# Patient Record
Sex: MALE | Race: WHITE | NOT HISPANIC OR LATINO | Employment: OTHER | URBAN - METROPOLITAN AREA
[De-identification: names, ages, dates, MRNs, and addresses within clinical notes are randomized per-mention and may not be internally consistent; named-entity substitution may affect disease eponyms.]

---

## 2017-05-12 PROBLEM — M25.552 PAIN OF LEFT HIP JOINT: Status: ACTIVE | Noted: 2017-05-12

## 2017-05-12 PROBLEM — K92.2 GASTROINTESTINAL HEMORRHAGE: Status: ACTIVE | Noted: 2017-05-12

## 2017-05-12 PROBLEM — F19.90 EXCESSIVE USE OF NONSTEROIDAL ANTI-INFLAMMATORY DRUG (NSAID): Status: ACTIVE | Noted: 2017-05-12

## 2017-05-12 PROBLEM — D64.9 SYMPTOMATIC ANEMIA: Status: ACTIVE | Noted: 2017-05-12

## 2017-05-16 PROBLEM — E87.6 HYPOKALEMIA: Status: ACTIVE | Noted: 2017-05-16

## 2017-10-24 ENCOUNTER — OFFICE VISIT (OUTPATIENT)
Dept: CARDIOLOGY | Facility: CLINIC | Age: 72
End: 2017-10-24
Payer: MEDICARE

## 2017-10-24 VITALS
SYSTOLIC BLOOD PRESSURE: 140 MMHG | HEIGHT: 71 IN | WEIGHT: 176 LBS | DIASTOLIC BLOOD PRESSURE: 71 MMHG | HEART RATE: 67 BPM | BODY MASS INDEX: 24.64 KG/M2

## 2017-10-24 DIAGNOSIS — I10 ESSENTIAL HYPERTENSION: ICD-10-CM

## 2017-10-24 DIAGNOSIS — I25.10 CORONARY ARTERY DISEASE INVOLVING NATIVE CORONARY ARTERY OF NATIVE HEART WITHOUT ANGINA PECTORIS: Primary | ICD-10-CM

## 2017-10-24 DIAGNOSIS — E78.00 HYPERCHOLESTEREMIA: ICD-10-CM

## 2017-10-24 PROCEDURE — 99999 PR PBB SHADOW E&M-NEW PATIENT-LVL III: CPT | Mod: PBBFAC,,, | Performed by: INTERNAL MEDICINE

## 2017-10-24 PROCEDURE — 99203 OFFICE O/P NEW LOW 30 MIN: CPT | Mod: PBBFAC,PO | Performed by: INTERNAL MEDICINE

## 2017-10-24 PROCEDURE — 99204 OFFICE O/P NEW MOD 45 MIN: CPT | Mod: S$PBB,,, | Performed by: INTERNAL MEDICINE

## 2017-10-24 RX ORDER — VITAMIN K2 40 MCG
1000 TABLET ORAL DAILY
Status: ON HOLD | COMMUNITY
End: 2023-01-01 | Stop reason: HOSPADM

## 2017-10-24 RX ORDER — AMOXICILLIN 500 MG
2 CAPSULE ORAL DAILY
Status: ON HOLD | COMMUNITY
End: 2018-02-16

## 2017-10-24 RX ORDER — ASPIRIN 81 MG/1
81 TABLET ORAL DAILY
Status: ON HOLD | COMMUNITY
End: 2018-02-16

## 2017-10-24 NOTE — PROGRESS NOTES
Subjective:    Patient ID:  Forrest Schmidt is a 72 y.o. male who presents for evaluation of established pt (established pt); Hypertension; and Hyperlipidemia      HPI72 yo WM who had coronary stent in 2004 , HTN and HLD. Had GI bleed this year related to NSAIDS. Doing well. Denies chest pain, SOB, or edema. Denies palpitations, weak spells, and syncope Goes to wellness program 3x's per week and exercises without problems except for a hip issue.    Review of Systems   Constitution: Negative for decreased appetite, fever, weakness, malaise/fatigue, weight gain and weight loss.   HENT: Negative for hearing loss and nosebleeds.    Eyes: Negative for visual disturbance.   Cardiovascular: Negative for chest pain, claudication, cyanosis, dyspnea on exertion, irregular heartbeat, leg swelling, near-syncope, orthopnea, palpitations, paroxysmal nocturnal dyspnea and syncope.   Respiratory: Negative for cough, hemoptysis, shortness of breath, sleep disturbances due to breathing, snoring and wheezing.    Endocrine: Negative for cold intolerance, heat intolerance, polydipsia and polyuria.   Hematologic/Lymphatic: Negative for adenopathy and bleeding problem. Does not bruise/bleed easily.   Skin: Negative for color change, itching, poor wound healing, rash and suspicious lesions.   Musculoskeletal: Positive for arthritis and joint pain. Negative for back pain, falls, joint swelling, muscle cramps, muscle weakness and myalgias.   Gastrointestinal: Negative for bloating, abdominal pain, change in bowel habit, constipation, flatus, heartburn, hematemesis, hematochezia, hemorrhoids, jaundice, melena, nausea and vomiting.   Genitourinary: Negative for bladder incontinence, decreased libido, frequency, hematuria, hesitancy and urgency.   Neurological: Negative for brief paralysis, difficulty with concentration, excessive daytime sleepiness, dizziness, focal weakness, headaches, light-headedness, loss of balance, numbness and vertigo.  "  Psychiatric/Behavioral: Negative for altered mental status, depression and memory loss. The patient does not have insomnia and is not nervous/anxious.    Allergic/Immunologic: Negative for environmental allergies, hives and persistent infections.        Objective:    Physical Exam   Constitutional: He is oriented to person, place, and time. He appears well-developed and well-nourished. No distress.   BP (!) 140/71   Pulse 67   Ht 5' 11" (1.803 m)   Wt 79.8 kg (176 lb)   BMI 24.55 kg/m²      HENT:   Head: Normocephalic and atraumatic.   Eyes: Conjunctivae and lids are normal. Pupils are equal, round, and reactive to light. Right eye exhibits no discharge. No scleral icterus.   Neck: Normal range of motion. Neck supple. No JVD present. No tracheal deviation present. No thyromegaly present.   Cardiovascular: Normal rate, regular rhythm, S1 normal, S2 normal, normal heart sounds and intact distal pulses.  Exam reveals no gallop and no friction rub.    No murmur heard.  Pulses:       Carotid pulses are 2+ on the right side, and 2+ on the left side.       Radial pulses are 2+ on the right side, and 2+ on the left side.        Femoral pulses are 2+ on the right side, and 2+ on the left side.       Popliteal pulses are 2+ on the right side, and 2+ on the left side.        Dorsalis pedis pulses are 2+ on the right side, and 2+ on the left side.        Posterior tibial pulses are 2+ on the right side, and 2+ on the left side.   Pulmonary/Chest: Effort normal and breath sounds normal. No respiratory distress. He has no wheezes. He has no rales. He exhibits no tenderness.   Abdominal: Soft. Bowel sounds are normal. He exhibits no distension and no mass. There is no hepatosplenomegaly or hepatomegaly. There is no tenderness. There is no rebound and no guarding.   Musculoskeletal: Normal range of motion. He exhibits no edema or tenderness.   Lymphadenopathy:     He has no cervical adenopathy.   Neurological: He is alert and " oriented to person, place, and time. He has normal reflexes. No cranial nerve deficit. Coordination normal.   Skin: Skin is warm and dry. No rash noted. He is not diaphoretic. No erythema. No pallor.   Psychiatric: He has a normal mood and affect. His speech is normal and behavior is normal. Judgment and thought content normal. Cognition and memory are normal.         Assessment:       1. Coronary artery disease involving native coronary artery of native heart without angina pectoris    2. Essential hypertension    3. Hypercholesteremia         Plan:     Patient asymptomatic with adequate control of BP and lipids    The current medical regimen is effective;  continue present plan and medications.    Patient advised to modify risk factors such as weight, exercise, diet,  tobacco and alcohol exposure    No orders of the defined types were placed in this encounter.    Return in about 6 months (around 4/24/2018).

## 2018-02-12 ENCOUNTER — HOSPITAL ENCOUNTER (INPATIENT)
Facility: HOSPITAL | Age: 73
LOS: 20 days | Discharge: HOME HEALTH | DRG: 982 | End: 2018-03-04
Attending: INTERNAL MEDICINE | Admitting: INTERNAL MEDICINE
Payer: MEDICARE

## 2018-02-12 DIAGNOSIS — K63.89 MASS OF SMALL INTESTINE: ICD-10-CM

## 2018-02-12 DIAGNOSIS — K92.1 GASTROINTESTINAL HEMORRHAGE WITH MELENA: ICD-10-CM

## 2018-02-12 DIAGNOSIS — K92.2 GIB (GASTROINTESTINAL BLEEDING): ICD-10-CM

## 2018-02-12 DIAGNOSIS — K92.2 GASTROINTESTINAL HEMORRHAGE: ICD-10-CM

## 2018-02-12 PROCEDURE — 36415 COLL VENOUS BLD VENIPUNCTURE: CPT

## 2018-02-12 PROCEDURE — 83735 ASSAY OF MAGNESIUM: CPT

## 2018-02-12 PROCEDURE — 11000001 HC ACUTE MED/SURG PRIVATE ROOM

## 2018-02-12 PROCEDURE — 84100 ASSAY OF PHOSPHORUS: CPT

## 2018-02-12 PROCEDURE — 85025 COMPLETE CBC W/AUTO DIFF WBC: CPT

## 2018-02-12 PROCEDURE — 80053 COMPREHEN METABOLIC PANEL: CPT

## 2018-02-12 PROCEDURE — 85610 PROTHROMBIN TIME: CPT

## 2018-02-13 PROBLEM — K92.1 MELENA: Status: ACTIVE | Noted: 2018-02-13

## 2018-02-13 LAB
ABO + RH BLD: NORMAL
ALBUMIN SERPL BCP-MCNC: 2.6 G/DL
ALBUMIN SERPL BCP-MCNC: 2.8 G/DL
ALP SERPL-CCNC: 62 U/L
ALP SERPL-CCNC: 65 U/L
ALT SERPL W/O P-5'-P-CCNC: 10 U/L
ALT SERPL W/O P-5'-P-CCNC: 10 U/L
ANION GAP SERPL CALC-SCNC: 6 MMOL/L
ANION GAP SERPL CALC-SCNC: 7 MMOL/L
AST SERPL-CCNC: 14 U/L
AST SERPL-CCNC: 15 U/L
BASOPHILS # BLD AUTO: 0.03 K/UL
BASOPHILS # BLD AUTO: 0.03 K/UL
BASOPHILS NFR BLD: 0.3 %
BASOPHILS NFR BLD: 0.3 %
BILIRUB SERPL-MCNC: 0.2 MG/DL
BILIRUB SERPL-MCNC: 0.2 MG/DL
BLD GP AB SCN CELLS X3 SERPL QL: NORMAL
BUN SERPL-MCNC: 22 MG/DL
BUN SERPL-MCNC: 23 MG/DL
CALCIUM SERPL-MCNC: 8.5 MG/DL
CALCIUM SERPL-MCNC: 8.7 MG/DL
CHLORIDE SERPL-SCNC: 107 MMOL/L
CHLORIDE SERPL-SCNC: 108 MMOL/L
CO2 SERPL-SCNC: 26 MMOL/L
CO2 SERPL-SCNC: 26 MMOL/L
CREAT SERPL-MCNC: 0.9 MG/DL
CREAT SERPL-MCNC: 0.9 MG/DL
DIFFERENTIAL METHOD: ABNORMAL
DIFFERENTIAL METHOD: ABNORMAL
EOSINOPHIL # BLD AUTO: 0.2 K/UL
EOSINOPHIL # BLD AUTO: 0.3 K/UL
EOSINOPHIL NFR BLD: 2.7 %
EOSINOPHIL NFR BLD: 3.4 %
ERYTHROCYTE [DISTWIDTH] IN BLOOD BY AUTOMATED COUNT: 15.9 %
ERYTHROCYTE [DISTWIDTH] IN BLOOD BY AUTOMATED COUNT: 15.9 %
EST. GFR  (AFRICAN AMERICAN): >60 ML/MIN/1.73 M^2
EST. GFR  (AFRICAN AMERICAN): >60 ML/MIN/1.73 M^2
EST. GFR  (NON AFRICAN AMERICAN): >60 ML/MIN/1.73 M^2
EST. GFR  (NON AFRICAN AMERICAN): >60 ML/MIN/1.73 M^2
ESTIMATED AVG GLUCOSE: 103 MG/DL
GLUCOSE SERPL-MCNC: 117 MG/DL
GLUCOSE SERPL-MCNC: 117 MG/DL
HBA1C MFR BLD HPLC: 5.2 %
HCT VFR BLD AUTO: 22.8 %
HCT VFR BLD AUTO: 23.2 %
HCT VFR BLD AUTO: 23.4 %
HCT VFR BLD AUTO: 23.6 %
HCT VFR BLD AUTO: 26.2 %
HGB BLD-MCNC: 7.3 G/DL
HGB BLD-MCNC: 7.3 G/DL
HGB BLD-MCNC: 7.5 G/DL
HGB BLD-MCNC: 7.5 G/DL
HGB BLD-MCNC: 8.3 G/DL
IMM GRANULOCYTES # BLD AUTO: 0.04 K/UL
IMM GRANULOCYTES # BLD AUTO: 0.05 K/UL
IMM GRANULOCYTES NFR BLD AUTO: 0.5 %
IMM GRANULOCYTES NFR BLD AUTO: 0.6 %
INR PPP: 0.9
LYMPHOCYTES # BLD AUTO: 1 K/UL
LYMPHOCYTES # BLD AUTO: 1 K/UL
LYMPHOCYTES NFR BLD: 11.1 %
LYMPHOCYTES NFR BLD: 11.4 %
MAGNESIUM SERPL-MCNC: 1.8 MG/DL
MCH RBC QN AUTO: 27.8 PG
MCH RBC QN AUTO: 27.8 PG
MCHC RBC AUTO-ENTMCNC: 31.5 G/DL
MCHC RBC AUTO-ENTMCNC: 31.8 G/DL
MCV RBC AUTO: 87 FL
MCV RBC AUTO: 88 FL
MONOCYTES # BLD AUTO: 1.2 K/UL
MONOCYTES # BLD AUTO: 1.2 K/UL
MONOCYTES NFR BLD: 13.1 %
MONOCYTES NFR BLD: 13.4 %
NEUTROPHILS # BLD AUTO: 6.2 K/UL
NEUTROPHILS # BLD AUTO: 6.4 K/UL
NEUTROPHILS NFR BLD: 71.2 %
NEUTROPHILS NFR BLD: 72 %
NRBC BLD-RTO: 0 /100 WBC
NRBC BLD-RTO: 0 /100 WBC
PHOSPHATE SERPL-MCNC: 3.3 MG/DL
PLATELET # BLD AUTO: 360 K/UL
PLATELET # BLD AUTO: 364 K/UL
PMV BLD AUTO: 9.3 FL
PMV BLD AUTO: 9.4 FL
POTASSIUM SERPL-SCNC: 4.4 MMOL/L
POTASSIUM SERPL-SCNC: 4.7 MMOL/L
PROT SERPL-MCNC: 5.8 G/DL
PROT SERPL-MCNC: 6 G/DL
PROTHROMBIN TIME: 9.9 SEC
RBC # BLD AUTO: 2.63 M/UL
RBC # BLD AUTO: 2.7 M/UL
SODIUM SERPL-SCNC: 140 MMOL/L
SODIUM SERPL-SCNC: 140 MMOL/L
WBC # BLD AUTO: 8.65 K/UL
WBC # BLD AUTO: 8.93 K/UL

## 2018-02-13 PROCEDURE — 83036 HEMOGLOBIN GLYCOSYLATED A1C: CPT

## 2018-02-13 PROCEDURE — 63600175 PHARM REV CODE 636 W HCPCS: Performed by: STUDENT IN AN ORGANIZED HEALTH CARE EDUCATION/TRAINING PROGRAM

## 2018-02-13 PROCEDURE — C9113 INJ PANTOPRAZOLE SODIUM, VIA: HCPCS | Performed by: STUDENT IN AN ORGANIZED HEALTH CARE EDUCATION/TRAINING PROGRAM

## 2018-02-13 PROCEDURE — 36415 COLL VENOUS BLD VENIPUNCTURE: CPT

## 2018-02-13 PROCEDURE — 85025 COMPLETE CBC W/AUTO DIFF WBC: CPT

## 2018-02-13 PROCEDURE — 80053 COMPREHEN METABOLIC PANEL: CPT

## 2018-02-13 PROCEDURE — 86920 COMPATIBILITY TEST SPIN: CPT

## 2018-02-13 PROCEDURE — 11000001 HC ACUTE MED/SURG PRIVATE ROOM

## 2018-02-13 PROCEDURE — 85014 HEMATOCRIT: CPT

## 2018-02-13 PROCEDURE — 86316 IMMUNOASSAY TUMOR OTHER: CPT

## 2018-02-13 PROCEDURE — 85018 HEMOGLOBIN: CPT

## 2018-02-13 PROCEDURE — 25000003 PHARM REV CODE 250: Performed by: STUDENT IN AN ORGANIZED HEALTH CARE EDUCATION/TRAINING PROGRAM

## 2018-02-13 PROCEDURE — 93005 ELECTROCARDIOGRAM TRACING: CPT

## 2018-02-13 PROCEDURE — 99222 1ST HOSP IP/OBS MODERATE 55: CPT | Mod: GC,,, | Performed by: INTERNAL MEDICINE

## 2018-02-13 PROCEDURE — 93010 ELECTROCARDIOGRAM REPORT: CPT | Mod: ,,, | Performed by: INTERNAL MEDICINE

## 2018-02-13 PROCEDURE — 86850 RBC ANTIBODY SCREEN: CPT

## 2018-02-13 RX ORDER — HYDROCODONE BITARTRATE AND ACETAMINOPHEN 500; 5 MG/1; MG/1
TABLET ORAL ONCE
Status: CANCELLED | OUTPATIENT
Start: 2018-02-13 | End: 2018-02-13

## 2018-02-13 RX ORDER — PANTOPRAZOLE SODIUM 40 MG/10ML
40 INJECTION, POWDER, LYOPHILIZED, FOR SOLUTION INTRAVENOUS 2 TIMES DAILY
Status: DISCONTINUED | OUTPATIENT
Start: 2018-02-13 | End: 2018-02-14

## 2018-02-13 RX ORDER — IBUPROFEN 200 MG
24 TABLET ORAL
Status: DISCONTINUED | OUTPATIENT
Start: 2018-02-13 | End: 2018-03-04 | Stop reason: HOSPADM

## 2018-02-13 RX ORDER — FUROSEMIDE 10 MG/ML
40 INJECTION INTRAMUSCULAR; INTRAVENOUS 2 TIMES DAILY
Status: DISCONTINUED | OUTPATIENT
Start: 2018-02-13 | End: 2018-02-13

## 2018-02-13 RX ORDER — SODIUM CHLORIDE 0.9 % (FLUSH) 0.9 %
5 SYRINGE (ML) INJECTION
Status: DISCONTINUED | OUTPATIENT
Start: 2018-02-13 | End: 2018-03-04 | Stop reason: HOSPADM

## 2018-02-13 RX ORDER — ATORVASTATIN CALCIUM 20 MG/1
40 TABLET, FILM COATED ORAL NIGHTLY
Status: DISCONTINUED | OUTPATIENT
Start: 2018-02-13 | End: 2018-03-04 | Stop reason: HOSPADM

## 2018-02-13 RX ORDER — IBUPROFEN 200 MG
16 TABLET ORAL
Status: DISCONTINUED | OUTPATIENT
Start: 2018-02-13 | End: 2018-03-04 | Stop reason: HOSPADM

## 2018-02-13 RX ORDER — GLUCAGON 1 MG
1 KIT INJECTION
Status: DISCONTINUED | OUTPATIENT
Start: 2018-02-13 | End: 2018-03-04 | Stop reason: HOSPADM

## 2018-02-13 RX ADMIN — PANTOPRAZOLE SODIUM 40 MG: 40 INJECTION, POWDER, FOR SOLUTION INTRAVENOUS at 08:02

## 2018-02-13 RX ADMIN — PANTOPRAZOLE SODIUM 40 MG: 40 INJECTION, POWDER, FOR SOLUTION INTRAVENOUS at 02:02

## 2018-02-13 RX ADMIN — ATORVASTATIN CALCIUM 40 MG: 20 TABLET, FILM COATED ORAL at 08:02

## 2018-02-13 RX ADMIN — ATORVASTATIN CALCIUM 40 MG: 20 TABLET, FILM COATED ORAL at 02:02

## 2018-02-13 NOTE — ASSESSMENT & PLAN NOTE
Anemia and melena. Suspected from small bowel mass.  Needs surgical evaluation and operation.  Pt was previously taking large amount of NSAIDs, but stopped last year and now only taking ASA daily.  He wishes for EGD to rule out upper source.    Plan:  EGD tomorrow, rule out upper GI bleeding source  Keep NPO past Mn  Surgical onc consult for small bowel mass resection  Send Chromogranin A serum for suspicion of carcinoid tumor  PPI PO daily

## 2018-02-13 NOTE — SUBJECTIVE & OBJECTIVE
Past Medical History:   Diagnosis Date    Anticoagulant long-term use     Arthritis     BPH (benign prostatic hyperplasia)     Coronary artery disease     stent x1 2005    Hypercholesteremia     Hypertension     Low iron     Osteopenia        Past Surgical History:   Procedure Laterality Date    APPENDECTOMY      CATARACT EXTRACTION      COLONOSCOPY N/A 5/15/2017    Procedure: COLONOSCOPY;  Surgeon: Dez Jimenez MD;  Location: [facility];  Service: Endoscopy;  Laterality: N/A;    CORONARY STENT PLACEMENT      EYE SURGERY      SKIN BIOPSY      TONSILLECTOMY      VASECTOMY         Review of patient's allergies indicates:   Allergen Reactions    Augmentin [amoxicillin-pot clavulanate] Shortness Of Breath    Horse/equine containing products     Singulair [montelukast]      Family History     Problem Relation (Age of Onset)    Breast cancer Mother    Cancer Mother    Diabetes Mother    Stroke Mother, Father        Social History Main Topics    Smoking status: Former Smoker     Packs/day: 0.50     Years: 15.00    Smokeless tobacco: Not on file      Comment: Quit smoking about 40 yrs ago    Alcohol use Yes      Comment: 2-4 glasses wine/day, 5/11/17 last drink    Drug use: No    Sexual activity: Not on file     Review of Systems   Constitutional: Positive for fatigue and unexpected weight change. Negative for chills and fever.   HENT: Positive for facial swelling. Negative for mouth sores and trouble swallowing.    Eyes: Negative for pain and redness.   Respiratory: Negative for cough, shortness of breath and wheezing.    Cardiovascular: Negative for chest pain and leg swelling.   Gastrointestinal: Positive for blood in stool. Negative for abdominal pain.   Genitourinary: Positive for difficulty urinating. Negative for dysuria and hematuria.   Musculoskeletal: Negative for gait problem and neck stiffness.   Skin: Positive for color change. Negative for rash and wound.   Neurological: Negative  for seizures and headaches.   Psychiatric/Behavioral: Negative for confusion and hallucinations.     Objective:     Vital Signs (Most Recent):  Temp: 97.7 °F (36.5 °C) (02/13/18 0734)  Pulse: 62 (02/13/18 0733)  Resp: 14 (02/13/18 0734)  BP: (!) 155/71 (02/13/18 0733)  SpO2: 96 % (02/13/18 0333) Vital Signs (24h Range):  Temp:  [97.7 °F (36.5 °C)-98.2 °F (36.8 °C)] 97.7 °F (36.5 °C)  Pulse:  [62-70] 62  Resp:  [12-17] 14  SpO2:  [96 %-99 %] 96 %  BP: (151-155)/(68-71) 155/71     Weight: 73 kg (161 lb) (02/12/18 2204)  Body mass index is 22.45 kg/m².      Intake/Output Summary (Last 24 hours) at 02/13/18 1214  Last data filed at 02/13/18 1100   Gross per 24 hour   Intake                0 ml   Output                3 ml   Net               -3 ml       Lines/Drains/Airways     Peripheral Intravenous Line                 Peripheral IV - Single Lumen 05/12/17 2119 Right Antecubital 276 days         Peripheral IV - Single Lumen 02/13/18 0230 Left Forearm less than 1 day                Physical Exam   Constitutional: He is oriented to person, place, and time. He appears well-developed and well-nourished. No distress.   HENT:   Head: Normocephalic and atraumatic.   Eyes: Conjunctivae and EOM are normal.   Neck: Neck supple. No thyromegaly present.   Cardiovascular: Normal rate, regular rhythm and intact distal pulses.    Pulmonary/Chest: Effort normal and breath sounds normal. No respiratory distress.   Abdominal: Soft. He exhibits no distension. There is no tenderness. There is no guarding.   Musculoskeletal: Normal range of motion. He exhibits no tenderness.   Neurological: He is alert and oriented to person, place, and time. No cranial nerve deficit.   Skin: Skin is warm and dry. No rash noted.   Psychiatric: He has a normal mood and affect. His behavior is normal.   Vitals reviewed.      Significant Labs:  Acute Hepatitis Panel: No results for input(s): HEPBSAG, HEPAIGM, HEPCAB in the last 48 hours.    Invalid input(s):  HAPBIGM  Amylase: No results for input(s): AMYLASE in the last 48 hours.  Blood Culture: No results for input(s): LABBLOO in the last 48 hours.  CBC:   Recent Labs  Lab 02/12/18 2335 02/13/18 0202 02/13/18  0926 02/13/18  1154   WBC 8.65 8.93  --   --    HGB 7.3* 7.5* 7.5* 8.3*   HCT 23.2* 23.6* 23.4* 26.2*   * 360*  --   --      CMP:   Recent Labs  Lab 02/13/18 0202   *   CALCIUM 8.5*   ALBUMIN 2.8*   PROT 6.0      K 4.7   CO2 26      BUN 22   CREATININE 0.9   ALKPHOS 65   ALT 10   AST 15   BILITOT 0.2     Coagulation:   Recent Labs  Lab 02/12/18 2335   INR 0.9     CRP: No results for input(s): CRP in the last 48 hours.  H.Pylori Ab IgG: No results for input(s): HPYLORIIGG in the last 48 hours.  Lipase: No results for input(s): LIPASE in the last 48 hours.  Liver Function Test:   Recent Labs  Lab 02/12/18 2335 02/13/18 0202   ALT 10 10   AST 14 15   ALKPHOS 62 65   BILITOT 0.2 0.2   PROT 5.8* 6.0   ALBUMIN 2.6* 2.8*     Peritoneal Fluid Cultures: No results for input(s): AEROBICCULTU, LABGRAM in the last 48 hours.    Significant Imaging:  Imaging results within the past 24 hours have been reviewed.     Reviewed CT scan from OSH.  Reviewed ct report as well.

## 2018-02-13 NOTE — CONSULTS
[facility]  General Surgery  Consult Note    Patient Name: Forrest Schmidt  MRN: [id number]  Code Status: Full Code  Admission Date: 2/12/2018  Hospital Length of Stay: 1 days  Attending Physician: Skye Brennan MD  Primary Care Provider: Primary Doctor No    Patient information was obtained from patient, spouse/SO, past medical records and ER records.     Inpatient consult to General Surgery  Consult performed by: GALLITO MORFIN  Consult ordered by: BARRERA LOZA        Subjective:     Principal Problem: Gastrointestinal hemorrhage    History of Present Illness: This is a 73 y/o male with hx CAD and HTN, obscure overt GI bleeding who presents as transfer [facility] for ileal mass seen on CT imaging.  Pt was admitted 2/5 with melena. Underwent  colonoscopy, negative for bleeding with old blood seen in TI. No EGD at OSH.  CT abd showed ileal mass, approx 5 cm. Surgery recommend operative intervention at OSH, however patient requested transfer for 2nd opinion.  Patient has had a similar episode in the past year, at which time he also had EGD, colonscopy and capsule enterography which were negative, bleeding at that time thought due to NSAID use which was discontinued and he did well until about a week ago. When he began to have blood stools again. He has some fullness and early satiety, but has not altered his eating habits, and normal bowel habits, 1-3 movement a day, no change in consistency. No abdominal pain. Only prior abdominal surgery is an appendectomy as a child.     No current facility-administered medications on file prior to encounter.      Current Outpatient Prescriptions on File Prior to Encounter   Medication Sig    arginine 500 mg tablet Take 500 mg by mouth once daily.    aspirin (ECOTRIN) 81 MG EC tablet Take 81 mg by mouth once daily.    atorvastatin (LIPITOR) 40 MG tablet Take 40 mg by mouth every evening.    cholecalciferol, vitamin D3, (VITAMIN D3) 2,000 unit Cap Take 1  capsule by mouth once daily.    FERROUS SULFATE (FEOSOL ORAL) Take 1 tablet by mouth once daily at 6am.    fish oil-omega-3 fatty acids 300-1,000 mg capsule Take 2 g by mouth once daily.    fluticasone (FLONASE) 50 mcg/actuation nasal spray 1 spray by Each Nare route 2 (two) times daily.    losartan (COZAAR) 100 MG tablet Take 1 tablet (100 mg total) by mouth every evening. Hold if sbp < 120    omeprazole (PRILOSEC) 20 MG capsule Take 20 mg by mouth once daily.    SOD CHLOR,SOD BICARB/NETI POT (NEILMED NASAFLO NASL) 1 spray by Nasal route every evening.    SODIUM CHLORIDE (FE-128 OPHT) Apply 1 drop to eye 4 (four) times daily. 1 drop 4 times daily to right eye    tadalafil (CIALIS) 5 MG tablet Take 5 mg by mouth once daily at 6am.    testosterone cypionate (DEPOTESTOTERONE CYPIONATE) 200 mg/mL injection Inject 200 mg into the muscle every 21 days.    zinc gluconate 50 mg tablet Take 50 mg by mouth twice a week. Every Tuesday and thursday    FOLIC ACID/MV,FE,MIN (CENTRUM ORAL) Take 1 tablet by mouth once daily at 6am.       Review of patient's allergies indicates:   Allergen Reactions    Augmentin [amoxicillin-pot clavulanate] Shortness Of Breath    Horse/equine containing products     Singulair [montelukast]        Past Medical History:   Diagnosis Date    Anticoagulant long-term use     Arthritis     BPH (benign prostatic hyperplasia)     Coronary artery disease     stent x1 2005    Hypercholesteremia     Hypertension     Low iron     Osteopenia      Past Surgical History:   Procedure Laterality Date    APPENDECTOMY      CATARACT EXTRACTION      COLONOSCOPY N/A 5/15/2017    Procedure: COLONOSCOPY;  Surgeon: Dez Jimenez MD;  Location[facility];  Service: Endoscopy;  Laterality: N/A;    CORONARY STENT PLACEMENT      EYE SURGERY      SKIN BIOPSY      TONSILLECTOMY      VASECTOMY       Family History     Problem Relation (Age of Onset)    Breast cancer Mother    Cancer Mother     Diabetes Mother    Stroke Mother, Father        Social History Main Topics    Smoking status: Former Smoker     Packs/day: 0.50     Years: 15.00    Smokeless tobacco: Not on file      Comment: Quit smoking about 40 yrs ago    Alcohol use Yes      Comment: 2-4 glasses wine/day, 5/11/17 last drink    Drug use: No    Sexual activity: Not on file     Review of Systems   Constitutional: Negative for activity change, appetite change and fever.   HENT: Negative for congestion and drooling.    Eyes: Negative for pain, redness and itching.   Respiratory: Negative for cough and shortness of breath.    Cardiovascular: Negative for chest pain.   Gastrointestinal: Positive for blood in stool. Negative for abdominal distention and abdominal pain.   Endocrine: Negative for cold intolerance and heat intolerance.   Genitourinary: Negative for dysuria.   Musculoskeletal: Negative for back pain and gait problem.   Skin: Negative for color change and wound.   Allergic/Immunologic: Negative for environmental allergies and immunocompromised state.   Neurological: Negative for dizziness.   Hematological: Negative for adenopathy.   Psychiatric/Behavioral: Negative for agitation and confusion.     Objective:     Vital Signs (Most Recent):  Temp: 97.6 °F (36.4 °C) (02/13/18 1608)  Pulse: 70 (02/13/18 1608)  Resp: 15 (02/13/18 1608)  BP: 126/61 (02/13/18 1608)  SpO2: 99 % (02/13/18 1608) Vital Signs (24h Range):  Temp:  [97.4 °F (36.3 °C)-98.2 °F (36.8 °C)] 97.6 °F (36.4 °C)  Pulse:  [62-94] 70  Resp:  [12-17] 15  SpO2:  [96 %-99 %] 99 %  BP: (126-155)/(61-71) 126/61     Weight: 73 kg (161 lb)  Body mass index is 22.45 kg/m².    Physical Exam   Constitutional: He is oriented to person, place, and time. He appears well-developed and well-nourished. No distress.   HENT:   Head: Normocephalic and atraumatic.   Eyes: Conjunctivae are normal. Right eye exhibits no discharge. Left eye exhibits no discharge. No scleral icterus.   Neck: Normal  range of motion. Neck supple. No JVD present. No tracheal deviation present.   Cardiovascular: Normal rate and regular rhythm.    Pulmonary/Chest: Effort normal. No respiratory distress.   Abdominal: Soft. He exhibits no distension. There is no tenderness.   Neurological: He is alert and oriented to person, place, and time.   Skin: Skin is warm and dry. No rash noted. He is not diaphoretic. No erythema.       Significant Labs:  CBC:   Recent Labs  Lab 02/13/18  0202  02/13/18  1154   WBC 8.93  --   --    RBC 2.70*  --   --    HGB 7.5*  < > 8.3*   HCT 23.6*  < > 26.2*   *  --   --    MCV 87  --   --    MCH 27.8  --   --    MCHC 31.8*  --   --    < > = values in this interval not displayed.  CMP:   Recent Labs  Lab 02/13/18  0202   *   CALCIUM 8.5*   ALBUMIN 2.8*   PROT 6.0      K 4.7   CO2 26      BUN 22   CREATININE 0.9   ALKPHOS 65   ALT 10   AST 15   BILITOT 0.2     Coagulation:   Recent Labs  Lab 02/12/18  2335   LABPROT 9.9   INR 0.9       Significant Diagnostics:  I have reviewed and interpreted all pertinent imaging results/findings within the past 24 hours.    Assessment/Plan:     Mass of small intestine    This is possibly related to the source of GI bleed  Agree with GI plan for upper endoscopy tomorrow to rule out more proximal source given melanic stools by history.   If this is negative then will likely need operative intervention, excisional biopsy of this ileal mass  Was in normal state of health prior to this episode, and tolerating normal diet  Will check prealbumin for operative planning  Would continue to trend H/H, and transfuse as necessary, has been stable for last several checks.   Will continue to follow along          VTE Risk Mitigation         Ordered     Medium Risk of VTE  Once      02/13/18 0104     Place sequential compression device  Until discontinued      02/13/18 0104          Thank you for your consult. I will follow-up with patient. Please contact us if  you have any additional questions.    Mario Bacon MD  General Surgery  [facility]

## 2018-02-13 NOTE — PLAN OF CARE
Outside Transfer Acceptance Note     Transferring Physician or Mid Level Provider/Speciality: Alden Swain MD     Accepting Physician: Silvio Borrero     Date of Acceptance: 02/12/2018      Code Status: Full     Transferring Facility/Hospital: [place]     Reason for Transfer to [facility]: Surgery evaluation-Patient request     Report from Transferring Physician or Mid-Level provider/Hospital course: 72 year old male with hx of chronic GI bleed. Patient has received 3 units PRBCs during this  hospitalization(approximately 1 week). Patient has had multiple EGDs, colonoscopies and capsule endoscopy in the past per Dr. Alden Swain. CT scan at this hospitalization found 5cm ileal mass.  General Surgery at [facility] recommended surgery. Patient would like second opinion with GI(possible biopsy) and surgery at [facility] if needed.   Patient requested Dr. Sanchez per outside physician.   Hgb stable today at 7.5. Hemodynamically stable.      To do list upon patient arrival: consult GI first then general surgery     Please call extension [phone] upon patient arrival to floor for Hospital Medicine admit team assignment and for additional admit orders. If patient is coming from another [facility] facility please also call [phone] to inform the admit team/office that patient has arrived from the [facility] facility to the floor so patient can be evaluated.

## 2018-02-13 NOTE — ASSESSMENT & PLAN NOTE
- with history of melena, the source likely to be Upper GIB or right side colon   - In this pt., the ileal mass could be the source for the GIB. DDX og the mass adenocarcinoma, lymphoma or less likely infectious in origine.     1. Protonix 40 mg IV BID  2. Intravascular resuscitation/support with IVFs and pRBCs as needed.  3. Serial H/H's transfuse as needed.  4. Discontinue all ASA, NSAIDs and Heparin products  5. Maintain IV access with 2 large bore IVs  6. NPO   7. GI consult. Please call in AM

## 2018-02-13 NOTE — HPI
This is a 71 y/o male with hx CAD and HTN, obscure overt GI bleeding who presents as transfer from O[facility] for ileal mass seen on CT imaging.  Pt was admitted 2/5 with melena. Underwent  colonoscopy, negative for bleeding with old blood seen in TI. No EGD at OSH.  CT abd showed ileal mass, approx 5 cm. Surgery recommend operative intervention at OSH, however patient requested transfer for 2nd opinion.  Patient has had a similar episode in the past year, at which time he also had EGD, colonscopy and capsule enterography which were negative, bleeding at that time thought due to NSAID use which was discontinued and he did well until about a week ago. When he began to have blood stools again. He has some fullness and early satiety, but has not altered his eating habits, and normal bowel habits, 1-3 movement a day, no change in consistency. No abdominal pain. Only prior abdominal surgery is an appendectomy as a child.

## 2018-02-13 NOTE — CONSULTS
[facility]  Gastroenterology  Consult Note    Patient Name: Forrest Schmidt  MRN: [id number]  Admission Date: 2/12/2018  Hospital Length of Stay: 1 days  Code Status: Full Code   Attending Provider: Skye Brennan MD   Consulting Provider: Carla Salinas MD  Primary Care Physician: Primary Doctor No  Principal Problem:Gastrointestinal hemorrhage    Inpatient consult to Gastroenterology  Consult performed by: CARLA SALINAS  Consult ordered by: DAGO HARRIS        Subjective:     HPI:  This is a 73 y/o male with hx CAD and HTN, obscure overt GI bleeding [facility].  Pt was admitted 2/5 with melena. Underwent  colonoscopy, negative for bleeding with old blood seen in TI. No EGD at OSH.  CT abd showed ileal mass, approx 5 cm  Surgery recommend operative intervention at OSH, however patient requested transfer for 2nd opinion.    GI is consulted for possible evaluation of small bowel mass.  Of note, pt with similar episode back in 5/2017 and underwent EGD + colon, both negative. VCE was performed, and per report, was negative.  He was taking NSAIDs at that time.  No NSAIDs, no liver disease    Pt has symptoms of bladder pressure and some mild flushing. No wheezing. No diarrhea. Some cough and mild SOB. Assoc 10 lb wt loss, unintentional.         Past Medical History:   Diagnosis Date    Anticoagulant long-term use     Arthritis     BPH (benign prostatic hyperplasia)     Coronary artery disease     stent x1 2005    Hypercholesteremia     Hypertension     Low iron     Osteopenia        Past Surgical History:   Procedure Laterality Date    APPENDECTOMY      CATARACT EXTRACTION      COLONOSCOPY N/A 5/15/2017    Procedure: COLONOSCOPY;  Surgeon: Dez Jimenez MD;  Location: [facility];  Service: Endoscopy;  Laterality: N/A;    CORONARY STENT PLACEMENT      EYE SURGERY      SKIN BIOPSY      TONSILLECTOMY       VASECTOMY         Review of patient's allergies indicates:   Allergen Reactions    Augmentin [amoxicillin-pot clavulanate] Shortness Of Breath    Horse/equine containing products     Singulair [montelukast]      Family History     Problem Relation (Age of Onset)    Breast cancer Mother    Cancer Mother    Diabetes Mother    Stroke Mother, Father        Social History Main Topics    Smoking status: Former Smoker     Packs/day: 0.50     Years: 15.00    Smokeless tobacco: Not on file      Comment: Quit smoking about 40 yrs ago    Alcohol use Yes      Comment: 2-4 glasses wine/day, 5/11/17 last drink    Drug use: No    Sexual activity: Not on file     Review of Systems   Constitutional: Positive for fatigue and unexpected weight change. Negative for chills and fever.   HENT: Positive for facial swelling. Negative for mouth sores and trouble swallowing.    Eyes: Negative for pain and redness.   Respiratory: Negative for cough, shortness of breath and wheezing.    Cardiovascular: Negative for chest pain and leg swelling.   Gastrointestinal: Positive for blood in stool. Negative for abdominal pain.   Genitourinary: Positive for difficulty urinating. Negative for dysuria and hematuria.   Musculoskeletal: Negative for gait problem and neck stiffness.   Skin: Positive for color change. Negative for rash and wound.   Neurological: Negative for seizures and headaches.   Psychiatric/Behavioral: Negative for confusion and hallucinations.     Objective:     Vital Signs (Most Recent):  Temp: 97.7 °F (36.5 °C) (02/13/18 0734)  Pulse: 62 (02/13/18 0733)  Resp: 14 (02/13/18 0734)  BP: (!) 155/71 (02/13/18 0733)  SpO2: 96 % (02/13/18 0333) Vital Signs (24h Range):  Temp:  [97.7 °F (36.5 °C)-98.2 °F (36.8 °C)] 97.7 °F (36.5 °C)  Pulse:  [62-70] 62  Resp:  [12-17] 14  SpO2:  [96 %-99 %] 96 %  BP: (151-155)/(68-71) 155/71     Weight: 73 kg (161 lb) (02/12/18 2204)  Body mass index is 22.45 kg/m².      Intake/Output Summary  (Last 24 hours) at 02/13/18 1214  Last data filed at 02/13/18 1100   Gross per 24 hour   Intake                0 ml   Output                3 ml   Net               -3 ml       Lines/Drains/Airways     Peripheral Intravenous Line                 Peripheral IV - Single Lumen 05/12/17 2119 Right Antecubital 276 days         Peripheral IV - Single Lumen 02/13/18 0230 Left Forearm less than 1 day                Physical Exam   Constitutional: He is oriented to person, place, and time. He appears well-developed and well-nourished. No distress.   HENT:   Head: Normocephalic and atraumatic.   Eyes: Conjunctivae and EOM are normal.   Neck: Neck supple. No thyromegaly present.   Cardiovascular: Normal rate, regular rhythm and intact distal pulses.    Pulmonary/Chest: Effort normal and breath sounds normal. No respiratory distress.   Abdominal: Soft. He exhibits no distension. There is no tenderness. There is no guarding.   Musculoskeletal: Normal range of motion. He exhibits no tenderness.   Neurological: He is alert and oriented to person, place, and time. No cranial nerve deficit.   Skin: Skin is warm and dry. No rash noted.   Psychiatric: He has a normal mood and affect. His behavior is normal.   Vitals reviewed.      Significant Labs:  Acute Hepatitis Panel: No results for input(s): HEPBSAG, HEPAIGM, HEPCAB in the last 48 hours.    Invalid input(s): HAPBIGM  Amylase: No results for input(s): AMYLASE in the last 48 hours.  Blood Culture: No results for input(s): LABBLOO in the last 48 hours.  CBC:   Recent Labs  Lab 02/12/18  2335 02/13/18  0202 02/13/18  0926 02/13/18  1154   WBC 8.65 8.93  --   --    HGB 7.3* 7.5* 7.5* 8.3*   HCT 23.2* 23.6* 23.4* 26.2*   * 360*  --   --      CMP:   Recent Labs  Lab 02/13/18  0202   *   CALCIUM 8.5*   ALBUMIN 2.8*   PROT 6.0      K 4.7   CO2 26      BUN 22   CREATININE 0.9   ALKPHOS 65   ALT 10   AST 15   BILITOT 0.2     Coagulation:   Recent Labs  Lab  02/12/18  2335   INR 0.9     CRP: No results for input(s): CRP in the last 48 hours.  H.Pylori Ab IgG: No results for input(s): HPYLORIIGG in the last 48 hours.  Lipase: No results for input(s): LIPASE in the last 48 hours.  Liver Function Test:   Recent Labs  Lab 02/12/18 2335 02/13/18  0202   ALT 10 10   AST 14 15   ALKPHOS 62 65   BILITOT 0.2 0.2   PROT 5.8* 6.0   ALBUMIN 2.6* 2.8*     Peritoneal Fluid Cultures: No results for input(s): AEROBICCULTU, LABGRAM in the last 48 hours.    Significant Imaging:  Imaging results within the past 24 hours have been reviewed.     Reviewed CT scan from OSH.  Reviewed ct report as well.    Assessment/Plan:     Mass of small intestine    Anemia and melena. Suspected from small bowel mass.  Needs surgical evaluation and operation.  Pt was previously taking large amount of NSAIDs, but stopped last year and now only taking ASA daily.  He wishes for EGD to rule out upper source.    Plan:  EGD tomorrow, rule out upper GI bleeding source  Keep NPO past Mn  Surgical onc consult for small bowel mass resection  Send Chromogranin A serum for suspicion of carcinoid tumor  PPI PO daily           Melena    As above            Thank you for your consult. I will follow-up with patient. Please contact us if you have any additional questions.    Chace Salinas MD  Gastroenterology  [facility]

## 2018-02-13 NOTE — HPI
72 year old male with hx of GI bleed (5/2017), a remote history of coronary artery disease and hypertension who trasferred from [facility] for GI/Surgery eval. Pt presented to OSH on 02/05/18 when melena for couple days. Per pt. He still has melena with each BM, last one was yesterday 11 am. Denies abdominal pain, nausea, vomiting or diarrhea. Denies NSAID use. Not know to have liver disease.     In [facility],  Patient has received 3 units PRBCs. Had colonoscopy 02/08/18 which was negative. CT abdomen showed ileal mass which is the reason pt. Is her for GI/Surgery eval for the mass. General Surgery at [facility] recommended surgery. Patient would like second opinion with GI(possible biopsy) and surgery at [facility] if     Of note, in April 2017, he had melena but at that time, he was using lots of NSAID which he is not now. At that time, he had EGD, colonoscopy and capsule endoscopy which were negative for source of bleeding.

## 2018-02-13 NOTE — ASSESSMENT & PLAN NOTE
- likely due to blood loss  - received 3 pRBC at OSH about 5 days ago, since then H/H stable   - H/H 7.5/23.6  - check H/H q 6 h

## 2018-02-13 NOTE — PLAN OF CARE
Problem: Patient Care Overview  Goal: Plan of Care Review  Outcome: Ongoing (interventions implemented as appropriate)   02/13/18 0546   Coping/Psychosocial   Plan Of Care Reviewed With patient;spouse   Patient admitted to floor 2/12. Plan of care reviewed with patient and spouse; patient NPO. Call light within reach; siderailx3; no s/sx of distress. Frequent monitoring continued; will cont to monitor.

## 2018-02-13 NOTE — NURSING
Patient arrived via transport. Patient oriented to room; call light within reach; team notified of arrival. Will continue to monitor.

## 2018-02-13 NOTE — ASSESSMENT & PLAN NOTE
This is possibly related to the source of GI bleed  Agree with GI plan for upper endoscopy tomorrow to rule out more proximal source given melanic stools by history.   If this is negative then will likely need operative intervention, excisional biopsy of this ileal mass  Was in normal state of health prior to this episode, and tolerating normal diet  Will check prealbumin for operative planning  Would continue to trend H/H, and transfuse as necessary, has been stable for last several checks.   Will continue to follow along

## 2018-02-13 NOTE — HPI
This is a 71 y/o male with hx CAD and HTN, obscure overt GI bleeding who presents as transfer from OSH [facility] for ileal mass seen on CT imaging.  Pt was admitted 2/5 with melena. Underwent  colonoscopy, negative for bleeding with old blood seen in TI. No EGD at OSH.  CT abd showed ileal mass, approx 5 cm  Surgery recommend operative intervention at OSH, however patient requested transfer for 2nd opinion.    GI is consulted for possible evaluation of small bowel mass.  Of note, pt with similar episode back in 5/2017 and underwent EGD + colon, both negative. VCE was performed, and per report, was negative.  He was taking NSAIDs at that time.  No NSAIDs, no liver disease    Pt has symptoms of bladder pressure and some mild flushing. No wheezing. No diarrhea. Some cough and mild SOB. Assoc 10 lb wt loss, unintentional.

## 2018-02-13 NOTE — HOSPITAL COURSE
2/13 Admitted to -1, GI/GS consulted, I unite PRBC transfused.  2/14 Patient hemodynamically stable, no complaints, underwent EGD today, tolerated well , no abnormalities was found, Gs will preform biopsy and possible ileal tumor resection tomorrow.   2/15 NEON,Hb stable, will undergo surgical resection of ilial mass today.   2/16 Small bowel resection was done yesterday with no complication, 5-6 cm tumor removed,no evidence of mets, frozen suction showed spindle cell tumor, patient doing well this morning, hemodynamically stable, pain controled with dilaudid gtt.

## 2018-02-13 NOTE — SUBJECTIVE & OBJECTIVE
No current facility-administered medications on file prior to encounter.      Current Outpatient Prescriptions on File Prior to Encounter   Medication Sig    arginine 500 mg tablet Take 500 mg by mouth once daily.    aspirin (ECOTRIN) 81 MG EC tablet Take 81 mg by mouth once daily.    atorvastatin (LIPITOR) 40 MG tablet Take 40 mg by mouth every evening.    cholecalciferol, vitamin D3, (VITAMIN D3) 2,000 unit Cap Take 1 capsule by mouth once daily.    FERROUS SULFATE (FEOSOL ORAL) Take 1 tablet by mouth once daily at 6am.    fish oil-omega-3 fatty acids 300-1,000 mg capsule Take 2 g by mouth once daily.    fluticasone (FLONASE) 50 mcg/actuation nasal spray 1 spray by Each Nare route 2 (two) times daily.    losartan (COZAAR) 100 MG tablet Take 1 tablet (100 mg total) by mouth every evening. Hold if sbp < 120    omeprazole (PRILOSEC) 20 MG capsule Take 20 mg by mouth once daily.    SOD CHLOR,SOD BICARB/NETI POT (NEILMED NASAFLO NASL) 1 spray by Nasal route every evening.    SODIUM CHLORIDE (FE-128 OPHT) Apply 1 drop to eye 4 (four) times daily. 1 drop 4 times daily to right eye    tadalafil (CIALIS) 5 MG tablet Take 5 mg by mouth once daily at 6am.    testosterone cypionate (DEPOTESTOTERONE CYPIONATE) 200 mg/mL injection Inject 200 mg into the muscle every 21 days.    zinc gluconate 50 mg tablet Take 50 mg by mouth twice a week. Every Tuesday and thursday    FOLIC ACID/MV,FE,MIN (CENTRUM ORAL) Take 1 tablet by mouth once daily at 6am.       Review of patient's allergies indicates:   Allergen Reactions    Augmentin [amoxicillin-pot clavulanate] Shortness Of Breath    Horse/equine containing products     Singulair [montelukast]        Past Medical History:   Diagnosis Date    Anticoagulant long-term use     Arthritis     BPH (benign prostatic hyperplasia)     Coronary artery disease     stent x1 2005    Hypercholesteremia     Hypertension     Low iron     Osteopenia      Past Surgical  History:   Procedure Laterality Date    APPENDECTOMY      CATARACT EXTRACTION      COLONOSCOPY N/A 5/15/2017    Procedure: COLONOSCOPY;  Surgeon: Dez Jimenez MD;  Location: [facility];  Service: Endoscopy;  Laterality: N/A;    CORONARY STENT PLACEMENT      EYE SURGERY      SKIN BIOPSY      TONSILLECTOMY      VASECTOMY       Family History     Problem Relation (Age of Onset)    Breast cancer Mother    Cancer Mother    Diabetes Mother    Stroke Mother, Father        Social History Main Topics    Smoking status: Former Smoker     Packs/day: 0.50     Years: 15.00    Smokeless tobacco: Not on file      Comment: Quit smoking about 40 yrs ago    Alcohol use Yes      Comment: 2-4 glasses wine/day, 5/11/17 last drink    Drug use: No    Sexual activity: Not on file     Review of Systems   Constitutional: Negative for activity change, appetite change and fever.   HENT: Negative for congestion and drooling.    Eyes: Negative for pain, redness and itching.   Respiratory: Negative for cough and shortness of breath.    Cardiovascular: Negative for chest pain.   Gastrointestinal: Positive for blood in stool. Negative for abdominal distention and abdominal pain.   Endocrine: Negative for cold intolerance and heat intolerance.   Genitourinary: Negative for dysuria.   Musculoskeletal: Negative for back pain and gait problem.   Skin: Negative for color change and wound.   Allergic/Immunologic: Negative for environmental allergies and immunocompromised state.   Neurological: Negative for dizziness.   Hematological: Negative for adenopathy.   Psychiatric/Behavioral: Negative for agitation and confusion.     Objective:     Vital Signs (Most Recent):  Temp: 97.6 °F (36.4 °C) (02/13/18 1608)  Pulse: 70 (02/13/18 1608)  Resp: 15 (02/13/18 1608)  BP: 126/61 (02/13/18 1608)  SpO2: 99 % (02/13/18 1608) Vital Signs (24h Range):  Temp:  [97.4 °F (36.3 °C)-98.2 °F (36.8 °C)] 97.6 °F (36.4 °C)  Pulse:  [62-94] 70  Resp:  [12-17]  15  SpO2:  [96 %-99 %] 99 %  BP: (126-155)/(61-71) 126/61     Weight: 73 kg (161 lb)  Body mass index is 22.45 kg/m².    Physical Exam   Constitutional: He is oriented to person, place, and time. He appears well-developed and well-nourished. No distress.   HENT:   Head: Normocephalic and atraumatic.   Eyes: Conjunctivae are normal. Right eye exhibits no discharge. Left eye exhibits no discharge. No scleral icterus.   Neck: Normal range of motion. Neck supple. No JVD present. No tracheal deviation present.   Cardiovascular: Normal rate and regular rhythm.    Pulmonary/Chest: Effort normal. No respiratory distress.   Abdominal: Soft. He exhibits no distension. There is no tenderness.   Neurological: He is alert and oriented to person, place, and time.   Skin: Skin is warm and dry. No rash noted. He is not diaphoretic. No erythema.       Significant Labs:  CBC:   Recent Labs  Lab 02/13/18  0202  02/13/18  1154   WBC 8.93  --   --    RBC 2.70*  --   --    HGB 7.5*  < > 8.3*   HCT 23.6*  < > 26.2*   *  --   --    MCV 87  --   --    MCH 27.8  --   --    MCHC 31.8*  --   --    < > = values in this interval not displayed.  CMP:   Recent Labs  Lab 02/13/18  0202   *   CALCIUM 8.5*   ALBUMIN 2.8*   PROT 6.0      K 4.7   CO2 26      BUN 22   CREATININE 0.9   ALKPHOS 65   ALT 10   AST 15   BILITOT 0.2     Coagulation:   Recent Labs  Lab 02/12/18  2335   LABPROT 9.9   INR 0.9       Significant Diagnostics:  I have reviewed and interpreted all pertinent imaging results/findings within the past 24 hours.

## 2018-02-13 NOTE — H&P
[facility] Hospital Medicine  History & Physical    Patient Name: Forrest Schmidt  MRN: [id number]  Admission Date: 2/12/2018  Attending Physician: Sukhdeep Mckenzie MD   Primary Care Provider: Primary Doctor [facility] Medicine Team: [facility] HOSP MED 1 Pattie Zapata MD     Patient information was obtained from patient and past medical records.     Subjective:     Principal Problem:Gastrointestinal hemorrhage    Chief Complaint: Melena and ileal mass on CT abdomen      HPI: 72 year old male with hx of GI bleed (5/2017), a remote history of coronary artery disease and hypertension who trasferred from [facility] for GI/Surgery eval. Pt presented to OSH on 02/05/18 when melena for couple days. Per pt. He still has melena with each BM, last one was yesterday 11 am. Denies abdominal pain, nausea, vomiting or diarrhea. Denies NSAID use. Not know to have liver disease.     In [facility],  Patient has received 3 units PRBCs. Had colonoscopy 02/08/18 which was negative. CT abdomen showed ileal mass which is the reason pt. Is her for GI/Surgery eval for the mass. General Surgery at [facility] recommended surgery. Patient would like second opinion with GI(possible biopsy) and surgery at [facility] if     Of note, in April 2017, he had melena but at that time, he was using lots of NSAID which he is not now. At that time, he had EGD, colonoscopy and capsule endoscopy which were negative for source of bleeding.     Past Medical History:   Diagnosis Date    Anticoagulant long-term use     Arthritis     BPH (benign prostatic hyperplasia)     Coronary artery disease     stent x1 2005    Hypercholesteremia     Hypertension     Low iron     Osteopenia        Past Surgical History:   Procedure Laterality Date    APPENDECTOMY      CATARACT EXTRACTION      COLONOSCOPY N/A 5/15/2017    Procedure: COLONOSCOPY;  Surgeon: Dez Jimenez MD;  Location: [facility];  Service: Endoscopy;   Laterality: N/A;    CORONARY STENT PLACEMENT      EYE SURGERY      SKIN BIOPSY      TONSILLECTOMY      VASECTOMY         Review of patient's allergies indicates:   Allergen Reactions    Augmentin [amoxicillin-pot clavulanate] Shortness Of Breath    Horse/equine containing products     Singulair [montelukast]        No current facility-administered medications on file prior to encounter.      Current Outpatient Prescriptions on File Prior to Encounter   Medication Sig    arginine 500 mg tablet Take 500 mg by mouth once daily.    aspirin (ECOTRIN) 81 MG EC tablet Take 81 mg by mouth once daily.    atorvastatin (LIPITOR) 40 MG tablet Take 40 mg by mouth every evening.    cholecalciferol, vitamin D3, (VITAMIN D3) 2,000 unit Cap Take 1 capsule by mouth once daily.    FERROUS SULFATE (FEOSOL ORAL) Take 1 tablet by mouth once daily at 6am.    fish oil-omega-3 fatty acids 300-1,000 mg capsule Take 2 g by mouth once daily.    fluticasone (FLONASE) 50 mcg/actuation nasal spray 1 spray by Each Nare route 2 (two) times daily.    losartan (COZAAR) 100 MG tablet Take 1 tablet (100 mg total) by mouth every evening. Hold if sbp < 120    omeprazole (PRILOSEC) 20 MG capsule Take 20 mg by mouth once daily.    SOD CHLOR,SOD BICARB/NETI POT (NEILMED NASAFLO NASL) 1 spray by Nasal route every evening.    SODIUM CHLORIDE (FE-128 OPHT) Apply 1 drop to eye 4 (four) times daily. 1 drop 4 times daily to right eye    tadalafil (CIALIS) 5 MG tablet Take 5 mg by mouth once daily at 6am.    testosterone cypionate (DEPOTESTOTERONE CYPIONATE) 200 mg/mL injection Inject 200 mg into the muscle every 21 days.    zinc gluconate 50 mg tablet Take 50 mg by mouth twice a week. Every Tuesday and thursday    FOLIC ACID/MV,FE,MIN (CENTRUM ORAL) Take 1 tablet by mouth once daily at 6am.     Family History     Problem Relation (Age of Onset)    Breast cancer Mother    Cancer Mother    Diabetes Mother    Stroke Mother, Father         Social History Main Topics    Smoking status: Former Smoker     Packs/day: 0.50     Years: 15.00    Smokeless tobacco: Not on file      Comment: Quit smoking about 40 yrs ago    Alcohol use Yes      Comment: 2-4 glasses wine/day, 5/11/17 last drink    Drug use: No    Sexual activity: Not on file     Review of Systems   Constitutional: denies unintentional weight loss, night sweats, fever or chills  Eyes: denies visual changes  ENT: denies nasal congestion, ear pain or sore throat  Respiratory: denies cough, dyspnea on exertion and pleurisy  Cardiovascular: denies chest pain, chest pressure/discomfort, dyspnea.  Gastrointestinal: + melena. denies nausea or vomiting, abdominal pain.  Genitourinary: denies hematuria or dysuria  Musculoskeletal: denies arthralgias or myalgias  Neurological: denies seizures or tremors  Behavioral/Psych: denies auditory or visual hallucinations, SI, HI     Objective:     Vital Signs (Most Recent):  Temp: 98 °F (36.7 °C) (02/13/18 0333)  Pulse: 64 (02/13/18 0333)  Resp: 17 (02/13/18 0333)  BP: (!) 151/68 (02/13/18 0333)  SpO2: 96 % (02/13/18 0333) Vital Signs (24h Range):  Temp:  [98 °F (36.7 °C)-98.2 °F (36.8 °C)] 98 °F (36.7 °C)  Pulse:  [64-70] 64  Resp:  [12-17] 17  SpO2:  [96 %-99 %] 96 %  BP: (151-153)/(68-71) 151/68     Weight: 73 kg (161 lb)  Body mass index is 22.45 kg/m².    Physical Exam    General: Well developed, well nourished male in NAD  HEENT: Conjunctiva clear; Oropharynx clear  Neck: No JVD noted, Supple  CV: Normal S1, S2 with no murmurs.  Resp: Lungs CTA Bilaterally, Unlabored  Abdomen: NTND, BS normoactive x4 quads, soft. LUPE -ve for melena or BRBPR   Extrem: No cyanosis, clubbing, edema.  Skin: No rashes, lesions, ulcers  Neuro: motor strength in tact. No focal deficit   PSYCH: Oriented x3       Significant Labs:   CBC:   Recent Labs  Lab 02/12/18  2335 02/13/18  0202   WBC 8.65 8.93   HGB 7.3* 7.5*   HCT 23.2* 23.6*   * 360*     CMP:   Recent  Labs  Lab 02/12/18  2335 02/13/18  0202    140   K 4.4 4.7    108   CO2 26 26   * 117*   BUN 23 22   CREATININE 0.9 0.9   CALCIUM 8.7 8.5*   PROT 5.8* 6.0   ALBUMIN 2.6* 2.8*   BILITOT 0.2 0.2   ALKPHOS 62 65   AST 14 15   ALT 10 10   ANIONGAP 7* 6*   EGFRNONAA >60.0 >60.0       Significant Imaging:   CT abdomen/ Pelvic from OSH 2/8/18   1.  Soft tissue mass contiguous with the ileum concerning for malignancy. Considerations include adenocarcinoma, carcinoid, lymphoma and gastrointestinal stromal tumor.   2.  Bilateral renal cysts.  3. Postoperative changes of appendectomy.    Assessment/Plan:     * Gastrointestinal hemorrhage    - with history of melena, the source likely to be Upper GIB or right side colon   - In this pt., the ileal mass could be the source for the GIB. DDX og the mass adenocarcinoma, lymphoma or less likely infectious in origine.   - pt. Consented, typed and screened     1. Protonix 40 mg IV BID  2. Intravascular resuscitation/support with IVFs and pRBCs as needed.  3. Serial H/H's transfuse as needed.  4. Discontinue all ASA, NSAIDs and Heparin products  5. Maintain IV access with 2 large bore IVs  6. NPO   7. GI consult. Please call in AM              Mass of small intestine    - see GI bleed           Coronary artery disease    - stable no issue  - restart ASA if GIB resolved           Hypercholesteremia    - on statin, will continue           Hypertension    - hold med due to GIB          Symptomatic anemia    - likely due to blood loss  - received 3 pRBC at OSH about 5 days ago, since then H/H stable   - H/H 7.5/23.6  - check H/H q 6 h            VTE Risk Mitigation         Ordered     Medium Risk of VTE  Once      02/13/18 0104     Place sequential compression device  Until discontinued      02/13/18 0104        Diet: NPO  DVT ppx:  SCD due to GIB  Code: Full  PT/OT: not ordered, pt. Is fully independent.   Disop: pending clinical improvement       Pattie Zapata  MD  Department of Hospital Medicine   [facility]

## 2018-02-14 ENCOUNTER — ANESTHESIA EVENT (OUTPATIENT)
Dept: SURGERY | Facility: HOSPITAL | Age: 73
DRG: 982 | End: 2018-02-14
Payer: MEDICARE

## 2018-02-14 ENCOUNTER — ANESTHESIA (OUTPATIENT)
Dept: ENDOSCOPY | Facility: HOSPITAL | Age: 73
DRG: 982 | End: 2018-02-14
Payer: MEDICARE

## 2018-02-14 ENCOUNTER — SURGERY (OUTPATIENT)
Age: 73
End: 2018-02-14

## 2018-02-14 ENCOUNTER — ANESTHESIA EVENT (OUTPATIENT)
Dept: ENDOSCOPY | Facility: HOSPITAL | Age: 73
DRG: 982 | End: 2018-02-14
Payer: MEDICARE

## 2018-02-14 DIAGNOSIS — K63.89 MASS OF SMALL INTESTINE: Primary | ICD-10-CM

## 2018-02-14 LAB
ALBUMIN SERPL BCP-MCNC: 2.7 G/DL
ALP SERPL-CCNC: 66 U/L
ALT SERPL W/O P-5'-P-CCNC: 8 U/L
ANION GAP SERPL CALC-SCNC: 5 MMOL/L
AST SERPL-CCNC: 15 U/L
BASOPHILS # BLD AUTO: 0.03 K/UL
BASOPHILS NFR BLD: 0.2 %
BILIRUB SERPL-MCNC: 1.9 MG/DL
BLD PROD TYP BPU: NORMAL
BLOOD UNIT EXPIRATION DATE: NORMAL
BLOOD UNIT TYPE CODE: 600
BLOOD UNIT TYPE: NORMAL
BUN SERPL-MCNC: 19 MG/DL
CALCIUM SERPL-MCNC: 8.7 MG/DL
CHLORIDE SERPL-SCNC: 107 MMOL/L
CO2 SERPL-SCNC: 27 MMOL/L
CODING SYSTEM: NORMAL
CREAT SERPL-MCNC: 0.9 MG/DL
DIFFERENTIAL METHOD: ABNORMAL
DISPENSE STATUS: NORMAL
EOSINOPHIL # BLD AUTO: 0.3 K/UL
EOSINOPHIL NFR BLD: 2.3 %
ERYTHROCYTE [DISTWIDTH] IN BLOOD BY AUTOMATED COUNT: 15.5 %
EST. GFR  (AFRICAN AMERICAN): >60 ML/MIN/1.73 M^2
EST. GFR  (NON AFRICAN AMERICAN): >60 ML/MIN/1.73 M^2
GLUCOSE SERPL-MCNC: 109 MG/DL
HCT VFR BLD AUTO: 21.8 %
HCT VFR BLD AUTO: 25.9 %
HCT VFR BLD AUTO: 28.6 %
HGB BLD-MCNC: 6.9 G/DL
HGB BLD-MCNC: 8.3 G/DL
HGB BLD-MCNC: 9.3 G/DL
IMM GRANULOCYTES # BLD AUTO: 0.07 K/UL
IMM GRANULOCYTES NFR BLD AUTO: 0.5 %
LYMPHOCYTES # BLD AUTO: 0.7 K/UL
LYMPHOCYTES NFR BLD: 5.4 %
MCH RBC QN AUTO: 27.4 PG
MCHC RBC AUTO-ENTMCNC: 32 G/DL
MCV RBC AUTO: 86 FL
MONOCYTES # BLD AUTO: 1 K/UL
MONOCYTES NFR BLD: 7.8 %
NEUTROPHILS # BLD AUTO: 10.7 K/UL
NEUTROPHILS NFR BLD: 83.8 %
NRBC BLD-RTO: 0 /100 WBC
PLATELET # BLD AUTO: 381 K/UL
PMV BLD AUTO: 9 FL
POTASSIUM SERPL-SCNC: 4.5 MMOL/L
PROT SERPL-MCNC: 5.7 G/DL
RBC # BLD AUTO: 3.03 M/UL
SODIUM SERPL-SCNC: 139 MMOL/L
TRANS ERYTHROCYTES VOL PATIENT: NORMAL ML
WBC # BLD AUTO: 12.73 K/UL

## 2018-02-14 PROCEDURE — C9113 INJ PANTOPRAZOLE SODIUM, VIA: HCPCS | Performed by: STUDENT IN AN ORGANIZED HEALTH CARE EDUCATION/TRAINING PROGRAM

## 2018-02-14 PROCEDURE — 25000003 PHARM REV CODE 250: Performed by: STUDENT IN AN ORGANIZED HEALTH CARE EDUCATION/TRAINING PROGRAM

## 2018-02-14 PROCEDURE — P9021 RED BLOOD CELLS UNIT: HCPCS

## 2018-02-14 PROCEDURE — 99222 1ST HOSP IP/OBS MODERATE 55: CPT | Mod: 57,GC,, | Performed by: SURGERY

## 2018-02-14 PROCEDURE — 37000008 HC ANESTHESIA 1ST 15 MINUTES: Performed by: INTERNAL MEDICINE

## 2018-02-14 PROCEDURE — 11000001 HC ACUTE MED/SURG PRIVATE ROOM

## 2018-02-14 PROCEDURE — 0DJ08ZZ INSPECTION OF UPPER INTESTINAL TRACT, VIA NATURAL OR ARTIFICIAL OPENING ENDOSCOPIC: ICD-10-PCS | Performed by: INTERNAL MEDICINE

## 2018-02-14 PROCEDURE — 43235 EGD DIAGNOSTIC BRUSH WASH: CPT | Mod: ,,, | Performed by: INTERNAL MEDICINE

## 2018-02-14 PROCEDURE — 25000003 PHARM REV CODE 250: Performed by: NURSE ANESTHETIST, CERTIFIED REGISTERED

## 2018-02-14 PROCEDURE — 63600175 PHARM REV CODE 636 W HCPCS: Performed by: STUDENT IN AN ORGANIZED HEALTH CARE EDUCATION/TRAINING PROGRAM

## 2018-02-14 PROCEDURE — D9220A PRA ANESTHESIA: Mod: ANES,,, | Performed by: ANESTHESIOLOGY

## 2018-02-14 PROCEDURE — 36415 COLL VENOUS BLD VENIPUNCTURE: CPT

## 2018-02-14 PROCEDURE — D9220A PRA ANESTHESIA: Mod: CRNA,,, | Performed by: NURSE ANESTHETIST, CERTIFIED REGISTERED

## 2018-02-14 PROCEDURE — 85025 COMPLETE CBC W/AUTO DIFF WBC: CPT

## 2018-02-14 PROCEDURE — 85018 HEMOGLOBIN: CPT

## 2018-02-14 PROCEDURE — 37000009 HC ANESTHESIA EA ADD 15 MINS: Performed by: INTERNAL MEDICINE

## 2018-02-14 PROCEDURE — 80053 COMPREHEN METABOLIC PANEL: CPT

## 2018-02-14 PROCEDURE — 63600175 PHARM REV CODE 636 W HCPCS: Performed by: NURSE ANESTHETIST, CERTIFIED REGISTERED

## 2018-02-14 PROCEDURE — 99233 SBSQ HOSP IP/OBS HIGH 50: CPT | Mod: ,,, | Performed by: HOSPITALIST

## 2018-02-14 PROCEDURE — 43235 EGD DIAGNOSTIC BRUSH WASH: CPT | Performed by: INTERNAL MEDICINE

## 2018-02-14 PROCEDURE — 85014 HEMATOCRIT: CPT

## 2018-02-14 RX ORDER — PANTOPRAZOLE SODIUM 40 MG/1
40 TABLET, DELAYED RELEASE ORAL 2 TIMES DAILY
Status: DISCONTINUED | OUTPATIENT
Start: 2018-02-14 | End: 2018-03-04 | Stop reason: HOSPADM

## 2018-02-14 RX ORDER — SODIUM CHLORIDE 9 MG/ML
INJECTION, SOLUTION INTRAVENOUS CONTINUOUS PRN
Status: DISCONTINUED | OUTPATIENT
Start: 2018-02-14 | End: 2018-02-14

## 2018-02-14 RX ORDER — PROPOFOL 10 MG/ML
VIAL (ML) INTRAVENOUS
Status: DISCONTINUED | OUTPATIENT
Start: 2018-02-14 | End: 2018-02-14

## 2018-02-14 RX ORDER — LIDOCAINE HCL/PF 100 MG/5ML
SYRINGE (ML) INTRAVENOUS
Status: DISCONTINUED | OUTPATIENT
Start: 2018-02-14 | End: 2018-02-14

## 2018-02-14 RX ORDER — SODIUM CHLORIDE 0.9 % (FLUSH) 0.9 %
3 SYRINGE (ML) INJECTION
Status: DISCONTINUED | OUTPATIENT
Start: 2018-02-14 | End: 2018-02-14 | Stop reason: HOSPADM

## 2018-02-14 RX ORDER — HYDROCODONE BITARTRATE AND ACETAMINOPHEN 500; 5 MG/1; MG/1
TABLET ORAL
Status: DISCONTINUED | OUTPATIENT
Start: 2018-02-14 | End: 2018-03-04 | Stop reason: HOSPADM

## 2018-02-14 RX ADMIN — PANTOPRAZOLE SODIUM 40 MG: 40 INJECTION, POWDER, FOR SOLUTION INTRAVENOUS at 11:02

## 2018-02-14 RX ADMIN — PROPOFOL 100 MG: 10 INJECTION, EMULSION INTRAVENOUS at 09:02

## 2018-02-14 RX ADMIN — PROPOFOL 20 MG: 10 INJECTION, EMULSION INTRAVENOUS at 09:02

## 2018-02-14 RX ADMIN — LIDOCAINE HYDROCHLORIDE 100 MG: 20 INJECTION, SOLUTION INTRAVENOUS at 09:02

## 2018-02-14 RX ADMIN — ATORVASTATIN CALCIUM 40 MG: 20 TABLET, FILM COATED ORAL at 09:02

## 2018-02-14 RX ADMIN — SODIUM CHLORIDE: 0.9 INJECTION, SOLUTION INTRAVENOUS at 09:02

## 2018-02-14 RX ADMIN — PANTOPRAZOLE SODIUM 40 MG: 40 TABLET, DELAYED RELEASE ORAL at 09:02

## 2018-02-14 NOTE — PLAN OF CARE
Problem: Patient Care Overview  Goal: Plan of Care Review  Outcome: Ongoing (interventions implemented as appropriate)   02/14/18 9490   Coping/Psychosocial   Plan Of Care Reviewed With patient;spouse   Plan of care reviewed with patient and spouse; hemoglobin 6.9- transfused 1 u PRBCs. Patient tolerated well. No s/sx of reaction. Patient NPO for EGD today. Voiding per urinal. VSS; no s/sx of distress. Frequent checks maintained; call light within reach; will continue to monitor.

## 2018-02-14 NOTE — ANESTHESIA PREPROCEDURE EVALUATION
02/14/2018  Pre-operative evaluation for Procedure(s) (LRB):  ESOPHAGOGASTRODUODENOSCOPY (EGD) (N/A)    Forrest Schmidt is a 72 y.o. male     Patient Active Problem List   Diagnosis    Symptomatic anemia    Hypertension    Hypercholesteremia    Coronary artery disease    BPH (benign prostatic hyperplasia)    Arthritis    Gastrointestinal hemorrhage    Excessive use of nonsteroidal anti-inflammatory drug (NSAID)    Pain of left hip joint    Hypokalemia    Mass of small intestine    Melena       Review of patient's allergies indicates:   Allergen Reactions    Augmentin [amoxicillin-pot clavulanate] Shortness Of Breath    Horse/equine containing products     Singulair [montelukast]        No current facility-administered medications on file prior to encounter.      Current Outpatient Prescriptions on File Prior to Encounter   Medication Sig Dispense Refill    arginine 500 mg tablet Take 500 mg by mouth once daily.      aspirin (ECOTRIN) 81 MG EC tablet Take 81 mg by mouth once daily.      atorvastatin (LIPITOR) 40 MG tablet Take 40 mg by mouth every evening.      cholecalciferol, vitamin D3, (VITAMIN D3) 2,000 unit Cap Take 1 capsule by mouth once daily.      FERROUS SULFATE (FEOSOL ORAL) Take 1 tablet by mouth once daily at 6am.      fish oil-omega-3 fatty acids 300-1,000 mg capsule Take 2 g by mouth once daily.      fluticasone (FLONASE) 50 mcg/actuation nasal spray 1 spray by Each Nare route 2 (two) times daily.      losartan (COZAAR) 100 MG tablet Take 1 tablet (100 mg total) by mouth every evening. Hold if sbp < 120      omeprazole (PRILOSEC) 20 MG capsule Take 20 mg by mouth once daily.      SOD CHLOR,SOD BICARB/NETI POT (NEILMED NASAFLO NASL) 1 spray by Nasal route every evening.      SODIUM CHLORIDE (FE-128 OPHT) Apply 1 drop to eye 4 (four) times daily. 1 drop 4 times daily to  right eye      tadalafil (CIALIS) 5 MG tablet Take 5 mg by mouth once daily at 6am.      testosterone cypionate (DEPOTESTOTERONE CYPIONATE) 200 mg/mL injection Inject 200 mg into the muscle every 21 days.      zinc gluconate 50 mg tablet Take 50 mg by mouth twice a week. Every Tuesday and thursday      FOLIC ACID/MV,FE,MIN (CENTRUM ORAL) Take 1 tablet by mouth once daily at 6am.         Past Surgical History:   Procedure Laterality Date    APPENDECTOMY      CATARACT EXTRACTION      COLONOSCOPY N/A 5/15/2017    Procedure: COLONOSCOPY;  Surgeon: Dez Jimenez MD;  [facility];  Service: Endoscopy;  Laterality: N/A;    CORONARY STENT PLACEMENT      EYE SURGERY      SKIN BIOPSY      TONSILLECTOMY      VASECTOMY         Social History     Social History    Marital status:      Spouse name: N/A    Number of children: N/A    Years of education: N/A     Occupational History    Not on file.     Social History Main Topics    Smoking status: Former Smoker     Packs/day: 0.50     Years: 15.00    Smokeless tobacco: Not on file      Comment: Quit smoking about 40 yrs ago    Alcohol use Yes      Comment: 2-4 glasses wine/day, 5/11/17 last drink    Drug use: No    Sexual activity: Not on file     Other Topics Concern    Not on file     Social History Narrative    No narrative on file         Vital Signs Range (Last 24H):  Temp:  [36.2 °C (97.1 °F)-37.2 °C (98.9 °F)]   Pulse:  [60-94]   Resp:  [12-18]   BP: (112-156)/(60-72)   SpO2:  [96 %-99 %]       CBC:   Recent Labs      02/13/18 0202 02/13/18 2337 02/14/18   0619   WBC  8.93   --    --   12.73*   RBC  2.70*   --    --   3.03*   HGB  7.5*   < >  6.9*  8.3*   HCT  23.6*   < >  21.8*  25.9*   PLT  360*   --    --   381*   MCV  87   --    --   86   MCH  27.8   --    --   27.4   MCHC  31.8*   --    --   32.0    < > = values in this interval not displayed.       CMP:   Recent Labs      02/12/18 2335 02/13/18 0202 02/14/18    0619   NA  140  140  139   K  4.4  4.7  4.5   CL  107  108  107   CO2  26  26  27   BUN  23  22  19   CREATININE  0.9  0.9  0.9   GLU  117*  117*  109   MG  1.8   --    --    PHOS  3.3   --    --    CALCIUM  8.7  8.5*  8.7   ALBUMIN  2.6*  2.8*  2.7*   PROT  5.8*  6.0  5.7*   ALKPHOS  62  65  66   ALT  10  10  8*   AST  14  15  15   BILITOT  0.2  0.2  1.9*       INR  Recent Labs      18   2335   INR  0.9           Diagnostic Studies:      EKD Echo:        Anesthesia Evaluation    I have reviewed the Patient Summary Reports.     I have reviewed the Medications.     Review of Systems  Anesthesia Hx:  No problems with previous Anesthesia  History of prior surgery of interest to airway management or planning: Denies Family Hx of Anesthesia complications.   Denies Personal Hx of Anesthesia complications.   Cardiovascular:   Exercise tolerance: good Hypertension CAD  CABG/stent     Pulmonary:  Pulmonary Normal    Renal/:  Renal/ Normal     Neurological:  Neurology Normal    Endocrine:  Endocrine Normal        Physical Exam  General:  Well nourished    Airway/Jaw/Neck:  Airway Findings: Mouth Opening: Normal Tongue: Normal  General Airway Assessment: Adult  Mallampati: II  TM Distance: Normal, at least 6 cm  Jaw/Neck Findings:  Neck ROM: Normal ROM      Dental:  Dental Findings: In tact   Chest/Lungs:  Chest/Lungs Findings: Clear to auscultation, Normal Respiratory Rate         Mental Status:  Mental Status Findings:  Cooperative, Alert and Oriented         Anesthesia Plan  Type of Anesthesia, risks & benefits discussed:  Anesthesia Type:  general  Patient's Preference:   Intra-op Monitoring Plan: standard ASA monitors  Intra-op Monitoring Plan Comments:   Post Op Pain Control Plan: multimodal analgesia  Post Op Pain Control Plan Comments:   Induction:   IV  Beta Blocker:  Patient is not currently on a Beta-Blocker (No further documentation required).       Informed Consent: Patient understands risks and  agrees with Anesthesia plan.  Questions answered. Anesthesia consent signed with patient.  ASA Score: 3     Day of Surgery Review of History & Physical:    H&P update referred to the surgeon.         Ready For Surgery From Anesthesia Perspective.

## 2018-02-14 NOTE — ASSESSMENT & PLAN NOTE
- with history of melena, the source likely to be Upper GIB or right side colon   - In this pt., the ileal mass could be the source for the GIB. DDX og the mass adenocarcinoma, lymphoma or less likely infectious in origine.     -Protonix 40 mg PO BID  -Serial H/H's q6 ,transfuse as needed.  -Discontinue all ASA, NSAIDs and Heparin products  - Maintain IV access with 2 large bore IVs  -GI/GS consult  -NPO for GS intervention tomorrow.    - Underwent EGD today, tolerated well , no abnormalities was found

## 2018-02-14 NOTE — ASSESSMENT & PLAN NOTE
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  If no source of bleed on EGD today, will plan on surgical resection of mass and associated small bowel tomorrow

## 2018-02-14 NOTE — ANESTHESIA POSTPROCEDURE EVALUATION
"Anesthesia Post Evaluation    Patient: Forrest Schmidt    Procedure(s) Performed: Procedure(s) (LRB):  ESOPHAGOGASTRODUODENOSCOPY (EGD) (N/A)    Final Anesthesia Type: general  Patient location during evaluation: floor  Patient participation: Yes- Able to Participate  Level of consciousness: awake and alert and oriented  Post-procedure vital signs: reviewed and stable  Pain management: adequate  Airway patency: patent  PONV status at discharge: No PONV  Anesthetic complications: no      Cardiovascular status: hemodynamically stable  Respiratory status: unassisted  Hydration status: euvolemic  Follow-up not needed.        Visit Vitals  BP (!) 152/69   Pulse 85   Temp 36.3 °C (97.3 °F)   Resp 18   Ht 5' 11" (1.803 m)   Wt 73 kg (161 lb)   SpO2 98%   BMI 22.45 kg/m²       Pain/Denice Score: Pain Assessment Performed: Yes (2/14/2018 10:25 AM)  Presence of Pain: denies (2/14/2018 10:25 AM)  Denice Score: 9 (2/14/2018  9:47 AM)      "

## 2018-02-14 NOTE — SUBJECTIVE & OBJECTIVE
Interval Hx: Patient hemodynamically stable, no complaints, underwent EGD today, tolerated well , no abnormalities was found, Gs will preform biopsy and possible ileal tumor resection tomorrow.     Review of Systems   Constitutional: patient had 10 Ib unintentional wt loss in the last 3 month , night sweats, fever or chills  Eyes: denies visual changes  ENT: denies nasal congestion, ear pain or sore throat  Respiratory: denies cough, dyspnea on exertion and pleurisy  Cardiovascular: denies chest pain, chest pressure/discomfort, dyspnea.  Gastrointestinal: + melena. denies nausea or vomiting, abdominal pain.  Genitourinary: denies hematuria or dysuria  Musculoskeletal: denies arthralgias or myalgias  Neurological: denies seizures or tremors  Behavioral/Psych: denies auditory or visual hallucinations, SI, HI     Objective:     Vital Signs (Most Recent):  Temp: 98.6 °F (37 °C) (02/14/18 1030)  Pulse: 67 (02/14/18 1030)  Resp: 18 (02/14/18 1030)  BP: 131/62 (02/14/18 1030)  SpO2: 99 % (02/14/18 1030) Vital Signs (24h Range):  Temp:  [97.1 °F (36.2 °C)-98.9 °F (37.2 °C)] 98.6 °F (37 °C)  Pulse:  [59-94] 67  Resp:  [12-18] 18  SpO2:  [96 %-99 %] 99 %  BP: ()/(49-72) 131/62     Weight: 73 kg (161 lb)  Body mass index is 22.45 kg/m².    Physical Exam    General: Well developed, well nourished male in NAD  HEENT: Conjunctiva clear; Oropharynx clear  Neck: No JVD noted, Supple  CV: Normal S1, S2 with no murmurs.  Resp: Lungs CTA Bilaterally, Unlabored  Abdomen: NTND, BS normoactive x4 quads, soft. LUPE -ve for melena or BRBPR   Extrem: No cyanosis, clubbing, edema.  Skin: No rashes, lesions, ulcers  Neuro: motor strength in tact. No focal deficit   PSYCH: Oriented x3       Significant Labs:   CBC:   Recent Labs  Lab 02/12/18  2335 02/13/18  0202  02/13/18  1757 02/13/18  2337 02/14/18  0619   WBC 8.65 8.93  --   --   --  12.73*   HGB 7.3* 7.5*  < > 7.3* 6.9* 8.3*   HCT 23.2* 23.6*  < > 22.8* 21.8* 25.9*   * 360*   --   --   --  381*   < > = values in this interval not displayed.  CMP:     Recent Labs  Lab 02/12/18  2335 02/13/18  0202 02/14/18  0619    140 139   K 4.4 4.7 4.5    108 107   CO2 26 26 27   * 117* 109   BUN 23 22 19   CREATININE 0.9 0.9 0.9   CALCIUM 8.7 8.5* 8.7   PROT 5.8* 6.0 5.7*   ALBUMIN 2.6* 2.8* 2.7*   BILITOT 0.2 0.2 1.9*   ALKPHOS 62 65 66   AST 14 15 15   ALT 10 10 8*   ANIONGAP 7* 6* 5*   EGFRNONAA >60.0 >60.0 >60.0       Significant Imaging:   CT abdomen/ Pelvic from OSH 2/8/18   1.  Soft tissue mass contiguous with the ileum concerning for malignancy. Considerations include adenocarcinoma, carcinoid, lymphoma and gastrointestinal stromal tumor.   2.  Bilateral renal cysts.  3. Postoperative changes of appendectomy.

## 2018-02-14 NOTE — PROGRESS NOTES
[facility] Hospital Medicine  Progress Note    Patient Name: Forrest Schmidt  MRN: [id number]  Patient Class: IP- Inpatient   Admission Date: 2/12/2018  Length of Stay: 2 days  Attending Physician: Skye Brennan MD  Primary Care Provider: Primary Doctor [facility] Medicine Team: [facility] HOSP MED 1 LIAN Wright    Subjective:     Principal Problem:Gastrointestinal hemorrhage    HPI:  72 year old male with hx of GI bleed (5/2017), a remote history of coronary artery disease and hypertension who trasferred from [facility] for GI/Surgery eval. Pt presented to OSH on 02/05/18 when melena for couple days. Per pt. He still has melena with each BM, last one was yesterday 11 am. Denies abdominal pain, nausea, vomiting or diarrhea. Denies NSAID use. Not know to have liver disease.     In [facility],  Patient has received 3 units PRBCs. Had colonoscopy 02/08/18 which was negative. CT abdomen showed ileal mass which is the reason pt. Is her for GI/Surgery eval for the mass. General Surgery at [facility] recommended surgery. Patient would like second opinion with GI(possible biopsy) and surgery at [facility] if     Of note, in April 2017, he had melena but at that time, he was using lots of NSAID which he is not now. At that time, he had EGD, colonoscopy and capsule endoscopy which were negative for source of bleeding.     Hospital Course:  2/13 Admitted to -1, GI/GS consulted, I unite PRBC transfused.  2/14 Patient hemodynamically stable, no complaints, underwent EGD today, tolerated well , no abnormalities was found, Gs will preform biopsy and possible ileal tumor resection tomorrow.     Interval Hx: Patient hemodynamically stable, no complaints, underwent EGD today, tolerated well , no abnormalities was found, Gs will preform biopsy and possible ileal tumor resection tomorrow.     Review of Systems   Constitutional: patient had 10 Ib unintentional wt loss in the last 3 month , night  sweats, fever or chills  Eyes: denies visual changes  ENT: denies nasal congestion, ear pain or sore throat  Respiratory: denies cough, dyspnea on exertion and pleurisy  Cardiovascular: denies chest pain, chest pressure/discomfort, dyspnea.  Gastrointestinal: + melena. denies nausea or vomiting, abdominal pain.  Genitourinary: denies hematuria or dysuria  Musculoskeletal: denies arthralgias or myalgias  Neurological: denies seizures or tremors  Behavioral/Psych: denies auditory or visual hallucinations, SI, HI     Objective:     Vital Signs (Most Recent):  Temp: 98.6 °F (37 °C) (02/14/18 1030)  Pulse: 67 (02/14/18 1030)  Resp: 18 (02/14/18 1030)  BP: 131/62 (02/14/18 1030)  SpO2: 99 % (02/14/18 1030) Vital Signs (24h Range):  Temp:  [97.1 °F (36.2 °C)-98.9 °F (37.2 °C)] 98.6 °F (37 °C)  Pulse:  [59-94] 67  Resp:  [12-18] 18  SpO2:  [96 %-99 %] 99 %  BP: ()/(49-72) 131/62     Weight: 73 kg (161 lb)  Body mass index is 22.45 kg/m².    Physical Exam    General: Well developed, well nourished male in NAD  HEENT: Conjunctiva clear; Oropharynx clear  Neck: No JVD noted, Supple  CV: Normal S1, S2 with no murmurs.  Resp: Lungs CTA Bilaterally, Unlabored  Abdomen: NTND, BS normoactive x4 quads, soft. LUPE -ve for melena or BRBPR   Extrem: No cyanosis, clubbing, edema.  Skin: No rashes, lesions, ulcers  Neuro: motor strength in tact. No focal deficit   PSYCH: Oriented x3       Significant Labs:   CBC:   Recent Labs  Lab 02/12/18  2335 02/13/18  0202  02/13/18  1757 02/13/18  2337 02/14/18  0619   WBC 8.65 8.93  --   --   --  12.73*   HGB 7.3* 7.5*  < > 7.3* 6.9* 8.3*   HCT 23.2* 23.6*  < > 22.8* 21.8* 25.9*   * 360*  --   --   --  381*   < > = values in this interval not displayed.  CMP:     Recent Labs  Lab 02/12/18  2335 02/13/18  0202 02/14/18  0619    140 139   K 4.4 4.7 4.5    108 107   CO2 26 26 27   * 117* 109   BUN 23 22 19   CREATININE 0.9 0.9 0.9   CALCIUM 8.7 8.5* 8.7   PROT 5.8* 6.0  5.7*   ALBUMIN 2.6* 2.8* 2.7*   BILITOT 0.2 0.2 1.9*   ALKPHOS 62 65 66   AST 14 15 15   ALT 10 10 8*   ANIONGAP 7* 6* 5*   EGFRNONAA >60.0 >60.0 >60.0       Significant Imaging:   CT abdomen/ Pelvic from OSH 2/8/18   1.  Soft tissue mass contiguous with the ileum concerning for malignancy. Considerations include adenocarcinoma, carcinoid, lymphoma and gastrointestinal stromal tumor.   2.  Bilateral renal cysts.  3. Postoperative changes of appendectomy.    Assessment/Plan:      * Gastrointestinal hemorrhage    - with history of melena, the source likely to be Upper GIB or right side colon   - In this pt., the ileal mass could be the source for the GIB. DDX og the mass adenocarcinoma, lymphoma or less likely infectious in origine.     -Protonix 40 mg PO BID  -Serial H/H's q6 ,transfuse as needed.  -Discontinue all ASA, NSAIDs and Heparin products  - Maintain IV access with 2 large bore IVs  -GI/GS consult  -NPO for GS intervention tomorrow.    - Underwent EGD today, tolerated well , no abnormalities was found        Mass of small intestine    - see GI bleed           Coronary artery disease    - stable no issue  - restart ASA if GIB resolved           Hypercholesteremia    - on statin, will continue           Hypertension    - hold med due to GIB          Symptomatic anemia    - likely due to blood loss  - received 3 pRBC at OSH about 5 days ago, since then H/H stable   - H/H 7.5/23.6  - check H/H q 6 h            VTE Risk Mitigation         Ordered     Medium Risk of VTE  Once      02/13/18 0104     Place sequential compression device  Until discontinued      02/13/18 0104              LIAN Wright  Department of Hospital Medicine   [facility]                    02/14/2018                             STAFF PHYSICIAN NOTE                                   Attending Attestation for Rounds with Resident  I have reviewed and concur with the resident's history, physical, assessment, and plan.   I have personally interviewed and examined the patient at bedside and agree with the resident's findings.                                  ________________________________________                                     REASON FOR ADMISSION:     Patient is 72 y.o.male    Body mass index is 22.45 kg/m².,  Gastrointestinal hemorrhage

## 2018-02-14 NOTE — PLAN OF CARE
Extended Emergency Contact Information  Primary Emergency Contact: Veena Schmidt   [facility]  Home Phone: [phone]  Mobile Phone: [phone]  Relation: [place]  [id number] [id number]    No future appointments.    Payor: MEDICARE / Plan: MEDICARE PART A & B / Product Type: [employer] /       [facility] [place], [place] - [address]  [address]  [place] [address]  Phone: [phone] Fax: [phone]       02/14/18 6895   Discharge Assessment   Assessment Type Discharge Planning Assessment   Assessment information obtained from? Medical Record   Prior to hospitilization cognitive status: Alert/Oriented   Prior to hospitalization functional status: Independent   Current cognitive status: Alert/Oriented   Current Functional Status: Independent   Lives With spouse   Able to Return to Prior Arrangements yes   Is patient able to care for self after discharge? Yes   Readmission Within The Last 30 Days no previous admission in last 30 days   Patient currently being followed by outpatient case management? No   Patient currently receives any other outside agency services? No   Equipment Currently Used at Home none   Does the patient receive services at the [facility] Clinic? No   Discharge Plan A Home with family   Discharge Plan B Home with family;Home Health   Patient/Family In Agreement With Plan unable to assess

## 2018-02-14 NOTE — TRANSFER OF CARE
"Anesthesia Transfer of Care Note    Patient: Forrest Schmidt    Procedure(s) Performed: Procedure(s) (LRB):  ESOPHAGOGASTRODUODENOSCOPY (EGD) (N/A)    Patient location: [facility]    Anesthesia Type: general    Transport from OR: Transported from OR on 2-3 L/min O2 by NC with adequate spontaneous ventilation    Post pain: adequate analgesia    Post assessment: no apparent anesthetic complications and tolerated procedure well    Post vital signs: stable    Level of consciousness: sedated and responds to stimulation    Nausea/Vomiting: no nausea/vomiting    Complications: none    Transfer of care protocol was followed      Last vitals:   Visit Vitals  BP (!) 143/66 (BP Location: Left arm, Patient Position: Sitting)   Pulse 69   Temp 37.1 °C (98.8 °F) (Temporal)   Resp 17   Ht 5' 11" (1.803 m)   Wt 73 kg (161 lb)   SpO2 96%   BMI 22.45 kg/m²     "

## 2018-02-14 NOTE — PROGRESS NOTES
[facility]  General Surgery  Progress Note    Subjective:     History of Present Illness:  This is a 71 y/o male with hx CAD and HTN, obscure overt GI bleeding who [facility] CT [facility].  Pt was admitted 2/5 with melena. Underwent  colonoscopy, negative for bleeding with old blood seen in TI. No EGD at OSH.  CT abd showed ileal mass, approx 5 cm. Surgery recommend operative intervention at OSH, however patient requested transfer for 2nd opinion.  Patient has had a similar episode in the past year, at which time he also had EGD, colonscopy and capsule enterography which were negative, bleeding at that time thought due to NSAID use which was discontinued and he did well until about a week ago. When he began to have blood stools again. He has some fullness and early satiety, but has not altered his eating habits, and normal bowel habits, 1-3 movement a day, no change in consistency. No abdominal pain. Only prior abdominal surgery is an appendectomy as a child.     Post-Op Info:  Procedure(s) (LRB):  ESOPHAGOGASTRODUODENOSCOPY (EGD) (N/A)   Day of Surgery     Interval History: No acute events overnight, though did receive a blood transfusion.  Afebrile and HD stable.  NPO for EGD today.  No current complaints.    Medications:  Continuous Infusions:  Scheduled Meds:   atorvastatin  40 mg Oral QHS    pantoprazole  40 mg Oral BID     PRN Meds:sodium chloride, dextrose 50%, dextrose 50%, glucagon (human recombinant), glucose, glucose, sodium chloride 0.9%     Review of patient's allergies indicates:   Allergen Reactions    Augmentin [amoxicillin-pot clavulanate] Shortness Of Breath    Horse/equine containing products     Singulair [montelukast]      Objective:     Vital Signs (Most Recent):  Temp: 97.3 °F (36.3 °C) (02/14/18 1257)  Pulse: 85 (02/14/18 1257)  Resp: 18 (02/14/18 1257)  BP: (!) 152/69 (02/14/18 1257)  SpO2: 98 % (02/14/18 1257) Vital  Signs (24h Range):  Temp:  [97.1 °F (36.2 °C)-98.9 °F (37.2 °C)] 97.3 °F (36.3 °C)  Pulse:  [59-87] 85  Resp:  [12-18] 18  SpO2:  [96 %-99 %] 98 %  BP: ()/(49-72) 152/69     Weight: 73 kg (161 lb)  Body mass index is 22.45 kg/m².    Intake/Output - Last 3 Shifts       02/12 0700 - 02/13 0659 02/13 0700 - 02/14 0659 02/14 0700 - 02/15 0659    P.O.  480     I.V. (mL/kg)   50 (0.7)    Blood  606.3     Total Intake(mL/kg)  1086.3 (14.9) 50 (0.7)    Net   +1086.3 +50           Urine Occurrence  5 x     Stool Occurrence  1 x           Physical Exam   Constitutional: He is oriented to person, place, and time. He appears well-developed and well-nourished. No distress.   HENT:   Head: Normocephalic and atraumatic.   Eyes: Conjunctivae and EOM are normal. No scleral icterus.   Neck: Normal range of motion.   Cardiovascular: Normal rate and regular rhythm.    Pulmonary/Chest: Effort normal. No respiratory distress.   Abdominal: Soft. He exhibits no distension. There is no tenderness.   Musculoskeletal: Normal range of motion. He exhibits no edema.   Neurological: He is alert and oriented to person, place, and time.   Nursing note and vitals reviewed.      Significant Labs:  CBC:   Recent Labs  Lab 02/14/18  0619   WBC 12.73*   RBC 3.03*   HGB 8.3*   HCT 25.9*   *   MCV 86   MCH 27.4   MCHC 32.0     CMP:   Recent Labs  Lab 02/14/18  0619      CALCIUM 8.7   ALBUMIN 2.7*   PROT 5.7*      K 4.5   CO2 27      BUN 19   CREATININE 0.9   ALKPHOS 66   ALT 8*   AST 15   BILITOT 1.9*       Significant Diagnostics:  I have reviewed and interpreted all pertinent imaging results/findings within the past 24 hours.    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  If no source of bleed on EGD today, will plan on  surgical resection of mass and associated small bowel tomorrow            Reddy Garcia Jr., MD  General Surgery  [facility]

## 2018-02-14 NOTE — PLAN OF CARE
Patient calling to report today is day three. Radha also had some questions.     [id number]   Problem: Patient Care Overview  Goal: Plan of Care Review  Outcome: Ongoing (interventions implemented as appropriate)  Pt alert and oriented. No c/o pain. No skin break down noted. No falls noted this shift. Wife at bedside. Bed in low position. Call light in reach. Will continue to monitor.

## 2018-02-14 NOTE — SUBJECTIVE & OBJECTIVE
Interval History: No acute events overnight, though did receive a blood transfusion.  Afebrile and HD stable.  NPO for EGD today.  No current complaints.    Medications:  Continuous Infusions:  Scheduled Meds:   atorvastatin  40 mg Oral QHS    pantoprazole  40 mg Oral BID     PRN Meds:sodium chloride, dextrose 50%, dextrose 50%, glucagon (human recombinant), glucose, glucose, sodium chloride 0.9%     Review of patient's allergies indicates:   Allergen Reactions    Augmentin [amoxicillin-pot clavulanate] Shortness Of Breath    Horse/equine containing products     Singulair [montelukast]      Objective:     Vital Signs (Most Recent):  Temp: 97.3 °F (36.3 °C) (02/14/18 1257)  Pulse: 85 (02/14/18 1257)  Resp: 18 (02/14/18 1257)  BP: (!) 152/69 (02/14/18 1257)  SpO2: 98 % (02/14/18 1257) Vital Signs (24h Range):  Temp:  [97.1 °F (36.2 °C)-98.9 °F (37.2 °C)] 97.3 °F (36.3 °C)  Pulse:  [59-87] 85  Resp:  [12-18] 18  SpO2:  [96 %-99 %] 98 %  BP: ()/(49-72) 152/69     Weight: 73 kg (161 lb)  Body mass index is 22.45 kg/m².    Intake/Output - Last 3 Shifts       02/12 0700 - 02/13 0659 02/13 0700 - 02/14 0659 02/14 0700 - 02/15 0659    P.O.  480     I.V. (mL/kg)   50 (0.7)    Blood  606.3     Total Intake(mL/kg)  1086.3 (14.9) 50 (0.7)    Net   +1086.3 +50           Urine Occurrence  5 x     Stool Occurrence  1 x           Physical Exam   Constitutional: He is oriented to person, place, and time. He appears well-developed and well-nourished. No distress.   HENT:   Head: Normocephalic and atraumatic.   Eyes: Conjunctivae and EOM are normal. No scleral icterus.   Neck: Normal range of motion.   Cardiovascular: Normal rate and regular rhythm.    Pulmonary/Chest: Effort normal. No respiratory distress.   Abdominal: Soft. He exhibits no distension. There is no tenderness.   Musculoskeletal: Normal range of motion. He exhibits no edema.   Neurological: He is alert and oriented to person, place, and time.   Nursing note  and vitals reviewed.      Significant Labs:  CBC:   Recent Labs  Lab 02/14/18  0619   WBC 12.73*   RBC 3.03*   HGB 8.3*   HCT 25.9*   *   MCV 86   MCH 27.4   MCHC 32.0     CMP:   Recent Labs  Lab 02/14/18  0619      CALCIUM 8.7   ALBUMIN 2.7*   PROT 5.7*      K 4.5   CO2 27      BUN 19   CREATININE 0.9   ALKPHOS 66   ALT 8*   AST 15   BILITOT 1.9*       Significant Diagnostics:  I have reviewed and interpreted all pertinent imaging results/findings within the past 24 hours.

## 2018-02-14 NOTE — NURSING TRANSFER
Nursing Transfer Note      2/14/2018     Transfer To: [id number] From: [facility] 34    Transfer via stretcher    Transfer with na     Transported by PCT    Medicines sent: none    Chart send with patient: Yes    Notified: PAYTON Urbina 5th floor RN    Patient reassessed at: 02/14/17 1030 (date, time)    Upon arrival to floor: patient oriented to room and bed in lowest position

## 2018-02-14 NOTE — PROVATION PATIENT INSTRUCTIONS
Discharge Summary/Instructions after an Endoscopic Procedure  Patient Name: Forrest Schmidt  Patient MRN: [id number]  Patient YOB: 1945 Wednesday, February 14, 2018  Jakob Ng MD  RESTRICTIONS:  During your procedure today, you received medications for sedation.  These   medications may affect your judgment, balance and coordination.  Therefore,   for 24 hours, you have the following restrictions:   - DO NOT drive a car, operate machinery, make legal/financial decisions,   sign important papers or drink alcohol.    ACTIVITY:  The following day: return to full activity including work, except no heavy   lifting, straining or running for 3 days if polyps were removed.  DIET:  Eat and drink normally unless instructed otherwise.     TREATMENT FOR COMMON SIDE EFFECTS:  - Mild abdominal pain, belching, bloating or excessive gas: rest, eat   lightly and use a heating pad.  - Sore Throat: treat with throat lozenges and/or gargle with warm salt   water.  SYMPTOMS TO WATCH FOR AND REPORT TO YOUR PHYSICIAN:  1. Abdominal pain or bloating, other than gas cramps.  2. Chest pain.  3. Back pain.  4. Chills or fever occurring within 24 hours after the procedure.  5. Rectal bleeding, which would show as bright red, maroon, or black stools.   (A tablespoon of blood from the rectum is not serious, especially if   hemorrhoids are present.)  6. Vomiting.  7. Weakness or dizziness.  8. Because air was used during the procedure, expelling large amounts of air   from your rectum or belching is normal.  9. If a bowel prep was taken, you may not have a bowel movement for 1-3   days.  This is normal.  GO DIRECTLY TO THE NEAREST EMERGENCY ROOM IF YOU HAVE ANY OF THE FOLLOWING:      Difficulty breathing  Chills and/or fever over 101 F   Persistent vomiting and/or vomiting blood   Severe abdominal pain   Severe chest pain   Black, tarry stools   Bleeding- more than one tablespoon   Any other symptom or condition that you may feel needs  urgent attention  Your doctor recommends these additional instructions:  If any biopsies were taken, your doctor s clinic will contact you in 1 to 2   weeks with any results.  The findings and recommendations were discussed with your designated   responsible adult.  For questions, problems or results please call your physician - Jakob Ng MD at Work:  [phone].  [facility], EMERGENCY ROOM PHONE NUMBER: [phone]  IF A COMPLICATION OR EMERGENCY SITUATION ARISES AND YOU ARE UNABLE TO REACH   YOUR PHYSICIAN - GO DIRECTLY TO THE EMERGENCY ROOM.  Jakob Ng MD  2/14/2018 10:12:05 AM  This report has been verified and signed electronically.

## 2018-02-14 NOTE — ANESTHESIA PREPROCEDURE EVALUATION
[facility]  Anesthesia Pre-Operative Evaluation         Patient Name: Forrest Schmidt  YOB: 1945  MRN: [id number]    SUBJECTIVE:     Pre-operative evaluation for Procedure(s) (LRB):  RESECTION-SMALL BOWEL (N/A)     02/14/2018    Forrest Schmidt is a 72 y.o. male w/ a significant PMHx of CAD and HTN. He was as a transfer from [facility] after presenting there with an overt GI bleeding. CT imaging found an ileal mass (approximately 5 cm). He underwent colonoscopy which was negative for bleeding with old blood seen in terminal ileum. EGD is planned for 02/14/18 to r/o upper GI bleed. Patient now presents for the above procedure(s).      LDA:        Peripheral IV - Single Lumen 05/12/17 2119 Right Antecubital (Active)   Site Assessment Clean;Dry;Intact;No redness;No swelling 2/14/2018  9:33 AM   Line Status Saline locked 2/14/2018  9:33 AM   Dressing Status Clean;Dry;Intact 2/14/2018  9:33 AM   Reason Not Rotated Not due 2/14/2018  9:33 AM   Number of days: 277            Peripheral IV - Single Lumen 02/13/18 0230 Left Forearm (Active)   Site Assessment Clean;Dry;Intact;No redness;No swelling 2/14/2018  9:33 AM   Line Status Infusing 2/14/2018  9:33 AM   Dressing Status Clean;Intact;Dry 2/14/2018  9:33 AM   Reason Not Rotated Not due 2/14/2018  9:33 AM   Number of days: 1       Prev airway: None documented in Epic or in Legacy Documents.     Drips: None documented.      Patient Active Problem List   Diagnosis    Symptomatic anemia    Hypertension    Hypercholesteremia    Coronary artery disease    BPH (benign prostatic hyperplasia)    Arthritis    Gastrointestinal hemorrhage    Excessive use of nonsteroidal anti-inflammatory drug (NSAID)    Pain of left hip joint    Hypokalemia    Mass of small intestine    Melena       Review of patient's allergies indicates:   Allergen Reactions    Augmentin [amoxicillin-pot clavulanate] Shortness Of Breath    Horse/equine containing products      Singulair [montelukast]        Current Inpatient Medications:   atorvastatin  40 mg Oral QHS    pantoprazole  40 mg Intravenous BID       No current facility-administered medications on file prior to encounter.      Current Outpatient Prescriptions on File Prior to Encounter   Medication Sig Dispense Refill    arginine 500 mg tablet Take 500 mg by mouth once daily.      aspirin (ECOTRIN) 81 MG EC tablet Take 81 mg by mouth once daily.      atorvastatin (LIPITOR) 40 MG tablet Take 40 mg by mouth every evening.      cholecalciferol, vitamin D3, (VITAMIN D3) 2,000 unit Cap Take 1 capsule by mouth once daily.      FERROUS SULFATE (FEOSOL ORAL) Take 1 tablet by mouth once daily at 6am.      fish oil-omega-3 fatty acids 300-1,000 mg capsule Take 2 g by mouth once daily.      fluticasone (FLONASE) 50 mcg/actuation nasal spray 1 spray by Each Nare route 2 (two) times daily.      losartan (COZAAR) 100 MG tablet Take 1 tablet (100 mg total) by mouth every evening. Hold if sbp < 120      omeprazole (PRILOSEC) 20 MG capsule Take 20 mg by mouth once daily.      SOD CHLOR,SOD BICARB/NETI POT (NEILMED NASAFLO NASL) 1 spray by Nasal route every evening.      SODIUM CHLORIDE (FE-128 OPHT) Apply 1 drop to eye 4 (four) times daily. 1 drop 4 times daily to right eye      tadalafil (CIALIS) 5 MG tablet Take 5 mg by mouth once daily at 6am.      testosterone cypionate (DEPOTESTOTERONE CYPIONATE) 200 mg/mL injection Inject 200 mg into the muscle every 21 days.      zinc gluconate 50 mg tablet Take 50 mg by mouth twice a week. Every Tuesday and thursday      FOLIC ACID/MV,FE,MIN (CENTRUM ORAL) Take 1 tablet by mouth once daily at 6am.         Past Surgical History:   Procedure Laterality Date    APPENDECTOMY      CATARACT EXTRACTION      COLONOSCOPY N/A 5/15/2017    Procedure: COLONOSCOPY;  Surgeon: Dez Jimenez MD;  Location: [facility];  Service: Endoscopy;  Laterality: N/A;    CORONARY STENT PLACEMENT       EYE SURGERY      SKIN BIOPSY      TONSILLECTOMY      VASECTOMY         Social History     Social History    Marital status:      Spouse name: N/A    Number of children: N/A    Years of education: N/A     Occupational History    Not on file.     Social History Main Topics    Smoking status: Former Smoker     Packs/day: 0.50     Years: 15.00    Smokeless tobacco: Not on file      Comment: Quit smoking about 40 yrs ago    Alcohol use Yes      Comment: 2-4 glasses wine/day, 17 last drink    Drug use: No    Sexual activity: Not on file     Other Topics Concern    Not on file     Social History Narrative    No narrative on file       OBJECTIVE:     Vital Signs Range (Last 24H):  Temp:  [36.2 °C (97.1 °F)-37.2 °C (98.9 °F)]   Pulse:  [60-94]   Resp:  [12-18]   BP: (112-156)/(60-72)   SpO2:  [96 %-99 %]       CBC:   Recent Labs      18   0202   18   2337  18   0619   WBC  8.93   --    --   12.73*   RBC  2.70*   --    --   3.03*   HGB  7.5*   < >  6.9*  8.3*   HCT  23.6*   < >  21.8*  25.9*   PLT  360*   --    --   381*   MCV  87   --    --   86   MCH  27.8   --    --   27.4   MCHC  31.8*   --    --   32.0    < > = values in this interval not displayed.       CMP:   Recent Labs      18   2335  182  18   0619   NA  140  140  139   K  4.4  4.7  4.5   CL  107  108  107   CO2  26  26  27   BUN  23  22  19   CREATININE  0.9  0.9  0.9   GLU  117*  117*  109   MG  1.8   --    --    PHOS  3.3   --    --    CALCIUM  8.7  8.5*  8.7   ALBUMIN  2.6*  2.8*  2.7*   PROT  5.8*  6.0  5.7*   ALKPHOS  62  65  66   ALT  10  10  8*   AST  14  15  15   BILITOT  0.2  0.2  1.9*       INR:  Recent Labs      185   INR  0.9       Diagnostic Studies:     EK/13/18    Test Reason : K92.2  Vent. Rate : 073 BPM     Atrial Rate : 073 BPM     P-R Int : 170 ms          QRS Dur : 090 ms      QT Int : 408 ms       P-R-T Axes : 077 051 055 degrees     QTc Int : 449  ms    Normal sinus rhythm  Moderate voltage criteria for LVH, may be normal variant  Borderline Abnormal ECG  No previous ECGs available    Referred By: ISABELA PRADHAN           Confirmed By:Mann Cherry MD    2D ECHO:  No results found for this or any previous visit.      ASSESSMENT/PLAN:         Anesthesia Evaluation    I have reviewed the Patient Summary Reports.     I have reviewed the Medications.     Review of Systems  Anesthesia Hx:  No problems with previous Anesthesia  History of prior surgery of interest to airway management or planning: Denies Family Hx of Anesthesia complications.   Denies Personal Hx of Anesthesia complications.   Hematology/Oncology:  Hematology Normal   Oncology Normal     EENT/Dental:EENT/Dental Normal   Cardiovascular:   Exercise tolerance: good Hypertension CAD  CABG/stent     Pulmonary:  Pulmonary Normal    Renal/:  Renal/ Normal     Hepatic/GI:  Hepatic/GI Normal    Musculoskeletal:  Musculoskeletal Normal    Neurological:  Neurology Normal    Endocrine:  Endocrine Normal    Dermatological:  Skin Normal    Psych:  Psychiatric Normal           Physical Exam  General:  Well nourished, Large Beard    Airway/Jaw/Neck:  Airway Findings: Mouth Opening: Normal Tongue: Normal  General Airway Assessment: Adult  Mallampati: II  TM Distance: Normal, at least 6 cm  Jaw/Neck Findings:  Neck ROM: Normal ROM  Neck Findings: Normal     Dental:  Dental Findings: In tact, Periodontal disease, Mild   Chest/Lungs:  Chest/Lungs Findings: Clear to auscultation, Normal Respiratory Rate     Heart/Vascular:  Heart Findings: Rate: Normal  Rhythm: Regular Rhythm  Sounds: Normal  Heart murmur: negative Vascular Findings: Normal    Abdomen:  Abdomen Findings:  Tenderness     Musculoskeletal:  Musculoskeletal Findings: Normal   Skin:  Skin Findings: Normal    Mental Status:  Mental Status Findings:  Cooperative, Alert and Oriented         Anesthesia Plan  Type of Anesthesia, risks & benefits  discussed:  Anesthesia Type:  general  Patient's Preference:   Intra-op Monitoring Plan: standard ASA monitors  Intra-op Monitoring Plan Comments:   Post Op Pain Control Plan: multimodal analgesia, per primary service following discharge from PACU and IV/PO Opioids PRN  Post Op Pain Control Plan Comments:   Induction:   IV  Beta Blocker:  Patient is not currently on a Beta-Blocker (No further documentation required).       Informed Consent: Patient understands risks and agrees with Anesthesia plan.  Questions answered. Anesthesia consent signed with patient.  ASA Score: 3     Day of Surgery Review of History & Physical:    H&P update referred to the surgeon.         Ready For Surgery From Anesthesia Perspective.

## 2018-02-15 ENCOUNTER — ANESTHESIA (OUTPATIENT)
Dept: SURGERY | Facility: HOSPITAL | Age: 73
DRG: 982 | End: 2018-02-15
Payer: MEDICARE

## 2018-02-15 ENCOUNTER — SURGERY (OUTPATIENT)
Age: 73
End: 2018-02-15

## 2018-02-15 PROBLEM — C49.A3 MALIGNANT GASTROINTESTINAL STROMAL TUMOR (GIST) OF SMALL INTESTINE: Status: ACTIVE | Noted: 2018-02-15

## 2018-02-15 PROBLEM — K92.1 GASTROINTESTINAL HEMORRHAGE WITH MELENA: Status: ACTIVE | Noted: 2018-02-15

## 2018-02-15 LAB
ALBUMIN SERPL BCP-MCNC: 2.6 G/DL
ALP SERPL-CCNC: 66 U/L
ALT SERPL W/O P-5'-P-CCNC: 8 U/L
ANION GAP SERPL CALC-SCNC: 5 MMOL/L
AST SERPL-CCNC: 16 U/L
BASOPHILS # BLD AUTO: 0.04 K/UL
BASOPHILS NFR BLD: 0.5 %
BILIRUB SERPL-MCNC: 0.4 MG/DL
BUN SERPL-MCNC: 18 MG/DL
CALCIUM SERPL-MCNC: 8.5 MG/DL
CHLORIDE SERPL-SCNC: 105 MMOL/L
CO2 SERPL-SCNC: 29 MMOL/L
CREAT SERPL-MCNC: 1 MG/DL
DIFFERENTIAL METHOD: ABNORMAL
EOSINOPHIL # BLD AUTO: 0.3 K/UL
EOSINOPHIL NFR BLD: 3.5 %
ERYTHROCYTE [DISTWIDTH] IN BLOOD BY AUTOMATED COUNT: 15.7 %
EST. GFR  (AFRICAN AMERICAN): >60 ML/MIN/1.73 M^2
EST. GFR  (NON AFRICAN AMERICAN): >60 ML/MIN/1.73 M^2
GLUCOSE SERPL-MCNC: 118 MG/DL
HCT VFR BLD AUTO: 25.6 %
HCT VFR BLD AUTO: 25.7 %
HCT VFR BLD AUTO: 25.8 %
HCT VFR BLD AUTO: 27.2 %
HCT VFR BLD AUTO: 28.6 %
HGB BLD-MCNC: 8.3 G/DL
HGB BLD-MCNC: 8.7 G/DL
HGB BLD-MCNC: 9.1 G/DL
IMM GRANULOCYTES # BLD AUTO: 0.04 K/UL
IMM GRANULOCYTES NFR BLD AUTO: 0.5 %
LYMPHOCYTES # BLD AUTO: 0.9 K/UL
LYMPHOCYTES NFR BLD: 10.2 %
MCH RBC QN AUTO: 27.3 PG
MCHC RBC AUTO-ENTMCNC: 32.2 G/DL
MCV RBC AUTO: 85 FL
MONOCYTES # BLD AUTO: 1.3 K/UL
MONOCYTES NFR BLD: 15 %
NEUTROPHILS # BLD AUTO: 6.1 K/UL
NEUTROPHILS NFR BLD: 70.3 %
NRBC BLD-RTO: 0 /100 WBC
PLATELET # BLD AUTO: 356 K/UL
PMV BLD AUTO: 9.2 FL
POTASSIUM SERPL-SCNC: 4.4 MMOL/L
PREALB SERPL-MCNC: 24 MG/DL
PROT SERPL-MCNC: 5.7 G/DL
RBC # BLD AUTO: 3.04 M/UL
SODIUM SERPL-SCNC: 139 MMOL/L
WBC # BLD AUTO: 8.65 K/UL

## 2018-02-15 PROCEDURE — 36415 COLL VENOUS BLD VENIPUNCTURE: CPT

## 2018-02-15 PROCEDURE — 88309 TISSUE EXAM BY PATHOLOGIST: CPT | Mod: 26,,, | Performed by: PATHOLOGY

## 2018-02-15 PROCEDURE — 71000039 HC RECOVERY, EACH ADD'L HOUR: Performed by: SURGERY

## 2018-02-15 PROCEDURE — 36000709 HC OR TIME LEV III EA ADD 15 MIN: Performed by: SURGERY

## 2018-02-15 PROCEDURE — 63600175 PHARM REV CODE 636 W HCPCS: Performed by: NURSE ANESTHETIST, CERTIFIED REGISTERED

## 2018-02-15 PROCEDURE — 94761 N-INVAS EAR/PLS OXIMETRY MLT: CPT

## 2018-02-15 PROCEDURE — 25000003 PHARM REV CODE 250: Performed by: NURSE ANESTHETIST, CERTIFIED REGISTERED

## 2018-02-15 PROCEDURE — D9220A PRA ANESTHESIA: Mod: CRNA,,, | Performed by: NURSE ANESTHETIST, CERTIFIED REGISTERED

## 2018-02-15 PROCEDURE — 85014 HEMATOCRIT: CPT | Mod: 91

## 2018-02-15 PROCEDURE — 88341 IMHCHEM/IMCYTCHM EA ADD ANTB: CPT | Mod: 26,,, | Performed by: PATHOLOGY

## 2018-02-15 PROCEDURE — S0077 INJECTION, CLINDAMYCIN PHOSP: HCPCS | Performed by: NURSE ANESTHETIST, CERTIFIED REGISTERED

## 2018-02-15 PROCEDURE — 85014 HEMATOCRIT: CPT

## 2018-02-15 PROCEDURE — 0DB80ZZ EXCISION OF SMALL INTESTINE, OPEN APPROACH: ICD-10-PCS | Performed by: SURGERY

## 2018-02-15 PROCEDURE — 27000221 HC OXYGEN, UP TO 24 HOURS

## 2018-02-15 PROCEDURE — D9220A PRA ANESTHESIA: Mod: ANES,,, | Performed by: ANESTHESIOLOGY

## 2018-02-15 PROCEDURE — 37000009 HC ANESTHESIA EA ADD 15 MINS: Performed by: SURGERY

## 2018-02-15 PROCEDURE — 88342 IMHCHEM/IMCYTCHM 1ST ANTB: CPT | Performed by: PATHOLOGY

## 2018-02-15 PROCEDURE — 99233 SBSQ HOSP IP/OBS HIGH 50: CPT | Mod: ,,, | Performed by: HOSPITALIST

## 2018-02-15 PROCEDURE — 85025 COMPLETE CBC W/AUTO DIFF WBC: CPT

## 2018-02-15 PROCEDURE — 88342 IMHCHEM/IMCYTCHM 1ST ANTB: CPT | Mod: 26,,, | Performed by: PATHOLOGY

## 2018-02-15 PROCEDURE — 25000003 PHARM REV CODE 250: Performed by: SURGERY

## 2018-02-15 PROCEDURE — 36000708 HC OR TIME LEV III 1ST 15 MIN: Performed by: SURGERY

## 2018-02-15 PROCEDURE — 37000008 HC ANESTHESIA 1ST 15 MINUTES: Performed by: SURGERY

## 2018-02-15 PROCEDURE — 63600175 PHARM REV CODE 636 W HCPCS: Performed by: SURGERY

## 2018-02-15 PROCEDURE — 84134 ASSAY OF PREALBUMIN: CPT

## 2018-02-15 PROCEDURE — 80053 COMPREHEN METABOLIC PANEL: CPT

## 2018-02-15 PROCEDURE — 85018 HEMOGLOBIN: CPT | Mod: 91

## 2018-02-15 PROCEDURE — 85018 HEMOGLOBIN: CPT

## 2018-02-15 PROCEDURE — C9290 INJ, BUPIVACAINE LIPOSOME: HCPCS | Performed by: SURGERY

## 2018-02-15 PROCEDURE — 27201423 OPTIME MED/SURG SUP & DEVICES STERILE SUPPLY: Performed by: SURGERY

## 2018-02-15 PROCEDURE — 25000003 PHARM REV CODE 250: Performed by: STUDENT IN AN ORGANIZED HEALTH CARE EDUCATION/TRAINING PROGRAM

## 2018-02-15 PROCEDURE — 71000033 HC RECOVERY, INTIAL HOUR: Performed by: SURGERY

## 2018-02-15 PROCEDURE — 88331 PATH CONSLTJ SURG 1 BLK 1SPC: CPT | Mod: 26,,, | Performed by: PATHOLOGY

## 2018-02-15 PROCEDURE — 11000001 HC ACUTE MED/SURG PRIVATE ROOM

## 2018-02-15 PROCEDURE — 88341 IMHCHEM/IMCYTCHM EA ADD ANTB: CPT | Performed by: PATHOLOGY

## 2018-02-15 PROCEDURE — 44120 REMOVAL OF SMALL INTESTINE: CPT | Mod: GC,,, | Performed by: SURGERY

## 2018-02-15 RX ORDER — BUPIVACAINE HYDROCHLORIDE 2.5 MG/ML
INJECTION, SOLUTION EPIDURAL; INFILTRATION; INTRACAUDAL
Status: DISCONTINUED | OUTPATIENT
Start: 2018-02-15 | End: 2018-02-15

## 2018-02-15 RX ORDER — NALOXONE HCL 0.4 MG/ML
0.02 VIAL (ML) INJECTION
Status: DISCONTINUED | OUTPATIENT
Start: 2018-02-15 | End: 2018-02-16

## 2018-02-15 RX ORDER — MIDAZOLAM HYDROCHLORIDE 1 MG/ML
INJECTION, SOLUTION INTRAMUSCULAR; INTRAVENOUS
Status: DISCONTINUED | OUTPATIENT
Start: 2018-02-15 | End: 2018-02-15

## 2018-02-15 RX ORDER — KETOROLAC TROMETHAMINE 30 MG/ML
INJECTION, SOLUTION INTRAMUSCULAR; INTRAVENOUS
Status: DISCONTINUED | OUTPATIENT
Start: 2018-02-15 | End: 2018-02-15

## 2018-02-15 RX ORDER — LIDOCAINE HCL/PF 100 MG/5ML
SYRINGE (ML) INTRAVENOUS
Status: DISCONTINUED | OUTPATIENT
Start: 2018-02-15 | End: 2018-02-15

## 2018-02-15 RX ORDER — IPRATROPIUM BROMIDE AND ALBUTEROL SULFATE 2.5; .5 MG/3ML; MG/3ML
3 SOLUTION RESPIRATORY (INHALATION)
Status: COMPLETED | OUTPATIENT
Start: 2018-02-16 | End: 2018-02-17

## 2018-02-15 RX ORDER — SODIUM CHLORIDE 0.9 % (FLUSH) 0.9 %
3 SYRINGE (ML) INJECTION
Status: DISCONTINUED | OUTPATIENT
Start: 2018-02-15 | End: 2018-03-04 | Stop reason: HOSPADM

## 2018-02-15 RX ORDER — CLINDAMYCIN PHOSPHATE 900 MG/50ML
INJECTION, SOLUTION INTRAVENOUS
Status: DISCONTINUED | OUTPATIENT
Start: 2018-02-15 | End: 2018-02-15

## 2018-02-15 RX ORDER — SODIUM CHLORIDE 9 MG/ML
INJECTION, SOLUTION INTRAVENOUS CONTINUOUS
Status: DISCONTINUED | OUTPATIENT
Start: 2018-02-15 | End: 2018-02-15

## 2018-02-15 RX ORDER — SODIUM CHLORIDE 9 MG/ML
INJECTION, SOLUTION INTRAVENOUS CONTINUOUS
Status: ACTIVE | OUTPATIENT
Start: 2018-02-15 | End: 2018-02-15

## 2018-02-15 RX ORDER — FENTANYL CITRATE 50 UG/ML
INJECTION, SOLUTION INTRAMUSCULAR; INTRAVENOUS
Status: DISCONTINUED | OUTPATIENT
Start: 2018-02-15 | End: 2018-02-15

## 2018-02-15 RX ORDER — SUCCINYLCHOLINE CHLORIDE 20 MG/ML
INJECTION INTRAMUSCULAR; INTRAVENOUS
Status: DISCONTINUED | OUTPATIENT
Start: 2018-02-15 | End: 2018-02-15

## 2018-02-15 RX ORDER — ONDANSETRON 2 MG/ML
INJECTION INTRAMUSCULAR; INTRAVENOUS
Status: DISCONTINUED | OUTPATIENT
Start: 2018-02-15 | End: 2018-02-15

## 2018-02-15 RX ORDER — GLYCOPYRROLATE 0.2 MG/ML
INJECTION INTRAMUSCULAR; INTRAVENOUS
Status: DISCONTINUED | OUTPATIENT
Start: 2018-02-15 | End: 2018-02-15

## 2018-02-15 RX ORDER — FENTANYL CITRATE 50 UG/ML
25 INJECTION, SOLUTION INTRAMUSCULAR; INTRAVENOUS EVERY 5 MIN PRN
Status: DISCONTINUED | OUTPATIENT
Start: 2018-02-15 | End: 2018-02-15

## 2018-02-15 RX ORDER — PROPOFOL 10 MG/ML
VIAL (ML) INTRAVENOUS
Status: DISCONTINUED | OUTPATIENT
Start: 2018-02-15 | End: 2018-02-15

## 2018-02-15 RX ORDER — LIDOCAINE HYDROCHLORIDE 10 MG/ML
1 INJECTION, SOLUTION EPIDURAL; INFILTRATION; INTRACAUDAL; PERINEURAL ONCE
Status: DISCONTINUED | OUTPATIENT
Start: 2018-02-15 | End: 2018-02-15

## 2018-02-15 RX ORDER — ACETAMINOPHEN 10 MG/ML
INJECTION, SOLUTION INTRAVENOUS
Status: DISCONTINUED | OUTPATIENT
Start: 2018-02-15 | End: 2018-02-15

## 2018-02-15 RX ORDER — HYDROMORPHONE HCL IN 0.9% NACL 6 MG/30 ML
PATIENT CONTROLLED ANALGESIA SYRINGE INTRAVENOUS CONTINUOUS
Status: DISCONTINUED | OUTPATIENT
Start: 2018-02-15 | End: 2018-02-16

## 2018-02-15 RX ORDER — NEOSTIGMINE METHYLSULFATE 1 MG/ML
INJECTION, SOLUTION INTRAVENOUS
Status: DISCONTINUED | OUTPATIENT
Start: 2018-02-15 | End: 2018-02-15

## 2018-02-15 RX ORDER — ROCURONIUM BROMIDE 10 MG/ML
INJECTION, SOLUTION INTRAVENOUS
Status: DISCONTINUED | OUTPATIENT
Start: 2018-02-15 | End: 2018-02-15

## 2018-02-15 RX ADMIN — EPHEDRINE SULFATE 10 MG: 50 INJECTION, SOLUTION INTRAMUSCULAR; INTRAVENOUS; SUBCUTANEOUS at 02:02

## 2018-02-15 RX ADMIN — EPHEDRINE SULFATE 10 MG: 50 INJECTION, SOLUTION INTRAMUSCULAR; INTRAVENOUS; SUBCUTANEOUS at 01:02

## 2018-02-15 RX ADMIN — ACETAMINOPHEN 1000 MG: 10 INJECTION, SOLUTION INTRAVENOUS at 03:02

## 2018-02-15 RX ADMIN — FENTANYL CITRATE 50 MCG: 50 INJECTION, SOLUTION INTRAMUSCULAR; INTRAVENOUS at 04:02

## 2018-02-15 RX ADMIN — ROCURONIUM BROMIDE 40 MG: 10 INJECTION, SOLUTION INTRAVENOUS at 01:02

## 2018-02-15 RX ADMIN — ROCURONIUM BROMIDE 5 MG: 10 INJECTION, SOLUTION INTRAVENOUS at 03:02

## 2018-02-15 RX ADMIN — SODIUM CHLORIDE: 0.9 INJECTION, SOLUTION INTRAVENOUS at 04:02

## 2018-02-15 RX ADMIN — EPHEDRINE SULFATE 10 MG: 50 INJECTION, SOLUTION INTRAMUSCULAR; INTRAVENOUS; SUBCUTANEOUS at 03:02

## 2018-02-15 RX ADMIN — SODIUM CHLORIDE, SODIUM GLUCONATE, SODIUM ACETATE, POTASSIUM CHLORIDE, MAGNESIUM CHLORIDE, SODIUM PHOSPHATE, DIBASIC, AND POTASSIUM PHOSPHATE: .53; .5; .37; .037; .03; .012; .00082 INJECTION, SOLUTION INTRAVENOUS at 01:02

## 2018-02-15 RX ADMIN — FENTANYL CITRATE 50 MCG: 50 INJECTION, SOLUTION INTRAMUSCULAR; INTRAVENOUS at 02:02

## 2018-02-15 RX ADMIN — SODIUM CHLORIDE: 0.9 INJECTION, SOLUTION INTRAVENOUS at 09:02

## 2018-02-15 RX ADMIN — SUCCINYLCHOLINE CHLORIDE 140 MG: 20 INJECTION, SOLUTION INTRAMUSCULAR; INTRAVENOUS at 01:02

## 2018-02-15 RX ADMIN — KETOROLAC TROMETHAMINE 30 MG: 30 INJECTION, SOLUTION INTRAMUSCULAR; INTRAVENOUS at 04:02

## 2018-02-15 RX ADMIN — FENTANYL CITRATE 100 MCG: 50 INJECTION, SOLUTION INTRAMUSCULAR; INTRAVENOUS at 01:02

## 2018-02-15 RX ADMIN — ROCURONIUM BROMIDE 5 MG: 10 INJECTION, SOLUTION INTRAVENOUS at 02:02

## 2018-02-15 RX ADMIN — CLINDAMYCIN PHOSPHATE 900 MG: 18 INJECTION, SOLUTION INTRAVENOUS at 01:02

## 2018-02-15 RX ADMIN — BUPIVACAINE 20 ML: 13.3 INJECTION, SUSPENSION, LIPOSOMAL INFILTRATION at 04:02

## 2018-02-15 RX ADMIN — NEOSTIGMINE METHYLSULFATE 4 MG: 1 INJECTION INTRAVENOUS at 04:02

## 2018-02-15 RX ADMIN — ONDANSETRON 4 MG: 2 INJECTION INTRAMUSCULAR; INTRAVENOUS at 04:02

## 2018-02-15 RX ADMIN — MIDAZOLAM HYDROCHLORIDE 2 MG: 1 INJECTION, SOLUTION INTRAMUSCULAR; INTRAVENOUS at 01:02

## 2018-02-15 RX ADMIN — SODIUM CHLORIDE, SODIUM GLUCONATE, SODIUM ACETATE, POTASSIUM CHLORIDE, MAGNESIUM CHLORIDE, SODIUM PHOSPHATE, DIBASIC, AND POTASSIUM PHOSPHATE: .53; .5; .37; .037; .03; .012; .00082 INJECTION, SOLUTION INTRAVENOUS at 02:02

## 2018-02-15 RX ADMIN — Medication: at 04:02

## 2018-02-15 RX ADMIN — BUPIVACAINE HYDROCHLORIDE 30 ML: 2.5 INJECTION, SOLUTION EPIDURAL; INFILTRATION; INTRACAUDAL; PERINEURAL at 04:02

## 2018-02-15 RX ADMIN — PROPOFOL 150 MG: 10 INJECTION, EMULSION INTRAVENOUS at 01:02

## 2018-02-15 RX ADMIN — LIDOCAINE HYDROCHLORIDE 100 MG: 20 INJECTION, SOLUTION INTRAVENOUS at 01:02

## 2018-02-15 RX ADMIN — GLYCOPYRROLATE 0.4 MG: 0.2 INJECTION, SOLUTION INTRAMUSCULAR; INTRAVENOUS at 04:02

## 2018-02-15 RX ADMIN — PANTOPRAZOLE SODIUM 40 MG: 40 TABLET, DELAYED RELEASE ORAL at 09:02

## 2018-02-15 RX ADMIN — ATORVASTATIN CALCIUM 40 MG: 20 TABLET, FILM COATED ORAL at 09:02

## 2018-02-15 RX ADMIN — FENTANYL CITRATE 50 MCG: 50 INJECTION, SOLUTION INTRAMUSCULAR; INTRAVENOUS at 03:02

## 2018-02-15 NOTE — OP NOTE
DATE OF PROCEDURE:  02/15/2018.    OPERATING SURGEON:  Ahmet Sanchez M.D.    ASSISTANT:  Mario Bacon M.D. (RES).    PREOPERATIVE DIAGNOSIS:  Small bowel neoplasm.    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    OPERATIVE PROCEDURE:  Segmental small bowel resection.    PROCEDURE IN DETAIL:  The patient is placed in the supine position on the   operating table.  Adequate general endotracheal anesthesia is induced.  The   abdomen is prepped and draped in sterile fashion and entered through a mid   abdominal midline incision that started just above the umbilicus and continued   around the umbilicus to the infraumbilical midline.  There is no ascites.    Readily apparent is a mass in the lower abdomen and pelvis, which appears to be   arising from the small bowel.  There is no evidence of peritoneal or omental   implants and the accessible portions of the liver are palpated and there are no   focal lesions within it.  Attention is turned to the small bowel mass.  The mass   appears to be arising from the mid small bowel.  It is somewhat adherent to the   sigmoid colon and to an adjacent loop of more proximal small bowel.  It does   not appear that the tumor is invading these structures, but rather there are a   series of vascular adhesions to these structures, which I take down sharply,   taking great care to avoid disrupting the mass.  Once the mass has been freed,   it is quite mobile and delivered onto the anterior abdominal wall.  Segmental   small bowel resection is performed using the BOBBY blue tissue cartridge to   complete resection by dividing the bowel proximal and distal to the lesion,   providing a margin of some 8 cm on either side, taking a wedge of the mesentery.    The specimen is submitted for frozen section and the Pathologist reports that   this is a spindle cell tumor, quite suggestive of a GIST and there is no   evidence of lymphoma.  Small bowel continuity is  restored with a side-to-side   stapled BOBBY anastomosis.  The transverse enterotomies are closed with   interrupted 3-0 Vicryl.  Mesenteric defect is closed with interrupted 3-0 silk.    A blue load of the stapler is used to create the anastomosis.  The small bowel   is run from the ligament of Treitz to the ileocecal valve.  No other lesions are   noted.  The abdomen is irrigated with sterile saline.  Hemostasis is excellent.    Exparel solution is infiltrated into the deep muscular layers on either side   of the incision.  The abdominal incision is closed in layers in the usual   fashion and a sterile dressing is applied.  The patient tolerates the procedure   well and is brought to the Recovery Room in stable condition.      PRIYA/JORDANA  dd: 02/15/2018 16:30:37 (CST)  td: 02/15/2018 17:09:33 (CST)  Doc ID   #[id number]  Job ID #[id number]    CC:

## 2018-02-15 NOTE — SUBJECTIVE & OBJECTIVE
Interval Hx: NEON,Hb stable, will undergo surgical resection of ilial mass today.     Review of Systems   Constitutional: patient had 10 Ib unintentional wt loss in the last 3 month , night sweats, fever or chills  Eyes: denies visual changes  ENT: denies nasal congestion, ear pain or sore throat  Respiratory: denies cough, dyspnea on exertion and pleurisy  Cardiovascular: denies chest pain, chest pressure/discomfort, dyspnea.  Gastrointestinal: + melena. denies nausea or vomiting, abdominal pain.  Genitourinary: denies hematuria or dysuria  Musculoskeletal: denies arthralgias or myalgias  Neurological: denies seizures or tremors  Behavioral/Psych: denies auditory or visual hallucinations, SI, HI     Objective:     Vital Signs (Most Recent):  Temp: 97.6 °F (36.4 °C) (02/15/18 0718)  Pulse: 62 (02/15/18 0718)  Resp: 20 (02/15/18 0718)  BP: (!) 143/78 (02/15/18 0718)  SpO2: 96 % (02/15/18 0718) Vital Signs (24h Range):  Temp:  [97.2 °F (36.2 °C)-98.4 °F (36.9 °C)] 97.6 °F (36.4 °C)  Pulse:  [62-85] 62  Resp:  [16-20] 20  SpO2:  [96 %-98 %] 96 %  BP: (125-152)/(69-78) 143/78     Weight: 73 kg (161 lb)  Body mass index is 22.45 kg/m².    Physical Exam    General: Well developed, well nourished male in NAD  HEENT: Conjunctiva clear; Oropharynx clear  Neck: No JVD noted, Supple  CV: Normal S1, S2 with no murmurs.  Resp: Lungs CTA Bilaterally, Unlabored  Abdomen: NTND, BS normoactive x4 quads, soft. LUPE -ve for melena or BRBPR   Extrem: No cyanosis, clubbing, edema.  Skin: No rashes, lesions, ulcers  Neuro: motor strength in tact. No focal deficit   PSYCH: Oriented x3       Significant Labs:   CBC:     Recent Labs  Lab 02/14/18  0619 02/14/18  1754 02/15/18  0007 02/15/18  0603   WBC 12.73*  --   --  8.65   HGB 8.3* 9.3* 8.3* 8.3*  8.3*   HCT 25.9* 28.6* 25.6* 25.8*  25.7*   *  --   --  356*     CMP:     Recent Labs  Lab 02/14/18  0619 02/15/18  0603    139   K 4.5 4.4    105   CO2 27 29    118*    BUN 19 18   CREATININE 0.9 1.0   CALCIUM 8.7 8.5*   PROT 5.7* 5.7*   ALBUMIN 2.7* 2.6*   BILITOT 1.9* 0.4   ALKPHOS 66 66   AST 15 16   ALT 8* 8*   ANIONGAP 5* 5*   EGFRNONAA >60.0 >60.0

## 2018-02-15 NOTE — BRIEF OP NOTE
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 Segmental small bowel resection performed. Frozen section reveals a spindle cell tumor  EBL: 100 ccs  CPT code:

## 2018-02-15 NOTE — SUBJECTIVE & OBJECTIVE
Interval History: No acute events overnight.  Afebrile and HD stable.  NPO for surgery today. No bowel movements. No pain, nausea, vomiting.     Medications:  Continuous Infusions:  Scheduled Meds:   atorvastatin  40 mg Oral QHS    pantoprazole  40 mg Oral BID     PRN Meds:sodium chloride, dextrose 50%, dextrose 50%, glucagon (human recombinant), glucose, glucose, sodium chloride 0.9%     Review of patient's allergies indicates:   Allergen Reactions    Augmentin [amoxicillin-pot clavulanate] Shortness Of Breath    Horse/equine containing products     Singulair [montelukast]      Objective:     Vital Signs (Most Recent):  Temp: 97.2 °F (36.2 °C) (02/15/18 0455)  Pulse: 68 (02/15/18 0455)  Resp: 16 (02/15/18 0455)  BP: (!) 141/72 (02/15/18 0455)  SpO2: 97 % (02/15/18 0455) Vital Signs (24h Range):  Temp:  [97.1 °F (36.2 °C)-98.8 °F (37.1 °C)] 97.2 °F (36.2 °C)  Pulse:  [59-85] 68  Resp:  [16-18] 16  SpO2:  [96 %-99 %] 97 %  BP: ()/(49-73) 141/72     Weight: 73 kg (161 lb)  Body mass index is 22.45 kg/m².    Intake/Output - Last 3 Shifts       02/13 0700 - 02/14 0659 02/14 0700 - 02/15 0659 02/15 0700 - 02/16 0659    P.O. 480      I.V. (mL/kg)  50 (0.7)     Blood 606.3      Total Intake(mL/kg) 1086.3 (14.9) 50 (0.7)     Net +1086.3 +50             Urine Occurrence 5 x      Stool Occurrence 1 x            Physical Exam   Constitutional: He is oriented to person, place, and time. He appears well-developed and well-nourished. No distress.   HENT:   Head: Normocephalic and atraumatic.   Eyes: Conjunctivae and EOM are normal. No scleral icterus.   Neck: Normal range of motion.   Cardiovascular: Normal rate and regular rhythm.    Pulmonary/Chest: Effort normal. No respiratory distress.   Abdominal: Soft. He exhibits no distension. There is no tenderness.   Musculoskeletal: Normal range of motion. He exhibits no edema.   Neurological: He is alert and oriented to person, place, and time.   Nursing note and vitals  reviewed.      Significant Labs:  CBC:     Recent Labs  Lab 02/15/18  0603   WBC 8.65   RBC 3.04*   HGB 8.3*  8.3*   HCT 25.8*  25.7*   *   MCV 85   MCH 27.3   MCHC 32.2     CMP:     Recent Labs  Lab 02/14/18  0619      CALCIUM 8.7   ALBUMIN 2.7*   PROT 5.7*      K 4.5   CO2 27      BUN 19   CREATININE 0.9   ALKPHOS 66   ALT 8*   AST 15   BILITOT 1.9*       Significant Diagnostics:  I have reviewed and interpreted all pertinent imaging results/findings within the past 24 hours.

## 2018-02-15 NOTE — TRANSFER OF CARE
"Anesthesia Transfer of Care Note    Patient: Forrest Schmidt    Procedure(s) Performed: Procedure(s) (LRB):  RESECTION-SMALL BOWEL (N/A)    Patient location: PACU    Anesthesia Type: general    Transport from OR: Transported from OR on 6-10 L/min O2 by face mask with adequate spontaneous ventilation    Post pain: adequate analgesia    Post assessment: no apparent anesthetic complications    Post vital signs: stable    Level of consciousness: awake    Nausea/Vomiting: no nausea/vomiting    Complications: none    Transfer of care protocol was followed      Last vitals:   Visit Vitals  BP (!) 108/59 (BP Location: Right arm, Patient Position: Lying)   Pulse 99   Temp 36.5 °C (97.7 °F) (Temporal)   Resp 16   Ht 5' 11" (1.803 m)   Wt 73 kg (161 lb)   SpO2 100%   BMI 22.45 kg/m²     "

## 2018-02-15 NOTE — ASSESSMENT & PLAN NOTE
- with history of melena, the source likely to be Upper GIB or right side colon   - In this pt., the ileal mass could be the source for the GIB. DDX og the mass adenocarcinoma, lymphoma or less likely infectious in origine.     -Protonix 40 mg PO BID  -Serial H/H's q6 ,transfuse as needed.  -Discontinue all ASA, NSAIDs and Heparin products  -GI/GS consult  -surgical resection of ilial mass today.     - Underwent EGD today, tolerated well , no abnormalities was found

## 2018-02-15 NOTE — PROGRESS NOTES
[facility]  General Surgery  Progress Note    Subjective:     History of Present Illness:  This is a 73 y/o male with hx CAD and HTN, obscure overt GI bleeding [facility].  Pt was admitted 2/5 with melena. Underwent  colonoscopy, negative for bleeding with old blood seen in TI. No EGD at OSH.  CT abd showed ileal mass, approx 5 cm. Surgery recommend operative intervention at OSH, however patient requested transfer for 2nd opinion.  Patient has had a similar episode in the past year, at which time he also had EGD, colonscopy and capsule enterography which were negative, bleeding at that time thought due to NSAID use which was discontinued and he did well until about a week ago. When he began to have blood stools again. He has some fullness and early satiety, but has not altered his eating habits, and normal bowel habits, 1-3 movement a day, no change in consistency. No abdominal pain. Only prior abdominal surgery is an appendectomy as a child.     Post-Op Info:  Procedure(s) (LRB):  ESOPHAGOGASTRODUODENOSCOPY (EGD) (N/A)   1 Day Post-Op     Interval History: No acute events overnight.  Afebrile and HD stable.  NPO for surgery today. No bowel movements. No pain, nausea, vomiting.     Medications:  Continuous Infusions:  Scheduled Meds:   atorvastatin  40 mg Oral QHS    pantoprazole  40 mg Oral BID     PRN Meds:sodium chloride, dextrose 50%, dextrose 50%, glucagon (human recombinant), glucose, glucose, sodium chloride 0.9%     Review of patient's allergies indicates:   Allergen Reactions    Augmentin [amoxicillin-pot clavulanate] Shortness Of Breath    Horse/equine containing products     Singulair [montelukast]      Objective:     Vital Signs (Most Recent):  Temp: 97.2 °F (36.2 °C) (02/15/18 0455)  Pulse: 68 (02/15/18 0455)  Resp: 16 (02/15/18 0455)  BP: (!) 141/72 (02/15/18 0455)  SpO2: 97 % (02/15/18 0455) Vital Signs (24h  Range):  Temp:  [97.1 °F (36.2 °C)-98.8 °F (37.1 °C)] 97.2 °F (36.2 °C)  Pulse:  [59-85] 68  Resp:  [16-18] 16  SpO2:  [96 %-99 %] 97 %  BP: ()/(49-73) 141/72     Weight: 73 kg (161 lb)  Body mass index is 22.45 kg/m².    Intake/Output - Last 3 Shifts       02/13 0700 - 02/14 0659 02/14 0700 - 02/15 0659 02/15 0700 - 02/16 0659    P.O. 480      I.V. (mL/kg)  50 (0.7)     Blood 606.3      Total Intake(mL/kg) 1086.3 (14.9) 50 (0.7)     Net +1086.3 +50             Urine Occurrence 5 x      Stool Occurrence 1 x            Physical Exam   Constitutional: He is oriented to person, place, and time. He appears well-developed and well-nourished. No distress.   HENT:   Head: Normocephalic and atraumatic.   Eyes: Conjunctivae and EOM are normal. No scleral icterus.   Neck: Normal range of motion.   Cardiovascular: Normal rate and regular rhythm.    Pulmonary/Chest: Effort normal. No respiratory distress.   Abdominal: Soft. He exhibits no distension. There is no tenderness.   Musculoskeletal: Normal range of motion. He exhibits no edema.   Neurological: He is alert and oriented to person, place, and time.   Nursing note and vitals reviewed.      Significant Labs:  CBC:     Recent Labs  Lab 02/15/18  0603   WBC 8.65   RBC 3.04*   HGB 8.3*  8.3*   HCT 25.8*  25.7*   *   MCV 85   MCH 27.3   MCHC 32.2     CMP:     Recent Labs  Lab 02/14/18  0619      CALCIUM 8.7   ALBUMIN 2.7*   PROT 5.7*      K 4.5   CO2 27      BUN 19   CREATININE 0.9   ALKPHOS 66   ALT 8*   AST 15   BILITOT 1.9*       Significant Diagnostics:  I have reviewed and interpreted all pertinent imaging results/findings within the past 24 hours.    Assessment/Plan:     Mass of small intestine    No GI bleed noted on EDG yesterday 2/14/18  NPO at midnight   Plan for surgical resection of mass and associated small bowel today  Consented at bedside yesterday            Haylie Garnica MD  General Surgery  [facility]

## 2018-02-15 NOTE — ASSESSMENT & PLAN NOTE
- likely due to blood loss  - received 3 pRBC at OSH about 5 days ago, received 1 unit 2/13 since then H/H stable   - check H/H q 6 h

## 2018-02-15 NOTE — PROGRESS NOTES
[facility] Hospital Medicine  Progress Note    Patient Name: Forrest Schmidt  MRN: [id number]  Patient Class: IP- Inpatient   Admission Date: 2/12/2018  Length of Stay: 3 days  Attending Physician: Skye Brennan MD  Primary Care Provider: Ahmet Watkins Jr, MD    [facility]: [facility] 1 LIAN Wright    Subjective:     Principal Problem:Gastrointestinal hemorrhage    HPI:  72 year old male with hx of GI bleed (5/2017), a remote history of coronary artery disease and hypertension who trasferred from [facility] for GI/Surgery eval. Pt presented to OSH on 02/05/18 when melena for couple days. Per pt. He still has melena with each BM, last one was yesterday 11 am. Denies abdominal pain, nausea, vomiting or diarrhea. Denies NSAID use. Not know to have liver disease.     In [facility],  Patient has received 3 units PRBCs. Had colonoscopy 02/08/18 which was negative. CT abdomen showed ileal mass which is the reason pt. Is her for GI/Surgery eval for the mass. General Surgery at [facility] recommended surgery. Patient would like second opinion with GI(possible biopsy) and surgery at [facility] if     Of note, in April 2017, he had melena but at that time, he was using lots of NSAID which he is not now. At that time, he had EGD, colonoscopy and capsule endoscopy which were negative for source of bleeding.     Hospital Course:  2/13 Admitted to -1, GI/GS consulted, I unite PRBC transfused.  2/14 Patient hemodynamically stable, no complaints, underwent EGD today, tolerated well , no abnormalities was found, Gs will preform biopsy and possible ileal tumor resection tomorrow.   2/15 NEON,Hb stable, will undergo surgical resection of ilial mass today.     Interval Hx: NEON,Hb stable, will undergo surgical resection of ilial mass today.     Review of Systems   Constitutional: patient had 10 Ib unintentional wt loss in the last 3 month , night sweats, fever or chills  Eyes:  denies visual changes  ENT: denies nasal congestion, ear pain or sore throat  Respiratory: denies cough, dyspnea on exertion and pleurisy  Cardiovascular: denies chest pain, chest pressure/discomfort, dyspnea.  Gastrointestinal: + melena. denies nausea or vomiting, abdominal pain.  Genitourinary: denies hematuria or dysuria  Musculoskeletal: denies arthralgias or myalgias  Neurological: denies seizures or tremors  Behavioral/Psych: denies auditory or visual hallucinations, SI, HI     Objective:     Vital Signs (Most Recent):  Temp: 97.6 °F (36.4 °C) (02/15/18 0718)  Pulse: 62 (02/15/18 0718)  Resp: 20 (02/15/18 0718)  BP: (!) 143/78 (02/15/18 0718)  SpO2: 96 % (02/15/18 0718) Vital Signs (24h Range):  Temp:  [97.2 °F (36.2 °C)-98.4 °F (36.9 °C)] 97.6 °F (36.4 °C)  Pulse:  [62-85] 62  Resp:  [16-20] 20  SpO2:  [96 %-98 %] 96 %  BP: (125-152)/(69-78) 143/78     Weight: 73 kg (161 lb)  Body mass index is 22.45 kg/m².    Physical Exam    General: Well developed, well nourished male in NAD  HEENT: Conjunctiva clear; Oropharynx clear  Neck: No JVD noted, Supple  CV: Normal S1, S2 with no murmurs.  Resp: Lungs CTA Bilaterally, Unlabored  Abdomen: NTND, BS normoactive x4 quads, soft. LUPE -ve for melena or BRBPR   Extrem: No cyanosis, clubbing, edema.  Skin: No rashes, lesions, ulcers  Neuro: motor strength in tact. No focal deficit   PSYCH: Oriented x3       Significant Labs:   CBC:     Recent Labs  Lab 02/14/18  0619 02/14/18  1754 02/15/18  0007 02/15/18  0603   WBC 12.73*  --   --  8.65   HGB 8.3* 9.3* 8.3* 8.3*  8.3*   HCT 25.9* 28.6* 25.6* 25.8*  25.7*   *  --   --  356*     CMP:     Recent Labs  Lab 02/14/18  0619 02/15/18  0603    139   K 4.5 4.4    105   CO2 27 29    118*   BUN 19 18   CREATININE 0.9 1.0   CALCIUM 8.7 8.5*   PROT 5.7* 5.7*   ALBUMIN 2.7* 2.6*   BILITOT 1.9* 0.4   ALKPHOS 66 66   AST 15 16   ALT 8* 8*   ANIONGAP 5* 5*   EGFRNONAA >60.0 >60.0         Assessment/Plan:      *  Gastrointestinal hemorrhage    - with history of melena, the source likely to be Upper GIB or right side colon   - In this pt., the ileal mass could be the source for the GIB. DDX og the mass adenocarcinoma, lymphoma or less likely infectious in origine.     -Protonix 40 mg PO BID  -Serial H/H's q6 ,transfuse as needed.  -Discontinue all ASA, NSAIDs and Heparin products  -GI/GS consult  - Underwent EGD 2/14, tolerated well , no abnormalities was found  -surgical resection of ilial mass today.         Mass of small intestine    - see GI bleed           Coronary artery disease    - stable no issue  - restart ASA if GIB resolved           Hypercholesteremia    - on statin, will continue           Hypertension    - hold med due to GIB          Symptomatic anemia    - likely due to blood loss  - received 3 pRBC at OSH about 5 days ago, received 1 unit 2/13 since then H/H stable   - check H/H q 6 h            VTE Risk Mitigation         Ordered     Medium Risk of VTE  Once      02/13/18 0104     Place sequential compression device  Until discontinued      02/13/18 0104              LIAN Wright  Department of Hospital Medicine   [facility]                    02/15/2018                             STAFF PHYSICIAN NOTE                                   Attending Attestation for Rounds with Resident  I have reviewed and concur with the resident's history, physical, assessment, and plan.  I have personally interviewed and examined the patient at bedside and agree with the resident's findings.                                  ________________________________________                                     REASON FOR ADMISSION:     Patient is 72 y.o.male    Body mass index is 22.45 kg/m².,  Gastrointestinal hemorrhage

## 2018-02-15 NOTE — ASSESSMENT & PLAN NOTE
No GI bleed noted on EDG yesterday 2/14/18  NPO at midnight   Plan for surgical resection of mass and associated small bowel today  Consented at bedside yesterday

## 2018-02-15 NOTE — ANESTHESIA POSTPROCEDURE EVALUATION
"Anesthesia Post Evaluation    Patient: Forrest Schmidt    Procedure(s) Performed: Procedure(s) (LRB):  RESECTION-SMALL BOWEL (N/A)    Final Anesthesia Type: general  Patient location during evaluation: PACU  Patient participation: Yes- Able to Participate  Level of consciousness: awake and alert  Post-procedure vital signs: reviewed and stable  Pain management: adequate  Airway patency: patent  PONV status at discharge: No PONV  Anesthetic complications: no      Cardiovascular status: blood pressure returned to baseline  Respiratory status: unassisted  Hydration status: euvolemic  Follow-up not needed.        Visit Vitals  BP (!) 108/59 (BP Location: Right arm, Patient Position: Lying)   Pulse 99   Temp 36.5 °C (97.7 °F) (Temporal)   Resp 16   Ht 5' 11" (1.803 m)   Wt 73 kg (161 lb)   SpO2 100%   BMI 22.45 kg/m²       Pain/Denice Score: Pain Assessment Performed: Yes (2/15/2018 12:25 PM)  Presence of Pain: denies (2/15/2018 12:25 PM)  Pain Rating Prior to Med Admin: 0 (2/15/2018  4:59 PM)  Denice Score: 9 (2/14/2018  9:47 AM)      "

## 2018-02-16 LAB
ALBUMIN SERPL BCP-MCNC: 2.4 G/DL
ALP SERPL-CCNC: 63 U/L
ALT SERPL W/O P-5'-P-CCNC: 7 U/L
ANION GAP SERPL CALC-SCNC: 7 MMOL/L
AST SERPL-CCNC: 19 U/L
BASOPHILS # BLD AUTO: 0.02 K/UL
BASOPHILS NFR BLD: 0.2 %
BILIRUB SERPL-MCNC: 0.6 MG/DL
BUN SERPL-MCNC: 20 MG/DL
CALCIUM SERPL-MCNC: 7.7 MG/DL
CHLORIDE SERPL-SCNC: 107 MMOL/L
CO2 SERPL-SCNC: 25 MMOL/L
CREAT SERPL-MCNC: 1 MG/DL
DIFFERENTIAL METHOD: ABNORMAL
EOSINOPHIL # BLD AUTO: 0 K/UL
EOSINOPHIL NFR BLD: 0.1 %
ERYTHROCYTE [DISTWIDTH] IN BLOOD BY AUTOMATED COUNT: 15.8 %
EST. GFR  (AFRICAN AMERICAN): >60 ML/MIN/1.73 M^2
EST. GFR  (NON AFRICAN AMERICAN): >60 ML/MIN/1.73 M^2
GLUCOSE SERPL-MCNC: 138 MG/DL
HCT VFR BLD AUTO: 25.1 %
HCT VFR BLD AUTO: 25.2 %
HCT VFR BLD AUTO: 25.5 %
HCT VFR BLD AUTO: 26.6 %
HGB BLD-MCNC: 7.9 G/DL
HGB BLD-MCNC: 7.9 G/DL
HGB BLD-MCNC: 8 G/DL
HGB BLD-MCNC: 8.4 G/DL
IMM GRANULOCYTES # BLD AUTO: 0.04 K/UL
IMM GRANULOCYTES NFR BLD AUTO: 0.3 %
LYMPHOCYTES # BLD AUTO: 0.4 K/UL
LYMPHOCYTES NFR BLD: 3.2 %
MCH RBC QN AUTO: 27.1 PG
MCHC RBC AUTO-ENTMCNC: 31.5 G/DL
MCV RBC AUTO: 86 FL
MONOCYTES # BLD AUTO: 0.8 K/UL
MONOCYTES NFR BLD: 7.2 %
NEUTROPHILS # BLD AUTO: 10.2 K/UL
NEUTROPHILS NFR BLD: 89 %
NRBC BLD-RTO: 0 /100 WBC
PLATELET # BLD AUTO: 370 K/UL
PMV BLD AUTO: 9.3 FL
POTASSIUM SERPL-SCNC: 4.8 MMOL/L
PROT SERPL-MCNC: 5.3 G/DL
RBC # BLD AUTO: 2.91 M/UL
SODIUM SERPL-SCNC: 139 MMOL/L
WBC # BLD AUTO: 11.46 K/UL

## 2018-02-16 PROCEDURE — 85014 HEMATOCRIT: CPT

## 2018-02-16 PROCEDURE — 97530 THERAPEUTIC ACTIVITIES: CPT

## 2018-02-16 PROCEDURE — 25000003 PHARM REV CODE 250: Performed by: STUDENT IN AN ORGANIZED HEALTH CARE EDUCATION/TRAINING PROGRAM

## 2018-02-16 PROCEDURE — 97161 PT EVAL LOW COMPLEX 20 MIN: CPT

## 2018-02-16 PROCEDURE — 63600175 PHARM REV CODE 636 W HCPCS: Performed by: SURGERY

## 2018-02-16 PROCEDURE — 85025 COMPLETE CBC W/AUTO DIFF WBC: CPT

## 2018-02-16 PROCEDURE — 25000242 PHARM REV CODE 250 ALT 637 W/ HCPCS: Performed by: NURSE PRACTITIONER

## 2018-02-16 PROCEDURE — G8987 SELF CARE CURRENT STATUS: HCPCS | Mod: CK

## 2018-02-16 PROCEDURE — 99233 SBSQ HOSP IP/OBS HIGH 50: CPT | Mod: ,,, | Performed by: HOSPITALIST

## 2018-02-16 PROCEDURE — S5010 5% DEXTROSE AND 0.45% SALINE: HCPCS | Performed by: NURSE PRACTITIONER

## 2018-02-16 PROCEDURE — 25000003 PHARM REV CODE 250: Performed by: NURSE PRACTITIONER

## 2018-02-16 PROCEDURE — 94761 N-INVAS EAR/PLS OXIMETRY MLT: CPT

## 2018-02-16 PROCEDURE — 94640 AIRWAY INHALATION TREATMENT: CPT

## 2018-02-16 PROCEDURE — 94770 HC EXHALED C02 TEST: CPT

## 2018-02-16 PROCEDURE — 97165 OT EVAL LOW COMPLEX 30 MIN: CPT

## 2018-02-16 PROCEDURE — 85018 HEMOGLOBIN: CPT | Mod: 91

## 2018-02-16 PROCEDURE — 99900035 HC TECH TIME PER 15 MIN (STAT)

## 2018-02-16 PROCEDURE — 11000001 HC ACUTE MED/SURG PRIVATE ROOM

## 2018-02-16 PROCEDURE — 94799 UNLISTED PULMONARY SVC/PX: CPT

## 2018-02-16 PROCEDURE — 36415 COLL VENOUS BLD VENIPUNCTURE: CPT

## 2018-02-16 PROCEDURE — 80053 COMPREHEN METABOLIC PANEL: CPT

## 2018-02-16 PROCEDURE — G8988 SELF CARE GOAL STATUS: HCPCS | Mod: CI

## 2018-02-16 RX ORDER — KETOROLAC TROMETHAMINE 15 MG/ML
30 INJECTION, SOLUTION INTRAMUSCULAR; INTRAVENOUS EVERY 8 HOURS
Status: DISCONTINUED | OUTPATIENT
Start: 2018-02-16 | End: 2018-02-16

## 2018-02-16 RX ORDER — FLUTICASONE PROPIONATE 50 MCG
1 SPRAY, SUSPENSION (ML) NASAL 2 TIMES DAILY
Status: DISCONTINUED | OUTPATIENT
Start: 2018-02-16 | End: 2018-03-04 | Stop reason: HOSPADM

## 2018-02-16 RX ORDER — ONDANSETRON 2 MG/ML
8 INJECTION INTRAMUSCULAR; INTRAVENOUS EVERY 8 HOURS PRN
Status: DISCONTINUED | OUTPATIENT
Start: 2018-02-16 | End: 2018-03-04 | Stop reason: HOSPADM

## 2018-02-16 RX ORDER — TADALAFIL 5 MG/1
5 TABLET ORAL DAILY
Status: DISCONTINUED | OUTPATIENT
Start: 2018-02-16 | End: 2018-02-16

## 2018-02-16 RX ORDER — ACETAMINOPHEN 325 MG/1
650 TABLET ORAL EVERY 6 HOURS PRN
Status: DISCONTINUED | OUTPATIENT
Start: 2018-02-16 | End: 2018-02-16

## 2018-02-16 RX ORDER — NALOXONE HCL 0.4 MG/ML
0.02 VIAL (ML) INJECTION
Status: DISCONTINUED | OUTPATIENT
Start: 2018-02-16 | End: 2018-02-17

## 2018-02-16 RX ORDER — DEXTROSE MONOHYDRATE AND SODIUM CHLORIDE 5; .45 G/100ML; G/100ML
INJECTION, SOLUTION INTRAVENOUS CONTINUOUS
Status: DISCONTINUED | OUTPATIENT
Start: 2018-02-16 | End: 2018-02-25

## 2018-02-16 RX ORDER — ACETAMINOPHEN 10 MG/ML
1000 INJECTION, SOLUTION INTRAVENOUS EVERY 8 HOURS
Status: COMPLETED | OUTPATIENT
Start: 2018-02-16 | End: 2018-02-17

## 2018-02-16 RX ORDER — MORPHINE SULFATE 1 MG/ML
INJECTION INTRAVENOUS CONTINUOUS
Status: DISCONTINUED | OUTPATIENT
Start: 2018-02-16 | End: 2018-02-17

## 2018-02-16 RX ADMIN — FLUTICASONE PROPIONATE 50 MCG: 50 SPRAY, METERED NASAL at 01:02

## 2018-02-16 RX ADMIN — IPRATROPIUM BROMIDE AND ALBUTEROL SULFATE 3 ML: .5; 3 SOLUTION RESPIRATORY (INHALATION) at 09:02

## 2018-02-16 RX ADMIN — PANTOPRAZOLE SODIUM 40 MG: 40 TABLET, DELAYED RELEASE ORAL at 09:02

## 2018-02-16 RX ADMIN — PANTOPRAZOLE SODIUM 40 MG: 40 TABLET, DELAYED RELEASE ORAL at 08:02

## 2018-02-16 RX ADMIN — Medication: at 11:02

## 2018-02-16 RX ADMIN — IPRATROPIUM BROMIDE AND ALBUTEROL SULFATE 3 ML: .5; 3 SOLUTION RESPIRATORY (INHALATION) at 07:02

## 2018-02-16 RX ADMIN — ONDANSETRON 8 MG: 2 INJECTION INTRAMUSCULAR; INTRAVENOUS at 01:02

## 2018-02-16 RX ADMIN — ACETAMINOPHEN 1000 MG: 10 INJECTION, SOLUTION INTRAVENOUS at 01:02

## 2018-02-16 RX ADMIN — DEXTROSE AND SODIUM CHLORIDE: 5; .45 INJECTION, SOLUTION INTRAVENOUS at 11:02

## 2018-02-16 RX ADMIN — Medication: at 06:02

## 2018-02-16 RX ADMIN — KETOROLAC TROMETHAMINE 30 MG: 15 INJECTION, SOLUTION INTRAMUSCULAR; INTRAVENOUS at 09:02

## 2018-02-16 RX ADMIN — ATORVASTATIN CALCIUM 40 MG: 20 TABLET, FILM COATED ORAL at 09:02

## 2018-02-16 RX ADMIN — ACETAMINOPHEN 650 MG: 325 TABLET, FILM COATED ORAL at 11:02

## 2018-02-16 RX ADMIN — ACETAMINOPHEN 1000 MG: 10 INJECTION, SOLUTION INTRAVENOUS at 09:02

## 2018-02-16 RX ADMIN — IPRATROPIUM BROMIDE AND ALBUTEROL SULFATE 3 ML: .5; 3 SOLUTION RESPIRATORY (INHALATION) at 12:02

## 2018-02-16 NOTE — SUBJECTIVE & OBJECTIVE
Interval History: s/p Small bowel resection    No acute events overnight. Febrile to 100.8. HD stable.  NPO. PCA with adequate pain control. Low UOP.    Medications:  Continuous Infusions:   hydromorphone in 0.9 % NaCl 6 mg/30 ml       Scheduled Meds:   albuterol-ipratropium 2.5mg-0.5mg/3mL  3 mL Nebulization Q6H WAKE    atorvastatin  40 mg Oral QHS    ketorolac  30 mg Intravenous Q8H    pantoprazole  40 mg Oral BID     PRN Meds:sodium chloride, acetaminophen, dextrose 50%, dextrose 50%, glucagon (human recombinant), glucose, glucose, naloxone, sodium chloride 0.9%, sodium chloride 0.9%     Review of patient's allergies indicates:   Allergen Reactions    Augmentin [amoxicillin-pot clavulanate] Shortness Of Breath    Horse/equine containing products     Singulair [montelukast]      Objective:     Vital Signs (Most Recent):  Temp: 97.3 °F (36.3 °C) (02/16/18 0738)  Pulse: 77 (02/16/18 0738)  Resp: 16 (02/16/18 0738)  BP: 138/62 (02/16/18 0738)  SpO2: 95 % (02/16/18 0738) Vital Signs (24h Range):  Temp:  [97 °F (36.1 °C)-98.5 °F (36.9 °C)] 97.3 °F (36.3 °C)  Pulse:  [60-99] 77  Resp:  [16-20] 16  SpO2:  [95 %-100 %] 95 %  BP: (108-156)/(55-70) 138/62     Weight: 73 kg (161 lb)  Body mass index is 22.45 kg/m².    Intake/Output - Last 3 Shifts       02/14 0700 - 02/15 0659 02/15 0700 - 02/16 0659 02/16 0700 - 02/17 0659    P.O.  60     I.V. (mL/kg) 50 (0.7) 3659 (50.1)     Blood       Total Intake(mL/kg) 50 (0.7) 3719 (50.9)     Urine (mL/kg/hr)  950 (0.5)     Blood  100 (0.1)     Total Output   1050      Net +50 +2669                   Physical Exam   Constitutional: He is oriented to person, place, and time. He appears well-developed and well-nourished. No distress.   HENT:   Head: Normocephalic and atraumatic.   Eyes: Conjunctivae and EOM are normal. No scleral icterus.   Neck: Normal range of motion.   Cardiovascular: Normal rate and regular rhythm.    Pulmonary/Chest: Effort normal. No respiratory distress.    Abdominal: Soft. He exhibits no distension. There is tenderness.   Incision intact without erythema, drainage or bleeding   Musculoskeletal: Normal range of motion. He exhibits no edema.   Neurological: He is alert and oriented to person, place, and time.   Nursing note and vitals reviewed.      Significant Labs:  CBC:     Recent Labs  Lab 02/16/18  0445   WBC 11.46   RBC 2.91*   HGB 7.9*  7.9*   HCT 25.5*  25.1*   *   MCV 86   MCH 27.1   MCHC 31.5*     CMP:     Recent Labs  Lab 02/16/18  0445   *   CALCIUM 7.7*   ALBUMIN 2.4*   PROT 5.3*      K 4.8   CO2 25      BUN 20   CREATININE 1.0   ALKPHOS 63   ALT 7*   AST 19   BILITOT 0.6       Significant Diagnostics:  I have reviewed and interpreted all pertinent imaging results/findings within the past 24 hours.

## 2018-02-16 NOTE — PLAN OF CARE
Problem: Occupational Therapy Goal  Goal: Occupational Therapy Goal  Goals to be met by: 3/2/18    Patient will increase functional independence with ADLs by performing:    UE Dressing with [place].  Grooming while standing at sink with Supervision.  Toileting from toilet with [place] for hygiene and clothing management.   Toilet transfer to toilet with Supervision.    Outcome: Ongoing (interventions implemented as appropriate)  Eval completed; goals established      Comments: Initiate OT THANG Garces OT  2/16/2018

## 2018-02-16 NOTE — ASSESSMENT & PLAN NOTE
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 DDX og the mass adenocarcinoma, lymphoma or less likely infectious in origine.     -Protonix 40 mg PO BID  -Serial H/H's q6 ,transfuse as needed.  -Discontinue all ASA, NSAIDs and Heparin products  -GI/GS consult  - Underwent EGD , tolerated well , no abnormalities was found.  - Small bowel resection was done yesterday with no complication, 5-6 cm tumor removed,no evidence of mets, frozen suction showed spindle cell tumor, patient doing well this morning, hemodynamically stable, pain controled with dilaudid gtt.

## 2018-02-16 NOTE — NURSING TRANSFER
Nursing Transfer Note      2/15/2018     Transfer To: [address]    Transfer via stretcher    Transfer with ETCO2, IVF,PCA pump    Transported by PCT    Medicines sent: None    Chart send with patient: Yes    Notified: spouse    Patient reassessed at: 2/15/18 1800    Upon arrival to floor: patient oriented to room, call bell in reach and bed in lowest position

## 2018-02-16 NOTE — PT/OT/SLP EVAL
Occupational Therapy   Evaluation    Name: Forrest Schmidt  MRN: [id number]  Admitting Diagnosis:  Gastrointestinal hemorrhage 1 Day Post-Op    Recommendations:     Discharge Recommendations: home health OT  Discharge Equipment Recommendations:  none (Pt owns RW, straight cane, beside commode, shower chair, and grab bar)  Barriers to discharge:  Decreased caregiver support (Wife can provide limited physical assist)    History:     Occupational Profile:  Living Environment: Pt lives with wife in 2SH with threshold ELROY. 17 steps with B handrail to reach 2nd floor. PTA, pt using 2nd floor bed/bath; however 1st floor bed/bath available for use following d/c. 2nd floor bathroom has walk-in shower with shower chair; 1st floor bathroom has tub/shower combo  Previous level of function: PTA, pt reports independence with ADL and IADL, including driving. 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  Pain/Comfort:  ·   3/10 at s site pre- and post- intervention    Patients cultural, spiritual, Buddhism conflicts given the current situation: none reported     Objective:     Patient found with: SCD, peripheral IV, PCA, oxygen    General Precautions: Standard, fall   Orthopedic Precautions:N/A   Braces: N/A     Occupational Performance:    Bed Mobility:    NT 2/2 pt found UIC and wanting to remain UIC following eval    Functional Mobility/Transfers:  · Patient completed Sit <> Stand Transfer with contact guard assistance with no assistive device; pt demo 1 LOB upon standing, but able to self-correct    Activities of Daily Living:  · LB Dressing: independence to doff/don B socks    Cognitive/Visual Perceptual:  Cognitive/Psychosocial Skills:     -       Oriented to: Person, Place, Time and Situation   -       Follows Commands/attention:Follows multistep  commands  -       Communication: clear/fluent  -       Memory: No Deficits noted  -       Safety awareness/insight to disability: intact   -       Mood/Affect/Coping skills/emotional control: Appropriate to situation  Visual/Perceptual:      -Intact    Physical Exam:  Balance:    -       good sitting balance; fair standing balance, 1 LOB upon stand from chair but pt able to self-correct  Postural examination/scapula alignment:    -       No postural abnormalities identified  Skin integrity: Visible skin intact  Edema:  None noted  Sensation:    -       Intact  Dominant hand:    -       R  Upper Extremity Range of Motion:     -       Right Upper Extremity: WFL  -       Left Upper Extremity: WFL  Upper Extremity Strength:    -       Right Upper Extremity: WFL  -       Left Upper Extremity: WFL   Strength:    -       Right Upper Extremity: WFL  -       Left Upper Extremity: WFL  Fine Motor Coordination:    -       Intact    Patient left up in chair with all lines intact, call button in reach, RN notified and RN and wife present    AMPA 6 Click:  AMPA Total Score: 19    Treatment  "& Education:  Pt and wife educated on role of OT/POC and HH OT recommendation  Education:    Assessment:     Forrest Schmidt is a 72 y.o. male with a medical diagnosis of Gastrointestinal hemorrhage.  He presents with pain and weakness impairing safe functional mobility and self care.  Performance deficits affecting function are weakness, impaired endurance, impaired self care skills, impaired functional mobilty, pain.      Rehab Prognosis:  Good; patient would benefit from acute skilled OT services to address these deficits and reach maximum level of function.         Clinical Decision Makin.  OT Low:  "Pt evaluation falls under low complexity for evaluation coding due to performance deficits noted in 1-3 areas as stated above and 0 co-morbities affecting current functional status. Data obtained from problem focused assessments. No modifications or assistance was required for completion of evaluation. Only brief occupational profile and history review completed."     Plan:     Patient to be seen 3 x/week to address the above listed problems via self-care/home management, therapeutic activities, therapeutic exercises  · Plan of Care Expires: 18  · Plan of Care Reviewed with: spouse, patient    This Plan of care has been discussed with the patient who was involved in its development and understands and is in agreement with the identified goals and treatment plan    GOALS:    Occupational Therapy Goals        Problem: Occupational Therapy Goal    Goal Priority Disciplines Outcome Interventions   Occupational Therapy Goal     OT, PT/OT Ongoing (interventions implemented as appropriate)    Description:  Goals to be met by: 3/2/18    Patient will increase functional independence with ADLs by performing:    UE Dressing with [place].  Grooming while standing at sink with Supervision.  Toileting from toilet with [place] for hygiene and clothing management.   Toilet transfer to toilet with Supervision.    "                   Time Tracking:     OT Date of Treatment:  2/16/18  OT Start Time:  1125  OT Stop Time:  1135  OT Total Time (min):  10    Billable Minutes:Evaluation 10    Pushpa Garces OT  2/16/2018

## 2018-02-16 NOTE — PROGRESS NOTES
[facility] Hospital Medicine  Progress Note    Patient Name: Forrest Schmidt  MRN: [id number]  Patient Class: IP- Inpatient   Admission Date: 2/12/2018  Length of Stay: 4 days  Attending Physician: Ahmet Sanchez MD  Primary Care Provider: Ahmet Watkins Jr, MD    [facility] Medicine Team: [facility] HOSP MED 1 LIAN Wright    Subjective:     Principal Problem:Gastrointestinal hemorrhage    HPI:  72 year old male with hx of GI bleed (5/2017), a remote history of coronary artery disease and hypertension who trasferred from [facility] for GI/Surgery eval. Pt presented to OSH on 02/05/18 when melena for couple days. Per pt. He still has melena with each BM, last one was yesterday 11 am. Denies abdominal pain, nausea, vomiting or diarrhea. Denies NSAID use. Not know to have liver disease.     In [facility],  Patient has received 3 units PRBCs. Had colonoscopy 02/08/18 which was negative. CT abdomen showed ileal mass which is the reason pt. Is her for GI/Surgery eval for the mass. General Surgery at [facility] recommended surgery. Patient would like second opinion with GI(possible biopsy) and surgery at [facility] if     Of note, in April 2017, he had melena but at that time, he was using lots of NSAID which he is not now. At that time, he had EGD, colonoscopy and capsule endoscopy which were negative for source of bleeding.     Hospital Course:  2/13 Admitted to -1, GI/GS consulted, I unite PRBC transfused.  2/14 Patient hemodynamically stable, no complaints, underwent EGD today, tolerated well , no abnormalities was found, Gs will preform biopsy and possible ileal tumor resection tomorrow.   2/15 NEON,Hb stable, will undergo surgical resection of ilial mass today.   2/16 Small bowel resection was done yesterday with no complication, 5-6 cm tumor removed,no evidence of mets, frozen suction showed spindle cell tumor, patient doing well this morning, hemodynamically stable, pain controled  with dilaudid gtt.     Interval Hx: Small bowel resection was done yesterday with no complication, 5-6 cm tumor removed,no evidence of mets, frozen suction showed spindle cell tumor, patient doing well this morning, hemodynamically stable, pain controled with dilaudid gtt.      Review of Systems   Constitutional: patient had 10 Ib unintentional wt loss in the last 3 month , night sweats, fever or chills  Eyes: denies visual changes  ENT: denies nasal congestion, ear pain or sore throat  Respiratory: denies cough, dyspnea on exertion and pleurisy  Cardiovascular: denies chest pain, chest pressure/discomfort, dyspnea.  Gastrointestinal: + melena. denies nausea or vomiting, abdominal pain.  Genitourinary: denies hematuria or dysuria  Musculoskeletal: denies arthralgias or myalgias  Neurological: denies seizures or tremors.headaches.  Behavioral/Psych: denies auditory or visual hallucinations, SI, HI     Objective:     Vital Signs (Most Recent):  Temp: 96.1 °F (35.6 °C) (02/16/18 1220)  Pulse: 87 (02/16/18 1251)  Resp: 17 (02/16/18 1251)  BP: 130/64 (02/16/18 1220)  SpO2: 96 % (02/16/18 1251) Vital Signs (24h Range):  Temp:  [96.1 °F (35.6 °C)-97.8 °F (36.6 °C)] 96.1 °F (35.6 °C)  Pulse:  [68-99] 87  Resp:  [14-20] 17  SpO2:  [94 %-100 %] 96 %  BP: (108-138)/(55-70) 130/64     Weight: 73 kg (161 lb)  Body mass index is 22.45 kg/m².    Physical Exam    General: Well developed, well nourished male in NAD  HEENT: Conjunctiva clear; Oropharynx clear  Neck: No JVD noted, Supple  CV: Normal S1, S2 with no murmurs.  Resp: Lungs CTA Bilaterally, Unlabored  Abdomen: NTND, BS normoactive x4 quads, soft. LUPE -ve for melena or BRBPR ,surgical scar midabdominal.  Extrem: No cyanosis, clubbing, edema.  Skin: No rashes, lesions, ulcers  Neuro: motor strength in tact. No focal deficit   PSYCH: Oriented x3       Significant Labs:   CBC:     Recent Labs  Lab 02/15/18  0603  02/15/18  2348 02/16/18  0445 02/16/18  1153   WBC 8.65  --   --   11.46  --    HGB 8.3*  8.3*  < > 8.0* 7.9*  7.9* 8.4*   HCT 25.8*  25.7*  < > 25.2* 25.5*  25.1* 26.6*   *  --   --  370*  --    < > = values in this interval not displayed.  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 DDX og the mass adenocarcinoma, lymphoma or less likely infectious in origine.     -Protonix 40 mg PO BID  -Serial H/H's q6 ,transfuse as needed.  -Discontinue all ASA, NSAIDs and Heparin products  -GI/GS consult  - Underwent EGD , tolerated well , no abnormalities was found.  - Small bowel resection was done yesterday with no complication, 5-6 cm tumor removed,no evidence of mets, frozen suction showed spindle cell tumor, patient doing well this morning, hemodynamically stable, pain controled with dilaudid gtt.             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              Melena              Mass of small intestine    - see GI bleed           Coronary artery disease    - stable no issue  - restart ASA if GIB resolved           Hypercholesteremia    - on statin, will continue           Hypertension    - hold med due to GIB          Symptomatic anemia    - likely due to blood loss  - received 3 pRBC at OSH about 5 days ago, received 1 unit 2/13 since then H/H stable   - check H/H q 6 h            VTE Risk Mitigation         Ordered     Medium Risk of VTE  Once      02/13/18 0104     Place sequential compression device  Until  discontinued      02/13/18 0104              LIAN Wright  Department of Hospital Medicine   [facility]                    02/16/2018                             STAFF PHYSICIAN NOTE                                   Attending Attestation for Rounds with Resident  I have reviewed and concur with the resident's history, physical, assessment, and plan.  I have personally interviewed and examined the patient at bedside and agree with the resident's findings.                                  ________________________________________                                     REASON FOR ADMISSION:     Patient is 72 y.o.male    Body mass index is 22.45 kg/m².,  Gastrointestinal hemorrhage

## 2018-02-16 NOTE — PLAN OF CARE
Problem: Patient Care Overview  Goal: Plan of Care Review  Outcome: Ongoing (interventions implemented as appropriate)  Pt awake alert and oriented x4. Vitals stable. No fall or trauma this shift. PCA for pain control. Wife here at bedside. White board updated. q2h rounding protocol followed

## 2018-02-16 NOTE — PROGRESS NOTES
[facility]  General Surgery  Progress Note    Subjective:     History of Present Illness:  This is a 71 y/o male with hx CAD and HTN, obscure overt GI bleeding [facility].  Pt was admitted 2/5 with melena. Underwent  colonoscopy, negative for bleeding with old blood seen in TI. No EGD at OSH.  CT abd showed ileal mass, approx 5 cm. Surgery recommend operative intervention at OSH, however patient requested transfer for 2nd opinion.  Patient has had a similar episode in the past year, at which time he also had EGD, colonscopy and capsule enterography which were negative, bleeding at that time thought due to NSAID use which was discontinued and he did well until about a week ago. When he began to have blood stools again. He has some fullness and early satiety, but has not altered his eating habits, and normal bowel habits, 1-3 movement a day, no change in consistency. No abdominal pain. Only prior abdominal surgery is an appendectomy as a child.     Post-Op Info:  Procedure(s) (LRB):  RESECTION-SMALL BOWEL (N/A)   1 Day Post-Op     Interval History: s/p Small bowel resection    No acute events overnight. Febrile to 100.8. HD stable.  NPO. PCA with adequate pain control. Low UOP.    Medications:  Continuous Infusions:   hydromorphone in 0.9 % NaCl 6 mg/30 ml       Scheduled Meds:   albuterol-ipratropium 2.5mg-0.5mg/3mL  3 mL Nebulization Q6H WAKE    atorvastatin  40 mg Oral QHS    ketorolac  30 mg Intravenous Q8H    pantoprazole  40 mg Oral BID     PRN Meds:sodium chloride, acetaminophen, dextrose 50%, dextrose 50%, glucagon (human recombinant), glucose, glucose, naloxone, sodium chloride 0.9%, sodium chloride 0.9%     Review of patient's allergies indicates:   Allergen Reactions    Augmentin [amoxicillin-pot clavulanate] Shortness Of Breath    Horse/equine containing products     Singulair [montelukast]      Objective:      Vital Signs (Most Recent):  Temp: 97.3 °F (36.3 °C) (02/16/18 0738)  Pulse: 77 (02/16/18 0738)  Resp: 16 (02/16/18 0738)  BP: 138/62 (02/16/18 0738)  SpO2: 95 % (02/16/18 0738) Vital Signs (24h Range):  Temp:  [97 °F (36.1 °C)-98.5 °F (36.9 °C)] 97.3 °F (36.3 °C)  Pulse:  [60-99] 77  Resp:  [16-20] 16  SpO2:  [95 %-100 %] 95 %  BP: (108-156)/(55-70) 138/62     Weight: 73 kg (161 lb)  Body mass index is 22.45 kg/m².    Intake/Output - Last 3 Shifts       02/14 0700 - 02/15 0659 02/15 0700 - 02/16 0659 02/16 0700 - 02/17 0659    P.O.  60     I.V. (mL/kg) 50 (0.7) 3659 (50.1)     Blood       Total Intake(mL/kg) 50 (0.7) 3719 (50.9)     Urine (mL/kg/hr)  950 (0.5)     Blood  100 (0.1)     Total Output   1050      Net +50 +2669                   Physical Exam   Constitutional: He is oriented to person, place, and time. He appears well-developed and well-nourished. No distress.   HENT:   Head: Normocephalic and atraumatic.   Eyes: Conjunctivae and EOM are normal. No scleral icterus.   Neck: Normal range of motion.   Cardiovascular: Normal rate and regular rhythm.    Pulmonary/Chest: Effort normal. No respiratory distress.   Abdominal: Soft. He exhibits no distension. There is tenderness.   Incision intact without erythema, drainage or bleeding   Musculoskeletal: Normal range of motion. He exhibits no edema.   Neurological: He is alert and oriented to person, place, and time.   Nursing note and vitals reviewed.      Significant Labs:  CBC:     Recent Labs  Lab 02/16/18  0445   WBC 11.46   RBC 2.91*   HGB 7.9*  7.9*   HCT 25.5*  25.1*   *   MCV 86   MCH 27.1   MCHC 31.5*     CMP:     Recent Labs  Lab 02/16/18  0445   *   CALCIUM 7.7*   ALBUMIN 2.4*   PROT 5.3*      K 4.8   CO2 25      BUN 20   CREATININE 1.0   ALKPHOS 63   ALT 7*   AST 19   BILITOT 0.6       Significant Diagnostics:  I have reviewed and interpreted all pertinent imaging results/findings within the past 24  hours.    Assessment/Plan:     Mass of small intestine    71 yo male with mass in distal small bowel s/p Small bowel resection/mass resection on 2/16/18    Daily labs and lyte repletion; H/H stable, no leukocytosis  NPO with sips   mIVF   Path pending-will follow up   Strict I/Os- low UOP overnight. Bladder scan - if high will replace baldwin   OOBTC today, encourage ambulation  Encourage IS.                Haylie Garnica MD  General Surgery  [facility] [address]

## 2018-02-16 NOTE — PLAN OF CARE
Problem: Patient Care Overview  Goal: Plan of Care Review  Outcome: Ongoing (interventions implemented as appropriate)  Pt aaox4, vs stable, no falls or injuries. Fall precautions in place w/ personal items near by. Worked w/ PT and OT. Pain level being monitor and control by PCA. No signs of infection or distress. Call light in reach; will continue to monitor pt.

## 2018-02-16 NOTE — ASSESSMENT & PLAN NOTE
71 yo male with mass in distal small bowel s/p Small bowel resection/mass resection on 2/16/18    Daily labs and lyte repletion; H/H stable, no leukocytosis  NPO with sips   mIVF   Path pending-will follow up   Strict I/Os- low UOP overnight. Bladder scan - if high will replace baldwin   OOBTC today, encourage ambulation  Encourage IS.

## 2018-02-16 NOTE — PHARMACY MED REC
"Admission Medication Reconciliation - Pharmacy Consult Note    The home medication history was taken by Heidi Block, Pharmacy Technician.  Based on information gathered and subsequent review by the clinical pharmacist, the items below may need attention.    You may go to "Admission" then "Reconcile Home Medications" tabs to review and/or act upon these items.    No issues noted with the medication reconciliation.    Please address this information as you see fit.  Feel free to contact us if you have any questions or require assistance.    Viviane Roberts, PharmD  [id number]     Patient's prior to admission medication regimen was as follows:  Medication Sig    arginine 500 mg tablet Take 1,000 mg by mouth once daily.     atorvastatin (LIPITOR) 40 MG tablet Take 40 mg by mouth every evening.    cholecalciferol, vitamin D3, (VITAMIN D3) 2,000 unit Cap Take 1 capsule by mouth once daily.    FERROUS SULFATE (FEOSOL ORAL) Take 1 tablet by mouth once daily at 6am.    fluticasone (FLONASE) 50 mcg/actuation nasal spray 1 spray by Each Nare route 2 (two) times daily.    losartan (COZAAR) 100 MG tablet Take 1 tablet (100 mg total) by mouth every evening. Hold if sbp < 120    omeprazole (PRILOSEC) 20 MG capsule Take 20 mg by mouth 2 (two) times daily.     SOD CHLOR,SOD BICARB/NETI POT (NEILMED NASAFLO NASL) 1 spray by Nasal route every evening.    SODIUM CHLORIDE (FE-128 OPHT) Place 1 drop into the right eye 4 (four) times daily.     tadalafil (CIALIS) 5 MG tablet Take 5 mg by mouth once daily at 6am.    testosterone cypionate (DEPOTESTOTERONE CYPIONATE) 200 mg/mL injection Inject 200 mg into the muscle every 21 days.    zinc gluconate 50 mg tablet Take 50 mg by mouth every Monday and Thursday.     [DISCONTINUED] aspirin (ECOTRIN) 81 MG EC tablet Take 81 mg by mouth once daily.    [DISCONTINUED] fish oil-omega-3 fatty acids 300-1,000 mg capsule Take 2 g by mouth once daily.    FOLIC ACID/MV,FE,MIN (CENTRUM ORAL) " Take 1 tablet by mouth once daily at 6am.         .    .

## 2018-02-16 NOTE — PT/OT/SLP EVAL
Physical Therapy Evaluation/Treatment    Patient Name:  Forrest Schmidt   MRN:  [id number]    Recommendations:     Discharge Recommendations:  nursing facility, skilled   Discharge Equipment Recommendations: none   Barriers to discharge: Decreased caregiver support    Assessment:     Forrest Schmidt is a 72 y.o. male admitted with a medical diagnosis of Gastrointestinal hemorrhage.  He presents with the following impairments/functional limitations:  weakness, impaired endurance, impaired self care skills, gait instability, impaired balance, pain, decreased safety awareness requiring CGA for bed mobility, transfers, and short distance ambulation using RW limited by light headedness and dizziness during session. Pt may need to discharge to  SNF pending progress upon discharge due to inability of spouse to physically assist. It is possible for pt to discharge home with HHPT if functional mobility improves with pain management and decreased dizziness.     Rehab Prognosis:  good; patient would benefit from acute skilled PT services to address these deficits and reach maximum level of function.      Recent Surgery: Procedure(s) (LRB):  RESECTION-SMALL BOWEL (N/A) 1 Day Post-Op    Plan:     During this hospitalization, patient to be seen 4 x/week to address the above listed problems via gait training, therapeutic activities, therapeutic exercises, neuromuscular re-education  · Plan of Care Expires:  03/18/18   Plan of Care Reviewed with: patient, spouse    Subjective     Communicated with pt and nurse prior to session.  Patient found supine in bed with spouse present upon PT entry to room, agreeable to evaluation.      Chief Complaint: headache and abdominal pain  Patient comments/goals: to improve mobility to return home  Pain/Comfort:  · Pain Rating 1: 3/10  · Location - Orientation 1: generalized  · Location 1: abdomen  · Pain Addressed 1: Pre-medicate for activity, Reposition, Distraction, Cessation of Activity, Nurse  notified  · Pain Rating Post-Intervention 1: 4/10  · Pain Rating 2:  (did not rate)  · Location 2: head  · Pain Addressed 2: Pre-medicate for activity, Reposition, Distraction, Cessation of Activity, Nurse notified  · Pain Rating Post-Intervention 2:  (did not rate)    Patients cultural, spiritual, Alevism conflicts given the current situation: none    Living Environment:  Pt lives with spouse in 2SH with threshold ELROY and 17 steps to 2nd floor with B Hr. Pt has BR/BA on 1st floor if needed.   Prior to admission, patients level of function was independent without AD.  Patient has the following equipment: none.  DME owned (not currently used): rolling walker, single point cane, bedside commode and shower chair.  Upon discharge, patient will have light assistance from spouse but she is unable to physically assist.    Objective:     Patient found with: SCD, peripheral IV, PCA, oxygen     General Precautions: Standard, fall   Orthopedic Precautions:N/A   Braces: N/A     Exams:  · Cognitive Exam:  Patient is oriented to Person, Place, Time and Situation and follows 100% of single step commands   · Fine Motor Coordination:    · -       Intact  · Gross Motor Coordination:  WFL  · Postural Exam:  Patient presented with the following abnormalities:    · -       Rounded shoulders  · -       Forward head  · Sensation:    · -       Intact  · Skin Integrity/Edema:      · -       Skin integrity: Visible skin intact  · -       Edema: None noted BLE  · RLE ROM: WFL  · RLE Strength: WFL  · LLE ROM: WFL  · LLE Strength: WFL    Functional Mobility:  · Bed Mobility:     · Rolling Right: contact guard assistance  · Supine to Sit: contact guard assistance  · Transfers:     · Sit to Stand:  contact guard assistance with rolling walker  · Gait: 3 feet forward and backward using RW with CGA with decreased step length and foot clearance  · Balance: pt demonstrates increased sway with dynamic activity in sitting balance but is able to  maintain without LOB, pt is able to perform static standing with BUE support on RW but is unable to accept challenges on this date due to light headedness upon standing    AM-PAC 6 CLICK MOBILITY  Total Score:17       Therapeutic Activities and Exercises:   PT provided pt and spouse education on:   -role of PT and POC   -importance of OOB activity   -LE exercises to perform independently   -call for assistance for transfers   -importance of participation in therapy   -discharge planning      Patient left up in chair with call button in reach, nurse notified of pt dizziness/clammy and left in reclined chair and spouse present.    GOALS:    Physical Therapy Goals        Problem: Physical Therapy Goal    Goal Priority Disciplines Outcome Goal Variances Interventions   Physical Therapy Goal     PT/OT, PT Ongoing (interventions implemented as appropriate)     Description:  Goals to be met by: 18     Patient will increase functional independence with mobility by performin. Supine to sit with Modified [place]  2. Sit to supine with Modified [place]  3. Sit to stand transfer with Stand-by Assistance  4. Bed to chair transfer with Stand-by Assistance using Rolling Walker  5. Gait  x 50 feet with Stand-by Assistance using Rolling Walker.   6. 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    Osteopenia        Past Surgical History:   Procedure Laterality Date    APPENDECTOMY      CATARACT EXTRACTION      COLONOSCOPY N/A 5/15/2017    Procedure: COLONOSCOPY;  Surgeon: Dez Jimenez MD;  [facility];  Service: Endoscopy;   Laterality: N/A;    CORONARY STENT PLACEMENT      ESOPHAGOGASTRODUODENOSCOPY      EYE SURGERY      SKIN BIOPSY      TONSILLECTOMY      VASECTOMY         Clinical Decision Making:     History  Co-morbidities and personal factors that may impact the plan of care Examination  Body Structures and Functions, activity limitations and participation restrictions that may impact the plan of care Clinical Presentation   Decision Making/ Complexity Score   Co-morbidities:   [] Time since onset of injury / illness / exacerbation  [] Status of current condition  []Patient's cognitive status and safety concerns    [x] Multiple Medical Problems (see med hx)  Personal Factors:   [] Patient's age  [] Prior Level of function   [x] Patient's home situation (environment and family support)  [x] Patient's level of motivation  [] Expected progression of patient      HISTORY:(criteria)    [] 45812 - no personal factors/history    [x] 38476 - has 1-2 personal factor/comorbidity     [] 37444 - has >3 personal factor/comorbidity     Body Regions:  [] Objective examination findings  [] Head     []  Neck  [x] Trunk   [] Upper Extremity  [x] Lower Extremity    Body Systems:  [] For communication ability, affect, cognition, language, and learning style: the assessment of the ability to make needs known, consciousness, orientation (person, place, and time), expected emotional /behavioral responses, and learning preferences (eg, learning barriers, education  needs)  [x] For the neuromuscular system: a general assessment of gross coordinated movement (eg, balance, gait, locomotion, transfers, and transitions) and motor function  (motor control and motor learning)  [x] For the musculoskeletal system: the assessment of gross symmetry, gross range of motion, gross strength, height, and weight  [] For the integumentary system: the assessment of pliability(texture), presence of scar formation, skin color, and skin integrity  [] For  cardiovascular/pulmonary system: the assessment of heart rate, respiratory rate, blood pressure, and edema     Activity limitations:    [] Patient's cognitive status and saf ety concerns          [] Status of current condition      [] Weight bearing restriction  [] Cardiopulmunary Restriction    Participation Restrictions:   [] Goals and goal agreement with the patient     [] Rehab potential (prognosis) and probable outcome      Examination of Body System: (criteria)    [x] 75861 - addressing 1-2 elements    [] 57792 - addressing a total of 3 or more elements     [] 31214 -  Addressing a total of 4 or more elements         Clinical Presentation: (criteria)  Stable - 70351     On examination of body system using standardized tests and measures patient presents with 1-2 elements from any of the following: body structures and functions, activity limitations, and/or participation restrictions.  Leading to a clinical presentation that is considered stable and/or uncomplicated                              Clinical Decision Making  (Eval Complexity):  Low- 47435     Time Tracking:     PT Received On: 02/16/18  PT Start Time: 0937     PT Stop Time: 1005  PT Total Time (min): 28 min     Billable Minutes: Evaluation 18 and Therapeutic Activity 10      Radha Thompson, PT  02/16/2018

## 2018-02-16 NOTE — CLINICAL REVIEW
Pt unable to void since out of surgery on 2/15 around 4pm. Bladder scan showed 659cc. Dr Velez, on call for surg., paged and notified. Straight cath order given. Will continue to monitor.

## 2018-02-16 NOTE — PLAN OF CARE
Problem: Physical Therapy Goal  Goal: Physical Therapy Goal  Goals to be met by: 18     Patient will increase functional independence with mobility by performin. Supine to sit with Modified [place]  2. Sit to supine with Modified [place]  3. Sit to stand transfer with Stand-by Assistance  4. Bed to chair transfer with Stand-by Assistance using Rolling Walker  5. Gait  x 50 feet with Stand-by Assistance using Rolling Walker.   6. Lower extremity exercise program x20 reps per handout, with independence    Outcome: Ongoing (interventions implemented as appropriate)  Goals set today

## 2018-02-16 NOTE — SUBJECTIVE & OBJECTIVE
Interval Hx: Small bowel resection was done yesterday with no complication, 5-6 cm tumor removed,no evidence of mets, frozen suction showed spindle cell tumor, patient doing well this morning, hemodynamically stable, pain controled with dilaudid gtt.      Review of Systems   Constitutional: patient had 10 Ib unintentional wt loss in the last 3 month , night sweats, fever or chills  Eyes: denies visual changes  ENT: denies nasal congestion, ear pain or sore throat  Respiratory: denies cough, dyspnea on exertion and pleurisy  Cardiovascular: denies chest pain, chest pressure/discomfort, dyspnea.  Gastrointestinal: + melena. denies nausea or vomiting, abdominal pain.  Genitourinary: denies hematuria or dysuria  Musculoskeletal: denies arthralgias or myalgias  Neurological: denies seizures or tremors.headaches.  Behavioral/Psych: denies auditory or visual hallucinations, SI, HI     Objective:     Vital Signs (Most Recent):  Temp: 96.1 °F (35.6 °C) (02/16/18 1220)  Pulse: 87 (02/16/18 1251)  Resp: 17 (02/16/18 1251)  BP: 130/64 (02/16/18 1220)  SpO2: 96 % (02/16/18 1251) Vital Signs (24h Range):  Temp:  [96.1 °F (35.6 °C)-97.8 °F (36.6 °C)] 96.1 °F (35.6 °C)  Pulse:  [68-99] 87  Resp:  [14-20] 17  SpO2:  [94 %-100 %] 96 %  BP: (108-138)/(55-70) 130/64     Weight: 73 kg (161 lb)  Body mass index is 22.45 kg/m².    Physical Exam    General: Well developed, well nourished male in NAD  HEENT: Conjunctiva clear; Oropharynx clear  Neck: No JVD noted, Supple  CV: Normal S1, S2 with no murmurs.  Resp: Lungs CTA Bilaterally, Unlabored  Abdomen: NTND, BS normoactive x4 quads, soft. LUPE -ve for melena or BRBPR ,surgical scar midabdominal.  Extrem: No cyanosis, clubbing, edema.  Skin: No rashes, lesions, ulcers  Neuro: motor strength in tact. No focal deficit   PSYCH: Oriented x3       Significant Labs:   CBC:     Recent Labs  Lab 02/15/18  0603  02/15/18  2348 02/16/18  0445 02/16/18  1153   WBC 8.65  --   --  11.46  --    HGB 8.3*   8.3*  < > 8.0* 7.9*  7.9* 8.4*   HCT 25.8*  25.7*  < > 25.2* 25.5*  25.1* 26.6*   *  --   --  370*  --    < > = values in this interval not displayed.  CMP:     Recent Labs  Lab 02/15/18  0603 02/16/18  0445    139   K 4.4 4.8    107   CO2 29 25   * 138*   BUN 18 20   CREATININE 1.0 1.0   CALCIUM 8.5* 7.7*   PROT 5.7* 5.3*   ALBUMIN 2.6* 2.4*   BILITOT 0.4 0.6   ALKPHOS 66 63   AST 16 19   ALT 8* 7*   ANIONGAP 5* 7*   EGFRNONAA >60.0 >60.0

## 2018-02-16 NOTE — NURSING
Pt returned to floor. VSS. Reoriented to room. No complaints at this time. Wife at bedside. Will continue to monitor.

## 2018-02-17 LAB
ALBUMIN SERPL BCP-MCNC: 2.1 G/DL
ALP SERPL-CCNC: 55 U/L
ALT SERPL W/O P-5'-P-CCNC: 7 U/L
ANION GAP SERPL CALC-SCNC: 6 MMOL/L
AST SERPL-CCNC: 16 U/L
BASOPHILS # BLD AUTO: 0.01 K/UL
BASOPHILS NFR BLD: 0.1 %
BILIRUB SERPL-MCNC: 0.4 MG/DL
BLD PROD TYP BPU: NORMAL
BLD PROD TYP BPU: NORMAL
BLOOD UNIT EXPIRATION DATE: NORMAL
BLOOD UNIT EXPIRATION DATE: NORMAL
BLOOD UNIT TYPE CODE: 600
BLOOD UNIT TYPE CODE: 600
BLOOD UNIT TYPE: NORMAL
BLOOD UNIT TYPE: NORMAL
BUN SERPL-MCNC: 14 MG/DL
CALCIUM SERPL-MCNC: 7.4 MG/DL
CGA SERPL-MCNC: 567 NG/ML (ref 0–95)
CHLORIDE SERPL-SCNC: 107 MMOL/L
CO2 SERPL-SCNC: 26 MMOL/L
CODING SYSTEM: NORMAL
CODING SYSTEM: NORMAL
CREAT SERPL-MCNC: 0.9 MG/DL
DIFFERENTIAL METHOD: ABNORMAL
DISPENSE STATUS: NORMAL
DISPENSE STATUS: NORMAL
EOSINOPHIL # BLD AUTO: 0.1 K/UL
EOSINOPHIL NFR BLD: 1.6 %
ERYTHROCYTE [DISTWIDTH] IN BLOOD BY AUTOMATED COUNT: 15.6 %
EST. GFR  (AFRICAN AMERICAN): >60 ML/MIN/1.73 M^2
EST. GFR  (NON AFRICAN AMERICAN): >60 ML/MIN/1.73 M^2
GLUCOSE SERPL-MCNC: 239 MG/DL
HCT VFR BLD AUTO: 23.1 %
HGB BLD-MCNC: 7.2 G/DL
IMM GRANULOCYTES # BLD AUTO: 0.06 K/UL
IMM GRANULOCYTES NFR BLD AUTO: 0.8 %
LYMPHOCYTES # BLD AUTO: 0.5 K/UL
LYMPHOCYTES NFR BLD: 6.6 %
MAGNESIUM SERPL-MCNC: 1.9 MG/DL
MCH RBC QN AUTO: 27.2 PG
MCHC RBC AUTO-ENTMCNC: 31.2 G/DL
MCV RBC AUTO: 87 FL
MONOCYTES # BLD AUTO: 0.7 K/UL
MONOCYTES NFR BLD: 9.6 %
NEUTROPHILS # BLD AUTO: 6 K/UL
NEUTROPHILS NFR BLD: 81.3 %
NRBC BLD-RTO: 0 /100 WBC
PHOSPHATE SERPL-MCNC: 2.3 MG/DL
PLATELET # BLD AUTO: 352 K/UL
PMV BLD AUTO: 9.3 FL
POTASSIUM SERPL-SCNC: 3.9 MMOL/L
PROT SERPL-MCNC: 5.1 G/DL
RBC # BLD AUTO: 2.65 M/UL
SODIUM SERPL-SCNC: 139 MMOL/L
TRANS ERYTHROCYTES VOL PATIENT: NORMAL ML
TRANS ERYTHROCYTES VOL PATIENT: NORMAL ML
WBC # BLD AUTO: 7.32 K/UL

## 2018-02-17 PROCEDURE — 25000003 PHARM REV CODE 250: Performed by: SURGERY

## 2018-02-17 PROCEDURE — 25000003 PHARM REV CODE 250: Performed by: STUDENT IN AN ORGANIZED HEALTH CARE EDUCATION/TRAINING PROGRAM

## 2018-02-17 PROCEDURE — 25000242 PHARM REV CODE 250 ALT 637 W/ HCPCS: Performed by: NURSE PRACTITIONER

## 2018-02-17 PROCEDURE — S5010 5% DEXTROSE AND 0.45% SALINE: HCPCS | Performed by: SURGERY

## 2018-02-17 PROCEDURE — 63600175 PHARM REV CODE 636 W HCPCS: Performed by: SURGERY

## 2018-02-17 PROCEDURE — 97530 THERAPEUTIC ACTIVITIES: CPT

## 2018-02-17 PROCEDURE — 11000001 HC ACUTE MED/SURG PRIVATE ROOM

## 2018-02-17 PROCEDURE — 36415 COLL VENOUS BLD VENIPUNCTURE: CPT

## 2018-02-17 PROCEDURE — 94640 AIRWAY INHALATION TREATMENT: CPT

## 2018-02-17 PROCEDURE — 94761 N-INVAS EAR/PLS OXIMETRY MLT: CPT

## 2018-02-17 PROCEDURE — 85025 COMPLETE CBC W/AUTO DIFF WBC: CPT

## 2018-02-17 PROCEDURE — 83735 ASSAY OF MAGNESIUM: CPT

## 2018-02-17 PROCEDURE — 97116 GAIT TRAINING THERAPY: CPT

## 2018-02-17 PROCEDURE — 80053 COMPREHEN METABOLIC PANEL: CPT

## 2018-02-17 PROCEDURE — 84100 ASSAY OF PHOSPHORUS: CPT

## 2018-02-17 RX ORDER — OXYCODONE AND ACETAMINOPHEN 5; 325 MG/1; MG/1
1 TABLET ORAL EVERY 4 HOURS PRN
Status: DISCONTINUED | OUTPATIENT
Start: 2018-02-17 | End: 2018-02-19

## 2018-02-17 RX ORDER — OXYCODONE AND ACETAMINOPHEN 10; 325 MG/1; MG/1
1 TABLET ORAL EVERY 6 HOURS PRN
Status: DISCONTINUED | OUTPATIENT
Start: 2018-02-17 | End: 2018-02-19

## 2018-02-17 RX ADMIN — FLUTICASONE PROPIONATE 50 MCG: 50 SPRAY, METERED NASAL at 09:02

## 2018-02-17 RX ADMIN — OXYCODONE HYDROCHLORIDE AND ACETAMINOPHEN 1 TABLET: 5; 325 TABLET ORAL at 05:02

## 2018-02-17 RX ADMIN — Medication: at 12:02

## 2018-02-17 RX ADMIN — IPRATROPIUM BROMIDE AND ALBUTEROL SULFATE 3 ML: .5; 3 SOLUTION RESPIRATORY (INHALATION) at 09:02

## 2018-02-17 RX ADMIN — ATORVASTATIN CALCIUM 40 MG: 20 TABLET, FILM COATED ORAL at 09:02

## 2018-02-17 RX ADMIN — DEXTROSE AND SODIUM CHLORIDE: 5; .45 INJECTION, SOLUTION INTRAVENOUS at 12:02

## 2018-02-17 RX ADMIN — PANTOPRAZOLE SODIUM 40 MG: 40 TABLET, DELAYED RELEASE ORAL at 09:02

## 2018-02-17 RX ADMIN — IPRATROPIUM BROMIDE AND ALBUTEROL SULFATE 3 ML: .5; 3 SOLUTION RESPIRATORY (INHALATION) at 07:02

## 2018-02-17 RX ADMIN — IPRATROPIUM BROMIDE AND ALBUTEROL SULFATE 3 ML: .5; 3 SOLUTION RESPIRATORY (INHALATION) at 01:02

## 2018-02-17 RX ADMIN — OXYCODONE HYDROCHLORIDE AND ACETAMINOPHEN 1 TABLET: 10; 325 TABLET ORAL at 09:02

## 2018-02-17 RX ADMIN — ACETAMINOPHEN 1000 MG: 10 INJECTION, SOLUTION INTRAVENOUS at 06:02

## 2018-02-17 NOTE — PLAN OF CARE
Problem: Patient Care Overview  Goal: Plan of Care Review  Outcome: Ongoing (interventions implemented as appropriate)  Pt aaox4, vs stable, no falls or injuries. Fall precautions in place w/ personal items near by. Spouse at bedside. Pain level being monitor. Worked with PT. No signs of distress. Call light in reach; will continue to monitor pt.

## 2018-02-17 NOTE — SUBJECTIVE & OBJECTIVE
Interval History: s/p Small bowel resection    No acute events overnight. HD stable.  NPO. PCA partially controlling pain. Feels distended, possible ileus. Cialis started with good UOP-baldwin in place    Medications:  Continuous Infusions:   dextrose 5 % and 0.45 % NaCl 100 mL/hr at 02/16/18 1251    morphine       Scheduled Meds:   albuterol-ipratropium 2.5mg-0.5mg/3mL  3 mL Nebulization Q6H WAKE    atorvastatin  40 mg Oral QHS    Cialis (tadalafil) 5 mg - half tablet  5 mg Oral Daily    fluticasone  1 spray Each Nare BID    pantoprazole  40 mg Oral BID     PRN Meds:sodium chloride, dextrose 50%, dextrose 50%, glucagon (human recombinant), glucose, glucose, naloxone, ondansetron, promethazine (PHENERGAN) IVPB, sodium chloride 0.9%, sodium chloride 0.9%     Review of patient's allergies indicates:   Allergen Reactions    Augmentin [amoxicillin-pot clavulanate] Shortness Of Breath    Horse/equine containing products     Singulair [montelukast]      Objective:     Vital Signs (Most Recent):  Temp: 97.5 °F (36.4 °C) (02/17/18 0026)  Pulse: 69 (02/17/18 0026)  Resp: 18 (02/17/18 0026)  BP: 133/60 (02/17/18 0026)  SpO2: (!) 94 % (02/17/18 0026) Vital Signs (24h Range):  Temp:  [96.1 °F (35.6 °C)-97.5 °F (36.4 °C)] 97.5 °F (36.4 °C)  Pulse:  [66-99] 69  Resp:  [14-18] 18  SpO2:  [67 %-99 %] 94 %  BP: (129-133)/(60-64) 133/60     Weight: 73 kg (161 lb)  Body mass index is 22.45 kg/m².    Intake/Output - Last 3 Shifts       02/15 0700 - 02/16 0659 02/16 0700 - 02/17 0659 02/17 0700 - 02/18 0659    P.O. 60 120     I.V. (mL/kg) 3659 (50.1) 731 (10)     Total Intake(mL/kg) 3719 (50.9) 851 (11.7)     Urine (mL/kg/hr) 950 (0.5) 1710 (1)     Blood 100 (0.1)      Total Output 1050 1710      Net +2669 -859             Stool Occurrence  0 x           Physical Exam   Constitutional: He is oriented to person, place, and time. He appears well-developed and well-nourished. No distress.   HENT:   Head: Normocephalic and  atraumatic.   Eyes: Conjunctivae and EOM are normal. No scleral icterus.   Neck: Normal range of motion.   Cardiovascular: Normal rate and regular rhythm.    Pulmonary/Chest: Effort normal. No respiratory distress.   Abdominal: Soft. He exhibits distension (mild with tympany ). There is tenderness.   Incision intact without erythema, drainage or bleeding   Musculoskeletal: Normal range of motion. He exhibits no edema.   Neurological: He is alert and oriented to person, place, and time.   Nursing note and vitals reviewed.      Significant Labs:  CBC:     Recent Labs  Lab 02/17/18  0649   WBC 7.32   RBC 2.65*   HGB 7.2*   HCT 23.1*   *   MCV 87   MCH 27.2   MCHC 31.2*     CMP:     Recent Labs  Lab 02/17/18  0649   *   CALCIUM 7.4*   ALBUMIN 2.1*   PROT 5.1*      K 3.9   CO2 26      BUN 14   CREATININE 0.9   ALKPHOS 55   ALT 7*   AST 16   BILITOT 0.4       Significant Diagnostics:  I have reviewed and interpreted all pertinent imaging results/findings within the past 24 hours.

## 2018-02-17 NOTE — ASSESSMENT & PLAN NOTE
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 Appear to have ileus.     Daily labs and lyte repletion; H/H stable, no leukocytosis  NPO with sips and ice chips  mIVF   Path pending-will follow up   Strict I/Os-   Cialis (home med) restarted- baldwin in place with increase UOP  OOBTC today, encourage ambulation  Encourage IS.

## 2018-02-17 NOTE — PLAN OF CARE
Problem: Patient Care Overview  Goal: Plan of Care Review  Outcome: Ongoing (interventions implemented as appropriate)  Patient is awake, alert and oriented. Patient remained free from complaints this shift. Patient is ambulatory with assistance. Patient spouse is at bedside.  Remained free from injury and falls this shift. Bed is in the low and locked position with side rail up x 2. Call light is within reach. Informed to call if need assistance. Will continue to monitor.

## 2018-02-17 NOTE — PROGRESS NOTES
[facility]  General Surgery  Progress Note    Subjective:     History of Present Illness:  This is a 73 y/o male with hx CAD and HTN, obscure overt GI bleeding [facility].  Pt was admitted 2/5 with melena. Underwent  colonoscopy, negative for bleeding with old blood seen in TI. No EGD at OSH.  CT abd showed ileal mass, approx 5 cm. Surgery recommend operative intervention at OSH, however patient requested transfer for 2nd opinion.  Patient has had a similar episode in the past year, at which time he also had EGD, colonscopy and capsule enterography which were negative, bleeding at that time thought due to NSAID use which was discontinued and he did well until about a week ago. When he began to have blood stools again. He has some fullness and early satiety, but has not altered his eating habits, and normal bowel habits, 1-3 movement a day, no change in consistency. No abdominal pain. Only prior abdominal surgery is an appendectomy as a child.     Post-Op Info:  Procedure(s) (LRB):  RESECTION-SMALL BOWEL (N/A)   2 Days Post-Op     Interval History: s/p Small bowel resection    No acute events overnight. HD stable.  NPO. PCA partially controlling pain. Feels distended, possible ileus. Cialis started with good UOP-baldwin in place    Medications:  Continuous Infusions:   dextrose 5 % and 0.45 % NaCl 100 mL/hr at 02/16/18 1251    morphine       Scheduled Meds:   albuterol-ipratropium 2.5mg-0.5mg/3mL  3 mL Nebulization Q6H WAKE    atorvastatin  40 mg Oral QHS    Cialis (tadalafil) 5 mg - half tablet  5 mg Oral Daily    fluticasone  1 spray Each Nare BID    pantoprazole  40 mg Oral BID     PRN Meds:sodium chloride, dextrose 50%, dextrose 50%, glucagon (human recombinant), glucose, glucose, naloxone, ondansetron, promethazine (PHENERGAN) IVPB, sodium chloride 0.9%, sodium chloride 0.9%     Review of patient's allergies indicates:    Allergen Reactions    Augmentin [amoxicillin-pot clavulanate] Shortness Of Breath    Horse/equine containing products     Singulair [montelukast]      Objective:     Vital Signs (Most Recent):  Temp: 97.5 °F (36.4 °C) (02/17/18 0026)  Pulse: 69 (02/17/18 0026)  Resp: 18 (02/17/18 0026)  BP: 133/60 (02/17/18 0026)  SpO2: (!) 94 % (02/17/18 0026) Vital Signs (24h Range):  Temp:  [96.1 °F (35.6 °C)-97.5 °F (36.4 °C)] 97.5 °F (36.4 °C)  Pulse:  [66-99] 69  Resp:  [14-18] 18  SpO2:  [67 %-99 %] 94 %  BP: (129-133)/(60-64) 133/60     Weight: 73 kg (161 lb)  Body mass index is 22.45 kg/m².    Intake/Output - Last 3 Shifts       02/15 0700 - 02/16 0659 02/16 0700 - 02/17 0659 02/17 0700 - 02/18 0659    P.O. 60 120     I.V. (mL/kg) 3659 (50.1) 731 (10)     Total Intake(mL/kg) 3719 (50.9) 851 (11.7)     Urine (mL/kg/hr) 950 (0.5) 1710 (1)     Blood 100 (0.1)      Total Output 1050 1710      Net +2669 -859             Stool Occurrence  0 x           Physical Exam   Constitutional: He is oriented to person, place, and time. He appears well-developed and well-nourished. No distress.   HENT:   Head: Normocephalic and atraumatic.   Eyes: Conjunctivae and EOM are normal. No scleral icterus.   Neck: Normal range of motion.   Cardiovascular: Normal rate and regular rhythm.    Pulmonary/Chest: Effort normal. No respiratory distress.   Abdominal: Soft. He exhibits distension (mild with tympany ). There is tenderness.   Incision intact without erythema, drainage or bleeding   Musculoskeletal: Normal range of motion. He exhibits no edema.   Neurological: He is alert and oriented to person, place, and time.   Nursing note and vitals reviewed.      Significant Labs:  CBC:     Recent Labs  Lab 02/17/18  0649   WBC 7.32   RBC 2.65*   HGB 7.2*   HCT 23.1*   *   MCV 87   MCH 27.2   MCHC 31.2*     CMP:     Recent Labs  Lab 02/17/18  0649   *   CALCIUM 7.4*   ALBUMIN 2.1*   PROT 5.1*      K 3.9   CO2 26      BUN 14    CREATININE 0.9   ALKPHOS 55   ALT 7*   AST 16   BILITOT 0.4       Significant Diagnostics:  I have reviewed and interpreted all pertinent imaging results/findings within the past 24 hours.    Assessment/Plan:     Mass of small intestine    71 yo male with mass in distal small bowel s/p Small bowel resection/mass resection on 2/16/18. Appear to have ileus.     Daily labs and lyte repletion; H/H stable, no leukocytosis  NPO with sips and ice chips  mIVF   Path pending-will follow up   Strict I/Os-   Cialis (home med) restarted- baldwin in place with increase UOP  OOBTC today, encourage ambulation  Encourage IS.                Haylie Garnica MD  General Surgery  [facility]

## 2018-02-17 NOTE — PT/OT/SLP PROGRESS
Physical Therapy Treatment    Patient Name:  Forrest Schmidt   MRN:  [id number]    Recommendations:     Discharge Recommendations: nursing facility, skilled  progressing towards  home health PT   Discharge Equipment Recommendations: none   Barriers to discharge:Decreased caregiver support (Wife can provide limited physical assist)    Assessment:     Forrest Schmidt is a 72 y.o. male admitted with a medical diagnosis of Gastrointestinal hemorrhage.  He presents with the following impairments/functional limitations:  weakness, impaired endurance, impaired self care skills, gait instability, impaired functional mobilty, pain . Pt Progressing with PT Intervention. Pt Progressing with improving gait distance however limited due to c/o of dizziness/ light headedness. Pt would continue to benefit from skilled PT to address overall functional mobility and goals. Goals remain appropriate.    Rehab Prognosis:  good; patient would benefit from acute skilled PT services to address these deficits and reach maximum level of function.      Recent Surgery: Procedure(s) (LRB):  RESECTION-SMALL BOWEL (N/A) 2 Days Post-Op    Plan:     During this hospitalization, patient to be seen 4 x/week to address the above listed problems via gait training, therapeutic activities, therapeutic exercises, neuromuscular re-education  · Plan of Care Expires:  03/18/18   Plan of Care Reviewed with: patient    Subjective     Communicated with RN prior to session.  Patient found supine upon PT entry to room, agreeable to treatment.      Chief Complaint: I feel light headed  I want to go home from here  Pain/Comfort:  · Pain Rating 1: 4/10  · Location - Orientation 1: generalized  · Location 1: abdomen  · Pain Addressed 1: Pre-medicate for activity, Reposition, Cessation of Activity, Distraction, Nurse notified  · Pain Rating Post-Intervention 1: 4/10    Patients cultural, spiritual, Hoahaoism conflicts given the current situation: none reported    Objective:      Patient found with: peripheral IV, PCA, SCD     General Precautions: Standard, fall   Orthopedic Precautions:N/A   Braces: N/A     Functional Mobility:  · Bed Mobility:     · Rolling Right: contact guard assistance  · Supine to Sit: contact guard assistance  · Transfers:     · Sit to Stand:  contact guard assistance with rolling walker  · Gait: 200 feet x 2 with seated rest break between trials using RW with CGA . Pt with c/o of dizziness, fatigue and increased perspiration during seated rest break. Pt ambulated with decreased step length and foot clearance      AM-PAC 6 CLICK MOBILITY  Turning over in bed (including adjusting bedclothes, sheets and blankets)?: 3  Sitting down on and standing up from a chair with arms (e.g., wheelchair, bedside commode, etc.): 3  Moving from lying on back to sitting on the side of the bed?: 3  Moving to and from a bed to a chair (including a wheelchair)?: 3  Need to walk in hospital room?: 3  Climbing 3-5 steps with a railing?: 2  Total Score: 17       Therapeutic Activities and Exercises:   Discussed/educated patient on progress, safety,d/c, PT POC   White board updated   Donned an extra gown   Pt encouraged to ambulate in hallways 3x/day with nursing  assistance to improve endurance.   Pt safe to ambulate in hallway with RN or PCT assistance       Patient left up in chair with all lines intact, call button in reach, nsg notified and spouse present..    GOALS:    Physical Therapy Goals        Problem: Physical Therapy Goal    Goal Priority Disciplines Outcome Goal Variances Interventions   Physical Therapy Goal     PT/OT, PT Ongoing (interventions implemented as appropriate)     Description:  Goals to be met by: 18     Patient will increase functional independence with mobility by performin. Supine to sit with Modified [place]  2. Sit to supine with Modified [place]  3. Sit to stand transfer with Stand-by Assistance-met  4. Bed to chair transfer with  Stand-by Assistance using Rolling Walker  5. Gait  x 50 feet with Stand-by Assistance using Rolling Walker.   6. Lower extremity exercise program x20 reps per handout, with independence                       Time Tracking:     PT Received On: 02/17/18  PT Start Time: 1028     PT Stop Time: 1107  PT Total Time (min): 39 min     Billable Minutes: Gait Training 26 and Therapeutic Activity 13    Treatment Type: Treatment  PT/PTA: PTA     PTA Visit Number: 1     Ahmet Pearce, USAMA  02/17/2018

## 2018-02-17 NOTE — PLAN OF CARE
Problem: Physical Therapy Goal  Goal: Physical Therapy Goal  Goals to be met by: 18     Patient will increase functional independence with mobility by performin. Supine to sit with Modified [place]  2. Sit to supine with Modified [place]  3. Sit to stand transfer with Stand-by Assistance-met  4. Bed to chair transfer with Stand-by Assistance using Rolling Walker  5. Gait  x 50 feet with Stand-by Assistance using Rolling Walker.   6. Lower extremity exercise program x20 reps per handout, with independence     Pt progressing towards goals. continue with PT POC.Goals remain appropriate.  Ahmet Pearce PTA  2018

## 2018-02-18 LAB
ALBUMIN SERPL BCP-MCNC: 2.3 G/DL
ALP SERPL-CCNC: 57 U/L
ALT SERPL W/O P-5'-P-CCNC: 10 U/L
ANION GAP SERPL CALC-SCNC: 7 MMOL/L
AST SERPL-CCNC: 19 U/L
BASOPHILS # BLD AUTO: 0.03 K/UL
BASOPHILS NFR BLD: 0.4 %
BILIRUB SERPL-MCNC: 0.4 MG/DL
BUN SERPL-MCNC: 8 MG/DL
CALCIUM SERPL-MCNC: 8.1 MG/DL
CHLORIDE SERPL-SCNC: 108 MMOL/L
CO2 SERPL-SCNC: 26 MMOL/L
CREAT SERPL-MCNC: 0.8 MG/DL
DIFFERENTIAL METHOD: ABNORMAL
EOSINOPHIL # BLD AUTO: 0.2 K/UL
EOSINOPHIL NFR BLD: 3 %
ERYTHROCYTE [DISTWIDTH] IN BLOOD BY AUTOMATED COUNT: 15.3 %
EST. GFR  (AFRICAN AMERICAN): >60 ML/MIN/1.73 M^2
EST. GFR  (NON AFRICAN AMERICAN): >60 ML/MIN/1.73 M^2
GLUCOSE SERPL-MCNC: 106 MG/DL
HCT VFR BLD AUTO: 27.6 %
HGB BLD-MCNC: 8.3 G/DL
IMM GRANULOCYTES # BLD AUTO: 0.03 K/UL
IMM GRANULOCYTES NFR BLD AUTO: 0.4 %
LYMPHOCYTES # BLD AUTO: 0.6 K/UL
LYMPHOCYTES NFR BLD: 7.8 %
MAGNESIUM SERPL-MCNC: 2 MG/DL
MCH RBC QN AUTO: 26.6 PG
MCHC RBC AUTO-ENTMCNC: 30.1 G/DL
MCV RBC AUTO: 89 FL
MONOCYTES # BLD AUTO: 0.8 K/UL
MONOCYTES NFR BLD: 10.1 %
NEUTROPHILS # BLD AUTO: 6.2 K/UL
NEUTROPHILS NFR BLD: 78.3 %
NRBC BLD-RTO: 0 /100 WBC
PHOSPHATE SERPL-MCNC: 2.5 MG/DL
PLATELET # BLD AUTO: 408 K/UL
PMV BLD AUTO: 9.5 FL
POTASSIUM SERPL-SCNC: 4 MMOL/L
PROT SERPL-MCNC: 5.5 G/DL
RBC # BLD AUTO: 3.12 M/UL
SODIUM SERPL-SCNC: 141 MMOL/L
WBC # BLD AUTO: 7.96 K/UL

## 2018-02-18 PROCEDURE — 84100 ASSAY OF PHOSPHORUS: CPT

## 2018-02-18 PROCEDURE — 25000003 PHARM REV CODE 250: Performed by: STUDENT IN AN ORGANIZED HEALTH CARE EDUCATION/TRAINING PROGRAM

## 2018-02-18 PROCEDURE — 11000001 HC ACUTE MED/SURG PRIVATE ROOM

## 2018-02-18 PROCEDURE — 36415 COLL VENOUS BLD VENIPUNCTURE: CPT

## 2018-02-18 PROCEDURE — 83735 ASSAY OF MAGNESIUM: CPT

## 2018-02-18 PROCEDURE — 85025 COMPLETE CBC W/AUTO DIFF WBC: CPT

## 2018-02-18 PROCEDURE — 80053 COMPREHEN METABOLIC PANEL: CPT

## 2018-02-18 RX ADMIN — ATORVASTATIN CALCIUM 40 MG: 20 TABLET, FILM COATED ORAL at 09:02

## 2018-02-18 RX ADMIN — OXYCODONE HYDROCHLORIDE AND ACETAMINOPHEN 1 TABLET: 10; 325 TABLET ORAL at 11:02

## 2018-02-18 RX ADMIN — FLUTICASONE PROPIONATE 50 MCG: 50 SPRAY, METERED NASAL at 09:02

## 2018-02-18 RX ADMIN — PANTOPRAZOLE SODIUM 40 MG: 40 TABLET, DELAYED RELEASE ORAL at 08:02

## 2018-02-18 RX ADMIN — OXYCODONE HYDROCHLORIDE AND ACETAMINOPHEN 1 TABLET: 10; 325 TABLET ORAL at 07:02

## 2018-02-18 RX ADMIN — OXYCODONE HYDROCHLORIDE AND ACETAMINOPHEN 1 TABLET: 5; 325 TABLET ORAL at 03:02

## 2018-02-18 RX ADMIN — OXYCODONE HYDROCHLORIDE AND ACETAMINOPHEN 1 TABLET: 5; 325 TABLET ORAL at 07:02

## 2018-02-18 RX ADMIN — FLUTICASONE PROPIONATE 50 MCG: 50 SPRAY, METERED NASAL at 08:02

## 2018-02-18 RX ADMIN — PANTOPRAZOLE SODIUM 40 MG: 40 TABLET, DELAYED RELEASE ORAL at 09:02

## 2018-02-18 NOTE — PROGRESS NOTES
[facility]  General Surgery  Progress Note    Subjective:     History of Present Illness:  This is a 71 y/o male with hx CAD and HTN, obscure overt GI bleeding [facility].  Pt was admitted 2/5 with melena. Underwent  colonoscopy, negative for bleeding with old blood seen in TI. No EGD at OSH.  CT abd showed ileal mass, approx 5 cm. Surgery recommend operative intervention at OSH, however patient requested transfer for 2nd opinion.  Patient has had a similar episode in the past year, at which time he also had EGD, colonscopy and capsule enterography which were negative, bleeding at that time thought due to NSAID use which was discontinued and he did well until about a week ago. When he began to have blood stools again. He has some fullness and early satiety, but has not altered his eating habits, and normal bowel habits, 1-3 movement a day, no change in consistency. No abdominal pain. Only prior abdominal surgery is an appendectomy as a child.     Post-Op Info:  Procedure(s) (LRB):  RESECTION-SMALL BOWEL (N/A)   3 Days Post-Op     Interval History: s/p Small bowel resection    No acute events overnight. HD stable. Appears to still have ileus. +bloating +belching -flatus -bm. NPO. Switched from PCA to PO pain medications  Dove in place with good UOP    Medications:  Continuous Infusions:   dextrose 5 % and 0.45 % NaCl 100 mL/hr at 02/17/18 1214     Scheduled Meds:   atorvastatin  40 mg Oral QHS    Cialis (tadalafil) 5 mg - half tablet  5 mg Oral Daily    fluticasone  1 spray Each Nare BID    pantoprazole  40 mg Oral BID     PRN Meds:sodium chloride, dextrose 50%, dextrose 50%, glucagon (human recombinant), glucose, glucose, ondansetron, oxyCODONE-acetaminophen, oxyCODONE-acetaminophen, promethazine (PHENERGAN) IVPB, sodium chloride 0.9%, sodium chloride 0.9%     Review of patient's allergies indicates:   Allergen Reactions     Augmentin [amoxicillin-pot clavulanate] Shortness Of Breath    Horse/equine containing products     Singulair [montelukast]      Objective:     Vital Signs (Most Recent):  Temp: 96.7 °F (35.9 °C) (02/18/18 0724)  Pulse: 65 (02/18/18 0724)  Resp: 16 (02/18/18 0724)  BP: (!) 154/68 (02/18/18 0724)  SpO2: 99 % (02/18/18 0724) Vital Signs (24h Range):  Temp:  [96.3 °F (35.7 °C)-98.5 °F (36.9 °C)] 96.7 °F (35.9 °C)  Pulse:  [60-75] 65  Resp:  [13-18] 16  SpO2:  [92 %-99 %] 99 %  BP: (118-154)/(54-68) 154/68     Weight: 73 kg (161 lb)  Body mass index is 22.45 kg/m².    Intake/Output - Last 3 Shifts       02/16 0700 - 02/17 0659 02/17 0700 - 02/18 0659 02/18 0700 - 02/19 0659    P.O. 120 240     I.V. (mL/kg) 731 (10) 2176 (29.8)     Total Intake(mL/kg) 851 (11.7) 2416 (33.1)     Urine (mL/kg/hr) 1710 (1) 2500 (1.4)     Stool  0 (0)     Blood       Total Output 1710 2500      Net -859 -84             Stool Occurrence 0 x 0 x           Physical Exam   Constitutional: He is oriented to person, place, and time. He appears well-developed and well-nourished. No distress.   HENT:   Head: Normocephalic and atraumatic.   Eyes: Conjunctivae and EOM are normal. No scleral icterus.   Neck: Normal range of motion.   Cardiovascular: Normal rate and regular rhythm.    Pulmonary/Chest: Effort normal. No respiratory distress.   Abdominal: Soft. He exhibits distension (mild with tympany ). There is tenderness.   Incision intact without erythema, drainage or bleeding   Musculoskeletal: Normal range of motion. He exhibits no edema.   Neurological: He is alert and oriented to person, place, and time.   Nursing note and vitals reviewed.      Significant Labs:  Awaiting today's lab values-will follow up    Significant Diagnostics:  I have reviewed and interpreted all pertinent imaging results/findings within the past 24 hours.    Assessment/Plan:     Mass of small intestine    71 yo male with mass in distal small bowel s/p Small bowel  resection/mass resection on 2/16/18. Appear to have ileus.     Daily labs and lyte repletion- no labs back yet will follow up  NPO with sips and ice chips. Limit intake due to ileus  mIVF   KUB today  PRN pain medications   Path pending-will follow up   Strict I/Os  D/c baldwin today  Cialis (home med) for urinary retention  OOBTC today, encourage ambulation  Encourage IS.                Haylie Garnica MD  General Surgery  [facility]

## 2018-02-18 NOTE — ASSESSMENT & PLAN NOTE
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 Appear to have ileus.     Daily labs and lyte repletion- no labs back yet will follow up  NPO with sips and ice chips. Limit intake due to ileus  mIVF   KUB today  PRN pain medications   Path pending-will follow up   Strict I/Os  D/c baldwin today  Cialis (home med) for urinary retention  OOBTC today, encourage ambulation  Encourage IS.

## 2018-02-18 NOTE — PLAN OF CARE
Problem: Patient Care Overview  Goal: Plan of Care Review  Outcome: Ongoing (interventions implemented as appropriate)  VSS, pt. Pain is moderately controlled with current regimen.  Pt. And wife verbalized understanding to call for assistance for OOB, will continue to monitor.

## 2018-02-18 NOTE — SUBJECTIVE & OBJECTIVE
Interval History: s/p Small bowel resection    No acute events overnight. HD stable. Appears to still have ileus. +bloating +belching -flatus -bm. NPO. Switched from PCA to PO pain medications  Dove in place with good UOP    Medications:  Continuous Infusions:   dextrose 5 % and 0.45 % NaCl 100 mL/hr at 02/17/18 1214     Scheduled Meds:   atorvastatin  40 mg Oral QHS    Cialis (tadalafil) 5 mg - half tablet  5 mg Oral Daily    fluticasone  1 spray Each Nare BID    pantoprazole  40 mg Oral BID     PRN Meds:sodium chloride, dextrose 50%, dextrose 50%, glucagon (human recombinant), glucose, glucose, ondansetron, oxyCODONE-acetaminophen, oxyCODONE-acetaminophen, promethazine (PHENERGAN) IVPB, sodium chloride 0.9%, sodium chloride 0.9%     Review of patient's allergies indicates:   Allergen Reactions    Augmentin [amoxicillin-pot clavulanate] Shortness Of Breath    Horse/equine containing products     Singulair [montelukast]      Objective:     Vital Signs (Most Recent):  Temp: 96.7 °F (35.9 °C) (02/18/18 0724)  Pulse: 65 (02/18/18 0724)  Resp: 16 (02/18/18 0724)  BP: (!) 154/68 (02/18/18 0724)  SpO2: 99 % (02/18/18 0724) Vital Signs (24h Range):  Temp:  [96.3 °F (35.7 °C)-98.5 °F (36.9 °C)] 96.7 °F (35.9 °C)  Pulse:  [60-75] 65  Resp:  [13-18] 16  SpO2:  [92 %-99 %] 99 %  BP: (118-154)/(54-68) 154/68     Weight: 73 kg (161 lb)  Body mass index is 22.45 kg/m².    Intake/Output - Last 3 Shifts       02/16 0700 - 02/17 0659 02/17 0700 - 02/18 0659 02/18 0700 - 02/19 0659    P.O. 120 240     I.V. (mL/kg) 731 (10) 2176 (29.8)     Total Intake(mL/kg) 851 (11.7) 2416 (33.1)     Urine (mL/kg/hr) 1710 (1) 2500 (1.4)     Stool  0 (0)     Blood       Total Output 1710 2500      Net -859 -84             Stool Occurrence 0 x 0 x           Physical Exam   Constitutional: He is oriented to person, place, and time. He appears well-developed and well-nourished. No distress.   HENT:   Head: Normocephalic and atraumatic.   Eyes:  Conjunctivae and EOM are normal. No scleral icterus.   Neck: Normal range of motion.   Cardiovascular: Normal rate and regular rhythm.    Pulmonary/Chest: Effort normal. No respiratory distress.   Abdominal: Soft. He exhibits distension (mild with tympany ). There is tenderness.   Incision intact without erythema, drainage or bleeding   Musculoskeletal: Normal range of motion. He exhibits no edema.   Neurological: He is alert and oriented to person, place, and time.   Nursing note and vitals reviewed.      Significant Labs:  Awaiting today's lab values-will follow up    Significant Diagnostics:  I have reviewed and interpreted all pertinent imaging results/findings within the past 24 hours.

## 2018-02-19 LAB
ALBUMIN SERPL BCP-MCNC: 2.2 G/DL
ALP SERPL-CCNC: 57 U/L
ALT SERPL W/O P-5'-P-CCNC: 11 U/L
ANION GAP SERPL CALC-SCNC: 6 MMOL/L
AST SERPL-CCNC: 19 U/L
BASOPHILS # BLD AUTO: 0.05 K/UL
BASOPHILS NFR BLD: 0.7 %
BILIRUB SERPL-MCNC: 0.4 MG/DL
BUN SERPL-MCNC: 8 MG/DL
CALCIUM SERPL-MCNC: 8.1 MG/DL
CHLORIDE SERPL-SCNC: 106 MMOL/L
CO2 SERPL-SCNC: 27 MMOL/L
CREAT SERPL-MCNC: 0.8 MG/DL
DIFFERENTIAL METHOD: ABNORMAL
EOSINOPHIL # BLD AUTO: 0.3 K/UL
EOSINOPHIL NFR BLD: 3.7 %
ERYTHROCYTE [DISTWIDTH] IN BLOOD BY AUTOMATED COUNT: 15.2 %
EST. GFR  (AFRICAN AMERICAN): >60 ML/MIN/1.73 M^2
EST. GFR  (NON AFRICAN AMERICAN): >60 ML/MIN/1.73 M^2
GLUCOSE SERPL-MCNC: 122 MG/DL
HCT VFR BLD AUTO: 27.3 %
HGB BLD-MCNC: 9.2 G/DL
IMM GRANULOCYTES # BLD AUTO: 0.04 K/UL
IMM GRANULOCYTES NFR BLD AUTO: 0.5 %
LYMPHOCYTES # BLD AUTO: 0.6 K/UL
LYMPHOCYTES NFR BLD: 7.7 %
MAGNESIUM SERPL-MCNC: 1.7 MG/DL
MCH RBC QN AUTO: 27.1 PG
MCHC RBC AUTO-ENTMCNC: 33.7 G/DL
MCV RBC AUTO: 81 FL
MONOCYTES # BLD AUTO: 0.9 K/UL
MONOCYTES NFR BLD: 12.4 %
NEUTROPHILS # BLD AUTO: 5.5 K/UL
NEUTROPHILS NFR BLD: 75 %
NRBC BLD-RTO: 0 /100 WBC
PHOSPHATE SERPL-MCNC: 2.8 MG/DL
PLATELET # BLD AUTO: 482 K/UL
PMV BLD AUTO: 9.1 FL
POTASSIUM SERPL-SCNC: 3.6 MMOL/L
PROT SERPL-MCNC: 5.2 G/DL
RBC # BLD AUTO: 3.39 M/UL
SODIUM SERPL-SCNC: 139 MMOL/L
WBC # BLD AUTO: 7.36 K/UL

## 2018-02-19 PROCEDURE — 25000003 PHARM REV CODE 250: Performed by: STUDENT IN AN ORGANIZED HEALTH CARE EDUCATION/TRAINING PROGRAM

## 2018-02-19 PROCEDURE — 11000001 HC ACUTE MED/SURG PRIVATE ROOM

## 2018-02-19 PROCEDURE — 85025 COMPLETE CBC W/AUTO DIFF WBC: CPT

## 2018-02-19 PROCEDURE — 83735 ASSAY OF MAGNESIUM: CPT

## 2018-02-19 PROCEDURE — G8988 SELF CARE GOAL STATUS: HCPCS | Mod: CI

## 2018-02-19 PROCEDURE — 25000003 PHARM REV CODE 250: Performed by: SURGERY

## 2018-02-19 PROCEDURE — 63600175 PHARM REV CODE 636 W HCPCS: Performed by: NURSE PRACTITIONER

## 2018-02-19 PROCEDURE — 25000003 PHARM REV CODE 250: Performed by: NURSE PRACTITIONER

## 2018-02-19 PROCEDURE — 97530 THERAPEUTIC ACTIVITIES: CPT

## 2018-02-19 PROCEDURE — 36415 COLL VENOUS BLD VENIPUNCTURE: CPT

## 2018-02-19 PROCEDURE — 84100 ASSAY OF PHOSPHORUS: CPT

## 2018-02-19 PROCEDURE — 80053 COMPREHEN METABOLIC PANEL: CPT

## 2018-02-19 PROCEDURE — 94761 N-INVAS EAR/PLS OXIMETRY MLT: CPT

## 2018-02-19 PROCEDURE — G8987 SELF CARE CURRENT STATUS: HCPCS | Mod: CK

## 2018-02-19 RX ORDER — BISACODYL 10 MG
10 SUPPOSITORY, RECTAL RECTAL ONCE
Status: COMPLETED | OUTPATIENT
Start: 2018-02-19 | End: 2018-02-19

## 2018-02-19 RX ORDER — HYDROCODONE BITARTRATE AND ACETAMINOPHEN 7.5; 325 MG/15ML; MG/15ML
15 SOLUTION ORAL EVERY 6 HOURS PRN
Status: DISCONTINUED | OUTPATIENT
Start: 2018-02-19 | End: 2018-02-19

## 2018-02-19 RX ORDER — ENOXAPARIN SODIUM 100 MG/ML
40 INJECTION SUBCUTANEOUS EVERY 24 HOURS
Status: DISCONTINUED | OUTPATIENT
Start: 2018-02-19 | End: 2018-03-04 | Stop reason: HOSPADM

## 2018-02-19 RX ORDER — OXYCODONE AND ACETAMINOPHEN 10; 325 MG/1; MG/1
1 TABLET ORAL EVERY 6 HOURS PRN
Status: DISCONTINUED | OUTPATIENT
Start: 2018-02-19 | End: 2018-03-01

## 2018-02-19 RX ORDER — HYDROCODONE BITARTRATE AND ACETAMINOPHEN 7.5; 325 MG/15ML; MG/15ML
10 SOLUTION ORAL EVERY 4 HOURS PRN
Status: DISCONTINUED | OUTPATIENT
Start: 2018-02-19 | End: 2018-02-19

## 2018-02-19 RX ORDER — OXYCODONE AND ACETAMINOPHEN 5; 325 MG/1; MG/1
1 TABLET ORAL EVERY 4 HOURS PRN
Status: DISCONTINUED | OUTPATIENT
Start: 2018-02-19 | End: 2018-03-04 | Stop reason: HOSPADM

## 2018-02-19 RX ORDER — MAGNESIUM SULFATE HEPTAHYDRATE 40 MG/ML
2 INJECTION, SOLUTION INTRAVENOUS ONCE
Status: COMPLETED | OUTPATIENT
Start: 2018-02-19 | End: 2018-02-19

## 2018-02-19 RX ADMIN — MAGNESIUM SULFATE HEPTAHYDRATE 2 G: 40 INJECTION, SOLUTION INTRAVENOUS at 11:02

## 2018-02-19 RX ADMIN — ATORVASTATIN CALCIUM 40 MG: 20 TABLET, FILM COATED ORAL at 09:02

## 2018-02-19 RX ADMIN — POTASSIUM PHOSPHATE, MONOBASIC AND POTASSIUM PHOSPHATE, DIBASIC 30 MMOL: 224; 236 INJECTION, SOLUTION INTRAVENOUS at 12:02

## 2018-02-19 RX ADMIN — ENOXAPARIN SODIUM 40 MG: 100 INJECTION SUBCUTANEOUS at 11:02

## 2018-02-19 RX ADMIN — FLUTICASONE PROPIONATE 50 MCG: 50 SPRAY, METERED NASAL at 09:02

## 2018-02-19 RX ADMIN — FLUTICASONE PROPIONATE 50 MCG: 50 SPRAY, METERED NASAL at 11:02

## 2018-02-19 RX ADMIN — OXYCODONE HYDROCHLORIDE AND ACETAMINOPHEN 1 TABLET: 5; 325 TABLET ORAL at 12:02

## 2018-02-19 RX ADMIN — OXYCODONE HYDROCHLORIDE AND ACETAMINOPHEN 1 TABLET: 10; 325 TABLET ORAL at 05:02

## 2018-02-19 RX ADMIN — PANTOPRAZOLE SODIUM 40 MG: 40 TABLET, DELAYED RELEASE ORAL at 09:02

## 2018-02-19 RX ADMIN — BISACODYL 10 MG: 10 SUPPOSITORY RECTAL at 11:02

## 2018-02-19 RX ADMIN — PANTOPRAZOLE SODIUM 40 MG: 40 TABLET, DELAYED RELEASE ORAL at 08:02

## 2018-02-19 RX ADMIN — SODIUM CHLORIDE 1000 ML: 0.9 INJECTION, SOLUTION INTRAVENOUS at 08:02

## 2018-02-19 RX ADMIN — OXYCODONE HYDROCHLORIDE AND ACETAMINOPHEN 1 TABLET: 10; 325 TABLET ORAL at 08:02

## 2018-02-19 RX ADMIN — OXYCODONE HYDROCHLORIDE AND ACETAMINOPHEN 1 TABLET: 5; 325 TABLET ORAL at 01:02

## 2018-02-19 NOTE — PLAN OF CARE
RENEE learned in morning huddle that Pt is anticipated to discharge home later in the week. Therapy has recommended home health. RENEE met with Pt who was agreeable to home health and said he did not have a preference for the agency to which he was referred. RENEE sent all necessary referral information to [facility] via [facility] and notified them of Pt's anticipated discharge. SW to continue to follow.     Jenna Hollis LCSW

## 2018-02-19 NOTE — PT/OT/SLP PROGRESS
Occupational Therapy   Treatment    Name: Forrest Schmidt  MRN: [id number]  Admitting Diagnosis:  Gastrointestinal hemorrhage  4 Days Post-Op    Recommendations:     Discharge Recommendations: home health OT  Discharge Equipment Recommendations:  walker, rolling  Barriers to discharge:  Decreased caregiver support (wife can provide limited physical assist)    Subjective     Communicated with: RN prior to session.  Pain/Comfort:  · Pain Rating 1: 3/10  · Location - Orientation 1:  (incision site)  · Pain Addressed 1: Reposition, Distraction  · Pain Rating Post-Intervention 1: 4/10    Patients cultural, spiritual, Holiness conflicts given the current situation: none reported     Objective:     Patient found with: peripheral IV, SCD    General Precautions: Standard, fall   Orthopedic Precautions:N/A   Braces: N/A     Occupational Performance:    Bed Mobility:    · Patient completed Rolling/Turning to Right with modified independence and with side rail  · Patient completed Scooting/Bridging with modified independence and with side rail  · Patient completed Supine to Sit with modified independence     Functional Mobility/Transfers:  · Patient completed Sit <> Stand Transfer with contact guard assistance  with  rolling walker   · Patient completed Bed <> Chair Transfer using Step Transfer technique with contact guard assistance with rolling walker  · Patient completed Toilet Transfer to bedside commode using Step Transfer technique with contact guard assistance with rolling walker  · Functional Mobility: Pt ambulate 200 ft with CGA using RW; 1 standing rest break needed 2/2 pain    Activities of Daily Living:  · UB Dressing: Minimum assistance to don backward gown 2/2 IV  · Toileting: Set-up assistance for clothing management and ledy cleaning    Patient left up in chair with all lines intact and call button in reach    [facility] 6 Click:  [facility] Total Score: 19    Treatment & Education:  BUE AROM exercises completed 10 x 2 for  shoulder flex, elbow flex/ext, and chest press.  Education:    Assessment:     Forrest Schmidt is a 72 y.o. male with a medical diagnosis of Gastrointestinal hemorrhage.  He presents with pain, weakness and recent low BP impairing safe functional mobility and self care.  Performance deficits affecting function are weakness, impaired endurance, impaired functional mobilty, impaired self care skills, impaired cardiopulmonary response to activity.      Rehab Prognosis:  Good; patient would benefit from acute skilled OT services to address these deficits and reach maximum level of function.       Plan:     Patient to be seen 3 x/week to address the above listed problems via self-care/home management, therapeutic activities, therapeutic exercises  · Plan of Care Expires: 03/18/18  · Plan of Care Reviewed with: patient    This Plan of care has been discussed with the patient who was involved in its development and understands and is in agreement with the identified goals and treatment plan    GOALS:    Occupational Therapy Goals        Problem: Occupational Therapy Goal    Goal Priority Disciplines Outcome Interventions   Occupational Therapy Goal     OT, PT/OT Ongoing (interventions implemented as appropriate)    Description:  Goals to be met by: 3/2/18    Patient will increase functional independence with ADLs by performing:    UE Dressing with [place].  Grooming while standing at sink with Supervision.  Toileting from toilet with [place] for hygiene and clothing management.   Toilet transfer to toilet with Supervision.                      Time Tracking:     OT Date of Treatment: 02/19/18  OT Start Time: 1026  OT Stop Time: 1044  OT Total Time (min): 18 min    Billable Minutes:Therapeutic Activity 18    Pushpa Garces OT  2/19/2018

## 2018-02-19 NOTE — PLAN OF CARE
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       I have seen and examined this patient face to face today. My clinical findings that support the need for the home health skilled services and home bound status are the following:  Weakness/numbness causing balance and gait disturbance due to Surgery making it taxing to leave home.    Allergies:  Review of patient's allergies indicates:   Allergen Reactions    Augmentin [amoxicillin-pot clavulanate] Shortness Of Breath    Singulair [montelukast]      Pt "felt strange"      Horse/equine containing products        Diet: regular diet    Activities: activity as tolerated    Nursing:   SN to complete comprehensive assessment including routine vital signs. Instruct on disease process and s/s of complications to report to MD. Review/verify medication list sent home with the patient at time of discharge  and instruct patient/caregiver as needed. Frequency may be adjusted depending on start of care date.    Notify MD if SBP > 160 or < 90; DBP > 90 or < 50; HR > 120 or < 50; Temp > 101  "     Wound care:  Wound vac to midline abdomen.  Wound vac dressing changes every Monday, Wednesday and Friday using a black sponge to base of abdominal wound.     CONSULTS:    Physical Therapy to evaluate and treat. Evaluate for home safety and equipment needs; Establish/upgrade home exercise program. Perform / instruct on therapeutic exercises, gait training, transfer training, and Range of Motion.  Occupational Therapy to evaluate and treat. Evaluate home environment for safety and equipment needs. Perform/Instruct on transfers, ADL training, ROM, and therapeutic exercises.   to evaluate for community resources/long-range planning.  Aide to provide assistance with personal care, ADLs, and vital signs.      MISCELLANEOUS CARE:  Wound Care Orders:  yes:  Surgical Wound:  Location: midline abdomen. May shower.     Medications: Review discharge medications with patient and family and provide education.      Current Discharge Medication List      CONTINUE these medications which have NOT CHANGED    Details   arginine 500 mg tablet Take 1,000 mg by mouth once daily.       atorvastatin (LIPITOR) 40 MG tablet Take 40 mg by mouth every evening.      cholecalciferol, vitamin D3, (VITAMIN D3) 2,000 unit Cap Take 1 capsule by mouth once daily.      FERROUS SULFATE (FEOSOL ORAL) Take 1 tablet by mouth once daily at 6am.      fluticasone (FLONASE) 50 mcg/actuation nasal spray 1 spray by Each Nare route 2 (two) times daily.      losartan (COZAAR) 100 MG tablet Take 1 tablet (100 mg total) by mouth every evening. Hold if sbp < 120      omeprazole (PRILOSEC) 20 MG capsule Take 20 mg by mouth 2 (two) times daily.       SOD CHLOR,SOD BICARB/NETI POT (NEILMED NASAFLO NASL) 1 spray by Nasal route every evening.      SODIUM CHLORIDE (FE-128 OPHT) Place 1 drop into the right eye 4 (four) times daily.       tadalafil (CIALIS) 5 MG tablet Take 5 mg by mouth once daily at 6am.      testosterone cypionate (DEPOTESTOTERONE  CYPIONATE) 200 mg/mL injection Inject 200 mg into the muscle every 21 days.      zinc gluconate 50 mg tablet Take 50 mg by mouth every Monday and Thursday.       FOLIC ACID/MV,FE,MIN (CENTRUM ORAL) Take 1 tablet by mouth once daily at 6am.             I certify that this patient is confined to his home and needs intermittent skilled nursing care, physical therapy and occupational therapy.      Electronically Signed  _________________________________  Loren Loza, MARISOL  3/2/2018

## 2018-02-19 NOTE — ASSESSMENT & PLAN NOTE
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 Appear to have ileus.     Daily labs and lyte repletion- no labs back yet will follow up  NPO with sips and ice chips. Limit intake due to ileus  mIVF   KUB today  PRN pain medications   Path pending-will follow up   Strict I/Os  Good UOP with balwdin removed  Cialis (home med) for urinary retention  OOBTC today, encourage ambulation  Encourage IS.

## 2018-02-19 NOTE — SUBJECTIVE & OBJECTIVE
I  Interval History: s/p Small bowel resection    No acute events overnight. HD stable. Has ileus, increased bloating. +belching -flatus -nausea -bm. NPO. Dove removed yesterday- with good UOP since removal.     Medications:  Continuous Infusions:   dextrose 5 % and 0.45 % NaCl 100 mL/hr at 02/17/18 1214     Scheduled Meds:   atorvastatin  40 mg Oral QHS    bisacodyl  10 mg Rectal Once    Cialis (tadalafil) 5 mg - half tablet  5 mg Oral Daily    fluticasone  1 spray Each Nare BID    pantoprazole  40 mg Oral BID     PRN Meds:sodium chloride, dextrose 50%, dextrose 50%, glucagon (human recombinant), glucose, glucose, ondansetron, oxyCODONE-acetaminophen, oxyCODONE-acetaminophen, promethazine (PHENERGAN) IVPB, sodium chloride 0.9%, sodium chloride 0.9%     Review of patient's allergies indicates:   Allergen Reactions    Augmentin [amoxicillin-pot clavulanate] Shortness Of Breath    Horse/equine containing products     Singulair [montelukast]      Objective:     Vital Signs (Most Recent):  Temp: 96.1 °F (35.6 °C) (02/19/18 0437)  Pulse: 65 (02/19/18 0437)  Resp: 16 (02/19/18 0437)  BP: 128/69 (02/19/18 0437)  SpO2: 95 % (02/19/18 0437) Vital Signs (24h Range):  Temp:  [96.1 °F (35.6 °C)-97.2 °F (36.2 °C)] 96.1 °F (35.6 °C)  Pulse:  [61-65] 65  Resp:  [16-18] 16  SpO2:  [95 %-99 %] 95 %  BP: (128-174)/(68-82) 128/69     Weight: 73 kg (161 lb)  Body mass index is 22.45 kg/m².    Intake/Output - Last 3 Shifts       02/17 0700 - 02/18 0659 02/18 0700 - 02/19 0659 02/19 0700 - 02/20 0659    P.O. 240      I.V. (mL/kg) 2176 (29.8)      Total Intake(mL/kg) 2416 (33.1)      Urine (mL/kg/hr) 2500 (1.4)      Stool 0 (0)      Total Output 2500        Net -84               Stool Occurrence 0 x            Physical Exam   Constitutional: He is oriented to person, place, and time. He appears well-developed and well-nourished. No distress.   HENT:   Head: Normocephalic and atraumatic.   Eyes: Conjunctivae and EOM are normal.  No scleral icterus.   Neck: Normal range of motion.   Cardiovascular: Normal rate and regular rhythm.    Pulmonary/Chest: Effort normal. No respiratory distress.   Abdominal: Soft. He exhibits distension (mild with tympany ). There is tenderness.   Incision intact without erythema, drainage or bleeding   Musculoskeletal: Normal range of motion. He exhibits no edema.   Neurological: He is alert and oriented to person, place, and time.   Nursing note and vitals reviewed.      Significant Labs:  Awaiting today's lab values-will follow up    Significant Diagnostics:  I have reviewed and interpreted all pertinent imaging results/findings within the past 24 hours.

## 2018-02-19 NOTE — PLAN OF CARE
Patient admitted in transfer from [place] to Medicine Service with GI Bleed (transfused 3 U PRBC at [place]) and Ileal mass found on CT scan.  Patient went to OR with Dr Sanchez for Small Bowel Resection 2/15/2018 and transferred from [facility] to Dr Sanchez's Service 2/17/2018.  Patient now with postop Ileus, NPO.  Patient is expected to discharge home with with wife and home health +/- 2/23/2018.  Will continue to follow for needs.       02/19/18 1019   Discharge Reassessment   Assessment Type Discharge Planning Reassessment   Provided patient/caregiver education on the expected discharge date and the discharge plan Yes   Do you have any problems affording any of your prescribed medications? No   Discharge Plan A Home with family;Home Health   Can the patient answer the patient profile reliably? Yes, cognitively intact   Describe the patient's ability to walk at the present time. Minor restrictions or changes   How often would a person be available to care for the patient? Often

## 2018-02-19 NOTE — PLAN OF CARE
Problem: Occupational Therapy Goal  Goal: Occupational Therapy Goal  Goals to be met by: 3/2/18    Patient will increase functional independence with ADLs by performing:    UE Dressing with [place].  Grooming while standing at sink with Supervision.  Toileting from toilet with [place] for hygiene and clothing management.   Toilet transfer to toilet with Supervision.     Outcome: Ongoing (interventions implemented as appropriate)  Goals remain appropriate    Comments: Continue OT POC    Pushpa Garces OT   2/19/2018

## 2018-02-19 NOTE — PROGRESS NOTES
[facility]  General Surgery  Progress Note    Subjective:     History of Present Illness:  This is a 71 y/o male with hx CAD and HTN, obscure overt GI bleeding who [facility] for ileal mass seen on CT imaging.  Pt was admitted 2/5 with melena. Underwent  colonoscopy, negative for bleeding with old blood seen in TI. No EGD at OSH.  CT abd showed ileal mass, approx 5 cm. Surgery recommend operative intervention at OSH, however patient requested transfer for 2nd opinion.  Patient has had a similar episode in the past year, at which time he also had EGD, colonscopy and capsule enterography which were negative, bleeding at that time thought due to NSAID use which was discontinued and he did well until about a week ago. When he began to have blood stools again. He has some fullness and early satiety, but has not altered his eating habits, and normal bowel habits, 1-3 movement a day, no change in consistency. No abdominal pain. Only prior abdominal surgery is an appendectomy as a child.     Post-Op Info:  Procedure(s) (LRB):  RESECTION-SMALL BOWEL (N/A)   4 Days Post-Op     I  Interval History: s/p Small bowel resection    No acute events overnight. HD stable. Has ileus, increased bloating. +belching -flatus -nausea -bm. NPO. Dove removed yesterday- with good UOP since removal.     Medications:  Continuous Infusions:   dextrose 5 % and 0.45 % NaCl 100 mL/hr at 02/17/18 1214     Scheduled Meds:   atorvastatin  40 mg Oral QHS    bisacodyl  10 mg Rectal Once    Cialis (tadalafil) 5 mg - half tablet  5 mg Oral Daily    fluticasone  1 spray Each Nare BID    pantoprazole  40 mg Oral BID     PRN Meds:sodium chloride, dextrose 50%, dextrose 50%, glucagon (human recombinant), glucose, glucose, ondansetron, oxyCODONE-acetaminophen, oxyCODONE-acetaminophen, promethazine (PHENERGAN) IVPB, sodium chloride 0.9%, sodium chloride 0.9%     Review of patient's allergies indicates:    Allergen Reactions    Augmentin [amoxicillin-pot clavulanate] Shortness Of Breath    Horse/equine containing products     Singulair [montelukast]      Objective:     Vital Signs (Most Recent):  Temp: 96.1 °F (35.6 °C) (02/19/18 0437)  Pulse: 65 (02/19/18 0437)  Resp: 16 (02/19/18 0437)  BP: 128/69 (02/19/18 0437)  SpO2: 95 % (02/19/18 0437) Vital Signs (24h Range):  Temp:  [96.1 °F (35.6 °C)-97.2 °F (36.2 °C)] 96.1 °F (35.6 °C)  Pulse:  [61-65] 65  Resp:  [16-18] 16  SpO2:  [95 %-99 %] 95 %  BP: (128-174)/(68-82) 128/69     Weight: 73 kg (161 lb)  Body mass index is 22.45 kg/m².    Intake/Output - Last 3 Shifts       02/17 0700 - 02/18 0659 02/18 0700 - 02/19 0659 02/19 0700 - 02/20 0659    P.O. 240      I.V. (mL/kg) 2176 (29.8)      Total Intake(mL/kg) 2416 (33.1)      Urine (mL/kg/hr) 2500 (1.4)      Stool 0 (0)      Total Output 2500        Net -84               Stool Occurrence 0 x            Physical Exam   Constitutional: He is oriented to person, place, and time. He appears well-developed and well-nourished. No distress.   HENT:   Head: Normocephalic and atraumatic.   Eyes: Conjunctivae and EOM are normal. No scleral icterus.   Neck: Normal range of motion.   Cardiovascular: Normal rate and regular rhythm.    Pulmonary/Chest: Effort normal. No respiratory distress.   Abdominal: Soft. He exhibits distension (mild with tympany ). There is tenderness.   Incision intact without erythema, drainage or bleeding   Musculoskeletal: Normal range of motion. He exhibits no edema.   Neurological: He is alert and oriented to person, place, and time.   Nursing note and vitals reviewed.      Significant Labs:  Awaiting today's lab values-will follow up    Significant Diagnostics:  I have reviewed and interpreted all pertinent imaging results/findings within the past 24 hours.       Assessment/Plan:     Mass of small intestine    73 yo male with mass in distal small bowel s/p Small bowel resection/mass resection on  2/16/18. Appear to have ileus.     Daily labs and lyte repletion- no labs back yet will follow up  NPO with sips and ice chips. Limit intake due to ileus  mIVF   KUB today  PRN pain medications   Path pending-will follow up   Strict I/Os  Good UOP with baldwin removed  Cialis (home med) for urinary retention  OOBTC today, encourage ambulation  Encourage IS.                Haylie Garnica MD  General Surgery  [facility]

## 2018-02-20 LAB
ALBUMIN SERPL BCP-MCNC: 2.4 G/DL
ALP SERPL-CCNC: 62 U/L
ALT SERPL W/O P-5'-P-CCNC: 20 U/L
ANION GAP SERPL CALC-SCNC: 5 MMOL/L
AST SERPL-CCNC: 26 U/L
BASOPHILS # BLD AUTO: 0.04 K/UL
BASOPHILS NFR BLD: 0.5 %
BILIRUB SERPL-MCNC: 0.5 MG/DL
BUN SERPL-MCNC: 9 MG/DL
CALCIUM SERPL-MCNC: 8.4 MG/DL
CHLORIDE SERPL-SCNC: 106 MMOL/L
CO2 SERPL-SCNC: 29 MMOL/L
CREAT SERPL-MCNC: 0.8 MG/DL
DIFFERENTIAL METHOD: ABNORMAL
EOSINOPHIL # BLD AUTO: 0.2 K/UL
EOSINOPHIL NFR BLD: 2.4 %
ERYTHROCYTE [DISTWIDTH] IN BLOOD BY AUTOMATED COUNT: 14.9 %
EST. GFR  (AFRICAN AMERICAN): >60 ML/MIN/1.73 M^2
EST. GFR  (NON AFRICAN AMERICAN): >60 ML/MIN/1.73 M^2
GLUCOSE SERPL-MCNC: 120 MG/DL
HCT VFR BLD AUTO: 26.6 %
HGB BLD-MCNC: 8.3 G/DL
IMM GRANULOCYTES # BLD AUTO: 0.03 K/UL
IMM GRANULOCYTES NFR BLD AUTO: 0.4 %
LYMPHOCYTES # BLD AUTO: 0.5 K/UL
LYMPHOCYTES NFR BLD: 6.3 %
MAGNESIUM SERPL-MCNC: 1.8 MG/DL
MCH RBC QN AUTO: 26.3 PG
MCHC RBC AUTO-ENTMCNC: 31.2 G/DL
MCV RBC AUTO: 84 FL
MONOCYTES # BLD AUTO: 0.8 K/UL
MONOCYTES NFR BLD: 9.6 %
NEUTROPHILS # BLD AUTO: 6.4 K/UL
NEUTROPHILS NFR BLD: 80.8 %
NRBC BLD-RTO: 0 /100 WBC
PHOSPHATE SERPL-MCNC: 3.7 MG/DL
PLATELET # BLD AUTO: 409 K/UL
PMV BLD AUTO: 9.2 FL
POTASSIUM SERPL-SCNC: 4.2 MMOL/L
PROT SERPL-MCNC: 5.5 G/DL
RBC # BLD AUTO: 3.15 M/UL
SODIUM SERPL-SCNC: 140 MMOL/L
WBC # BLD AUTO: 7.93 K/UL

## 2018-02-20 PROCEDURE — 36415 COLL VENOUS BLD VENIPUNCTURE: CPT

## 2018-02-20 PROCEDURE — 25000003 PHARM REV CODE 250: Performed by: STUDENT IN AN ORGANIZED HEALTH CARE EDUCATION/TRAINING PROGRAM

## 2018-02-20 PROCEDURE — 25000003 PHARM REV CODE 250: Performed by: SURGERY

## 2018-02-20 PROCEDURE — 63600175 PHARM REV CODE 636 W HCPCS: Performed by: SURGERY

## 2018-02-20 PROCEDURE — S5010 5% DEXTROSE AND 0.45% SALINE: HCPCS | Performed by: SURGERY

## 2018-02-20 PROCEDURE — 80053 COMPREHEN METABOLIC PANEL: CPT

## 2018-02-20 PROCEDURE — 97116 GAIT TRAINING THERAPY: CPT

## 2018-02-20 PROCEDURE — 97803 MED NUTRITION INDIV SUBSEQ: CPT

## 2018-02-20 PROCEDURE — 11000001 HC ACUTE MED/SURG PRIVATE ROOM

## 2018-02-20 PROCEDURE — 25000003 PHARM REV CODE 250: Performed by: NURSE PRACTITIONER

## 2018-02-20 PROCEDURE — 83735 ASSAY OF MAGNESIUM: CPT

## 2018-02-20 PROCEDURE — 63600175 PHARM REV CODE 636 W HCPCS: Performed by: NURSE PRACTITIONER

## 2018-02-20 PROCEDURE — 84100 ASSAY OF PHOSPHORUS: CPT

## 2018-02-20 PROCEDURE — 85025 COMPLETE CBC W/AUTO DIFF WBC: CPT

## 2018-02-20 PROCEDURE — 25000242 PHARM REV CODE 250 ALT 637 W/ HCPCS: Performed by: NURSE PRACTITIONER

## 2018-02-20 RX ORDER — PSEUDOEPHEDRINE/ACETAMINOPHEN 30MG-500MG
100 TABLET ORAL
Status: COMPLETED | OUTPATIENT
Start: 2018-02-20 | End: 2018-02-20

## 2018-02-20 RX ORDER — DOCUSATE SODIUM 100 MG/1
100 CAPSULE, LIQUID FILLED ORAL 2 TIMES DAILY
Status: DISCONTINUED | OUTPATIENT
Start: 2018-02-20 | End: 2018-03-04 | Stop reason: HOSPADM

## 2018-02-20 RX ORDER — KETOROLAC TROMETHAMINE 30 MG/ML
15 INJECTION, SOLUTION INTRAMUSCULAR; INTRAVENOUS EVERY 8 HOURS
Status: COMPLETED | OUTPATIENT
Start: 2018-02-20 | End: 2018-02-21

## 2018-02-20 RX ORDER — SYRING-NEEDL,DISP,INSUL,0.3 ML 29 G X1/2"
296 SYRINGE, EMPTY DISPOSABLE MISCELLANEOUS
Status: COMPLETED | OUTPATIENT
Start: 2018-02-20 | End: 2018-02-20

## 2018-02-20 RX ORDER — ACETAMINOPHEN 10 MG/ML
1000 INJECTION, SOLUTION INTRAVENOUS EVERY 8 HOURS
Status: COMPLETED | OUTPATIENT
Start: 2018-02-20 | End: 2018-02-21

## 2018-02-20 RX ORDER — MAGNESIUM SULFATE HEPTAHYDRATE 40 MG/ML
2 INJECTION, SOLUTION INTRAVENOUS ONCE
Status: COMPLETED | OUTPATIENT
Start: 2018-02-20 | End: 2018-02-20

## 2018-02-20 RX ADMIN — FLUTICASONE PROPIONATE 50 MCG: 50 SPRAY, METERED NASAL at 11:02

## 2018-02-20 RX ADMIN — MAGNESIUM SULFATE IN WATER 2 G: 40 INJECTION, SOLUTION INTRAVENOUS at 11:02

## 2018-02-20 RX ADMIN — OXYCODONE HYDROCHLORIDE AND ACETAMINOPHEN 1 TABLET: 5; 325 TABLET ORAL at 07:02

## 2018-02-20 RX ADMIN — SODIUM CHLORIDE 500 ML: 9 INJECTION, SOLUTION INTRAVENOUS at 09:02

## 2018-02-20 RX ADMIN — FLUTICASONE PROPIONATE 50 MCG: 50 SPRAY, METERED NASAL at 09:02

## 2018-02-20 RX ADMIN — MAGNESIUM CITRATE 296 ML: 1.75 LIQUID ORAL at 09:02

## 2018-02-20 RX ADMIN — ONDANSETRON 8 MG: 2 INJECTION INTRAMUSCULAR; INTRAVENOUS at 11:02

## 2018-02-20 RX ADMIN — DEXTROSE AND SODIUM CHLORIDE: 5; .45 INJECTION, SOLUTION INTRAVENOUS at 05:02

## 2018-02-20 RX ADMIN — KETOROLAC TROMETHAMINE 15 MG: 30 INJECTION, SOLUTION INTRAMUSCULAR at 10:02

## 2018-02-20 RX ADMIN — KETOROLAC TROMETHAMINE 15 MG: 30 INJECTION, SOLUTION INTRAMUSCULAR at 01:02

## 2018-02-20 RX ADMIN — OXYCODONE HYDROCHLORIDE AND ACETAMINOPHEN 1 TABLET: 10; 325 TABLET ORAL at 12:02

## 2018-02-20 RX ADMIN — Medication 100 ML: at 09:02

## 2018-02-20 RX ADMIN — ACETAMINOPHEN 1000 MG: 10 INJECTION, SOLUTION INTRAVENOUS at 01:02

## 2018-02-20 RX ADMIN — ACETAMINOPHEN 1000 MG: 10 INJECTION, SOLUTION INTRAVENOUS at 10:02

## 2018-02-20 RX ADMIN — PANTOPRAZOLE SODIUM 40 MG: 40 TABLET, DELAYED RELEASE ORAL at 09:02

## 2018-02-20 RX ADMIN — DOCUSATE SODIUM 100 MG: 100 CAPSULE, LIQUID FILLED ORAL at 09:02

## 2018-02-20 RX ADMIN — ENOXAPARIN SODIUM 40 MG: 100 INJECTION SUBCUTANEOUS at 05:02

## 2018-02-20 RX ADMIN — DOCUSATE SODIUM 100 MG: 100 CAPSULE, LIQUID FILLED ORAL at 10:02

## 2018-02-20 RX ADMIN — ATORVASTATIN CALCIUM 40 MG: 20 TABLET, FILM COATED ORAL at 09:02

## 2018-02-20 NOTE — ASSESSMENT & PLAN NOTE
73 yo male with mass in distal small bowel s/p Small bowel resection/mass resection on 2/16/18. Ileus improving    Daily labs and lyte repletion; H/H stable; no leukocytosis  NPO with sips and ice chips. Limit intake due to ileus. If continues to improve possible CLD later today  mIVF   KUB today- Persistent gaseous distention of scattered loops of small and large bowel, similar when compared to the previous exam.  PRN pain medications   Path pending-will follow up   Strict I/Os  Cialis (home med) for urinary retention  OOBTC today, encourage ambulation  Encourage IS.  Stool softeners

## 2018-02-20 NOTE — SUBJECTIVE & OBJECTIVE
"Interval History: s/p Small bowel resection    No acute events overnight. HD stable. Ileus improving. Passed small amount of stool yesterday with flatus. +belching. Remains bloated but pain improved. No nausea or vomiting. NPO.     Medications:  Continuous Infusions:   dextrose 5 % and 0.45 % NaCl 100 mL/hr at 02/17/18 1214     Scheduled Meds:   atorvastatin  40 mg Oral QHS    Cialis (tadalafil) 5 mg - half tablet  5 mg Oral Daily    enoxaparin  40 mg Subcutaneous Daily    fluticasone  1 spray Each Nare BID    pantoprazole  40 mg Oral BID     PRN Meds:sodium chloride, dextrose 50%, dextrose 50%, glucagon (human recombinant), glucose, glucose, ondansetron, oxyCODONE-acetaminophen, oxyCODONE-acetaminophen, promethazine (PHENERGAN) IVPB, sodium chloride 0.9%, sodium chloride 0.9%     Review of patient's allergies indicates:   Allergen Reactions    Augmentin [amoxicillin-pot clavulanate] Shortness Of Breath    Singulair [montelukast]      Pt "felt strange"      Horse/equine containing products      Objective:     Vital Signs (Most Recent):  Temp: 96.5 °F (35.8 °C) (02/20/18 0407)  Pulse: 64 (02/20/18 0407)  Resp: 18 (02/20/18 0007)  BP: (!) 144/71 (02/20/18 0407)  SpO2: 96 % (02/20/18 0407) Vital Signs (24h Range):  Temp:  [96.2 °F (35.7 °C)-98.2 °F (36.8 °C)] 96.5 °F (35.8 °C)  Pulse:  [55-76] 64  Resp:  [16-18] 18  SpO2:  [93 %-98 %] 96 %  BP: ()/(51-82) 144/71     Weight: 73 kg (161 lb)  Body mass index is 22.45 kg/m².    Intake/Output - Last 3 Shifts       02/18 0700 - 02/19 0659 02/19 0700 - 02/20 0659 02/20 0700 - 02/21 0659    P.O.       I.V. (mL/kg)       Total Intake(mL/kg)       Urine (mL/kg/hr)       Stool       Total Output          Net                 Stool Occurrence   1 x          Physical Exam  Constitutional: He is oriented to person, place, and time. He appears well-developed and well-nourished. No distress.   HENT:   Head: Normocephalic and atraumatic.   Eyes: Conjunctivae and EOM are " normal. No scleral icterus.   Neck: Normal range of motion.   Cardiovascular: Normal rate and regular rhythm.    Pulmonary/Chest: Effort normal. No respiratory distress.   Abdominal: Soft. He exhibits distension (mild with tympany ). There is tenderness.   Incision intact without erythema, drainage or bleeding   Musculoskeletal: Normal range of motion. He exhibits no edema.   Neurological: He is alert and oriented to person, place, and time.   Nursing note and vitals reviewed.    Significant Labs:  CBC:   Recent Labs  Lab 02/20/18 0459   WBC 7.93   RBC 3.15*   HGB 8.3*   HCT 26.6*   *   MCV 84   MCH 26.3*   MCHC 31.2*     BMP:   Recent Labs  Lab 02/20/18 0459   *      K 4.2      CO2 29   BUN 9   CREATININE 0.8   CALCIUM 8.4*   MG 1.8     CMP:   Recent Labs  Lab 02/20/18 0459   *   CALCIUM 8.4*   ALBUMIN 2.4*   PROT 5.5*      K 4.2   CO2 29      BUN 9   CREATININE 0.8   ALKPHOS 62   ALT 20   AST 26   BILITOT 0.5     LFTs:   Recent Labs  Lab 02/20/18  0459   ALT 20   AST 26   ALKPHOS 62   BILITOT 0.5   PROT 5.5*   ALBUMIN 2.4*       Significant Diagnostics:  Technique: AP supine abdominal radiographs.    Comparison: Radiograph 02/19/2018.    Findings:    There is scattered gas within slightly distended loops of small and large bowel, similar when compared to the previous radiograph.  No definite intra-abdominal free air.  There is persistent left basilar subsegmental atelectasis.   Impression         Persistent gaseous distention of scattered loops of small and large bowel, similar when compared to the previous exam.

## 2018-02-20 NOTE — PLAN OF CARE
Problem: Nutrition, Imbalanced: Inadequate Oral Intake (Adult)  Intervention: Promote/Optimize Nutrition   02/20/18 1127   Nutrition Interventions   Oral Nutrition Promotion nutritional therapy counseling provided   Recommendations     Recommendation/Intervention: ADAT to Cl as tolerated. IF tolerated cont. to escalate diet to GI soft. Encourage PO intake > 75%.  If PO intake< 50% Provide Boost (breeze for CL Plus for more adv'ed diet). RD to follow  Goals: pt to consume nutrients >/= 85% EEN/EPN   Nutrition Goal Status: progressing towards goal  Communication of RD Recs: reviewed with RN     Assessment and Plan         * Gastrointestinal hemorrhage     Nutrition Problem  Inadequate oral intake     Related to (etiology):   GI hemorrhage 2/2 ileus     Signs and Symptoms (as evidenced by):   NPO Day+5     Interventions/Recommendations (treatment strategy):  See recs above          Nutrition Diagnosis Status:   New

## 2018-02-20 NOTE — PLAN OF CARE
Problem: Physical Therapy Goal  Goal: Physical Therapy Goal  Goals to be met by: 18     Patient will increase functional independence with mobility by performin. Supine to sit with Modified [place]  2. Sit to supine with Modified [place]  3. Sit to stand transfer with Stand-by Assistance-met  4. Bed to chair transfer with Stand-by Assistance using Rolling Walker  5. Gait  x 50 feet with Stand-by Assistance using Rolling Walker.   6. Lower extremity exercise program x20 reps per handout, with independence      Outcome: Ongoing (interventions implemented as appropriate)  Goals remain appropriate

## 2018-02-20 NOTE — PROGRESS NOTES
[facility]  General Surgery  Progress Note    Subjective:     History of Present Illness:  This is a 71 y/o male with hx CAD and HTN, obscure overt GI bleeding who [facility] CT imaging.  Pt was admitted 2/5 with melena. Underwent  colonoscopy, negative for bleeding with old blood seen in TI. No EGD at OSH.  CT abd showed ileal mass, approx 5 cm. Surgery recommend operative intervention at OSH, however patient requested transfer for 2nd opinion.  Patient has had a similar episode in the past year, at which time he also had EGD, colonscopy and capsule enterography which were negative, bleeding at that time thought due to NSAID use which was discontinued and he did well until about a week ago. When he began to have blood stools again. He has some fullness and early satiety, but has not altered his eating habits, and normal bowel habits, 1-3 movement a day, no change in consistency. No abdominal pain. Only prior abdominal surgery is an appendectomy as a child.     Post-Op Info:  Procedure(s) (LRB):  RESECTION-SMALL BOWEL (N/A)   5 Days Post-Op     Interval History: s/p Small bowel resection    No acute events overnight. HD stable. Ileus improving. Passed small amount of stool yesterday with flatus. +belching. Remains bloated but pain improved. No nausea or vomiting. NPO.     Medications:  Continuous Infusions:   dextrose 5 % and 0.45 % NaCl 100 mL/hr at 02/17/18 1214     Scheduled Meds:   atorvastatin  40 mg Oral QHS    Cialis (tadalafil) 5 mg - half tablet  5 mg Oral Daily    enoxaparin  40 mg Subcutaneous Daily    fluticasone  1 spray Each Nare BID    pantoprazole  40 mg Oral BID     PRN Meds:sodium chloride, dextrose 50%, dextrose 50%, glucagon (human recombinant), glucose, glucose, ondansetron, oxyCODONE-acetaminophen, oxyCODONE-acetaminophen, promethazine (PHENERGAN) IVPB, sodium chloride 0.9%, sodium chloride 0.9%     Review of  "patient's allergies indicates:   Allergen Reactions    Augmentin [amoxicillin-pot clavulanate] Shortness Of Breath    Singulair [montelukast]      Pt "felt strange"      Horse/equine containing products      Objective:     Vital Signs (Most Recent):  Temp: 96.5 °F (35.8 °C) (02/20/18 0407)  Pulse: 64 (02/20/18 0407)  Resp: 18 (02/20/18 0007)  BP: (!) 144/71 (02/20/18 0407)  SpO2: 96 % (02/20/18 0407) Vital Signs (24h Range):  Temp:  [96.2 °F (35.7 °C)-98.2 °F (36.8 °C)] 96.5 °F (35.8 °C)  Pulse:  [55-76] 64  Resp:  [16-18] 18  SpO2:  [93 %-98 %] 96 %  BP: ()/(51-82) 144/71     Weight: 73 kg (161 lb)  Body mass index is 22.45 kg/m².    Intake/Output - Last 3 Shifts       02/18 0700 - 02/19 0659 02/19 0700 - 02/20 0659 02/20 0700 - 02/21 0659    P.O.       I.V. (mL/kg)       Total Intake(mL/kg)       Urine (mL/kg/hr)       Stool       Total Output          Net                 Stool Occurrence   1 x          Physical Exam  Constitutional: He is oriented to person, place, and time. He appears well-developed and well-nourished. No distress.   HENT:   Head: Normocephalic and atraumatic.   Eyes: Conjunctivae and EOM are normal. No scleral icterus.   Neck: Normal range of motion.   Cardiovascular: Normal rate and regular rhythm.    Pulmonary/Chest: Effort normal. No respiratory distress.   Abdominal: Soft. He exhibits distension (mild with tympany ). There is tenderness.   Incision intact without erythema, drainage or bleeding   Musculoskeletal: Normal range of motion. He exhibits no edema.   Neurological: He is alert and oriented to person, place, and time.   Nursing note and vitals reviewed.    Significant Labs:  CBC:   Recent Labs  Lab 02/20/18  0459   WBC 7.93   RBC 3.15*   HGB 8.3*   HCT 26.6*   *   MCV 84   MCH 26.3*   MCHC 31.2*     BMP:   Recent Labs  Lab 02/20/18  0459   *      K 4.2      CO2 29   BUN 9   CREATININE 0.8   CALCIUM 8.4*   MG 1.8     CMP:   Recent Labs  Lab " 02/20/18  0459   *   CALCIUM 8.4*   ALBUMIN 2.4*   PROT 5.5*      K 4.2   CO2 29      BUN 9   CREATININE 0.8   ALKPHOS 62   ALT 20   AST 26   BILITOT 0.5     LFTs:   Recent Labs  Lab 02/20/18  0459   ALT 20   AST 26   ALKPHOS 62   BILITOT 0.5   PROT 5.5*   ALBUMIN 2.4*       Significant Diagnostics:  Technique: AP supine abdominal radiographs.    Comparison: Radiograph 02/19/2018.    Findings:    There is scattered gas within slightly distended loops of small and large bowel, similar when compared to the previous radiograph.  No definite intra-abdominal free air.  There is persistent left basilar subsegmental atelectasis.   Impression         Persistent gaseous distention of scattered loops of small and large bowel, similar when compared to the previous exam.             Assessment/Plan:     Mass of small intestine    73 yo male with mass in distal small bowel s/p Small bowel resection/mass resection on 2/16/18. Ileus improving    Daily labs and lyte repletion; H/H stable; no leukocytosis  NPO with sips and ice chips. Limit intake due to ileus. If continues to improve possible CLD later today  mIVF   KUB today- Persistent gaseous distention of scattered loops of small and large bowel, similar when compared to the previous exam.  PRN pain medications   Path pending-will follow up   Strict I/Os  Cialis (home med) for urinary retention  OOBTC today, encourage ambulation  Encourage IS.  Stool softeners               Haylie Garnica MD  General Surgery  [facility]

## 2018-02-20 NOTE — NURSING
Saline/mag citrate enema given as ordered. Large amt of formed stool and soft stool returned. Tolerated well

## 2018-02-20 NOTE — PT/OT/SLP PROGRESS
Physical Therapy Treatment    Patient Name:  Forrest Schmidt   MRN:  [id number]    Recommendations:     Discharge Recommendations:  nursing facility, skilled, home with home health   Discharge Equipment Recommendations: walker, rolling   Barriers to discharge: Decreased caregiver support    Assessment:     Forrest Schmidt is a 72 y.o. male admitted with a medical diagnosis of Gastrointestinal hemorrhage.  He presents with the following impairments/functional limitations:  weakness, impaired endurance, impaired functional mobilty, gait instability, impaired balance, impaired cardiopulmonary response to activity, pain requiring CGA for bed mobility and SBA for transfers and ambulation using Rw. Pt was limited in session today due to not having pain medication since early this morning, being fatigued from having enema earlier today, and c/o dizziness with standing. Pt seems to fluctuate in activity tolerance and may need to be considered for SS SNF placement upon discharge due to spouse's inability to provide physical assistance.    Rehab Prognosis:  good; patient would benefit from acute skilled PT services to address these deficits and reach maximum level of function.      Recent Surgery: Procedure(s) (LRB):  RESECTION-SMALL BOWEL (N/A) 5 Days Post-Op    Plan:     During this hospitalization, patient to be seen 4 x/week to address the above listed problems via gait training, therapeutic activities, therapeutic exercises, neuromuscular re-education  · Plan of Care Expires:  03/18/18   Plan of Care Reviewed with: patient, spouse    Subjective     Communicated with pt and nurse prior to session.  Patient found supine in bed with spouse present upon PT entry to room, agreeable to treatment.      Chief Complaint: pain in abdomen with activity, fatigue, and dizziness with standing  Patient comments/goals: to improve mobility to return home  Pain/Comfort:  · Pain Rating 1: 0/10  · Location - Orientation 1: upper  · Location 1:  abdomen  · Pain Addressed 1: Reposition, Distraction, Cessation of Activity  · Pain Rating Post-Intervention 1: 5/10    Patients cultural, spiritual, Orthodox conflicts given the current situation: none    Objective:     Patient found with: peripheral IV     General Precautions: Standard, fall   Orthopedic Precautions:N/A   Braces: N/A     Functional Mobility:  · Bed Mobility:     · Rolling Right: contact guard assistance  · Supine to Sit: contact guard assistance   PT provided verbal and tactile instruction for proper technique with use of side rails    · Transfers:     · Sit to Stand:  stand by assistance with rolling walker    · Gait: 24 feet within room using RW with close SBA for safety with pt demonstrating slow shell with decreased step length and foot clearance    · Balance: pt demonstrates good sitting balance but requires BUE support in standing due to increased sway with c/o dizziness      AM-PAC 6 CLICK MOBILITY  Turning over in bed (including adjusting bedclothes, sheets and blankets)?: 3  Sitting down on and standing up from a chair with arms (e.g., wheelchair, bedside commode, etc.): 3  Moving from lying on back to sitting on the side of the bed?: 3  Moving to and from a bed to a chair (including a wheelchair)?: 3  Need to walk in hospital room?: 3  Climbing 3-5 steps with a railing?: 2  Total Score: 17       Therapeutic Activities and Exercises:   PT provided pt education on:   -role of PT and POC   -importance of OOB activity   -LE exercises to perform independently   -call for assistance for transfers    -importance of participation in therapy      Patient left up in chair with call button in reach and spouse present..    GOALS:    Physical Therapy Goals        Problem: Physical Therapy Goal    Goal Priority Disciplines Outcome Goal Variances Interventions   Physical Therapy Goal     PT/OT, PT Ongoing (interventions implemented as appropriate)     Description:  Goals to be met by: 2/23/18      Patient will increase functional independence with mobility by performin. Supine to sit with Modified [place]  2. Sit to supine with Modified [place]  3. Sit to stand transfer with Stand-by Assistance-met  4. Bed to chair transfer with Stand-by Assistance using Rolling Walker  5. Gait  x 50 feet with Stand-by Assistance using Rolling Walker.   6. Lower extremity exercise program x20 reps per handout, with independence                       Time Tracking:     PT Received On: 18  PT Start Time: 1410     PT Stop Time: 1428  PT Total Time (min): 18 min     Billable Minutes: Gait Training 18    Treatment Type: Treatment  PT/PTA: PT     PTA Visit Number: 0     Radha Thompson, PT  2018

## 2018-02-20 NOTE — PROGRESS NOTES
Patient Consulted by CTTS:     The following was discussed by the Tobacco Treatment Specialist:  Patient stated that he quit tobacco usage about 40 years ago.

## 2018-02-20 NOTE — ASSESSMENT & PLAN NOTE
Nutrition Problem  Inadequate oral intake    Related to (etiology):   GI hemorrhage 2/2 ileus    Signs and Symptoms (as evidenced by):   NPO Day+5    Interventions/Recommendations (treatment strategy):  See recs above     Nutrition Diagnosis Status:   New

## 2018-02-21 LAB
ALBUMIN SERPL BCP-MCNC: 2.3 G/DL
ALP SERPL-CCNC: 65 U/L
ALT SERPL W/O P-5'-P-CCNC: 21 U/L
ANION GAP SERPL CALC-SCNC: 9 MMOL/L
AST SERPL-CCNC: 23 U/L
BASOPHILS # BLD AUTO: 0.02 K/UL
BASOPHILS NFR BLD: 0.2 %
BILIRUB SERPL-MCNC: 0.5 MG/DL
BUN SERPL-MCNC: 11 MG/DL
CALCIUM SERPL-MCNC: 8 MG/DL
CHLORIDE SERPL-SCNC: 106 MMOL/L
CO2 SERPL-SCNC: 24 MMOL/L
CREAT SERPL-MCNC: 0.8 MG/DL
DIFFERENTIAL METHOD: ABNORMAL
EOSINOPHIL # BLD AUTO: 0.2 K/UL
EOSINOPHIL NFR BLD: 2.6 %
ERYTHROCYTE [DISTWIDTH] IN BLOOD BY AUTOMATED COUNT: 15 %
EST. GFR  (AFRICAN AMERICAN): >60 ML/MIN/1.73 M^2
EST. GFR  (NON AFRICAN AMERICAN): >60 ML/MIN/1.73 M^2
GLUCOSE SERPL-MCNC: 117 MG/DL
HCT VFR BLD AUTO: 27.1 %
HGB BLD-MCNC: 8.5 G/DL
IMM GRANULOCYTES # BLD AUTO: 0.02 K/UL
IMM GRANULOCYTES NFR BLD AUTO: 0.2 %
LYMPHOCYTES # BLD AUTO: 0.4 K/UL
LYMPHOCYTES NFR BLD: 5 %
MAGNESIUM SERPL-MCNC: 1.9 MG/DL
MCH RBC QN AUTO: 26.1 PG
MCHC RBC AUTO-ENTMCNC: 31.4 G/DL
MCV RBC AUTO: 83 FL
MONOCYTES # BLD AUTO: 0.7 K/UL
MONOCYTES NFR BLD: 8.7 %
NEUTROPHILS # BLD AUTO: 7.1 K/UL
NEUTROPHILS NFR BLD: 83.3 %
NRBC BLD-RTO: 0 /100 WBC
PHOSPHATE SERPL-MCNC: 3.2 MG/DL
PLATELET # BLD AUTO: 408 K/UL
PMV BLD AUTO: 8.9 FL
POTASSIUM SERPL-SCNC: 3.6 MMOL/L
PROT SERPL-MCNC: 5.4 G/DL
RBC # BLD AUTO: 3.26 M/UL
SODIUM SERPL-SCNC: 139 MMOL/L
WBC # BLD AUTO: 8.54 K/UL

## 2018-02-21 PROCEDURE — 25000003 PHARM REV CODE 250: Performed by: STUDENT IN AN ORGANIZED HEALTH CARE EDUCATION/TRAINING PROGRAM

## 2018-02-21 PROCEDURE — 84100 ASSAY OF PHOSPHORUS: CPT

## 2018-02-21 PROCEDURE — 80053 COMPREHEN METABOLIC PANEL: CPT

## 2018-02-21 PROCEDURE — 63600175 PHARM REV CODE 636 W HCPCS: Performed by: NURSE PRACTITIONER

## 2018-02-21 PROCEDURE — 36415 COLL VENOUS BLD VENIPUNCTURE: CPT

## 2018-02-21 PROCEDURE — 97110 THERAPEUTIC EXERCISES: CPT

## 2018-02-21 PROCEDURE — 25000003 PHARM REV CODE 250: Performed by: NURSE PRACTITIONER

## 2018-02-21 PROCEDURE — 85025 COMPLETE CBC W/AUTO DIFF WBC: CPT

## 2018-02-21 PROCEDURE — 97116 GAIT TRAINING THERAPY: CPT

## 2018-02-21 PROCEDURE — 11000001 HC ACUTE MED/SURG PRIVATE ROOM

## 2018-02-21 PROCEDURE — 83735 ASSAY OF MAGNESIUM: CPT

## 2018-02-21 RX ORDER — KETOROLAC TROMETHAMINE 30 MG/ML
30 INJECTION, SOLUTION INTRAMUSCULAR; INTRAVENOUS EVERY 8 HOURS
Status: COMPLETED | OUTPATIENT
Start: 2018-02-21 | End: 2018-02-21

## 2018-02-21 RX ORDER — MAGNESIUM SULFATE HEPTAHYDRATE 40 MG/ML
2 INJECTION, SOLUTION INTRAVENOUS ONCE
Status: COMPLETED | OUTPATIENT
Start: 2018-02-21 | End: 2018-02-21

## 2018-02-21 RX ORDER — POTASSIUM CHLORIDE 750 MG/1
40 CAPSULE, EXTENDED RELEASE ORAL ONCE
Status: COMPLETED | OUTPATIENT
Start: 2018-02-21 | End: 2018-02-21

## 2018-02-21 RX ADMIN — KETOROLAC TROMETHAMINE 15 MG: 30 INJECTION, SOLUTION INTRAMUSCULAR at 05:02

## 2018-02-21 RX ADMIN — FLUTICASONE PROPIONATE 50 MCG: 50 SPRAY, METERED NASAL at 09:02

## 2018-02-21 RX ADMIN — ENOXAPARIN SODIUM 40 MG: 100 INJECTION SUBCUTANEOUS at 05:02

## 2018-02-21 RX ADMIN — MAGNESIUM SULFATE HEPTAHYDRATE 2 G: 40 INJECTION, SOLUTION INTRAVENOUS at 12:02

## 2018-02-21 RX ADMIN — PANTOPRAZOLE SODIUM 40 MG: 40 TABLET, DELAYED RELEASE ORAL at 11:02

## 2018-02-21 RX ADMIN — KETOROLAC TROMETHAMINE 30 MG: 30 INJECTION, SOLUTION INTRAMUSCULAR at 09:02

## 2018-02-21 RX ADMIN — PANTOPRAZOLE SODIUM 40 MG: 40 TABLET, DELAYED RELEASE ORAL at 09:02

## 2018-02-21 RX ADMIN — KETOROLAC TROMETHAMINE 30 MG: 30 INJECTION, SOLUTION INTRAMUSCULAR at 02:02

## 2018-02-21 RX ADMIN — DOCUSATE SODIUM 100 MG: 100 CAPSULE, LIQUID FILLED ORAL at 08:02

## 2018-02-21 RX ADMIN — POTASSIUM CHLORIDE 40 MEQ: 750 CAPSULE, EXTENDED RELEASE ORAL at 11:02

## 2018-02-21 RX ADMIN — ACETAMINOPHEN 1000 MG: 10 INJECTION, SOLUTION INTRAVENOUS at 05:02

## 2018-02-21 RX ADMIN — FLUTICASONE PROPIONATE 50 MCG: 50 SPRAY, METERED NASAL at 08:02

## 2018-02-21 RX ADMIN — DOCUSATE SODIUM 100 MG: 100 CAPSULE, LIQUID FILLED ORAL at 09:02

## 2018-02-21 RX ADMIN — ATORVASTATIN CALCIUM 40 MG: 20 TABLET, FILM COATED ORAL at 09:02

## 2018-02-21 NOTE — SUBJECTIVE & OBJECTIVE
"Interval History: s/p Small bowel resection    Had small bowel movements yesterday and is passing flatus. Bloating improved. No nausea or vomiting. NPO.     Medications:  Continuous Infusions:   dextrose 5 % and 0.45 % NaCl 100 mL/hr at 02/20/18 1758     Scheduled Meds:   atorvastatin  40 mg Oral QHS    Cialis (tadalafil) 5 mg - half tablet  5 mg Oral Daily    docusate sodium  100 mg Oral BID    enoxaparin  40 mg Subcutaneous Daily    fluticasone  1 spray Each Nare BID    pantoprazole  40 mg Oral BID     PRN Meds:sodium chloride, dextrose 50%, dextrose 50%, glucagon (human recombinant), glucose, glucose, ondansetron, oxyCODONE-acetaminophen, oxyCODONE-acetaminophen, promethazine (PHENERGAN) IVPB, sodium chloride 0.9%, sodium chloride 0.9%     Review of patient's allergies indicates:   Allergen Reactions    Augmentin [amoxicillin-pot clavulanate] Shortness Of Breath    Singulair [montelukast]      Pt "felt strange"      Horse/equine containing products      Objective:     Vital Signs (Most Recent):  Temp: 96.6 °F (35.9 °C) (02/21/18 0715)  Pulse: 64 (02/21/18 0715)  Resp: 15 (02/21/18 0715)  BP: (!) 142/67 (02/21/18 0715)  SpO2: 96 % (02/21/18 0715) Vital Signs (24h Range):  Temp:  [95.6 °F (35.3 °C)-97.3 °F (36.3 °C)] 96.6 °F (35.9 °C)  Pulse:  [56-68] 64  Resp:  [15-20] 15  SpO2:  [96 %-97 %] 96 %  BP: (114-161)/(56-74) 142/67     Weight: 73 kg (160 lb 15 oz)  Body mass index is 22.46 kg/m².    Intake/Output - Last 3 Shifts       02/19 0700 - 02/20 0659 02/20 0700 - 02/21 0659 02/21 0700 - 02/22 0659    I.V. (mL/kg)  1200 (16.4)     IV Piggyback  200     Total Intake(mL/kg)  1400 (19.2)     Urine (mL/kg/hr)  300 (0.2)     Total Output   300      Net   +1100             Urine Occurrence  2 x     Stool Occurrence  1 x     Emesis Occurrence  0 x           Physical Exam    Constitutional: He is oriented to person, place, and time. He appears well-developed and well-nourished. No distress.   HENT:   Head: " Normocephalic and atraumatic.   Eyes: Conjunctivae and EOM are normal. No scleral icterus.   Neck: Normal range of motion.   Cardiovascular: Normal rate and regular rhythm.    Pulmonary/Chest: Effort normal. No respiratory distress.   Abdominal: Soft. Non-tender. He exhibits distension improved (mild with tympany).   Incision intact without erythema, drainage or bleeding   Musculoskeletal: Normal range of motion. He exhibits no edema.   Neurological: He is alert and oriented to person, place, and time.   Nursing note and vitals reviewed.    Significant Labs:  CBC:     Recent Labs  Lab 02/21/18  0516   WBC 8.54   RBC 3.26*   HGB 8.5*   HCT 27.1*   *   MCV 83   MCH 26.1*   MCHC 31.4*     BMP:     Recent Labs  Lab 02/21/18  0516   *      K 3.6      CO2 24   BUN 11   CREATININE 0.8   CALCIUM 8.0*   MG 1.9     CMP:     Recent Labs  Lab 02/21/18  0516   *   CALCIUM 8.0*   ALBUMIN 2.3*   PROT 5.4*      K 3.6   CO2 24      BUN 11   CREATININE 0.8   ALKPHOS 65   ALT 21   AST 23   BILITOT 0.5     LFTs:     Recent Labs  Lab 02/21/18  0516   ALT 21   AST 23   ALKPHOS 65   BILITOT 0.5   PROT 5.4*   ALBUMIN 2.3*       Significant Diagnostics:  Technique: AP supine abdominal radiographs.    Comparison: Radiograph 02/19/2018.    Findings:    There is scattered gas within slightly distended loops of small and large bowel, similar when compared to the previous radiograph.  No definite intra-abdominal free air.  There is persistent left basilar subsegmental atelectasis.   Impression       Persistent gaseous distention of scattered loops of small and large bowel, similar when compared to the previous exam.

## 2018-02-21 NOTE — PROGRESS NOTES
[facility]  General Surgery  Progress Note    Subjective:     History of Present Illness:  This is a 73 y/o male with hx CAD and HTN, obscure overt GI bleeding who [facility].  Pt was admitted 2/5 with melena. Underwent  colonoscopy, negative for bleeding with old blood seen in TI. No EGD at OSH.  CT abd showed ileal mass, approx 5 cm. Surgery recommend operative intervention at OSH, however patient requested transfer for 2nd opinion.  Patient has had a similar episode in the past year, at which time he also had EGD, colonscopy and capsule enterography which were negative, bleeding at that time thought due to NSAID use which was discontinued and he did well until about a week ago. When he began to have blood stools again. He has some fullness and early satiety, but has not altered his eating habits, and normal bowel habits, 1-3 movement a day, no change in consistency. No abdominal pain. Only prior abdominal surgery is an appendectomy as a child.     Post-Op Info:  Procedure(s) (LRB):  RESECTION-SMALL BOWEL (N/A)   6 Days Post-Op     Interval History: s/p Small bowel resection    Had small bowel movements yesterday and is passing flatus. Bloating improved. No nausea or vomiting. NPO.     Medications:  Continuous Infusions:   dextrose 5 % and 0.45 % NaCl 100 mL/hr at 02/20/18 1758     Scheduled Meds:   atorvastatin  40 mg Oral QHS    Cialis (tadalafil) 5 mg - half tablet  5 mg Oral Daily    docusate sodium  100 mg Oral BID    enoxaparin  40 mg Subcutaneous Daily    fluticasone  1 spray Each Nare BID    pantoprazole  40 mg Oral BID     PRN Meds:sodium chloride, dextrose 50%, dextrose 50%, glucagon (human recombinant), glucose, glucose, ondansetron, oxyCODONE-acetaminophen, oxyCODONE-acetaminophen, promethazine (PHENERGAN) IVPB, sodium chloride 0.9%, sodium chloride 0.9%     Review of patient's allergies indicates:   Allergen  "Reactions    Augmentin [amoxicillin-pot clavulanate] Shortness Of Breath    Singulair [montelukast]      Pt "felt strange"      Horse/equine containing products      Objective:     Vital Signs (Most Recent):  Temp: 96.6 °F (35.9 °C) (02/21/18 0715)  Pulse: 64 (02/21/18 0715)  Resp: 15 (02/21/18 0715)  BP: (!) 142/67 (02/21/18 0715)  SpO2: 96 % (02/21/18 0715) Vital Signs (24h Range):  Temp:  [95.6 °F (35.3 °C)-97.3 °F (36.3 °C)] 96.6 °F (35.9 °C)  Pulse:  [56-68] 64  Resp:  [15-20] 15  SpO2:  [96 %-97 %] 96 %  BP: (114-161)/(56-74) 142/67     Weight: 73 kg (160 lb 15 oz)  Body mass index is 22.46 kg/m².    Intake/Output - Last 3 Shifts       02/19 0700 - 02/20 0659 02/20 0700 - 02/21 0659 02/21 0700 - 02/22 0659    I.V. (mL/kg)  1200 (16.4)     IV Piggyback  200     Total Intake(mL/kg)  1400 (19.2)     Urine (mL/kg/hr)  300 (0.2)     Total Output   300      Net   +1100             Urine Occurrence  2 x     Stool Occurrence  1 x     Emesis Occurrence  0 x           Physical Exam    Constitutional: He is oriented to person, place, and time. He appears well-developed and well-nourished. No distress.   HENT:   Head: Normocephalic and atraumatic.   Eyes: Conjunctivae and EOM are normal. No scleral icterus.   Neck: Normal range of motion.   Cardiovascular: Normal rate and regular rhythm.    Pulmonary/Chest: Effort normal. No respiratory distress.   Abdominal: Soft. Non-tender. He exhibits distension improved (mild with tympany).   Incision intact without erythema, drainage or bleeding   Musculoskeletal: Normal range of motion. He exhibits no edema.   Neurological: He is alert and oriented to person, place, and time.   Nursing note and vitals reviewed.    Significant Labs:  CBC:     Recent Labs  Lab 02/21/18  0516   WBC 8.54   RBC 3.26*   HGB 8.5*   HCT 27.1*   *   MCV 83   MCH 26.1*   MCHC 31.4*     BMP:     Recent Labs  Lab 02/21/18  0516   *      K 3.6      CO2 24   BUN 11   CREATININE 0.8 "   CALCIUM 8.0*   MG 1.9     CMP:     Recent Labs  Lab 02/21/18  0516   *   CALCIUM 8.0*   ALBUMIN 2.3*   PROT 5.4*      K 3.6   CO2 24      BUN 11   CREATININE 0.8   ALKPHOS 65   ALT 21   AST 23   BILITOT 0.5     LFTs:     Recent Labs  Lab 02/21/18  0516   ALT 21   AST 23   ALKPHOS 65   BILITOT 0.5   PROT 5.4*   ALBUMIN 2.3*       Significant Diagnostics:  Technique: AP supine abdominal radiographs.    Comparison: Radiograph 02/19/2018.    Findings:    There is scattered gas within slightly distended loops of small and large bowel, similar when compared to the previous radiograph.  No definite intra-abdominal free air.  There is persistent left basilar subsegmental atelectasis.   Impression       Persistent gaseous distention of scattered loops of small and large bowel, similar when compared to the previous exam.             Assessment/Plan:     Mass of small intestine    71 yo male with mass in distal small bowel s/p Small bowel resection/mass resection on 2/16/18. Ileus improving    Daily labs and lyte repletion; H/H stable; no leukocytosis  Clear liquid diet today as tolerated.  mIVF   PRN pain medications   Path pending-will follow up   Strict I/Os  Cialis (home med) for urinary retention  OOBTC today, encourage ambulation  Encourage IS.  Stool softeners PRN              Haylie Garnica MD  General Surgery  [facility]

## 2018-02-21 NOTE — PLAN OF CARE
Problem: Patient Care Overview  Goal: Plan of Care Review  Outcome: Ongoing (interventions implemented as appropriate)  Pt continues with plan of care. Waiting on placement for SNF. No issues over night. Pain managed with iv medication. Pt free from falls. Incision dry intact. Will continue to monitor.

## 2018-02-21 NOTE — PLAN OF CARE
Problem: Physical Therapy Goal  Goal: Physical Therapy Goal  Goals to be met by: 18     Patient will increase functional independence with mobility by performin. Supine to sit with Modified [place]  2. Sit to supine with Modified [place]  3. Sit to stand transfer with Stand-by Assistance-met  4. Bed to chair transfer with Stand-by Assistance using Rolling Walker  5. Gait  x 50 feet with Stand-by Assistance using Rolling Walker. -met  6. Lower extremity exercise program x20 reps per handout, with independence      Pt progressing towards goals. continue with PT POC.Goals remain appropriate.  Ahmet Pearce PTA  2018

## 2018-02-21 NOTE — ASSESSMENT & PLAN NOTE
73 yo male with mass in distal small bowel s/p Small bowel resection/mass resection on 2/16/18. Ileus improving    Daily labs and lyte repletion; H/H stable; no leukocytosis  Clear liquid diet today as tolerated.  mIVF   PRN pain medications   Path pending-will follow up   Strict I/Os  Cialis (home med) for urinary retention  OOBTC today, encourage ambulation  Encourage IS.  Stool softeners PRN

## 2018-02-21 NOTE — PT/OT/SLP PROGRESS
Physical Therapy Treatment    Patient Name:  Forrest Schmidt   MRN:  [id number]    Recommendations:     Discharge Recommendations:  nursing facility, skilled, home with home health   Discharge Equipment Recommendations: walker, rolling   Barriers to discharge: Decreased caregiver support    Assessment:     Forrest Schmidt is a 72 y.o. male admitted with a medical diagnosis of Gastrointestinal hemorrhage.  He presents with the following impairments/functional limitations:  weakness, impaired endurance, impaired self care skills, gait instability, impaired functional mobilty, pain, impaired cardiopulmonary response to activity .pt without c/o of dizziness with mobility this date. Pt Progressing with PT Intervention. Pt Progressing with improving gait distance. Pt would continue to benefit from skilled PT to address overall functional mobility and goals. Goals remain appropriate.      Rehab Prognosis:  good; patient would benefit from acute skilled PT services to address these deficits and reach maximum level of function.      Recent Surgery: Procedure(s) (LRB):  RESECTION-SMALL BOWEL (N/A) 6 Days Post-Op    Plan:     During this hospitalization, patient to be seen 4 x/week to address the above listed problems via gait training, therapeutic activities, therapeutic exercises, neuromuscular re-education  · Plan of Care Expires:  03/18/18   Plan of Care Reviewed with: patient    Subjective     Communicated with RN prior to session.  Patient found seated upon PT entry to room, agreeable to treatment.      Chief Complaint: I am ready to walk  Pain/Comfort:  · Pain Rating 1: 0/10  · Location - Orientation 1: upper  · Location 1: abdomen  · Pain Addressed 1: Reposition, Cessation of Activity, Distraction  · Pain Rating Post-Intervention 1: 5/10    Patients cultural, spiritual, Pentecostal conflicts given the current situation: none    Objective:     Patient found with: peripheral IV     General Precautions: Standard, fall   Orthopedic  Precautions:N/A   Braces: N/A     Functional Mobility:  · Transfers:     · Sit to Stand:  stand by assistance with rolling walker     · Gait: 225 feetusing RW with close SBA for safety with pt demonstrating slow shell with decreased step length and foot clearance      AM-PAC 6 CLICK MOBILITY  Turning over in bed (including adjusting bedclothes, sheets and blankets)?: 3  Sitting down on and standing up from a chair with arms (e.g., wheelchair, bedside commode, etc.): 3  Moving from lying on back to sitting on the side of the bed?: 3  Moving to and from a bed to a chair (including a wheelchair)?: 3  Need to walk in hospital room?: 3  Climbing 3-5 steps with a railing?: 2  Total Score: 17       Therapeutic Activities and Exercises:   Discussed/educated patient on progress, safety,d/c, PT POC   White board updated   Donned an extra gown   Pt performed B LE therex x 15 reps AROM  Pt encouraged to ambulate in hallways 3x/day with nursing  assistance to improve endurance.   Pt safe to ambulate in hallway with RN or PCT assistance     Patient left up in chair with all lines intact, call button in reach, nsg notified and spouse present..    GOALS:    Physical Therapy Goals        Problem: Physical Therapy Goal    Goal Priority Disciplines Outcome Goal Variances Interventions   Physical Therapy Goal     PT/OT, PT Ongoing (interventions implemented as appropriate)     Description:  Goals to be met by: 18     Patient will increase functional independence with mobility by performin. Supine to sit with Modified [place]  2. Sit to supine with Modified [place]  3. Sit to stand transfer with Stand-by Assistance-met  4. Bed to chair transfer with Stand-by Assistance using Rolling Walker  5. Gait  x 50 feet with Stand-by Assistance using Rolling Walker. -met  6. Lower extremity exercise program x20 reps per handout, with independence                        Time Tracking:     PT Received On: 18   PT Total  Time (min): 23 min     Billable Minutes: Gait Training 15 and Therapeutic Exercise 8    Treatment Type: Treatment  PT/PTA: PTA     PTA Visit Number: 1     Ahmet Pearce PTA  02/21/2018

## 2018-02-22 LAB
ALBUMIN SERPL BCP-MCNC: 2.3 G/DL
ALP SERPL-CCNC: 76 U/L
ALT SERPL W/O P-5'-P-CCNC: 40 U/L
ANION GAP SERPL CALC-SCNC: 5 MMOL/L
AST SERPL-CCNC: 47 U/L
BASOPHILS # BLD AUTO: 0.02 K/UL
BASOPHILS NFR BLD: 0.2 %
BILIRUB SERPL-MCNC: 0.4 MG/DL
BUN SERPL-MCNC: 12 MG/DL
CALCIUM SERPL-MCNC: 8 MG/DL
CHLORIDE SERPL-SCNC: 105 MMOL/L
CO2 SERPL-SCNC: 26 MMOL/L
CREAT SERPL-MCNC: 0.9 MG/DL
DIFFERENTIAL METHOD: ABNORMAL
EOSINOPHIL # BLD AUTO: 0.3 K/UL
EOSINOPHIL NFR BLD: 3.4 %
ERYTHROCYTE [DISTWIDTH] IN BLOOD BY AUTOMATED COUNT: 15.2 %
EST. GFR  (AFRICAN AMERICAN): >60 ML/MIN/1.73 M^2
EST. GFR  (NON AFRICAN AMERICAN): >60 ML/MIN/1.73 M^2
GLUCOSE SERPL-MCNC: 125 MG/DL
HCT VFR BLD AUTO: 25.1 %
HGB BLD-MCNC: 8 G/DL
IMM GRANULOCYTES # BLD AUTO: 0.04 K/UL
IMM GRANULOCYTES NFR BLD AUTO: 0.5 %
LYMPHOCYTES # BLD AUTO: 0.5 K/UL
LYMPHOCYTES NFR BLD: 5.9 %
MAGNESIUM SERPL-MCNC: 2.1 MG/DL
MCH RBC QN AUTO: 26.3 PG
MCHC RBC AUTO-ENTMCNC: 31.9 G/DL
MCV RBC AUTO: 83 FL
MONOCYTES # BLD AUTO: 0.9 K/UL
MONOCYTES NFR BLD: 10.5 %
NEUTROPHILS # BLD AUTO: 6.8 K/UL
NEUTROPHILS NFR BLD: 79.5 %
NRBC BLD-RTO: 0 /100 WBC
PHOSPHATE SERPL-MCNC: 2.9 MG/DL
PLATELET # BLD AUTO: 370 K/UL
PMV BLD AUTO: 9 FL
POTASSIUM SERPL-SCNC: 3.9 MMOL/L
PROT SERPL-MCNC: 5.2 G/DL
RBC # BLD AUTO: 3.04 M/UL
SODIUM SERPL-SCNC: 136 MMOL/L
WBC # BLD AUTO: 8.59 K/UL

## 2018-02-22 PROCEDURE — 25000003 PHARM REV CODE 250: Performed by: STUDENT IN AN ORGANIZED HEALTH CARE EDUCATION/TRAINING PROGRAM

## 2018-02-22 PROCEDURE — S0077 INJECTION, CLINDAMYCIN PHOSP: HCPCS | Performed by: STUDENT IN AN ORGANIZED HEALTH CARE EDUCATION/TRAINING PROGRAM

## 2018-02-22 PROCEDURE — 11000001 HC ACUTE MED/SURG PRIVATE ROOM

## 2018-02-22 PROCEDURE — 63600175 PHARM REV CODE 636 W HCPCS: Performed by: NURSE PRACTITIONER

## 2018-02-22 PROCEDURE — 25000003 PHARM REV CODE 250: Performed by: NURSE PRACTITIONER

## 2018-02-22 PROCEDURE — 63600175 PHARM REV CODE 636 W HCPCS: Performed by: STUDENT IN AN ORGANIZED HEALTH CARE EDUCATION/TRAINING PROGRAM

## 2018-02-22 PROCEDURE — 83735 ASSAY OF MAGNESIUM: CPT

## 2018-02-22 PROCEDURE — 25000003 PHARM REV CODE 250: Performed by: SURGERY

## 2018-02-22 PROCEDURE — G8987 SELF CARE CURRENT STATUS: HCPCS | Mod: CJ

## 2018-02-22 PROCEDURE — 84100 ASSAY OF PHOSPHORUS: CPT

## 2018-02-22 PROCEDURE — 97530 THERAPEUTIC ACTIVITIES: CPT

## 2018-02-22 PROCEDURE — 36415 COLL VENOUS BLD VENIPUNCTURE: CPT

## 2018-02-22 PROCEDURE — G8988 SELF CARE GOAL STATUS: HCPCS | Mod: CI

## 2018-02-22 PROCEDURE — S5010 5% DEXTROSE AND 0.45% SALINE: HCPCS | Performed by: SURGERY

## 2018-02-22 PROCEDURE — 85025 COMPLETE CBC W/AUTO DIFF WBC: CPT

## 2018-02-22 PROCEDURE — 80053 COMPREHEN METABOLIC PANEL: CPT

## 2018-02-22 RX ORDER — SODIUM,POTASSIUM PHOSPHATES 280-250MG
2 POWDER IN PACKET (EA) ORAL
Status: COMPLETED | OUTPATIENT
Start: 2018-02-22 | End: 2018-02-22

## 2018-02-22 RX ORDER — KETOROLAC TROMETHAMINE 30 MG/ML
30 INJECTION, SOLUTION INTRAMUSCULAR; INTRAVENOUS ONCE
Status: COMPLETED | OUTPATIENT
Start: 2018-02-22 | End: 2018-02-22

## 2018-02-22 RX ORDER — TRAMADOL HYDROCHLORIDE 50 MG/1
50 TABLET ORAL EVERY 6 HOURS
Status: DISCONTINUED | OUTPATIENT
Start: 2018-02-22 | End: 2018-03-04 | Stop reason: HOSPADM

## 2018-02-22 RX ORDER — KETOROLAC TROMETHAMINE 30 MG/ML
INJECTION, SOLUTION INTRAMUSCULAR; INTRAVENOUS
Status: DISPENSED
Start: 2018-02-22 | End: 2018-02-22

## 2018-02-22 RX ORDER — CLINDAMYCIN PHOSPHATE 600 MG/50ML
600 INJECTION, SOLUTION INTRAVENOUS
Status: DISCONTINUED | OUTPATIENT
Start: 2018-02-22 | End: 2018-03-04 | Stop reason: HOSPADM

## 2018-02-22 RX ADMIN — POTASSIUM & SODIUM PHOSPHATES POWDER PACK 280-160-250 MG 2 PACKET: 280-160-250 PACK at 10:02

## 2018-02-22 RX ADMIN — CLINDAMYCIN IN 5 PERCENT DEXTROSE 600 MG: 12 INJECTION, SOLUTION INTRAVENOUS at 05:02

## 2018-02-22 RX ADMIN — DOCUSATE SODIUM 100 MG: 100 CAPSULE, LIQUID FILLED ORAL at 09:02

## 2018-02-22 RX ADMIN — FLUTICASONE PROPIONATE 50 MCG: 50 SPRAY, METERED NASAL at 08:02

## 2018-02-22 RX ADMIN — DEXTROSE AND SODIUM CHLORIDE: 5; .45 INJECTION, SOLUTION INTRAVENOUS at 12:02

## 2018-02-22 RX ADMIN — PANTOPRAZOLE SODIUM 40 MG: 40 TABLET, DELAYED RELEASE ORAL at 08:02

## 2018-02-22 RX ADMIN — FLUTICASONE PROPIONATE 50 MCG: 50 SPRAY, METERED NASAL at 10:02

## 2018-02-22 RX ADMIN — PANTOPRAZOLE SODIUM 40 MG: 40 TABLET, DELAYED RELEASE ORAL at 10:02

## 2018-02-22 RX ADMIN — DOCUSATE SODIUM 100 MG: 100 CAPSULE, LIQUID FILLED ORAL at 08:02

## 2018-02-22 RX ADMIN — KETOROLAC TROMETHAMINE 30 MG: 30 INJECTION, SOLUTION INTRAMUSCULAR at 07:02

## 2018-02-22 RX ADMIN — TRAMADOL HYDROCHLORIDE 50 MG: 50 TABLET, FILM COATED ORAL at 12:02

## 2018-02-22 RX ADMIN — ENOXAPARIN SODIUM 40 MG: 100 INJECTION SUBCUTANEOUS at 05:02

## 2018-02-22 RX ADMIN — ATORVASTATIN CALCIUM 40 MG: 20 TABLET, FILM COATED ORAL at 10:02

## 2018-02-22 RX ADMIN — TRAMADOL HYDROCHLORIDE 50 MG: 50 TABLET, FILM COATED ORAL at 05:02

## 2018-02-22 RX ADMIN — CLINDAMYCIN IN 5 PERCENT DEXTROSE 600 MG: 12 INJECTION, SOLUTION INTRAVENOUS at 10:02

## 2018-02-22 RX ADMIN — POTASSIUM & SODIUM PHOSPHATES POWDER PACK 280-160-250 MG 2 PACKET: 280-160-250 PACK at 05:02

## 2018-02-22 NOTE — NURSING
Small amount of drainage, serosanguinous applied non adherent dressing with paper tape. Stapes intact. Incision intact.

## 2018-02-22 NOTE — ASSESSMENT & PLAN NOTE
73 yo male with mass in distal small bowel s/p Small bowel resection/mass resection on 2/16/18. Ileus improving. Now with superficial wound infection/    Daily labs and lyte repletion; H/H stable; no leukocytosis 8.5  Staples removed at middle portition of midline incision- wound was explored with qtip and does not appear to track laterally. Purulent drainage expressed. Wound packed with gauze. Will continue to monitor. No systemic signs of infection- no leukocytosis, fever, chills  Clear liquid diet today as tolerated.  mIVF   Daily KUB- awaiting results  PRN pain medications   Path pending-will follow up   Strict I/Os  Cialis (home med) for urinary retention  OOBTC today, encourage ambulation  Encourage IS.  Stool softeners PRN

## 2018-02-22 NOTE — PROGRESS NOTES
[facility]  General Surgery  Progress Note    Subjective:     History of Present Illness:  This is a 71 y/o male with hx CAD and HTN, obscure overt GI bleeding [facility].  Pt was admitted 2/5 with melena. Underwent  colonoscopy, negative for bleeding with old blood seen in TI. No EGD at OSH.  CT abd showed ileal mass, approx 5 cm. Surgery recommend operative intervention at OSH, however patient requested transfer for 2nd opinion.  Patient has had a similar episode in the past year, at which time he also had EGD, colonscopy and capsule enterography which were negative, bleeding at that time thought due to NSAID use which was discontinued and he did well until about a week ago. When he began to have blood stools again. He has some fullness and early satiety, but has not altered his eating habits, and normal bowel habits, 1-3 movement a day, no change in consistency. No abdominal pain. Only prior abdominal surgery is an appendectomy as a child.     Post-Op Info:  Procedure(s) (LRB):  RESECTION-SMALL BOWEL (N/A)   7 Days Post-Op     Interval History: s/p Small bowel resection    Has bloody/purulent drainage from middle porition of midline incision overnight. No fever. No leukocytosis. Has continued to  pass flatus. Bloating improved. No nausea or vomiting. Tolerated CLD.     Medications:  Continuous Infusions:   dextrose 5 % and 0.45 % NaCl 100 mL/hr at 02/20/18 1758     Scheduled Meds:   ketorolac        atorvastatin  40 mg Oral QHS    Cialis (tadalafil) 5 mg - half tablet  5 mg Oral Daily    docusate sodium  100 mg Oral BID    enoxaparin  40 mg Subcutaneous Daily    fluticasone  1 spray Each Nare BID    pantoprazole  40 mg Oral BID     PRN Meds:sodium chloride, dextrose 50%, dextrose 50%, glucagon (human recombinant), glucose, glucose, ondansetron, oxyCODONE-acetaminophen, oxyCODONE-acetaminophen, promethazine (PHENERGAN)  "IVPB, sodium chloride 0.9%, sodium chloride 0.9%     Review of patient's allergies indicates:   Allergen Reactions    Augmentin [amoxicillin-pot clavulanate] Shortness Of Breath    Singulair [montelukast]      Pt "felt strange"      Horse/equine containing products      Objective:     Vital Signs (Most Recent):  Temp: 98.4 °F (36.9 °C) (02/22/18 0442)  Pulse: (!) 58 (02/22/18 0442)  Resp: 18 (02/22/18 0442)  BP: 137/67 (02/22/18 0442)  SpO2: 97 % (02/22/18 0442) Vital Signs (24h Range):  Temp:  [97.3 °F (36.3 °C)-98.4 °F (36.9 °C)] 98.4 °F (36.9 °C)  Pulse:  [58-71] 58  Resp:  [15-18] 18  SpO2:  [96 %-99 %] 97 %  BP: (137-153)/(63-72) 137/67     Weight: 73 kg (160 lb 15 oz)  Body mass index is 22.46 kg/m².    Intake/Output - Last 3 Shifts       02/20 0700 - 02/21 0659 02/21 0700 - 02/22 0659 02/22 0700 - 02/23 0659    I.V. (mL/kg) 1200 (16.4)      IV Piggyback 200      Total Intake(mL/kg) 1400 (19.2)      Urine (mL/kg/hr) 300 (0.2)      Total Output 300        Net +1100               Urine Occurrence 2 x 3 x     Stool Occurrence 1 x 0 x     Emesis Occurrence 0 x 0 x           Physical Exam    Constitutional: He is oriented to person, place, and time. He appears well-developed and well-nourished. No distress.   HENT:   Head: Normocephalic and atraumatic.   Eyes: Conjunctivae and EOM are normal. No scleral icterus.   Neck: Normal range of motion.   Cardiovascular: Normal rate and regular rhythm.    Pulmonary/Chest: Effort normal. No respiratory distress.   Abdominal: Soft. Non-tender. He exhibits distension improved (mild with tympany).   Incision with area of bloody purulent drainage from the middle of the wound. Associated erythema of the skin at superior aspect of wound. Wound does not appear to track laterally. 6-7 staples removed and wound now packed with gauze  Musculoskeletal: Normal range of motion. He exhibits no edema.   Neurological: He is alert and oriented to person, place, and time.   Nursing note and " vitals reviewed.    Significant Labs:  CBC:     Recent Labs  Lab 02/22/18  0515   WBC 8.59   RBC 3.04*   HGB 8.0*   HCT 25.1*   *   MCV 83   MCH 26.3*   MCHC 31.9*     BMP:     Recent Labs  Lab 02/22/18  0515   *      K 3.9      CO2 26   BUN 12   CREATININE 0.9   CALCIUM 8.0*   MG 2.1     CMP:     Recent Labs  Lab 02/22/18  0515   *   CALCIUM 8.0*   ALBUMIN 2.3*   PROT 5.2*      K 3.9   CO2 26      BUN 12   CREATININE 0.9   ALKPHOS 76   ALT 40   AST 47*   BILITOT 0.4     LFTs:     Recent Labs  Lab 02/22/18  0515   ALT 40   AST 47*   ALKPHOS 76   BILITOT 0.4   PROT 5.2*   ALBUMIN 2.3*       Significant Diagnostics:  Awaiting daily KUB today        Assessment/Plan:     Mass of small intestine    73 yo male with mass in distal small bowel s/p Small bowel resection/mass resection on 2/16/18. Ileus improving. Now with superficial wound infection/    Daily labs and lyte repletion; H/H stable; no leukocytosis 8.5  Staples removed at middle portition of midline incision- wound was explored with qtip and does not appear to track laterally. Purulent drainage expressed. Wound packed with gauze. Will start on abx  Clear liquid diet today as tolerated.  mIVF   Daily KUB- awaiting results  PRN pain medications- d/c toradol, will add scheduled tramadol and PRN percocet for breakthrough   Path pending-will follow up   Strict I/Os  Cialis (home med) for urinary retention  OOBTC today, encourage ambulation  Encourage IS.  Stool softeners PRN              Haylie Garnica MD  General Surgery  [facility]

## 2018-02-22 NOTE — PLAN OF CARE
Problem: Patient Care Overview  Goal: Plan of Care Review  Outcome: Ongoing (interventions implemented as appropriate)  Pt AAOx3, VSS, no complaints at this time, family at bedside, no s/s of skin breakdown noted, no falls noted as precautions remain. Will resume with plan of care.

## 2018-02-22 NOTE — PT/OT/SLP PROGRESS
Occupational Therapy   Treatment    Name: Forrest Schmidt  MRN: [id number]  Admitting Diagnosis:  Gastrointestinal hemorrhage  7 Days Post-Op    Recommendations:     Discharge Recommendations: home health OT  Discharge Equipment Recommendations:  walker, rolling  Barriers to discharge:  Decreased caregiver support (Wife can provide limited physical assist)    Subjective     Communicated with: RN prior to session.  Pain/Comfort:  · Pain Rating 1: 0/10  · Pain Rating Post-Intervention 1: 5/10 (at incision site)    Patients cultural, spiritual, Zoroastrianism conflicts given the current situation: none reported     Objective:     Patient found with: peripheral IV    General Precautions: Standard, fall   Orthopedic Precautions:N/A   Braces: N/A     Occupational Performance:    Bed Mobility:    · Patient completed Rolling/Turning to Right with modified independence and with side rail  · Patient completed Scooting/Bridging with modified independence and with side rail  · Patient completed Supine to Sit with modified independence and with side rail     Functional Mobility/Transfers:  · Patient completed Sit <> Stand Transfer with stand by assistance  with  rolling walker   · Patient completed Bed <> Chair Transfer using Step Transfer technique with stand by assistance with rolling walker  · Functional Mobility: Pt ambulate 250 ft with SBA using RW; pt took 2 standing rest breaks 2/2 pain    Activities of Daily Living:  · Grooming: supervision standing at sink to brush teeth, use mouthwash, wash face, and comb hair/beard  · LB Dressing: independence to doff/don B socks    Patient left up in chair with all lines intact, call button in reach and wife present    AMPA 6 Click:  [facility] Total Score: 20    Treatment & Education:  Pt educated on role of OT/POC  White board/communication board updated  Education:    Assessment:     Forrest Schmidt is a 72 y.o. male with a medical diagnosis of Gastrointestinal hemorrhage.  He presents with pain as  limiting factor in safe functional mobility and self care.  Performance deficits affecting function are weakness, impaired endurance, impaired self care skills, impaired functional mobilty, pain.      Rehab Prognosis:  Good; patient would benefit from acute skilled OT services to address these deficits and reach maximum level of function.       Plan:     Patient to be seen 3 x/week to address the above listed problems via self-care/home management, therapeutic activities, therapeutic exercises  · Plan of Care Expires: 03/18/18  · Plan of Care Reviewed with: patient, spouse    This Plan of care has been discussed with the patient who was involved in its development and understands and is in agreement with the identified goals and treatment plan    GOALS:    Occupational Therapy Goals        Problem: Occupational Therapy Goal    Goal Priority Disciplines Outcome Interventions   Occupational Therapy Goal     OT, PT/OT Ongoing (interventions implemented as appropriate)    Description:  Goals to be met by: 3/2/18    Patient will increase functional independence with ADLs by performing:    UE Dressing with [place].  Grooming while standing at sink with Supervision. GOAL MET 2/22  Toileting from toilet with [place] for hygiene and clothing management.   Toilet transfer to toilet with Supervision.                       Time Tracking:     OT Date of Treatment: 02/22/18  OT Start Time: 0903  OT Stop Time: 0923  OT Total Time (min): 20 min    Billable Minutes:Therapeutic Activity 20    Pushpa Garces OT  2/22/2018

## 2018-02-22 NOTE — SUBJECTIVE & OBJECTIVE
"Interval History: s/p Small bowel resection    Has bloody/purulent drainage from middle porition of midline incision overnight. Had small bowel movements and passing flatus. Bloating improved. No nausea or vomiting. Tolerated CLD.     Medications:  Continuous Infusions:   dextrose 5 % and 0.45 % NaCl 100 mL/hr at 02/20/18 1758     Scheduled Meds:   ketorolac        atorvastatin  40 mg Oral QHS    Cialis (tadalafil) 5 mg - half tablet  5 mg Oral Daily    docusate sodium  100 mg Oral BID    enoxaparin  40 mg Subcutaneous Daily    fluticasone  1 spray Each Nare BID    pantoprazole  40 mg Oral BID     PRN Meds:sodium chloride, dextrose 50%, dextrose 50%, glucagon (human recombinant), glucose, glucose, ondansetron, oxyCODONE-acetaminophen, oxyCODONE-acetaminophen, promethazine (PHENERGAN) IVPB, sodium chloride 0.9%, sodium chloride 0.9%     Review of patient's allergies indicates:   Allergen Reactions    Augmentin [amoxicillin-pot clavulanate] Shortness Of Breath    Singulair [montelukast]      Pt "felt strange"      Horse/equine containing products      Objective:     Vital Signs (Most Recent):  Temp: 98.4 °F (36.9 °C) (02/22/18 0442)  Pulse: (!) 58 (02/22/18 0442)  Resp: 18 (02/22/18 0442)  BP: 137/67 (02/22/18 0442)  SpO2: 97 % (02/22/18 0442) Vital Signs (24h Range):  Temp:  [97.3 °F (36.3 °C)-98.4 °F (36.9 °C)] 98.4 °F (36.9 °C)  Pulse:  [58-71] 58  Resp:  [15-18] 18  SpO2:  [96 %-99 %] 97 %  BP: (137-153)/(63-72) 137/67     Weight: 73 kg (160 lb 15 oz)  Body mass index is 22.46 kg/m².    Intake/Output - Last 3 Shifts       02/20 0700 - 02/21 0659 02/21 0700 - 02/22 0659 02/22 0700 - 02/23 0659    I.V. (mL/kg) 1200 (16.4)      IV Piggyback 200      Total Intake(mL/kg) 1400 (19.2)      Urine (mL/kg/hr) 300 (0.2)      Total Output 300        Net +1100               Urine Occurrence 2 x 3 x     Stool Occurrence 1 x 0 x     Emesis Occurrence 0 x 0 x           Physical Exam    Constitutional: He is oriented to " person, place, and time. He appears well-developed and well-nourished. No distress.   HENT:   Head: Normocephalic and atraumatic.   Eyes: Conjunctivae and EOM are normal. No scleral icterus.   Neck: Normal range of motion.   Cardiovascular: Normal rate and regular rhythm.    Pulmonary/Chest: Effort normal. No respiratory distress.   Abdominal: Soft. Non-tender. He exhibits distension improved (mild with tympany).   Incision with area of bloody purulent drainage from the middle of the wound.No associated erythema of the skin. Wound does not appear to track laterally. 6-7 staples removed and wound now packed with gauze  Musculoskeletal: Normal range of motion. He exhibits no edema.   Neurological: He is alert and oriented to person, place, and time.   Nursing note and vitals reviewed.    Significant Labs:  CBC:     Recent Labs  Lab 02/22/18 0515   WBC 8.59   RBC 3.04*   HGB 8.0*   HCT 25.1*   *   MCV 83   MCH 26.3*   MCHC 31.9*     BMP:     Recent Labs  Lab 02/22/18 0515   *      K 3.9      CO2 26   BUN 12   CREATININE 0.9   CALCIUM 8.0*   MG 2.1     CMP:     Recent Labs  Lab 02/22/18 0515   *   CALCIUM 8.0*   ALBUMIN 2.3*   PROT 5.2*      K 3.9   CO2 26      BUN 12   CREATININE 0.9   ALKPHOS 76   ALT 40   AST 47*   BILITOT 0.4     LFTs:     Recent Labs  Lab 02/22/18 0515   ALT 40   AST 47*   ALKPHOS 76   BILITOT 0.4   PROT 5.2*   ALBUMIN 2.3*       Significant Diagnostics:  Awaiting daily KUB today

## 2018-02-23 LAB
ALBUMIN SERPL BCP-MCNC: 2.2 G/DL
ALP SERPL-CCNC: 89 U/L
ALT SERPL W/O P-5'-P-CCNC: 81 U/L
ANION GAP SERPL CALC-SCNC: 8 MMOL/L
AST SERPL-CCNC: 86 U/L
BASOPHILS # BLD AUTO: 0.02 K/UL
BASOPHILS NFR BLD: 0.2 %
BILIRUB SERPL-MCNC: 0.5 MG/DL
BUN SERPL-MCNC: 9 MG/DL
CALCIUM SERPL-MCNC: 7.9 MG/DL
CHLORIDE SERPL-SCNC: 104 MMOL/L
CO2 SERPL-SCNC: 26 MMOL/L
CREAT SERPL-MCNC: 0.8 MG/DL
DIFFERENTIAL METHOD: ABNORMAL
EOSINOPHIL # BLD AUTO: 0.2 K/UL
EOSINOPHIL NFR BLD: 2.7 %
ERYTHROCYTE [DISTWIDTH] IN BLOOD BY AUTOMATED COUNT: 15.2 %
EST. GFR  (AFRICAN AMERICAN): >60 ML/MIN/1.73 M^2
EST. GFR  (NON AFRICAN AMERICAN): >60 ML/MIN/1.73 M^2
GLUCOSE SERPL-MCNC: 112 MG/DL
HCT VFR BLD AUTO: 24.1 %
HGB BLD-MCNC: 7.6 G/DL
IMM GRANULOCYTES # BLD AUTO: 0.02 K/UL
IMM GRANULOCYTES NFR BLD AUTO: 0.2 %
LYMPHOCYTES # BLD AUTO: 0.5 K/UL
LYMPHOCYTES NFR BLD: 6.4 %
MAGNESIUM SERPL-MCNC: 1.6 MG/DL
MCH RBC QN AUTO: 26.1 PG
MCHC RBC AUTO-ENTMCNC: 31.5 G/DL
MCV RBC AUTO: 83 FL
MONOCYTES # BLD AUTO: 0.9 K/UL
MONOCYTES NFR BLD: 11.4 %
NEUTROPHILS # BLD AUTO: 6.3 K/UL
NEUTROPHILS NFR BLD: 79.1 %
NRBC BLD-RTO: 0 /100 WBC
PHOSPHATE SERPL-MCNC: 3.6 MG/DL
PLATELET # BLD AUTO: 361 K/UL
PMV BLD AUTO: 9.2 FL
POTASSIUM SERPL-SCNC: 3.7 MMOL/L
PROT SERPL-MCNC: 5.1 G/DL
RBC # BLD AUTO: 2.91 M/UL
SODIUM SERPL-SCNC: 138 MMOL/L
WBC # BLD AUTO: 8.01 K/UL

## 2018-02-23 PROCEDURE — 83735 ASSAY OF MAGNESIUM: CPT

## 2018-02-23 PROCEDURE — 63600175 PHARM REV CODE 636 W HCPCS: Performed by: SURGERY

## 2018-02-23 PROCEDURE — 25000003 PHARM REV CODE 250: Performed by: STUDENT IN AN ORGANIZED HEALTH CARE EDUCATION/TRAINING PROGRAM

## 2018-02-23 PROCEDURE — 11000001 HC ACUTE MED/SURG PRIVATE ROOM

## 2018-02-23 PROCEDURE — 63600175 PHARM REV CODE 636 W HCPCS: Performed by: NURSE PRACTITIONER

## 2018-02-23 PROCEDURE — 84100 ASSAY OF PHOSPHORUS: CPT

## 2018-02-23 PROCEDURE — S0077 INJECTION, CLINDAMYCIN PHOSP: HCPCS | Performed by: STUDENT IN AN ORGANIZED HEALTH CARE EDUCATION/TRAINING PROGRAM

## 2018-02-23 PROCEDURE — 80053 COMPREHEN METABOLIC PANEL: CPT

## 2018-02-23 PROCEDURE — S5010 5% DEXTROSE AND 0.45% SALINE: HCPCS | Performed by: SURGERY

## 2018-02-23 PROCEDURE — 36415 COLL VENOUS BLD VENIPUNCTURE: CPT

## 2018-02-23 PROCEDURE — 25000003 PHARM REV CODE 250: Performed by: NURSE PRACTITIONER

## 2018-02-23 PROCEDURE — 85025 COMPLETE CBC W/AUTO DIFF WBC: CPT

## 2018-02-23 PROCEDURE — 25000003 PHARM REV CODE 250: Performed by: SURGERY

## 2018-02-23 PROCEDURE — 97803 MED NUTRITION INDIV SUBSEQ: CPT

## 2018-02-23 RX ORDER — POTASSIUM CHLORIDE 750 MG/1
30 CAPSULE, EXTENDED RELEASE ORAL ONCE
Status: COMPLETED | OUTPATIENT
Start: 2018-02-23 | End: 2018-02-23

## 2018-02-23 RX ORDER — BISACODYL 10 MG
10 SUPPOSITORY, RECTAL RECTAL DAILY
Status: DISCONTINUED | OUTPATIENT
Start: 2018-02-23 | End: 2018-02-25

## 2018-02-23 RX ADMIN — CLINDAMYCIN IN 5 PERCENT DEXTROSE 600 MG: 12 INJECTION, SOLUTION INTRAVENOUS at 10:02

## 2018-02-23 RX ADMIN — DOCUSATE SODIUM 100 MG: 100 CAPSULE, LIQUID FILLED ORAL at 08:02

## 2018-02-23 RX ADMIN — POTASSIUM CHLORIDE 30 MEQ: 750 CAPSULE, EXTENDED RELEASE ORAL at 03:02

## 2018-02-23 RX ADMIN — METHYLNALTREXONE BROMIDE 12 MG: 12 INJECTION, SOLUTION SUBCUTANEOUS at 11:02

## 2018-02-23 RX ADMIN — ENOXAPARIN SODIUM 40 MG: 100 INJECTION SUBCUTANEOUS at 05:02

## 2018-02-23 RX ADMIN — FLUTICASONE PROPIONATE 50 MCG: 50 SPRAY, METERED NASAL at 09:02

## 2018-02-23 RX ADMIN — DEXTROSE AND SODIUM CHLORIDE: 5; .45 INJECTION, SOLUTION INTRAVENOUS at 10:02

## 2018-02-23 RX ADMIN — FLUTICASONE PROPIONATE 50 MCG: 50 SPRAY, METERED NASAL at 08:02

## 2018-02-23 RX ADMIN — PANTOPRAZOLE SODIUM 40 MG: 40 TABLET, DELAYED RELEASE ORAL at 09:02

## 2018-02-23 RX ADMIN — BISACODYL 10 MG: 10 SUPPOSITORY RECTAL at 11:02

## 2018-02-23 RX ADMIN — TRAMADOL HYDROCHLORIDE 50 MG: 50 TABLET, FILM COATED ORAL at 12:02

## 2018-02-23 RX ADMIN — DOCUSATE SODIUM 100 MG: 100 CAPSULE, LIQUID FILLED ORAL at 09:02

## 2018-02-23 RX ADMIN — OXYCODONE HYDROCHLORIDE AND ACETAMINOPHEN 1 TABLET: 5; 325 TABLET ORAL at 09:02

## 2018-02-23 RX ADMIN — PANTOPRAZOLE SODIUM 40 MG: 40 TABLET, DELAYED RELEASE ORAL at 08:02

## 2018-02-23 RX ADMIN — ONDANSETRON 8 MG: 2 INJECTION INTRAMUSCULAR; INTRAVENOUS at 05:02

## 2018-02-23 RX ADMIN — CLINDAMYCIN IN 5 PERCENT DEXTROSE 600 MG: 12 INJECTION, SOLUTION INTRAVENOUS at 02:02

## 2018-02-23 RX ADMIN — TRAMADOL HYDROCHLORIDE 50 MG: 50 TABLET, FILM COATED ORAL at 06:02

## 2018-02-23 RX ADMIN — CLINDAMYCIN IN 5 PERCENT DEXTROSE 600 MG: 12 INJECTION, SOLUTION INTRAVENOUS at 05:02

## 2018-02-23 RX ADMIN — OXYCODONE HYDROCHLORIDE AND ACETAMINOPHEN 1 TABLET: 5; 325 TABLET ORAL at 12:02

## 2018-02-23 RX ADMIN — ATORVASTATIN CALCIUM 40 MG: 20 TABLET, FILM COATED ORAL at 08:02

## 2018-02-23 RX ADMIN — OXYCODONE HYDROCHLORIDE AND ACETAMINOPHEN 1 TABLET: 10; 325 TABLET ORAL at 08:02

## 2018-02-23 NOTE — PROGRESS NOTES
Feels about the same, slightly bloated. Passing small amount of flatus  Abdomen soft, mildly distended. BS are normally active    Abdominal xray this am looks about the same; mildly distended bowel loops, mostly colon. There is a loop of non-distended small bowel visible in the left mid-abdomen    Imp:  Adynamic ileus; mechanical partial SBO less likely    Rec:  Will try methylnaltrexone

## 2018-02-23 NOTE — PROGRESS NOTES
[facility]  General Surgery  Progress Note    Subjective:     History of Present Illness:  This is a 73 y/o male with hx CAD and HTN, obscure overt GI bleeding who [facility] CT [facility].  Pt was admitted 2/5 with melena. Underwent  colonoscopy, negative for bleeding with old blood seen in TI. No EGD at OSH.  CT abd showed ileal mass, approx 5 cm. Surgery recommend operative intervention at OSH, however patient requested transfer for 2nd opinion.  Patient has had a similar episode in the past year, at which time he also had EGD, colonscopy and capsule enterography which were negative, bleeding at that time thought due to NSAID use which was discontinued and he did well until about a week ago. When he began to have blood stools again. He has some fullness and early satiety, but has not altered his eating habits, and normal bowel habits, 1-3 movement a day, no change in consistency. No abdominal pain. Only prior abdominal surgery is an appendectomy as a child.     Post-Op Info:  Procedure(s) (LRB):  RESECTION-SMALL BOWEL (N/A)   8 Days Post-Op     Interval History: +flatus. - BM. + abdominal distension. + ambulation and voiding without difficulty.  No c/o chest pain, shortness of breath, nausea or vomiting.     Medications:  Continuous Infusions:   dextrose 5 % and 0.45 % NaCl 100 mL/hr at 02/23/18 1048     Scheduled Meds:   atorvastatin  40 mg Oral QHS    bisacodyl  10 mg Rectal Daily    Cialis (tadalafil) 5 mg - half tablet  5 mg Oral Daily    clindamycin (CLEOCIN) IVPB  600 mg Intravenous Q8H    docusate sodium  100 mg Oral BID    enoxaparin  40 mg Subcutaneous Daily    fluticasone  1 spray Each Nare BID    methylnaltrexone  12 mg Subcutaneous Every other day    pantoprazole  40 mg Oral BID    traMADol  50 mg Oral Q6H     PRN Meds:sodium chloride, dextrose 50%, dextrose 50%, glucagon (human recombinant), glucose, glucose,  "ondansetron, oxyCODONE-acetaminophen, oxyCODONE-acetaminophen, promethazine (PHENERGAN) IVPB, sodium chloride 0.9%, sodium chloride 0.9%     Review of patient's allergies indicates:   Allergen Reactions    Augmentin [amoxicillin-pot clavulanate] Shortness Of Breath    Singulair [montelukast]      Pt "felt strange"      Horse/equine containing products      Objective:     Vital Signs (Most Recent):  Temp: 96.3 °F (35.7 °C) (02/23/18 1200)  Pulse: 65 (02/23/18 1200)  Resp: 16 (02/23/18 1200)  BP: (!) 140/70 (02/23/18 0842)  SpO2: 95 % (02/23/18 1200) Vital Signs (24h Range):  Temp:  [96.3 °F (35.7 °C)-98.4 °F (36.9 °C)] 96.3 °F (35.7 °C)  Pulse:  [57-65] 65  Resp:  [16-18] 16  SpO2:  [95 %-98 %] 95 %  BP: (128-176)/(70-82) 140/70     Weight: 73 kg (160 lb 15 oz)  Body mass index is 22.46 kg/m².    Intake/Output - Last 3 Shifts       02/21 0700 - 02/22 0659 02/22 0700 - 02/23 0659 02/23 0700 - 02/24 0659    P.O.  400     I.V. (mL/kg)  550 (7.5)     IV Piggyback       Total Intake(mL/kg)  950 (13)     Urine (mL/kg/hr)       Total Output          Net   +950             Urine Occurrence 3 x 2 x     Stool Occurrence 0 x 0 x     Emesis Occurrence 0 x            Physical Exam   Constitutional: He is oriented to person, place, and time.   HENT:   Head: Normocephalic.   Eyes: Pupils are equal, round, and reactive to light.   Neck: Normal range of motion.   Cardiovascular: Normal rate, regular rhythm, normal heart sounds and intact distal pulses.    Pulmonary/Chest: Effort normal and breath sounds normal.   Abdominal: Soft. Bowel sounds are normal. He exhibits distension.   Incision c/d/i   Musculoskeletal: Normal range of motion.   Neurological: He is alert and oriented to person, place, and time.   Skin: Skin is warm and dry.   Psychiatric: He has a normal mood and affect. His behavior is normal. Judgment and thought content normal.   Nursing note and vitals reviewed.      Significant Labs:  CBC:   Recent Labs  Lab " 02/23/18  0549   WBC 8.01   RBC 2.91*   HGB 7.6*   HCT 24.1*   *   MCV 83   MCH 26.1*   MCHC 31.5*     CMP:   Recent Labs  Lab 02/23/18  0549   *   CALCIUM 7.9*   ALBUMIN 2.2*   PROT 5.1*      K 3.7   CO2 26      BUN 9   CREATININE 0.8   ALKPHOS 89   ALT 81*   AST 86*   BILITOT 0.5       Significant Diagnostics:  I have reviewed and interpreted all pertinent imaging results/findings within the past 24 hours.    Assessment/Plan:     Mass of small intestine    71 yo male with mass in distal small bowel s/p Small bowel resection/mass resection on 2/16/18. Ileus improving. Now with superficial wound infection/    Daily labs and lyte repletion; H/H stable; no leukocytosis   Staples removed at middle portition of midline incision- wound was explored with qtip and does not appear to track laterally. Purulent drainage expressed. Wound packed with gauze. Will continue to monitor. No systemic signs of infection- no leukocytosis, fever, chills  Clear liquid diet today as tolerated.  mIVF   Daily KUB  PRN pain medications   Path pending-will follow up   Strict I/Os  Cialis (home med) for urinary retention  OOBTC today, encourage ambulation, PT  Encourage IS.  Stool softeners PRN  methylnaltrexone  Adynamic ileus; mechanical partial SBO less likely              Loren Loza NP  General Surgery  [facility]

## 2018-02-23 NOTE — PLAN OF CARE
Problem: Patient Care Overview  Goal: Plan of Care Review  Outcome: Ongoing (interventions implemented as appropriate)  Pt continues with plan of care. VSS no issues overnight, family at bedside, x-ray done at bedside this am redressed wound. Pain managed with oral pain medication.

## 2018-02-23 NOTE — PLAN OF CARE
Patient with persistent postop ileus and not medically stable for discharge.  Plan for home with [facility] when ready, will continue to follow for needs.       02/23/18 1145   Discharge Reassessment   Assessment Type Discharge Planning Reassessment   Discharge Plan A Home with family;[facility]

## 2018-02-23 NOTE — SUBJECTIVE & OBJECTIVE
"Interval History: +flatus. - BM. + abdominal distension. + ambulation and voiding without difficulty.  No c/o chest pain, shortness of breath, nausea or vomiting.     Medications:  Continuous Infusions:   dextrose 5 % and 0.45 % NaCl 100 mL/hr at 02/23/18 1048     Scheduled Meds:   atorvastatin  40 mg Oral QHS    bisacodyl  10 mg Rectal Daily    Cialis (tadalafil) 5 mg - half tablet  5 mg Oral Daily    clindamycin (CLEOCIN) IVPB  600 mg Intravenous Q8H    docusate sodium  100 mg Oral BID    enoxaparin  40 mg Subcutaneous Daily    fluticasone  1 spray Each Nare BID    methylnaltrexone  12 mg Subcutaneous Every other day    pantoprazole  40 mg Oral BID    traMADol  50 mg Oral Q6H     PRN Meds:sodium chloride, dextrose 50%, dextrose 50%, glucagon (human recombinant), glucose, glucose, ondansetron, oxyCODONE-acetaminophen, oxyCODONE-acetaminophen, promethazine (PHENERGAN) IVPB, sodium chloride 0.9%, sodium chloride 0.9%     Review of patient's allergies indicates:   Allergen Reactions    Augmentin [amoxicillin-pot clavulanate] Shortness Of Breath    Singulair [montelukast]      Pt "felt strange"      Horse/equine containing products      Objective:     Vital Signs (Most Recent):  Temp: 96.3 °F (35.7 °C) (02/23/18 1200)  Pulse: 65 (02/23/18 1200)  Resp: 16 (02/23/18 1200)  BP: (!) 140/70 (02/23/18 0842)  SpO2: 95 % (02/23/18 1200) Vital Signs (24h Range):  Temp:  [96.3 °F (35.7 °C)-98.4 °F (36.9 °C)] 96.3 °F (35.7 °C)  Pulse:  [57-65] 65  Resp:  [16-18] 16  SpO2:  [95 %-98 %] 95 %  BP: (128-176)/(70-82) 140/70     Weight: 73 kg (160 lb 15 oz)  Body mass index is 22.46 kg/m².    Intake/Output - Last 3 Shifts       02/21 0700 - 02/22 0659 02/22 0700 - 02/23 0659 02/23 0700 - 02/24 0659    P.O.  400     I.V. (mL/kg)  550 (7.5)     IV Piggyback       Total Intake(mL/kg)  950 (13)     Urine (mL/kg/hr)       Total Output          Net   +950             Urine Occurrence 3 x 2 x     Stool Occurrence 0 x 0 x     " Emesis Occurrence 0 x            Physical Exam   Constitutional: He is oriented to person, place, and time.   HENT:   Head: Normocephalic.   Eyes: Pupils are equal, round, and reactive to light.   Neck: Normal range of motion.   Cardiovascular: Normal rate, regular rhythm, normal heart sounds and intact distal pulses.    Pulmonary/Chest: Effort normal and breath sounds normal.   Abdominal: Soft. Bowel sounds are normal. He exhibits distension.   Incision c/d/i   Musculoskeletal: Normal range of motion.   Neurological: He is alert and oriented to person, place, and time.   Skin: Skin is warm and dry.   Psychiatric: He has a normal mood and affect. His behavior is normal. Judgment and thought content normal.   Nursing note and vitals reviewed.      Significant Labs:  CBC:   Recent Labs  Lab 02/23/18  0549   WBC 8.01   RBC 2.91*   HGB 7.6*   HCT 24.1*   *   MCV 83   MCH 26.1*   MCHC 31.5*     CMP:   Recent Labs  Lab 02/23/18  0549   *   CALCIUM 7.9*   ALBUMIN 2.2*   PROT 5.1*      K 3.7   CO2 26      BUN 9   CREATININE 0.8   ALKPHOS 89   ALT 81*   AST 86*   BILITOT 0.5       Significant Diagnostics:  I have reviewed and interpreted all pertinent imaging results/findings within the past 24 hours.

## 2018-02-23 NOTE — PROGRESS NOTES
"  [facility]  Adult Nutrition  Consult Note    SUMMARY     Recommendations    1. When medically feasible, ADAT to GI soft/light.   2. Upon diet advancement, add Boost Plus ONS if PO intake consistently <50%.  3. RD to monitor & follow-up.    Goals: PO intake >50%  Nutrition Goal Status: new  Communication of RD Recs: reviewed with RN    Reason for Assessment    Reason for Assessment: RD follow-up  Diagnosis:  (Gastrointestinal hemorrhage)  Relevent Medical History: anticoagulants, HTN, GIST   Interdisciplinary Rounds: did not attend     General Information Comments: 8 days post-op small bowel resection. Tolerating clear liquid diet, consuming 50% of meals.  Nutrition Discharge Planning: Unable to determine    Nutrition Prescription Ordered    Current Diet Order: Clear liquid    Evaluation of Received Nutrients/Fluid Intake    Other Calories (kcal): 408 (From D5)     IV Fluid (mL): 2400     Nutrition/Diet History    Patient Reported Diet/Restrictions/Preferences: general     Typical Food/Fluid Intake: adequate PTA     Factors Affecting Nutritional Intake: altered gastrointestinal function, other (see comments) (CLD)    Labs/Tests/Procedures/Meds    Pertinent Labs Reviewed: reviewed  Pertinent Labs Comments: Gluc 112  Pertinent Medications Reviewed: reviewed, pertinent  Pertinent Medications Comments: D5, Statin    Physical Findings    Overall Physical Appearance: lethargic, nourished  Oral/Mouth Cavity: WDL  Skin: incision (Abdomen)    Anthropometrics    Temp: 97.4 °F (36.3 °C)     Height: 5' 10.98" (180.3 cm)  Weight Method: Bed Scale  Weight: 73 kg (160 lb 15 oz)    Ideal Body Weight (IBW), Male: 171.88 lb  % Ideal Body Weight, Male (lb): 93.64 lb     BMI (Calculated): 22.5  BMI Grade: 18.5-24.9 - normal    Estimated/Assessed Needs    Weight Used For Calorie Calculations: 73 kg (160 lb 15 oz)      Energy Calorie Requirements (kcal): 1764  Energy Need Method: [place]-St Jeor (x 1.2)     Weight " Used For Protein Calculations: 73 kg (160 lb 15 oz)  Protein Requirements: 88 g/d (1.2 g/kg)    Fluid Requirements (mL): per MD    Assessment and Plan    Gastrointestinal hemorrhage     Nutrition Problem  Inadequate oral intake     Related to (etiology):   GI hemorrhage 2/2 ileus     Signs and Symptoms (as evidenced by):   NPO Day+5    Nutrition Diagnosis Status:   Continues     Monitor and Evaluation    Food and Nutrient Intake: energy intake, food and beverage intake  Food and Nutrient Adminstration: diet order     Physical Activity and Function: nutrition-related ADLs and IADLs  Anthropometric Measurements: weight, weight change  Biochemical Data, Medical Tests and Procedures: inflammatory profile, lipid profile, glucose/endocrine profile, gastrointestinal profile, electrolyte and renal panel  Nutrition-Focused Physical Findings: overall appearance    Nutrition Risk    Level of Risk:  (f/u 2x/wk)    Nutrition Follow-Up    RD Follow-up?: Yes

## 2018-02-23 NOTE — PT/OT/SLP PROGRESS
Physical Therapy      Patient Name:  Forrest Schmidt   MRN:  [id number]    Patient not seen today secondary to pt reports not feeling well and just received suppository  . Will follow-up next scheduled treatment per PT POC.    Ahmet Pearce, PTA  2/23/2018

## 2018-02-24 PROBLEM — S31.109A OPEN WOUND OF ABDOMEN: Status: ACTIVE | Noted: 2018-02-24

## 2018-02-24 LAB
ALBUMIN SERPL BCP-MCNC: 2.2 G/DL
ALP SERPL-CCNC: 98 U/L
ALT SERPL W/O P-5'-P-CCNC: 107 U/L
ANION GAP SERPL CALC-SCNC: 6 MMOL/L
AST SERPL-CCNC: 98 U/L
BASOPHILS # BLD AUTO: 0.03 K/UL
BASOPHILS NFR BLD: 0.4 %
BILIRUB SERPL-MCNC: 0.4 MG/DL
BUN SERPL-MCNC: 8 MG/DL
CALCIUM SERPL-MCNC: 8.2 MG/DL
CHLORIDE SERPL-SCNC: 105 MMOL/L
CO2 SERPL-SCNC: 28 MMOL/L
CREAT SERPL-MCNC: 0.8 MG/DL
DIFFERENTIAL METHOD: ABNORMAL
EOSINOPHIL # BLD AUTO: 0.1 K/UL
EOSINOPHIL NFR BLD: 1.7 %
ERYTHROCYTE [DISTWIDTH] IN BLOOD BY AUTOMATED COUNT: 15.3 %
EST. GFR  (AFRICAN AMERICAN): >60 ML/MIN/1.73 M^2
EST. GFR  (NON AFRICAN AMERICAN): >60 ML/MIN/1.73 M^2
GLUCOSE SERPL-MCNC: 124 MG/DL
HCT VFR BLD AUTO: 24.7 %
HGB BLD-MCNC: 7.7 G/DL
IMM GRANULOCYTES # BLD AUTO: 0.02 K/UL
IMM GRANULOCYTES NFR BLD AUTO: 0.2 %
LYMPHOCYTES # BLD AUTO: 0.5 K/UL
LYMPHOCYTES NFR BLD: 5.8 %
MAGNESIUM SERPL-MCNC: 1.5 MG/DL
MCH RBC QN AUTO: 25.8 PG
MCHC RBC AUTO-ENTMCNC: 31.2 G/DL
MCV RBC AUTO: 83 FL
MONOCYTES # BLD AUTO: 0.8 K/UL
MONOCYTES NFR BLD: 9.4 %
NEUTROPHILS # BLD AUTO: 6.7 K/UL
NEUTROPHILS NFR BLD: 82.5 %
NRBC BLD-RTO: 0 /100 WBC
PHOSPHATE SERPL-MCNC: 3.7 MG/DL
PLATELET # BLD AUTO: 333 K/UL
PMV BLD AUTO: 9.1 FL
POTASSIUM SERPL-SCNC: 4 MMOL/L
PROT SERPL-MCNC: 5.3 G/DL
RBC # BLD AUTO: 2.98 M/UL
SODIUM SERPL-SCNC: 139 MMOL/L
WBC # BLD AUTO: 8.07 K/UL

## 2018-02-24 PROCEDURE — 63600175 PHARM REV CODE 636 W HCPCS: Performed by: SURGERY

## 2018-02-24 PROCEDURE — 25000242 PHARM REV CODE 250 ALT 637 W/ HCPCS: Performed by: NURSE PRACTITIONER

## 2018-02-24 PROCEDURE — S0077 INJECTION, CLINDAMYCIN PHOSP: HCPCS | Performed by: STUDENT IN AN ORGANIZED HEALTH CARE EDUCATION/TRAINING PROGRAM

## 2018-02-24 PROCEDURE — 25000003 PHARM REV CODE 250: Performed by: STUDENT IN AN ORGANIZED HEALTH CARE EDUCATION/TRAINING PROGRAM

## 2018-02-24 PROCEDURE — 80053 COMPREHEN METABOLIC PANEL: CPT

## 2018-02-24 PROCEDURE — A4216 STERILE WATER/SALINE, 10 ML: HCPCS | Performed by: SURGERY

## 2018-02-24 PROCEDURE — 76937 US GUIDE VASCULAR ACCESS: CPT

## 2018-02-24 PROCEDURE — 25500020 PHARM REV CODE 255: Performed by: SURGERY

## 2018-02-24 PROCEDURE — 25000003 PHARM REV CODE 250: Performed by: SURGERY

## 2018-02-24 PROCEDURE — B4185 PARENTERAL SOL 10 GM LIPIDS: HCPCS | Performed by: SURGERY

## 2018-02-24 PROCEDURE — 36415 COLL VENOUS BLD VENIPUNCTURE: CPT

## 2018-02-24 PROCEDURE — 25000003 PHARM REV CODE 250: Performed by: NURSE PRACTITIONER

## 2018-02-24 PROCEDURE — 84100 ASSAY OF PHOSPHORUS: CPT

## 2018-02-24 PROCEDURE — C1751 CATH, INF, PER/CENT/MIDLINE: HCPCS

## 2018-02-24 PROCEDURE — 11000001 HC ACUTE MED/SURG PRIVATE ROOM

## 2018-02-24 PROCEDURE — 36569 INSJ PICC 5 YR+ W/O IMAGING: CPT

## 2018-02-24 PROCEDURE — 63600175 PHARM REV CODE 636 W HCPCS: Performed by: NURSE PRACTITIONER

## 2018-02-24 PROCEDURE — 97116 GAIT TRAINING THERAPY: CPT | Performed by: PHYSICAL THERAPIST

## 2018-02-24 PROCEDURE — 02HV33Z INSERTION OF INFUSION DEVICE INTO SUPERIOR VENA CAVA, PERCUTANEOUS APPROACH: ICD-10-PCS | Performed by: INTERNAL MEDICINE

## 2018-02-24 PROCEDURE — 83735 ASSAY OF MAGNESIUM: CPT

## 2018-02-24 PROCEDURE — 85025 COMPLETE CBC W/AUTO DIFF WBC: CPT

## 2018-02-24 RX ORDER — SODIUM CHLORIDE 0.9 % (FLUSH) 0.9 %
10 SYRINGE (ML) INJECTION EVERY 6 HOURS
Status: DISCONTINUED | OUTPATIENT
Start: 2018-02-24 | End: 2018-03-04 | Stop reason: HOSPADM

## 2018-02-24 RX ORDER — SODIUM CHLORIDE 0.9 % (FLUSH) 0.9 %
10 SYRINGE (ML) INJECTION
Status: DISCONTINUED | OUTPATIENT
Start: 2018-02-24 | End: 2018-03-04 | Stop reason: HOSPADM

## 2018-02-24 RX ORDER — LANOLIN ALCOHOL/MO/W.PET/CERES
400 CREAM (GRAM) TOPICAL ONCE
Status: COMPLETED | OUTPATIENT
Start: 2018-02-24 | End: 2018-02-24

## 2018-02-24 RX ORDER — MORPHINE SULFATE 4 MG/ML
4 INJECTION, SOLUTION INTRAMUSCULAR; INTRAVENOUS ONCE
Status: COMPLETED | OUTPATIENT
Start: 2018-02-24 | End: 2018-02-24

## 2018-02-24 RX ORDER — MORPHINE SULFATE 4 MG/ML
INJECTION, SOLUTION INTRAMUSCULAR; INTRAVENOUS
Status: DISPENSED
Start: 2018-02-24 | End: 2018-02-24

## 2018-02-24 RX ADMIN — DIATRIZOATE MEGLUMINE AND DIATRIZOATE SODIUM 100 ML: 660; 100 LIQUID ORAL; RECTAL at 02:02

## 2018-02-24 RX ADMIN — TRAMADOL HYDROCHLORIDE 50 MG: 50 TABLET, FILM COATED ORAL at 12:02

## 2018-02-24 RX ADMIN — ATORVASTATIN CALCIUM 40 MG: 20 TABLET, FILM COATED ORAL at 08:02

## 2018-02-24 RX ADMIN — OXYCODONE HYDROCHLORIDE AND ACETAMINOPHEN 1 TABLET: 10; 325 TABLET ORAL at 02:02

## 2018-02-24 RX ADMIN — PANTOPRAZOLE SODIUM 40 MG: 40 TABLET, DELAYED RELEASE ORAL at 08:02

## 2018-02-24 RX ADMIN — DOCUSATE SODIUM 100 MG: 100 CAPSULE, LIQUID FILLED ORAL at 08:02

## 2018-02-24 RX ADMIN — CLINDAMYCIN IN 5 PERCENT DEXTROSE 600 MG: 12 INJECTION, SOLUTION INTRAVENOUS at 06:02

## 2018-02-24 RX ADMIN — MAGNESIUM SULFATE HEPTAHYDRATE 3 G: 500 INJECTION, SOLUTION INTRAMUSCULAR; INTRAVENOUS at 12:02

## 2018-02-24 RX ADMIN — ENOXAPARIN SODIUM 40 MG: 100 INJECTION SUBCUTANEOUS at 06:02

## 2018-02-24 RX ADMIN — FLUTICASONE PROPIONATE 50 MCG: 50 SPRAY, METERED NASAL at 08:02

## 2018-02-24 RX ADMIN — Medication 10 ML: at 06:02

## 2018-02-24 RX ADMIN — CLINDAMYCIN IN 5 PERCENT DEXTROSE 600 MG: 12 INJECTION, SOLUTION INTRAVENOUS at 02:02

## 2018-02-24 RX ADMIN — BISACODYL 10 MG: 10 SUPPOSITORY RECTAL at 08:02

## 2018-02-24 RX ADMIN — OXYCODONE HYDROCHLORIDE AND ACETAMINOPHEN 1 TABLET: 5; 325 TABLET ORAL at 05:02

## 2018-02-24 RX ADMIN — MORPHINE SULFATE 4 MG: 4 INJECTION INTRAVENOUS at 10:02

## 2018-02-24 RX ADMIN — ASCORBIC ACID, VITAMIN A PALMITATE, CHOLECALCIFEROL, THIAMINE HYDROCHLORIDE, RIBOFLAVIN-5 PHOSPHATE SODIUM, PYRIDOXINE HYDROCHLORIDE, NIACINAMIDE, DEXPANTHENOL, ALPHA-TOCOPHEROL ACETATE, VITAMIN K1, FOLIC ACID, BIOTIN, CYANOCOBALAMIN: 200; 3300; 200; 6; 3.6; 6; 40; 15; 10; 150; 600; 60; 5 INJECTION, SOLUTION INTRAVENOUS at 10:02

## 2018-02-24 RX ADMIN — FLUTICASONE PROPIONATE 50 MCG: 50 SPRAY, METERED NASAL at 10:02

## 2018-02-24 RX ADMIN — TRAMADOL HYDROCHLORIDE 50 MG: 50 TABLET, FILM COATED ORAL at 06:02

## 2018-02-24 RX ADMIN — OXYCODONE HYDROCHLORIDE AND ACETAMINOPHEN 1 TABLET: 10; 325 TABLET ORAL at 08:02

## 2018-02-24 RX ADMIN — SOYBEAN OIL 250 ML: 20 INJECTION, SOLUTION INTRAVENOUS at 10:02

## 2018-02-24 RX ADMIN — CLINDAMYCIN IN 5 PERCENT DEXTROSE 600 MG: 12 INJECTION, SOLUTION INTRAVENOUS at 10:02

## 2018-02-24 RX ADMIN — MAGNESIUM OXIDE TAB 400 MG (241.3 MG ELEMENTAL MG) 400 MG: 400 (241.3 MG) TAB at 02:02

## 2018-02-24 NOTE — ASSESSMENT & PLAN NOTE
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 Ileus improving. Now with superficial wound infection/    Daily labs and lyte repletion; H/H stable; no leukocytosis   Staples removed at middle portition of midline incision plus a few more superior and inferior for a total of 6cm wound exposed- wound was explored with qtip and does not appear to track laterally. Purulent drainage expressed. Wound packed with gauze. Will continue to monitor. No systemic signs of infection- no leukocytosis, fever, chills  Clear liquid diet today as tolerated.  mIVF   Daily KUB  PRN pain medications   Path pending-will follow up   Strict I/Os  Cialis (home med) for urinary retention  OOBTC today, encourage ambulation, PT  Encourage IS.  Stool softeners PRN  methylnaltrexone  -Plan for gastrograffin challenge patient will need to be sitting up in bed during and after drinking to minimize aspiration   -PICC line placed  -Begin TPN  -Wound care changes BID with wet to dry dressing changes   -Possible adynamic ileus vs SBO will f/u gastro challenge

## 2018-02-24 NOTE — CONSULTS
Double lumen PICC placed in right brachial vein RUE, 35cm in length with 0cm exposed and 29cm arm circumference. Lot#[id number].

## 2018-02-24 NOTE — PROCEDURES
"Forrest Schmidt is a 72 y.o. male patient.    Temp: 97.3 °F (36.3 °C) (02/24/18 0751)  Pulse: 62 (02/24/18 0751)  Resp: 16 (02/24/18 0751)  BP: 133/63 (02/24/18 0751)  SpO2: 96 % (02/24/18 0751)  Weight: 73 kg (160 lb 15 oz) (02/23/18 1000)  Height: 5' 10.98" (180.3 cm) (02/23/18 1000)    PICC  Date/Time: 2/24/2018 12:03 PM  Performed by: ELENA LEE  Consent Done: Yes  Time out: Immediately prior to procedure a time out was called to verify the correct patient, procedure, equipment, support staff and site/side marked as required  Indications: med administration and vascular access  Anesthesia: local infiltration  Local anesthetic: lidocaine 1% without epinephrine  Anesthetic Total (mL): 2  Preparation: skin prepped with ChloraPrep  Skin prep agent dried: skin prep agent completely dried prior to procedure  Sterile barriers: all five maximum sterile barriers used - cap, mask, sterile gown, sterile gloves, and large sterile sheet  Hand hygiene: hand hygiene performed prior to central venous catheter insertion  Location details: right brachial  Catheter type: double lumen  Catheter size: 5 Fr  Catheter Length: 35cm    Ultrasound guidance: yes  Vessel Caliber: medium and patent, compressibility normal  Vascular Doppler: not done  Needle advanced into vessel with real time Ultrasound guidance.  Guidewire confirmed in vessel.  Image recorded and saved.  Sterile sheath used.  Number of attempts: 1  Post-procedure: blood return through all ports, chlorhexidine patch and sterile dressing applied  Technical procedures used: 3cg  Specimens: No  Implants: No  Assessment: placement verified by x-ray  Complications: none        Xiao Brush  2/24/2018    "

## 2018-02-24 NOTE — SUBJECTIVE & OBJECTIVE
"Interval History: Patient states he is passing gas but not very often. - BM. + abdominal distension. + ambulation and voiding without difficulty.  No c/o chest pain, shortness of breath, nausea or vomiting.     Medications:  Continuous Infusions:   Amino acid 5% - dextrose 20% (CLINIMIX-E) solution with additives (1L provides 50 gm AA, 200 gm CHO (680 kcal/L dextrose), Na 35, K 30, Mg 5, Ca 4.5, Acetate 80, Cl 39, Phos 15)      dextrose 5 % and 0.45 % NaCl 100 mL/hr at 02/23/18 1048     Scheduled Meds:   atorvastatin  40 mg Oral QHS    bisacodyl  10 mg Rectal Daily    Cialis (tadalafil) 5 mg - half tablet  5 mg Oral Daily    clindamycin (CLEOCIN) IVPB  600 mg Intravenous Q8H    docusate sodium  100 mg Oral BID    enoxaparin  40 mg Subcutaneous Daily    fat emulsion 20%  250 mL Intravenous Daily    fluticasone  1 spray Each Nare BID    gastrograffin  100 mL Oral Once    magnesium sulfate IVPB  3 g Intravenous Once    methylnaltrexone  12 mg Subcutaneous Every other day    morphine        pantoprazole  40 mg Oral BID    sodium chloride 0.9%  10 mL Intravenous Q6H    traMADol  50 mg Oral Q6H     PRN Meds:sodium chloride, dextrose 50%, dextrose 50%, glucagon (human recombinant), glucose, glucose, ondansetron, oxyCODONE-acetaminophen, oxyCODONE-acetaminophen, promethazine (PHENERGAN) IVPB, Flushing PICC Protocol **AND** sodium chloride 0.9% **AND** sodium chloride 0.9%, sodium chloride 0.9%, sodium chloride 0.9%     Review of patient's allergies indicates:   Allergen Reactions    Augmentin [amoxicillin-pot clavulanate] Shortness Of Breath    Singulair [montelukast]      Pt "felt strange"      Horse/equine containing products      Objective:     Vital Signs (Most Recent):  Temp: 97.3 °F (36.3 °C) (02/24/18 0751)  Pulse: 62 (02/24/18 0751)  Resp: 16 (02/24/18 0751)  BP: 133/63 (02/24/18 0751)  SpO2: 96 % (02/24/18 0751) Vital Signs (24h Range):  Temp:  [96.2 °F (35.7 °C)-98.4 °F (36.9 °C)] 97.3 °F (36.3 " °C)  Pulse:  [56-81] 62  Resp:  [16-18] 16  SpO2:  [95 %-98 %] 96 %  BP: (118-168)/(56-72) 133/63     Weight: 73 kg (160 lb 15 oz)  Body mass index is 22.46 kg/m².    Intake/Output - Last 3 Shifts       02/22 0700 - 02/23 0659 02/23 0700 - 02/24 0659 02/24 0700 - 02/25 0659    P.O. 400 400     I.V. (mL/kg) 550 (7.5) 1050 (14.4)     Total Intake(mL/kg) 950 (13) 1450 (19.9)     Net +950 +1450             Urine Occurrence 2 x 3 x     Stool Occurrence 0 x 1 x           Physical Exam   Constitutional: He is oriented to person, place, and time.   HENT:   Head: Normocephalic.   Eyes: Pupils are equal, round, and reactive to light.   Neck: Normal range of motion.   Cardiovascular: Normal rate, regular rhythm, normal heart sounds and intact distal pulses.    Pulmonary/Chest: Effort normal and breath sounds normal.   Abdominal: Soft. Bowel sounds are normal. He exhibits distension.   Incision c/d/i   Musculoskeletal: Normal range of motion.   Neurological: He is alert and oriented to person, place, and time.   Skin: Skin is warm and dry.   Psychiatric: He has a normal mood and affect. His behavior is normal. Judgment and thought content normal.   Nursing note and vitals reviewed.      Significant Labs:  CBC:     Recent Labs  Lab 02/24/18  0454   WBC 8.07   RBC 2.98*   HGB 7.7*   HCT 24.7*      MCV 83   MCH 25.8*   MCHC 31.2*     CMP:     Recent Labs  Lab 02/24/18  0454   *   CALCIUM 8.2*   ALBUMIN 2.2*   PROT 5.3*      K 4.0   CO2 28      BUN 8   CREATININE 0.8   ALKPHOS 98   *   AST 98*   BILITOT 0.4       Significant Diagnostics:  I have reviewed and interpreted all pertinent imaging results/findings within the past 24 hours.

## 2018-02-24 NOTE — PLAN OF CARE
Problem: Physical Therapy Goal  Goal: Physical Therapy Goal  Goals to be met by: 3/3    Patient will increase functional independence with mobility by performin. Supine to sit with Modified [place]  2. Sit to supine with Modified [place]  3. Sit to stand transfer with Stand-by Assistance-met  3a.  Transfers supervision with no device  4. Bed to chair transfer with Stand-by Assistance using Rolling Walker  5. Gait  x 50 feet with Stand-by Assistance using Rolling Walker. -met  5a. Gait with supervision x250 ft with no device  6. Lower extremity exercise program x20 reps per handout, with independence       Outcome: Ongoing (interventions implemented as appropriate)  Updated goals    Nakul Hoffman PT

## 2018-02-24 NOTE — PT/OT/SLP PROGRESS
Physical Therapy Treatment    Patient Name:  Forrest Schmidt   MRN:  [id number]    Recommendations:     Discharge Recommendations:  home health PT   Discharge Equipment Recommendations: walker, rolling, bedside commode   Barriers to discharge: None    Assessment:     Forrest Schmidt is a 72 y.o. male admitted with a medical diagnosis of Gastrointestinal hemorrhage.  He presents with the following impairments/functional limitations:  weakness, impaired balance, impaired endurance, impaired functional mobilty, gait instability, decreased lower extremity function.   He has demonstrated increased distance ambulated and improving endurance.  He had some discomfort with bed mobility and transitional movement today due to surgery removing staples along the incision.  He will continue to benefit from PT to progress with mobility and improve safety, strength, and balance but at this time if progress remains at its current rate, he will likely d;c home with HH.    Rehab Prognosis:  good; patient would benefit from acute skilled PT services to address these deficits and reach maximum level of function.      Recent Surgery: Procedure(s) (LRB):  RESECTION-SMALL BOWEL (N/A) 9 Days Post-Op    Plan:     During this hospitalization, patient to be seen 4 x/week to address the above listed problems via gait training, therapeutic activities, therapeutic exercises  · Plan of Care Expires:  03/18/18   Plan of Care Reviewed with: patient, spouse    Subjective     Communicated with RN prior to session.  Patient found supine upon PT entry to room, agreeable to treatment.      Chief Complaint: abdominal discomfort with movement  Patient comments/goals: to get better and return home  Pain/Comfort:  · Pain Rating 1: other (see comments) (pain only with movement)  · Location 1: abdomen  · Pain Addressed 1: Reposition  · Pain Rating Post-Intervention 1: other (see comments)    Patients cultural, spiritual, Adventist conflicts given the current situation:  none    Objective:     Patient found with: peripheral IV, telemetry     General Precautions: Standard, fall   Orthopedic Precautions:N/A   Braces: N/A     Functional Mobility:  · Bed Mobility:     · Supine to Sit: contact guard assistance and increased time  · Transfers:     · Sit to Stand:  stand by assistance with rolling walker  · Gait: Ambulated about 250ft with RW.  slow gait speed.  no LOB.        AM-PAC 6 CLICK MOBILITY  Turning over in bed (including adjusting bedclothes, sheets and blankets)?: 3  Sitting down on and standing up from a chair with arms (e.g., wheelchair, bedside commode, etc.): 3  Moving from lying on back to sitting on the side of the bed?: 3  Moving to and from a bed to a chair (including a wheelchair)?: 3  Need to walk in hospital room?: 3  Climbing 3-5 steps with a railing?: 3  Total Score: 18       Therapeutic Activities and Exercises:   pt educated on benefit of consistent ambulation and time OOB to improve strength and endurance and improve indepencence    Patient left up in chair with all lines intact and call button in reach..    GOALS:    Physical Therapy Goals        Problem: Physical Therapy Goal    Goal Priority Disciplines Outcome Goal Variances Interventions   Physical Therapy Goal     PT/OT, PT Ongoing (interventions implemented as appropriate)     Description:  Goals to be met by: 3/3    Patient will increase functional independence with mobility by performin. Supine to sit with Modified [place]  2. Sit to supine with Modified [place]  3. Sit to stand transfer with Stand-by Assistance-met  3a.  Transfers supervision with no device  4. Bed to chair transfer with Stand-by Assistance using Rolling Walker  5. Gait  x 50 feet with Stand-by Assistance using Rolling Walker. -met  5a. Gait with supervision x250 ft with no device  6. Lower extremity exercise program x20 reps per handout, with independence                         Time Tracking:     PT Received On:  02/24/18  PT Start Time: 1408     PT Stop Time: 1422  PT Total Time (min): 14 min     Billable Minutes: Gait Training 14    Treatment Type: Treatment  PT/PTA: PT     PTA Visit Number: 0     Nakul Hoffman PT  02/24/2018

## 2018-02-24 NOTE — PROGRESS NOTES
[facility]  General Surgery  Progress Note    Subjective:     History of Present Illness:  This is a 73 y/o male with hx CAD and HTN, obscure overt GI bleeding [facility].  Pt was admitted 2/5 with melena. Underwent  colonoscopy, negative for bleeding with old blood seen in TI. No EGD at OSH.  CT abd showed ileal mass, approx 5 cm. Surgery recommend operative intervention at OSH, however patient requested transfer for 2nd opinion.  Patient has had a similar episode in the past year, at which time he also had EGD, colonscopy and capsule enterography which were negative, bleeding at that time thought due to NSAID use which was discontinued and he did well until about a week ago. When he began to have blood stools again. He has some fullness and early satiety, but has not altered his eating habits, and normal bowel habits, 1-3 movement a day, no change in consistency. No abdominal pain. Only prior abdominal surgery is an appendectomy as a child.     Post-Op Info:  Procedure(s) (LRB):  RESECTION-SMALL BOWEL (N/A)   9 Days Post-Op     Interval History: Patient states he is passing gas but not very often. - BM. + abdominal distension. + ambulation and voiding without difficulty.  No c/o chest pain, shortness of breath, nausea or vomiting.     Medications:  Continuous Infusions:   Amino acid 5% - dextrose 20% (CLINIMIX-E) solution with additives (1L provides 50 gm AA, 200 gm CHO (680 kcal/L dextrose), Na 35, K 30, Mg 5, Ca 4.5, Acetate 80, Cl 39, Phos 15)      dextrose 5 % and 0.45 % NaCl 100 mL/hr at 02/23/18 1048     Scheduled Meds:   atorvastatin  40 mg Oral QHS    bisacodyl  10 mg Rectal Daily    Cialis (tadalafil) 5 mg - half tablet  5 mg Oral Daily    clindamycin (CLEOCIN) IVPB  600 mg Intravenous Q8H    docusate sodium  100 mg Oral BID    enoxaparin  40 mg Subcutaneous Daily    fat emulsion 20%  250 mL Intravenous Daily  "   fluticasone  1 spray Each Nare BID    gastrograffin  100 mL Oral Once    magnesium sulfate IVPB  3 g Intravenous Once    methylnaltrexone  12 mg Subcutaneous Every other day    morphine        pantoprazole  40 mg Oral BID    sodium chloride 0.9%  10 mL Intravenous Q6H    traMADol  50 mg Oral Q6H     PRN Meds:sodium chloride, dextrose 50%, dextrose 50%, glucagon (human recombinant), glucose, glucose, ondansetron, oxyCODONE-acetaminophen, oxyCODONE-acetaminophen, promethazine (PHENERGAN) IVPB, Flushing PICC Protocol **AND** sodium chloride 0.9% **AND** sodium chloride 0.9%, sodium chloride 0.9%, sodium chloride 0.9%     Review of patient's allergies indicates:   Allergen Reactions    Augmentin [amoxicillin-pot clavulanate] Shortness Of Breath    Singulair [montelukast]      Pt "felt strange"      Horse/equine containing products      Objective:     Vital Signs (Most Recent):  Temp: 97.3 °F (36.3 °C) (02/24/18 0751)  Pulse: 62 (02/24/18 0751)  Resp: 16 (02/24/18 0751)  BP: 133/63 (02/24/18 0751)  SpO2: 96 % (02/24/18 0751) Vital Signs (24h Range):  Temp:  [96.2 °F (35.7 °C)-98.4 °F (36.9 °C)] 97.3 °F (36.3 °C)  Pulse:  [56-81] 62  Resp:  [16-18] 16  SpO2:  [95 %-98 %] 96 %  BP: (118-168)/(56-72) 133/63     Weight: 73 kg (160 lb 15 oz)  Body mass index is 22.46 kg/m².    Intake/Output - Last 3 Shifts       02/22 0700 - 02/23 0659 02/23 0700 - 02/24 0659 02/24 0700 - 02/25 0659    P.O. 400 400     I.V. (mL/kg) 550 (7.5) 1050 (14.4)     Total Intake(mL/kg) 950 (13) 1450 (19.9)     Net +950 +1450             Urine Occurrence 2 x 3 x     Stool Occurrence 0 x 1 x           Physical Exam   Constitutional: He is oriented to person, place, and time.   HENT:   Head: Normocephalic.   Eyes: Pupils are equal, round, and reactive to light.   Neck: Normal range of motion.   Cardiovascular: Normal rate, regular rhythm, normal heart sounds and intact distal pulses.    Pulmonary/Chest: Effort normal and breath sounds " normal.   Abdominal: Soft. Bowel sounds are normal. He exhibits distension.   Incision is opened about 6cm with purulent drainage expressed   Musculoskeletal: Normal range of motion.   Neurological: He is alert and oriented to person, place, and time.   Skin: Skin is warm and dry.   Psychiatric: He has a normal mood and affect. His behavior is normal. Judgment and thought content normal.   Nursing note and vitals reviewed.      Significant Labs:  CBC:     Recent Labs  Lab 02/24/18  0454   WBC 8.07   RBC 2.98*   HGB 7.7*   HCT 24.7*      MCV 83   MCH 25.8*   MCHC 31.2*     CMP:     Recent Labs  Lab 02/24/18  0454   *   CALCIUM 8.2*   ALBUMIN 2.2*   PROT 5.3*      K 4.0   CO2 28      BUN 8   CREATININE 0.8   ALKPHOS 98   *   AST 98*   BILITOT 0.4       Significant Diagnostics:  I have reviewed and interpreted all pertinent imaging results/findings within the past 24 hours.    Assessment/Plan:     Mass of small intestine    71 yo male with mass in distal small bowel s/p Small bowel resection/mass resection on 2/16/18. Ileus improving. Now with superficial wound infection/    Daily labs and lyte repletion; H/H stable; no leukocytosis   Staples removed at middle portition of midline incision plus a few more superior and inferior for a total of 6cm wound exposed- wound was explored with qtip and does not appear to track laterally. Purulent drainage expressed. Wound packed with gauze. Will continue to monitor. No systemic signs of infection- no leukocytosis, fever, chills  Clear liquid diet today as tolerated.  mIVF   Daily KUB  PRN pain medications   Path pending-will follow up   Strict I/Os  Cialis (home med) for urinary retention  OOBTC today, encourage ambulation, PT  Encourage IS.  Stool softeners PRN  methylnaltrexone  -Plan for gastrograffin challenge patient will need to be sitting up in bed during and after drinking to minimize aspiration   -PICC line placed  -Begin TPN  -Wound  care changes BID with wet to dry dressing changes   -Possible adynamic ileus vs SBO will f/u gastro challenge              Steven Roblero MD  General Surgery  [facility]

## 2018-02-25 LAB
ALBUMIN SERPL BCP-MCNC: 2 G/DL
ALP SERPL-CCNC: 95 U/L
ALT SERPL W/O P-5'-P-CCNC: 95 U/L
ANION GAP SERPL CALC-SCNC: 6 MMOL/L
AST SERPL-CCNC: 67 U/L
BASOPHILS # BLD AUTO: 0.03 K/UL
BASOPHILS NFR BLD: 0.3 %
BILIRUB SERPL-MCNC: 0.2 MG/DL
BUN SERPL-MCNC: 9 MG/DL
CALCIUM SERPL-MCNC: 7.9 MG/DL
CHLORIDE SERPL-SCNC: 102 MMOL/L
CO2 SERPL-SCNC: 29 MMOL/L
CREAT SERPL-MCNC: 0.8 MG/DL
DIFFERENTIAL METHOD: ABNORMAL
EOSINOPHIL # BLD AUTO: 0.1 K/UL
EOSINOPHIL NFR BLD: 1.5 %
ERYTHROCYTE [DISTWIDTH] IN BLOOD BY AUTOMATED COUNT: 15.4 %
EST. GFR  (AFRICAN AMERICAN): >60 ML/MIN/1.73 M^2
EST. GFR  (NON AFRICAN AMERICAN): >60 ML/MIN/1.73 M^2
GLUCOSE SERPL-MCNC: 135 MG/DL
HCT VFR BLD AUTO: 23 %
HGB BLD-MCNC: 7.2 G/DL
IMM GRANULOCYTES # BLD AUTO: 0.04 K/UL
IMM GRANULOCYTES NFR BLD AUTO: 0.5 %
LYMPHOCYTES # BLD AUTO: 0.7 K/UL
LYMPHOCYTES NFR BLD: 7.8 %
MAGNESIUM SERPL-MCNC: 1.8 MG/DL
MCH RBC QN AUTO: 25.3 PG
MCHC RBC AUTO-ENTMCNC: 31.3 G/DL
MCV RBC AUTO: 81 FL
MONOCYTES # BLD AUTO: 0.9 K/UL
MONOCYTES NFR BLD: 10.6 %
NEUTROPHILS # BLD AUTO: 6.9 K/UL
NEUTROPHILS NFR BLD: 79.3 %
NRBC BLD-RTO: 0 /100 WBC
PHOSPHATE SERPL-MCNC: 3.4 MG/DL
PLATELET # BLD AUTO: 312 K/UL
PMV BLD AUTO: 9 FL
POTASSIUM SERPL-SCNC: 3.9 MMOL/L
PROT SERPL-MCNC: 5.1 G/DL
RBC # BLD AUTO: 2.85 M/UL
SODIUM SERPL-SCNC: 137 MMOL/L
WBC # BLD AUTO: 8.74 K/UL

## 2018-02-25 PROCEDURE — 25000003 PHARM REV CODE 250: Performed by: SURGERY

## 2018-02-25 PROCEDURE — 25000003 PHARM REV CODE 250: Performed by: STUDENT IN AN ORGANIZED HEALTH CARE EDUCATION/TRAINING PROGRAM

## 2018-02-25 PROCEDURE — 87186 SC STD MICRODIL/AGAR DIL: CPT

## 2018-02-25 PROCEDURE — 25000003 PHARM REV CODE 250: Performed by: NURSE PRACTITIONER

## 2018-02-25 PROCEDURE — 87075 CULTR BACTERIA EXCEPT BLOOD: CPT

## 2018-02-25 PROCEDURE — 87077 CULTURE AEROBIC IDENTIFY: CPT

## 2018-02-25 PROCEDURE — 80053 COMPREHEN METABOLIC PANEL: CPT

## 2018-02-25 PROCEDURE — 63600175 PHARM REV CODE 636 W HCPCS: Performed by: SURGERY

## 2018-02-25 PROCEDURE — B4185 PARENTERAL SOL 10 GM LIPIDS: HCPCS | Performed by: SURGERY

## 2018-02-25 PROCEDURE — 87070 CULTURE OTHR SPECIMN AEROBIC: CPT

## 2018-02-25 PROCEDURE — S0077 INJECTION, CLINDAMYCIN PHOSP: HCPCS | Performed by: STUDENT IN AN ORGANIZED HEALTH CARE EDUCATION/TRAINING PROGRAM

## 2018-02-25 PROCEDURE — 85025 COMPLETE CBC W/AUTO DIFF WBC: CPT

## 2018-02-25 PROCEDURE — 87076 CULTURE ANAEROBE IDENT EACH: CPT

## 2018-02-25 PROCEDURE — 63600175 PHARM REV CODE 636 W HCPCS: Performed by: NURSE PRACTITIONER

## 2018-02-25 PROCEDURE — 11000001 HC ACUTE MED/SURG PRIVATE ROOM

## 2018-02-25 PROCEDURE — 84100 ASSAY OF PHOSPHORUS: CPT

## 2018-02-25 PROCEDURE — A4216 STERILE WATER/SALINE, 10 ML: HCPCS | Performed by: SURGERY

## 2018-02-25 PROCEDURE — 83735 ASSAY OF MAGNESIUM: CPT

## 2018-02-25 RX ADMIN — PANTOPRAZOLE SODIUM 40 MG: 40 TABLET, DELAYED RELEASE ORAL at 09:02

## 2018-02-25 RX ADMIN — TRAMADOL HYDROCHLORIDE 50 MG: 50 TABLET, FILM COATED ORAL at 06:02

## 2018-02-25 RX ADMIN — Medication 10 ML: at 01:02

## 2018-02-25 RX ADMIN — ASCORBIC ACID, VITAMIN A PALMITATE, CHOLECALCIFEROL, THIAMINE HYDROCHLORIDE, RIBOFLAVIN-5 PHOSPHATE SODIUM, PYRIDOXINE HYDROCHLORIDE, NIACINAMIDE, DEXPANTHENOL, ALPHA-TOCOPHEROL ACETATE, VITAMIN K1, FOLIC ACID, BIOTIN, CYANOCOBALAMIN: 200; 3300; 200; 6; 3.6; 6; 40; 15; 10; 150; 600; 60; 5 INJECTION, SOLUTION INTRAVENOUS at 10:02

## 2018-02-25 RX ADMIN — BISACODYL 10 MG: 10 SUPPOSITORY RECTAL at 09:02

## 2018-02-25 RX ADMIN — FLUTICASONE PROPIONATE 50 MCG: 50 SPRAY, METERED NASAL at 09:02

## 2018-02-25 RX ADMIN — TRAMADOL HYDROCHLORIDE 50 MG: 50 TABLET, FILM COATED ORAL at 01:02

## 2018-02-25 RX ADMIN — OXYCODONE HYDROCHLORIDE AND ACETAMINOPHEN 1 TABLET: 10; 325 TABLET ORAL at 08:02

## 2018-02-25 RX ADMIN — TRAMADOL HYDROCHLORIDE 50 MG: 50 TABLET, FILM COATED ORAL at 12:02

## 2018-02-25 RX ADMIN — Medication 10 ML: at 06:02

## 2018-02-25 RX ADMIN — OXYCODONE HYDROCHLORIDE AND ACETAMINOPHEN 1 TABLET: 10; 325 TABLET ORAL at 04:02

## 2018-02-25 RX ADMIN — CLINDAMYCIN IN 5 PERCENT DEXTROSE 600 MG: 12 INJECTION, SOLUTION INTRAVENOUS at 01:02

## 2018-02-25 RX ADMIN — FLUTICASONE PROPIONATE 50 MCG: 50 SPRAY, METERED NASAL at 08:02

## 2018-02-25 RX ADMIN — PANTOPRAZOLE SODIUM 40 MG: 40 TABLET, DELAYED RELEASE ORAL at 08:02

## 2018-02-25 RX ADMIN — OXYCODONE HYDROCHLORIDE AND ACETAMINOPHEN 1 TABLET: 5; 325 TABLET ORAL at 04:02

## 2018-02-25 RX ADMIN — SOYBEAN OIL 250 ML: 20 INJECTION, SOLUTION INTRAVENOUS at 10:02

## 2018-02-25 RX ADMIN — CLINDAMYCIN IN 5 PERCENT DEXTROSE 600 MG: 12 INJECTION, SOLUTION INTRAVENOUS at 06:02

## 2018-02-25 RX ADMIN — CLINDAMYCIN IN 5 PERCENT DEXTROSE 600 MG: 12 INJECTION, SOLUTION INTRAVENOUS at 11:02

## 2018-02-25 RX ADMIN — ATORVASTATIN CALCIUM 40 MG: 20 TABLET, FILM COATED ORAL at 08:02

## 2018-02-25 RX ADMIN — ENOXAPARIN SODIUM 40 MG: 100 INJECTION SUBCUTANEOUS at 06:02

## 2018-02-25 RX ADMIN — DOCUSATE SODIUM 100 MG: 100 CAPSULE, LIQUID FILLED ORAL at 08:02

## 2018-02-25 RX ADMIN — DOCUSATE SODIUM 100 MG: 100 CAPSULE, LIQUID FILLED ORAL at 09:02

## 2018-02-25 RX ADMIN — ONDANSETRON 8 MG: 2 INJECTION INTRAMUSCULAR; INTRAVENOUS at 04:02

## 2018-02-25 RX ADMIN — OXYCODONE HYDROCHLORIDE AND ACETAMINOPHEN 1 TABLET: 10; 325 TABLET ORAL at 11:02

## 2018-02-25 RX ADMIN — METHYLNALTREXONE BROMIDE 12 MG: 12 INJECTION, SOLUTION SUBCUTANEOUS at 09:02

## 2018-02-25 NOTE — ASSESSMENT & PLAN NOTE
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 Ileus improving. Now with superficial wound infection/    Daily labs and lyte repletion; H/H stable; no leukocytosis   Staples removed at middle portition of midline incision plus a few more superior and inferior for a total of 6cm wound exposed- wound was explored with qtip and does not appear to track laterally. Purulent drainage expressed. Wound packed with gauze. Will continue to monitor. No systemic signs of infection- no leukocytosis, fever, chills  Advance to full liquid diet  mIVF   Daily KUB  PRN pain medications   Path pending-will follow up   Strict I/Os  Cialis (home med) for urinary retention  OOBTC today, encourage ambulation, PT  Encourage IS.  Stool softeners PRN  methylnaltrexone  -PICC line placed  -Begin TPN  -Wound care changes BID with wet to dry dressing changes   -D/c suppository  -Possible adynamic ileus

## 2018-02-25 NOTE — PROGRESS NOTES
[facility]  General Surgery  Progress Note    Subjective:     History of Present Illness:  This is a 73 y/o male with hx CAD and HTN, obscure overt GI bleeding who presents as transfer from [facility] for ileal mass seen on CT imaging.  Pt was admitted 2/5 with melena. Underwent  colonoscopy, negative for bleeding with old blood seen in TI. No EGD at OSH.  CT abd showed ileal mass, approx 5 cm. Surgery recommend operative intervention at OSH, however patient requested transfer for 2nd opinion.  Patient has had a similar episode in the past year, at which time he also had EGD, colonscopy and capsule enterography which were negative, bleeding at that time thought due to NSAID use which was discontinued and he did well until about a week ago. When he began to have blood stools again. He has some fullness and early satiety, but has not altered his eating habits, and normal bowel habits, 1-3 movement a day, no change in consistency. No abdominal pain. Only prior abdominal surgery is an appendectomy as a child.     Post-Op Info:  Procedure(s) (LRB):  RESECTION-SMALL BOWEL (N/A)   10 Days Post-Op     Interval History: Patient states he is passing gas. One very small BM. + abdominal distension. + ambulation and voiding without difficulty.  No c/o chest pain, shortness of breath, nausea or vomiting.     Medications:  Continuous Infusions:   Amino acid 5% - dextrose 20% (CLINIMIX-E) solution with additives (1L provides 50 gm AA, 200 gm CHO (680 kcal/L dextrose), Na 35, K 30, Mg 5, Ca 4.5, Acetate 80, Cl 39, Phos 15) 75 mL/hr at 02/24/18 2205    dextrose 5 % and 0.45 % NaCl 100 mL/hr at 02/23/18 1048     Scheduled Meds:   atorvastatin  40 mg Oral QHS    bisacodyl  10 mg Rectal Daily    Cialis (tadalafil) 5 mg - half tablet  5 mg Oral Daily    clindamycin (CLEOCIN) IVPB  600 mg Intravenous Q8H    docusate sodium  100 mg Oral BID    enoxaparin  40 mg Subcutaneous Daily    fluticasone  1 spray  "Each Nare BID    methylnaltrexone  12 mg Subcutaneous Every other day    pantoprazole  40 mg Oral BID    sodium chloride 0.9%  10 mL Intravenous Q6H    traMADol  50 mg Oral Q6H     PRN Meds:sodium chloride, dextrose 50%, dextrose 50%, glucagon (human recombinant), glucose, glucose, ondansetron, oxyCODONE-acetaminophen, oxyCODONE-acetaminophen, promethazine (PHENERGAN) IVPB, Flushing PICC Protocol **AND** sodium chloride 0.9% **AND** sodium chloride 0.9%, sodium chloride 0.9%, sodium chloride 0.9%     Review of patient's allergies indicates:   Allergen Reactions    Augmentin [amoxicillin-pot clavulanate] Shortness Of Breath    Singulair [montelukast]      Pt "felt strange"      Horse/equine containing products      Objective:     Vital Signs (Most Recent):  Temp: 97.9 °F (36.6 °C) (02/25/18 0423)  Pulse: 69 (02/25/18 0423)  Resp: 20 (02/25/18 0423)  BP: 131/65 (02/25/18 0423)  SpO2: 95 % (02/25/18 0423) Vital Signs (24h Range):  Temp:  [96.8 °F (36 °C)-97.9 °F (36.6 °C)] 97.9 °F (36.6 °C)  Pulse:  [67-70] 69  Resp:  [16-20] 20  SpO2:  [94 %-96 %] 95 %  BP: (121-166)/(63-80) 131/65     Weight: 73 kg (160 lb 15 oz)  Body mass index is 22.46 kg/m².    Intake/Output - Last 3 Shifts       02/23 0700 - 02/24 0659 02/24 0700 - 02/25 0659 02/25 0700 - 02/26 0659    P.O. 400 400     I.V. (mL/kg) 1050 (14.4) 1250 (17.1)     Total Intake(mL/kg) 1450 (19.9) 1650 (22.6)     Net +1450 +1650             Urine Occurrence 3 x 4 x     Stool Occurrence 1 x 1 x           Physical Exam   Constitutional: He is oriented to person, place, and time.   HENT:   Head: Normocephalic.   Eyes: Pupils are equal, round, and reactive to light.   Neck: Normal range of motion.   Cardiovascular: Normal rate, regular rhythm, normal heart sounds and intact distal pulses.    Pulmonary/Chest: Effort normal and breath sounds normal.   Abdominal: Soft. Bowel sounds are normal. He exhibits distension.   Incision c/d/i   Musculoskeletal: Normal range of " motion.   Neurological: He is alert and oriented to person, place, and time.   Skin: Skin is warm and dry.   Psychiatric: He has a normal mood and affect. His behavior is normal. Judgment and thought content normal.   Nursing note and vitals reviewed.      Significant Labs:  CBC:     Recent Labs  Lab 02/25/18  0438   WBC 8.74   RBC 2.85*   HGB 7.2*   HCT 23.0*      MCV 81*   MCH 25.3*   MCHC 31.3*     CMP:     Recent Labs  Lab 02/25/18  0438   *   CALCIUM 7.9*   ALBUMIN 2.0*   PROT 5.1*      K 3.9   CO2 29      BUN 9   CREATININE 0.8   ALKPHOS 95   ALT 95*   AST 67*   BILITOT 0.2       Significant Diagnostics:  I have reviewed and interpreted all pertinent imaging results/findings within the past 24 hours.    Assessment/Plan:     Mass of small intestine    73 yo male with mass in distal small bowel s/p Small bowel resection/mass resection on 2/16/18. Ileus improving. Now with superficial wound infection/    Daily labs and lyte repletion; H/H stable; no leukocytosis   Staples removed at middle portition of midline incision plus a few more superior and inferior for a total of 6cm wound exposed- wound was explored with qtip and does not appear to track laterally. Purulent drainage expressed. Wound packed with gauze. Will continue to monitor. No systemic signs of infection- no leukocytosis, fever, chills  Advance to full liquid diet  mIVF   Daily KUB  PRN pain medications   Path pending-will follow up   Strict I/Os  Cialis (home med) for urinary retention  OOBTC today, encourage ambulation, PT  Encourage IS.  Stool softeners PRN  methylnaltrexone  -PICC line placed  -Begin TPN  -Wound care changes BID with wet to dry dressing changes   -D/c suppository  -Possible adynamic ileus               Steven Roblero MD  General Surgery  [facility] [address] [facility]

## 2018-02-25 NOTE — SUBJECTIVE & OBJECTIVE
"Interval History: Patient states he is passing gas. One very small BM. + abdominal distension. + ambulation and voiding without difficulty.  No c/o chest pain, shortness of breath, nausea or vomiting.     Medications:  Continuous Infusions:   Amino acid 5% - dextrose 20% (CLINIMIX-E) solution with additives (1L provides 50 gm AA, 200 gm CHO (680 kcal/L dextrose), Na 35, K 30, Mg 5, Ca 4.5, Acetate 80, Cl 39, Phos 15) 75 mL/hr at 02/24/18 2205    dextrose 5 % and 0.45 % NaCl 100 mL/hr at 02/23/18 1048     Scheduled Meds:   atorvastatin  40 mg Oral QHS    bisacodyl  10 mg Rectal Daily    Cialis (tadalafil) 5 mg - half tablet  5 mg Oral Daily    clindamycin (CLEOCIN) IVPB  600 mg Intravenous Q8H    docusate sodium  100 mg Oral BID    enoxaparin  40 mg Subcutaneous Daily    fluticasone  1 spray Each Nare BID    methylnaltrexone  12 mg Subcutaneous Every other day    pantoprazole  40 mg Oral BID    sodium chloride 0.9%  10 mL Intravenous Q6H    traMADol  50 mg Oral Q6H     PRN Meds:sodium chloride, dextrose 50%, dextrose 50%, glucagon (human recombinant), glucose, glucose, ondansetron, oxyCODONE-acetaminophen, oxyCODONE-acetaminophen, promethazine (PHENERGAN) IVPB, Flushing PICC Protocol **AND** sodium chloride 0.9% **AND** sodium chloride 0.9%, sodium chloride 0.9%, sodium chloride 0.9%     Review of patient's allergies indicates:   Allergen Reactions    Augmentin [amoxicillin-pot clavulanate] Shortness Of Breath    Singulair [montelukast]      Pt "felt strange"      Horse/equine containing products      Objective:     Vital Signs (Most Recent):  Temp: 97.9 °F (36.6 °C) (02/25/18 0423)  Pulse: 69 (02/25/18 0423)  Resp: 20 (02/25/18 0423)  BP: 131/65 (02/25/18 0423)  SpO2: 95 % (02/25/18 0423) Vital Signs (24h Range):  Temp:  [96.8 °F (36 °C)-97.9 °F (36.6 °C)] 97.9 °F (36.6 °C)  Pulse:  [67-70] 69  Resp:  [16-20] 20  SpO2:  [94 %-96 %] 95 %  BP: (121-166)/(63-80) 131/65     Weight: 73 kg (160 lb 15 " oz)  Body mass index is 22.46 kg/m².    Intake/Output - Last 3 Shifts       02/23 0700 - 02/24 0659 02/24 0700 - 02/25 0659 02/25 0700 - 02/26 0659    P.O. 400 400     I.V. (mL/kg) 1050 (14.4) 1250 (17.1)     Total Intake(mL/kg) 1450 (19.9) 1650 (22.6)     Net +1450 +1650             Urine Occurrence 3 x 4 x     Stool Occurrence 1 x 1 x           Physical Exam   Constitutional: He is oriented to person, place, and time.   HENT:   Head: Normocephalic.   Eyes: Pupils are equal, round, and reactive to light.   Neck: Normal range of motion.   Cardiovascular: Normal rate, regular rhythm, normal heart sounds and intact distal pulses.    Pulmonary/Chest: Effort normal and breath sounds normal.   Abdominal: Soft. Bowel sounds are normal. He exhibits distension.   Incision c/d/i   Musculoskeletal: Normal range of motion.   Neurological: He is alert and oriented to person, place, and time.   Skin: Skin is warm and dry.   Psychiatric: He has a normal mood and affect. His behavior is normal. Judgment and thought content normal.   Nursing note and vitals reviewed.      Significant Labs:  CBC:     Recent Labs  Lab 02/25/18  0438   WBC 8.74   RBC 2.85*   HGB 7.2*   HCT 23.0*      MCV 81*   MCH 25.3*   MCHC 31.3*     CMP:     Recent Labs  Lab 02/25/18  0438   *   CALCIUM 7.9*   ALBUMIN 2.0*   PROT 5.1*      K 3.9   CO2 29      BUN 9   CREATININE 0.8   ALKPHOS 95   ALT 95*   AST 67*   BILITOT 0.2       Significant Diagnostics:  I have reviewed and interpreted all pertinent imaging results/findings within the past 24 hours.

## 2018-02-26 LAB
ALBUMIN SERPL BCP-MCNC: 2.1 G/DL
ALP SERPL-CCNC: 106 U/L
ALT SERPL W/O P-5'-P-CCNC: 78 U/L
ANION GAP SERPL CALC-SCNC: 9 MMOL/L
AST SERPL-CCNC: 62 U/L
BASOPHILS # BLD AUTO: 0.05 K/UL
BASOPHILS NFR BLD: 0.6 %
BILIRUB SERPL-MCNC: 0.2 MG/DL
BUN SERPL-MCNC: 13 MG/DL
CALCIUM SERPL-MCNC: 8.4 MG/DL
CHLORIDE SERPL-SCNC: 101 MMOL/L
CO2 SERPL-SCNC: 26 MMOL/L
CREAT SERPL-MCNC: 0.8 MG/DL
DIFFERENTIAL METHOD: ABNORMAL
EOSINOPHIL # BLD AUTO: 0.2 K/UL
EOSINOPHIL NFR BLD: 2.9 %
ERYTHROCYTE [DISTWIDTH] IN BLOOD BY AUTOMATED COUNT: 15.5 %
EST. GFR  (AFRICAN AMERICAN): >60 ML/MIN/1.73 M^2
EST. GFR  (NON AFRICAN AMERICAN): >60 ML/MIN/1.73 M^2
GLUCOSE SERPL-MCNC: 131 MG/DL
HCT VFR BLD AUTO: 24.6 %
HGB BLD-MCNC: 7.8 G/DL
IMM GRANULOCYTES # BLD AUTO: 0.02 K/UL
IMM GRANULOCYTES NFR BLD AUTO: 0.2 %
LYMPHOCYTES # BLD AUTO: 0.7 K/UL
LYMPHOCYTES NFR BLD: 7.9 %
MAGNESIUM SERPL-MCNC: 1.8 MG/DL
MCH RBC QN AUTO: 25.8 PG
MCHC RBC AUTO-ENTMCNC: 31.7 G/DL
MCV RBC AUTO: 82 FL
MONOCYTES # BLD AUTO: 1 K/UL
MONOCYTES NFR BLD: 12.2 %
NEUTROPHILS # BLD AUTO: 6.4 K/UL
NEUTROPHILS NFR BLD: 76.2 %
NRBC BLD-RTO: 0 /100 WBC
PHOSPHATE SERPL-MCNC: 3.7 MG/DL
PLATELET # BLD AUTO: 326 K/UL
PMV BLD AUTO: 9.8 FL
POTASSIUM SERPL-SCNC: 4.2 MMOL/L
PREALB SERPL-MCNC: 11 MG/DL
PROT SERPL-MCNC: 5.6 G/DL
RBC # BLD AUTO: 3.02 M/UL
SODIUM SERPL-SCNC: 136 MMOL/L
WBC # BLD AUTO: 8.37 K/UL

## 2018-02-26 PROCEDURE — 25000003 PHARM REV CODE 250: Performed by: STUDENT IN AN ORGANIZED HEALTH CARE EDUCATION/TRAINING PROGRAM

## 2018-02-26 PROCEDURE — 83735 ASSAY OF MAGNESIUM: CPT

## 2018-02-26 PROCEDURE — B4185 PARENTERAL SOL 10 GM LIPIDS: HCPCS | Performed by: STUDENT IN AN ORGANIZED HEALTH CARE EDUCATION/TRAINING PROGRAM

## 2018-02-26 PROCEDURE — 97116 GAIT TRAINING THERAPY: CPT

## 2018-02-26 PROCEDURE — 97110 THERAPEUTIC EXERCISES: CPT

## 2018-02-26 PROCEDURE — 84134 ASSAY OF PREALBUMIN: CPT

## 2018-02-26 PROCEDURE — 25000003 PHARM REV CODE 250: Performed by: SURGERY

## 2018-02-26 PROCEDURE — 63600175 PHARM REV CODE 636 W HCPCS: Performed by: SURGERY

## 2018-02-26 PROCEDURE — 11000001 HC ACUTE MED/SURG PRIVATE ROOM

## 2018-02-26 PROCEDURE — 85025 COMPLETE CBC W/AUTO DIFF WBC: CPT

## 2018-02-26 PROCEDURE — 25000003 PHARM REV CODE 250: Performed by: NURSE PRACTITIONER

## 2018-02-26 PROCEDURE — 97803 MED NUTRITION INDIV SUBSEQ: CPT

## 2018-02-26 PROCEDURE — 80053 COMPREHEN METABOLIC PANEL: CPT

## 2018-02-26 PROCEDURE — 63600175 PHARM REV CODE 636 W HCPCS: Performed by: NURSE PRACTITIONER

## 2018-02-26 PROCEDURE — A4216 STERILE WATER/SALINE, 10 ML: HCPCS | Performed by: SURGERY

## 2018-02-26 PROCEDURE — 84100 ASSAY OF PHOSPHORUS: CPT

## 2018-02-26 PROCEDURE — S0077 INJECTION, CLINDAMYCIN PHOSP: HCPCS | Performed by: STUDENT IN AN ORGANIZED HEALTH CARE EDUCATION/TRAINING PROGRAM

## 2018-02-26 RX ORDER — LOSARTAN POTASSIUM 25 MG/1
25 TABLET ORAL DAILY
Status: DISCONTINUED | OUTPATIENT
Start: 2018-02-26 | End: 2018-02-27

## 2018-02-26 RX ORDER — LOSARTAN POTASSIUM 25 MG/1
100 TABLET ORAL NIGHTLY
Status: DISCONTINUED | OUTPATIENT
Start: 2018-02-26 | End: 2018-02-26

## 2018-02-26 RX ADMIN — ONDANSETRON 8 MG: 2 INJECTION INTRAMUSCULAR; INTRAVENOUS at 10:02

## 2018-02-26 RX ADMIN — OXYCODONE HYDROCHLORIDE AND ACETAMINOPHEN 1 TABLET: 10; 325 TABLET ORAL at 04:02

## 2018-02-26 RX ADMIN — ENOXAPARIN SODIUM 40 MG: 100 INJECTION SUBCUTANEOUS at 05:02

## 2018-02-26 RX ADMIN — Medication 10 ML: at 05:02

## 2018-02-26 RX ADMIN — PANTOPRAZOLE SODIUM 40 MG: 40 TABLET, DELAYED RELEASE ORAL at 09:02

## 2018-02-26 RX ADMIN — Medication 10 ML: at 06:02

## 2018-02-26 RX ADMIN — CLINDAMYCIN IN 5 PERCENT DEXTROSE 600 MG: 12 INJECTION, SOLUTION INTRAVENOUS at 04:02

## 2018-02-26 RX ADMIN — DOCUSATE SODIUM 100 MG: 100 CAPSULE, LIQUID FILLED ORAL at 08:02

## 2018-02-26 RX ADMIN — PANTOPRAZOLE SODIUM 40 MG: 40 TABLET, DELAYED RELEASE ORAL at 08:02

## 2018-02-26 RX ADMIN — TRAMADOL HYDROCHLORIDE 50 MG: 50 TABLET, FILM COATED ORAL at 05:02

## 2018-02-26 RX ADMIN — TRAMADOL HYDROCHLORIDE 50 MG: 50 TABLET, FILM COATED ORAL at 11:02

## 2018-02-26 RX ADMIN — TRAMADOL HYDROCHLORIDE 50 MG: 50 TABLET, FILM COATED ORAL at 12:02

## 2018-02-26 RX ADMIN — OXYCODONE HYDROCHLORIDE AND ACETAMINOPHEN 1 TABLET: 10; 325 TABLET ORAL at 05:02

## 2018-02-26 RX ADMIN — Medication 10 ML: at 12:02

## 2018-02-26 RX ADMIN — FLUTICASONE PROPIONATE 50 MCG: 50 SPRAY, METERED NASAL at 08:02

## 2018-02-26 RX ADMIN — ATORVASTATIN CALCIUM 40 MG: 20 TABLET, FILM COATED ORAL at 09:02

## 2018-02-26 RX ADMIN — ONDANSETRON 8 MG: 2 INJECTION INTRAMUSCULAR; INTRAVENOUS at 12:02

## 2018-02-26 RX ADMIN — FLUTICASONE PROPIONATE 50 MCG: 50 SPRAY, METERED NASAL at 09:02

## 2018-02-26 RX ADMIN — LOSARTAN POTASSIUM 25 MG: 25 TABLET, FILM COATED ORAL at 10:02

## 2018-02-26 RX ADMIN — CLINDAMYCIN IN 5 PERCENT DEXTROSE 600 MG: 12 INJECTION, SOLUTION INTRAVENOUS at 12:02

## 2018-02-26 RX ADMIN — CLINDAMYCIN IN 5 PERCENT DEXTROSE 600 MG: 12 INJECTION, SOLUTION INTRAVENOUS at 09:02

## 2018-02-26 RX ADMIN — ASCORBIC ACID, VITAMIN A PALMITATE, CHOLECALCIFEROL, THIAMINE HYDROCHLORIDE, RIBOFLAVIN-5 PHOSPHATE SODIUM, PYRIDOXINE HYDROCHLORIDE, NIACINAMIDE, DEXPANTHENOL, ALPHA-TOCOPHEROL ACETATE, VITAMIN K1, FOLIC ACID, BIOTIN, CYANOCOBALAMIN: 200; 3300; 200; 6; 3.6; 6; 40; 15; 10; 150; 600; 60; 5 INJECTION, SOLUTION INTRAVENOUS at 09:02

## 2018-02-26 RX ADMIN — TRAMADOL HYDROCHLORIDE 50 MG: 50 TABLET, FILM COATED ORAL at 06:02

## 2018-02-26 RX ADMIN — SOYBEAN OIL 250 ML: 20 INJECTION, SOLUTION INTRAVENOUS at 09:02

## 2018-02-26 RX ADMIN — DOCUSATE SODIUM 100 MG: 100 CAPSULE, LIQUID FILLED ORAL at 09:02

## 2018-02-26 NOTE — PROGRESS NOTES
Patient concerned about having 3 loose BM today.  MD on call made aware.  No concerns at this time.  Will continue to monitor.

## 2018-02-26 NOTE — PT/OT/SLP PROGRESS
Physical Therapy Treatment    Patient Name:  Forrest Schmidt   MRN:  [id number]    Recommendations:     Discharge Recommendations:  home health PT   Discharge Equipment Recommendations: walker, rolling, bedside commode   Barriers to discharge: None    Assessment:     Forrest Schmidt is a 72 y.o. male admitted with a medical diagnosis of Gastrointestinal hemorrhage.  He presents with the following impairments/functional limitations:  weakness, impaired endurance, impaired self care skills, gait instability, impaired functional mobilty, decreased lower extremity function .pt with c/o of  some discomfort and dizziness with bed mobility and transitional movement today.Pt tolerated treatment fairly well and is progressing with mobility. Pt would continue to benefit from skilled PT to address overall functional mobility and goals. Goals remain appropriate    Rehab Prognosis:  good; patient would benefit from acute skilled PT services to address these deficits and reach maximum level of function.      Recent Surgery: Procedure(s) (LRB):  RESECTION-SMALL BOWEL (N/A) 11 Days Post-Op    Plan:     During this hospitalization, patient to be seen 4 x/week to address the above listed problems via gait training, therapeutic activities, therapeutic exercises  · Plan of Care Expires:  03/18/18   Plan of Care Reviewed with: patient    Subjective     Communicated with RN prior to session.  Patient found supine upon PT entry to room, agreeable to treatment.      Chief Complaint: abdominal discomfort with movement  Pain/Comfort:  · Pain Rating 1: 6/10  · Location - Orientation 1: upper  · Location 1: abdomen  · Pain Addressed 1: Reposition  · Pain Rating Post-Intervention 1: 6/10    Patients cultural, spiritual, Evangelical conflicts given the current situation: none noted    Objective:     Patient found with: peripheral IV, telemetry     General Precautions: Standard, fall   Orthopedic Precautions:N/A   Braces: N/A     Functional Mobility:  · Bed  Mobility:     · Supine to Sit: contact guard assistance and increased time  · Transfers:     · Sit to Stand:  stand by assistance with rolling walker  · Gait: Ambulated about 260ft with RW.  slow gait speed.  no LOB.           AM-PAC 6 CLICK MOBILITY  Turning over in bed (including adjusting bedclothes, sheets and blankets)?: 3  Sitting down on and standing up from a chair with arms (e.g., wheelchair, bedside commode, etc.): 3  Moving from lying on back to sitting on the side of the bed?: 3  Moving to and from a bed to a chair (including a wheelchair)?: 3  Need to walk in hospital room?: 3  Climbing 3-5 steps with a railing?: 3  Total Score: 18       Therapeutic Activities and Exercises:   Discussed/educated patient on progress, safety,d/c, PT POC   White board updated   Donned an extra gown   Pt performed B LE therex x 15 reps AROM  Pt encouraged to ambulate in hallways 3x/day with nursing  assistance to improve endurance.   Pt safe to ambulate in hallway with RN or PCT assistance     Patient left up in chair with all lines intact, call button in reach, nsg notified and spouse present..    GOALS:    Physical Therapy Goals        Problem: Physical Therapy Goal    Goal Priority Disciplines Outcome Goal Variances Interventions   Physical Therapy Goal     PT/OT, PT Ongoing (interventions implemented as appropriate)     Description:  Goals to be met by: 3/3    Patient will increase functional independence with mobility by performin. Supine to sit with Modified [place]  2. Sit to supine with Modified [place]  3. Sit to stand transfer with Stand-by Assistance-met  3a.  Transfers supervision with no device  4. Bed to chair transfer with Stand-by Assistance using Rolling Walker  5. Gait  x 50 feet with Stand-by Assistance using Rolling Walker. -met  5a. Gait with supervision x250 ft with no device  6. Lower extremity exercise program x20 reps per handout, with independence                         Time  Tracking:     PT Received On: 02/26/18  PT Start Time: 1028     PT Stop Time: 1053  PT Total Time (min): 25 min     Billable Minutes: Gait Training 15 and Therapeutic Exercise 10    Treatment Type: Treatment  PT/PTA: PTA     PTA Visit Number: 1     Ahmet Pearce, USAMA  02/26/2018

## 2018-02-26 NOTE — SUBJECTIVE & OBJECTIVE
"Interval History: No acute events overnight. Had 3 loose BMs yesterday. Patient states he is passing gas. Continues to have abdominal distension but soft abdomen, hyperactive bowel sounds . Voiding, ambulating. Midline incision open, no erythema. Dressing changed at bedside this morning.    Medications:  Continuous Infusions:   Amino acid 5% - dextrose 20% (CLINIMIX-E) solution with additives (1L provides 50 gm AA, 200 gm CHO (680 kcal/L dextrose), Na 35, K 30, Mg 5, Ca 4.5, Acetate 80, Cl 39, Phos 15) 75 mL/hr at 02/25/18 2219    Amino acid 5% - dextrose 20% (CLINIMIX-E) solution with additives (1L provides 50 gm AA, 200 gm CHO (680 kcal/L dextrose), Na 35, K 30, Mg 5, Ca 4.5, Acetate 80, Cl 39, Phos 15)       Scheduled Meds:   atorvastatin  40 mg Oral QHS    Cialis (tadalafil) 5 mg - half tablet  5 mg Oral Daily    clindamycin (CLEOCIN) IVPB  600 mg Intravenous Q8H    docusate sodium  100 mg Oral BID    enoxaparin  40 mg Subcutaneous Daily    fat emulsion 20%  250 mL Intravenous Daily    fat emulsion 20%  250 mL Intravenous Daily    fluticasone  1 spray Each Nare BID    methylnaltrexone  12 mg Subcutaneous Every other day    pantoprazole  40 mg Oral BID    sodium chloride 0.9%  10 mL Intravenous Q6H    traMADol  50 mg Oral Q6H     PRN Meds:sodium chloride, dextrose 50%, dextrose 50%, glucagon (human recombinant), glucose, glucose, ondansetron, oxyCODONE-acetaminophen, oxyCODONE-acetaminophen, promethazine (PHENERGAN) IVPB, Flushing PICC Protocol **AND** sodium chloride 0.9% **AND** sodium chloride 0.9%, sodium chloride 0.9%, sodium chloride 0.9%     Review of patient's allergies indicates:   Allergen Reactions    Augmentin [amoxicillin-pot clavulanate] Shortness Of Breath    Singulair [montelukast]      Pt "felt strange"      Horse/equine containing products      Objective:     Vital Signs (Most Recent):  Temp: 97.2 °F (36.2 °C) (02/26/18 0453)  Pulse: 69 (02/26/18 0453)  Resp: 16 (02/26/18 " 1603)  BP: (!) 157/69 (02/26/18 0453)  SpO2: 95 % (02/26/18 0453) Vital Signs (24h Range):  Temp:  [96.6 °F (35.9 °C)-97.8 °F (36.6 °C)] 97.2 °F (36.2 °C)  Pulse:  [67-74] 69  Resp:  [16] 16  SpO2:  [94 %-98 %] 95 %  BP: (129-166)/(68-78) 157/69     Weight: 73 kg (160 lb 15 oz)  Body mass index is 22.46 kg/m².    Intake/Output - Last 3 Shifts       02/24 0700 - 02/25 0659 02/25 0700 - 02/26 0659 02/26 0700 - 02/27 0659    P.O. 400      I.V. (mL/kg) 1250 (17.1)      Total Intake(mL/kg) 1650 (22.6)      Net +1650               Urine Occurrence 4 x      Stool Occurrence 1 x 3 x           Physical Exam   Constitutional: He is oriented to person, place, and time. He appears well-developed and well-nourished.   HENT:   Head: Normocephalic and atraumatic.   Eyes: Pupils are equal, round, and reactive to light.   Neck: Normal range of motion.   Cardiovascular: Normal rate.    Pulmonary/Chest: Effort normal. No respiratory distress.   Abdominal: Soft. Bowel sounds are normal. He exhibits distension. There is no tenderness.   Midline incision packed with gauze, changed at bedside this morning.   Musculoskeletal: Normal range of motion.   Neurological: He is alert and oriented to person, place, and time.   Skin: Skin is warm and dry.   Psychiatric: He has a normal mood and affect. His behavior is normal. Judgment and thought content normal.   Nursing note and vitals reviewed.      Significant Labs:  CBC:     Recent Labs  Lab 02/26/18 0417   WBC 8.37   RBC 3.02*   HGB 7.8*   HCT 24.6*      MCV 82   MCH 25.8*   MCHC 31.7*     CMP:     Recent Labs  Lab 02/26/18 0417   *   CALCIUM 8.4*   ALBUMIN 2.1*   PROT 5.6*      K 4.2   CO2 26      BUN 13   CREATININE 0.8   ALKPHOS 106   ALT 78*   AST 62*   BILITOT 0.2       Significant Diagnostics:  I have reviewed and interpreted all pertinent imaging results/findings within the past 24 hours.

## 2018-02-26 NOTE — ASSESSMENT & PLAN NOTE
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 Ileus improving. Now with superficial wound infection/    - trend labs q48 hours; H/H stable; no leukocytosis this morning  - midline incision open at middle portition of midline incision plus a few more superior and inferior for a total of 6cm wound exposed. Wound packed with gauze- dressing changed this morning. No systemic signs of infection- no leukocytosis, fever, chills  - tolerating full liquid diet, continue, has return of bowel function  - d/c mIVF   - pain well controlled on PO PRN pain medications   - pathology report discussed with patient by Dr. Sanchez, report given  - monitor I/Os  - continue cialis (home med) for urinary retention  - encourage OOBTC today  - encourage IS  - stool softeners PRN  - methylnaltrexone  -PICC line placed, continue  -continue TPN, reordered for today  -Wound care changes BID with wet to dry dressing changes   - hypertension overnight, added home losartan back today

## 2018-02-26 NOTE — PLAN OF CARE
Problem: Physical Therapy Goal  Goal: Physical Therapy Goal  Goals to be met by: 3/3    Patient will increase functional independence with mobility by performin. Supine to sit with Modified [place]  2. Sit to supine with Modified [place]  3. Sit to stand transfer with Stand-by Assistance-met  3a.  Transfers supervision with no device  4. Bed to chair transfer with Stand-by Assistance using Rolling Walker  5. Gait  x 50 feet with Stand-by Assistance using Rolling Walker. -met  5a. Gait with supervision x250 ft with no device  6. Lower extremity exercise program x20 reps per handout, with independence        Pt progressing towards goals. continue with PT POC.Goals remain appropriate.  Ahmet Pearce PTA  2018

## 2018-02-26 NOTE — PROGRESS NOTES
[facility]  General Surgery  Progress Note    Subjective:     History of Present Illness:  This is a 73 y/o male with hx CAD and HTN, obscure overt GI bleeding [facility].  Pt was admitted 2/5 with melena. Underwent  colonoscopy, negative for bleeding with old blood seen in TI. No EGD at OSH.  CT abd showed ileal mass, approx 5 cm. Surgery recommend operative intervention at OSH, however patient requested transfer for 2nd opinion.  Patient has had a similar episode in the past year, at which time he also had EGD, colonscopy and capsule enterography which were negative, bleeding at that time thought due to NSAID use which was discontinued and he did well until about a week ago. When he began to have blood stools again. He has some fullness and early satiety, but has not altered his eating habits, and normal bowel habits, 1-3 movement a day, no change in consistency. No abdominal pain. Only prior abdominal surgery is an appendectomy as a child.     Post-Op Info:  Procedure(s) (LRB):  RESECTION-SMALL BOWEL (N/A)   11 Days Post-Op     Interval History: No acute events overnight. Had 3 loose BMs yesterday. Patient states he is passing gas. Continues to have abdominal distension but soft abdomen, hyperactive bowel sounds . Voiding, ambulating. Midline incision open, no erythema. Dressing changed at bedside this morning.    Medications:  Continuous Infusions:   Amino acid 5% - dextrose 20% (CLINIMIX-E) solution with additives (1L provides 50 gm AA, 200 gm CHO (680 kcal/L dextrose), Na 35, K 30, Mg 5, Ca 4.5, Acetate 80, Cl 39, Phos 15) 75 mL/hr at 02/25/18 5556    Amino acid 5% - dextrose 20% (CLINIMIX-E) solution with additives (1L provides 50 gm AA, 200 gm CHO (680 kcal/L dextrose), Na 35, K 30, Mg 5, Ca 4.5, Acetate 80, Cl 39, Phos 15)       Scheduled Meds:   atorvastatin  40 mg Oral QHS    Cialis (tadalafil) 5 mg - half tablet   "5 mg Oral Daily    clindamycin (CLEOCIN) IVPB  600 mg Intravenous Q8H    docusate sodium  100 mg Oral BID    enoxaparin  40 mg Subcutaneous Daily    fat emulsion 20%  250 mL Intravenous Daily    fat emulsion 20%  250 mL Intravenous Daily    fluticasone  1 spray Each Nare BID    methylnaltrexone  12 mg Subcutaneous Every other day    pantoprazole  40 mg Oral BID    sodium chloride 0.9%  10 mL Intravenous Q6H    traMADol  50 mg Oral Q6H     PRN Meds:sodium chloride, dextrose 50%, dextrose 50%, glucagon (human recombinant), glucose, glucose, ondansetron, oxyCODONE-acetaminophen, oxyCODONE-acetaminophen, promethazine (PHENERGAN) IVPB, Flushing PICC Protocol **AND** sodium chloride 0.9% **AND** sodium chloride 0.9%, sodium chloride 0.9%, sodium chloride 0.9%     Review of patient's allergies indicates:   Allergen Reactions    Augmentin [amoxicillin-pot clavulanate] Shortness Of Breath    Singulair [montelukast]      Pt "felt strange"      Horse/equine containing products      Objective:     Vital Signs (Most Recent):  Temp: 97.2 °F (36.2 °C) (02/26/18 0453)  Pulse: 69 (02/26/18 0453)  Resp: 16 (02/26/18 0453)  BP: (!) 157/69 (02/26/18 0453)  SpO2: 95 % (02/26/18 0453) Vital Signs (24h Range):  Temp:  [96.6 °F (35.9 °C)-97.8 °F (36.6 °C)] 97.2 °F (36.2 °C)  Pulse:  [67-74] 69  Resp:  [16] 16  SpO2:  [94 %-98 %] 95 %  BP: (129-166)/(68-78) 157/69     Weight: 73 kg (160 lb 15 oz)  Body mass index is 22.46 kg/m².    Intake/Output - Last 3 Shifts       02/24 0700 - 02/25 0659 02/25 0700 - 02/26 0659 02/26 0700 - 02/27 0659    P.O. 400      I.V. (mL/kg) 1250 (17.1)      Total Intake(mL/kg) 1650 (22.6)      Net +1650               Urine Occurrence 4 x      Stool Occurrence 1 x 3 x           Physical Exam   Constitutional: He is oriented to person, place, and time. He appears well-developed and well-nourished.   HENT:   Head: Normocephalic and atraumatic.   Eyes: Pupils are equal, round, and reactive to light. "   Neck: Normal range of motion.   Cardiovascular: Normal rate.    Pulmonary/Chest: Effort normal. No respiratory distress.   Abdominal: Soft. Bowel sounds are normal. He exhibits distension. There is no tenderness.   Midline incision packed with gauze, changed at bedside this morning.   Musculoskeletal: Normal range of motion.   Neurological: He is alert and oriented to person, place, and time.   Skin: Skin is warm and dry.   Psychiatric: He has a normal mood and affect. His behavior is normal. Judgment and thought content normal.   Nursing note and vitals reviewed.      Significant Labs:  CBC:     Recent Labs  Lab 02/26/18 0417   WBC 8.37   RBC 3.02*   HGB 7.8*   HCT 24.6*      MCV 82   MCH 25.8*   MCHC 31.7*     CMP:     Recent Labs  Lab 02/26/18 0417   *   CALCIUM 8.4*   ALBUMIN 2.1*   PROT 5.6*      K 4.2   CO2 26      BUN 13   CREATININE 0.8   ALKPHOS 106   ALT 78*   AST 62*   BILITOT 0.2       Significant Diagnostics:  I have reviewed and interpreted all pertinent imaging results/findings within the past 24 hours.    Assessment/Plan:     Mass of small intestine    71 yo male with mass in distal small bowel s/p Small bowel resection/mass resection on 2/16/18. Ileus improving. Now with superficial wound infection/    - trend labs q48 hours; H/H stable; no leukocytosis this morning  - midline incision open at middle portition of midline incision plus a few more superior and inferior for a total of 6cm wound exposed. Wound packed with gauze- dressing changed this morning. No systemic signs of infection- no leukocytosis, fever, chills  - tolerating full liquid diet, continue, has return of bowel function  - d/c mIVF   - pain well controlled on PO PRN pain medications   - pathology report discussed with patient by Dr. Sanchez, report given  - monitor I/Os  - continue cialis (home med) for urinary retention  - encourage OOBTC today  - encourage IS  - stool softeners PRN  -  methylnaltrexone  -PICC line placed, continue  -continue TPN, reordered for today  -Wound care changes BID with wet to dry dressing changes   - hypertension overnight, added home losartan back today                    Devora Gross MD  General Surgery  [facility]

## 2018-02-26 NOTE — PROGRESS NOTES
[facility]  Adult Nutrition  Consult Note    SUMMARY     Recommendations    1. When medically able, ADAT to GI soft/light.  2. Modify TPN regimen to 5/15 @ 75 mL/hr + 250 mL 20% lipids to meet 95% EEN, 102% EPN.   - Currently meeting 121% EEN, 114% EPN.  3. RD to monitor & follow-up.    Goals: Meet % EEN, EPN  Nutrition Goal Status: new  Communication of RD Recs: reviewed with RN    Reason for Assessment    Reason for Assessment: RD follow-up  Diagnosis:  (Gastrointestinal hemorrhage)  Relevent Medical History: anticoagulants, HTN, GIST   Interdisciplinary Rounds: did not attend     General Information Comments: 11 days post-op small bowel resection. Receiving TPN/Lipids & on full liquid diet.  Nutrition Discharge Planning: Unable to determine    Nutrition Prescription Ordered    Current Diet Order: Full liquid     Current Nutrition Support Formula Ordered: Other (Comment) (400g D, 100g AA)  Current Nutrition Support Rate Ordered: 75 (ml)  Current Nutrition Support Frequency Ordered: mL/hr + 250 mL 20% lipids     Evaluation of Received Nutrients/Fluid Intake    Parenteral Calories (kcal): 2260 (500 calories from lipids)  Parenteral Protein (gm): 100  Parenteral Fluid (mL): 1800     Energy Calories Required: exceed needs  % Kcal Needs: 121%     Protein Required: exceed needs  % Protein Needs: 114%     GIR (Glucose Infusion Rate) (mg/kg/min): 3.81 mg/kg/min     Tolerance: tolerating    Nutrition/Diet History    Patient Reported Diet/Restrictions/Preferences: general     Typical Food/Fluid Intake: adequate PTA     Food Preferences: pt to consume nutrients >/= 85% EEN/EPN      Factors Affecting Nutritional Intake: altered gastrointestinal function     Labs/Tests/Procedures/Meds    Pertinent Labs Reviewed: reviewed  Pertinent Labs Comments: Gluc 131, AST 62, ALT 78  Pertinent Medications Reviewed: reviewed, pertinent  Pertinent Medications Comments: TPN, Lipids, Statin    Physical  "Findings    Overall Physical Appearance: lethargic, nourished  Oral/Mouth Cavity: WDL  Skin: incision (Abdomen)    Anthropometrics    Temp: 97.1 °F (36.2 °C)     Height: 5' 10.98" (180.3 cm)  Weight Method: Bed Scale  Weight: 73 kg (160 lb 15 oz)    Ideal Body Weight (IBW), Male: 171.88 lb  % Ideal Body Weight, Male (lb): 93.64 lb     BMI (Calculated): 22.5  BMI Grade: 18.5-24.9 - normal    Estimated/Assessed Needs    Weight Used For Calorie Calculations: 73 kg (160 lb 15 oz)      Energy Calorie Requirements (kcal): 1870 kcal/d  Energy Need Method: [place]-St Jeor (1.25 PAL)     Weight Used For Protein Calculations: 73 kg (160 lb 15 oz)  Protein Requirements: 88 g/d (1.2 g/kg)    Fluid Requirements (mL): per MD    Assessment and Plan    Gastrointestinal hemorrhage     Nutrition Problem  Inadequate oral intake     Related to (etiology):   GI hemorrhage 2/2 ileus     Signs and Symptoms (as evidenced by):   NPO Day+5     Nutrition Diagnosis Status:   Continues     Monitor and Evaluation    Food and Nutrient Intake: energy intake, food and beverage intake, parenteral nutrition intake  Food and Nutrient Adminstration: diet order, enteral and parenteral nutrition administration     Physical Activity and Function: nutrition-related ADLs and IADLs  Anthropometric Measurements: weight, weight change  Biochemical Data, Medical Tests and Procedures: inflammatory profile, lipid profile, glucose/endocrine profile, gastrointestinal profile, electrolyte and renal panel  Nutrition-Focused Physical Findings: overall appearance    Nutrition Risk    Level of Risk:  (f/u 2x/wk)    Nutrition Follow-Up    RD Follow-up?: Yes      "

## 2018-02-26 NOTE — PLAN OF CARE
Patient expected to discharge home +/- 3/2/2018 with [facility].  Per Dr Sanchez's note:    Three loose BM's over the past 18 hours, two of them 'large'  Occasional abdominal cramps  Over the weekend, I followed the progress of his contrast SBFT and this did not show evidence of obstruction.   This am, his abdomen is mildly distended but soft. Incision looks better, with resolution of erythema. BS, which I have listened to on multiple occasions over the past 4-5 days, are mildly hyperactive and slightly high pitched today, but no rushes or tinkles are heard.      Will continue present management, including TPN and diet as tolerated        02/26/18 1127   Discharge Reassessment   Assessment Type Discharge Planning Reassessment   Provided patient/caregiver education on the expected discharge date and the discharge plan Yes   Do you have any problems affording any of your prescribed medications? No   Discharge Plan A Home Health   Can the patient answer the patient profile reliably? Yes, cognitively intact   Describe the patient's ability to walk at the present time. Minor restrictions or changes   How often would a person be available to care for the patient? Whenever needed

## 2018-02-26 NOTE — PROGRESS NOTES
Afebrile, vs stable  Three loose BM's over the past 18 hours, two of them 'large'  Occasional abdominal cramps  Over the weekend, I followed the progress of his contrast SBFT and this did not show evidence of obstruction.   This am, his abdomen is mildly distended but soft. Incision looks better, with resolution of erythema. BS, which I have listened to on multiple occasions over the past 4-5 days, are mildly hyperactive and slightly high pitched today, but no rushes or tinkles are heard.     Will continue present management, including TPN and diet as tolerated    Path report discussed with patient and copy of path report given to him.

## 2018-02-27 PROCEDURE — B4185 PARENTERAL SOL 10 GM LIPIDS: HCPCS | Performed by: STUDENT IN AN ORGANIZED HEALTH CARE EDUCATION/TRAINING PROGRAM

## 2018-02-27 PROCEDURE — 25000003 PHARM REV CODE 250: Performed by: STUDENT IN AN ORGANIZED HEALTH CARE EDUCATION/TRAINING PROGRAM

## 2018-02-27 PROCEDURE — 11000001 HC ACUTE MED/SURG PRIVATE ROOM

## 2018-02-27 PROCEDURE — 25000003 PHARM REV CODE 250: Performed by: SURGERY

## 2018-02-27 PROCEDURE — A4216 STERILE WATER/SALINE, 10 ML: HCPCS | Performed by: SURGERY

## 2018-02-27 PROCEDURE — G8987 SELF CARE CURRENT STATUS: HCPCS | Mod: CJ

## 2018-02-27 PROCEDURE — S0077 INJECTION, CLINDAMYCIN PHOSP: HCPCS | Performed by: STUDENT IN AN ORGANIZED HEALTH CARE EDUCATION/TRAINING PROGRAM

## 2018-02-27 PROCEDURE — 63600175 PHARM REV CODE 636 W HCPCS: Performed by: NURSE PRACTITIONER

## 2018-02-27 PROCEDURE — 63600175 PHARM REV CODE 636 W HCPCS: Performed by: SURGERY

## 2018-02-27 PROCEDURE — 97535 SELF CARE MNGMENT TRAINING: CPT

## 2018-02-27 PROCEDURE — 25000003 PHARM REV CODE 250: Performed by: NURSE PRACTITIONER

## 2018-02-27 PROCEDURE — G8988 SELF CARE GOAL STATUS: HCPCS | Mod: CI

## 2018-02-27 RX ORDER — LOSARTAN POTASSIUM 25 MG/1
50 TABLET ORAL DAILY
Status: DISCONTINUED | OUTPATIENT
Start: 2018-02-28 | End: 2018-03-01

## 2018-02-27 RX ADMIN — CLINDAMYCIN IN 5 PERCENT DEXTROSE 600 MG: 12 INJECTION, SOLUTION INTRAVENOUS at 09:02

## 2018-02-27 RX ADMIN — TRAMADOL HYDROCHLORIDE 50 MG: 50 TABLET, FILM COATED ORAL at 06:02

## 2018-02-27 RX ADMIN — OXYCODONE HYDROCHLORIDE AND ACETAMINOPHEN 1 TABLET: 10; 325 TABLET ORAL at 08:02

## 2018-02-27 RX ADMIN — DOCUSATE SODIUM 100 MG: 100 CAPSULE, LIQUID FILLED ORAL at 08:02

## 2018-02-27 RX ADMIN — ATORVASTATIN CALCIUM 40 MG: 20 TABLET, FILM COATED ORAL at 09:02

## 2018-02-27 RX ADMIN — FLUTICASONE PROPIONATE 50 MCG: 50 SPRAY, METERED NASAL at 09:02

## 2018-02-27 RX ADMIN — ONDANSETRON 8 MG: 2 INJECTION INTRAMUSCULAR; INTRAVENOUS at 09:02

## 2018-02-27 RX ADMIN — CLINDAMYCIN IN 5 PERCENT DEXTROSE 600 MG: 12 INJECTION, SOLUTION INTRAVENOUS at 01:02

## 2018-02-27 RX ADMIN — TRAMADOL HYDROCHLORIDE 50 MG: 50 TABLET, FILM COATED ORAL at 11:02

## 2018-02-27 RX ADMIN — METHYLNALTREXONE BROMIDE 12 MG: 12 INJECTION, SOLUTION SUBCUTANEOUS at 08:02

## 2018-02-27 RX ADMIN — LOSARTAN POTASSIUM 25 MG: 25 TABLET, FILM COATED ORAL at 08:02

## 2018-02-27 RX ADMIN — TRAMADOL HYDROCHLORIDE 50 MG: 50 TABLET, FILM COATED ORAL at 05:02

## 2018-02-27 RX ADMIN — Medication 10 ML: at 12:02

## 2018-02-27 RX ADMIN — CLINDAMYCIN IN 5 PERCENT DEXTROSE 600 MG: 12 INJECTION, SOLUTION INTRAVENOUS at 06:02

## 2018-02-27 RX ADMIN — FLUTICASONE PROPIONATE 50 MCG: 50 SPRAY, METERED NASAL at 08:02

## 2018-02-27 RX ADMIN — SOYBEAN OIL 250 ML: 20 INJECTION, SOLUTION INTRAVENOUS at 09:02

## 2018-02-27 RX ADMIN — Medication 10 ML: at 05:02

## 2018-02-27 RX ADMIN — DOCUSATE SODIUM 100 MG: 100 CAPSULE, LIQUID FILLED ORAL at 09:02

## 2018-02-27 RX ADMIN — PANTOPRAZOLE SODIUM 40 MG: 40 TABLET, DELAYED RELEASE ORAL at 09:02

## 2018-02-27 RX ADMIN — PANTOPRAZOLE SODIUM 40 MG: 40 TABLET, DELAYED RELEASE ORAL at 08:02

## 2018-02-27 RX ADMIN — TRAMADOL HYDROCHLORIDE 50 MG: 50 TABLET, FILM COATED ORAL at 12:02

## 2018-02-27 RX ADMIN — ASCORBIC ACID, VITAMIN A PALMITATE, CHOLECALCIFEROL, THIAMINE HYDROCHLORIDE, RIBOFLAVIN-5 PHOSPHATE SODIUM, PYRIDOXINE HYDROCHLORIDE, NIACINAMIDE, DEXPANTHENOL, ALPHA-TOCOPHEROL ACETATE, VITAMIN K1, FOLIC ACID, BIOTIN, CYANOCOBALAMIN: 200; 3300; 200; 6; 3.6; 6; 40; 15; 10; 150; 600; 60; 5 INJECTION, SOLUTION INTRAVENOUS at 09:02

## 2018-02-27 RX ADMIN — ONDANSETRON 8 MG: 2 INJECTION INTRAMUSCULAR; INTRAVENOUS at 01:02

## 2018-02-27 RX ADMIN — Medication 10 ML: at 11:02

## 2018-02-27 RX ADMIN — ENOXAPARIN SODIUM 40 MG: 100 INJECTION SUBCUTANEOUS at 04:02

## 2018-02-27 NOTE — PT/OT/SLP PROGRESS
Occupational Therapy   Treatment    Name: Forrest Schmidt  MRN: [id number]  Admitting Diagnosis:  Gastrointestinal hemorrhage  12 Days Post-Op    Recommendations:     Discharge Recommendations: home health OT  Discharge Equipment Recommendations:  walker, rolling, bedside commode  Barriers to discharge:  Decreased caregiver support    Subjective     Communicated with: RN prior to session.  Pain/Comfort:  · Pain Rating 1: 4/10  · Location 1:  (incision site)  · Pain Addressed 1: Reposition, Distraction, Nurse notified  · Pain Rating Post-Intervention 1: 5/10  · Pain Rating 2: 5/10  · Location - Orientation 2: lower  · Location 2: back  · Pain Addressed 2: Nurse notified, Reposition, Distraction  · Pain Rating Post-Intervention 2: 5/10    Patients cultural, spiritual, Rastafari conflicts given the current situation: none reported     Objective:     Patient found with: peripheral IV    General Precautions: Standard, fall   Orthopedic Precautions:N/A   Braces: N/A     Occupational Performance:    Bed Mobility:    · Patient completed Rolling/Turning to Right with supervision  · Patient completed Scooting/Bridging with supervision  · Patient completed Supine to Sit with supervision     Functional Mobility/Transfers:  · Patient completed Sit <> Stand Transfer with supervision  with  rolling walker   · Patient completed Bed <> Chair Transfer using Step Transfer technique with supervision with rolling walker  · Functional Mobility: Pt ambulate 250 ft with SBA using RW; occasional standing rest breaks 2/2 pain    Activities of Daily Living:  · Grooming: supervision standing at sink to brush teeth, wash face, and comb hair/beard  · UB Dressing: minimum assistance to don backward gown while managing IV  · LB Dressing: supervision to doff/don B socks while seated EOB    Patient left up in chair with all lines intact, call button in reach, RN notified and wife present    [facility] 6 Click:  [facility] Total Score: 20    Treatment &  Education:  Pt educated on role of OT/POC  White board/communication board updated  Education:    Assessment:     Forrest Schmidt is a 72 y.o. male with a medical diagnosis of Gastrointestinal hemorrhage.  He presents with pain as limiting factor in safe functional mobility and self care.  Performance deficits affecting function are weakness, impaired endurance, impaired functional mobilty, impaired self care skills, impaired balance, pain.      Rehab Prognosis:  Good; patient would benefit from acute skilled OT services to address these deficits and reach maximum level of function.       Plan:     Patient to be seen 3 x/week to address the above listed problems via self-care/home management, therapeutic activities, therapeutic exercises  · Plan of Care Expires: 03/18/18  · Plan of Care Reviewed with: patient, spouse    This Plan of care has been discussed with the patient who was involved in its development and understands and is in agreement with the identified goals and treatment plan    GOALS:    Occupational Therapy Goals        Problem: Occupational Therapy Goal    Goal Priority Disciplines Outcome Interventions   Occupational Therapy Goal     OT, PT/OT Ongoing (interventions implemented as appropriate)    Description:  Goals to be met by: 3/2/18    Patient will increase functional independence with ADLs by performing:    UE Dressing with [employer].  Grooming while standing at sink with Supervision. GOAL MET 2/22  Toileting from toilet with [employer] for hygiene and clothing management.   Toilet transfer to toilet with Supervision.                       Time Tracking:     OT Date of Treatment: 02/27/18  OT Start Time: 0759  OT Stop Time: 0818  OT Total Time (min): 19 min    Billable Minutes:Self Care/Home Management 19    Pushpa Garces OT  2/27/2018

## 2018-02-27 NOTE — PROGRESS NOTES
[facility]  General Surgery  Progress Note    Subjective:     History of Present Illness:  This is a 73 y/o male with hx CAD and HTN, obscure overt GI bleeding [facility] CT [facility].  Pt was admitted 2/5 with melena. Underwent  colonoscopy, negative for bleeding with old blood seen in TI. No EGD at OSH.  CT abd showed ileal mass, approx 5 cm. Surgery recommend operative intervention at OSH, however patient requested transfer for 2nd opinion.  Patient has had a similar episode in the past year, at which time he also had EGD, colonscopy and capsule enterography which were negative, bleeding at that time thought due to NSAID use which was discontinued and he did well until about a week ago. When he began to have blood stools again. He has some fullness and early satiety, but has not altered his eating habits, and normal bowel habits, 1-3 movement a day, no change in consistency. No abdominal pain. Only prior abdominal surgery is an appendectomy as a child.     Post-Op Info:  Procedure(s) (LRB):  RESECTION-SMALL BOWEL (N/A)   12 Days Post-Op     Interval History: No acute events overnight. Had several small BMs overnight, passing flatus.  Continues to have abdominal distension but soft abdomen. Voiding, ambulating. Midline incision open, no erythema. Dressing changed at bedside this morning.    Medications:  Continuous Infusions:   Amino acid 5% - dextrose 15% (CLINIMIX-E) solution with additives (1L  provides 510 kcal/L dextrose, with 50 gm AA, 150 gm CHO, Na 35, K 30, Mg 5, Ca 4.5, Acetate 80, Cl 39, Phos 15)      Amino acid 5% - dextrose 20% (CLINIMIX-E) solution with additives (1L provides 50 gm AA, 200 gm CHO (680 kcal/L dextrose), Na 35, K 30, Mg 5, Ca 4.5, Acetate 80, Cl 39, Phos 15) 75 mL/hr at 02/26/18 2141     Scheduled Meds:   atorvastatin  40 mg Oral QHS    Cialis (tadalafil) 5 mg - half tablet  5 mg Oral Daily    clindamycin  "(CLEOCIN) IVPB  600 mg Intravenous Q8H    docusate sodium  100 mg Oral BID    enoxaparin  40 mg Subcutaneous Daily    fat emulsion 20%  250 mL Intravenous Daily    fat emulsion 20%  250 mL Intravenous Daily    fluticasone  1 spray Each Nare BID    losartan  25 mg Oral Daily    methylnaltrexone  12 mg Subcutaneous Every other day    pantoprazole  40 mg Oral BID    sodium chloride 0.9%  10 mL Intravenous Q6H    traMADol  50 mg Oral Q6H     PRN Meds:sodium chloride, dextrose 50%, dextrose 50%, glucagon (human recombinant), glucose, glucose, ondansetron, oxyCODONE-acetaminophen, oxyCODONE-acetaminophen, promethazine (PHENERGAN) IVPB, Flushing PICC Protocol **AND** sodium chloride 0.9% **AND** sodium chloride 0.9%, sodium chloride 0.9%, sodium chloride 0.9%     Review of patient's allergies indicates:   Allergen Reactions    Augmentin [amoxicillin-pot clavulanate] Shortness Of Breath    Singulair [montelukast]      Pt "felt strange"      Horse/equine containing products      Objective:     Vital Signs (Most Recent):  Temp: 97.3 °F (36.3 °C) (02/27/18 0337)  Pulse: 64 (02/27/18 0337)  Resp: 16 (02/27/18 0337)  BP: (!) 140/65 (02/27/18 0337)  SpO2: 95 % (02/27/18 0337) Vital Signs (24h Range):  Temp:  [96.5 °F (35.8 °C)-97.3 °F (36.3 °C)] 97.3 °F (36.3 °C)  Pulse:  [64-76] 64  Resp:  [16-18] 16  SpO2:  [95 %-98 %] 95 %  BP: (140-161)/(65-78) 140/65     Weight: 73 kg (160 lb 15 oz)  Body mass index is 22.46 kg/m².    Intake/Output - Last 3 Shifts       02/25 0700 - 02/26 0659 02/26 0700 - 02/27 0659 02/27 0700 - 02/28 0659    P.O.  824     I.V. (mL/kg)       Total Intake(mL/kg)  824 (11.3)     Urine (mL/kg/hr)  825 (0.5)     Stool  0 (0)     Total Output   825      Net   -1             Urine Occurrence  2 x     Stool Occurrence 3 x 0 x           Physical Exam   Constitutional: He is oriented to person, place, and time. He appears well-developed and well-nourished.   HENT:   Head: Normocephalic and atraumatic. "   Eyes: Pupils are equal, round, and reactive to light.   Neck: Normal range of motion.   Cardiovascular: Normal rate.    Pulmonary/Chest: Effort normal. No respiratory distress.   Abdominal: Soft. Bowel sounds are normal. He exhibits distension. There is no tenderness.   Midline incision packed with gauze with modest amount of purulent drainage- decreased from yesterday, changed at bedside this morning.   Musculoskeletal: Normal range of motion.   Neurological: He is alert and oriented to person, place, and time.   Skin: Skin is warm and dry.   Psychiatric: He has a normal mood and affect. His behavior is normal. Judgment and thought content normal.   Nursing note and vitals reviewed.      Significant Labs:  CBC:     Recent Labs  Lab 02/26/18  0417   WBC 8.37   RBC 3.02*   HGB 7.8*   HCT 24.6*      MCV 82   MCH 25.8*   MCHC 31.7*     CMP:     Recent Labs  Lab 02/26/18 0417   *   CALCIUM 8.4*   ALBUMIN 2.1*   PROT 5.6*      K 4.2   CO2 26      BUN 13   CREATININE 0.8   ALKPHOS 106   ALT 78*   AST 62*   BILITOT 0.2       Significant Diagnostics:  I have reviewed and interpreted all pertinent imaging results/findings within the past 24 hours.    Assessment/Plan:     Mass of small intestine    71 yo male with mass in distal small bowel s/p Small bowel resection/mass resection on 2/16/18. Ileus improving. Now with superficial wound infection/    - trend labs q48 hours  - midline incision open at middle portition of midline incision plus a few more superior and inferior for a total of 6cm wound exposed. Wound packed with gauze- dressing changed this morning with modest amount purulent drainage. No systemic signs of infection- no leukocytosis, fever, chills  - tolerating full liquid diet, continue, has return of bowel function, continue TPN until tolerating regular diet  - pain well controlled on PO PRN pain medications   - pathology report discussed with patient by Dr. Sanchez, report given  yesterday  - monitor I/Os  - continue cialis (home med) for urinary retention  - encourage OOBTC today  - encourage IS  - stool softeners PRN  - methylnaltrexone  -PICC line placed, continue  -continue TPN, reordered for today  -Wound care changes BID with wet to dry dressing changes   - continue home losartan for hypertension                    Devora Gross MD  General Surgery  [facility]

## 2018-02-27 NOTE — PLAN OF CARE
Problem: Patient Care Overview  Goal: Plan of Care Review  Outcome: Ongoing (interventions implemented as appropriate)  Pt is progressing with plan of care. Frequent rounds made to assess pain and safety. Pt's pain controlled with pain medication ordered at this time. TPN infusing to PICC line. PICC line dressing clean and intact. Bed locked and at lowest position. Side rails up x 2. Call light within reach. Will continue to monitor.

## 2018-02-27 NOTE — SUBJECTIVE & OBJECTIVE
"Interval History: No acute events overnight. Had several small BMs overnight, passing flatus.  Continues to have abdominal distension but soft abdomen. Voiding, ambulating. Midline incision open, no erythema. Dressing changed at bedside this morning.    Medications:  Continuous Infusions:   Amino acid 5% - dextrose 15% (CLINIMIX-E) solution with additives (1L  provides 510 kcal/L dextrose, with 50 gm AA, 150 gm CHO, Na 35, K 30, Mg 5, Ca 4.5, Acetate 80, Cl 39, Phos 15)      Amino acid 5% - dextrose 20% (CLINIMIX-E) solution with additives (1L provides 50 gm AA, 200 gm CHO (680 kcal/L dextrose), Na 35, K 30, Mg 5, Ca 4.5, Acetate 80, Cl 39, Phos 15) 75 mL/hr at 02/26/18 2141     Scheduled Meds:   atorvastatin  40 mg Oral QHS    Cialis (tadalafil) 5 mg - half tablet  5 mg Oral Daily    clindamycin (CLEOCIN) IVPB  600 mg Intravenous Q8H    docusate sodium  100 mg Oral BID    enoxaparin  40 mg Subcutaneous Daily    fat emulsion 20%  250 mL Intravenous Daily    fat emulsion 20%  250 mL Intravenous Daily    fluticasone  1 spray Each Nare BID    losartan  25 mg Oral Daily    methylnaltrexone  12 mg Subcutaneous Every other day    pantoprazole  40 mg Oral BID    sodium chloride 0.9%  10 mL Intravenous Q6H    traMADol  50 mg Oral Q6H     PRN Meds:sodium chloride, dextrose 50%, dextrose 50%, glucagon (human recombinant), glucose, glucose, ondansetron, oxyCODONE-acetaminophen, oxyCODONE-acetaminophen, promethazine (PHENERGAN) IVPB, Flushing PICC Protocol **AND** sodium chloride 0.9% **AND** sodium chloride 0.9%, sodium chloride 0.9%, sodium chloride 0.9%     Review of patient's allergies indicates:   Allergen Reactions    Augmentin [amoxicillin-pot clavulanate] Shortness Of Breath    Singulair [montelukast]      Pt "felt strange"      Horse/equine containing products      Objective:     Vital Signs (Most Recent):  Temp: 97.3 °F (36.3 °C) (02/27/18 0337)  Pulse: 64 (02/27/18 0337)  Resp: 16 (02/27/18 " 0337)  BP: (!) 140/65 (02/27/18 0337)  SpO2: 95 % (02/27/18 0337) Vital Signs (24h Range):  Temp:  [96.5 °F (35.8 °C)-97.3 °F (36.3 °C)] 97.3 °F (36.3 °C)  Pulse:  [64-76] 64  Resp:  [16-18] 16  SpO2:  [95 %-98 %] 95 %  BP: (140-161)/(65-78) 140/65     Weight: 73 kg (160 lb 15 oz)  Body mass index is 22.46 kg/m².    Intake/Output - Last 3 Shifts       02/25 0700 - 02/26 0659 02/26 0700 - 02/27 0659 02/27 0700 - 02/28 0659    P.O.  824     I.V. (mL/kg)       Total Intake(mL/kg)  824 (11.3)     Urine (mL/kg/hr)  825 (0.5)     Stool  0 (0)     Total Output   825      Net   -1             Urine Occurrence  2 x     Stool Occurrence 3 x 0 x           Physical Exam   Constitutional: He is oriented to person, place, and time. He appears well-developed and well-nourished.   HENT:   Head: Normocephalic and atraumatic.   Eyes: Pupils are equal, round, and reactive to light.   Neck: Normal range of motion.   Cardiovascular: Normal rate.    Pulmonary/Chest: Effort normal. No respiratory distress.   Abdominal: Soft. Bowel sounds are normal. He exhibits distension. There is no tenderness.   Midline incision packed with gauze with modest amount of purulent drainage- decreased from yesterday, changed at bedside this morning.   Musculoskeletal: Normal range of motion.   Neurological: He is alert and oriented to person, place, and time.   Skin: Skin is warm and dry.   Psychiatric: He has a normal mood and affect. His behavior is normal. Judgment and thought content normal.   Nursing note and vitals reviewed.      Significant Labs:  CBC:     Recent Labs  Lab 02/26/18 0417   WBC 8.37   RBC 3.02*   HGB 7.8*   HCT 24.6*      MCV 82   MCH 25.8*   MCHC 31.7*     CMP:     Recent Labs  Lab 02/26/18 0417   *   CALCIUM 8.4*   ALBUMIN 2.1*   PROT 5.6*      K 4.2   CO2 26      BUN 13   CREATININE 0.8   ALKPHOS 106   ALT 78*   AST 62*   BILITOT 0.2       Significant Diagnostics:  I have reviewed and interpreted all  pertinent imaging results/findings within the past 24 hours.

## 2018-02-27 NOTE — ASSESSMENT & PLAN NOTE
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 Ileus improving. Now with superficial wound infection/    - trend labs q48 hours  - midline incision open at middle portition of midline incision plus a few more superior and inferior for a total of 6cm wound exposed. Wound packed with gauze- dressing changed this morning with modest amount purulent drainage. No systemic signs of infection- no leukocytosis, fever, chills  - tolerating full liquid diet, continue, has return of bowel function, continue TPN until tolerating regular diet  - pain well controlled on PO PRN pain medications   - pathology report discussed with patient by Dr. Sanchez, report given yesterday  - monitor I/Os  - continue cialis (home med) for urinary retention  - encourage OOBTC today  - encourage IS  - stool softeners PRN  - methylnaltrexone  -PICC line placed, continue  -continue TPN, reordered for today  -Wound care changes BID with wet to dry dressing changes   - continue home losartan for hypertension

## 2018-02-27 NOTE — PLAN OF CARE
Problem: Occupational Therapy Goal  Goal: Occupational Therapy Goal  Goals to be met by: 3/2/18    Patient will increase functional independence with ADLs by performing:    UE Dressing with [employer].  Grooming while standing at sink with Supervision. GOAL MET 2/22  Toileting from toilet with [employer] for hygiene and clothing management.   Toilet transfer to toilet with Supervision.      Outcome: Ongoing (interventions implemented as appropriate)  Goals remain appropriate    Comments: Continue OT POC    Pushpa Garces OT  2/27/2018

## 2018-02-27 NOTE — PROGRESS NOTES
Pt AA0x3 and VSS.  Two side rails up, bed locked, call light within reach. No falls noted as these precautions remain. Pt free of skin breakdown as the pt moves well independently. Pain controlled well with PRN meds. Hourly rounds made and no complaints at this time noted. Will resume with plan of care.

## 2018-02-28 LAB
ALBUMIN SERPL BCP-MCNC: 2.1 G/DL
ALP SERPL-CCNC: 106 U/L
ALT SERPL W/O P-5'-P-CCNC: 54 U/L
ANION GAP SERPL CALC-SCNC: 7 MMOL/L
AST SERPL-CCNC: 39 U/L
BACTERIA SPEC AEROBE CULT: NORMAL
BASOPHILS # BLD AUTO: 0.03 K/UL
BASOPHILS NFR BLD: 0.4 %
BILIRUB SERPL-MCNC: 0.2 MG/DL
BUN SERPL-MCNC: 16 MG/DL
CALCIUM SERPL-MCNC: 8.8 MG/DL
CHLORIDE SERPL-SCNC: 98 MMOL/L
CO2 SERPL-SCNC: 29 MMOL/L
CREAT SERPL-MCNC: 0.8 MG/DL
DIFFERENTIAL METHOD: ABNORMAL
EOSINOPHIL # BLD AUTO: 0.3 K/UL
EOSINOPHIL NFR BLD: 4.3 %
ERYTHROCYTE [DISTWIDTH] IN BLOOD BY AUTOMATED COUNT: 15.9 %
EST. GFR  (AFRICAN AMERICAN): >60 ML/MIN/1.73 M^2
EST. GFR  (NON AFRICAN AMERICAN): >60 ML/MIN/1.73 M^2
GLUCOSE SERPL-MCNC: 118 MG/DL
HCT VFR BLD AUTO: 24.4 %
HGB BLD-MCNC: 7.5 G/DL
IMM GRANULOCYTES # BLD AUTO: 0.04 K/UL
IMM GRANULOCYTES NFR BLD AUTO: 0.6 %
LYMPHOCYTES # BLD AUTO: 0.7 K/UL
LYMPHOCYTES NFR BLD: 9.9 %
MAGNESIUM SERPL-MCNC: 1.7 MG/DL
MCH RBC QN AUTO: 24.8 PG
MCHC RBC AUTO-ENTMCNC: 30.7 G/DL
MCV RBC AUTO: 81 FL
MONOCYTES # BLD AUTO: 1.1 K/UL
MONOCYTES NFR BLD: 14.8 %
NEUTROPHILS # BLD AUTO: 5 K/UL
NEUTROPHILS NFR BLD: 70 %
NRBC BLD-RTO: 0 /100 WBC
PHOSPHATE SERPL-MCNC: 3.9 MG/DL
PLATELET # BLD AUTO: 344 K/UL
PMV BLD AUTO: 9.9 FL
POTASSIUM SERPL-SCNC: 4.5 MMOL/L
PROT SERPL-MCNC: 5.7 G/DL
RBC # BLD AUTO: 3.02 M/UL
SODIUM SERPL-SCNC: 134 MMOL/L
WBC # BLD AUTO: 7.16 K/UL

## 2018-02-28 PROCEDURE — 25000003 PHARM REV CODE 250: Performed by: STUDENT IN AN ORGANIZED HEALTH CARE EDUCATION/TRAINING PROGRAM

## 2018-02-28 PROCEDURE — G8987 SELF CARE CURRENT STATUS: HCPCS | Mod: CJ

## 2018-02-28 PROCEDURE — 84100 ASSAY OF PHOSPHORUS: CPT

## 2018-02-28 PROCEDURE — 97535 SELF CARE MNGMENT TRAINING: CPT

## 2018-02-28 PROCEDURE — 80053 COMPREHEN METABOLIC PANEL: CPT

## 2018-02-28 PROCEDURE — 63600175 PHARM REV CODE 636 W HCPCS: Performed by: NURSE PRACTITIONER

## 2018-02-28 PROCEDURE — 25000003 PHARM REV CODE 250: Performed by: NURSE PRACTITIONER

## 2018-02-28 PROCEDURE — 85025 COMPLETE CBC W/AUTO DIFF WBC: CPT

## 2018-02-28 PROCEDURE — G8988 SELF CARE GOAL STATUS: HCPCS | Mod: CI

## 2018-02-28 PROCEDURE — 11000001 HC ACUTE MED/SURG PRIVATE ROOM

## 2018-02-28 PROCEDURE — S0077 INJECTION, CLINDAMYCIN PHOSP: HCPCS | Performed by: STUDENT IN AN ORGANIZED HEALTH CARE EDUCATION/TRAINING PROGRAM

## 2018-02-28 PROCEDURE — A4216 STERILE WATER/SALINE, 10 ML: HCPCS | Performed by: SURGERY

## 2018-02-28 PROCEDURE — B4185 PARENTERAL SOL 10 GM LIPIDS: HCPCS | Performed by: STUDENT IN AN ORGANIZED HEALTH CARE EDUCATION/TRAINING PROGRAM

## 2018-02-28 PROCEDURE — 83735 ASSAY OF MAGNESIUM: CPT

## 2018-02-28 PROCEDURE — 25000003 PHARM REV CODE 250: Performed by: SURGERY

## 2018-02-28 RX ADMIN — PANTOPRAZOLE SODIUM 40 MG: 40 TABLET, DELAYED RELEASE ORAL at 08:02

## 2018-02-28 RX ADMIN — SOYBEAN OIL 250 ML: 20 INJECTION, SOLUTION INTRAVENOUS at 09:02

## 2018-02-28 RX ADMIN — ATORVASTATIN CALCIUM 40 MG: 20 TABLET, FILM COATED ORAL at 09:02

## 2018-02-28 RX ADMIN — TRAMADOL HYDROCHLORIDE 50 MG: 50 TABLET, FILM COATED ORAL at 05:02

## 2018-02-28 RX ADMIN — RETINOL, ERGOCALCIFEROL, .ALPHA.-TOCOPHEROL ACETATE, DL-, PHYTONADIONE, ASCORBIC ACID, NIACINAMIDE, RIBOFLAVIN 5-PHOSPHATE SODIUM, THIAMINE HYDROCHLORIDE, PYRIDOXINE HYDROCHLORIDE, DEXPANTHENOL, BIOTIN, FOLIC ACID, AND CYANOCOBALAMIN: KIT at 09:02

## 2018-02-28 RX ADMIN — CLINDAMYCIN IN 5 PERCENT DEXTROSE 600 MG: 12 INJECTION, SOLUTION INTRAVENOUS at 09:02

## 2018-02-28 RX ADMIN — OXYCODONE HYDROCHLORIDE AND ACETAMINOPHEN 1 TABLET: 10; 325 TABLET ORAL at 08:02

## 2018-02-28 RX ADMIN — OXYCODONE HYDROCHLORIDE AND ACETAMINOPHEN 1 TABLET: 5; 325 TABLET ORAL at 04:02

## 2018-02-28 RX ADMIN — Medication 10 ML: at 05:02

## 2018-02-28 RX ADMIN — DOCUSATE SODIUM 100 MG: 100 CAPSULE, LIQUID FILLED ORAL at 09:02

## 2018-02-28 RX ADMIN — CLINDAMYCIN IN 5 PERCENT DEXTROSE 600 MG: 12 INJECTION, SOLUTION INTRAVENOUS at 05:02

## 2018-02-28 RX ADMIN — Medication 10 ML: at 12:02

## 2018-02-28 RX ADMIN — CLINDAMYCIN IN 5 PERCENT DEXTROSE 600 MG: 12 INJECTION, SOLUTION INTRAVENOUS at 01:02

## 2018-02-28 RX ADMIN — TRAMADOL HYDROCHLORIDE 50 MG: 50 TABLET, FILM COATED ORAL at 12:02

## 2018-02-28 RX ADMIN — FLUTICASONE PROPIONATE 50 MCG: 50 SPRAY, METERED NASAL at 09:02

## 2018-02-28 RX ADMIN — LOSARTAN POTASSIUM 50 MG: 25 TABLET, FILM COATED ORAL at 08:02

## 2018-02-28 RX ADMIN — TRAMADOL HYDROCHLORIDE 50 MG: 50 TABLET, FILM COATED ORAL at 11:02

## 2018-02-28 RX ADMIN — ENOXAPARIN SODIUM 40 MG: 100 INJECTION SUBCUTANEOUS at 04:02

## 2018-02-28 RX ADMIN — DOCUSATE SODIUM 100 MG: 100 CAPSULE, LIQUID FILLED ORAL at 08:02

## 2018-02-28 RX ADMIN — FLUTICASONE PROPIONATE 50 MCG: 50 SPRAY, METERED NASAL at 08:02

## 2018-02-28 RX ADMIN — PANTOPRAZOLE SODIUM 40 MG: 40 TABLET, DELAYED RELEASE ORAL at 09:02

## 2018-02-28 NOTE — SUBJECTIVE & OBJECTIVE
"Interval History: No acute events overnight. Tolerating regular diet, some nausea which is moderately controlled with PRN zofran. Continues to have several small BMs overnight, passing flatus.  Still having abdominal distension but soft abdomen. Voiding, ambulating. Midline incision open, no erythema. Dressing changed at bedside this morning, wound with continued purulent drainage noted at superior aspect of wound bed.    Medications:  Continuous Infusions:   Amino acid 5% - dextrose 15% (CLINIMIX-E) solution with additives (1L  provides 510 kcal/L dextrose, with 50 gm AA, 150 gm CHO, Na 35, K 30, Mg 5, Ca 4.5, Acetate 80, Cl 39, Phos 15) 75 mL/hr at 02/27/18 2144    Amino acid 5% - dextrose 15% (CLINIMIX-E) solution with additives (1L  provides 510 kcal/L dextrose, with 50 gm AA, 150 gm CHO, Na 35, K 30, Mg 5, Ca 4.5, Acetate 80, Cl 39, Phos 15)       Scheduled Meds:   atorvastatin  40 mg Oral QHS    Cialis (tadalafil) 5 mg - half tablet  5 mg Oral Daily    clindamycin (CLEOCIN) IVPB  600 mg Intravenous Q8H    docusate sodium  100 mg Oral BID    enoxaparin  40 mg Subcutaneous Daily    fat emulsion 20%  250 mL Intravenous Daily    fluticasone  1 spray Each Nare BID    losartan  50 mg Oral Daily    methylnaltrexone  12 mg Subcutaneous Every other day    pantoprazole  40 mg Oral BID    sodium chloride 0.9%  10 mL Intravenous Q6H    traMADol  50 mg Oral Q6H     PRN Meds:sodium chloride, dextrose 50%, dextrose 50%, glucagon (human recombinant), glucose, glucose, ondansetron, oxyCODONE-acetaminophen, oxyCODONE-acetaminophen, promethazine (PHENERGAN) IVPB, Flushing PICC Protocol **AND** sodium chloride 0.9% **AND** sodium chloride 0.9%, sodium chloride 0.9%, sodium chloride 0.9%     Review of patient's allergies indicates:   Allergen Reactions    Augmentin [amoxicillin-pot clavulanate] Shortness Of Breath    Singulair [montelukast]      Pt "felt strange"      Horse/equine containing products  "     Objective:     Vital Signs (Most Recent):  Temp: 98.1 °F (36.7 °C) (02/28/18 0755)  Pulse: 63 (02/28/18 0755)  Resp: 16 (02/28/18 0755)  BP: 128/62 (02/28/18 0755)  SpO2: 97 % (02/28/18 0755) Vital Signs (24h Range):  Temp:  [96.3 °F (35.7 °C)-98.1 °F (36.7 °C)] 98.1 °F (36.7 °C)  Pulse:  [63-73] 63  Resp:  [14-16] 16  SpO2:  [93 %-98 %] 97 %  BP: (121-152)/(57-73) 128/62     Weight: 73 kg (160 lb 15 oz)  Body mass index is 22.46 kg/m².    Intake/Output - Last 3 Shifts       02/26 0700 - 02/27 0659 02/27 0700 - 02/28 0659 02/28 0700 - 03/01 0659    P.O. 824 1190     Other  0     IV Piggyback  53     TPN  561     Total Intake(mL/kg) 824 (11.3) 1804 (24.7)     Urine (mL/kg/hr) 825 (0.5) 2380 (1.4)     Stool 0 (0) 0 (0)     Total Output 825 2380      Net -1 -576             Urine Occurrence 2 x      Stool Occurrence 0 x 2 x           Physical Exam   Constitutional: He is oriented to person, place, and time. He appears well-developed and well-nourished.   HENT:   Head: Normocephalic and atraumatic.   Eyes: Pupils are equal, round, and reactive to light.   Neck: Normal range of motion.   Cardiovascular: Normal rate.    Pulmonary/Chest: Effort normal. No respiratory distress.   Abdominal: Soft. Bowel sounds are normal. He exhibits distension (Improved slightly from yesterday). There is no tenderness.   Midline incision packed with gauze with modest amount of purulent drainage, changed at bedside this morning.   Musculoskeletal: Normal range of motion.   Neurological: He is alert and oriented to person, place, and time.   Skin: Skin is warm and dry.   Psychiatric: He has a normal mood and affect. His behavior is normal. Judgment and thought content normal.   Nursing note and vitals reviewed.      Significant Labs:  CBC:     Recent Labs  Lab 02/28/18  0401   WBC 7.16   RBC 3.02*   HGB 7.5*   HCT 24.4*      MCV 81*   MCH 24.8*   MCHC 30.7*     CMP:     Recent Labs  Lab 02/28/18  0401   *   CALCIUM 8.8    ALBUMIN 2.1*   PROT 5.7*   *   K 4.5   CO2 29   CL 98   BUN 16   CREATININE 0.8   ALKPHOS 106   ALT 54*   AST 39   BILITOT 0.2       Significant Diagnostics:  I have reviewed and interpreted all pertinent imaging results/findings within the past 24 hours.

## 2018-02-28 NOTE — PT/OT/SLP PROGRESS
Physical Therapy Treatment    Patient Name:  Forrest Schmidt   MRN:  [id number]    Recommendations:     Discharge Recommendations:  home health PT   Discharge Equipment Recommendations: walker, rolling, bedside commode   Barriers to discharge: Decreased caregiver support    Assessment:     Forrest Schmidt is a 72 y.o. male admitted with a medical diagnosis of Gastrointestinal hemorrhage.  He presents with the following impairments/functional limitations:  weakness, impaired endurance, impaired self care skills, gait instability, impaired functional mobilty, impaired balance, pain . Pt Progressing with PT Intervention. Pt Progressing with improving gait distance. Pt limited due to c/o of fatigue and dizziness. Pt would continue to benefit from skilled PT to address overall functional mobility and goals. Goals remain appropriate      Rehab Prognosis:  good; patient would benefit from acute skilled PT services to address these deficits and reach maximum level of function.      Recent Surgery: Procedure(s) (LRB):  RESECTION-SMALL BOWEL (N/A) 13 Days Post-Op    Plan:     During this hospitalization, patient to be seen 4 x/week to address the above listed problems via gait training, therapeutic activities, therapeutic exercises  · Plan of Care Expires:  03/18/18   Plan of Care Reviewed with: patient    Subjective     Communicated with RN prior to session.  Patient found supine1 upon PT entry to room, agreeable to treatment.      Chief Complaint: I need to use the bathroom  Pain/Comfort:  · Pain Rating 1: 4/10  · Location - Orientation 1: upper  · Location 1: abdomen  · Pain Addressed 1: Reposition, Nurse notified  · Pain Rating Post-Intervention 1: 5/10    Patients cultural, spiritual, Sabianism conflicts given the current situation: none noted    Objective:     Patient found with: peripheral IV     General Precautions: Standard, fall   Orthopedic Precautions:N/A   Braces: N/A     Functional Mobility:  · Bed Mobility:     · Supine  to Sit: stand by assistance   · Sit to supine with SBA  · Transfers:    · Toilet t/f with no AD bed <> commode with CGA    · Sit to Stand:  stand by assistance with rolling walker  · Gait: Ambulated about 300ft with RW with SBA/CGA.  slow gait speed.  no LOB pt with min c/o of dizziness and required standing rest breaks prn      AM-PAC 6 CLICK MOBILITY  Turning over in bed (including adjusting bedclothes, sheets and blankets)?: 3  Sitting down on and standing up from a chair with arms (e.g., wheelchair, bedside commode, etc.): 3  Moving from lying on back to sitting on the side of the bed?: 3  Moving to and from a bed to a chair (including a wheelchair)?: 3  Need to walk in hospital room?: 3  Climbing 3-5 steps with a railing?: 3  Total Score: 18       Therapeutic Activities and Exercises:   Discussed/educated patient on progress, safety,d/c, PT POC   White board updated   Donned an extra gown   Pt encouraged to ambulate in hallways 3x/day with nursing  assistance to improve endurance.   Pt safe to ambulate in hallway with RN or PCT assistance     Patient left supine with all lines intact, call button in reach and nsg notified..    GOALS:    Physical Therapy Goals        Problem: Physical Therapy Goal    Goal Priority Disciplines Outcome Goal Variances Interventions   Physical Therapy Goal     PT/OT, PT Ongoing (interventions implemented as appropriate)     Description:  Goals to be met by: 3/3    Patient will increase functional independence with mobility by performin. Supine to sit with Modified [place]  2. Sit to supine with Modified [place]  3. Sit to stand transfer with Stand-by Assistance-met  3a.  Transfers supervision with no device  4. Bed to chair transfer with Stand-by Assistance using Rolling Walker  5. Gait  x 50 feet with Stand-by Assistance using Rolling Walker. -met  5a. Gait with supervision x250 ft with no device  6. Lower extremity exercise program x20 reps per handout, with  independence                         Time Tracking:     PT Received On: 02/28/18  PT Total Time (min): 23 min     Billable Minutes: Gait Training 15 and Therapeutic Activity 8    Treatment Type: Treatment  PT/PTA: PTA     PTA Visit Number: 2     Ahmet Pearce PTA  02/28/2018

## 2018-02-28 NOTE — PHYSICIAN QUERY
PT Name: Forrest Schmidt  MR #: [id number]    Physician Query Form - Nutrition Clarification     CDS/: Bethanie PARKS,RN,CDI              Contact information: [phone]    This form is a permanent document in the medical record.     Query Date: February 28, 2018    By submitting this query, we are merely seeking further clarification of documentation.. Please utilize your independent clinical judgment when addressing the question(s) below.    The Medical record contains the following:   Indicators  Supporting Clinical Findings Location in Medical Record   x % of Estimated Energy Intake over a time frame from p.o., TF, or TPN Energy Calories Required: not meeting needs  % Kcal Needs: 0   Protein Required: not meeting needs  % Protein Needs: 0  % Intake of Estimated Energy Needs: 0 - 25 %  % Meal Intake: NPO     02/20/18 @1120am Adult Nutrition Progress Note by Oracio Rollins RD   x Weight Status over a time frame Assoc 10 lb wt loss, unintentional 02/13/18@1221pm Gastroenterology Consults by Chace Salinas MD    Subcutaneous Fat and/or Muscle Loss      Fluid Accumulation or Edema      Reduced  Strength     x Wt / BMI / Usual Body Weight HT=5'10  OC=623mrl  BMI=22.5 02/20/18 @1120am Adult Nutrition Progress Note by Oracio Rollins RD    Delayed Wound Healing / Failure to Thrive     x Acute or Chronic Illness  Gastrointestinal hemorrhage,  GIST       Now with superficial wound infection 02/26/18@208pm Adult Nutrition Consult by Holly Guo RD      02/26/18@1035 General Surgery Progress Note by Devora Gross MD    Medication     x Treatment Recommendation/Intervention: ADAT to Cl as tolerated. IF tolerated cont. to escalate diet to GI soft. Encourage PO intake > 75%.  If PO intake< 50% Provide Boost (breeze for CL Plus for more adv'ed diet      Modify TPN regimen to 5/15 @ 75 mL/hr + 250 mL 20% lipids to meet 95% EEN, 102% EPN.     - Currently meeting 121% EEN, 114% EPN.   02/20/18 @1120am  Adult Nutrition Progress Note by Oracio Rollins RD                 02/26/18@208pm Adult Nutrition Consult by Holly Guo RD    Other       AND / ASPEN Clinical Characteristics (October 2011)  A minimum of two characteristics is recommended for diagnosing either moderate or severe malnutrition   Mild Malnutrition Moderate Malnutrition Severe Malnutrition   Energy Intake from p.o., TF or TPN. < 75% intake of estimated energy needs for less than 7 days < 75% intake of estimated energy needs for greater than 7 days < 50% intake of estimated energy needs for > 5 days   Weight Loss 1-2% in 1 month  5% in 3 months  7.5% in 6 months  10% in 1 year 1-2 % in 1 week  5% in 1 month  7.5% in 3 months  10% in 6 months  20% in 1 year > 2% in 1 week  > 5% in 1 month  > 7.5% in 3 months  > 10% in 6 months  > 20% in 1 year   Physical Findings     None *Mild subcutaneous fat and/or muscle loss  *Mild fluid accumulation  *Stage II decubitus  *Surgical wound or non-healing wound *Mod/severe subcutaneous fat and/or muscle loss  *Mod/severe fluid accumulation  *Stage III or IV decubitus  *Non-healing surgical wound     Provider, please specify diagnosis or diagnoses associated with above clinical findings.    [ ] Mild Protein-Calorie Malnutrition    [xx ] Moderate Protein-Calorie Malnutrition    [ ] Severe Protein-Calorie Malnutrition    [ ] Other Nutritional Diagnosis (please specify): ____________________________________    [ ] Clinically Undetermined    Please document in your progress notes daily for the duration of treatment until resolved and include in your discharge summary.

## 2018-02-28 NOTE — PROGRESS NOTES
[facility]  General Surgery  Progress Note    Subjective:     History of Present Illness:  This is a 73 y/o male with hx CAD and HTN, obscure overt GI bleeding [facility] CT [facility].  Pt was admitted 2/5 with melena. Underwent  colonoscopy, negative for bleeding with old blood seen in TI. No EGD at OSH.  CT abd showed ileal mass, approx 5 cm. Surgery recommend operative intervention at OSH, however patient requested transfer for 2nd opinion.  Patient has had a similar episode in the past year, at which time he also had EGD, colonscopy and capsule enterography which were negative, bleeding at that time thought due to NSAID use which was discontinued and he did well until about a week ago. When he began to have blood stools again. He has some fullness and early satiety, but has not altered his eating habits, and normal bowel habits, 1-3 movement a day, no change in consistency. No abdominal pain. Only prior abdominal surgery is an appendectomy as a child.     Post-Op Info:  Procedure(s) (LRB):  RESECTION-SMALL BOWEL (N/A)   13 Days Post-Op     Interval History: No acute events overnight. Tolerating regular diet, some nausea which is moderately controlled with PRN zofran. Continues to have several small BMs overnight, passing flatus.  Still having abdominal distension but soft abdomen. Voiding, ambulating. Midline incision open, no erythema. Dressing changed at bedside this morning, wound with continued purulent drainage noted at superior aspect of wound bed.    Medications:  Continuous Infusions:   Amino acid 5% - dextrose 15% (CLINIMIX-E) solution with additives (1L  provides 510 kcal/L dextrose, with 50 gm AA, 150 gm CHO, Na 35, K 30, Mg 5, Ca 4.5, Acetate 80, Cl 39, Phos 15) 75 mL/hr at 02/27/18 2144    Amino acid 5% - dextrose 15% (CLINIMIX-E) solution with additives (1L  provides 510 kcal/L dextrose, with 50 gm AA, 150 gm CHO, Na 35, K  "30, Mg 5, Ca 4.5, Acetate 80, Cl 39, Phos 15)       Scheduled Meds:   atorvastatin  40 mg Oral QHS    Cialis (tadalafil) 5 mg - half tablet  5 mg Oral Daily    clindamycin (CLEOCIN) IVPB  600 mg Intravenous Q8H    docusate sodium  100 mg Oral BID    enoxaparin  40 mg Subcutaneous Daily    fat emulsion 20%  250 mL Intravenous Daily    fluticasone  1 spray Each Nare BID    losartan  50 mg Oral Daily    methylnaltrexone  12 mg Subcutaneous Every other day    pantoprazole  40 mg Oral BID    sodium chloride 0.9%  10 mL Intravenous Q6H    traMADol  50 mg Oral Q6H     PRN Meds:sodium chloride, dextrose 50%, dextrose 50%, glucagon (human recombinant), glucose, glucose, ondansetron, oxyCODONE-acetaminophen, oxyCODONE-acetaminophen, promethazine (PHENERGAN) IVPB, Flushing PICC Protocol **AND** sodium chloride 0.9% **AND** sodium chloride 0.9%, sodium chloride 0.9%, sodium chloride 0.9%     Review of patient's allergies indicates:   Allergen Reactions    Augmentin [amoxicillin-pot clavulanate] Shortness Of Breath    Singulair [montelukast]      Pt "felt strange"      Horse/equine containing products      Objective:     Vital Signs (Most Recent):  Temp: 98.1 °F (36.7 °C) (02/28/18 0755)  Pulse: 63 (02/28/18 0755)  Resp: 16 (02/28/18 0755)  BP: 128/62 (02/28/18 0755)  SpO2: 97 % (02/28/18 0755) Vital Signs (24h Range):  Temp:  [96.3 °F (35.7 °C)-98.1 °F (36.7 °C)] 98.1 °F (36.7 °C)  Pulse:  [63-73] 63  Resp:  [14-16] 16  SpO2:  [93 %-98 %] 97 %  BP: (121-152)/(57-73) 128/62     Weight: 73 kg (160 lb 15 oz)  Body mass index is 22.46 kg/m².    Intake/Output - Last 3 Shifts       02/26 0700 - 02/27 0659 02/27 0700 - 02/28 0659 02/28 0700 - 03/01 0659    P.O. 824 1190     Other  0     IV Piggyback  53     TPN  561     Total Intake(mL/kg) 824 (11.3) 1804 (24.7)     Urine (mL/kg/hr) 825 (0.5) 2380 (1.4)     Stool 0 (0) 0 (0)     Total Output 825 2380      Net -1 -576             Urine Occurrence 2 x      Stool " Occurrence 0 x 2 x           Physical Exam   Constitutional: He is oriented to person, place, and time. He appears well-developed and well-nourished.   HENT:   Head: Normocephalic and atraumatic.   Eyes: Pupils are equal, round, and reactive to light.   Neck: Normal range of motion.   Cardiovascular: Normal rate.    Pulmonary/Chest: Effort normal. No respiratory distress.   Abdominal: Soft. Bowel sounds are normal. He exhibits distension (Improved slightly from yesterday). There is no tenderness.   Midline incision packed with gauze with modest amount of purulent drainage, changed at bedside this morning.   Musculoskeletal: Normal range of motion.   Neurological: He is alert and oriented to person, place, and time.   Skin: Skin is warm and dry.   Psychiatric: He has a normal mood and affect. His behavior is normal. Judgment and thought content normal.   Nursing note and vitals reviewed.      Significant Labs:  CBC:     Recent Labs  Lab 02/28/18  0401   WBC 7.16   RBC 3.02*   HGB 7.5*   HCT 24.4*      MCV 81*   MCH 24.8*   MCHC 30.7*     CMP:     Recent Labs  Lab 02/28/18  0401   *   CALCIUM 8.8   ALBUMIN 2.1*   PROT 5.7*   *   K 4.5   CO2 29   CL 98   BUN 16   CREATININE 0.8   ALKPHOS 106   ALT 54*   AST 39   BILITOT 0.2       Significant Diagnostics:  I have reviewed and interpreted all pertinent imaging results/findings within the past 24 hours.    Assessment/Plan:     Mass of small intestine    73 yo male with mass in distal small bowel s/p Small bowel resection/mass resection on 2/16/18. Ileus improving. Now with superficial wound infection/    - trend labs q48 hours, H/H stable and WBC within normal limits this morning  - midline incision open at middle portition of midline incision plus a few more superior and inferior for a total of 6cm wound exposed. Wound packed with gauze- dressing changed this morning with modest amount purulent drainage. No systemic signs of infection- no  leukocytosis, fever, chills, WBC within normal limits  - tolerating regular diet, continue, has return of bowel function, continue TPN   - pain well controlled on PO PRN pain medications   - monitor I/Os  - continue cialis (home med) for urinary retention  - encourage OOBTC today  - encourage IS  - stool softeners PRN  - methylnaltrexone  -PICC line in place, TPN infusing- reordered for today  -Wound care changes BID with wet to dry dressing changes   - continue home losartan for hypertension                    Devora Gross MD  General Surgery  [facility]

## 2018-02-28 NOTE — PLAN OF CARE
Problem: Occupational Therapy Goal  Goal: Occupational Therapy Goal  Goals to be met by: 3/2/18    Patient will increase functional independence with ADLs by performing:    UE Dressing with [place].  Grooming while standing at sink with Supervision. GOAL MET 2/22  Toileting from toilet with [place] for hygiene and clothing management.   Toilet transfer to toilet with Supervision.       Outcome: Ongoing (interventions implemented as appropriate)  Goals remain appropriate    Comments: Continue OT POC    Pushpa Garces OT  2/28/2018

## 2018-02-28 NOTE — ASSESSMENT & PLAN NOTE
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 Ileus improving. Now with superficial wound infection/    - trend labs q48 hours, H/H stable and WBC within normal limits this morning  - midline incision open at middle portition of midline incision plus a few more superior and inferior for a total of 6cm wound exposed. Wound packed with gauze- dressing changed this morning with modest amount purulent drainage. No systemic signs of infection- no leukocytosis, fever, chills, WBC within normal limits  - tolerating regular diet, continue, has return of bowel function, continue TPN   - pain well controlled on PO PRN pain medications   - monitor I/Os  - continue cialis (home med) for urinary retention  - encourage OOBTC today  - encourage IS  - stool softeners PRN  - methylnaltrexone  -PICC line in place, TPN infusing- reordered for today  -Wound care changes BID with wet to dry dressing changes   - continue home losartan for hypertension

## 2018-02-28 NOTE — PT/OT/SLP PROGRESS
Occupational Therapy   Treatment    Name: Forrest Schmidt  MRN: [id number]  Admitting Diagnosis:  Gastrointestinal hemorrhage  13 Days Post-Op    Recommendations:     Discharge Recommendations: home health OT  Discharge Equipment Recommendations:  walker, rolling, bedside commode  Barriers to discharge:  Decreased caregiver support    Subjective     Communicated with: RN prior to session.  Pain/Comfort:  · Pain Rating 1: 5/10  · Location 1: abdomen  · Pain Addressed 1: Reposition, Distraction  · Pain Rating Post-Intervention 1: 5/10  · Pain Rating 2: 5/10  · Location - Orientation 2: lower  · Location 2: back  · Pain Addressed 2: Distraction  · Pain Rating Post-Intervention 2: 5/10    Patients cultural, spiritual, Mormonism conflicts given the current situation: none reported     Objective:     Patient found with: peripheral IV    General Precautions: Standard, fall   Orthopedic Precautions:N/A   Braces: N/A     Occupational Performance:    Bed Mobility:    NT 2/2 pt found seated on bedside commode    Functional Mobility/Transfers:  · Patient completed Sit <> Stand Transfer with supervision with rolling walker   · Patient completed Bed <> Chair Transfer using Step Transfer technique with supervision with rolling walker  · Patient completed Toilet Transfer from bedside commode using Step Transfer technique with supervision with rolling walker  · Functional Mobility: Pt ambulate 200 ft with SBA using RW; occasional standing rest breaks 2/2 pain    Activities of Daily Living:  · Grooming: supervision standing at sink to wash hands, wash face, brush teeth, and comb hair/beard  · UB Dressing: minimum assistance to doff back gown 2/2 lines  · Toileting: supervision for ledy cleaning and clothing management when using bedside commode    Patient left up in chair with all lines intact and call button in reach    [facility] 6 Click:  [facility] Total Score: 21    Treatment & Education:   Pt c/o lower back pain throughout session; OT  recommended hot pack, but to f/u with RN to see if use of hot pack was possible  BUE AROM exercises 10 x 1 for shoulder flex, elbow flex/ext, and chest press while seated UIC  Pt educated on role of OT/POC  White board/communication board updated    Education:    Assessment:     Forrest Schmidt is a 72 y.o. male with a medical diagnosis of Gastrointestinal hemorrhage.  He presents with good motivation and participation in therapy, but pain as limiting factor in safe functional mobility and self care.  Performance deficits affecting function are weakness, impaired endurance, pain, impaired functional mobilty, impaired self care skills, impaired balance.      Rehab Prognosis:  Good; patient would benefit from acute skilled OT services to address these deficits and reach maximum level of function.       Plan:     Patient to be seen 3 x/week to address the above listed problems via self-care/home management, therapeutic activities, therapeutic exercises  · Plan of Care Expires: 03/18/18  · Plan of Care Reviewed with: patient    This Plan of care has been discussed with the patient who was involved in its development and understands and is in agreement with the identified goals and treatment plan    GOALS:    Occupational Therapy Goals        Problem: Occupational Therapy Goal    Goal Priority Disciplines Outcome Interventions   Occupational Therapy Goal     OT, PT/OT Ongoing (interventions implemented as appropriate)    Description:  Goals to be met by: 3/2/18    Patient will increase functional independence with ADLs by performing:    UE Dressing with [place].  Grooming while standing at sink with Supervision. GOAL MET 2/22  Toileting from toilet with [place] for hygiene and clothing management.   Toilet transfer to toilet with Supervision.                         Time Tracking:     OT Date of Treatment: 02/28/18  OT Start Time: 1126  OT Stop Time: 1146  OT Total Time (min): 20 min    Billable Minutes:Self  Care/Home Management 20    Pushpa Garces, OT  2/28/2018

## 2018-02-28 NOTE — PLAN OF CARE
Problem: Physical Therapy Goal  Goal: Physical Therapy Goal  Goals to be met by: 3/3    Patient will increase functional independence with mobility by performin. Supine to sit with Modified [place]  2. Sit to supine with Modified [place]  3. Sit to stand transfer with Stand-by Assistance-met  3a.  Transfers supervision with no device  4. Bed to chair transfer with Stand-by Assistance using Rolling Walker  5. Gait  x 50 feet with Stand-by Assistance using Rolling Walker. -met  5a. Gait with supervision x250 ft with no device  6. Lower extremity exercise program x20 reps per handout, with independence        Pt progressing towards goals. continue with PT POC.Goals remain appropriate.  Ahmet Pearce PTA  2018

## 2018-03-01 LAB — PREALB SERPL-MCNC: 15 MG/DL

## 2018-03-01 PROCEDURE — 25500020 PHARM REV CODE 255: Performed by: SURGERY

## 2018-03-01 PROCEDURE — A4216 STERILE WATER/SALINE, 10 ML: HCPCS | Performed by: SURGERY

## 2018-03-01 PROCEDURE — 25000003 PHARM REV CODE 250: Performed by: SURGERY

## 2018-03-01 PROCEDURE — 97530 THERAPEUTIC ACTIVITIES: CPT

## 2018-03-01 PROCEDURE — G8988 SELF CARE GOAL STATUS: HCPCS | Mod: CI

## 2018-03-01 PROCEDURE — 97535 SELF CARE MNGMENT TRAINING: CPT

## 2018-03-01 PROCEDURE — G8987 SELF CARE CURRENT STATUS: HCPCS | Mod: CJ

## 2018-03-01 PROCEDURE — S0077 INJECTION, CLINDAMYCIN PHOSP: HCPCS | Performed by: STUDENT IN AN ORGANIZED HEALTH CARE EDUCATION/TRAINING PROGRAM

## 2018-03-01 PROCEDURE — 25500020 PHARM REV CODE 255: Performed by: STUDENT IN AN ORGANIZED HEALTH CARE EDUCATION/TRAINING PROGRAM

## 2018-03-01 PROCEDURE — 11000001 HC ACUTE MED/SURG PRIVATE ROOM

## 2018-03-01 PROCEDURE — 84134 ASSAY OF PREALBUMIN: CPT

## 2018-03-01 PROCEDURE — B4185 PARENTERAL SOL 10 GM LIPIDS: HCPCS | Performed by: STUDENT IN AN ORGANIZED HEALTH CARE EDUCATION/TRAINING PROGRAM

## 2018-03-01 PROCEDURE — 25000003 PHARM REV CODE 250: Performed by: STUDENT IN AN ORGANIZED HEALTH CARE EDUCATION/TRAINING PROGRAM

## 2018-03-01 PROCEDURE — 63600175 PHARM REV CODE 636 W HCPCS: Performed by: SURGERY

## 2018-03-01 PROCEDURE — 63600175 PHARM REV CODE 636 W HCPCS: Performed by: NURSE PRACTITIONER

## 2018-03-01 PROCEDURE — 25000003 PHARM REV CODE 250: Performed by: NURSE PRACTITIONER

## 2018-03-01 PROCEDURE — 97116 GAIT TRAINING THERAPY: CPT

## 2018-03-01 RX ORDER — HYDRALAZINE HYDROCHLORIDE 10 MG/1
10 TABLET, FILM COATED ORAL ONCE
Status: COMPLETED | OUTPATIENT
Start: 2018-03-01 | End: 2018-03-01

## 2018-03-01 RX ORDER — LOSARTAN POTASSIUM 25 MG/1
100 TABLET ORAL DAILY
Status: DISCONTINUED | OUTPATIENT
Start: 2018-03-02 | End: 2018-03-04 | Stop reason: HOSPADM

## 2018-03-01 RX ADMIN — TRAMADOL HYDROCHLORIDE 50 MG: 50 TABLET, FILM COATED ORAL at 11:03

## 2018-03-01 RX ADMIN — METHYLNALTREXONE BROMIDE 12 MG: 12 INJECTION, SOLUTION SUBCUTANEOUS at 09:03

## 2018-03-01 RX ADMIN — DOCUSATE SODIUM 100 MG: 100 CAPSULE, LIQUID FILLED ORAL at 09:03

## 2018-03-01 RX ADMIN — HYDRALAZINE HYDROCHLORIDE 10 MG: 10 TABLET, FILM COATED ORAL at 01:03

## 2018-03-01 RX ADMIN — IOHEXOL 15 ML: 350 INJECTION, SOLUTION INTRAVENOUS at 12:03

## 2018-03-01 RX ADMIN — IOHEXOL 75 ML: 350 INJECTION, SOLUTION INTRAVENOUS at 02:03

## 2018-03-01 RX ADMIN — PANTOPRAZOLE SODIUM 40 MG: 40 TABLET, DELAYED RELEASE ORAL at 09:03

## 2018-03-01 RX ADMIN — IOHEXOL 15 ML: 350 INJECTION, SOLUTION INTRAVENOUS at 11:03

## 2018-03-01 RX ADMIN — ATORVASTATIN CALCIUM 40 MG: 20 TABLET, FILM COATED ORAL at 09:03

## 2018-03-01 RX ADMIN — CLINDAMYCIN IN 5 PERCENT DEXTROSE 600 MG: 12 INJECTION, SOLUTION INTRAVENOUS at 01:03

## 2018-03-01 RX ADMIN — LOSARTAN POTASSIUM 50 MG: 25 TABLET, FILM COATED ORAL at 09:03

## 2018-03-01 RX ADMIN — ENOXAPARIN SODIUM 40 MG: 100 INJECTION SUBCUTANEOUS at 04:03

## 2018-03-01 RX ADMIN — OXYCODONE HYDROCHLORIDE AND ACETAMINOPHEN 1 TABLET: 5; 325 TABLET ORAL at 04:03

## 2018-03-01 RX ADMIN — RETINOL, ERGOCALCIFEROL, .ALPHA.-TOCOPHEROL ACETATE, DL-, PHYTONADIONE, ASCORBIC ACID, NIACINAMIDE, RIBOFLAVIN 5-PHOSPHATE SODIUM, THIAMINE HYDROCHLORIDE, PYRIDOXINE HYDROCHLORIDE, DEXPANTHENOL, BIOTIN, FOLIC ACID, AND CYANOCOBALAMIN: KIT at 09:03

## 2018-03-01 RX ADMIN — CLINDAMYCIN IN 5 PERCENT DEXTROSE 600 MG: 12 INJECTION, SOLUTION INTRAVENOUS at 09:03

## 2018-03-01 RX ADMIN — SOYBEAN OIL 250 ML: 20 INJECTION, SOLUTION INTRAVENOUS at 09:03

## 2018-03-01 RX ADMIN — TRAMADOL HYDROCHLORIDE 50 MG: 50 TABLET, FILM COATED ORAL at 05:03

## 2018-03-01 RX ADMIN — ONDANSETRON 8 MG: 2 INJECTION INTRAMUSCULAR; INTRAVENOUS at 02:03

## 2018-03-01 RX ADMIN — Medication 10 ML: at 11:03

## 2018-03-01 RX ADMIN — TRAMADOL HYDROCHLORIDE 50 MG: 50 TABLET, FILM COATED ORAL at 04:03

## 2018-03-01 RX ADMIN — CLINDAMYCIN IN 5 PERCENT DEXTROSE 600 MG: 12 INJECTION, SOLUTION INTRAVENOUS at 05:03

## 2018-03-01 RX ADMIN — FLUTICASONE PROPIONATE 50 MCG: 50 SPRAY, METERED NASAL at 09:03

## 2018-03-01 RX ADMIN — OXYCODONE HYDROCHLORIDE AND ACETAMINOPHEN 1 TABLET: 5; 325 TABLET ORAL at 09:03

## 2018-03-01 NOTE — PROGRESS NOTES
[facility]  General Surgery  Progress Note    Subjective:     History of Present Illness:  This is a 71 y/o male with hx CAD and HTN, obscure overt GI bleeding who [facility] on CT imaging.  Pt was admitted 2/5 with melena. Underwent  colonoscopy, negative for bleeding with old blood seen in TI. No EGD at OSH.  CT abd showed ileal mass, approx 5 cm. Surgery recommend operative intervention at OSH, however patient requested transfer for 2nd opinion.  Patient has had a similar episode in the past year, at which time he also had EGD, colonscopy and capsule enterography which were negative, bleeding at that time thought due to NSAID use which was discontinued and he did well until about a week ago. When he began to have blood stools again. He has some fullness and early satiety, but has not altered his eating habits, and normal bowel habits, 1-3 movement a day, no change in consistency. No abdominal pain. Only prior abdominal surgery is an appendectomy as a child.     Post-Op Info:  Procedure(s) (LRB):  RESECTION-SMALL BOWEL (N/A)   14 Days Post-Op     Interval History: No acute events overnight. Tolerating regular diet, still having some belching with mild nausea. Continues to have several small BMs overnight, passing flatus.  Still having abdominal distension but soft abdomen. Voiding, ambulating. Midline incision open, no erythema. Dressing changed at bedside this morning, wound with continued purulent drainage noted at superior aspect of wound bed. Relatively no changes from yesterday.    Medications:  Continuous Infusions:   Amino acid 5% - dextrose 15% (CLINIMIX-E) solution with additives (1L  provides 510 kcal/L dextrose, with 50 gm AA, 150 gm CHO, Na 35, K 30, Mg 5, Ca 4.5, Acetate 80, Cl 39, Phos 15) 75 mL/hr at 02/28/18 1834     Scheduled Meds:   atorvastatin  40 mg Oral QHS    Cialis (tadalafil) 5 mg - half tablet  5 mg Oral Daily     "clindamycin (CLEOCIN) IVPB  600 mg Intravenous Q8H    docusate sodium  100 mg Oral BID    enoxaparin  40 mg Subcutaneous Daily    fat emulsion 20%  250 mL Intravenous Daily    fluticasone  1 spray Each Nare BID    losartan  50 mg Oral Daily    methylnaltrexone  12 mg Subcutaneous Every other day    pantoprazole  40 mg Oral BID    sodium chloride 0.9%  10 mL Intravenous Q6H    traMADol  50 mg Oral Q6H     PRN Meds:sodium chloride, dextrose 50%, dextrose 50%, glucagon (human recombinant), glucose, glucose, ondansetron, oxyCODONE-acetaminophen, oxyCODONE-acetaminophen, promethazine (PHENERGAN) IVPB, Flushing PICC Protocol **AND** sodium chloride 0.9% **AND** sodium chloride 0.9%, sodium chloride 0.9%, sodium chloride 0.9%     Review of patient's allergies indicates:   Allergen Reactions    Augmentin [amoxicillin-pot clavulanate] Shortness Of Breath    Singulair [montelukast]      Pt "felt strange"      Horse/equine containing products      Objective:     Vital Signs (Most Recent):  Temp: 98.8 °F (37.1 °C) (03/01/18 0459)  Pulse: 65 (03/01/18 0459)  Resp: 17 (02/28/18 2344)  BP: 130/63 (03/01/18 0459)  SpO2: 96 % (03/01/18 0459) Vital Signs (24h Range):  Temp:  [96.5 °F (35.8 °C)-98.8 °F (37.1 °C)] 98.8 °F (37.1 °C)  Pulse:  [63-76] 65  Resp:  [16-17] 17  SpO2:  [96 %-99 %] 96 %  BP: (107-153)/(58-71) 130/63     Weight: 73 kg (160 lb 15 oz)  Body mass index is 22.46 kg/m².    Intake/Output - Last 3 Shifts       02/27 0700 - 02/28 0659 02/28 0700 - 03/01 0659 03/01 0700 - 03/02 0659    P.O. 1190 1120     Other 0 0     IV Piggyback 53 50      568     Total Intake(mL/kg) 1804 (24.7) 1738 (23.8)     Urine (mL/kg/hr) 2380 (1.4) 1525 (0.9)     Stool 0 (0) 0 (0)     Total Output 2380 1525      Net -576 +213             Stool Occurrence 2 x 3 x           Physical Exam   Constitutional: He is oriented to person, place, and time. He appears well-developed and well-nourished.   HENT:   Head: Normocephalic and " atraumatic.   Eyes: Pupils are equal, round, and reactive to light.   Neck: Normal range of motion.   Cardiovascular: Normal rate.    Pulmonary/Chest: Effort normal. No respiratory distress.   Abdominal: Soft. Bowel sounds are normal. He exhibits distension (Improved slightly from yesterday). There is no tenderness.   Midline incision packed with gauze with modest amount of purulent drainage, changed at bedside this morning.   Musculoskeletal: Normal range of motion.   Neurological: He is alert and oriented to person, place, and time.   Skin: Skin is warm and dry.   Psychiatric: He has a normal mood and affect. His behavior is normal. Judgment and thought content normal.   Nursing note and vitals reviewed.      Significant Labs:  CBC:     Recent Labs  Lab 02/28/18  0401   WBC 7.16   RBC 3.02*   HGB 7.5*   HCT 24.4*      MCV 81*   MCH 24.8*   MCHC 30.7*     CMP:     Recent Labs  Lab 02/28/18  0401   *   CALCIUM 8.8   ALBUMIN 2.1*   PROT 5.7*   *   K 4.5   CO2 29   CL 98   BUN 16   CREATININE 0.8   ALKPHOS 106   ALT 54*   AST 39   BILITOT 0.2       Significant Diagnostics:  I have reviewed and interpreted all pertinent imaging results/findings within the past 24 hours.    Assessment/Plan:     Mass of small intestine    73 yo male with mass in distal small bowel s/p Small bowel resection/mass resection on 2/16/18. Ileus improving. Now with superficial wound infection/    - trend labs q48 hours, labs due tomorrow  - midline incision open at middle portition of midline incision plus a few more superior and inferior for a total of 6cm wound exposed. Wound packed with gauze- dressing changed this morning with small amount of expressible purulent drainage. CT scan today to assess for fluid collection  - tolerating regular diet, continue, has return of bowel function, continue TPN   - pain well controlled on PO PRN pain medications   - monitor I/Os  - continue cialis (home med) for urinary retention  -  encourage OOBTC, patient states he is ambulating  - encourage IS  - stool softeners PRN  - methylnaltrexone  -PICC line in place, TPN infusing- reordered for today  -Wound care changes BID with wet to dry dressing changes   - continue home losartan for hypertension                    Devora Gross MD  General Surgery  [facility]

## 2018-03-01 NOTE — PLAN OF CARE
Problem: Physical Therapy Goal  Goal: Physical Therapy Goal  Goals to be met by: 3/3    Patient will increase functional independence with mobility by performin. Supine to sit with Modified [place]  2. Sit to supine with Modified [place]  3. Sit to stand transfer with Stand-by Assistance-met  3a.  Transfers supervision with no device  4. Bed to chair transfer with Stand-by Assistance using Rolling Walker  5. Gait  x 50 feet with Stand-by Assistance using Rolling Walker. -met  5a. Gait with supervision x250 ft with no device  6. Lower extremity exercise program x20 reps per handout, with independence        Pt progressing towards goals. continue with PT POC.Goals remain appropriate.  Ahmet Pearce PTA  3/1/2018

## 2018-03-01 NOTE — PLAN OF CARE
Problem: Occupational Therapy Goal  Goal: Occupational Therapy Goal  Goals to be met by: 3/2/18    Patient will increase functional independence with ADLs by performing:    UE Dressing with [place].  Grooming while standing at sink with Supervision. GOAL MET 2/22  Toileting from toilet with [place] for hygiene and clothing management.   Toilet transfer to toilet with Supervision.        Outcome: Ongoing (interventions implemented as appropriate)  Goals remain appropriate    Comments: Continue OT POC    Pushpa Garces OT  3/1/2018

## 2018-03-01 NOTE — PT/OT/SLP PROGRESS
Physical Therapy Treatment    Patient Name:  Forrest Schmidt   MRN:  [id number]    Recommendations:     Discharge Recommendations:  home health PT   Discharge Equipment Recommendations: bedside commode, walker, rolling   Barriers to discharge: Decreased caregiver support    Assessment:     Forrest Schmidt is a 72 y.o. male admitted with a medical diagnosis of Gastrointestinal hemorrhage.  He presents with the following impairments/functional limitations:  weakness, impaired endurance, impaired self care skills, gait instability, impaired functional mobilty, pain .Pt continues to remain motivated and cooperative with treatment session. Pt Progressing with PT Intervention however Pt limited due to c/o of fatigue and dizziness.  Pt would continue to benefit from skilled PT to address overall functional mobility and goals. Goals remain appropriate      Rehab Prognosis:  good; patient would benefit from acute skilled PT services to address these deficits and reach maximum level of function.      Recent Surgery: Procedure(s) (LRB):  RESECTION-SMALL BOWEL (N/A) 14 Days Post-Op    Plan:     During this hospitalization, patient to be seen 4 x/week to address the above listed problems via gait training, therapeutic activities, therapeutic exercises  · Plan of Care Expires:  03/18/18   Plan of Care Reviewed with: patient    Subjective     Communicated with RN prior to session.  Patient found seated upon PT entry to room, agreeable to treatment.      Chief Complaint: I am going for a CT scan later, so I have to drink this contrast  Pain/Comfort:  · Pain Rating 1: 3/10  · Location - Orientation 1: upper  · Location 1: abdomen  · Pain Addressed 1: Reposition, Distraction  · Pain Rating Post-Intervention 1:  (did not rate)    Patients cultural, spiritual, Roman Catholic conflicts given the current situation: none noted    Objective:     Patient found with: peripheral IV     General Precautions: Standard, fall   Orthopedic Precautions:N/A    Braces: N/A     Functional Mobility:  · Transfers:      · Sit to Stand:  stand by assistance with rolling walker  · Gait: Ambulated about 200ft x 2  with RW with SBA/CGA.  slow gait speed.  no LOB pt with min c/o of dizziness and required seated rest break between trials      AM-PAC 6 CLICK MOBILITY  Turning over in bed (including adjusting bedclothes, sheets and blankets)?: 3  Sitting down on and standing up from a chair with arms (e.g., wheelchair, bedside commode, etc.): 3  Moving from lying on back to sitting on the side of the bed?: 3  Moving to and from a bed to a chair (including a wheelchair)?: 3  Need to walk in hospital room?: 3  Climbing 3-5 steps with a railing?: 3  Total Score: 18       Therapeutic Activities and Exercises:   Discussed/educated patient on progress, safety,d/c, PT POC   White board updated   Donned an extra gown   Pt encouraged to ambulate in hallways 3x/day with nursing  assistance to improve endurance.   Pt safe to ambulate in hallway with RN or PCT assistance        Patient left up in chair with all lines intact, call button in reach, nsg notified and spouse present..    GOALS:    Physical Therapy Goals        Problem: Physical Therapy Goal    Goal Priority Disciplines Outcome Goal Variances Interventions   Physical Therapy Goal     PT/OT, PT Ongoing (interventions implemented as appropriate)     Description:  Goals to be met by: 3/3    Patient will increase functional independence with mobility by performin. Supine to sit with Modified [place]  2. Sit to supine with Modified [place]  3. Sit to stand transfer with Stand-by Assistance-met  3a.  Transfers supervision with no device  4. Bed to chair transfer with Stand-by Assistance using Rolling Walker  5. Gait  x 50 feet with Stand-by Assistance using Rolling Walker. -met  5a. Gait with supervision x250 ft with no device  6. Lower extremity exercise program x20 reps per handout, with independence                          Time Tracking:     PT Received On: 03/01/18  PT Start Time: 1111     PT Stop Time: 1134  PT Total Time (min): 23 min     Billable Minutes: Gait Training 23    Treatment Type: Treatment  PT/PTA: PTA     PTA Visit Number: 3     Ahmet Pearce PTA  03/01/2018

## 2018-03-01 NOTE — PLAN OF CARE
"Visited patient. AAOx4. Spouse at BS. Discussed discharge plan: Home w/HH w/HH nurse coming out 2-3 times/week usually, +- home TPN. Both patient & spouse state,"We do not know how to change dressings. We do not know what we would be looking at. The Dr changes/packs it in the daytime, w/night nurse changes  at night." Patient states,"I thought a nurse would come out daily to change when I am home?" CM discussed w/them that they would be taught, given supplies before he is discharged (maybe tomorrow vs Monday) & CM would pass this information onto floor nurse & NP JACKIE Loza. They verbalized their understanding.     Also informed of home DME: RW, & BSC;Asked if they would like for insurance would cover? They states they have both of these at home already.     Floor nurse, HAIDER Hollis,  & JACKIE Loza NP, updated on above.        03/01/18 1100   Medicare Message   Important Message from Medicare regarding Discharge Appeal Rights Given to patient/caregiver;Explained to patient/caregiver;Signed/date by patient/caregiver   Date IMM was signed 03/01/18   Time IMM was signed 1100     "

## 2018-03-01 NOTE — SUBJECTIVE & OBJECTIVE
"Interval History: No acute events overnight. Tolerating regular diet, still having some belching with mild nausea. Continues to have several small BMs overnight, passing flatus.  Still having abdominal distension but soft abdomen. Voiding, ambulating. Midline incision open, no erythema. Dressing changed at bedside this morning, wound with continued purulent drainage noted at superior aspect of wound bed. Relatively no changes from yesterday.    Medications:  Continuous Infusions:   Amino acid 5% - dextrose 15% (CLINIMIX-E) solution with additives (1L  provides 510 kcal/L dextrose, with 50 gm AA, 150 gm CHO, Na 35, K 30, Mg 5, Ca 4.5, Acetate 80, Cl 39, Phos 15) 75 mL/hr at 02/28/18 2137     Scheduled Meds:   atorvastatin  40 mg Oral QHS    Cialis (tadalafil) 5 mg - half tablet  5 mg Oral Daily    clindamycin (CLEOCIN) IVPB  600 mg Intravenous Q8H    docusate sodium  100 mg Oral BID    enoxaparin  40 mg Subcutaneous Daily    fat emulsion 20%  250 mL Intravenous Daily    fluticasone  1 spray Each Nare BID    losartan  50 mg Oral Daily    methylnaltrexone  12 mg Subcutaneous Every other day    pantoprazole  40 mg Oral BID    sodium chloride 0.9%  10 mL Intravenous Q6H    traMADol  50 mg Oral Q6H     PRN Meds:sodium chloride, dextrose 50%, dextrose 50%, glucagon (human recombinant), glucose, glucose, ondansetron, oxyCODONE-acetaminophen, oxyCODONE-acetaminophen, promethazine (PHENERGAN) IVPB, Flushing PICC Protocol **AND** sodium chloride 0.9% **AND** sodium chloride 0.9%, sodium chloride 0.9%, sodium chloride 0.9%     Review of patient's allergies indicates:   Allergen Reactions    Augmentin [amoxicillin-pot clavulanate] Shortness Of Breath    Singulair [montelukast]      Pt "felt strange"      Horse/equine containing products      Objective:     Vital Signs (Most Recent):  Temp: 98.8 °F (37.1 °C) (03/01/18 0459)  Pulse: 65 (03/01/18 0459)  Resp: 17 (02/28/18 7074)  BP: 130/63 (03/01/18 0459)  SpO2: 96 " % (03/01/18 1649) Vital Signs (24h Range):  Temp:  [96.5 °F (35.8 °C)-98.8 °F (37.1 °C)] 98.8 °F (37.1 °C)  Pulse:  [63-76] 65  Resp:  [16-17] 17  SpO2:  [96 %-99 %] 96 %  BP: (107-153)/(58-71) 130/63     Weight: 73 kg (160 lb 15 oz)  Body mass index is 22.46 kg/m².    Intake/Output - Last 3 Shifts       02/27 0700 - 02/28 0659 02/28 0700 - 03/01 0659 03/01 0700 - 03/02 0659    P.O. 1190 1120     Other 0 0     IV Piggyback 53 50      568     Total Intake(mL/kg) 1804 (24.7) 1738 (23.8)     Urine (mL/kg/hr) 2380 (1.4) 1525 (0.9)     Stool 0 (0) 0 (0)     Total Output 2380 1525      Net -576 +213             Stool Occurrence 2 x 3 x           Physical Exam   Constitutional: He is oriented to person, place, and time. He appears well-developed and well-nourished.   HENT:   Head: Normocephalic and atraumatic.   Eyes: Pupils are equal, round, and reactive to light.   Neck: Normal range of motion.   Cardiovascular: Normal rate.    Pulmonary/Chest: Effort normal. No respiratory distress.   Abdominal: Soft. Bowel sounds are normal. He exhibits distension (Improved slightly from yesterday). There is no tenderness.   Midline incision packed with gauze with modest amount of purulent drainage, changed at bedside this morning.   Musculoskeletal: Normal range of motion.   Neurological: He is alert and oriented to person, place, and time.   Skin: Skin is warm and dry.   Psychiatric: He has a normal mood and affect. His behavior is normal. Judgment and thought content normal.   Nursing note and vitals reviewed.      Significant Labs:  CBC:     Recent Labs  Lab 02/28/18  0401   WBC 7.16   RBC 3.02*   HGB 7.5*   HCT 24.4*      MCV 81*   MCH 24.8*   MCHC 30.7*     CMP:     Recent Labs  Lab 02/28/18  0401   *   CALCIUM 8.8   ALBUMIN 2.1*   PROT 5.7*   *   K 4.5   CO2 29   CL 98   BUN 16   CREATININE 0.8   ALKPHOS 106   ALT 54*   AST 39   BILITOT 0.2       Significant Diagnostics:  I have reviewed and  interpreted all pertinent imaging results/findings within the past 24 hours.

## 2018-03-01 NOTE — ASSESSMENT & PLAN NOTE
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 Ileus improving. Now with superficial wound infection/    - trend labs q48 hours, labs due tomorrow  - midline incision open at middle portition of midline incision plus a few more superior and inferior for a total of 6cm wound exposed. Wound packed with gauze- dressing changed this morning with small amount of expressible purulent drainage. No systemic signs of infection- no leukocytosis, fever, chills  - tolerating regular diet, continue, has return of bowel function, continue TPN   - pain well controlled on PO PRN pain medications   - monitor I/Os  - continue cialis (home med) for urinary retention  - encourage OOBTC, patient states he is ambulating  - encourage IS  - stool softeners PRN  - methylnaltrexone  -PICC line in place, TPN infusing- reordered for today  -Wound care changes BID with wet to dry dressing changes   - continue home losartan for hypertension

## 2018-03-01 NOTE — PLAN OF CARE
Problem: Patient Care Overview  Goal: Plan of Care Review  Outcome: Ongoing (interventions implemented as appropriate)  Plan of care reviewed and updated. Pt AA+O. Pt's pain is managed with the medication ordered at this time. Pt's VS are as charted.  No falls this shift. Pt is oriented to room and call system. Will continue to [facility].

## 2018-03-01 NOTE — PT/OT/SLP PROGRESS
Occupational Therapy   Treatment    Name: Forrest Schmidt  MRN: [id number]  Admitting Diagnosis:  Gastrointestinal hemorrhage  14 Days Post-Op    Recommendations:     Discharge Recommendations: home health OT  Discharge Equipment Recommendations:  bedside commode, walker, rolling  Barriers to discharge:  Decreased caregiver support    Subjective     Communicated with: RN prior to session.  Pain/Comfort:  · Pain Rating 1: 3/10  · Location 1: abdomen  · Pain Addressed 1: Reposition, Distraction  · Pain Rating Post-Intervention 1:  (Pt report increased pain, however did not rate)    Patients cultural, spiritual, Congregational conflicts given the current situation: none reported     Objective:     Patient found with: peripheral IV    General Precautions: Standard, fall   Orthopedic Precautions:N/A   Braces: N/A     Occupational Performance:    Bed Mobility:    · Patient completed Rolling/Turning to Right with modified independence and with side rail  · Patient completed Scooting/Bridging with modified independence  · Patient completed Supine to Sit with modified independence     Functional Mobility/Transfers:  · Patient completed Sit <> Stand Transfer with supervision  with  rolling walker   · Patient completed Bed <> Chair Transfer using Step Transfer technique with supervision with rolling walker  · Functional Mobility: Pt ambulate 350 ft with supervision using RW; occasional standing rest breaks 2/2 pain, pt reports bending forward helps relieve abdominal pain but increased back pain, and upright position relieves back pain, but increases abdominal pain.    Activities of Daily Living:  · Grooming: supervision standing at sink to wash face, brush teeth, comb hair/beard, and shave.  · UB Dressing: setup assistance to doff/don front gown and don backward gown  · LB Dressing: independence to doff/don B socks while seated EOB  · Toileting: [place] to use urinal in standing    Patient left up in chair with all lines intact,  call button in reach and wife present    [facility] 6 Click:  [facility] Total Score: 21    Treatment & Education:  Pt educated on role of OT/POC  White board/communication board updated  Education:    Assessment:     Forrest Schmidt is a 72 y.o. male with a medical diagnosis of Gastrointestinal hemorrhage.  He presents with pain as limiting factor in safe functional mobility and self care.  Performance deficits affecting function are weakness, impaired endurance, pain, impaired functional mobilty, impaired self care skills.      Rehab Prognosis:  Good; patient would benefit from acute skilled OT services to address these deficits and reach maximum level of function.       Plan:     Patient to be seen 3 x/week to address the above listed problems via self-care/home management, therapeutic exercises, therapeutic activities  · Plan of Care Expires: 03/18/18  · Plan of Care Reviewed with: patient    This Plan of care has been discussed with the patient who was involved in its development and understands and is in agreement with the identified goals and treatment plan    GOALS:    Occupational Therapy Goals        Problem: Occupational Therapy Goal    Goal Priority Disciplines Outcome Interventions   Occupational Therapy Goal     OT, PT/OT Ongoing (interventions implemented as appropriate)    Description:  Goals to be met by: 3/2/18    Patient will increase functional independence with ADLs by performing:    UE Dressing with [place].  Grooming while standing at sink with Supervision. GOAL MET 2/22  Toileting from toilet with [place] for hygiene and clothing management.   Toilet transfer to toilet with Supervision.                         Time Tracking:     OT Date of Treatment: 03/01/18  OT Start Time: 0823  OT Stop Time: 0846  OT Total Time (min): 23 min    Billable Minutes:Self Care/Home Management 13  Therapeutic Activity 10    Pushpa Garces OT  3/1/2018

## 2018-03-02 PROBLEM — T14.8XXA WOUND INFECTION: Status: ACTIVE | Noted: 2018-03-02

## 2018-03-02 PROBLEM — L08.9 WOUND INFECTION: Status: ACTIVE | Noted: 2018-03-02

## 2018-03-02 LAB
ALBUMIN SERPL BCP-MCNC: 2.4 G/DL
ALP SERPL-CCNC: 114 U/L
ALT SERPL W/O P-5'-P-CCNC: 53 U/L
ANION GAP SERPL CALC-SCNC: 6 MMOL/L
AST SERPL-CCNC: 43 U/L
BACTERIA SPEC ANAEROBE CULT: NORMAL
BASOPHILS # BLD AUTO: 0.05 K/UL
BASOPHILS NFR BLD: 0.6 %
BILIRUB SERPL-MCNC: 0.1 MG/DL
BUN SERPL-MCNC: 22 MG/DL
CALCIUM SERPL-MCNC: 8.7 MG/DL
CHLORIDE SERPL-SCNC: 99 MMOL/L
CO2 SERPL-SCNC: 29 MMOL/L
CREAT SERPL-MCNC: 0.9 MG/DL
DIFFERENTIAL METHOD: ABNORMAL
EOSINOPHIL # BLD AUTO: 0.2 K/UL
EOSINOPHIL NFR BLD: 2.9 %
ERYTHROCYTE [DISTWIDTH] IN BLOOD BY AUTOMATED COUNT: 16.3 %
EST. GFR  (AFRICAN AMERICAN): >60 ML/MIN/1.73 M^2
EST. GFR  (NON AFRICAN AMERICAN): >60 ML/MIN/1.73 M^2
GLUCOSE SERPL-MCNC: 118 MG/DL
HCT VFR BLD AUTO: 24.5 %
HGB BLD-MCNC: 7.7 G/DL
IMM GRANULOCYTES # BLD AUTO: 0.08 K/UL
IMM GRANULOCYTES NFR BLD AUTO: 1 %
LYMPHOCYTES # BLD AUTO: 0.7 K/UL
LYMPHOCYTES NFR BLD: 7.8 %
MAGNESIUM SERPL-MCNC: 1.8 MG/DL
MCH RBC QN AUTO: 25.6 PG
MCHC RBC AUTO-ENTMCNC: 31.4 G/DL
MCV RBC AUTO: 81 FL
MONOCYTES # BLD AUTO: 1.2 K/UL
MONOCYTES NFR BLD: 14.2 %
NEUTROPHILS # BLD AUTO: 6.2 K/UL
NEUTROPHILS NFR BLD: 73.5 %
NRBC BLD-RTO: 0 /100 WBC
PHOSPHATE SERPL-MCNC: 4.1 MG/DL
PLATELET # BLD AUTO: 357 K/UL
PMV BLD AUTO: 9.8 FL
POTASSIUM SERPL-SCNC: 4.4 MMOL/L
PROT SERPL-MCNC: 6 G/DL
RBC # BLD AUTO: 3.01 M/UL
SODIUM SERPL-SCNC: 134 MMOL/L
WBC # BLD AUTO: 8.36 K/UL

## 2018-03-02 PROCEDURE — 11000001 HC ACUTE MED/SURG PRIVATE ROOM

## 2018-03-02 PROCEDURE — 63600175 PHARM REV CODE 636 W HCPCS: Performed by: NURSE PRACTITIONER

## 2018-03-02 PROCEDURE — A4216 STERILE WATER/SALINE, 10 ML: HCPCS | Performed by: SURGERY

## 2018-03-02 PROCEDURE — B4185 PARENTERAL SOL 10 GM LIPIDS: HCPCS | Performed by: NURSE PRACTITIONER

## 2018-03-02 PROCEDURE — S0077 INJECTION, CLINDAMYCIN PHOSP: HCPCS | Performed by: STUDENT IN AN ORGANIZED HEALTH CARE EDUCATION/TRAINING PROGRAM

## 2018-03-02 PROCEDURE — 25000003 PHARM REV CODE 250: Performed by: NURSE PRACTITIONER

## 2018-03-02 PROCEDURE — 25000003 PHARM REV CODE 250: Performed by: STUDENT IN AN ORGANIZED HEALTH CARE EDUCATION/TRAINING PROGRAM

## 2018-03-02 PROCEDURE — 83735 ASSAY OF MAGNESIUM: CPT

## 2018-03-02 PROCEDURE — 80053 COMPREHEN METABOLIC PANEL: CPT

## 2018-03-02 PROCEDURE — 97803 MED NUTRITION INDIV SUBSEQ: CPT

## 2018-03-02 PROCEDURE — A4217 STERILE WATER/SALINE, 500 ML: HCPCS | Performed by: NURSE PRACTITIONER

## 2018-03-02 PROCEDURE — 85025 COMPLETE CBC W/AUTO DIFF WBC: CPT

## 2018-03-02 PROCEDURE — 84100 ASSAY OF PHOSPHORUS: CPT

## 2018-03-02 PROCEDURE — 25000003 PHARM REV CODE 250: Performed by: SURGERY

## 2018-03-02 PROCEDURE — 97116 GAIT TRAINING THERAPY: CPT

## 2018-03-02 RX ORDER — OXYCODONE AND ACETAMINOPHEN 5; 325 MG/1; MG/1
1 TABLET ORAL EVERY 6 HOURS PRN
Status: DISCONTINUED | OUTPATIENT
Start: 2018-03-02 | End: 2018-03-02

## 2018-03-02 RX ORDER — POLYETHYLENE GLYCOL 3350 17 G/17G
17 POWDER, FOR SOLUTION ORAL 2 TIMES DAILY
Status: DISCONTINUED | OUTPATIENT
Start: 2018-03-02 | End: 2018-03-04 | Stop reason: HOSPADM

## 2018-03-02 RX ORDER — ACETAMINOPHEN 325 MG/1
650 TABLET ORAL EVERY 6 HOURS PRN
Status: DISCONTINUED | OUTPATIENT
Start: 2018-03-02 | End: 2018-03-04 | Stop reason: HOSPADM

## 2018-03-02 RX ORDER — ACETAMINOPHEN 325 MG/1
650 TABLET ORAL EVERY 6 HOURS PRN
Status: DISCONTINUED | OUTPATIENT
Start: 2018-03-02 | End: 2018-03-02

## 2018-03-02 RX ADMIN — ACETAMINOPHEN 650 MG: 325 TABLET, FILM COATED ORAL at 10:03

## 2018-03-02 RX ADMIN — PANTOPRAZOLE SODIUM 40 MG: 40 TABLET, DELAYED RELEASE ORAL at 08:03

## 2018-03-02 RX ADMIN — TRAMADOL HYDROCHLORIDE 50 MG: 50 TABLET, FILM COATED ORAL at 05:03

## 2018-03-02 RX ADMIN — POLYETHYLENE GLYCOL 3350 17 G: 17 POWDER, FOR SOLUTION ORAL at 10:03

## 2018-03-02 RX ADMIN — SOYBEAN OIL 250 ML: 20 INJECTION, SOLUTION INTRAVENOUS at 10:03

## 2018-03-02 RX ADMIN — TRAMADOL HYDROCHLORIDE 50 MG: 50 TABLET, FILM COATED ORAL at 11:03

## 2018-03-02 RX ADMIN — CLINDAMYCIN IN 5 PERCENT DEXTROSE 600 MG: 12 INJECTION, SOLUTION INTRAVENOUS at 10:03

## 2018-03-02 RX ADMIN — ATORVASTATIN CALCIUM 40 MG: 20 TABLET, FILM COATED ORAL at 10:03

## 2018-03-02 RX ADMIN — TRAMADOL HYDROCHLORIDE 50 MG: 50 TABLET, FILM COATED ORAL at 06:03

## 2018-03-02 RX ADMIN — PANTOPRAZOLE SODIUM 40 MG: 40 TABLET, DELAYED RELEASE ORAL at 10:03

## 2018-03-02 RX ADMIN — CALCIUM GLUCONATE: 94 INJECTION, SOLUTION INTRAVENOUS at 10:03

## 2018-03-02 RX ADMIN — Medication 10 ML: at 11:03

## 2018-03-02 RX ADMIN — FLUTICASONE PROPIONATE 50 MCG: 50 SPRAY, METERED NASAL at 08:03

## 2018-03-02 RX ADMIN — ENOXAPARIN SODIUM 40 MG: 100 INJECTION SUBCUTANEOUS at 04:03

## 2018-03-02 RX ADMIN — FLUTICASONE PROPIONATE 50 MCG: 50 SPRAY, METERED NASAL at 10:03

## 2018-03-02 RX ADMIN — LOSARTAN POTASSIUM 100 MG: 25 TABLET, FILM COATED ORAL at 08:03

## 2018-03-02 RX ADMIN — POLYETHYLENE GLYCOL 3350 17 G: 17 POWDER, FOR SOLUTION ORAL at 04:03

## 2018-03-02 RX ADMIN — ACETAMINOPHEN 650 MG: 325 TABLET, FILM COATED ORAL at 04:03

## 2018-03-02 RX ADMIN — Medication 10 ML: at 12:03

## 2018-03-02 RX ADMIN — CLINDAMYCIN IN 5 PERCENT DEXTROSE 600 MG: 12 INJECTION, SOLUTION INTRAVENOUS at 06:03

## 2018-03-02 RX ADMIN — DOCUSATE SODIUM 100 MG: 100 CAPSULE, LIQUID FILLED ORAL at 10:03

## 2018-03-02 RX ADMIN — DOCUSATE SODIUM 100 MG: 100 CAPSULE, LIQUID FILLED ORAL at 08:03

## 2018-03-02 RX ADMIN — CLINDAMYCIN IN 5 PERCENT DEXTROSE 600 MG: 12 INJECTION, SOLUTION INTRAVENOUS at 01:03

## 2018-03-02 NOTE — PROGRESS NOTES
[facility]  General Surgery  Progress Note    Subjective:     History of Present Illness:  This is a 73 y/o male with hx CAD and HTN, obscure overt GI bleeding who [facility] CT [facility].  Pt was admitted 2/5 with melena. Underwent  colonoscopy, negative for bleeding with old blood seen in TI. No EGD at OSH.  CT abd showed ileal mass, approx 5 cm. Surgery recommend operative intervention at OSH, however patient requested transfer for 2nd opinion.  Patient has had a similar episode in the past year, at which time he also had EGD, colonscopy and capsule enterography which were negative, bleeding at that time thought due to NSAID use which was discontinued and he did well until about a week ago. When he began to have blood stools again. He has some fullness and early satiety, but has not altered his eating habits, and normal bowel habits, 1-3 movement a day, no change in consistency. No abdominal pain. Only prior abdominal surgery is an appendectomy as a child.     Post-Op Info:  Procedure(s) (LRB):  RESECTION-SMALL BOWEL (N/A)   15 Days Post-Op     Interval History: No acute events overnight. Tolerating regular diet, no nausea/vomiting. Continues to pass flatus, having some Bms every day.  Still having abdominal distension but soft abdomen. Voiding, ambulating. Midline incision open, no erythema. CT scan yesterday negative for intraabdominal fluid collection or acute pathology. Dressing changed at bedside this morning, wound with continued small amount of purulent drainage noted at superior aspect of wound bed. Relatively no changes from yesterday.    Medications:  Continuous Infusions:   Amino acid 5% - dextrose 15% (CLINIMIX-E) solution with additives (1L  provides 510 kcal/L dextrose, with 50 gm AA, 150 gm CHO, Na 35, K 30, Mg 5, Ca 4.5, Acetate 80, Cl 39, Phos 15) 75 mL/hr at 03/01/18 2121     Scheduled Meds:   atorvastatin  40 mg Oral QHS  "   Cialis (tadalafil) 5 mg - half tablet  5 mg Oral Daily    clindamycin (CLEOCIN) IVPB  600 mg Intravenous Q8H    docusate sodium  100 mg Oral BID    enoxaparin  40 mg Subcutaneous Daily    fat emulsion 20%  250 mL Intravenous Daily    fluticasone  1 spray Each Nare BID    losartan  100 mg Oral Daily    methylnaltrexone  12 mg Subcutaneous Every other day    pantoprazole  40 mg Oral BID    sodium chloride 0.9%  10 mL Intravenous Q6H    traMADol  50 mg Oral Q6H     PRN Meds:sodium chloride, dextrose 50%, dextrose 50%, glucagon (human recombinant), glucose, glucose, ondansetron, oxyCODONE-acetaminophen, promethazine (PHENERGAN) IVPB, Flushing PICC Protocol **AND** sodium chloride 0.9% **AND** sodium chloride 0.9%, sodium chloride 0.9%, sodium chloride 0.9%     Review of patient's allergies indicates:   Allergen Reactions    Augmentin [amoxicillin-pot clavulanate] Shortness Of Breath    Singulair [montelukast]      Pt "felt strange"      Horse/equine containing products      Objective:     Vital Signs (Most Recent):  Temp: 97.9 °F (36.6 °C) (03/02/18 0710)  Pulse: 72 (03/02/18 0710)  Resp: 14 (03/02/18 0710)  BP: 116/60 (03/02/18 0710)  SpO2: 96 % (03/02/18 0710) Vital Signs (24h Range):  Temp:  [96.5 °F (35.8 °C)-98.2 °F (36.8 °C)] 97.9 °F (36.6 °C)  Pulse:  [66-74] 72  Resp:  [14-16] 14  SpO2:  [95 %-98 %] 96 %  BP: (105-184)/(56-82) 116/60     Weight: 73 kg (160 lb 15 oz)  Body mass index is 22.46 kg/m².    Intake/Output - Last 3 Shifts       02/28 0700 - 03/01 0659 03/01 0700 - 03/02 0659 03/02 0700 - 03/03 0659    P.O. 1120 900     Other 0      IV Piggyback 50            Total Intake(mL/kg) 1738 (23.8) 900 (12.3)     Urine (mL/kg/hr) 1525 (0.9)      Stool 0 (0)      Total Output 1525        Net +213 +900             Urine Occurrence  2 x     Stool Occurrence 3 x            Physical Exam   Constitutional: He is oriented to person, place, and time. He appears well-developed and well-nourished. "   HENT:   Head: Normocephalic and atraumatic.   Eyes: Pupils are equal, round, and reactive to light.   Neck: Normal range of motion.   Cardiovascular: Normal rate.    Pulmonary/Chest: Effort normal. No respiratory distress.   Abdominal: Soft. Bowel sounds are normal. He exhibits distension (Improved slightly from yesterday). There is no tenderness.   Midline incision packed with gauze with modest amount of purulent drainage, changed at bedside this morning.   Musculoskeletal: Normal range of motion.   Neurological: He is alert and oriented to person, place, and time.   Skin: Skin is warm and dry.   Psychiatric: He has a normal mood and affect. His behavior is normal. Judgment and thought content normal.   Nursing note and vitals reviewed.      Significant Labs:  CBC:     Recent Labs  Lab 03/02/18  0549   WBC 8.36   RBC 3.01*   HGB 7.7*   HCT 24.5*   *   MCV 81*   MCH 25.6*   MCHC 31.4*     CMP:     Recent Labs  Lab 03/02/18  0549   *   CALCIUM 8.7   ALBUMIN 2.4*   PROT 6.0   *   K 4.4   CO2 29   CL 99   BUN 22   CREATININE 0.9   ALKPHOS 114   ALT 53*   AST 43*   BILITOT 0.1       Significant Diagnostics:  I have reviewed and interpreted all pertinent imaging results/findings within the past 24 hours.    Assessment/Plan:     Mass of small intestine    73 yo male with mass in distal small bowel s/p Small bowel resection/mass resection on 2/16/18. Ileus improving. Now with superficial wound infection/    - trend labs q48 hours, H/H low but stable, WBC within normal limits this morning  - midline incision open at middle portition of midline incision plus a few more superior and inferior for a total of 6cm wound exposed. Wound packed with gauze- dressing changed this morning with small amount of expressible purulent drainage. No systemic signs of infection- no leukocytosis, fever, chills. CT scan negative for intraabdominal fluid collection or any other acute pathology  - tolerating regular diet,  continue, has return of bowel function, consider not re-ordering TPN for tonight since patient is tolerating regular diet, will discuss with staff  - pain well controlled on PO PRN pain medications, encourage limited use of narcotic pain medication to assist in continued return of bowel function   - monitor I/Os  - continue cialis (home med) for urinary retention  - encourage OOBTC, patient states he is ambulating  - encourage IS  - stool softeners PRN  - methylnaltrexone  -PICC line in place, TPN infusing- will consider not re-ordering for today  - replace electrolytes PRN, slight hyponatremia on labs this morning otherwise no replacements needed  - will discuss with staff  -Wound care changes BID with wet to dry dressing changes   - continue home losartan for hypertension                    Devora Gross MD  General Surgery  [facility]

## 2018-03-02 NOTE — PT/OT/SLP PROGRESS
Physical Therapy Treatment    Patient Name:  Forrest Schmidt   MRN:  [id number]    Recommendations:     Discharge Recommendations:  home health PT   Discharge Equipment Recommendations: bedside commode, walker, rolling   Barriers to discharge: Decreased caregiver support    Assessment:     Forrest Schmidt is a 72 y.o. male admitted with a medical diagnosis of Gastrointestinal hemorrhage.  He presents with the following impairments/functional limitations:  weakness, impaired endurance, impaired self care skills, gait instability, impaired functional mobilty, pain Pt Progressing with PT Intervention. Pt still with c/o of  Dizziness and fatigue with mobility.  Pt would continue to benefit from skilled PT to address overall functional mobility and goals. Goals remain appropriate    Rehab Prognosis:  good; patient would benefit from acute skilled PT services to address these deficits and reach maximum level of function.      Recent Surgery: Procedure(s) (LRB):  RESECTION-SMALL BOWEL (N/A) 15 Days Post-Op    Plan:     During this hospitalization, patient to be seen 4 x/week to address the above listed problems via gait training, therapeutic activities, therapeutic exercises  · Plan of Care Expires:  03/18/18   Plan of Care Reviewed with: patient, spouse    Subjective     Communicated with RN prior to session.  Patient found seated upon PT entry to room, agreeable to treatment.      Chief Complaint: I still feel dizzy  Pain/Comfort:  · Pain Rating 1: 3/10  · Location - Orientation 1: upper  · Location 1: abdomen  · Pain Addressed 1: Reposition, Distraction  · Pain Rating Post-Intervention 1: 3/10    Patients cultural, spiritual, Catholic conflicts given the current situation: none noted    Objective:     Patient found with: peripheral IV     General Precautions: Standard, fall   Orthopedic Precautions:N/A   Braces: N/A     Functional Mobility:  · Transfers:      · Sit to Stand:  stand by assistance with rolling walker  · Gait:  "Ambulated about 400ft  And 50 ft  with RW with SBA/CGA.  slow gait speed.  no LOB pt with min c/o of dizziness.pt  required seated rest break between trials  · Stairs:  Pt ascended/descended 6" curb step with Rolling Walker with x 2trials and vcs for sequencig and safety with Contact Guard Assistance.       AM-PAC 6 CLICK MOBILITY  Turning over in bed (including adjusting bedclothes, sheets and blankets)?: 3  Sitting down on and standing up from a chair with arms (e.g., wheelchair, bedside commode, etc.): 3  Moving from lying on back to sitting on the side of the bed?: 3  Moving to and from a bed to a chair (including a wheelchair)?: 3  Need to walk in hospital room?: 3  Climbing 3-5 steps with a railing?: 3  Total Score: 18       Therapeutic Activities and Exercises:   Discussed/educated patient on progress, safety,d/c, PT POC   White board updated   Donned an extra gown   Pt encouraged to ambulate in hallways 3x/day with nursing  assistance to improve endurance.   Pt safe to ambulate in hallway with RN or PCT assistance     Patient left up in chair with all lines intact, call button in reach and nsg notified..    GOALS:    Physical Therapy Goals        Problem: Physical Therapy Goal    Goal Priority Disciplines Outcome Goal Variances Interventions   Physical Therapy Goal     PT/OT, PT Ongoing (interventions implemented as appropriate)     Description:  Goals to be met by: 3/3    Patient will increase functional independence with mobility by performin. Supine to sit with Modified [place]  2. Sit to supine with Modified [place]  3. Sit to stand transfer with Stand-by Assistance-met  3a.  Transfers supervision with no device  4. Bed to chair transfer with Stand-by Assistance using Rolling Walker  5. Gait  x 50 feet with Stand-by Assistance using Rolling Walker. -met  5a. Gait with supervision x250 ft with no device  6. Lower extremity exercise program x20 reps per handout, with independence          "                Time Tracking:     PT Received On: 03/02/18  PT Start Time: 1050     PT Stop Time: 1117  PT Total Time (min): 27 min     Billable Minutes: Gait Training 24    Treatment Type: Treatment  PT/PTA: PTA     PTA Visit Number: 4     Ahmet Pearce PTA  03/02/2018

## 2018-03-02 NOTE — PLAN OF CARE
RENEE learned from NP, Loren Swain, that Pt would now require a home wound vac. RENEE provided NP with [employer] wound vac form to complete and will fax all necessary referral information once it is completed.     Jenna Hollis LCSW

## 2018-03-02 NOTE — ASSESSMENT & PLAN NOTE
Nutrition Problem  Inadequate oral intake    Related to (etiology):   GI hemorrhage 2/2 ileus    Signs and Symptoms (as evidenced by):   NPO Day+5    Interventions/Recommendations (treatment strategy):  See recs above     Nutrition Diagnosis Status:   Resolved

## 2018-03-02 NOTE — PLAN OF CARE
RENEE faxed ([phone]) over Pt's face sheet, completed [employer] form, H&P and Op note to [employer]. RENEE to continue to follow.     Jenna Hollis LCSW

## 2018-03-02 NOTE — PLAN OF CARE
Problem: Patient Care Overview  Goal: Plan of Care Review  Outcome: Ongoing (interventions implemented as appropriate)  Plan of care reviewed and updated. Pt AA+O. Pt's pain is managed with the medication ordered at this time. Pt's VS are as charted.  No falls this shift. Pt is oriented to room and call system. Will continue to [facility].

## 2018-03-02 NOTE — ASSESSMENT & PLAN NOTE
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 Ileus improving. Now with superficial wound infection/    - trend labs q48 hours, H/H low but stable, WBC within normal limits this morning  - midline incision open at middle portition of midline incision plus a few more superior and inferior for a total of 6cm wound exposed. Wound packed with gauze- dressing changed this morning with small amount of expressible purulent drainage. No systemic signs of infection- no leukocytosis, fever, chills. CT scan negative for intraabdominal fluid collection or any other acute pathology  - tolerating regular diet, continue, has return of bowel function, consider not re-ordering TPN for tonight since patient is tolerating regular diet, will discuss with staff  - pain well controlled on PO PRN pain medications, encourage limited use of narcotic pain medication to assist in continued return of bowel function   - monitor I/Os  - continue cialis (home med) for urinary retention  - encourage OOBTC, patient states he is ambulating  - encourage IS  - stool softeners PRN  - methylnaltrexone  -PICC line in place, TPN infusing- will consider not re-ordering for today  - replace electrolytes PRN, slight hyponatremia on labs this morning otherwise no replacements needed  - will discuss with staff  -Wound care changes BID with wet to dry dressing changes   - continue home losartan for hypertension

## 2018-03-02 NOTE — PLAN OF CARE
Problem: Physical Therapy Goal  Goal: Physical Therapy Goal  Goals to be met by: 3/3    Patient will increase functional independence with mobility by performin. Supine to sit with Modified [place]-not met  2. Sit to supine with Modified [place]-not met  3. Sit to stand transfer with Stand-by Assistance-met  3a.  Transfers supervision with no device-not met  4. Bed to chair transfer with Stand-by Assistance using Rolling Walker  5. Gait  x 50 feet with Stand-by Assistance using Rolling Walker. -met  5a. Gait with supervision x250 ft with no device-not met  6. Lower extremity exercise program x20 reps per handout, with independence-not met          Pt progressing towards goals. continue with PT POC.Goals remain appropriate.  Ahmet Pearce PTA  3/2/2018

## 2018-03-02 NOTE — PROGRESS NOTES
[facility]  Adult Nutrition  Progress Note    SUMMARY     Recommendations    Recommendation/Intervention:   1. Continue current diet order.   2. May consider d/c TPN as pt consuming >75% of meals.   3. Encouraged good PO intake. Will monitor.   Goals: Meet % EEN, EPN  Nutrition Goal Status: goal met  Communication of RD Recs: reviewed with RN    Reason for Assessment    Reason for Assessment: RD follow-up  Diagnosis:  (Gastrointestinal hemorrhage)  Relevent Medical History: anticoagulants, HTN, GIST     Nutrition Discharge Planning: Adequate PO intake for optimal nutrition.     Nutrition Prescription Ordered    Current Diet Order: Regular     Current Nutrition Support Formula Ordered: Clinimix E 5/15 (with 20% lipids daily)  Current Nutrition Support Rate Ordered: 75 (ml)  Current Nutrition Support Frequency Ordered: mL/hr    Evaluation of Received Nutrients/Fluid Intake                Parenteral Calories (kcal): 1278  Parenteral Protein (gm): 90  Parenteral Fluid (mL): 1800           Other Calories (kcal): 0  Total Calories (kcal): 1778     Energy Calories Required: meeting needs  % Kcal Needs: 95              Protein Required: meeting needs  % Protein Needs: 102        GIR (Glucose Infusion Rate) (mg/kg/min): 2.6 mg/kg/min  Lipid Calories (kcals): 500 kcals         Fluid Required: meeting needs  Tolerance: tolerating  % Intake of Estimated Energy Needs: 75 - 100 %  % Meal Intake: 75%    Nutrition/Diet History    Patient Reported Diet/Restrictions/Preferences: general  Typical Food/Fluid Intake: Pt reports consuming 75% of meals. TPN running at 75mL/hr.         Factors Affecting Nutritional Intake: altered gastrointestinal function                Labs/Tests/Procedures/Meds    Diagnostic Test/Procedure Review: reviewed  Pertinent Labs Reviewed: reviewed  Pertinent Labs Comments: Na 134  Pertinent Medications Reviewed: reviewed, pertinent  Pertinent Medications Comments: docusate    Physical  "Findings    Overall Physical Appearance: nourished     Oral/Mouth Cavity: WDL  Skin: incision (Abdomen)    Anthropometrics    Temp: 97.9 °F (36.6 °C)     Height: 5' 10.98" (180.3 cm)  Weight Method: Bed Scale  Weight: 73 kg (160 lb 15 oz)  Ideal Body Weight (IBW), Male: 171.88 lb     % Ideal Body Weight, Male (lb): 93.64      BMI (Calculated): 22.5  BMI Grade: 18.5-24.9 - normal                            Estimated/Assessed Needs    Weight Used For Calorie Calculations: 73 kg (160 lb 15 oz)      Energy Calorie Requirements (kcal): 1870 kcal/d  Energy Need Method: McLennan-St Jeor (1.25 PAL)     RMR (McLennan-St. Jeor Equation): 1496.81        Weight Used For Protein Calculations: 73 kg (160 lb 15 oz)  Protein Requirements: 88 g/d (1.2 g/kg)  Fluid Requirements (mL): per MD  Fluid Need Method: RDA Method        RDA Method (mL): 1870       Assessment and Plan    * Gastrointestinal hemorrhage    Nutrition Problem  Inadequate oral intake    Related to (etiology):   GI hemorrhage 2/2 ileus    Signs and Symptoms (as evidenced by):   NPO Day+5    Interventions/Recommendations (treatment strategy):  See recs above     Nutrition Diagnosis Status:   Resolved              Monitor and Evaluation    Food and Nutrient Intake: energy intake, food and beverage intake, parenteral nutrition intake  Food and Nutrient Adminstration: diet order, enteral and parenteral nutrition administration     Physical Activity and Function: nutrition-related ADLs and IADLs  Anthropometric Measurements: weight, weight change  Biochemical Data, Medical Tests and Procedures: inflammatory profile, lipid profile, glucose/endocrine profile, gastrointestinal profile, electrolyte and renal panel  Nutrition-Focused Physical Findings: overall appearance    Nutrition Risk    Level of Risk: other (see comments) (1X/week)    Nutrition Follow-Up    RD Follow-up?: Yes  "

## 2018-03-02 NOTE — SUBJECTIVE & OBJECTIVE
"Interval History: No acute events overnight. Tolerating regular diet, no nausea/vomiting. Continues to pass flatus, having some Bms every day.  Still having abdominal distension but soft abdomen. Voiding, ambulating. Midline incision open, no erythema. CT scan yesterday negative for intraabdominal fluid collection or acute pathology. Dressing changed at bedside this morning, wound with continued small amount of purulent drainage noted at superior aspect of wound bed. Relatively no changes from yesterday.    Medications:  Continuous Infusions:   Amino acid 5% - dextrose 15% (CLINIMIX-E) solution with additives (1L  provides 510 kcal/L dextrose, with 50 gm AA, 150 gm CHO, Na 35, K 30, Mg 5, Ca 4.5, Acetate 80, Cl 39, Phos 15) 75 mL/hr at 03/01/18 2121     Scheduled Meds:   atorvastatin  40 mg Oral QHS    Cialis (tadalafil) 5 mg - half tablet  5 mg Oral Daily    clindamycin (CLEOCIN) IVPB  600 mg Intravenous Q8H    docusate sodium  100 mg Oral BID    enoxaparin  40 mg Subcutaneous Daily    fat emulsion 20%  250 mL Intravenous Daily    fluticasone  1 spray Each Nare BID    losartan  100 mg Oral Daily    methylnaltrexone  12 mg Subcutaneous Every other day    pantoprazole  40 mg Oral BID    sodium chloride 0.9%  10 mL Intravenous Q6H    traMADol  50 mg Oral Q6H     PRN Meds:sodium chloride, dextrose 50%, dextrose 50%, glucagon (human recombinant), glucose, glucose, ondansetron, oxyCODONE-acetaminophen, promethazine (PHENERGAN) IVPB, Flushing PICC Protocol **AND** sodium chloride 0.9% **AND** sodium chloride 0.9%, sodium chloride 0.9%, sodium chloride 0.9%     Review of patient's allergies indicates:   Allergen Reactions    Augmentin [amoxicillin-pot clavulanate] Shortness Of Breath    Singulair [montelukast]      Pt "felt strange"      Horse/equine containing products      Objective:     Vital Signs (Most Recent):  Temp: 97.9 °F (36.6 °C) (03/02/18 0710)  Pulse: 72 (03/02/18 0710)  Resp: 14 (03/02/18 " 0710)  BP: 116/60 (03/02/18 0710)  SpO2: 96 % (03/02/18 0710) Vital Signs (24h Range):  Temp:  [96.5 °F (35.8 °C)-98.2 °F (36.8 °C)] 97.9 °F (36.6 °C)  Pulse:  [66-74] 72  Resp:  [14-16] 14  SpO2:  [95 %-98 %] 96 %  BP: (105-184)/(56-82) 116/60     Weight: 73 kg (160 lb 15 oz)  Body mass index is 22.46 kg/m².    Intake/Output - Last 3 Shifts       02/28 0700 - 03/01 0659 03/01 0700 - 03/02 0659 03/02 0700 - 03/03 0659    P.O. 1120 900     Other 0      IV Piggyback 50            Total Intake(mL/kg) 1738 (23.8) 900 (12.3)     Urine (mL/kg/hr) 1525 (0.9)      Stool 0 (0)      Total Output 1525        Net +213 +900             Urine Occurrence  2 x     Stool Occurrence 3 x            Physical Exam   Constitutional: He is oriented to person, place, and time. He appears well-developed and well-nourished.   HENT:   Head: Normocephalic and atraumatic.   Eyes: Pupils are equal, round, and reactive to light.   Neck: Normal range of motion.   Cardiovascular: Normal rate.    Pulmonary/Chest: Effort normal. No respiratory distress.   Abdominal: Soft. Bowel sounds are normal. He exhibits distension (Improved slightly from yesterday). There is no tenderness.   Midline incision packed with gauze with modest amount of purulent drainage, changed at bedside this morning.   Musculoskeletal: Normal range of motion.   Neurological: He is alert and oriented to person, place, and time.   Skin: Skin is warm and dry.   Psychiatric: He has a normal mood and affect. His behavior is normal. Judgment and thought content normal.   Nursing note and vitals reviewed.      Significant Labs:  CBC:     Recent Labs  Lab 03/02/18  0549   WBC 8.36   RBC 3.01*   HGB 7.7*   HCT 24.5*   *   MCV 81*   MCH 25.6*   MCHC 31.4*     CMP:     Recent Labs  Lab 03/02/18  0549   *   CALCIUM 8.7   ALBUMIN 2.4*   PROT 6.0   *   K 4.4   CO2 29   CL 99   BUN 22   CREATININE 0.9   ALKPHOS 114   ALT 53*   AST 43*   BILITOT 0.1       Significant  Diagnostics:  I have reviewed and interpreted all pertinent imaging results/findings within the past 24 hours.

## 2018-03-03 PROCEDURE — A4216 STERILE WATER/SALINE, 10 ML: HCPCS | Performed by: SURGERY

## 2018-03-03 PROCEDURE — 63600175 PHARM REV CODE 636 W HCPCS: Performed by: NURSE PRACTITIONER

## 2018-03-03 PROCEDURE — 25000003 PHARM REV CODE 250: Performed by: STUDENT IN AN ORGANIZED HEALTH CARE EDUCATION/TRAINING PROGRAM

## 2018-03-03 PROCEDURE — B4185 PARENTERAL SOL 10 GM LIPIDS: HCPCS | Performed by: STUDENT IN AN ORGANIZED HEALTH CARE EDUCATION/TRAINING PROGRAM

## 2018-03-03 PROCEDURE — 25000003 PHARM REV CODE 250: Performed by: NURSE PRACTITIONER

## 2018-03-03 PROCEDURE — 25000003 PHARM REV CODE 250: Performed by: SURGERY

## 2018-03-03 PROCEDURE — S0077 INJECTION, CLINDAMYCIN PHOSP: HCPCS | Performed by: STUDENT IN AN ORGANIZED HEALTH CARE EDUCATION/TRAINING PROGRAM

## 2018-03-03 PROCEDURE — 63600175 PHARM REV CODE 636 W HCPCS: Performed by: SURGERY

## 2018-03-03 PROCEDURE — 11000001 HC ACUTE MED/SURG PRIVATE ROOM

## 2018-03-03 PROCEDURE — A4217 STERILE WATER/SALINE, 500 ML: HCPCS | Performed by: SURGERY

## 2018-03-03 RX ADMIN — Medication 10 ML: at 06:03

## 2018-03-03 RX ADMIN — ACETAMINOPHEN 650 MG: 325 TABLET, FILM COATED ORAL at 09:03

## 2018-03-03 RX ADMIN — Medication 10 ML: at 02:03

## 2018-03-03 RX ADMIN — SOYBEAN OIL 250 ML: 20 INJECTION, SOLUTION INTRAVENOUS at 10:03

## 2018-03-03 RX ADMIN — METHYLNALTREXONE BROMIDE 12 MG: 12 INJECTION, SOLUTION SUBCUTANEOUS at 09:03

## 2018-03-03 RX ADMIN — CLINDAMYCIN IN 5 PERCENT DEXTROSE 600 MG: 12 INJECTION, SOLUTION INTRAVENOUS at 02:03

## 2018-03-03 RX ADMIN — ATORVASTATIN CALCIUM 40 MG: 20 TABLET, FILM COATED ORAL at 10:03

## 2018-03-03 RX ADMIN — CALCIUM GLUCONATE: 94 INJECTION, SOLUTION INTRAVENOUS at 10:03

## 2018-03-03 RX ADMIN — POLYETHYLENE GLYCOL 3350 17 G: 17 POWDER, FOR SOLUTION ORAL at 09:03

## 2018-03-03 RX ADMIN — ENOXAPARIN SODIUM 40 MG: 100 INJECTION SUBCUTANEOUS at 06:03

## 2018-03-03 RX ADMIN — DOCUSATE SODIUM 100 MG: 100 CAPSULE, LIQUID FILLED ORAL at 09:03

## 2018-03-03 RX ADMIN — CLINDAMYCIN IN 5 PERCENT DEXTROSE 600 MG: 12 INJECTION, SOLUTION INTRAVENOUS at 06:03

## 2018-03-03 RX ADMIN — TRAMADOL HYDROCHLORIDE 50 MG: 50 TABLET, FILM COATED ORAL at 06:03

## 2018-03-03 RX ADMIN — PANTOPRAZOLE SODIUM 40 MG: 40 TABLET, DELAYED RELEASE ORAL at 09:03

## 2018-03-03 RX ADMIN — LOSARTAN POTASSIUM 100 MG: 25 TABLET, FILM COATED ORAL at 09:03

## 2018-03-03 RX ADMIN — CLINDAMYCIN IN 5 PERCENT DEXTROSE 600 MG: 12 INJECTION, SOLUTION INTRAVENOUS at 10:03

## 2018-03-03 RX ADMIN — ACETAMINOPHEN 650 MG: 325 TABLET, FILM COATED ORAL at 04:03

## 2018-03-03 RX ADMIN — FLUTICASONE PROPIONATE 50 MCG: 50 SPRAY, METERED NASAL at 09:03

## 2018-03-03 RX ADMIN — PANTOPRAZOLE SODIUM 40 MG: 40 TABLET, DELAYED RELEASE ORAL at 10:03

## 2018-03-03 RX ADMIN — DOCUSATE SODIUM 100 MG: 100 CAPSULE, LIQUID FILLED ORAL at 10:03

## 2018-03-03 RX ADMIN — FLUTICASONE PROPIONATE 50 MCG: 50 SPRAY, METERED NASAL at 10:03

## 2018-03-03 RX ADMIN — POLYETHYLENE GLYCOL 3350 17 G: 17 POWDER, FOR SOLUTION ORAL at 10:03

## 2018-03-03 RX ADMIN — ACETAMINOPHEN 650 MG: 325 TABLET, FILM COATED ORAL at 10:03

## 2018-03-03 NOTE — PROGRESS NOTES
Will no longer place wound vac  Continue BID dressing changes with wet-to-dry bandages  Will let current TPN run out at half dose and d/c after complete

## 2018-03-03 NOTE — SUBJECTIVE & OBJECTIVE
"Interval History: NAEON, pain well controlled, denies nausea or vomiting, +flatus, - Bm    Medications:  Continuous Infusions:   TPN ADULT CENTRAL LINE CUSTOM 75 mL/hr at 03/02/18 2229    TPN ADULT CENTRAL LINE CUSTOM       Scheduled Meds:   atorvastatin  40 mg Oral QHS    Cialis (tadalafil) 5 mg - half tablet  5 mg Oral Daily    clindamycin (CLEOCIN) IVPB  600 mg Intravenous Q8H    docusate sodium  100 mg Oral BID    enoxaparin  40 mg Subcutaneous Daily    fat emulsion 20%  250 mL Intravenous Daily    fat emulsion 20%  250 mL Intravenous Daily    fluticasone  1 spray Each Nare BID    losartan  100 mg Oral Daily    methylnaltrexone  12 mg Subcutaneous Every other day    pantoprazole  40 mg Oral BID    polyethylene glycol  17 g Oral BID    sodium chloride 0.9%  10 mL Intravenous Q6H    traMADol  50 mg Oral Q6H     PRN Meds:sodium chloride, acetaminophen, dextrose 50%, dextrose 50%, glucagon (human recombinant), glucose, glucose, ondansetron, oxyCODONE-acetaminophen, promethazine (PHENERGAN) IVPB, Flushing PICC Protocol **AND** sodium chloride 0.9% **AND** sodium chloride 0.9%, sodium chloride 0.9%, sodium chloride 0.9%     Review of patient's allergies indicates:   Allergen Reactions    Augmentin [amoxicillin-pot clavulanate] Shortness Of Breath    Singulair [montelukast]      Pt "felt strange"      Horse/equine containing products      Objective:     Vital Signs (Most Recent):  Temp: 96.8 °F (36 °C) (03/03/18 0756)  Pulse: 70 (03/03/18 0756)  Resp: 18 (03/03/18 0756)  BP: 135/65 (03/03/18 0756)  SpO2: 96 % (03/03/18 0756) Vital Signs (24h Range):  Temp:  [96.6 °F (35.9 °C)-98.7 °F (37.1 °C)] 96.8 °F (36 °C)  Pulse:  [62-88] 70  Resp:  [16-18] 18  SpO2:  [94 %-98 %] 96 %  BP: (112-135)/(56-67) 135/65     Weight: 73 kg (160 lb 15 oz)  Body mass index is 22.46 kg/m².    Intake/Output - Last 3 Shifts       03/01 0700 - 03/02 0659 03/02 0700 - 03/03 0659 03/03 0700 - 03/04 0659    P.O. 900 564     " Other       IV Piggyback       TPN       Total Intake(mL/kg) 900 (12.3) 720 (9.9)     Urine (mL/kg/hr)       Stool       Total Output          Net +900 +720             Urine Occurrence 2 x 3 x           Physical Exam   Constitutional: He is oriented to person, place, and time. He appears well-developed and well-nourished.   HENT:   Head: Normocephalic and atraumatic.   Eyes: Pupils are equal, round, and reactive to light.   Neck: Normal range of motion.   Cardiovascular: Normal rate.    Pulmonary/Chest: Effort normal. No respiratory distress.   Abdominal: Soft. Bowel sounds are normal. He exhibits distension (Improved slightly from yesterday). There is no tenderness.   Midline incision packed with gauze with modest amount of purulent drainage   Musculoskeletal: Normal range of motion.   Neurological: He is alert and oriented to person, place, and time.   Skin: Skin is warm and dry.   Psychiatric: He has a normal mood and affect. His behavior is normal. Judgment and thought content normal.   Nursing note and vitals reviewed.      Significant Labs:  CBC:   Recent Labs  Lab 03/02/18  0549   WBC 8.36   RBC 3.01*   HGB 7.7*   HCT 24.5*   *   MCV 81*   MCH 25.6*   MCHC 31.4*     CMP:   Recent Labs  Lab 03/02/18  0549   *   CALCIUM 8.7   ALBUMIN 2.4*   PROT 6.0   *   K 4.4   CO2 29   CL 99   BUN 22   CREATININE 0.9   ALKPHOS 114   ALT 53*   AST 43*   BILITOT 0.1       Significant Diagnostics:  I have reviewed all pertinent imaging results/findings within the past 24 hours.

## 2018-03-03 NOTE — PROGRESS NOTES
[facility]  General Surgery  Progress Note    Subjective:     History of Present Illness:  This is a 73 y/o male with hx CAD and HTN, obscure overt GI bleeding who [facility] CT [facility].  Pt was admitted 2/5 with melena. Underwent  colonoscopy, negative for bleeding with old blood seen in TI. No EGD at OSH.  CT abd showed ileal mass, approx 5 cm. Surgery recommend operative intervention at OSH, however patient requested transfer for 2nd opinion.  Patient has had a similar episode in the past year, at which time he also had EGD, colonscopy and capsule enterography which were negative, bleeding at that time thought due to NSAID use which was discontinued and he did well until about a week ago. When he began to have blood stools again. He has some fullness and early satiety, but has not altered his eating habits, and normal bowel habits, 1-3 movement a day, no change in consistency. No abdominal pain. Only prior abdominal surgery is an appendectomy as a child.     Post-Op Info:  Procedure(s) (LRB):  RESECTION-SMALL BOWEL (N/A)   16 Days Post-Op     Interval History: NAEON, pain well controlled, denies nausea or vomiting, +flatus, - Bm    Medications:  Continuous Infusions:   TPN ADULT CENTRAL LINE CUSTOM 75 mL/hr at 03/02/18 2229    TPN ADULT CENTRAL LINE CUSTOM       Scheduled Meds:   atorvastatin  40 mg Oral QHS    Cialis (tadalafil) 5 mg - half tablet  5 mg Oral Daily    clindamycin (CLEOCIN) IVPB  600 mg Intravenous Q8H    docusate sodium  100 mg Oral BID    enoxaparin  40 mg Subcutaneous Daily    fat emulsion 20%  250 mL Intravenous Daily    fat emulsion 20%  250 mL Intravenous Daily    fluticasone  1 spray Each Nare BID    losartan  100 mg Oral Daily    methylnaltrexone  12 mg Subcutaneous Every other day    pantoprazole  40 mg Oral BID    polyethylene glycol  17 g Oral BID    sodium chloride 0.9%  10 mL Intravenous Q6H     "traMADol  50 mg Oral Q6H     PRN Meds:sodium chloride, acetaminophen, dextrose 50%, dextrose 50%, glucagon (human recombinant), glucose, glucose, ondansetron, oxyCODONE-acetaminophen, promethazine (PHENERGAN) IVPB, Flushing PICC Protocol **AND** sodium chloride 0.9% **AND** sodium chloride 0.9%, sodium chloride 0.9%, sodium chloride 0.9%     Review of patient's allergies indicates:   Allergen Reactions    Augmentin [amoxicillin-pot clavulanate] Shortness Of Breath    Singulair [montelukast]      Pt "felt strange"      Horse/equine containing products      Objective:     Vital Signs (Most Recent):  Temp: 96.8 °F (36 °C) (03/03/18 0756)  Pulse: 70 (03/03/18 0756)  Resp: 18 (03/03/18 0756)  BP: 135/65 (03/03/18 0756)  SpO2: 96 % (03/03/18 0756) Vital Signs (24h Range):  Temp:  [96.6 °F (35.9 °C)-98.7 °F (37.1 °C)] 96.8 °F (36 °C)  Pulse:  [62-88] 70  Resp:  [16-18] 18  SpO2:  [94 %-98 %] 96 %  BP: (112-135)/(56-67) 135/65     Weight: 73 kg (160 lb 15 oz)  Body mass index is 22.46 kg/m².    Intake/Output - Last 3 Shifts       03/01 0700 - 03/02 0659 03/02 0700 - 03/03 0659 03/03 0700 - 03/04 0659    P.O. 900 720     Other       IV Piggyback       TPN       Total Intake(mL/kg) 900 (12.3) 720 (9.9)     Urine (mL/kg/hr)       Stool       Total Output          Net +900 +720             Urine Occurrence 2 x 3 x           Physical Exam   Constitutional: He is oriented to person, place, and time. He appears well-developed and well-nourished.   HENT:   Head: Normocephalic and atraumatic.   Eyes: Pupils are equal, round, and reactive to light.   Neck: Normal range of motion.   Cardiovascular: Normal rate.    Pulmonary/Chest: Effort normal. No respiratory distress.   Abdominal: Soft. Bowel sounds are normal. He exhibits distension (Improved slightly from yesterday). There is no tenderness.   Midline incision packed with gauze with modest amount of purulent drainage   Musculoskeletal: Normal range of motion.   Neurological: He " is alert and oriented to person, place, and time.   Skin: Skin is warm and dry.   Psychiatric: He has a normal mood and affect. His behavior is normal. Judgment and thought content normal.   Nursing note and vitals reviewed.      Significant Labs:  CBC:   Recent Labs  Lab 03/02/18  0549   WBC 8.36   RBC 3.01*   HGB 7.7*   HCT 24.5*   *   MCV 81*   MCH 25.6*   MCHC 31.4*     CMP:   Recent Labs  Lab 03/02/18  0549   *   CALCIUM 8.7   ALBUMIN 2.4*   PROT 6.0   *   K 4.4   CO2 29   CL 99   BUN 22   CREATININE 0.9   ALKPHOS 114   ALT 53*   AST 43*   BILITOT 0.1       Significant Diagnostics:  I have reviewed all pertinent imaging results/findings within the past 24 hours.    Assessment/Plan:     Mass of small intestine    71 yo male with mass in distal small bowel s/p Small bowel resection/mass resection on 2/16/18. Ileus improving. Now with superficial wound infection/    - trend labs q48 hours, H/H low but stable, WBC within normal limits yesterday  - midline incision open at middle portition of midline incision plus a few more superior and inferior for a total of 6cm wound exposed. Wound packed with gauze- dressing changed this morning with small amount of expressible purulent drainage. No systemic signs of infection- no leukocytosis, fever, chills. CT scan negative for intraabdominal fluid collection or any other acute pathology  - will place wound vac today  - tolerating regular diet, continue, has return of bowel function, consider not re-ordering TPN for tonight since patient is tolerating regular diet, will discuss with staff  - pain well controlled on PO PRN pain medications, encourage limited use of narcotic pain medication to assist in continued return of bowel function   - monitor I/Os  - continue cialis (home med) for urinary retention  - encourage OOBTC, patient states he is ambulating  - encourage IS  - stool softeners PRN  - methylnaltrexone  -PICC line in place, TPN infusing  -  replace electrolytes PRN  - will discuss with staff  - will place wound vac today  - continue home losartan for hypertension                    Ismael Francisco MD  General Surgery  [facility]

## 2018-03-03 NOTE — ASSESSMENT & PLAN NOTE
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 Ileus improving. Now with superficial wound infection/    - trend labs q48 hours, H/H low but stable, WBC within normal limits yesterday  - midline incision open at middle portition of midline incision plus a few more superior and inferior for a total of 6cm wound exposed. Wound packed with gauze- dressing changed this morning with small amount of expressible purulent drainage. No systemic signs of infection- no leukocytosis, fever, chills. CT scan negative for intraabdominal fluid collection or any other acute pathology  - will place wound vac today  - tolerating regular diet, continue, has return of bowel function, consider not re-ordering TPN for tonight since patient is tolerating regular diet, will discuss with staff  - pain well controlled on PO PRN pain medications, encourage limited use of narcotic pain medication to assist in continued return of bowel function   - monitor I/Os  - continue cialis (home med) for urinary retention  - encourage OOBTC, patient states he is ambulating  - encourage IS  - stool softeners PRN  - methylnaltrexone  -PICC line in place, TPN infusing  - replace electrolytes PRN  - will discuss with staff  - will place wound vac today  - continue home losartan for hypertension

## 2018-03-04 VITALS
DIASTOLIC BLOOD PRESSURE: 69 MMHG | HEIGHT: 71 IN | SYSTOLIC BLOOD PRESSURE: 140 MMHG | WEIGHT: 160.94 LBS | BODY MASS INDEX: 22.53 KG/M2 | OXYGEN SATURATION: 100 % | HEART RATE: 73 BPM | RESPIRATION RATE: 18 BRPM | TEMPERATURE: 98 F

## 2018-03-04 LAB
ALBUMIN SERPL BCP-MCNC: 2.4 G/DL
ALP SERPL-CCNC: 153 U/L
ALT SERPL W/O P-5'-P-CCNC: 91 U/L
ANION GAP SERPL CALC-SCNC: 9 MMOL/L
AST SERPL-CCNC: 79 U/L
BASOPHILS # BLD AUTO: 0.05 K/UL
BASOPHILS NFR BLD: 0.7 %
BILIRUB SERPL-MCNC: 0.1 MG/DL
BUN SERPL-MCNC: 21 MG/DL
CALCIUM SERPL-MCNC: 8.3 MG/DL
CHLORIDE SERPL-SCNC: 104 MMOL/L
CO2 SERPL-SCNC: 24 MMOL/L
CREAT SERPL-MCNC: 0.8 MG/DL
DIFFERENTIAL METHOD: ABNORMAL
EOSINOPHIL # BLD AUTO: 0.3 K/UL
EOSINOPHIL NFR BLD: 4.5 %
ERYTHROCYTE [DISTWIDTH] IN BLOOD BY AUTOMATED COUNT: 16.4 %
EST. GFR  (AFRICAN AMERICAN): >60 ML/MIN/1.73 M^2
EST. GFR  (NON AFRICAN AMERICAN): >60 ML/MIN/1.73 M^2
GLUCOSE SERPL-MCNC: 114 MG/DL
HCT VFR BLD AUTO: 24.5 %
HGB BLD-MCNC: 7.7 G/DL
IMM GRANULOCYTES # BLD AUTO: 0.12 K/UL
IMM GRANULOCYTES NFR BLD AUTO: 1.6 %
LYMPHOCYTES # BLD AUTO: 0.8 K/UL
LYMPHOCYTES NFR BLD: 10 %
MAGNESIUM SERPL-MCNC: 1.6 MG/DL
MCH RBC QN AUTO: 25.3 PG
MCHC RBC AUTO-ENTMCNC: 31.4 G/DL
MCV RBC AUTO: 81 FL
MONOCYTES # BLD AUTO: 0.9 K/UL
MONOCYTES NFR BLD: 11.5 %
NEUTROPHILS # BLD AUTO: 5.5 K/UL
NEUTROPHILS NFR BLD: 71.7 %
NRBC BLD-RTO: 0 /100 WBC
PHOSPHATE SERPL-MCNC: 3.5 MG/DL
PLATELET # BLD AUTO: 388 K/UL
PMV BLD AUTO: 9.8 FL
POTASSIUM SERPL-SCNC: 3.8 MMOL/L
PROT SERPL-MCNC: 6 G/DL
RBC # BLD AUTO: 3.04 M/UL
SODIUM SERPL-SCNC: 137 MMOL/L
WBC # BLD AUTO: 7.59 K/UL

## 2018-03-04 PROCEDURE — 25000003 PHARM REV CODE 250: Performed by: STUDENT IN AN ORGANIZED HEALTH CARE EDUCATION/TRAINING PROGRAM

## 2018-03-04 PROCEDURE — G8987 SELF CARE CURRENT STATUS: HCPCS | Mod: CJ

## 2018-03-04 PROCEDURE — 80053 COMPREHEN METABOLIC PANEL: CPT

## 2018-03-04 PROCEDURE — 85025 COMPLETE CBC W/AUTO DIFF WBC: CPT

## 2018-03-04 PROCEDURE — G8988 SELF CARE GOAL STATUS: HCPCS | Mod: CJ

## 2018-03-04 PROCEDURE — A4216 STERILE WATER/SALINE, 10 ML: HCPCS | Performed by: SURGERY

## 2018-03-04 PROCEDURE — G8989 SELF CARE D/C STATUS: HCPCS | Mod: CJ

## 2018-03-04 PROCEDURE — S0077 INJECTION, CLINDAMYCIN PHOSP: HCPCS | Performed by: STUDENT IN AN ORGANIZED HEALTH CARE EDUCATION/TRAINING PROGRAM

## 2018-03-04 PROCEDURE — 83735 ASSAY OF MAGNESIUM: CPT

## 2018-03-04 PROCEDURE — 25000003 PHARM REV CODE 250: Performed by: NURSE PRACTITIONER

## 2018-03-04 PROCEDURE — 84100 ASSAY OF PHOSPHORUS: CPT

## 2018-03-04 PROCEDURE — 25000003 PHARM REV CODE 250: Performed by: SURGERY

## 2018-03-04 RX ORDER — ONDANSETRON 4 MG/1
4 TABLET, ORALLY DISINTEGRATING ORAL ONCE
Status: COMPLETED | OUTPATIENT
Start: 2018-03-04 | End: 2018-03-04

## 2018-03-04 RX ORDER — CLINDAMYCIN HYDROCHLORIDE 300 MG/1
300 CAPSULE ORAL 3 TIMES DAILY
Qty: 21 CAPSULE | Refills: 0 | Status: SHIPPED | OUTPATIENT
Start: 2018-03-04 | End: 2018-03-04

## 2018-03-04 RX ORDER — AMOXICILLIN 250 MG
1 CAPSULE ORAL DAILY
COMMUNITY
Start: 2018-03-04 | End: 2018-05-23

## 2018-03-04 RX ORDER — OXYCODONE AND ACETAMINOPHEN 5; 325 MG/1; MG/1
1 TABLET ORAL EVERY 4 HOURS PRN
Qty: 28 TABLET | Refills: 0 | Status: SHIPPED | OUTPATIENT
Start: 2018-03-04 | End: 2018-05-23

## 2018-03-04 RX ORDER — CLINDAMYCIN HYDROCHLORIDE 300 MG/1
300 CAPSULE ORAL 3 TIMES DAILY
Qty: 21 CAPSULE | Refills: 0 | Status: SHIPPED | OUTPATIENT
Start: 2018-03-04 | End: 2018-03-11

## 2018-03-04 RX ADMIN — POLYETHYLENE GLYCOL 3350 17 G: 17 POWDER, FOR SOLUTION ORAL at 09:03

## 2018-03-04 RX ADMIN — Medication 10 ML: at 06:03

## 2018-03-04 RX ADMIN — CLINDAMYCIN IN 5 PERCENT DEXTROSE 600 MG: 12 INJECTION, SOLUTION INTRAVENOUS at 06:03

## 2018-03-04 RX ADMIN — ACETAMINOPHEN 650 MG: 325 TABLET, FILM COATED ORAL at 06:03

## 2018-03-04 RX ADMIN — ACETAMINOPHEN 650 MG: 325 TABLET, FILM COATED ORAL at 02:03

## 2018-03-04 RX ADMIN — PANTOPRAZOLE SODIUM 40 MG: 40 TABLET, DELAYED RELEASE ORAL at 09:03

## 2018-03-04 RX ADMIN — LOSARTAN POTASSIUM 100 MG: 25 TABLET, FILM COATED ORAL at 09:03

## 2018-03-04 RX ADMIN — DOCUSATE SODIUM 100 MG: 100 CAPSULE, LIQUID FILLED ORAL at 09:03

## 2018-03-04 RX ADMIN — ONDANSETRON 4 MG: 4 TABLET, ORALLY DISINTEGRATING ORAL at 02:03

## 2018-03-04 RX ADMIN — CLINDAMYCIN IN 5 PERCENT DEXTROSE 600 MG: 12 INJECTION, SOLUTION INTRAVENOUS at 01:03

## 2018-03-04 RX ADMIN — FLUTICASONE PROPIONATE 50 MCG: 50 SPRAY, METERED NASAL at 09:03

## 2018-03-04 RX ADMIN — Medication 10 ML: at 12:03

## 2018-03-04 RX ADMIN — Medication 10 ML: at 11:03

## 2018-03-04 NOTE — DISCHARGE SUMMARY
[facility]  General Surgery  Discharge Summary      Patient Name: Forrest Schmidt  MRN: [id number]  Admission Date: 2/12/2018  Hospital Length of Stay: 20 days  Discharge Date and Time:  03/04/2018 11:58 AM  Attending Physician: Ahmet Sanchez MD   Discharging Provider: Ismael Francisco MD  Primary Care Provider: Ahmet Watkins Jr, MD     HPI: This is a 73 y/o male with hx CAD and HTN, obscure overt GI bleeding who presents as transfer from [facility] for ileal mass seen on CT imaging.  Pt was admitted 2/5 with melena. Underwent  colonoscopy, negative for bleeding with old blood seen in TI. No EGD at OSH.  CT abd showed ileal mass, approx 5 cm. Surgery recommend operative intervention at OSH, however patient requested transfer for 2nd opinion.  Patient has had a similar episode in the past year, at which time he also had EGD, colonscopy and capsule enterography which were negative, bleeding at that time thought due to NSAID use which was discontinued and he did well until about a week ago. When he began to have blood stools again. He has some fullness and early satiety, but has not altered his eating habits, and normal bowel habits, 1-3 movement a day, no change in consistency. No abdominal pain. Only prior abdominal surgery is an appendectomy as a child.     Procedure(s) (LRB):  RESECTION-SMALL BOWEL (N/A)     Hospital Course: Was admitted for upper GI bleed. During his workup a small bowel mass was found and general surgery was consulted. Once his hgb stabilized he was taken to OR for an ex lap with small bowel resection. Afterwards his recovery was complicated by a prolonged ileus and a superficial wound infection. While awaiting his bowel function to resume he was placed on TPN. His skin infection was opened and packed with wet-to-dry dressings. His bowel function finally resumed. 3//418 his pain was well controlled, he was having bowel function, he was tolerating diet, he was ambulating, he  was afebrile, and his TPN was off. He was discharged on 3/4/18 with instructions to follow up in clinic in one week, with 7 days of PO clinda, and with home health to help with his wet-to-dry dressing changes.    Physical Exam   Constitutional: He is oriented to person, place, and time. He appears well-developed and well-nourished. No distress.   HENT:   Head: Normocephalic and atraumatic.   Eyes: EOM are normal. Pupils are equal, round, and reactive to light.   Neck: Normal range of motion. Neck supple.   Cardiovascular: Normal rate and regular rhythm.    Pulmonary/Chest: Effort normal. No respiratory distress.   Abdominal: Soft. He exhibits no distension. There is tenderness (mild). There is no guarding.   Midline incision with beefy red tissue, no erythema or induration, his fascia is intact   Musculoskeletal: Normal range of motion.   Neurological: He is alert and oriented to person, place, and time.   Skin: Skin is warm and dry. He is not diaphoretic.   Psychiatric: He has a normal mood and affect. His behavior is normal. Judgment and thought content normal.   Nursing note and vitals reviewed.        Consults:   Consults         Status Ordering Provider     Inpatient consult to Gastroenterology  Once     Provider:  (Not yet assigned)    Completed DAGO HARRIS     Inpatient consult to General Surgery  Once     Provider:  (Not yet assigned)    Completed BARRERA LOZA     Inpatient consult to PICC team ([facility])  Once     Provider:  (Not yet assigned)    Completed SACHIN SUAREZ JR.          Significant Diagnostic Studies: Labs:   CMP   Recent Labs  Lab 03/04/18  0632      K 3.8      CO2 24   *   BUN 21   CREATININE 0.8   CALCIUM 8.3*   PROT 6.0   ALBUMIN 2.4*   BILITOT 0.1   ALKPHOS 153*   AST 79*   ALT 91*   ANIONGAP 9   ESTGFRAFRICA >60.0   EGFRNONAA >60.0    and CBC   Recent Labs  Lab 03/04/18  0632   WBC 7.59   HGB 7.7*   HCT 24.5*   *       Pending Diagnostic Studies:      "None        Final Active Diagnoses:    Diagnosis Date Noted POA    PRINCIPAL PROBLEM:  Gastrointestinal hemorrhage [K92.2] 05/12/2017 Yes    Wound infection [T14.8XXA, L08.9] 03/02/2018 Yes    Open wound of abdomen [S31.109A] 02/24/2018 Yes    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    Specialty:  General Surgery  Contact information:  [address]  [facility] [address]  [phone]             [facility].    Specialty:  Home Health Services  Why:  Home health  Contact information:  [address]  [facility] [address]  [phone]             [facility].    Specialty:  DME Provider  Why:  Home [employer]  Contact information:  [address]  [place] [id number] [phone]                 Patient Instructions:     WALKER FOR HOME USE   Order Specific Question Answer Comments   Type of Walker: Adult (5'4"-6'6")    With wheels? Yes    Height: 5' 10.98" (1.803 m)    Weight: 73 kg (160 lb 15 oz)    Length of need (1-99 months): 99    Does patient have medical equipment at home? shower chair    Please check all that apply: Patient's condition impairs ambulation.    Please check all that apply: Patient is unable to safely ambulate without equipment.    Please check all that apply: Walker will be used for gait training.      Diet Adult Regular     Lifting restrictions   Order Comments: Do not lift more than 5 " lbs until 2 weeks after incision completely heals     Notify your health care provider if you experience any of the following:  temperature >100.4     Notify your health care provider if you experience any of the following:  persistent nausea and vomiting or diarrhea     Notify your health care provider if you experience any of the following:  severe uncontrolled pain     Notify your health care provider if you experience any of the following:  redness, tenderness, or signs of infection (pain, swelling, redness, odor or green/yellow discharge around incision site)     Change dressing (specify)   Order Comments: Dressing change: Two times per day using gauze and tape.     Prepare RBC 1 Unit   Standing Status: Future  Standing Exp. Date: 03/16/19   Order Specific Question Answer Comments   Number of Units 1 Unit    Purpose of Preparation: Other (Specify)    Other reason for preparation: active GI bleed        Medications:  Reconciled Home Medications:   Current Discharge Medication List      START taking these medications    Details   clindamycin (CLEOCIN) 300 MG capsule Take 1 capsule (300 mg total) by mouth 3 (three) times daily.  Qty: 21 capsule, Refills: 0      oxyCODONE-acetaminophen (PERCOCET) 5-325 mg per tablet Take 1 tablet by mouth every 4 (four) hours as needed for Pain.  Qty: 28 tablet, Refills: 0      senna-docusate 8.6-50 mg (PERICOLACE) 8.6-50 mg per tablet Take 1 tablet by mouth once daily.         CONTINUE these medications which have NOT CHANGED    Details   arginine 500 mg tablet Take 1,000 mg by mouth once daily.       atorvastatin (LIPITOR) 40 MG tablet Take 40 mg by mouth every evening.      cholecalciferol, vitamin D3, (VITAMIN D3) 2,000 unit Cap Take 1 capsule by mouth once daily.      fluticasone (FLONASE) 50 mcg/actuation nasal spray 1 spray by Each Nare route 2 (two) times daily.      losartan (COZAAR) 100 MG tablet Take 1 tablet (100 mg total) by mouth every evening. Hold if sbp < 120       omeprazole (PRILOSEC) 20 MG capsule Take 20 mg by mouth 2 (two) times daily.       SOD CHLOR,SOD BICARB/NETI POT (NEILMED NASAFLO NASL) 1 spray by Nasal route every evening.      SODIUM CHLORIDE (FE-128 OPHT) Place 1 drop into the right eye 4 (four) times daily.       tadalafil (CIALIS) 5 MG tablet Take 5 mg by mouth once daily at 6am.      testosterone cypionate (DEPOTESTOTERONE CYPIONATE) 200 mg/mL injection Inject 200 mg into the muscle every 21 days.      zinc gluconate 50 mg tablet Take 50 mg by mouth every Monday and Thursday.       FOLIC ACID/MV,FE,MIN (CENTRUM ORAL) Take 1 tablet by mouth once daily at 6am.         STOP taking these medications       FERROUS SULFATE (FEOSOL ORAL) Comments:   Reason for Stopping:               Ismael Francisco MD  General Surgery  [facility]

## 2018-03-04 NOTE — PLAN OF CARE
CM called that patient is being discharged home today.  Pt is on [facility] list, and they stated would be able to see patient.  Also, let MD know that would need new orders, as patient will not be going home on TPN, or a wound vac, but dressing changes bid, wet to dry.    Pt's family is providing transportation home.

## 2018-03-04 NOTE — PLAN OF CARE
[facility] HOME HEALTH ORDERS  FACE TO FACE ENCOUNTER    Patient Name: Forrest Schmidt  YOB: 1945    PCP: Ahmet Watkins Jr, MD   PCP Address: [address] 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       No future appointments.  Follow-up Information     Ahmet Sanchez MD In 1 week.    Specialty:  General Surgery  Contact information:  [address]  [place] [address]  [phone]             [facility].    Specialty:  Home Health Services  Why:  Home health  Contact information:  [address]  [place] [address]  [phone]             [facility].    Specialty:  DME Provider  Why:  Home [facility]  Contact information:  [address] [place] [place] [address]  [phone]                     I have seen and examined this patient face to face today. My clinical findings that support the need for the home health skilled services and home bound status are the following:  Weakness/numbness causing balance and gait disturbance due to Surgery making it taxing to leave home.    Allergies:  Review of patient's allergies indicates:   Allergen Reactions    Augmentin [amoxicillin-pot clavulanate] Shortness Of Breath    Singulair  "[montelukast]      Pt "felt strange"      Horse/equine containing products        Diet: regular diet    Activities: no lifting of more than 5 lbs until 2 weeks after incision has completely closed    Nursing:   SN to complete comprehensive assessment including routine vital signs. Instruct on disease process and s/s of complications to report to MD. Review/verify medication list sent home with the patient at time of discharge  and instruct patient/caregiver as needed. Frequency may be adjusted depending on start of care date.    Notify MD if SBP > 160 or < 90; DBP > 90 or < 50; HR > 120 or < 50; Temp > 101        MISCELLANEOUS CARE:  Wound Care Orders:  yes:  Surgical Wound:  Location: Midline abdomen    Consult ET nurse        Apply the following to wound:   Wet to Damp dressing: BID (frequency)    WOUND CARE ORDERS  yes:  Surgical Wound:  Location: Midline abdomen    Consult ET nurse        Apply the following to wound:   Wet to Damp dressing: BID (frequency)      Medications: Review discharge medications with patient and family and provide education.      Current Discharge Medication List      START taking these medications    Details   clindamycin (CLEOCIN) 300 MG capsule Take 1 capsule (300 mg total) by mouth 3 (three) times daily.  Qty: 21 capsule, Refills: 0      oxyCODONE-acetaminophen (PERCOCET) 5-325 mg per tablet Take 1 tablet by mouth every 4 (four) hours as needed for Pain.  Qty: 28 tablet, Refills: 0      senna-docusate 8.6-50 mg (PERICOLACE) 8.6-50 mg per tablet Take 1 tablet by mouth once daily.         CONTINUE these medications which have NOT CHANGED    Details   arginine 500 mg tablet Take 1,000 mg by mouth once daily.       atorvastatin (LIPITOR) 40 MG tablet Take 40 mg by mouth every evening.      cholecalciferol, vitamin D3, (VITAMIN D3) 2,000 unit Cap Take 1 capsule by mouth once daily.      fluticasone (FLONASE) 50 mcg/actuation nasal spray 1 spray by Each Nare route 2 (two) times daily.    "   losartan (COZAAR) 100 MG tablet Take 1 tablet (100 mg total) by mouth every evening. Hold if sbp < 120      omeprazole (PRILOSEC) 20 MG capsule Take 20 mg by mouth 2 (two) times daily.       SOD CHLOR,SOD BICARB/NETI POT (NEILMED NASAFLO NASL) 1 spray by Nasal route every evening.      SODIUM CHLORIDE (FE-128 OPHT) Place 1 drop into the right eye 4 (four) times daily.       tadalafil (CIALIS) 5 MG tablet Take 5 mg by mouth once daily at 6am.      testosterone cypionate (DEPOTESTOTERONE CYPIONATE) 200 mg/mL injection Inject 200 mg into the muscle every 21 days.      zinc gluconate 50 mg tablet Take 50 mg by mouth every Monday and Thursday.       FOLIC ACID/MV,FE,MIN (CENTRUM ORAL) Take 1 tablet by mouth once daily at 6am.         STOP taking these medications       FERROUS SULFATE (FEOSOL ORAL) Comments:   Reason for Stopping:               I certify that this patient is confined to his home and needs intermittent skilled nursing care.

## 2018-03-04 NOTE — PROGRESS NOTES
Pt discharged to home via wheelchair. Pt denies pain at the present time. Condition stable. Follows commands. Pt given prescriptions and copy of discharge home care instructions. Pt verbalized understanding of activity limitations and s/s of when to call the Dr. Pt also given information on followup appointment. All belongings with the pt. Pt verbalized understanding of all discharge instructions.       PICC line to be removed once IV ABX done.     Will cont to monitor.

## 2018-03-05 ENCOUNTER — TELEPHONE (OUTPATIENT)
Dept: SURGERY | Facility: CLINIC | Age: 73
End: 2018-03-05

## 2018-03-05 NOTE — TELEPHONE ENCOUNTER
----- Message from Nimco Jesus sent at 3/5/2018  3:20 PM CST -----  Contact: Mr.Gene [facility] [phone]  Caller states he would like to speak with nurse in reference to finding out if Pt. Can shower please call Mr.Gene [facility] [phone] Thank You :)

## 2018-03-06 ENCOUNTER — PATIENT OUTREACH (OUTPATIENT)
Dept: ADMINISTRATIVE | Facility: CLINIC | Age: 73
End: 2018-03-06

## 2018-03-06 NOTE — PATIENT INSTRUCTIONS
Wound Check After Surgery: Infection   Your surgical wound has become infected. Infection after surgery usually involves just the top layers of skin. Sometimes the infection is deeper in the wound and may involve a collection of fluid or pus. Treatment will depend on the type of infection you have.   Once antibiotic treatment is started the area of redness should not increase. Pain should start to decrease after two days of treatment.   Home Care:   Keep the wound clean and dry. Change the dressing as directed, or sooner, if the dressing becomes wet or stained with blood or fluid.   If you were told to clean the wound:   Remove the old bandage and wash the wound with soap and water.   Clean with hydrogen peroxide on a cotton tip applicator (Q-tip) to remove any crust or drainage.   Apply an antibiotic cream or ointment such as Neosporin or Bacitracin.   Reapply the bandage.  Bathe with a sponge (no shower or tub baths) for the first few days after surgery, or until there is no more drainage from you wound. Then, you may shower, but do not soak the area in water (no baths or swimming) until the sutures, staples or steri-strips are removed and any wound opening has healed and become dry.   If antibiotics have been prescribed, take them exactly as directed until they are all gone.   You may use acetaminophen (Tylenol) or ibuprofen (Motrin, Advil) to control pain, unless another medicine was prescribed. [NOTE: If you have chronic liver or kidney disease or ever had a stomach ulcer or GI bleeding, talk with your doctor before using these medicines.]  Follow Up   with your doctor as scheduled or as advised by our staff for your next wound check or suture/staple/tape removal.   Return Promptly   or contact your doctor if any of the following occurs:   Increasing pain at the site of surgery or pain not controlled by medicine prescribed   Fever of 100.4°F (38ºC) or higher, or as directed by your healthcare provider    Increasing redness around the wound   Fluid, pus or blood that continues to drain from the wound after five days of treatment   Vomiting, constipation or diarrhea   Shortness of breath or chest pain  © 5101-4377 [employer], [employer]. [address] [place] [address], [place] [address]. All rights reserved. This information is not intended as a substitute for professional medical care. Always follow your healthcare professional's instructions.

## 2018-03-06 NOTE — PT/OT/SLP DISCHARGE
Occupational Therapy Discharge Summary    Forrest Schmidt  MRN: [id number]   Principal Problem: Gastrointestinal hemorrhage      Patient Discharged from acute Occupational Therapy on 3/6/18.  Please refer to prior OT note dated 3/1/18 for functional status.    Assessment:      Goals partially met.    Objective:     GOALS:    Occupational Therapy Goals     Not on file          Multidisciplinary Problems (Resolved)        Problem: Occupational Therapy Goal    Goal Priority Disciplines Outcome Interventions   Occupational Therapy Goal   (Resolved)     OT, PT/OT Outcome(s) achieved    Description:  Goals to be met by: 3/2/18    Patient will increase functional independence with ADLs by performing:    UE Dressing with [place]. NOT MET   Grooming while standing at sink with Supervision. GOAL MET 2/22  Toileting from toilet with [place] for hygiene and clothing management. NOT MET   Toilet transfer to toilet with Supervision. NOT MET                          Reasons for Discontinuation of Therapy Services  Transfer to alternate level of care.      Plan:     Patient Discharged to: Home with Home Health Service    Pushpa Garces, OT  3/6/2018

## 2018-03-06 NOTE — PLAN OF CARE
Patient discharged home 3/4/2018 with [facility], eating, with wet to dry dressing changes BID.    Future Appointments  Date Time Provider Department Center   3/12/2018 2:00 PM Bess Kirby NP [facility] [place]        03/06/18 1023   Final Note   Assessment Type Final Discharge Note   Discharge Disposition Home-Health   Hospital Follow Up  Appt(s) scheduled? Yes   Right Care Referral Info   Post Acute Recommendation Home-care

## 2018-03-06 NOTE — PLAN OF CARE
Problem: Occupational Therapy Goal  Goal: Occupational Therapy Goal  Goals to be met by: 3/2/18    Patient will increase functional independence with ADLs by performing:    UE Dressing with [place]. NOT MET   Grooming while standing at sink with Supervision. GOAL MET 2/22  Toileting from toilet with [place] for hygiene and clothing management. NOT MET   Toilet transfer to toilet with Supervision. NOT MET        Outcome: Outcome(s) achieved Date Met: 03/06/18  Goals partially met; pt d/c from hospital

## 2018-03-09 ENCOUNTER — TELEPHONE (OUTPATIENT)
Dept: SURGERY | Facility: CLINIC | Age: 73
End: 2018-03-09

## 2018-03-09 NOTE — TELEPHONE ENCOUNTER
----- Message from Radha Mccormick sent at 3/9/2018  9:35 AM CST -----  Contact: Pushpa/[facility]   Pushpa States that she needs a call returned by a nurse in reference to his incision having some red to it and its hard as well,she has some concern and if Forrest can in earlier if possible, Please call Pushpa @ [phone]. Thanks :)

## 2018-03-12 ENCOUNTER — PATIENT MESSAGE (OUTPATIENT)
Dept: SURGERY | Facility: CLINIC | Age: 73
End: 2018-03-12

## 2018-03-12 ENCOUNTER — OFFICE VISIT (OUTPATIENT)
Dept: SURGERY | Facility: CLINIC | Age: 73
End: 2018-03-12
Payer: MEDICARE

## 2018-03-12 VITALS
BODY MASS INDEX: 22.35 KG/M2 | SYSTOLIC BLOOD PRESSURE: 126 MMHG | TEMPERATURE: 97 F | HEIGHT: 71 IN | WEIGHT: 159.63 LBS | HEART RATE: 75 BPM | DIASTOLIC BLOOD PRESSURE: 77 MMHG

## 2018-03-12 DIAGNOSIS — C49.A3 GIST (GASTROINTESTINAL STROMAL TUMOR) OF SMALL BOWEL, MALIGNANT: Primary | ICD-10-CM

## 2018-03-12 PROCEDURE — 99999 PR PBB SHADOW E&M-EST. PATIENT-LVL IV: CPT | Mod: PBBFAC,,, | Performed by: NURSE PRACTITIONER

## 2018-03-12 PROCEDURE — 99214 OFFICE O/P EST MOD 30 MIN: CPT | Mod: PBBFAC | Performed by: NURSE PRACTITIONER

## 2018-03-12 PROCEDURE — 99024 POSTOP FOLLOW-UP VISIT: CPT | Mod: POP,,, | Performed by: NURSE PRACTITIONER

## 2018-03-12 RX ORDER — CEPHALEXIN 500 MG/1
500 CAPSULE ORAL 4 TIMES DAILY
Qty: 40 CAPSULE | Refills: 0 | Status: SHIPPED | OUTPATIENT
Start: 2018-03-12 | End: 2018-05-23

## 2018-03-12 RX ORDER — FERROUS SULFATE 325(65) MG
1 TABLET ORAL
COMMUNITY
End: 2018-08-22 | Stop reason: SDUPTHER

## 2018-03-12 RX ORDER — MULTIVITAMIN
1 TABLET ORAL
COMMUNITY
Start: 2018-02-13 | End: 2019-02-13 | Stop reason: SDUPTHER

## 2018-03-12 RX ORDER — ASPIRIN 81 MG/1
81 TABLET ORAL NIGHTLY
COMMUNITY

## 2018-03-12 RX ORDER — AMOXICILLIN 500 MG
1 CAPSULE ORAL DAILY
COMMUNITY

## 2018-03-12 RX ORDER — ATORVASTATIN CALCIUM 40 MG/1
40 TABLET, FILM COATED ORAL
COMMUNITY
Start: 2015-09-10 | End: 2019-10-21 | Stop reason: SDUPTHER

## 2018-03-13 ENCOUNTER — TELEPHONE (OUTPATIENT)
Dept: HEMATOLOGY/ONCOLOGY | Facility: CLINIC | Age: 73
End: 2018-03-13

## 2018-03-13 ENCOUNTER — TELEPHONE (OUTPATIENT)
Dept: SURGERY | Facility: CLINIC | Age: 73
End: 2018-03-13

## 2018-03-13 DIAGNOSIS — C49.A3 GIST (GASTROINTESTINAL STROMAL TUMOR) OF SMALL BOWEL, MALIGNANT: Primary | ICD-10-CM

## 2018-03-13 NOTE — TELEPHONE ENCOUNTER
----- Message from Radha Mccormick sent at 3/13/2018 10:37 AM CDT -----  Contact: Nurse/Connor Barrow States that she needs a call returned by a nurse in reference to where his staples were removed and the incision is opening up and draining  And he completed his clindamycin on Sunday , Please call connor @ [phone] . Thanks :)

## 2018-03-13 NOTE — PROGRESS NOTES
First PO visit s/p small bowel resection 2/15/18 with Dr. Sanchez:    FINAL PATHOLOGIC DIAGNOSIS  SMALL BOWEL, RESECTION:  -Gastrointestinal stromal tumor, see synoptic report below.  GIST CASE SUMMARY:  Procedure: Small bowel resection.  Tumor site: Small bowel  Tumor size: 6 cm in greatest dimension.  Tumor focality: Unifocal.  Histologic type: Gastrointestinal stromal tumor, mixed  Mitotic rate: 9 mitoses per 5 mm2  Histologic grade: G2: High grade; mitotic rate >5/5 mm2  Risk assessment: High risk.  Margins: All margins are uninvolved by GIST.  Distance of tumor from closest margin: 5.8 cm, mucosal margin.  Regional lymph nodes: None identified after thorough search.  Pathologic stage: pTNM, AJCC 8th Edition  Tumor stage: pT3: Tumor more than 5 cm but not more than 10 cm  Lymph node stage: pNx.  Ancillary studies: : positive.  Treatment effect: No known presurgical therapy.  Immunohistochemical    Prolonged hospital stay 2/2 ileus.   Eating well now.  No N/V  Wet to dry dsg changes BID with reported progressive improvement per wife.      PE:  Looks good.  BBS cl  HRRR  Midline inc with long shallow open area with good granulation tissue.  No s/s/ infection.  Min drg on old dsg.  Staples removed.  Mod erythema.      PLAN:  Continue W to D BID  To call if redness does not improve or worsens.  F/U 2 weeks  Refer to hem/onc for adj tx.  (Gleevac)

## 2018-03-14 ENCOUNTER — TELEPHONE (OUTPATIENT)
Dept: SURGERY | Facility: CLINIC | Age: 73
End: 2018-03-14

## 2018-03-14 ENCOUNTER — PATIENT MESSAGE (OUTPATIENT)
Dept: SURGERY | Facility: CLINIC | Age: 73
End: 2018-03-14

## 2018-03-28 ENCOUNTER — OFFICE VISIT (OUTPATIENT)
Dept: SURGERY | Facility: CLINIC | Age: 73
End: 2018-03-28
Payer: MEDICARE

## 2018-03-28 ENCOUNTER — LAB VISIT (OUTPATIENT)
Dept: LAB | Facility: HOSPITAL | Age: 73
End: 2018-03-28
Attending: INTERNAL MEDICINE
Payer: MEDICARE

## 2018-03-28 ENCOUNTER — TELEPHONE (OUTPATIENT)
Dept: PHARMACY | Facility: HOSPITAL | Age: 73
End: 2018-03-28

## 2018-03-28 ENCOUNTER — INITIAL CONSULT (OUTPATIENT)
Dept: HEMATOLOGY/ONCOLOGY | Facility: CLINIC | Age: 73
End: 2018-03-28
Payer: MEDICARE

## 2018-03-28 VITALS
WEIGHT: 166 LBS | SYSTOLIC BLOOD PRESSURE: 133 MMHG | HEART RATE: 59 BPM | BODY MASS INDEX: 23.24 KG/M2 | DIASTOLIC BLOOD PRESSURE: 79 MMHG | HEIGHT: 71 IN

## 2018-03-28 VITALS
OXYGEN SATURATION: 100 % | BODY MASS INDEX: 23.24 KG/M2 | HEART RATE: 59 BPM | DIASTOLIC BLOOD PRESSURE: 79 MMHG | SYSTOLIC BLOOD PRESSURE: 133 MMHG | TEMPERATURE: 98 F | WEIGHT: 166 LBS | HEIGHT: 71 IN | RESPIRATION RATE: 19 BRPM

## 2018-03-28 DIAGNOSIS — D50.0 ANEMIA DUE TO CHRONIC BLOOD LOSS: ICD-10-CM

## 2018-03-28 DIAGNOSIS — C49.A3 GIST (GASTROINTESTINAL STROMAL TUMOR) OF SMALL BOWEL, MALIGNANT: Primary | ICD-10-CM

## 2018-03-28 DIAGNOSIS — I10 ESSENTIAL HYPERTENSION: ICD-10-CM

## 2018-03-28 DIAGNOSIS — D50.0 IRON DEFICIENCY ANEMIA DUE TO CHRONIC BLOOD LOSS: ICD-10-CM

## 2018-03-28 DIAGNOSIS — Z09 POSTOP CHECK: Primary | ICD-10-CM

## 2018-03-28 DIAGNOSIS — C49.A3 GIST (GASTROINTESTINAL STROMAL TUMOR) OF SMALL BOWEL, MALIGNANT: ICD-10-CM

## 2018-03-28 DIAGNOSIS — I25.10 CORONARY ARTERY DISEASE INVOLVING NATIVE CORONARY ARTERY OF NATIVE HEART WITHOUT ANGINA PECTORIS: ICD-10-CM

## 2018-03-28 LAB
ANISOCYTOSIS BLD QL SMEAR: SLIGHT
BASOPHILS # BLD AUTO: 0.04 K/UL
BASOPHILS NFR BLD: 0.7 %
DIFFERENTIAL METHOD: ABNORMAL
EOSINOPHIL # BLD AUTO: 0.2 K/UL
EOSINOPHIL NFR BLD: 3.7 %
ERYTHROCYTE [DISTWIDTH] IN BLOOD BY AUTOMATED COUNT: 17 %
FERRITIN SERPL-MCNC: 23 NG/ML
HCT VFR BLD AUTO: 29.2 %
HGB BLD-MCNC: 8.8 G/DL
HYPOCHROMIA BLD QL SMEAR: ABNORMAL
IMM GRANULOCYTES # BLD AUTO: 0.02 K/UL
IMM GRANULOCYTES NFR BLD AUTO: 0.4 %
IRON SERPL-MCNC: 16 UG/DL
LYMPHOCYTES # BLD AUTO: 0.9 K/UL
LYMPHOCYTES NFR BLD: 16.6 %
MCH RBC QN AUTO: 24.1 PG
MCHC RBC AUTO-ENTMCNC: 30.1 G/DL
MCV RBC AUTO: 80 FL
MONOCYTES # BLD AUTO: 0.7 K/UL
MONOCYTES NFR BLD: 12 %
NEUTROPHILS # BLD AUTO: 3.6 K/UL
NEUTROPHILS NFR BLD: 66.6 %
NRBC BLD-RTO: 0 /100 WBC
OVALOCYTES BLD QL SMEAR: ABNORMAL
PLATELET # BLD AUTO: 306 K/UL
PMV BLD AUTO: 9.1 FL
POIKILOCYTOSIS BLD QL SMEAR: SLIGHT
POLYCHROMASIA BLD QL SMEAR: ABNORMAL
RBC # BLD AUTO: 3.65 M/UL
RETICS/RBC NFR AUTO: 2.2 %
SATURATED IRON: 3 %
TOTAL IRON BINDING CAPACITY: 460 UG/DL
TRANSFERRIN SERPL-MCNC: 311 MG/DL
WBC # BLD AUTO: 5.43 K/UL

## 2018-03-28 PROCEDURE — 82728 ASSAY OF FERRITIN: CPT

## 2018-03-28 PROCEDURE — 99999 PR PBB SHADOW E&M-EST. PATIENT-LVL II: CPT | Mod: PBBFAC,,, | Performed by: SURGERY

## 2018-03-28 PROCEDURE — 85025 COMPLETE CBC W/AUTO DIFF WBC: CPT

## 2018-03-28 PROCEDURE — 99205 OFFICE O/P NEW HI 60 MIN: CPT | Mod: S$PBB,,, | Performed by: INTERNAL MEDICINE

## 2018-03-28 PROCEDURE — 99999 PR PBB SHADOW E&M-EST. PATIENT-LVL IV: CPT | Mod: PBBFAC,,, | Performed by: INTERNAL MEDICINE

## 2018-03-28 PROCEDURE — 36415 COLL VENOUS BLD VENIPUNCTURE: CPT

## 2018-03-28 PROCEDURE — 99024 POSTOP FOLLOW-UP VISIT: CPT | Mod: POP,,, | Performed by: SURGERY

## 2018-03-28 PROCEDURE — 83540 ASSAY OF IRON: CPT

## 2018-03-28 PROCEDURE — 99212 OFFICE O/P EST SF 10 MIN: CPT | Mod: PBBFAC | Performed by: SURGERY

## 2018-03-28 PROCEDURE — 85045 AUTOMATED RETICULOCYTE COUNT: CPT

## 2018-03-28 PROCEDURE — 99214 OFFICE O/P EST MOD 30 MIN: CPT | Mod: PBBFAC,27 | Performed by: INTERNAL MEDICINE

## 2018-03-28 RX ORDER — IMATINIB MESYLATE 400 MG/1
400 TABLET, FILM COATED ORAL DAILY
Qty: 30 TABLET | Refills: 2 | Status: SHIPPED | OUTPATIENT
Start: 2018-03-28 | End: 2018-06-07

## 2018-03-28 NOTE — Clinical Note
Labs (CBC, retic, ferritin, iron/TIBC) today RTC in 2 weeks with CBC, CMP, TSH CT chest the afternoon when he returns

## 2018-03-28 NOTE — LETTER
March 28, 2018      Ahmet Sanchez MD  [address]  [facility] [address]           [facility]  [address]  [facility] [address]  Phone: [phone]          Patient: Forrest Schmidt   MR Number: [id number]   YOB: 1945   Date of Visit: 3/28/2018       Dear Dr. Ahmet Sanchez:    Thank you for referring Forrest Schmidt to me for evaluation. Attached you will find relevant portions of my assessment and plan of care.    If you have questions, please do not hesitate to call me. I look forward to following Forrest Schmidt along with you.    Sincerely,    Robert Umana MD    Enclosure  CC:  No Recipients    If you would like to receive this communication electronically, please contact [email or web address] or [phone] to request more information on [employer] Link access.    For providers and/or their staff who would like to refer a patient to [facility], please contact us through our one-stop-shop provider referral line, [facility], at [phone].    If you feel you have received this communication in error or would no longer like to receive these types of communications, please e-mail [email or web address]

## 2018-03-28 NOTE — PROGRESS NOTES
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 Schmidt is a 71 yo  man with HTN, CAD s/p PCI 14 years ago, who presents today for further management of resected GIST. He presented with melena, syncope, hypotension on 18 at OSH. Colonoscopy showed old blood in the terminal ileum. CT abdomen/pelvis showed an ileal mass that measures approximately 5 cm. He was transferred to [facility] and underwent segmental small bowel resection by Dr. Sanchez on 2/15/18. Pathology showed a 6-cm GIST, histologic type: GIST, mixed; mitotic rate 9 mitoses/5 mm2, G2, high grade, high risk, margins negative. Cd117+, pathologic T3NxMx.  He had incisional wound infection after the surgery and took antibiotics for that. He still feels tired and has lost 10 lbs from the hospitalization, but he feels his energy level is improving and his appetite is good. Denies other complaints. Was taking iron pills 2 tab a day last year for GI bleed and CLINT, thought to from NSAIDs-induced gastritis.    ECO    Past Medical History:   Diagnosis Date    Anticoagulant long-term use     Arthritis     BPH (benign prostatic hyperplasia)     Coronary artery disease     stent x1     Hypercholesteremia     Hypertension     Low iron     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 2/15/2018    Osteopenia          Family History   Problem Relation Age of Onset    Breast cancer Mother     Diabetes Mother     Cancer Mother     Stroke Mother     Stroke Father     Cancer Maternal Grandmother     Heart attack Paternal Grandfather        Past Surgical History:   Procedure Laterality Date    APPENDECTOMY      CATARACT EXTRACTION      COLONOSCOPY N/A 5/15/2017    Procedure: COLONOSCOPY;  Surgeon: Dez Jimenez MD;  Location: [facility];  Service: Endoscopy;  Laterality: N/A;    CORONARY STENT PLACEMENT       ESOPHAGOGASTRODUODENOSCOPY      EYE SURGERY      SKIN BIOPSY      TONSILLECTOMY      VASECTOMY         Social History     Social History    Marital status:      Spouse name: N/A    Number of children: N/A    Years of education: N/A     Occupational History    Not on file.     Social History Main Topics    Smoking status: Former Smoker     Packs/day: 0.50     Years: 15.00     Types: Cigarettes     Quit date: 1974    Smokeless tobacco: Never Used      Comment: Quit smoking about 40 yrs ago    Alcohol use 0.6 oz/week     1 Glasses of wine per week      Comment: 2-4 glasses wine/day, 5/11/17 last drink    Drug use: No    Sexual activity: Yes     Partners: Female     Other Topics Concern    Not on file     Social History Narrative    No narrative on file       Review of Systems   Constitutional: Negative for activity change, appetite change, chills, diaphoresis, fatigue, fever and unexpected weight change.   HENT: Negative for mouth sores, tinnitus, trouble swallowing and voice change.    Eyes: Negative for pain, redness and visual disturbance.   Respiratory: Negative for cough, shortness of breath and wheezing.    Cardiovascular: Negative for chest pain, palpitations and leg swelling.   Gastrointestinal: Positive for abdominal pain. Negative for abdominal distention, blood in stool, constipation, diarrhea, nausea and vomiting.   Endocrine: Negative for polydipsia, polyphagia and polyuria.   Genitourinary: Negative for frequency and hematuria.   Musculoskeletal: Negative for back pain, joint swelling, myalgias, neck pain and neck stiffness.   Skin: Negative for color change, pallor, rash and wound.   Neurological: Positive for headaches. Negative for dizziness, tremors, seizures, syncope, speech difficulty, weakness, light-headedness and numbness.   Hematological: Negative for adenopathy.   Psychiatric/Behavioral: Negative for confusion, dysphoric mood and self-injury. The patient is not  nervous/anxious.    All other systems reviewed and are negative.      Objective:  Physical Exam   Constitutional: He is oriented to person, place, and time. He appears well-developed and well-nourished. No distress.   HENT:   Head: Normocephalic and atraumatic.   Mouth/Throat: Oropharynx is clear and moist. No oropharyngeal exudate.   Eyes: Conjunctivae and EOM are normal. Pupils are equal, round, and reactive to light. No scleral icterus.   Neck: Normal range of motion. Neck supple. No JVD present.   Cardiovascular: Normal rate, regular rhythm and normal heart sounds.  Exam reveals no gallop and no friction rub.    No murmur heard.  Pulmonary/Chest: Effort normal and breath sounds normal. No respiratory distress. He has no wheezes. He has no rales.   Abdominal: Soft. Bowel sounds are normal. He exhibits no distension and no mass. There is no hepatosplenomegaly. There is no tenderness.       Midline incisional wound in the process of healing, some whitish discharge from the upper part of the wound   Musculoskeletal: Normal range of motion. He exhibits no edema, tenderness or deformity.   Lymphadenopathy:     He has no cervical adenopathy.   Neurological: He is alert and oriented to person, place, and time. No cranial nerve deficit. He exhibits normal muscle tone.   Skin: Skin is warm and dry. No rash noted. No erythema. No pallor.   Psychiatric: He has a normal mood and affect. His behavior is normal. Judgment and thought content normal.   Vitals reviewed.      Labs:  Labs done at PCP's office on 3/19/18: WBC 6.1, H/H 8.8/27.7, MCV 77.4, plt 500. CMP unremarkable. Cr 0.95, Ca 8.8, ALT 14, AST 20, total bilirubin normal.     ASSESSMENT AND PLAN:    1. 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 Essential hypertension    3. Coronary artery disease involving native coronary artery of native heart without angina pectoris    4. Anemia     5. Iron deficiency anemia due to chronic blood loss       - Mr. Schmidt is a 71 yo man with stage IIIB GIST of the small intestine s/p resection on 2/15/18. His tumor was 6 cm, mitotic rate 9 mitoses/5mm2, which puts him in the high risk category.   - Discussed the diagnosis, rationale, and benefit of adjuvant Gleevec 400 mg daily for 3 years. Side effects of Gleevec were discussed with Mr and Mrs Schmidt which include but are not limited to fatigue, decreased appetite, mucositis, bone marrow suppression, increased risk of infection, anemia that may require blood transfusion, thrombocytopenia with increased risk of bleeding, edema, liver injury, rash. They understand and agree with adjuvant Gleevec  - His wound is not completely healed up and there is some whitish discharge from the wound. As Gleevec can cause neutropenia and increases risk of infection, I would like to see him back in 2 weeks to reassess his wound before starting Gleevec. Gleevec needs to be started within 12 weeks after surgery.    - He has not resumed po iron bid. Will check CBC, iron/TIBC, ferritin. If ferritin is low will resume oral iron.   - Printed information on Gleevec given to patient  - Sent prescription of Gleevec 400 mg once daily to specialty pharmacy. Discussed with patient that he should wait until I told him so before starting Gleevec.  - BP good, c/w current meds  - CAD stable, c/w current meds  - All questions were answered in the office. Ms. Schmidt understands and agrees with the plan.   - RTC in 2 weeks with CBC, CMP, TSH    I spent 60 minutes with Mr. Schmidt with at least 50% of the time on face-to-face counseling.

## 2018-03-28 NOTE — PROGRESS NOTES
Doing well. No digestive issues  ML wound granulating well. No hernia. Wound size is approx 2 cm x 6 cm.    Seeing Med Onc today    RTC one month

## 2018-03-28 NOTE — PROGRESS NOTES
"Patient is a 72 y.o. man s/p SBR of GIST 3 weeks ago who presents to clinic today for wound check    S:  Pain is resolving.  No redness or discharge around incision.  Wife has been changing wet to dry dressings BID.  There is some exudate on dressing changes. Home health has been coming by twice a week    Denies fever, chills, nausea, vomiting.  Maintaining his weight, and eating well.  Has 1-2 BM a day.    Will follow up with med onc today    O:   Vitals:    03/28/18 0950   BP: 133/79   Pulse: (!) 59   Weight: 75.3 kg (166 lb 0.1 oz)   Height: 5' 11" (1.803 m)     Phys:   Gen: NAD   HEENT: NCAT, trachea midline  Pulm: Unlabored  Abd: Soft, attp.  Wound is granulated all the way to the skin.  1 cm separation to superior aspect of wound, with 1 cm tracking superiorly.  No evidence of abscess  Extremities: no cyanosis or edema, or clubbing  Skin: Skin color, texture, turgor normal. No rashes or lesions    Path: 6 cm GIST (p3), G2, High grade  Mitotic rate 9/ 5 mm2    A/P 72 y.o. man s/p SBR of GIST 3 weeks ago    Wound is granulating very nicely  Keep f/u with med onc to discuss adjuvant therapy    Carlos Rankin MD  General Surgery, PGY-4  [phone]    "

## 2018-03-28 NOTE — PATIENT INSTRUCTIONS
Imatinib tablets  What is this medicine?  IMATINIB (i MAT in ib) is a medicine that targets proteins in cancer cells and stops the cancer cells from growing. It is used to treat certain leukemias, myelodysplastic syndromes, and other cancers. It is also used to treat specific digestive tract tumors called GISTs.  How should I use this medicine?  Take this medicine by mouth with a full glass of water. Take it with food to decrease the chance of it upsetting your stomach. Do not take with grapefruit juice. Follow the directions on the prescription label. Take your medicine at regular intervals. Do not take it more often than directed. Do not stop taking except on your doctor's advice.  If you have difficulty swallowing the tablets, let your doctor, pharmacist, or health care professional know. They can help you with advice.  Talk to your pediatrician regarding the use of this medicine in children. While this drug may be prescribed for children as young as 1 year for selected conditions, precautions do apply.  What side effects may I notice from receiving this medicine?  Side effects that you should report to your doctor or health care professional as soon as possible:  · low blood counts - this medicine may decrease the number of white blood cells, red blood cells and platelets. You may be at increased risk for infections and bleeding.  · signs of infection - fever or chills, cough, sore throat, pain or difficulty passing urine  · signs of decreased platelets or bleeding - bruising, pinpoint red spots on the skin, black, tarry stools, blood in the urine, nosebleeds  · signs of decreased red blood cells - unusually weak or tired, fainting spells, lightheadedness  · allergic reactions like skin rash, itching or hives, swelling of the face, lips, or tongue  · breathing problems  · changes in vision  · dark urine  · general ill feeling or flu-like symptoms  · light-colored stools  · loss of appetite  · mouth  sores  · redness, blistering, peeling or loosening of the skin, including inside the mouth  · right upper belly pain  · swelling of the legs or ankles  · trouble passing urine or change in the amount of urine  · vomiting  · yellowing of the eyes or skin  Side effects that usually do not require medical attention (report to your doctor or health care professional if they continue or are bothersome):  · decreased appetite  · diarrhea  · difficulty sleeping  · headache  · heartburn  · joint pain  · muscle cramps or pain  · nausea  · upset stomach  What may interact with this medicine?  · antiviral medicines for HIV or AIDS  · bosentan  · cisapride  · clarithromycin  · cyclosporine  · dexamethasone  · diltiazem  · ergot alkaloids like dihydroergotamine, ergonovine, ergotamine, methylergonovine  · erythromycin  · grapefruit or grapefruit juice  · medicines for cholesterol like atorvastatin lovastatin, simvastatin  · medicines for depression, anxiety, or psychotic disturbances  · medicines for fungal infections like ketoconazole and itraconazole  · medicines for irregular heart beat like amiodarone, bepridil, dofetilide, encainide, flecainide, propafenone, quinidine  · medicines for seizures like carbamazepine, phenobarbital, phenytoin  · medicines for sleep  · NSAIDS, medicines for pain and inflammation, like ibuprofen or naproxen  · pimozide  · rifabutin  · rifampin  · sildenafil  · sirolimus  · Vaughn's wort  · tacrolimus  · vaccines  · verapamil  · warfarin  Talk to your doctor or health care professional before taking any of these medicines:  · acetaminophen  · aspirin  · ibuprofen  · ketoprofen  · naproxen  What if I miss a dose?  If you miss a dose, take it as soon as you can. If it is almost time for your next dose, take only that dose and skip your missed dose. Do not take double or extra doses.  Where should I keep my medicine?  Keep out of reach of children.  Store tablets at room temperature between 15 and  30 degrees C (59 and 86 degrees F). Protect from moisture. Keep tightly closed. Throw away any unused medicine after the expiration date.  What should I tell my health care provider before I take this medicine?  They need to know if you have any of these conditions:  · bleeding problems  · infection (especially a virus infection such as chickenpox, cold sores, or herpes)  · heart disease  · heart failure  · kidney disease  · liver disease  · lung disease  · stomach problems  · an unusual or allergic reaction to imatinib, other medicines, foods, dyes, or preservatives  · pregnant or trying to get pregnant  · breast-feeding  What should I watch for while using this medicine?  Visit your doctor for checks on your progress. You will need to have regular blood tests while on this medicine. Report any new symptoms promptly.  Call your doctor or health care professional for advice if you get a fever, chills or sore throat, or other symptoms of a cold or flu. Do not treat yourself. This drug decreases your body's ability to fight infections. Try to avoid being around people who are sick.  This medicine may increase your risk to bruise or bleed. Call your doctor or health care professional if you notice any unusual bleeding.  You may get drowsy or dizzy. Do not drive, use machinery, or do anything that needs mental alertness until you know how this medicine affects you.  Do not become pregnant while taking this medicine or for 14 days after stopping it. Women should inform their doctor if they wish to become pregnant or think they might be pregnant. There is a potential for serious side effects to an unborn child. Talk to your health care professional or pharmacist for more information. Do not breast-feed an infant while taking this medicine or for 1 month after stopping it.  NOTE:This sheet is a summary. It may not cover all possible information. If you have questions about this medicine, talk to your doctor, pharmacist, or  health care provider. Copyright© 2017 [employer]

## 2018-03-28 NOTE — PROGRESS NOTES
Distress Screening Results: Psychosocial Distress screening score of Distress Score: 2 noted and reviewed. No intervention indicated.  Answers for HPI/ROS submitted by the patient on 3/27/2018   appetite change : No  unexpected weight change: No  visual disturbance: No  cough: No  shortness of breath: No  chest pain: No  abdominal pain: Yes  diarrhea: No  frequency: No  back pain: No  rash: No  headaches: Yes  adenopathy: No  nervous/ anxious: No

## 2018-03-28 NOTE — TELEPHONE ENCOUNTER
Informed patient [facility] received a prescription for Gleevec and it will require a prior authorization with their insurance company. We will update patient of status as more information is received.

## 2018-03-29 ENCOUNTER — TELEPHONE (OUTPATIENT)
Dept: HEMATOLOGY/ONCOLOGY | Facility: CLINIC | Age: 73
End: 2018-03-29

## 2018-03-29 NOTE — TELEPHONE ENCOUNTER
----- Message from Baldo Menon sent at 3/29/2018  8:16 AM CDT -----  Contact: Pharmacy   Prior auth is needed for imatinib (GLEEVEC) 400 MG Tab and additional questions     A call back is needed before 2p    Contact::[phone]  Ref#::[id number]

## 2018-03-29 NOTE — TELEPHONE ENCOUNTER
Called [employer] at 11 am and 0158 pm regarding Prior Authorization for Gleevac 400 mg. I informed [employer] that:    Q1: The patient does not have an unresectable and or metastatic, malignant GIST positive for a KIT () mutation .     Q2: Yes, the request is for adjuvant treatment of an adult following complete gross resection of GIST.     I also informed [employer] that they can call Dr. Umana's Office at [phone] if they have any further questions.

## 2018-03-29 NOTE — TELEPHONE ENCOUNTER
Spoke with Bessy the Prior Authorization Rep for [employer] regarding pt's Gleevec. Bessy stated she had 2 questions that need to be answered prior to 2 pm in order for the pt's Gleevec to be Authorized. Bessy stated she will fax the questions to [phone] and stated I can call her back with the response at [phone] or fax it back to her. I informed Bessy that I will call her back with the response. Bessy verbalized understanding.

## 2018-04-03 ENCOUNTER — TELEPHONE (OUTPATIENT)
Dept: HEMATOLOGY/ONCOLOGY | Facility: CLINIC | Age: 73
End: 2018-04-03

## 2018-04-03 ENCOUNTER — PATIENT MESSAGE (OUTPATIENT)
Dept: HEMATOLOGY/ONCOLOGY | Facility: CLINIC | Age: 73
End: 2018-04-03

## 2018-04-03 NOTE — TELEPHONE ENCOUNTER
Spoke with Nancy (Pharmacy  at [facility]) regarding pt's Gleevec. Nancy stated that initially pt's Gleevec was denied and an appeal was sent last week. Yesterday, Gleevec was approved from April 2, 2018 - April 2, 2021. Informed Mr. Schmidt about the approval,  Brayan verbalized understanding.

## 2018-04-11 ENCOUNTER — OFFICE VISIT (OUTPATIENT)
Dept: HEMATOLOGY/ONCOLOGY | Facility: CLINIC | Age: 73
End: 2018-04-11
Payer: MEDICARE

## 2018-04-11 ENCOUNTER — OFFICE VISIT (OUTPATIENT)
Dept: SURGERY | Facility: CLINIC | Age: 73
End: 2018-04-11
Payer: MEDICARE

## 2018-04-11 ENCOUNTER — HOSPITAL ENCOUNTER (OUTPATIENT)
Dept: RADIOLOGY | Facility: HOSPITAL | Age: 73
Discharge: HOME | End: 2018-04-11
Attending: INTERNAL MEDICINE
Payer: MEDICARE

## 2018-04-11 ENCOUNTER — TELEPHONE (OUTPATIENT)
Dept: PHARMACY | Facility: CLINIC | Age: 73
End: 2018-04-11

## 2018-04-11 VITALS
SYSTOLIC BLOOD PRESSURE: 152 MMHG | HEIGHT: 71 IN | BODY MASS INDEX: 22.68 KG/M2 | HEART RATE: 61 BPM | RESPIRATION RATE: 18 BRPM | TEMPERATURE: 98 F | OXYGEN SATURATION: 99 % | WEIGHT: 162 LBS | DIASTOLIC BLOOD PRESSURE: 69 MMHG

## 2018-04-11 DIAGNOSIS — C49.A3 GIST (GASTROINTESTINAL STROMAL TUMOR) OF SMALL BOWEL, MALIGNANT: Primary | ICD-10-CM

## 2018-04-11 DIAGNOSIS — C49.A3 GIST (GASTROINTESTINAL STROMAL TUMOR) OF SMALL BOWEL, MALIGNANT: ICD-10-CM

## 2018-04-11 DIAGNOSIS — I25.10 CORONARY ARTERY DISEASE INVOLVING NATIVE CORONARY ARTERY OF NATIVE HEART WITHOUT ANGINA PECTORIS: ICD-10-CM

## 2018-04-11 DIAGNOSIS — D50.0 IRON DEFICIENCY ANEMIA DUE TO CHRONIC BLOOD LOSS: ICD-10-CM

## 2018-04-11 DIAGNOSIS — Z09 POSTOP CHECK: Primary | ICD-10-CM

## 2018-04-11 DIAGNOSIS — I10 ESSENTIAL HYPERTENSION: ICD-10-CM

## 2018-04-11 PROCEDURE — 99999 PR PBB SHADOW E&M-EST. PATIENT-LVL III: CPT | Mod: PBBFAC,,, | Performed by: INTERNAL MEDICINE

## 2018-04-11 PROCEDURE — 25500020 PHARM REV CODE 255: Performed by: INTERNAL MEDICINE

## 2018-04-11 PROCEDURE — 99024 POSTOP FOLLOW-UP VISIT: CPT | Mod: POP,,, | Performed by: SURGERY

## 2018-04-11 PROCEDURE — 71260 CT THORAX DX C+: CPT | Mod: TC

## 2018-04-11 PROCEDURE — 99214 OFFICE O/P EST MOD 30 MIN: CPT | Mod: S$PBB,,, | Performed by: INTERNAL MEDICINE

## 2018-04-11 PROCEDURE — 71260 CT THORAX DX C+: CPT | Mod: 26,,, | Performed by: RADIOLOGY

## 2018-04-11 PROCEDURE — 99213 OFFICE O/P EST LOW 20 MIN: CPT | Mod: PBBFAC,25 | Performed by: INTERNAL MEDICINE

## 2018-04-11 RX ADMIN — IOHEXOL 75 ML: 350 INJECTION, SOLUTION INTRAVENOUS at 04:04

## 2018-04-11 NOTE — PROGRESS NOTES
Distress Screening Results: Psychosocial Distress screening score of Distress Score: 0 noted and reviewed. No intervention indicated.  Answers for HPI/ROS submitted by the patient on 4/9/2018   appetite change : No  unexpected weight change: No  visual disturbance: No  cough: No  shortness of breath: No  chest pain: No  abdominal pain: No  diarrhea: No  frequency: No  back pain: No  rash: No  headaches: No  adenopathy: No  nervous/ anxious: No

## 2018-04-11 NOTE — PROGRESS NOTES
Patient is a 72 y.o. man s/p SBR of GIST 2/15/18 weeks ago who presents to clinic today for wound check    S:  Pain has resolved.  No redness or discharge around incision.  Wife continues to change wet to dry dressings BID.  Today, incision has no erythema or swelling, some excoriation in midline but mostly healed up. Small granuloma in mid portion of incision. Home health has been coming by twice a week    Denies fever, chills, nausea, vomiting.  States he has been gaining weight, and eating well.  Has 1-2 BM a day.      O:   Phys:   Gen: NAD   HEENT: NCAT, trachea midline  Pulm: Unlabored  Abd: Soft, attp.  Wound is granulated all the way to the skin with only a few small areas of excoriation, small suture granuloma in midline of incision. No evidence of abscess  Extremities: no cyanosis or edema, or clubbing  Skin: Skin color, texture, turgor normal. No rashes or lesions    Path: 6 cm GIST (p3), G2, High grade  Mitotic rate 9/ 5 mm2    A/P 72 y.o. man s/p SBR of GIST in February    - Wound has healed except for small granuloma and slight skin excoriation- silver nitrate applied to granuloma in clinic today  - follow up as needed      Devora Gross, PGY-1  General Surgery  [phone]    I have personally taken the history and examined this patient and agree with the resident's note as stated above.  OK to start Gleevec from my perspective. Discussed with Dr Umana

## 2018-04-11 NOTE — TELEPHONE ENCOUNTER
Initial consultation for Gleevec completed on . Patient picked up medication at [facility] on  ($77.41)  and plans to start on .    Patient was counseled on the administration directions for Gleevec:  Take Gleevec 400m tablet (400mg total) by mouth once daily, at the same time daily, with a meal.  Do not chew, crush, or break the tablets.   If possible, patient was instructed tip the tablets from the RX bottle to the cap, and take directly from the cap to the mouth.  Patient may handle the medication with their hands if they wear with a latex or nitrile glove and wash their hands before and after handling the tablets.    Patient was counseled on the following possible side effects, which include, but are not limited to: Fatigue, rash, nausea, constipation/diarrhea (discussed that patient can take Imodium as needed for diarrhea and if still > 4 loose stools/day while on Imoidum, contact MD), throat irritation, swelling, muscle/ joint pain, muscle cramps, rash (patient received hydrocortisone cream for any red, itchy skin and Eucerin cream for dry skin), dizziness, headache, anorexia.  Warnings and precautions also reviewed: cardiac toxicity, dermatologic toxicity - Ingram-Marshal's Syndrome - hydrocortisone cream included, fluid retention, GI perforation, hepatotoxicity, Tumor Lysis Syndrome, hemorrhage, hematologic toxicity.    DDIs: Medication list reviewed.  - advised to avoid grapefruit and grapefruit juice.     Patient was given 2 patient education handouts on how to handle oral chemotherapy and specific recommendations- do's and don'ts. Instructed the patient that if they have any remaining oral chemotherapy, not to flush down the toilet or throw away in the trash; The patient or caregiver should return the unused oral chemotherapy to either the clinic or to myself in the Pharmacy where the oral chemotherapy can be disposed of properly.     Patient confirmed understanding. Patient does not have any  further questions. Patient plans to start Gleevec on 4/12. WCB in 1 weeks to see how patient is doing on therapy.  [facility]CB in 3 weeks to confirm readiness for refill.

## 2018-04-11 NOTE — PROGRESS NOTES
[facility]urg [facility]  General Surgery  Clinic Note      Subjective:     Chief Complaint: Post-operative follow up    History of Present Illness:  Patient is a 72 y.o. male with Hx of GIST s/p small bowel resection (2/15/2018) who presents to clinic today for post-operative follow up. ***    Current Outpatient Prescriptions on File Prior to Visit   Medication Sig    arginine 500 mg tablet Take 1,000 mg by mouth once daily.     aspirin (ECOTRIN) 81 MG EC tablet Take 81 mg by mouth.    atorvastatin (LIPITOR) 40 MG tablet Take 40 mg by mouth.    cephALEXin (KEFLEX) 500 MG capsule Take 1 capsule (500 mg total) by mouth 4 (four) times daily.    cholecalciferol, vitamin D3, (VITAMIN D3) 2,000 unit Cap Take 1 capsule by mouth once daily.    ferrous sulfate 325 mg (65 mg iron) Tab tablet Take 1 tablet by mouth.    fish oil-omega-3 fatty acids 300-1,000 mg capsule Take 1 g by mouth.    fluticasone (FLONASE) 50 mcg/actuation nasal spray 1 spray by Each Nare route 2 (two) times daily.    FOLIC ACID/MV,FE,MIN (CENTRUM ORAL) Take 1 tablet by mouth once daily at 6am.    imatinib (GLEEVEC) 400 MG Tab Take 1 tablet (400 mg total) by mouth once daily.    losartan (COZAAR) 100 MG tablet Take 1 tablet (100 mg total) by mouth every evening. Hold if sbp < 120    multivitamin (THERAGRAN) per tablet Take 1 tablet by mouth.    omeprazole (PRILOSEC) 20 MG capsule Take 20 mg by mouth 2 (two) times daily.     oxyCODONE-acetaminophen (PERCOCET) 5-325 mg per tablet Take 1 tablet by mouth every 4 (four) hours as needed for Pain.    senna-docusate 8.6-50 mg (PERICOLACE) 8.6-50 mg per tablet Take 1 tablet by mouth once daily.    SOD CHLOR,SOD BICARB/NETI POT (NEILMED NASAFLO NASL) 1 spray by Nasal route every evening.    SODIUM CHLORIDE (FE-128 OPHT) Place 1 drop into the right eye 4 (four) times daily.     tadalafil (CIALIS) 5 MG tablet Take 5 mg by mouth once daily at 6am.    testosterone cypionate (DEPOTESTOTERONE  "CYPIONATE) 200 mg/mL injection Inject 200 mg into the muscle every 21 days.    zinc gluconate 50 mg tablet Take 50 mg by mouth every Monday and Thursday.      No current facility-administered medications on file prior to visit.        Review of patient's allergies indicates:   Allergen Reactions    Augmentin [amoxicillin-pot clavulanate] Shortness Of Breath     disoriented    Singulair [montelukast] Other (See Comments)     Pt "felt strange"      Captopril     Horse/equine containing products Hives       Past Medical History:   Diagnosis Date    Anticoagulant long-term use     Arthritis     BPH (benign prostatic hyperplasia)     Coronary artery disease     stent x1 2005    Hypercholesteremia     Hypertension     Low iron     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 2/15/2018    Osteopenia      Past Surgical History:   Procedure Laterality Date    APPENDECTOMY      CATARACT EXTRACTION      COLONOSCOPY N/A 5/15/2017    Procedure: COLONOSCOPY;  Surgeon: Dez Jimenez MD;  Location: [facility];  Service: Endoscopy;  Laterality: N/A;    CORONARY STENT PLACEMENT      ESOPHAGOGASTRODUODENOSCOPY      EYE SURGERY      SKIN BIOPSY      TONSILLECTOMY      VASECTOMY       Family History     Problem Relation (Age of Onset)    Breast cancer Mother    Cancer Mother, Maternal Grandmother    Diabetes Mother    Heart attack Paternal Grandfather    Stroke Mother, Father        Social History Main Topics    Smoking status: Former Smoker     Packs/day: 0.50     Years: 15.00     Types: Cigarettes     Quit date: 1974    Smokeless tobacco: Never Used      Comment: Quit smoking about 40 yrs ago    Alcohol use 0.6 oz/week     1 Glasses of wine per week      Comment: 2-4 glasses wine/day, 5/11/17 last drink    Drug use: No    Sexual activity: Yes     Partners: Female     Review of Systems    Objective:     Vital Signs (Most Recent):           There is no height or weight on file to " calculate BMI.    Physical Exam    Pathology:      Assessment/Plan:   72 y.o. male s/p ***    -   - Return to clinic ***    Devora Gross MD  General Surgery Resident, PGY-1  [phone]  4/11/2018 10:28 AM

## 2018-04-13 NOTE — TELEPHONE ENCOUNTER
FOR DOCUMENTATION ONLY:  Financial Assistance for Imatinib is approved from 5-1-18 to 6-30-18  Source [employer]

## 2018-04-17 ENCOUNTER — OFFICE VISIT (OUTPATIENT)
Dept: CARDIOLOGY | Facility: CLINIC | Age: 73
End: 2018-04-17
Payer: MEDICARE

## 2018-04-17 VITALS
BODY MASS INDEX: 23.7 KG/M2 | SYSTOLIC BLOOD PRESSURE: 115 MMHG | HEART RATE: 71 BPM | WEIGHT: 169.31 LBS | DIASTOLIC BLOOD PRESSURE: 58 MMHG | HEIGHT: 71 IN

## 2018-04-17 DIAGNOSIS — E78.00 HYPERCHOLESTEREMIA: Primary | ICD-10-CM

## 2018-04-17 DIAGNOSIS — I10 ESSENTIAL HYPERTENSION: ICD-10-CM

## 2018-04-17 DIAGNOSIS — I25.10 CORONARY ARTERY DISEASE INVOLVING NATIVE CORONARY ARTERY OF NATIVE HEART WITHOUT ANGINA PECTORIS: ICD-10-CM

## 2018-04-17 PROCEDURE — 99213 OFFICE O/P EST LOW 20 MIN: CPT | Mod: PBBFAC,PO | Performed by: INTERNAL MEDICINE

## 2018-04-17 PROCEDURE — 99214 OFFICE O/P EST MOD 30 MIN: CPT | Mod: S$PBB,,, | Performed by: INTERNAL MEDICINE

## 2018-04-17 PROCEDURE — 99999 PR PBB SHADOW E&M-EST. PATIENT-LVL III: CPT | Mod: PBBFAC,,, | Performed by: INTERNAL MEDICINE

## 2018-04-17 NOTE — PROGRESS NOTES
Subjective:    Patient ID:  Forrest Schmidt is a 72 y.o. male who presents for evaluation of Follow-up (follow up)      HPI 71 yo WM who had coronary stent in 2004 , HTN and HLD. Recently in the hospital with lower intestine tumor and anemia which he had resected. No cardiac issues. Denies chest pain, SOB, or edema    Review of Systems   Constitution: Positive for malaise/fatigue. Negative for decreased appetite, fever, weakness, weight gain and weight loss.   HENT: Negative for hearing loss and nosebleeds.    Eyes: Negative for visual disturbance.   Cardiovascular: Negative for chest pain, claudication, cyanosis, dyspnea on exertion, irregular heartbeat, leg swelling, near-syncope, orthopnea, palpitations, paroxysmal nocturnal dyspnea and syncope.   Respiratory: Negative for cough, hemoptysis, shortness of breath, sleep disturbances due to breathing, snoring and wheezing.    Endocrine: Negative for cold intolerance, heat intolerance, polydipsia and polyuria.   Hematologic/Lymphatic: Negative for adenopathy and bleeding problem. Does not bruise/bleed easily.   Skin: Negative for color change, itching, poor wound healing, rash and suspicious lesions.   Musculoskeletal: Negative for arthritis, back pain, falls, joint pain, joint swelling, muscle cramps, muscle weakness and myalgias.   Gastrointestinal: Negative for bloating, abdominal pain, change in bowel habit, constipation, flatus, heartburn, hematemesis, hematochezia, hemorrhoids, jaundice, melena, nausea and vomiting.   Genitourinary: Negative for bladder incontinence, decreased libido, frequency, hematuria, hesitancy and urgency.   Neurological: Negative for brief paralysis, difficulty with concentration, excessive daytime sleepiness, dizziness, focal weakness, headaches, light-headedness, loss of balance, numbness and vertigo.   Psychiatric/Behavioral: Negative for altered mental status, depression and memory loss. The patient does not have insomnia and is not  "nervous/anxious.    Allergic/Immunologic: Negative for environmental allergies, hives and persistent infections.        Objective:    Physical Exam   Constitutional: He is oriented to person, place, and time. He appears well-developed and well-nourished. No distress.   BP (!) 115/58   Pulse 71   Ht 5' 11" (1.803 m)   Wt 76.8 kg (169 lb 5 oz)   BMI 23.61 kg/m²      HENT:   Head: Normocephalic and atraumatic.   Eyes: Conjunctivae and lids are normal. Pupils are equal, round, and reactive to light. Right eye exhibits no discharge. No scleral icterus.   Neck: Normal range of motion. Neck supple. No JVD present. No tracheal deviation present. No thyromegaly present.   Cardiovascular: Normal rate, regular rhythm, S1 normal, S2 normal, normal heart sounds and intact distal pulses.  Exam reveals no gallop and no friction rub.    No murmur heard.  Pulses:       Carotid pulses are 2+ on the right side, and 2+ on the left side.       Radial pulses are 2+ on the right side, and 2+ on the left side.        Femoral pulses are 2+ on the right side, and 2+ on the left side.       Popliteal pulses are 2+ on the right side, and 2+ on the left side.        Dorsalis pedis pulses are 2+ on the right side, and 2+ on the left side.        Posterior tibial pulses are 2+ on the right side, and 2+ on the left side.   Pulmonary/Chest: Effort normal and breath sounds normal. No respiratory distress. He has no wheezes. He has no rales. He exhibits no tenderness.   Abdominal: Soft. Bowel sounds are normal. He exhibits no distension and no mass. There is no hepatosplenomegaly or hepatomegaly. There is no tenderness. There is no rebound and no guarding.   Musculoskeletal: Normal range of motion. He exhibits no edema or tenderness.   Lymphadenopathy:     He has no cervical adenopathy.   Neurological: He is alert and oriented to person, place, and time. He has normal reflexes. No cranial nerve deficit. Coordination normal.   Skin: Skin is warm " and dry. No rash noted. He is not diaphoretic. No erythema. No pallor.   Psychiatric: He has a normal mood and affect. His speech is normal and behavior is normal. Judgment and thought content normal. Cognition and memory are normal.         Assessment:       1. Hypercholesteremia    2. Coronary artery disease involving native coronary artery of native heart without angina pectoris    3. Essential hypertension         Plan:     Cardiac status stable.     The current medical regimen is effective;  continue present plan and medications.    No orders of the defined types were placed in this encounter.    Follow-up in about 6 months (around 10/17/2018).

## 2018-04-19 ENCOUNTER — TELEPHONE (OUTPATIENT)
Dept: PHARMACY | Facility: CLINIC | Age: 73
End: 2018-04-19

## 2018-04-19 NOTE — TELEPHONE ENCOUNTER
Follow up: gleevec  Called patient for follow up on new medication, Gleevec. Patient confirmed that he started the medication on 4/11 and has been tolerating the medication very well. He confirmed that he takes the medication in the morning after breakfast and pours the pill into the cap so as to not directly touch the pills. He reports no side effects at this time. He has been a little tired, but nothing serious and has experiences this fatigue since after his procedure. Medication list reviewed; no new allergies or health conditions. Patient says he has had some issues with his sinuses due to the weather and has been taking Zyrtec in the evening and Mucinex during the day. Confirmed that there is no DDI expected with these medications and his Gleevec. Will continue to follow up with patient in a few weeks for his next refill.

## 2018-04-25 ENCOUNTER — LAB VISIT (OUTPATIENT)
Dept: LAB | Facility: HOSPITAL | Age: 73
End: 2018-04-25
Attending: INTERNAL MEDICINE
Payer: MEDICARE

## 2018-04-25 ENCOUNTER — OFFICE VISIT (OUTPATIENT)
Dept: HEMATOLOGY/ONCOLOGY | Facility: CLINIC | Age: 73
End: 2018-04-25
Payer: MEDICARE

## 2018-04-25 VITALS
SYSTOLIC BLOOD PRESSURE: 159 MMHG | OXYGEN SATURATION: 98 % | DIASTOLIC BLOOD PRESSURE: 75 MMHG | HEIGHT: 71 IN | WEIGHT: 176.56 LBS | TEMPERATURE: 98 F | HEART RATE: 66 BPM | BODY MASS INDEX: 24.72 KG/M2 | RESPIRATION RATE: 18 BRPM

## 2018-04-25 DIAGNOSIS — Z51.11 ENCOUNTER FOR CHEMOTHERAPY MANAGEMENT: ICD-10-CM

## 2018-04-25 DIAGNOSIS — C49.A3 GIST (GASTROINTESTINAL STROMAL TUMOR) OF SMALL BOWEL, MALIGNANT: Primary | ICD-10-CM

## 2018-04-25 DIAGNOSIS — C49.A3 GIST (GASTROINTESTINAL STROMAL TUMOR) OF SMALL BOWEL, MALIGNANT: ICD-10-CM

## 2018-04-25 DIAGNOSIS — D50.0 IRON DEFICIENCY ANEMIA DUE TO CHRONIC BLOOD LOSS: ICD-10-CM

## 2018-04-25 DIAGNOSIS — R94.6 ABNORMAL RESULTS OF THYROID FUNCTION STUDIES: ICD-10-CM

## 2018-04-25 LAB
ALBUMIN SERPL BCP-MCNC: 3.3 G/DL
ALP SERPL-CCNC: 58 U/L
ALT SERPL W/O P-5'-P-CCNC: 18 U/L
ANION GAP SERPL CALC-SCNC: 4 MMOL/L
AST SERPL-CCNC: 22 U/L
BILIRUB SERPL-MCNC: 0.2 MG/DL
BUN SERPL-MCNC: 15 MG/DL
CALCIUM SERPL-MCNC: 8.7 MG/DL
CHLORIDE SERPL-SCNC: 106 MMOL/L
CO2 SERPL-SCNC: 28 MMOL/L
CREAT SERPL-MCNC: 1 MG/DL
ERYTHROCYTE [DISTWIDTH] IN BLOOD BY AUTOMATED COUNT: 20.8 %
EST. GFR  (AFRICAN AMERICAN): >60 ML/MIN/1.73 M^2
EST. GFR  (NON AFRICAN AMERICAN): >60 ML/MIN/1.73 M^2
GLUCOSE SERPL-MCNC: 104 MG/DL
HCT VFR BLD AUTO: 32.5 %
HGB BLD-MCNC: 9.5 G/DL
IMM GRANULOCYTES # BLD AUTO: 0.01 K/UL
MCH RBC QN AUTO: 22.9 PG
MCHC RBC AUTO-ENTMCNC: 29.2 G/DL
MCV RBC AUTO: 79 FL
NEUTROPHILS # BLD AUTO: 2.9 K/UL
PLATELET # BLD AUTO: 274 K/UL
PMV BLD AUTO: 9 FL
POTASSIUM SERPL-SCNC: 4.7 MMOL/L
PROT SERPL-MCNC: 6.4 G/DL
RBC # BLD AUTO: 4.14 M/UL
SODIUM SERPL-SCNC: 138 MMOL/L
WBC # BLD AUTO: 4.64 K/UL

## 2018-04-25 PROCEDURE — 36415 COLL VENOUS BLD VENIPUNCTURE: CPT

## 2018-04-25 PROCEDURE — 99213 OFFICE O/P EST LOW 20 MIN: CPT | Mod: PBBFAC | Performed by: INTERNAL MEDICINE

## 2018-04-25 PROCEDURE — 99215 OFFICE O/P EST HI 40 MIN: CPT | Mod: S$PBB,,, | Performed by: INTERNAL MEDICINE

## 2018-04-25 PROCEDURE — 80053 COMPREHEN METABOLIC PANEL: CPT

## 2018-04-25 PROCEDURE — 85027 COMPLETE CBC AUTOMATED: CPT

## 2018-04-25 PROCEDURE — 99999 PR PBB SHADOW E&M-EST. PATIENT-LVL III: CPT | Mod: PBBFAC,,, | Performed by: INTERNAL MEDICINE

## 2018-04-25 NOTE — PROGRESS NOTES
PROGRESS NOTE    Subjective:      Patient ID: Forrest Schmidt is a 72 y.o. male.     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 Foundation One (tumor sample on 2/15/18) negative for KIT, PDGFRA, SDH, BRAF, NF1, NF2 mutations     TREATMENT HISTORY:  1. He initially presented with melena, syncope, hypotension on 18 at OSH. Colonoscopy showed old blood in the terminal ileum. CT abdomen/pelvis showed an ileal mass that measures approximately 5 cm. He was transferred to [facility] and underwent segmental small bowel resection by Dr. Sanchez on 2/15/18. Pathology showed a 6-cm GIST, histologic type: GIST, mixed; mitotic rate 9 mitoses/5 mm2, G2, high grade, high risk, margins negative. Cd117+, pathologic T3NxMx.  He had incisional wound infection after the surgery and took antibiotics for that.   2. Started Gleevec on 18         History of Present Illness:   Mr. Schmidt returns today for continued follow up for resected GIST. He started Gleevec 2 weeks ago and has tolerated it well. No side effects noticed. Occasional cough but thinks it is from seasonal allergy. Weight stable. Taking iron pills twice a day for CLINT. Foundation One result just came back yesterday afternoon. His tumor was negative for KIT, PDGFRA, SDH, BRAF, NF1, NF2 mutation     ECO     ROS:   See HPI, otherwise negative.      Physical Examination:   Vital signs reviewed.   Gen: well hydrated, well developed. In no acute distress  HEENT: normocephalic, PERRLA, EOMI, oropharynx clear  Neck: supple, no cervical LAD  Lungs: CTAB, no wheezing, rales  Heart: RRR, no M/R/G  Abd: midline surgical scar healing well, no signs of infection. Soft, no tenderness, non-distended, no hepatosplenomegaly, BS present  Ext: no clubbing, cyanosis,  "or edema  Neuro: alert and oriented x 4, in no acute distress  Psych: pleasant and appropriate mood and affect    Objective:     Laboratory Data:  Labs reviewed. Hb improved to 9.5.     Assessment/Plan:     1. 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 Iron deficiency anemia due to chronic blood loss    3. Encounter for chemotherapy management    4. Abnormal results of thyroid function studies       - Mr. Schmidt is a 74 yo man with stage IIIB GIST of the small intestine  (T3N0Mx), 6 cm, mitotic rate 9 mitoses/5 mm2, s/p resection on 2/15/18  - Has been on Gleevec for 2 weeks, tolerating it well  - Long discussions with Mr and Mrs Schmidt regarding Foundation One results. His tumor is negative for KIT, PDGFRA, SDH, BRAF, NF1, NF2 mutations. Gleevec is unlikely to work in this kind of "wild-type" GIST. We discussed the result of [facility] [place], which showed a lack of benefit of 3 years of Gleevec versus one year in patients without KIT exon 11 mutations. After discussion, Mr. Schmidt would like to do one year of Gleevec. Although he admits that there is low probability that Gleevec will work. He still wants to try given his well tolerance so far.   - will refer him to [facility] for a second opinion.   - surveillance CT scan due in the beginning of June  - RTC in 2 weeks     I spent 40 minutes with patient with at least 50% of the time on face-to-face counseling.       Electronically signed by Robert Pichardo for HPI/ROS submitted by the patient on 4/23/2018   appetite change : No  unexpected weight change: No  visual disturbance: No  cough: No  shortness of breath: No  chest pain: No  abdominal pain: No  diarrhea: No  frequency: No  back pain: No  rash: No  headaches: No  adenopathy: No  nervous/ anxious: No    "

## 2018-05-03 ENCOUNTER — TELEPHONE (OUTPATIENT)
Dept: PHARMACY | Facility: CLINIC | Age: 73
End: 2018-05-03

## 2018-05-03 ENCOUNTER — TELEPHONE (OUTPATIENT)
Dept: HEMATOLOGY/ONCOLOGY | Facility: CLINIC | Age: 73
End: 2018-05-03

## 2018-05-03 NOTE — TELEPHONE ENCOUNTER
Spoke with Devora in Medical Records Dept and she stated she received the Release of Medical Information Request for [facility] for pt and will take care of it.

## 2018-05-03 NOTE — TELEPHONE ENCOUNTER
Spoke with Devora Resendiz Release of  in Medical Records Dept.  stated she received the Release of Medical Records request  form for . Forrest Schmidt. Informed Ms. Resendiz that the records need to be mailed to Dr. Diann Guaman at [facility] in [place]. Ms. Resendiz voiced understanding and stated she will  fax over the request to the Release of  who takes care of the Clinics. I voiced understanding.

## 2018-05-04 ENCOUNTER — TELEPHONE (OUTPATIENT)
Dept: HEMATOLOGY/ONCOLOGY | Facility: CLINIC | Age: 73
End: 2018-05-04

## 2018-05-04 NOTE — TELEPHONE ENCOUNTER
On 5/4/2018 at 1020 am, spoke with Devora () in medical records and she confirmed that she received Medical Records Request for Mr. Forrest Schmidt.

## 2018-05-09 ENCOUNTER — OFFICE VISIT (OUTPATIENT)
Dept: HEMATOLOGY/ONCOLOGY | Facility: CLINIC | Age: 73
End: 2018-05-09
Payer: MEDICARE

## 2018-05-09 ENCOUNTER — TELEPHONE (OUTPATIENT)
Dept: HEMATOLOGY/ONCOLOGY | Facility: CLINIC | Age: 73
End: 2018-05-09

## 2018-05-09 ENCOUNTER — LAB VISIT (OUTPATIENT)
Dept: LAB | Facility: HOSPITAL | Age: 73
End: 2018-05-09
Attending: INTERNAL MEDICINE
Payer: MEDICARE

## 2018-05-09 VITALS
HEART RATE: 60 BPM | BODY MASS INDEX: 24.2 KG/M2 | SYSTOLIC BLOOD PRESSURE: 154 MMHG | TEMPERATURE: 99 F | DIASTOLIC BLOOD PRESSURE: 72 MMHG | WEIGHT: 173.5 LBS | OXYGEN SATURATION: 99 % | RESPIRATION RATE: 18 BRPM

## 2018-05-09 DIAGNOSIS — D50.9 IRON DEFICIENCY ANEMIA, UNSPECIFIED IRON DEFICIENCY ANEMIA TYPE: ICD-10-CM

## 2018-05-09 DIAGNOSIS — S31.109D OPEN WOUND OF ABDOMEN, SUBSEQUENT ENCOUNTER: ICD-10-CM

## 2018-05-09 DIAGNOSIS — C49.A3 GIST (GASTROINTESTINAL STROMAL TUMOR) OF SMALL BOWEL, MALIGNANT: Primary | ICD-10-CM

## 2018-05-09 DIAGNOSIS — R94.6 ABNORMAL RESULTS OF THYROID FUNCTION STUDIES: ICD-10-CM

## 2018-05-09 DIAGNOSIS — C49.A3 GIST (GASTROINTESTINAL STROMAL TUMOR) OF SMALL BOWEL, MALIGNANT: ICD-10-CM

## 2018-05-09 LAB
ALBUMIN SERPL BCP-MCNC: 3.7 G/DL
ALP SERPL-CCNC: 62 U/L
ALT SERPL W/O P-5'-P-CCNC: 19 U/L
ANION GAP SERPL CALC-SCNC: 5 MMOL/L
ANISOCYTOSIS BLD QL SMEAR: SLIGHT
AST SERPL-CCNC: 24 U/L
BASOPHILS # BLD AUTO: 0.05 K/UL
BASOPHILS NFR BLD: 0.9 %
BILIRUB SERPL-MCNC: 0.4 MG/DL
BUN SERPL-MCNC: 22 MG/DL
CALCIUM SERPL-MCNC: 8.7 MG/DL
CHLORIDE SERPL-SCNC: 104 MMOL/L
CO2 SERPL-SCNC: 27 MMOL/L
CREAT SERPL-MCNC: 1.2 MG/DL
DIFFERENTIAL METHOD: ABNORMAL
EOSINOPHIL # BLD AUTO: 0.2 K/UL
EOSINOPHIL NFR BLD: 4.3 %
ERYTHROCYTE [DISTWIDTH] IN BLOOD BY AUTOMATED COUNT: 23.3 %
EST. GFR  (AFRICAN AMERICAN): >60 ML/MIN/1.73 M^2
EST. GFR  (NON AFRICAN AMERICAN): 59.2 ML/MIN/1.73 M^2
FERRITIN SERPL-MCNC: 19 NG/ML
GLUCOSE SERPL-MCNC: 109 MG/DL
HCT VFR BLD AUTO: 33.7 %
HGB BLD-MCNC: 9.8 G/DL
HYPOCHROMIA BLD QL SMEAR: ABNORMAL
IMM GRANULOCYTES # BLD AUTO: 0.01 K/UL
IMM GRANULOCYTES NFR BLD AUTO: 0.2 %
IRON SERPL-MCNC: 99 UG/DL
LYMPHOCYTES # BLD AUTO: 0.7 K/UL
LYMPHOCYTES NFR BLD: 12.7 %
MCH RBC QN AUTO: 23.7 PG
MCHC RBC AUTO-ENTMCNC: 29.1 G/DL
MCV RBC AUTO: 81 FL
MONOCYTES # BLD AUTO: 0.7 K/UL
MONOCYTES NFR BLD: 11.9 %
NEUTROPHILS # BLD AUTO: 3.9 K/UL
NEUTROPHILS NFR BLD: 70 %
NRBC BLD-RTO: 0 /100 WBC
OVALOCYTES BLD QL SMEAR: ABNORMAL
PLATELET # BLD AUTO: 200 K/UL
PMV BLD AUTO: 9.2 FL
POIKILOCYTOSIS BLD QL SMEAR: SLIGHT
POLYCHROMASIA BLD QL SMEAR: ABNORMAL
POTASSIUM SERPL-SCNC: 4.6 MMOL/L
PROT SERPL-MCNC: 6.6 G/DL
RBC # BLD AUTO: 4.14 M/UL
SATURATED IRON: 21 %
SODIUM SERPL-SCNC: 136 MMOL/L
TOTAL IRON BINDING CAPACITY: 466 UG/DL
TRANSFERRIN SERPL-MCNC: 315 MG/DL
TSH SERPL DL<=0.005 MIU/L-ACNC: 1.62 UIU/ML
WBC # BLD AUTO: 5.61 K/UL

## 2018-05-09 PROCEDURE — 99213 OFFICE O/P EST LOW 20 MIN: CPT | Mod: PBBFAC | Performed by: INTERNAL MEDICINE

## 2018-05-09 PROCEDURE — 99999 PR PBB SHADOW E&M-EST. PATIENT-LVL III: CPT | Mod: PBBFAC,,, | Performed by: INTERNAL MEDICINE

## 2018-05-09 PROCEDURE — 84443 ASSAY THYROID STIM HORMONE: CPT

## 2018-05-09 PROCEDURE — 99214 OFFICE O/P EST MOD 30 MIN: CPT | Mod: S$PBB,,, | Performed by: INTERNAL MEDICINE

## 2018-05-09 PROCEDURE — 36415 COLL VENOUS BLD VENIPUNCTURE: CPT

## 2018-05-09 PROCEDURE — 85025 COMPLETE CBC W/AUTO DIFF WBC: CPT

## 2018-05-09 PROCEDURE — 83540 ASSAY OF IRON: CPT

## 2018-05-09 PROCEDURE — 82728 ASSAY OF FERRITIN: CPT

## 2018-05-09 PROCEDURE — 80053 COMPREHEN METABOLIC PANEL: CPT

## 2018-05-09 NOTE — TELEPHONE ENCOUNTER
Spoke with Leonardo (Lab Client ) at 1136 am and informed him that Dr. Umana would like to add on a iron/TIBC and ferritin. Leonardo voiced understanding and stated he'll call me back if he has any questions.

## 2018-05-09 NOTE — PROGRESS NOTES
PROGRESS NOTE    Subjective:      Patient ID: Forrest Schmidt is a 72 y.o. male.     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 Foundation One (tumor sample on 2/15/18) negative for KIT, PDGFRA, SDH, BRAF, NF1, NF2 mutations     TREATMENT HISTORY:  1. He initially presented with melena, syncope, hypotension on 18 at OSH. Colonoscopy showed old blood in the terminal ileum. CT abdomen/pelvis showed an ileal mass that measures approximately 5 cm. He was transferred to [facility] and underwent segmental small bowel resection by Dr. Sanchez on 2/15/18. Pathology showed a 6-cm GIST, histologic type: GIST, mixed; mitotic rate 9 mitoses/5 mm2, G2, high grade, high risk, margins negative. Cd117+, pathologic T3NxMx.  He had incisional wound infection after the surgery and took antibiotics for that.   2. Started Gleevec on 18. Foundation One results came back on 18 neg for KIT mutations. Long discussion with Mr and Mrs Schmidt on  regarding likely the lack of benefit from adjuvant Gleevec (please see note from that day for details). Mr. Schmidt would like to continue with Gleevec for a year for now. He is going to [facility] for a second opinion.         History of Present Illness:   Mr. Schmidt returns today for continued follow up for resected GIST. He would like to continue with Gleevec after we discussed it two weeks ago. He notices some dry cough, no other side effects. He has allergy and postnasal drip. Takes zyrtec and flonase. Denies other complains. Takes carbonyl iron twice daily. Energy improving. Wound is healing but slow.      ECO     ROS:   See HPI, otherwise negative.      Physical Examination:   Vital signs reviewed.   Gen: well hydrated, well developed. In no acute  distress  HEENT: normocephalic, PERRLA, EOMI, oropharynx clear  Neck: supple, no cervical LAD  Lungs: CTAB, no wheezing, rales  Heart: RRR, no M/R/G  Abd: midline surgical scar healing well, with residual 1 x 1 cm opening in the middle of the incision, no signs of infection. Soft, no tenderness, non-distended, no hepatosplenomegaly, BS present  Ext: no clubbing, cyanosis, or edema  Neuro: alert and oriented x 4, in no acute distress  Psych: pleasant and appropriate mood and affect      Objective:       Laboratory Data:  Labs from today reviewed. Hb 9.8. MCV 81.         Assessment/Plan:     1. 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 Iron deficiency anemia, unspecified iron deficiency anemia type    3. Open wound of abdomen, subsequent encounter      1.  - Mr. Schmidt is a 74 yo man with stage IIIB GIST of the small intestine  (T3N0Mx), 6 cm, mitotic rate 9 mitoses/5 mm2, s/p resection on 2/15/18  - Has been on Gleevec for 4 weeks, tolerating it well. We discussed the likely lack of benefit from Gleevec two weeks ago after Foundation One came back negative for KIT mutations. He would like to continue with it. They did discuss it after they went home regarding the lack of benefit, but still want to continue for now. He is going to see Dr. Guaman at [facility] for a second opinion. We are going to call our medical record office again today to make sure they have mailed out the medical records.     2.  - His Hb is improving very slowly. I will check ferritin, iron/TIBC again. If it is not coming up appropriately despite him taking oral iron, will use IV iron.     3.   - no signs of infection right now. They feel the wound is healing but somewhat slowly. They will call Dr. Sanchez's office to see if he can be seen when he returns to see me.     RTC in 2 weeks.     Electronically signed by Robert Umana        Distress Screening Results: Psychosocial Distress screening score of Distress Score: 0  noted and reviewed. No intervention indicated.       Answers for HPI/ROS submitted by the patient on 5/7/2018   appetite change : No  unexpected weight change: No  visual disturbance: No  cough: Yes  shortness of breath: No  chest pain: No  abdominal pain: No  diarrhea: No  frequency: Yes  back pain: No  rash: No  headaches: Yes  adenopathy: No  nervous/ anxious: No

## 2018-05-10 ENCOUNTER — TELEPHONE (OUTPATIENT)
Dept: SURGERY | Facility: CLINIC | Age: 73
End: 2018-05-10

## 2018-05-10 NOTE — TELEPHONE ENCOUNTER
----- Message from Radha Mccormick sent at 5/10/2018  9:09 AM CDT -----  Contact: self   Patient Requesting Sooner Appointment.     Reason:Forrest wants an appointment for date of may 23, 2018  Name of Caller:Forrest   When is the first available appointment?n/a  Symptoms:n/a   Communication Preference (MyChart response to Pt. (or) Call Back # and timeframe):callback today/ Forrest @ [phone]  Additional Information:

## 2018-05-23 ENCOUNTER — OFFICE VISIT (OUTPATIENT)
Dept: HEMATOLOGY/ONCOLOGY | Facility: CLINIC | Age: 73
End: 2018-05-23
Payer: MEDICARE

## 2018-05-23 ENCOUNTER — LAB VISIT (OUTPATIENT)
Dept: LAB | Facility: HOSPITAL | Age: 73
End: 2018-05-23
Attending: INTERNAL MEDICINE
Payer: MEDICARE

## 2018-05-23 ENCOUNTER — OFFICE VISIT (OUTPATIENT)
Dept: SURGERY | Facility: CLINIC | Age: 73
End: 2018-05-23
Payer: MEDICARE

## 2018-05-23 VITALS
HEIGHT: 71 IN | SYSTOLIC BLOOD PRESSURE: 151 MMHG | TEMPERATURE: 98 F | WEIGHT: 177.5 LBS | HEART RATE: 60 BPM | BODY MASS INDEX: 24.85 KG/M2 | DIASTOLIC BLOOD PRESSURE: 68 MMHG

## 2018-05-23 VITALS
SYSTOLIC BLOOD PRESSURE: 151 MMHG | DIASTOLIC BLOOD PRESSURE: 68 MMHG | TEMPERATURE: 98 F | WEIGHT: 177.5 LBS | HEART RATE: 60 BPM | BODY MASS INDEX: 24.85 KG/M2 | HEIGHT: 71 IN

## 2018-05-23 DIAGNOSIS — C49.A3 GIST (GASTROINTESTINAL STROMAL TUMOR) OF SMALL BOWEL, MALIGNANT: ICD-10-CM

## 2018-05-23 DIAGNOSIS — R05.9 COUGH: ICD-10-CM

## 2018-05-23 DIAGNOSIS — R09.82 POSTNASAL DRIP: ICD-10-CM

## 2018-05-23 DIAGNOSIS — Z09 POSTOP CHECK: Primary | ICD-10-CM

## 2018-05-23 DIAGNOSIS — R94.6 ABNORMAL RESULTS OF THYROID FUNCTION STUDIES: ICD-10-CM

## 2018-05-23 DIAGNOSIS — R79.89 ELEVATED SERUM CREATININE: ICD-10-CM

## 2018-05-23 DIAGNOSIS — D50.9 IRON DEFICIENCY ANEMIA, UNSPECIFIED IRON DEFICIENCY ANEMIA TYPE: ICD-10-CM

## 2018-05-23 DIAGNOSIS — C49.A3 GIST (GASTROINTESTINAL STROMAL TUMOR) OF SMALL BOWEL, MALIGNANT: Primary | ICD-10-CM

## 2018-05-23 LAB
ALBUMIN SERPL BCP-MCNC: 3.7 G/DL
ALP SERPL-CCNC: 65 U/L
ALT SERPL W/O P-5'-P-CCNC: 20 U/L
ANION GAP SERPL CALC-SCNC: 7 MMOL/L
AST SERPL-CCNC: 29 U/L
BILIRUB SERPL-MCNC: 0.4 MG/DL
BUN SERPL-MCNC: 22 MG/DL
CALCIUM SERPL-MCNC: 8.8 MG/DL
CHLORIDE SERPL-SCNC: 105 MMOL/L
CO2 SERPL-SCNC: 26 MMOL/L
CREAT SERPL-MCNC: 1.3 MG/DL
ERYTHROCYTE [DISTWIDTH] IN BLOOD BY AUTOMATED COUNT: 25.7 %
EST. GFR  (AFRICAN AMERICAN): >60 ML/MIN/1.73 M^2
EST. GFR  (NON AFRICAN AMERICAN): 53.8 ML/MIN/1.73 M^2
GLUCOSE SERPL-MCNC: 109 MG/DL
HCT VFR BLD AUTO: 35.6 %
HGB BLD-MCNC: 10.8 G/DL
IMM GRANULOCYTES # BLD AUTO: 0.01 K/UL
MCH RBC QN AUTO: 24.6 PG
MCHC RBC AUTO-ENTMCNC: 30.3 G/DL
MCV RBC AUTO: 81 FL
NEUTROPHILS # BLD AUTO: 2.3 K/UL
PLATELET # BLD AUTO: 240 K/UL
PMV BLD AUTO: 9.5 FL
POTASSIUM SERPL-SCNC: 5.1 MMOL/L
PROT SERPL-MCNC: 6.8 G/DL
RBC # BLD AUTO: 4.39 M/UL
SODIUM SERPL-SCNC: 138 MMOL/L
WBC # BLD AUTO: 3.93 K/UL

## 2018-05-23 PROCEDURE — 85027 COMPLETE CBC AUTOMATED: CPT

## 2018-05-23 PROCEDURE — 99213 OFFICE O/P EST LOW 20 MIN: CPT | Mod: PBBFAC,27 | Performed by: INTERNAL MEDICINE

## 2018-05-23 PROCEDURE — 99213 OFFICE O/P EST LOW 20 MIN: CPT | Mod: PBBFAC | Performed by: SURGERY

## 2018-05-23 PROCEDURE — 99999 PR PBB SHADOW E&M-EST. PATIENT-LVL III: CPT | Mod: PBBFAC,,, | Performed by: SURGERY

## 2018-05-23 PROCEDURE — 80053 COMPREHEN METABOLIC PANEL: CPT

## 2018-05-23 PROCEDURE — 36415 COLL VENOUS BLD VENIPUNCTURE: CPT

## 2018-05-23 PROCEDURE — 99999 PR PBB SHADOW E&M-EST. PATIENT-LVL III: CPT | Mod: PBBFAC,,, | Performed by: INTERNAL MEDICINE

## 2018-05-23 PROCEDURE — 99024 POSTOP FOLLOW-UP VISIT: CPT | Mod: POP,,, | Performed by: SURGERY

## 2018-05-23 PROCEDURE — 99214 OFFICE O/P EST MOD 30 MIN: CPT | Mod: S$PBB,,, | Performed by: INTERNAL MEDICINE

## 2018-05-23 NOTE — PROGRESS NOTES
Subjective:      Patient ID: Forrest Schmidt is a 72 y.o. male.     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 Foundation One (tumor sample on 2/15/18) negative for KIT, PDGFRA, SDH, BRAF, NF1, NF2 mutations     TREATMENT HISTORY:  1. He initially presented with melena, syncope, hypotension on 18 at OSH. Colonoscopy showed old blood in the terminal ileum. CT abdomen/pelvis showed an ileal mass that measures approximately 5 cm. He was transferred to [facility] and underwent segmental small bowel resection by Dr. Sanchez on 2/15/18. Pathology showed a 6-cm GIST, histologic type: GIST, mixed; mitotic rate 9 mitoses/5 mm2, G2, high grade, high risk, margins negative. Cd117+, pathologic T3NxMx.  He had incisional wound infection after the surgery and took antibiotics for that.   2. Started Gleevec on 18. Foundation One results came back on 18 neg for KIT mutations. Long discussion with Mr and Mrs Schmidt on  regarding likely the lack of benefit from adjuvant Gleevec (please see note from that day for details). Mr. Schmidt would like to continue with Gleevec for a year for now. He is going to [facility] for a second opinion.         History of Present Illness:   Mr. Schmidt returns today for continued follow up for resected GIST. He is continuing with Gleevec. Notes mild fatigue, dry cough (also has postnasal drip). Takes iron pills twice a day. Doing well otherwise. Saw Dr. Sanchez this morning. Suture sinus at midpoint of midline incision. Suture removed.      ECO     ROS:   See HPI, otherwise negative.      Physical Examination:   Vital signs reviewed.   Gen: well hydrated, well developed. In no acute distress  HEENT: normocephalic, PERRLA, EOMI, oropharynx clear  Neck: supple, no cervical LAD  Lungs: CTAB, no wheezing, rales  Heart: RRR, no  M/R/G  Abd: midline surgical scar healing well, with residual 1 x 1 cm opening in the middle of the incision, no signs of infection. Soft, no tenderness, non-distended, no hepatosplenomegaly, BS present  Ext: no clubbing, cyanosis, or edema  Neuro: alert and oriented x 4, in no acute distress  Psych: pleasant and appropriate mood and affect        Objective:         Laboratory Data:  Labs from today reviewed. Hb improved to 10.8.           Assessment/Plan:      1. 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 Iron deficiency anemia, unspecified iron deficiency anemia type    3. Elevated serum creatinine    4. Cough    5. Abnormal results of thyroid function studies     6. Postnasal drip        1.  - Mr. Schmidt is a 72 yo man with stage IIIB GIST of the small intestine  (T3N0Mx), 6 cm, mitotic rate 9 mitoses/5 mm2, s/p resection on 2/15/18  - Has been on Gleevec for 6 weeks, tolerating it well. We previously had extensive discussion regarding the likely lack of benefit from Gleevec after Foundation One came back negative for KIT mutations. He would like to continue with it for now.  He is going to see Dr. Guaman at [facility] next Wednesday. He is due for a restaging CT at the end of May/beginning of June. Asked him that if Dr. Guaman does a CT scan there, he should get the disc so that we have it in our system as well.      2.  - His Hb has improved almost by a gram. C/w oral iron.     3.   - likely from Gleevec. Encourage oral hydration. Monitor    4.  - likely from Gleevec. Lung clear. No s/o infection    6.  - Flonase bid       RTC in 4 weeks with CBC, CMP     Answers for HPI/ROS submitted by the patient on 5/21/2018   appetite change : No  unexpected weight change: No  visual disturbance: No  cough: Yes  shortness of breath: No  chest pain: No  abdominal pain: Yes  diarrhea: No  frequency: Yes  back pain: No  rash: No  headaches: Yes  adenopathy: No  nervous/ anxious: Yes

## 2018-05-28 ENCOUNTER — TELEPHONE (OUTPATIENT)
Dept: PHARMACY | Facility: CLINIC | Age: 73
End: 2018-05-28

## 2018-06-27 ENCOUNTER — LAB VISIT (OUTPATIENT)
Dept: LAB | Facility: HOSPITAL | Age: 73
End: 2018-06-27
Attending: INTERNAL MEDICINE
Payer: MEDICARE

## 2018-06-27 ENCOUNTER — OFFICE VISIT (OUTPATIENT)
Dept: HEMATOLOGY/ONCOLOGY | Facility: CLINIC | Age: 73
End: 2018-06-27
Payer: MEDICARE

## 2018-06-27 ENCOUNTER — TELEPHONE (OUTPATIENT)
Dept: HEMATOLOGY/ONCOLOGY | Facility: CLINIC | Age: 73
End: 2018-06-27

## 2018-06-27 VITALS
SYSTOLIC BLOOD PRESSURE: 133 MMHG | DIASTOLIC BLOOD PRESSURE: 72 MMHG | WEIGHT: 180.31 LBS | RESPIRATION RATE: 19 BRPM | HEART RATE: 57 BPM | OXYGEN SATURATION: 97 % | TEMPERATURE: 98 F | BODY MASS INDEX: 25.15 KG/M2

## 2018-06-27 DIAGNOSIS — C49.A3 GIST (GASTROINTESTINAL STROMAL TUMOR) OF SMALL BOWEL, MALIGNANT: Primary | ICD-10-CM

## 2018-06-27 DIAGNOSIS — D50.8 OTHER IRON DEFICIENCY ANEMIA: ICD-10-CM

## 2018-06-27 DIAGNOSIS — R94.6 ABNORMAL RESULTS OF THYROID FUNCTION STUDIES: ICD-10-CM

## 2018-06-27 DIAGNOSIS — C49.A3 GIST (GASTROINTESTINAL STROMAL TUMOR) OF SMALL BOWEL, MALIGNANT: ICD-10-CM

## 2018-06-27 LAB
ALBUMIN SERPL BCP-MCNC: 3.6 G/DL
ALP SERPL-CCNC: 64 U/L
ALT SERPL W/O P-5'-P-CCNC: 19 U/L
ANION GAP SERPL CALC-SCNC: 6 MMOL/L
AST SERPL-CCNC: 21 U/L
BILIRUB SERPL-MCNC: 0.3 MG/DL
BUN SERPL-MCNC: 16 MG/DL
CALCIUM SERPL-MCNC: 9 MG/DL
CHLORIDE SERPL-SCNC: 106 MMOL/L
CO2 SERPL-SCNC: 29 MMOL/L
CREAT SERPL-MCNC: 1 MG/DL
ERYTHROCYTE [DISTWIDTH] IN BLOOD BY AUTOMATED COUNT: 26.4 %
EST. GFR  (AFRICAN AMERICAN): >60 ML/MIN/1.73 M^2
EST. GFR  (NON AFRICAN AMERICAN): >60 ML/MIN/1.73 M^2
GLUCOSE SERPL-MCNC: 119 MG/DL
HCT VFR BLD AUTO: 38.2 %
HGB BLD-MCNC: 12.1 G/DL
IMM GRANULOCYTES # BLD AUTO: 0.02 K/UL
MCH RBC QN AUTO: 26.7 PG
MCHC RBC AUTO-ENTMCNC: 31.7 G/DL
MCV RBC AUTO: 84 FL
NEUTROPHILS # BLD AUTO: 2.8 K/UL
PLATELET # BLD AUTO: 210 K/UL
PMV BLD AUTO: 9.5 FL
POTASSIUM SERPL-SCNC: 4.6 MMOL/L
PROT SERPL-MCNC: 6.7 G/DL
RBC # BLD AUTO: 4.54 M/UL
SODIUM SERPL-SCNC: 141 MMOL/L
TSH SERPL DL<=0.005 MIU/L-ACNC: 1.4 UIU/ML
WBC # BLD AUTO: 4.37 K/UL

## 2018-06-27 PROCEDURE — 99999 PR PBB SHADOW E&M-EST. PATIENT-LVL III: CPT | Mod: PBBFAC,,, | Performed by: INTERNAL MEDICINE

## 2018-06-27 PROCEDURE — 99214 OFFICE O/P EST MOD 30 MIN: CPT | Mod: S$PBB,,, | Performed by: INTERNAL MEDICINE

## 2018-06-27 PROCEDURE — 80053 COMPREHEN METABOLIC PANEL: CPT

## 2018-06-27 PROCEDURE — 99213 OFFICE O/P EST LOW 20 MIN: CPT | Mod: PBBFAC | Performed by: INTERNAL MEDICINE

## 2018-06-27 PROCEDURE — 85027 COMPLETE CBC AUTOMATED: CPT

## 2018-06-27 PROCEDURE — 84443 ASSAY THYROID STIM HORMONE: CPT

## 2018-06-27 PROCEDURE — 36415 COLL VENOUS BLD VENIPUNCTURE: CPT

## 2018-06-27 RX ORDER — ASPIRIN 81 MG/1
81 TABLET ORAL
COMMUNITY
End: 2018-08-22 | Stop reason: SDUPTHER

## 2018-06-27 RX ORDER — TESTOSTERONE CYPIONATE 200 MG/ML
200 INJECTION, SOLUTION INTRAMUSCULAR
COMMUNITY
End: 2018-08-22 | Stop reason: SDUPTHER

## 2018-06-27 RX ORDER — SODIUM CHLORIDE 50 MG/ML
1 SOLUTION/ DROPS OPHTHALMIC
COMMUNITY
End: 2018-08-22 | Stop reason: SDUPTHER

## 2018-06-27 RX ORDER — LOSARTAN POTASSIUM 100 MG/1
1 TABLET ORAL
COMMUNITY
Start: 2018-05-15 | End: 2018-08-22 | Stop reason: SDUPTHER

## 2018-06-27 RX ORDER — ZINC GLUCONATE 50 MG
50 TABLET ORAL
COMMUNITY
End: 2018-08-22 | Stop reason: SDUPTHER

## 2018-06-27 RX ORDER — VITAMIN K2 40 MCG
1000 TABLET ORAL
COMMUNITY
End: 2018-08-22 | Stop reason: SDUPTHER

## 2018-06-27 RX ORDER — OMEPRAZOLE 20 MG/1
20 CAPSULE, DELAYED RELEASE ORAL
COMMUNITY
End: 2018-08-22 | Stop reason: SDUPTHER

## 2018-06-27 RX ORDER — ASCORBIC ACID 125 MG
2000 TABLET,CHEWABLE ORAL
COMMUNITY
End: 2018-08-22 | Stop reason: SDUPTHER

## 2018-06-27 RX ORDER — FERROUS SULFATE 325(65) MG
1 TABLET ORAL
COMMUNITY
End: 2019-02-13 | Stop reason: SDUPTHER

## 2018-06-27 RX ORDER — FLUTICASONE PROPIONATE 50 MCG
1 SPRAY, SUSPENSION (ML) NASAL
COMMUNITY
Start: 2015-09-23 | End: 2018-08-22 | Stop reason: SDUPTHER

## 2018-06-27 RX ORDER — TADALAFIL 5 MG/1
1 TABLET ORAL
COMMUNITY
Start: 2018-05-26 | End: 2018-08-22 | Stop reason: SDUPTHER

## 2018-06-27 RX ORDER — ATORVASTATIN CALCIUM 40 MG/1
1 TABLET, FILM COATED ORAL
COMMUNITY
Start: 2018-04-24 | End: 2018-08-22 | Stop reason: SDUPTHER

## 2018-06-27 RX ORDER — AMOXICILLIN 500 MG
1 CAPSULE ORAL
COMMUNITY
End: 2018-08-22 | Stop reason: SDUPTHER

## 2018-06-27 NOTE — TELEPHONE ENCOUNTER
Gave pt's 1 disc from [facility] to [employer] (Pt Access Rep) in Radiology on 6/27/18 at 420 pm to upload into the system.

## 2018-06-27 NOTE — PROGRESS NOTES
Subjective:      Patient ID: Forrest Schmidt is a 72 y.o. male.     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 Foundation One (tumor sample on 2/15/18) negative for KIT, PDGFRA, SDH, BRAF, NF1, NF2 mutations     TREATMENT HISTORY:  1. He initially presented with melena, syncope, hypotension on 18 at OSH. Colonoscopy showed old blood in the terminal ileum. CT abdomen/pelvis showed an ileal mass that measures approximately 5 cm. He was transferred to [facility] and underwent segmental small bowel resection by Dr. Sanchez on 2/15/18. Pathology showed a 6-cm GIST, histologic type: GIST, mixed; mitotic rate 9 mitoses/5 mm2, G2, high grade, high risk, margins negative. Cd117+, pathologic T3NxMx.  He had incisional wound infection after the surgery and took antibiotics for that.   2. Started Gleevec on 18. Foundation One results came back on 18 neg for KIT mutations. Long discussion with Mr and Mrs Schmidt on  regarding likely the lack of benefit from adjuvant Gleevec (please see note from that day for details). Mr. Schmidt would like to continue with Gleevec for now. He stopped Gleevec after he went to see Dr. Guaman at [facility]. CT abdomen/pelvis on 18 was negative for recurrent disease.         History of Present Illness:   Mr. Schmidt returns today for continued follow up for resected GIST. He has stopped Gleevec after he went to see Dr. Guaman at [facility]. CT abdomen/pelvis on 18 was negative for recurrent disease. He feels good now. Did get burn on his legs after he went to the beach.        ECO     ROS:   See HPI, otherwise negative.      Physical Examination:   Vital signs reviewed.   Gen: well hydrated, well developed. In no acute distress  HEENT: normocephalic, PERRLA, EOMI, oropharynx clear  Neck: supple, no cervical  LAD  Lungs: CTAB, no wheezing, rales  Heart: RRR, no M/R/G  Abd: midline surgical scar healed. Soft, no tenderness, non-distended, no hepatosplenomegaly, BS present  Ext: no clubbing, cyanosis, or edema  Neuro: alert and oriented x 4, in no acute distress  Psych: pleasant and appropriate mood and affect  Skin: erythema over bilateral shins c/w sunburn. No s/o infections.        Objective:         Laboratory Data:  Labs from today reviewed. Hb improved. 12.1           Assessment/Plan:      1. 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 Other iron deficiency anemia           1.  - Mr. Schmidt is a 72 yo man with stage IIIB GIST of the small intestine  (T3N0Mx), 6 cm, mitotic rate 9 mitoses/5 mm2, s/p resection on 2/15/18  - He has wild type GIST. Therefore no adjuvant chemotherapy was recommended.  - Doing well. Last CT abdomen/pelvis at [facility] on 5/30/18 was negative for recurrent disease.   - RTC at the end of August with repeat CT abdomen/pelvis.      2.  - His Hb has improved. C/w oral iron      Distress Screening Results: Psychosocial Distress screening score of Distress Score: 1 noted and reviewed. No intervention indicated.  Answers for HPI/ROS submitted by the patient on 6/26/2018   appetite change : No  unexpected weight change: No  visual disturbance: No  cough: Yes  shortness of breath: No  chest pain: No  abdominal pain: No  diarrhea: No  frequency: Yes  back pain: No  rash: No  headaches: Yes  adenopathy: No  nervous/ anxious: No

## 2018-08-08 ENCOUNTER — TELEPHONE (OUTPATIENT)
Dept: HEMATOLOGY/ONCOLOGY | Facility: CLINIC | Age: 73
End: 2018-08-08

## 2018-08-08 ENCOUNTER — PATIENT MESSAGE (OUTPATIENT)
Dept: HEMATOLOGY/ONCOLOGY | Facility: CLINIC | Age: 73
End: 2018-08-08

## 2018-08-08 NOTE — TELEPHONE ENCOUNTER
HELGAM informing pt that I will ask the  to move his CT scan appt scheduled for 8/17/18 at [facility] to the [facility] in [place]. I also stated pt can call Dr. Umana's office at [phone] if he has any further questions or concerns.

## 2018-08-09 ENCOUNTER — PATIENT MESSAGE (OUTPATIENT)
Dept: HEMATOLOGY/ONCOLOGY | Facility: CLINIC | Age: 73
End: 2018-08-09

## 2018-08-17 ENCOUNTER — HOSPITAL ENCOUNTER (OUTPATIENT)
Dept: RADIOLOGY | Facility: HOSPITAL | Age: 73
Discharge: HOME | End: 2018-08-17
Attending: INTERNAL MEDICINE
Payer: MEDICARE

## 2018-08-17 DIAGNOSIS — C49.A3 GIST (GASTROINTESTINAL STROMAL TUMOR) OF SMALL BOWEL, MALIGNANT: ICD-10-CM

## 2018-08-17 LAB
CREAT SERPL-MCNC: 0.9 MG/DL (ref 0.5–1.4)
SAMPLE: NORMAL

## 2018-08-17 PROCEDURE — 74177 CT ABD & PELVIS W/CONTRAST: CPT | Mod: 26,,, | Performed by: RADIOLOGY

## 2018-08-17 PROCEDURE — 25500020 PHARM REV CODE 255: Performed by: INTERNAL MEDICINE

## 2018-08-17 PROCEDURE — 74177 CT ABD & PELVIS W/CONTRAST: CPT | Mod: TC

## 2018-08-17 RX ADMIN — IOHEXOL 15 ML: 350 INJECTION, SOLUTION INTRAVENOUS at 10:08

## 2018-08-17 RX ADMIN — IOHEXOL 100 ML: 350 INJECTION, SOLUTION INTRAVENOUS at 11:08

## 2018-08-21 ENCOUNTER — TELEPHONE (OUTPATIENT)
Dept: SURGERY | Facility: CLINIC | Age: 73
End: 2018-08-21

## 2018-08-21 NOTE — TELEPHONE ENCOUNTER
----- Message from Trisha Velásquez sent at 8/21/2018  3:06 PM CDT -----  Contact: self  Patient need to speak with nurse.   Pt states may have a  hernia need appointment for tomorrow.   Please call pat at [phone]

## 2018-08-22 ENCOUNTER — LAB VISIT (OUTPATIENT)
Dept: LAB | Facility: HOSPITAL | Age: 73
End: 2018-08-22
Attending: INTERNAL MEDICINE
Payer: MEDICARE

## 2018-08-22 ENCOUNTER — OFFICE VISIT (OUTPATIENT)
Dept: HEMATOLOGY/ONCOLOGY | Facility: CLINIC | Age: 73
End: 2018-08-22
Payer: MEDICARE

## 2018-08-22 VITALS
RESPIRATION RATE: 16 BRPM | BODY MASS INDEX: 25 KG/M2 | DIASTOLIC BLOOD PRESSURE: 71 MMHG | HEIGHT: 71 IN | WEIGHT: 178.56 LBS | HEART RATE: 58 BPM | OXYGEN SATURATION: 96 % | SYSTOLIC BLOOD PRESSURE: 152 MMHG | TEMPERATURE: 99 F

## 2018-08-22 DIAGNOSIS — K43.9 ABDOMINAL WALL HERNIA: ICD-10-CM

## 2018-08-22 DIAGNOSIS — C49.A3 GIST (GASTROINTESTINAL STROMAL TUMOR) OF SMALL BOWEL, MALIGNANT: Primary | ICD-10-CM

## 2018-08-22 DIAGNOSIS — D50.9 IRON DEFICIENCY ANEMIA, UNSPECIFIED IRON DEFICIENCY ANEMIA TYPE: ICD-10-CM

## 2018-08-22 DIAGNOSIS — C49.A3 GIST (GASTROINTESTINAL STROMAL TUMOR) OF SMALL BOWEL, MALIGNANT: ICD-10-CM

## 2018-08-22 DIAGNOSIS — I25.10 CORONARY ARTERY DISEASE INVOLVING NATIVE CORONARY ARTERY OF NATIVE HEART WITHOUT ANGINA PECTORIS: ICD-10-CM

## 2018-08-22 DIAGNOSIS — I10 ESSENTIAL HYPERTENSION: ICD-10-CM

## 2018-08-22 LAB
ALBUMIN SERPL BCP-MCNC: 3.8 G/DL
ALP SERPL-CCNC: 63 U/L
ALT SERPL W/O P-5'-P-CCNC: 26 U/L
ANION GAP SERPL CALC-SCNC: 5 MMOL/L
AST SERPL-CCNC: 24 U/L
BILIRUB SERPL-MCNC: 0.4 MG/DL
BUN SERPL-MCNC: 23 MG/DL
CALCIUM SERPL-MCNC: 9 MG/DL
CHLORIDE SERPL-SCNC: 104 MMOL/L
CO2 SERPL-SCNC: 29 MMOL/L
CREAT SERPL-MCNC: 1.1 MG/DL
ERYTHROCYTE [DISTWIDTH] IN BLOOD BY AUTOMATED COUNT: 15.3 %
EST. GFR  (AFRICAN AMERICAN): >60 ML/MIN/1.73 M^2
EST. GFR  (NON AFRICAN AMERICAN): >60 ML/MIN/1.73 M^2
GLUCOSE SERPL-MCNC: 99 MG/DL
HCT VFR BLD AUTO: 42.2 %
HGB BLD-MCNC: 14.1 G/DL
IMM GRANULOCYTES # BLD AUTO: 0.01 K/UL
MCH RBC QN AUTO: 30.2 PG
MCHC RBC AUTO-ENTMCNC: 33.4 G/DL
MCV RBC AUTO: 90 FL
NEUTROPHILS # BLD AUTO: 2.9 K/UL
PLATELET # BLD AUTO: 171 K/UL
PMV BLD AUTO: 9.6 FL
POTASSIUM SERPL-SCNC: 4.6 MMOL/L
PROT SERPL-MCNC: 6.8 G/DL
RBC # BLD AUTO: 4.67 M/UL
SODIUM SERPL-SCNC: 138 MMOL/L
WBC # BLD AUTO: 4.68 K/UL

## 2018-08-22 PROCEDURE — 99214 OFFICE O/P EST MOD 30 MIN: CPT | Mod: S$PBB,,, | Performed by: INTERNAL MEDICINE

## 2018-08-22 PROCEDURE — 80053 COMPREHEN METABOLIC PANEL: CPT

## 2018-08-22 PROCEDURE — 36415 COLL VENOUS BLD VENIPUNCTURE: CPT

## 2018-08-22 PROCEDURE — 99999 PR PBB SHADOW E&M-EST. PATIENT-LVL V: CPT | Mod: PBBFAC,,, | Performed by: INTERNAL MEDICINE

## 2018-08-22 PROCEDURE — 85027 COMPLETE CBC AUTOMATED: CPT

## 2018-08-22 PROCEDURE — 99215 OFFICE O/P EST HI 40 MIN: CPT | Mod: PBBFAC | Performed by: INTERNAL MEDICINE

## 2018-08-22 RX ORDER — IMATINIB MESYLATE 400 MG/1
1 TABLET, FILM COATED ORAL
COMMUNITY
Start: 2018-05-07 | End: 2019-02-13 | Stop reason: SDUPTHER

## 2018-08-22 RX ORDER — PANTOPRAZOLE SODIUM 40 MG/1
40 TABLET, DELAYED RELEASE ORAL
COMMUNITY
Start: 2018-02-12 | End: 2019-02-13 | Stop reason: SDUPTHER

## 2018-08-22 RX ORDER — NITROGLYCERIN 0.4 MG/1
TABLET SUBLINGUAL
Refills: 5 | COMMUNITY
Start: 2018-08-07 | End: 2019-05-28 | Stop reason: SDUPTHER

## 2018-08-22 RX ORDER — TOBRAMYCIN AND DEXAMETHASONE 3; 1 MG/ML; MG/ML
SUSPENSION/ DROPS OPHTHALMIC
Refills: 0 | COMMUNITY
Start: 2018-07-30 | End: 2019-02-13 | Stop reason: SDUPTHER

## 2018-08-22 RX ORDER — AZELASTINE 1 MG/ML
1 SPRAY, METERED NASAL
COMMUNITY
Start: 2018-01-07 | End: 2019-02-13 | Stop reason: SDUPTHER

## 2018-08-22 NOTE — PROGRESS NOTES
Subjective:      Patient ID: Forrest Schmidt is a 72 y.o. male.     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 Foundation One (tumor sample on 2/15/18) negative for KIT, PDGFRA, SDH, BRAF, NF1, NF2 mutations     TREATMENT HISTORY:  1. He initially presented with melena, syncope, hypotension on 18 at OSH. Colonoscopy showed old blood in the terminal ileum. CT abdomen/pelvis showed an ileal mass that measures approximately 5 cm. He was transferred to [facility] and underwent segmental small bowel resection by Dr. Sanchez on 2/15/18. Pathology showed a 6-cm GIST, histologic type: GIST, mixed; mitotic rate 9 mitoses/5 mm2, G2, high grade, high risk, margins negative. Cd117+, pathologic T3NxMx.  He had incisional wound infection after the surgery and took antibiotics for that.   2. Started Gleevec on 18. Foundation One results came back on 18 neg for KIT mutations. Long discussion with Mr and Mrs Schmidt on  regarding likely the lack of benefit from adjuvant Gleevec (please see note from that day for details). Mr. Schmidt would like to continue with Gleevec for now. He stopped Gleevec after he went to see Dr. Guaman at [facility]. CT abdomen/pelvis on 18 was negative for recurrent disease.         History of Present Illness:   Mr. Schmidt returns today for continued follow up for resected GIST. Continues to feel well. Going to the gym 2-3 times a week. Taking 2 iron pills a day.         ECO     ROS:   See HPI, otherwise negative.      Physical Examination:   Vital signs reviewed.   Gen: well hydrated, well developed. In no acute distress  HEENT: normocephalic, PERRLA, EOMI, oropharynx clear  Neck: supple, no cervical LAD  Lungs: CTAB, no wheezing, rales  Heart: RRR, no M/R/G  Abd: midline surgical scar healed. +hernia in mid abdomen.  Soft, no tenderness, non-distended, no hepatosplenomegaly, BS present  Ext: no clubbing, cyanosis, or edema  Neuro: alert and oriented x 4, in no acute distress  Psych: pleasant and appropriate mood and affect  Skin: erythema over bilateral shins c/w sunburn. No s/o infections.        Objective:        Laboratory Data:  Labs from today reviewed. Hb improved. Normal now.         Assessment/Plan:      1. 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 Iron deficiency anemia, unspecified iron deficiency anemia type    3. Essential hypertension    4. Coronary artery disease involving native coronary artery of native heart without angina pectoris    5. Abdominal wall hernia        1.  - Mr. Schmidt is a 72 yo man with stage IIIB GIST of the small intestine  (T3N0Mx), 6 cm, mitotic rate 9 mitoses/5 mm2, s/p resection on 2/15/18  - He has wild type GIST. Therefore no adjuvant chemotherapy was recommended.  - Doing well. Last CT abdomen/pelvis on 8/17/18 showed no e/o recurrent disease  - RTC in 3 months with surveillance CT scan    2.  - Hb normal  - decrease iron to one pill a day    3.  - suboptimal. F/u with PCP    4.  - stable. C/w current meds    5.  - no signs/symptoms of incarceration  - seeing Dr. Sanchez next Monday    Answers for HPI/ROS submitted by the patient on 8/16/2018   appetite change : No  unexpected weight change: No  visual disturbance: No  cough: No  shortness of breath: No  chest pain: No  abdominal pain: No  diarrhea: No  frequency: Yes  back pain: No  rash: No  headaches: Yes  adenopathy: No  nervous/ anxious: No

## 2018-08-27 ENCOUNTER — OFFICE VISIT (OUTPATIENT)
Dept: SURGERY | Facility: CLINIC | Age: 73
End: 2018-08-27
Payer: MEDICARE

## 2018-08-27 VITALS
HEIGHT: 71 IN | BODY MASS INDEX: 25.37 KG/M2 | DIASTOLIC BLOOD PRESSURE: 75 MMHG | SYSTOLIC BLOOD PRESSURE: 154 MMHG | TEMPERATURE: 99 F | WEIGHT: 181.19 LBS | HEART RATE: 65 BPM

## 2018-08-27 DIAGNOSIS — K43.2 INCISIONAL HERNIA, WITHOUT OBSTRUCTION OR GANGRENE: ICD-10-CM

## 2018-08-27 PROCEDURE — 99213 OFFICE O/P EST LOW 20 MIN: CPT | Mod: S$PBB,,, | Performed by: SURGERY

## 2018-08-27 PROCEDURE — 99999 PR PBB SHADOW E&M-EST. PATIENT-LVL III: CPT | Mod: PBBFAC,,, | Performed by: SURGERY

## 2018-08-27 PROCEDURE — 99213 OFFICE O/P EST LOW 20 MIN: CPT | Mod: PBBFAC | Performed by: SURGERY

## 2018-08-27 NOTE — PROGRESS NOTES
Overall, doing quite well, but he has noted a bulge in the area of his incision. He wears a Velcro binder and has not limited his activities, except that he avoids abdominal crunches during his workouts.   Gleevec was stopped several months ago when genetic testing showed that his GIST was a 'quadruple negative'. He continues to be followed by Dr Umana.  Recent CT scan showed GENIE but also showed a large hernia. CT personally reviewed.     Exam: large, broad-based incisional hernia, easily reducible    Imp/Rec:  Given his minimal symptom level and physical findings, I don't see any need for urgent repair. If he finds that the hernia causes him to limit his physical activities. He will let me know and we will schedule elective repair. He will probably want laparoscopic repair and I will put him in touch with Dr Merlos or Colby.   Sxs and Sxs of strangulation discussed.

## 2018-10-04 ENCOUNTER — PATIENT MESSAGE (OUTPATIENT)
Dept: HEMATOLOGY/ONCOLOGY | Facility: CLINIC | Age: 73
End: 2018-10-04

## 2018-10-05 ENCOUNTER — PATIENT MESSAGE (OUTPATIENT)
Dept: HEMATOLOGY/ONCOLOGY | Facility: CLINIC | Age: 73
End: 2018-10-05

## 2018-10-16 ENCOUNTER — OFFICE VISIT (OUTPATIENT)
Dept: CARDIOLOGY | Facility: CLINIC | Age: 73
End: 2018-10-16
Payer: MEDICARE

## 2018-10-16 VITALS
HEIGHT: 71 IN | DIASTOLIC BLOOD PRESSURE: 73 MMHG | SYSTOLIC BLOOD PRESSURE: 128 MMHG | WEIGHT: 183 LBS | BODY MASS INDEX: 25.62 KG/M2 | HEART RATE: 69 BPM

## 2018-10-16 DIAGNOSIS — E78.00 HYPERCHOLESTEREMIA: ICD-10-CM

## 2018-10-16 DIAGNOSIS — I10 ESSENTIAL HYPERTENSION: ICD-10-CM

## 2018-10-16 DIAGNOSIS — I25.10 CORONARY ARTERY DISEASE INVOLVING NATIVE CORONARY ARTERY OF NATIVE HEART WITHOUT ANGINA PECTORIS: Primary | ICD-10-CM

## 2018-10-16 PROCEDURE — 99213 OFFICE O/P EST LOW 20 MIN: CPT | Mod: PBBFAC,PO | Performed by: INTERNAL MEDICINE

## 2018-10-16 PROCEDURE — 99999 PR PBB SHADOW E&M-EST. PATIENT-LVL III: CPT | Mod: PBBFAC,,, | Performed by: INTERNAL MEDICINE

## 2018-10-16 PROCEDURE — 99214 OFFICE O/P EST MOD 30 MIN: CPT | Mod: S$PBB,,, | Performed by: INTERNAL MEDICINE

## 2018-10-16 NOTE — PROGRESS NOTES
Subjective:    Patient ID:  Forrest Schmidt is a 73 y.o. male who presents for evaluation of Follow-up (follow up)      HPI 74 yo WM who had coronary stent in 2004 , HTN and HLD. Denies chest pain, SOB, or edema .Denies palpitations, weak spells, and syncope. Reviewed recent lab and all WNL.    Review of Systems   Constitution: Negative for decreased appetite, fever, weakness, malaise/fatigue, weight gain and weight loss.   HENT: Negative for hearing loss and nosebleeds.    Eyes: Negative for visual disturbance.   Cardiovascular: Negative for chest pain, claudication, cyanosis, dyspnea on exertion, irregular heartbeat, leg swelling, near-syncope, orthopnea, palpitations, paroxysmal nocturnal dyspnea and syncope.   Respiratory: Negative for cough, hemoptysis, shortness of breath, sleep disturbances due to breathing, snoring and wheezing.    Endocrine: Negative for cold intolerance, heat intolerance, polydipsia and polyuria.   Hematologic/Lymphatic: Negative for adenopathy and bleeding problem. Does not bruise/bleed easily.   Skin: Negative for color change, itching, poor wound healing, rash and suspicious lesions.   Musculoskeletal: Negative for arthritis, back pain, falls, joint pain, joint swelling, muscle cramps, muscle weakness and myalgias.   Gastrointestinal: Negative for bloating, abdominal pain, change in bowel habit, constipation, flatus, heartburn, hematemesis, hematochezia, hemorrhoids, jaundice, melena, nausea and vomiting.   Genitourinary: Negative for bladder incontinence, decreased libido, frequency, hematuria, hesitancy and urgency.   Neurological: Negative for brief paralysis, difficulty with concentration, excessive daytime sleepiness, dizziness, focal weakness, headaches, light-headedness, loss of balance, numbness and vertigo.   Psychiatric/Behavioral: Negative for altered mental status, depression and memory loss. The patient does not have insomnia and is not nervous/anxious.    Allergic/Immunologic:  "Negative for environmental allergies, hives and persistent infections.        Objective:    Physical Exam   Constitutional: He is oriented to person, place, and time. He appears well-developed and well-nourished. No distress.   /73   Pulse 69   Ht 5' 11" (1.803 m)   Wt 83 kg (183 lb)   BMI 25.52 kg/m²      HENT:   Head: Normocephalic and atraumatic.   Eyes: Conjunctivae and lids are normal. Pupils are equal, round, and reactive to light. Right eye exhibits no discharge. No scleral icterus.   Neck: Normal range of motion. Neck supple. No JVD present. No tracheal deviation present. No thyromegaly present.   Cardiovascular: Normal rate, regular rhythm, S1 normal, S2 normal, normal heart sounds and intact distal pulses. Exam reveals no gallop and no friction rub.   No murmur heard.  Pulses:       Carotid pulses are 2+ on the right side, and 2+ on the left side.       Radial pulses are 2+ on the right side, and 2+ on the left side.        Femoral pulses are 2+ on the right side, and 2+ on the left side.       Popliteal pulses are 2+ on the right side, and 2+ on the left side.        Dorsalis pedis pulses are 2+ on the right side, and 2+ on the left side.        Posterior tibial pulses are 2+ on the right side, and 2+ on the left side.   Pulmonary/Chest: Effort normal and breath sounds normal. No respiratory distress. He has no wheezes. He has no rales. He exhibits no tenderness.   Abdominal: Soft. Bowel sounds are normal. He exhibits no distension and no mass. There is no hepatosplenomegaly or hepatomegaly. There is no tenderness. There is no rebound and no guarding.   Musculoskeletal: Normal range of motion. He exhibits no edema or tenderness.   Lymphadenopathy:     He has no cervical adenopathy.   Neurological: He is alert and oriented to person, place, and time. He has normal reflexes. No cranial nerve deficit. Coordination normal.   Skin: Skin is warm and dry. No rash noted. He is not diaphoretic. No " erythema. No pallor.   Psychiatric: He has a normal mood and affect. His speech is normal and behavior is normal. Judgment and thought content normal. Cognition and memory are normal.         Assessment:       1. Coronary artery disease involving native coronary artery of native heart without angina pectoris    2. Hypercholesteremia    3. Essential hypertension         Plan:    Cardiac status stable   The current medical regimen is effective;  continue present plan and medications.     Patient advised to modify risk factors such as weight, exercise, diet,  tobacco and alcohol exposure    No orders of the defined types were placed in this encounter.    Follow-up in about 6 months (around 4/16/2019).

## 2018-11-12 ENCOUNTER — HOSPITAL ENCOUNTER (OUTPATIENT)
Dept: RADIOLOGY | Facility: HOSPITAL | Age: 73
Discharge: HOME | End: 2018-11-12
Attending: INTERNAL MEDICINE
Payer: MEDICARE

## 2018-11-12 DIAGNOSIS — C49.A3 GIST (GASTROINTESTINAL STROMAL TUMOR) OF SMALL BOWEL, MALIGNANT: ICD-10-CM

## 2018-11-12 PROCEDURE — 25500020 PHARM REV CODE 255: Mod: PO | Performed by: INTERNAL MEDICINE

## 2018-11-12 PROCEDURE — 74177 CT ABD & PELVIS W/CONTRAST: CPT | Mod: 26,,, | Performed by: RADIOLOGY

## 2018-11-12 PROCEDURE — 74177 CT ABD & PELVIS W/CONTRAST: CPT | Mod: TC,PO

## 2018-11-12 RX ADMIN — IOHEXOL 75 ML: 350 INJECTION, SOLUTION INTRAVENOUS at 10:11

## 2018-11-12 RX ADMIN — IOHEXOL 30 ML: 350 INJECTION, SOLUTION INTRAVENOUS at 10:11

## 2018-11-14 ENCOUNTER — OFFICE VISIT (OUTPATIENT)
Dept: HEMATOLOGY/ONCOLOGY | Facility: CLINIC | Age: 73
End: 2018-11-14
Payer: MEDICARE

## 2018-11-14 VITALS
DIASTOLIC BLOOD PRESSURE: 75 MMHG | HEIGHT: 71 IN | BODY MASS INDEX: 26.15 KG/M2 | SYSTOLIC BLOOD PRESSURE: 151 MMHG | WEIGHT: 186.75 LBS | TEMPERATURE: 98 F | HEART RATE: 62 BPM | OXYGEN SATURATION: 96 % | RESPIRATION RATE: 16 BRPM

## 2018-11-14 DIAGNOSIS — D50.0 IRON DEFICIENCY ANEMIA DUE TO CHRONIC BLOOD LOSS: ICD-10-CM

## 2018-11-14 DIAGNOSIS — C49.A3 GIST (GASTROINTESTINAL STROMAL TUMOR) OF SMALL BOWEL, MALIGNANT: Primary | ICD-10-CM

## 2018-11-14 PROCEDURE — 99214 OFFICE O/P EST MOD 30 MIN: CPT | Mod: PBBFAC | Performed by: INTERNAL MEDICINE

## 2018-11-14 PROCEDURE — 99214 OFFICE O/P EST MOD 30 MIN: CPT | Mod: S$PBB,,, | Performed by: INTERNAL MEDICINE

## 2018-11-14 PROCEDURE — 99999 PR PBB SHADOW E&M-EST. PATIENT-LVL IV: CPT | Mod: PBBFAC,,, | Performed by: INTERNAL MEDICINE

## 2018-11-14 RX ORDER — SCOPALAMINE 1 MG/3D
PATCH, EXTENDED RELEASE TRANSDERMAL
Refills: 3 | COMMUNITY
Start: 2018-10-23 | End: 2019-06-13

## 2018-11-14 RX ORDER — TOBRAMYCIN 3 MG/ML
SOLUTION/ DROPS OPHTHALMIC
Refills: 0 | COMMUNITY
Start: 2018-10-07 | End: 2019-04-16

## 2018-11-14 NOTE — PROGRESS NOTES
Subjective:      Patient ID: Forrest Schmidt is a 72 y.o. male.     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 Foundation One (tumor sample on 2/15/18) negative for KIT, PDGFRA, SDH, BRAF, NF1, NF2 mutations     TREATMENT HISTORY:  1. He initially presented with melena, syncope, hypotension on 18 at OSH. Colonoscopy showed old blood in the terminal ileum. CT abdomen/pelvis showed an ileal mass that measures approximately 5 cm. He was transferred to [facility] and underwent segmental small bowel resection by Dr. Sanchez on 2/15/18. Pathology showed a 6-cm GIST, histologic type: GIST, mixed; mitotic rate 9 mitoses/5 mm2, G2, high grade, high risk, margins negative. Cd117+, pathologic T3NxMx.  He had incisional wound infection after the surgery and took antibiotics for that.   2. Started Gleevec on 18. Foundation One results came back on 18 neg for KIT mutations. Long discussion with Mr and Mrs Schmidt on  regarding likely the lack of benefit from adjuvant Gleevec (please see note from that day for details). Mr. Schmidt would like to continue with Gleevec for now. He stopped Gleevec after he went to see Dr. Guaman at [facility]. CT abdomen/pelvis on 18 was negative for recurrent disease.         History of Present Illness:   Mr. Schmidt returns today for continued follow up for resected GIST. Continues to feel well. Taking iron pills once a day.         ECO     ROS:   See HPI, otherwise negative.      Physical Examination:   Vital signs reviewed.   Gen: well hydrated, well developed. In no acute distress  HEENT: normocephalic, PERRLA, EOMI, oropharynx clear  Neck: supple, no cervical LAD  Lungs: CTAB, no wheezing, rales  Heart: RRR, no M/R/G  Abd: midline surgical scar healed. +hernia in mid abdomen incision line. Soft, no tenderness,  non-distended, no hepatosplenomegaly, BS present  Ext: no clubbing, cyanosis, or edema  Neuro: alert and oriented x 4, in no acute distress  Psych: pleasant and appropriate mood and affect  Skin: no ulcer or open wound        Objective:      Laboratory Data:  Labs from today reviewed. Hb normal        Assessment/Plan:      1. 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 Iron deficiency anemia due to chronic blood loss        1.  - Mr. Schmidt is a 74 yo man with stage IIIB GIST of the small intestine  (T3N0Mx), 6 cm, mitotic rate 9 mitoses/5 mm2, s/p resection on 2/15/18  - He has wild type GIST. Therefore no adjuvant chemotherapy was recommended.  - Doing well. Last CT abdomen/pelvis on 11/12/18 showed no e/o recurrent disease  - RTC in 3 months with surveillance CT scan     2.  - Hb normal  - can stop oral iron         Distress Screening Results: Psychosocial Distress screening score of Distress Score: 0 noted and reviewed. No intervention indicated.

## 2019-02-06 ENCOUNTER — PATIENT MESSAGE (OUTPATIENT)
Dept: HEMATOLOGY/ONCOLOGY | Facility: CLINIC | Age: 74
End: 2019-02-06

## 2019-02-06 ENCOUNTER — TELEPHONE (OUTPATIENT)
Dept: HEMATOLOGY/ONCOLOGY | Facility: CLINIC | Age: 74
End: 2019-02-06

## 2019-02-06 NOTE — TELEPHONE ENCOUNTER
Spoke with patient in reference to having a CT of his lungs on 2/11/2019 . Informed patient that Dr. Umaan is out of the office and will be returning on 2/11/2019 , will discuss with Dr. Umana upon her return .

## 2019-02-11 ENCOUNTER — HOSPITAL ENCOUNTER (OUTPATIENT)
Dept: RADIOLOGY | Facility: HOSPITAL | Age: 74
Discharge: HOME | End: 2019-02-11
Attending: INTERNAL MEDICINE
Payer: MEDICARE

## 2019-02-11 DIAGNOSIS — C49.A3 GIST (GASTROINTESTINAL STROMAL TUMOR) OF SMALL BOWEL, MALIGNANT: ICD-10-CM

## 2019-02-11 DIAGNOSIS — C49.A3 GIST (GASTROINTESTINAL STROMAL TUMOR) OF SMALL BOWEL, MALIGNANT: Primary | ICD-10-CM

## 2019-02-11 PROCEDURE — 74177 CT CHEST ABDOMEN PELVIS WITH CONTRAST (XPD): ICD-10-PCS | Mod: 26,,, | Performed by: RADIOLOGY

## 2019-02-11 PROCEDURE — 25500020 PHARM REV CODE 255: Mod: PO | Performed by: INTERNAL MEDICINE

## 2019-02-11 PROCEDURE — 74177 CT ABD & PELVIS W/CONTRAST: CPT | Mod: 26,,, | Performed by: RADIOLOGY

## 2019-02-11 PROCEDURE — 71260 CT CHEST ABDOMEN PELVIS WITH CONTRAST (XPD): ICD-10-PCS | Mod: 26,,, | Performed by: RADIOLOGY

## 2019-02-11 PROCEDURE — 71260 CT THORAX DX C+: CPT | Mod: 26,,, | Performed by: RADIOLOGY

## 2019-02-11 PROCEDURE — 74177 CT ABD & PELVIS W/CONTRAST: CPT | Mod: TC,PO

## 2019-02-11 RX ADMIN — IOHEXOL 75 ML: 350 INJECTION, SOLUTION INTRAVENOUS at 10:02

## 2019-02-11 RX ADMIN — IOHEXOL 30 ML: 350 INJECTION, SOLUTION INTRAVENOUS at 10:02

## 2019-02-13 ENCOUNTER — OFFICE VISIT (OUTPATIENT)
Dept: HEMATOLOGY/ONCOLOGY | Facility: CLINIC | Age: 74
End: 2019-02-13
Payer: MEDICARE

## 2019-02-13 VITALS
RESPIRATION RATE: 18 BRPM | OXYGEN SATURATION: 97 % | HEART RATE: 58 BPM | SYSTOLIC BLOOD PRESSURE: 155 MMHG | TEMPERATURE: 98 F | HEIGHT: 71 IN | BODY MASS INDEX: 26.39 KG/M2 | DIASTOLIC BLOOD PRESSURE: 89 MMHG | WEIGHT: 188.5 LBS

## 2019-02-13 DIAGNOSIS — I10 ESSENTIAL HYPERTENSION: ICD-10-CM

## 2019-02-13 DIAGNOSIS — R91.8 LUNG NODULES: ICD-10-CM

## 2019-02-13 DIAGNOSIS — C49.A3 GIST (GASTROINTESTINAL STROMAL TUMOR) OF SMALL BOWEL, MALIGNANT: Primary | ICD-10-CM

## 2019-02-13 DIAGNOSIS — D50.9 IRON DEFICIENCY ANEMIA, UNSPECIFIED IRON DEFICIENCY ANEMIA TYPE: ICD-10-CM

## 2019-02-13 PROCEDURE — 99214 OFFICE O/P EST MOD 30 MIN: CPT | Mod: S$PBB,,, | Performed by: INTERNAL MEDICINE

## 2019-02-13 PROCEDURE — 99214 OFFICE O/P EST MOD 30 MIN: CPT | Mod: PBBFAC | Performed by: INTERNAL MEDICINE

## 2019-02-13 PROCEDURE — 99999 PR PBB SHADOW E&M-EST. PATIENT-LVL IV: CPT | Mod: PBBFAC,,, | Performed by: INTERNAL MEDICINE

## 2019-02-13 PROCEDURE — 99214 PR OFFICE/OUTPT VISIT, EST, LEVL IV, 30-39 MIN: ICD-10-PCS | Mod: S$PBB,,, | Performed by: INTERNAL MEDICINE

## 2019-02-13 PROCEDURE — 99999 PR PBB SHADOW E&M-EST. PATIENT-LVL IV: ICD-10-PCS | Mod: PBBFAC,,, | Performed by: INTERNAL MEDICINE

## 2019-02-13 RX ORDER — LEVOCETIRIZINE DIHYDROCHLORIDE 5 MG/1
5 TABLET, FILM COATED ORAL
COMMUNITY
Start: 2018-12-20 | End: 2019-04-16

## 2019-02-13 RX ORDER — MULTIVITAMIN
1 TABLET ORAL DAILY
COMMUNITY

## 2019-02-13 NOTE — PROGRESS NOTES
Subjective:      Patient ID: Forrest Schmidt is a 72 y.o. male.     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 Foundation One (tumor sample on 2/15/18) negative for KIT, PDGFRA, SDH, BRAF, NF1, NF2 mutations     TREATMENT HISTORY:  1. He initially presented with melena, syncope, hypotension on 18 at OSH. Colonoscopy showed old blood in the terminal ileum. CT abdomen/pelvis showed an ileal mass that measures approximately 5 cm. He was transferred to [facility] and underwent segmental small bowel resection by Dr. Sanchez on 2/15/18. Pathology showed a 6-cm GIST, histologic type: GIST, mixed; mitotic rate 9 mitoses/5 mm2, G2, high grade, high risk, margins negative. Cd117+, pathologic T3NxMx.  He had incisional wound infection after the surgery and took antibiotics for that.   2. Started Gleevec on 18. Foundation One results came back on 18 neg for KIT mutations. Long discussion with Mr and Mrs Schmidt on  regarding likely the lack of benefit from adjuvant Gleevec (please see note from that day for details). Mr. Schmidt would like to continue with Gleevec for now. He stopped Gleevec after he went to see Dr. Guaman at [facility]. CT abdomen/pelvis on 18 was negative for recurrent disease.   3. CT scan on 19 neg for recurrent disease.         History of Present Illness:   Mr. Schmidt returns today for continued follow up for resected GIST. Continues to feel well. Still has chronic cough from environmental allergy. Denies other complaints.         ECO     ROS:   See HPI, otherwise negative.      Physical Examination:   Vital signs reviewed.   Gen: well hydrated, well developed. In no acute distress  HEENT: normocephalic, PERRLA, EOMI, oropharynx clear  Neck: supple, no cervical LAD  Lungs: CTAB, no wheezing, rales  Heart: RRR, no  M/R/G  Abd:  +hernia in mid abdomen incision line. Soft, no tenderness, non-distended, no hepatosplenomegaly, BS present  Ext: no clubbing, cyanosis, or edema  Neuro: alert and oriented x 4, in no acute distress  Psych: pleasant and appropriate mood and affect  Skin: no ulcer or open wound        Objective:      Laboratory Data:  Labs reviewed. Unremarkable.     Imaging data:  CT C/A/P on 2/11/19:  Stable pulmonary nodules.  For a solid nodule <6 mm, Fleischner Society 2017 guidelines recommend no routine follow up for a low risk patient, or follow-up with non-contrast chest CT at 12 months in a high risk patient. For a ground glass nodule <6 mm, Fleischner Society 2017 guidelines recommend no routine follow up.    Status post partial small bowel resection.  No evidence for recurrent mass.  No evidence for abdominopelvic metastatic disease.    Unchanged nonspecific gastric fold thickening of the fundus and body, without distinct mass.    Rectus diastasis with multiple abdominal wall hernia formation.  No significant change.        Assessment/Plan:      1. 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 Lung nodules    3. Essential hypertension    4. Iron deficiency anemia, unspecified iron deficiency anemia type           1.  - Mr. Schmidt is a 72 yo man with stage IIIB GIST of the small intestine  (T3N0Mx), 6 cm, mitotic rate 9 mitoses/5 mm2, s/p resection on 2/15/18  - He has wild type GIST. Therefore no adjuvant chemotherapy was recommended.  - Doing well. Last CT abdomen/pelvis on 2/11/19 showed no e/o recurrent disease  - RTC in 3 months with surveillance CT scan     2.  - small nodules. Stable over the past year. Remote history of smoking  - surveillance CT chest in one year    3.  - BP slightly elevated today  - f/u with PCP    4.  - resolved. No need for oral iron now. Hb normal       Distress Screening Results: Psychosocial Distress screening score of Distress Score: 0 noted and reviewed.  No intervention indicated.

## 2019-04-13 ENCOUNTER — PATIENT MESSAGE (OUTPATIENT)
Dept: HEMATOLOGY/ONCOLOGY | Facility: CLINIC | Age: 74
End: 2019-04-13

## 2019-04-16 ENCOUNTER — OFFICE VISIT (OUTPATIENT)
Dept: CARDIOLOGY | Facility: CLINIC | Age: 74
End: 2019-04-16
Payer: MEDICARE

## 2019-04-16 VITALS
WEIGHT: 185 LBS | SYSTOLIC BLOOD PRESSURE: 144 MMHG | HEART RATE: 81 BPM | HEIGHT: 71 IN | DIASTOLIC BLOOD PRESSURE: 71 MMHG | BODY MASS INDEX: 25.9 KG/M2

## 2019-04-16 DIAGNOSIS — E78.00 HYPERCHOLESTEREMIA: ICD-10-CM

## 2019-04-16 DIAGNOSIS — I25.10 CORONARY ARTERY DISEASE INVOLVING NATIVE CORONARY ARTERY OF NATIVE HEART WITHOUT ANGINA PECTORIS: Primary | ICD-10-CM

## 2019-04-16 DIAGNOSIS — I10 ESSENTIAL HYPERTENSION: ICD-10-CM

## 2019-04-16 PROCEDURE — 99214 OFFICE O/P EST MOD 30 MIN: CPT | Mod: S$PBB,,, | Performed by: INTERNAL MEDICINE

## 2019-04-16 PROCEDURE — 99999 PR PBB SHADOW E&M-EST. PATIENT-LVL III: ICD-10-PCS | Mod: PBBFAC,,, | Performed by: INTERNAL MEDICINE

## 2019-04-16 PROCEDURE — 99214 PR OFFICE/OUTPT VISIT, EST, LEVL IV, 30-39 MIN: ICD-10-PCS | Mod: S$PBB,,, | Performed by: INTERNAL MEDICINE

## 2019-04-16 PROCEDURE — 99999 PR PBB SHADOW E&M-EST. PATIENT-LVL III: CPT | Mod: PBBFAC,,, | Performed by: INTERNAL MEDICINE

## 2019-04-16 PROCEDURE — 99213 OFFICE O/P EST LOW 20 MIN: CPT | Mod: PBBFAC,PO | Performed by: INTERNAL MEDICINE

## 2019-04-16 NOTE — PROGRESS NOTES
Subjective:    Patient ID:  Forrest Schmidt is a 73 y.o. male who presents for evaluation of Hypertension (follow up )      HPI 72 yo WM who had coronary stent in 2004 , HTN and HLD.States BP has been running a little high but states he feels light headed when BP lower. Denies chest pain, SOB, or edema    Review of Systems   Constitution: Negative for decreased appetite, fever, malaise/fatigue, weight gain and weight loss.   HENT: Negative for hearing loss and nosebleeds.    Eyes: Negative for visual disturbance.   Cardiovascular: Negative for chest pain, claudication, cyanosis, dyspnea on exertion, irregular heartbeat, leg swelling, near-syncope, orthopnea, palpitations, paroxysmal nocturnal dyspnea and syncope.   Respiratory: Negative for cough, hemoptysis, shortness of breath, sleep disturbances due to breathing, snoring and wheezing.    Endocrine: Negative for cold intolerance, heat intolerance, polydipsia and polyuria.   Hematologic/Lymphatic: Negative for adenopathy and bleeding problem. Does not bruise/bleed easily.   Skin: Negative for color change, itching, poor wound healing, rash and suspicious lesions.   Musculoskeletal: Negative for arthritis, back pain, falls, joint pain, joint swelling, muscle cramps, muscle weakness and myalgias.   Gastrointestinal: Negative for bloating, abdominal pain, change in bowel habit, constipation, flatus, heartburn, hematemesis, hematochezia, hemorrhoids, jaundice, melena, nausea and vomiting.   Genitourinary: Negative for bladder incontinence, decreased libido, frequency, hematuria, hesitancy and urgency.   Neurological: Negative for brief paralysis, difficulty with concentration, excessive daytime sleepiness, dizziness, focal weakness, headaches, light-headedness, loss of balance, numbness, vertigo and weakness.   Psychiatric/Behavioral: Negative for altered mental status, depression and memory loss. The patient does not have insomnia and is not nervous/anxious.   "  Allergic/Immunologic: Negative for environmental allergies, hives and persistent infections.        Objective:    Physical Exam   Constitutional: He is oriented to person, place, and time. He appears well-developed and well-nourished. No distress.   BP (!) 144/71   Pulse 81   Ht 5' 11" (1.803 m)   Wt 83.9 kg (185 lb)   BMI 25.80 kg/m²      HENT:   Head: Normocephalic and atraumatic.   Eyes: Pupils are equal, round, and reactive to light. Conjunctivae and lids are normal. Right eye exhibits no discharge. No scleral icterus.   Neck: Normal range of motion. Neck supple. No JVD present. No tracheal deviation present. No thyromegaly present.   Cardiovascular: Normal rate, regular rhythm, S1 normal, S2 normal, normal heart sounds and intact distal pulses. Exam reveals no gallop and no friction rub.   No murmur heard.  Pulses:       Carotid pulses are 2+ on the right side, and 2+ on the left side.       Radial pulses are 2+ on the right side, and 2+ on the left side.        Femoral pulses are 2+ on the right side, and 2+ on the left side.       Popliteal pulses are 2+ on the right side, and 2+ on the left side.        Dorsalis pedis pulses are 2+ on the right side, and 2+ on the left side.        Posterior tibial pulses are 2+ on the right side, and 2+ on the left side.   Pulmonary/Chest: Effort normal and breath sounds normal. No respiratory distress. He has no wheezes. He has no rales. He exhibits no tenderness.   Abdominal: Soft. Bowel sounds are normal. He exhibits no distension and no mass. There is no hepatosplenomegaly or hepatomegaly. There is no tenderness. There is no rebound and no guarding.   Musculoskeletal: Normal range of motion. He exhibits no edema or tenderness.   Lymphadenopathy:     He has no cervical adenopathy.   Neurological: He is alert and oriented to person, place, and time. He has normal reflexes. No cranial nerve deficit. Coordination normal.   Skin: Skin is warm and dry. No rash noted. He " is not diaphoretic. No erythema. No pallor.   Psychiatric: He has a normal mood and affect. His speech is normal and behavior is normal. Judgment and thought content normal. Cognition and memory are normal.         Assessment:       1. Coronary artery disease involving native coronary artery of native heart without angina pectoris    2. Essential hypertension    3. Hypercholesteremia         Plan:       BP elevated but patient does not want to add medication  Will watch diet and salt intake  Reviewed lab done at [place] and looks good  No orders of the defined types were placed in this encounter.    Follow up in about 6 months (around 10/16/2019).

## 2019-05-20 ENCOUNTER — HOSPITAL ENCOUNTER (OUTPATIENT)
Dept: RADIOLOGY | Facility: HOSPITAL | Age: 74
Discharge: HOME | End: 2019-05-20
Attending: INTERNAL MEDICINE
Payer: MEDICARE

## 2019-05-20 DIAGNOSIS — C49.A3 GIST (GASTROINTESTINAL STROMAL TUMOR) OF SMALL BOWEL, MALIGNANT: ICD-10-CM

## 2019-05-20 PROCEDURE — 74177 CT ABD & PELVIS W/CONTRAST: CPT | Mod: TC,PO

## 2019-05-20 PROCEDURE — 74177 CT ABDOMEN PELVIS WITH CONTRAST: ICD-10-PCS | Mod: 26,,, | Performed by: RADIOLOGY

## 2019-05-20 PROCEDURE — 25500020 PHARM REV CODE 255: Mod: PO | Performed by: INTERNAL MEDICINE

## 2019-05-20 PROCEDURE — 74177 CT ABD & PELVIS W/CONTRAST: CPT | Mod: 26,,, | Performed by: RADIOLOGY

## 2019-05-20 RX ADMIN — IOHEXOL 30 ML: 350 INJECTION, SOLUTION INTRAVENOUS at 10:05

## 2019-05-20 RX ADMIN — IOHEXOL 75 ML: 350 INJECTION, SOLUTION INTRAVENOUS at 10:05

## 2019-05-21 NOTE — PROGRESS NOTES
Subjective:      Patient ID: Forrest Schmidt is a 74 y.o. male.     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 He initially presented with melena, syncope, hypotension on 18 at OSH. Colonoscopy showed old blood in the terminal ileum. CT abdomen/pelvis showed an ileal mass that measures approximately 5 cm. He was transferred to [facility] and underwent segmental small bowel resection by Dr. Sanchez on 2/15/18. Pathology showed a 6-cm GIST, histologic type: GIST, mixed; mitotic rate 9 mitoses/5 mm2, G2, high grade, high risk, margins negative. Cd117+, pathologic T3NxMx.  He had incisional wound infection after the surgery and took antibiotics for that.   2. Started Gleevec on 18. Foundation One results came back on 18 neg for KIT mutations. Long discussion with Mr and Mrs Schmidt on  regarding likely the lack of benefit from adjuvant Gleevec (please see note from that day for details). Mr. Schmidt would like to continue with Gleevec for now. He stopped Gleevec after he went to see Dr. Guaman at [facility]. CT abdomen/pelvis on 18 was negative for recurrent disease.   3. CT scan on 19 neg for recurrent disease. CT scan on 19 showed a Nonspecific hepatic hypodensity at the dome of the liver near the inferior vena cava         History of Present Illness:   Mr. Schmidt returns today for continued follow up for resected GIST. He just returned from a trip to [place]. He wore scopolamine patch for motion sickness. After he removed the scopolamine patch, he had nausea and abdominal pain and went to ED. Workup was unrevealing. His symptoms resolved. Denies other complaints.         ECO     ROS:   See HPI,  otherwise negative.      Physical Examination:   Vital signs reviewed.   Gen: well hydrated, well developed. In no acute distress  HEENT: normocephalic, PERRLA, EOMI, oropharynx clear  Neck: supple, no cervical LAD  Lungs: CTAB, no wheezing, rales  Heart: RRR, no M/R/G  Abd:  +hernia in mid abdomen incision line. Soft, no tenderness, non-distended, no hepatosplenomegaly, BS present  Ext: no clubbing, cyanosis, or edema  Neuro: alert and oriented x 4, in no acute distress  Psych: pleasant and appropriate mood and affect  Skin: no ulcer or open wound        Objective:      Laboratory Data:  Labs reviewed. Unremarkable.      Imaging Data:  CT A/P 5/20/19:    1. Nonspecific hepatic hypodensity at the dome of the liver near the inferior vena cava posteriorly and laterally within the right lobe image 28 sequence 2 also seen on the sagittal.  MRI liver mass protocol evaluation is suggested given the patient's history to exclude a solid mass given the patient's history and the fact that is not convincingly displayed on the prior of 11/12/2018 or 08/17/2018 or 03/01/2018  2. Evidence of prior bowel surgery with anterior abdominal wall incisional hernia with diastasis of the rectus muscles.  3. Fat containing left inguinal hernia  4. Multiple stable renal hypodensities statistically favored relate to cysts  5. Atrophy of the pancreas is noted.        Assessment/Plan:      1. 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 Liver lesion    3. Coronary artery disease involving native coronary artery of native heart without angina pectoris    4. Essential hypertension           1.2  - Mr. Schmidt is a 73 yo man with stage IIIB GIST of the small intestine  (T3N0Mx), 6 cm, mitotic rate 9 mitoses/5 mm2, s/p resection on 2/15/18  - He has wild type GIST.  no adjuvant chemotherapy was recommended.  - CT scan showed a nonspecific hepatic hypodensity at the dome of the liver. Will get MRI liver.   - I will call  patient with the result. If suspicious for metastatic disease, will talk to IR to see if it is amenable to biopsy. If biopsy confirms metastatic GIST, will try liver ablation     2.  - small nodules. Stable over the past year. Remote history of smoking  - surveillance CT chest in one year     3.  - c/w aspirin and lipitor    4.  - BP mildly elevated  - monitor         Answers for HPI/ROS submitted by the patient on 5/18/2019   appetite change : No  unexpected weight change: No  visual disturbance: No  cough: No  shortness of breath: No  chest pain: No  abdominal pain: Yes  diarrhea: No  frequency: No  back pain: No  rash: No  headaches: Yes  adenopathy: No  nervous/ anxious: No

## 2019-05-22 ENCOUNTER — OFFICE VISIT (OUTPATIENT)
Dept: HEMATOLOGY/ONCOLOGY | Facility: CLINIC | Age: 74
End: 2019-05-22
Payer: MEDICARE

## 2019-05-22 VITALS
HEART RATE: 54 BPM | OXYGEN SATURATION: 97 % | RESPIRATION RATE: 20 BRPM | DIASTOLIC BLOOD PRESSURE: 78 MMHG | WEIGHT: 184.06 LBS | SYSTOLIC BLOOD PRESSURE: 150 MMHG | BODY MASS INDEX: 25.67 KG/M2 | TEMPERATURE: 98 F

## 2019-05-22 DIAGNOSIS — K76.9 LIVER LESION: ICD-10-CM

## 2019-05-22 DIAGNOSIS — C49.A3 GIST (GASTROINTESTINAL STROMAL TUMOR) OF SMALL BOWEL, MALIGNANT: Primary | ICD-10-CM

## 2019-05-22 DIAGNOSIS — I25.10 CORONARY ARTERY DISEASE INVOLVING NATIVE CORONARY ARTERY OF NATIVE HEART WITHOUT ANGINA PECTORIS: ICD-10-CM

## 2019-05-22 DIAGNOSIS — I10 ESSENTIAL HYPERTENSION: ICD-10-CM

## 2019-05-22 PROCEDURE — 99215 PR OFFICE/OUTPT VISIT, EST, LEVL V, 40-54 MIN: ICD-10-PCS | Mod: S$PBB,,, | Performed by: INTERNAL MEDICINE

## 2019-05-22 PROCEDURE — 99213 OFFICE O/P EST LOW 20 MIN: CPT | Mod: PBBFAC | Performed by: INTERNAL MEDICINE

## 2019-05-22 PROCEDURE — 99999 PR PBB SHADOW E&M-EST. PATIENT-LVL III: CPT | Mod: PBBFAC,,, | Performed by: INTERNAL MEDICINE

## 2019-05-22 PROCEDURE — 99999 PR PBB SHADOW E&M-EST. PATIENT-LVL III: ICD-10-PCS | Mod: PBBFAC,,, | Performed by: INTERNAL MEDICINE

## 2019-05-22 PROCEDURE — 99215 OFFICE O/P EST HI 40 MIN: CPT | Mod: S$PBB,,, | Performed by: INTERNAL MEDICINE

## 2019-05-22 NOTE — Clinical Note
Schedule for first available MRI liver at [facility] this week or next. RTC in 3 months, get CBC, CMP, CT A/P at [facility] 2 days prior

## 2019-05-24 ENCOUNTER — HOSPITAL ENCOUNTER (OUTPATIENT)
Dept: RADIOLOGY | Facility: HOSPITAL | Age: 74
Discharge: HOME | End: 2019-05-24
Attending: INTERNAL MEDICINE
Payer: MEDICARE

## 2019-05-24 DIAGNOSIS — K76.9 LIVER LESION: ICD-10-CM

## 2019-05-24 DIAGNOSIS — C49.A3 GIST (GASTROINTESTINAL STROMAL TUMOR) OF SMALL BOWEL, MALIGNANT: ICD-10-CM

## 2019-05-24 PROCEDURE — A9585 GADOBUTROL INJECTION: HCPCS | Performed by: INTERNAL MEDICINE

## 2019-05-24 PROCEDURE — 25500020 PHARM REV CODE 255: Performed by: INTERNAL MEDICINE

## 2019-05-24 PROCEDURE — 74183 MRI ABDOMEN W WO CONTRAST: ICD-10-PCS | Mod: 26,,, | Performed by: RADIOLOGY

## 2019-05-24 PROCEDURE — 74183 MRI ABD W/O CNTR FLWD CNTR: CPT | Mod: TC

## 2019-05-24 PROCEDURE — 74183 MRI ABD W/O CNTR FLWD CNTR: CPT | Mod: 26,,, | Performed by: RADIOLOGY

## 2019-05-24 RX ORDER — GADOBUTROL 604.72 MG/ML
10 INJECTION INTRAVENOUS
Status: COMPLETED | OUTPATIENT
Start: 2019-05-24 | End: 2019-05-24

## 2019-05-24 RX ADMIN — GADOBUTROL 10 ML: 604.72 INJECTION INTRAVENOUS at 04:05

## 2019-05-28 RX ORDER — NITROGLYCERIN 0.4 MG/1
TABLET SUBLINGUAL
Qty: 30 TABLET | Refills: 5 | Status: SHIPPED | OUTPATIENT
Start: 2019-05-28 | End: 2020-08-27 | Stop reason: SDUPTHER

## 2019-05-28 NOTE — TELEPHONE ENCOUNTER
Refill request has been sent in ----- Message from Ernestine Terry sent at 5/28/2019 11:45 AM CDT -----  Contact: self  Pt is calling to speak with nurse in regards to medication, Nitroglycerin for chest pain. Pt states pharmacy has faxed office and pt would like to know status. Please call pt back at [phone].    Pt uses:    [facility] [place] [facility] [address]  [address] [address]  Phone: [phone] Fax: [phone]       Thanks,   Ernestine Terry

## 2019-05-29 DIAGNOSIS — C78.7 METASTASIS TO LIVER: ICD-10-CM

## 2019-05-29 DIAGNOSIS — C49.A3 GIST (GASTROINTESTINAL STROMAL TUMOR) OF SMALL BOWEL, MALIGNANT: Primary | ICD-10-CM

## 2019-06-06 DIAGNOSIS — K76.9 LIVER LESION: ICD-10-CM

## 2019-06-06 DIAGNOSIS — C49.A3 GIST (GASTROINTESTINAL STROMAL TUMOR) OF SMALL BOWEL, MALIGNANT: Primary | ICD-10-CM

## 2019-06-11 ENCOUNTER — PATIENT MESSAGE (OUTPATIENT)
Dept: CARDIOLOGY | Facility: CLINIC | Age: 74
End: 2019-06-11

## 2019-06-11 ENCOUNTER — OFFICE VISIT (OUTPATIENT)
Dept: CARDIOLOGY | Facility: CLINIC | Age: 74
End: 2019-06-11
Payer: MEDICARE

## 2019-06-11 VITALS
SYSTOLIC BLOOD PRESSURE: 130 MMHG | HEART RATE: 70 BPM | HEIGHT: 71 IN | DIASTOLIC BLOOD PRESSURE: 74 MMHG | WEIGHT: 183 LBS | BODY MASS INDEX: 25.62 KG/M2

## 2019-06-11 DIAGNOSIS — I25.10 CORONARY ARTERY DISEASE INVOLVING NATIVE CORONARY ARTERY OF NATIVE HEART WITHOUT ANGINA PECTORIS: ICD-10-CM

## 2019-06-11 DIAGNOSIS — I10 ESSENTIAL HYPERTENSION: Primary | ICD-10-CM

## 2019-06-11 DIAGNOSIS — E78.00 HYPERCHOLESTEREMIA: ICD-10-CM

## 2019-06-11 PROCEDURE — 99214 PR OFFICE/OUTPT VISIT, EST, LEVL IV, 30-39 MIN: ICD-10-PCS | Mod: S$PBB,,, | Performed by: INTERNAL MEDICINE

## 2019-06-11 PROCEDURE — 99999 PR PBB SHADOW E&M-EST. PATIENT-LVL II: CPT | Mod: PBBFAC,,, | Performed by: INTERNAL MEDICINE

## 2019-06-11 PROCEDURE — 99214 OFFICE O/P EST MOD 30 MIN: CPT | Mod: S$PBB,,, | Performed by: INTERNAL MEDICINE

## 2019-06-11 PROCEDURE — 99999 PR PBB SHADOW E&M-EST. PATIENT-LVL II: ICD-10-PCS | Mod: PBBFAC,,, | Performed by: INTERNAL MEDICINE

## 2019-06-11 PROCEDURE — 99212 OFFICE O/P EST SF 10 MIN: CPT | Mod: PBBFAC,PO | Performed by: INTERNAL MEDICINE

## 2019-06-11 RX ORDER — IRBESARTAN 150 MG/1
150 TABLET ORAL NIGHTLY
Qty: 90 TABLET | Refills: 3 | Status: SHIPPED | OUTPATIENT
Start: 2019-06-11 | End: 2019-06-24

## 2019-06-11 RX ORDER — CHLORTHALIDONE 25 MG/1
TABLET ORAL
Qty: 60 TABLET | Refills: 11 | Status: SHIPPED | OUTPATIENT
Start: 2019-06-11 | End: 2019-10-21 | Stop reason: SDUPTHER

## 2019-06-11 RX ORDER — MAGNESIUM 250 MG
TABLET ORAL ONCE
COMMUNITY
End: 2020-10-20

## 2019-06-11 NOTE — PROGRESS NOTES
Subjective:    Patient ID:  Forrest Schmidt is a 74 y.o. male who presents for evaluation of Hypertension (follow up )      HPI 74 yo WM who had coronary stent in 2004 , HTN and HLD.Bp had been running high but last visit patient did not want to make medication changes.Brought a list of BP recordings and has been running high.    Review of Systems   Constitution: Negative for decreased appetite, fever, malaise/fatigue, weight gain and weight loss.   HENT: Negative for hearing loss and nosebleeds.    Eyes: Negative for visual disturbance.   Cardiovascular: Negative for chest pain, claudication, cyanosis, dyspnea on exertion, irregular heartbeat, leg swelling, near-syncope, orthopnea, palpitations, paroxysmal nocturnal dyspnea and syncope.   Respiratory: Negative for cough, hemoptysis, shortness of breath, sleep disturbances due to breathing, snoring and wheezing.    Endocrine: Negative for cold intolerance, heat intolerance, polydipsia and polyuria.   Hematologic/Lymphatic: Negative for adenopathy and bleeding problem. Does not bruise/bleed easily.   Skin: Negative for color change, itching, poor wound healing, rash and suspicious lesions.   Musculoskeletal: Positive for joint pain. Negative for arthritis, back pain, falls, joint swelling, muscle cramps, muscle weakness and myalgias.   Gastrointestinal: Negative for bloating, abdominal pain, change in bowel habit, constipation, flatus, heartburn, hematemesis, hematochezia, hemorrhoids, jaundice, melena, nausea and vomiting.   Genitourinary: Negative for bladder incontinence, decreased libido, frequency, hematuria, hesitancy and urgency.   Neurological: Negative for brief paralysis, difficulty with concentration, excessive daytime sleepiness, dizziness, focal weakness, headaches, light-headedness, loss of balance, numbness, vertigo and weakness.   Psychiatric/Behavioral: Negative for altered mental status, depression and memory loss. The patient does not have insomnia and  "is not nervous/anxious.    Allergic/Immunologic: Negative for environmental allergies, hives and persistent infections.        Objective:    Physical Exam   Constitutional: He is oriented to person, place, and time. He appears well-developed and well-nourished. No distress.   /74   Pulse 70   Ht 5' 11" (1.803 m)   Wt 83 kg (183 lb)   BMI 25.52 kg/m²      HENT:   Head: Normocephalic and atraumatic.   Eyes: Pupils are equal, round, and reactive to light. Conjunctivae and lids are normal. Right eye exhibits no discharge. No scleral icterus.   Neck: Normal range of motion. Neck supple. No JVD present. No tracheal deviation present. No thyromegaly present.   Cardiovascular: Normal rate, regular rhythm, S1 normal, S2 normal, normal heart sounds and intact distal pulses. Exam reveals no gallop and no friction rub.   No murmur heard.  Pulses:       Carotid pulses are 2+ on the right side, and 2+ on the left side.       Radial pulses are 2+ on the right side, and 2+ on the left side.        Femoral pulses are 2+ on the right side, and 2+ on the left side.       Popliteal pulses are 2+ on the right side, and 2+ on the left side.        Dorsalis pedis pulses are 2+ on the right side, and 2+ on the left side.        Posterior tibial pulses are 2+ on the right side, and 2+ on the left side.   Pulmonary/Chest: Effort normal and breath sounds normal. No respiratory distress. He has no wheezes. He has no rales. He exhibits no tenderness.   Abdominal: Soft. Bowel sounds are normal. He exhibits no distension and no mass. There is no hepatosplenomegaly or hepatomegaly. There is no tenderness. There is no rebound and no guarding.   Musculoskeletal: Normal range of motion. He exhibits no edema or tenderness.   Lymphadenopathy:     He has no cervical adenopathy.   Neurological: He is alert and oriented to person, place, and time. He has normal reflexes. No cranial nerve deficit. Coordination normal.   Skin: Skin is warm and " dry. No rash noted. He is not diaphoretic. No erythema. No pallor.   Psychiatric: He has a normal mood and affect. His speech is normal and behavior is normal. Judgment and thought content normal. Cognition and memory are normal.         Assessment:       1. Essential hypertension    2. Hypercholesteremia    3. Coronary artery disease involving native coronary artery of native heart without angina pectoris         Plan:     Dc losartan   Begin avapro 150 mg daily and chlorthalidone 12.5mg M-W-F   Low salt diet  Orders Placed This Encounter   Procedures    Basic metabolic panel     Follow up in about 3 months (around 9/11/2019).

## 2019-06-13 ENCOUNTER — OFFICE VISIT (OUTPATIENT)
Dept: INTERVENTIONAL RADIOLOGY/VASCULAR | Facility: CLINIC | Age: 74
End: 2019-06-13
Payer: MEDICARE

## 2019-06-13 VITALS
SYSTOLIC BLOOD PRESSURE: 130 MMHG | HEIGHT: 71 IN | HEART RATE: 71 BPM | WEIGHT: 184.75 LBS | BODY MASS INDEX: 25.86 KG/M2 | DIASTOLIC BLOOD PRESSURE: 72 MMHG

## 2019-06-13 DIAGNOSIS — K76.9 LIVER LESION: Primary | ICD-10-CM

## 2019-06-13 DIAGNOSIS — C49.A3 GIST (GASTROINTESTINAL STROMAL TUMOR) OF SMALL BOWEL, MALIGNANT: ICD-10-CM

## 2019-06-13 PROCEDURE — 99213 OFFICE O/P EST LOW 20 MIN: CPT | Mod: PBBFAC | Performed by: FAMILY MEDICINE

## 2019-06-13 PROCEDURE — 99999 PR PBB SHADOW E&M-EST. PATIENT-LVL III: CPT | Mod: PBBFAC,,, | Performed by: FAMILY MEDICINE

## 2019-06-13 PROCEDURE — 99999 PR PBB SHADOW E&M-EST. PATIENT-LVL III: ICD-10-PCS | Mod: PBBFAC,,, | Performed by: FAMILY MEDICINE

## 2019-06-13 PROCEDURE — 99214 OFFICE O/P EST MOD 30 MIN: CPT | Mod: S$PBB,,, | Performed by: FAMILY MEDICINE

## 2019-06-13 PROCEDURE — 99214 PR OFFICE/OUTPT VISIT, EST, LEVL IV, 30-39 MIN: ICD-10-PCS | Mod: S$PBB,,, | Performed by: FAMILY MEDICINE

## 2019-06-13 NOTE — PROGRESS NOTES
Subjective:       Patient ID: Forrest Schmidt is a 74 y.o. male.    Chief Complaint: Lesion    Patient here with complaints of a liver lesion that was identified during routine surveillance for his history of GIST. He was treated for GIST in 2/2018 with resection. A surveillance CT scan on 5/20/2019 identified a hypodensity in the liver near the IVC. A follow up MRI on 5/24/2019 confirmed a hypervascular liver lesion adjacent to the IVC, suspicious for metastatic disease. Patient reports feeling well. He denies any abdominal pain or distention. He is accompanied by his wife.     Review of Systems   Constitutional: Negative for activity change, appetite change, chills, fatigue and fever.   HENT: Negative for congestion, drooling, ear discharge, postnasal drip, sneezing and trouble swallowing.    Eyes: Negative for pain, discharge, redness and itching.   Respiratory: Negative for cough, shortness of breath, wheezing and stridor.    Cardiovascular: Negative for chest pain, palpitations and leg swelling.   Gastrointestinal: Negative for abdominal distention, abdominal pain, constipation, diarrhea, nausea and vomiting.   Genitourinary: Positive for difficulty urinating (related to BPH). Negative for dysuria, frequency and urgency.   Musculoskeletal: Negative for arthralgias, back pain, gait problem, joint swelling, myalgias and neck pain.        Trapped radial nerve right; rotator cuff   Skin: Negative for color change, pallor and rash.   Neurological: Positive for headaches. Negative for dizziness and weakness.       Objective:      Physical Exam   Constitutional: He is oriented to person, place, and time. He appears well-developed and well-nourished. No distress.   HENT:   Head: Normocephalic and atraumatic.   Right Ear: External ear normal.   Left Ear: External ear normal.   Nose: Nose normal.   Mouth/Throat: Oropharynx is clear and moist. No oropharyngeal exudate.   Eyes: Pupils are equal, round, and reactive to light.  Conjunctivae are normal. Right eye exhibits no discharge. Left eye exhibits no discharge. No scleral icterus.   Neck: Neck supple. No JVD present. No tracheal deviation present. No thyromegaly present.   Cardiovascular: Normal rate, regular rhythm, normal heart sounds and intact distal pulses. Exam reveals no gallop and no friction rub.   No murmur heard.  Pulmonary/Chest: Effort normal and breath sounds normal. No stridor. No respiratory distress. He has no wheezes. He has no rales.   Abdominal: Soft. Bowel sounds are normal. He exhibits no distension and no mass. There is no hepatosplenomegaly. There is no tenderness. There is no rebound and no guarding.   Musculoskeletal: He exhibits no edema.   Lymphadenopathy:     He has no cervical adenopathy.   Neurological: He is alert and oriented to person, place, and time. Gait normal.   Skin: Skin is warm and dry. He is not diaphoretic. No cyanosis. Nails show no clubbing.   Psychiatric: He has a normal mood and affect.   Vitals reviewed.      Imaging:  CT scan 5/20/2019  Impression       1. Nonspecific hepatic hypodensity at the dome of the liver near the inferior vena cava posteriorly and laterally within the right lobe image 28 sequence 2 also seen on the sagittal.  MRI liver mass protocol evaluation is suggested given the patient's history to exclude a solid mass given the patient's history and the fact that is not convincingly displayed on the prior of 11/12/2018 or 08/17/2018 or 03/01/2018  2. Evidence of prior bowel surgery with anterior abdominal wall incisional hernia with diastasis of the rectus muscles.  3. Fat containing left inguinal hernia  4. Multiple stable renal hypodensities statistically favored relate to cysts  5. Atrophy of the pancreas is noted.         MRI 5/24/2019  Impression       New 1.5 cm hypervascular hepatic lesion in segment VII adjacent to the IVC suspicious for metastatic disease.    History of gastric GIST status post resection.  No  dominant gastric mass identified to suggest is this local recurrence.    Rectus diastasis and ventral abdominal wall hernia containing mesenteric fat.         Assessment:       1. Liver lesion    2. GIST (gastrointestinal stromal tumor) of small bowel, malignant        Plan:         Explained to patient interventional radiologist can attempt biopsy and microwave ablation. Discussed how the procedures will be performed, risks (including, but not limited to, pain, bleeding, infection, damage to nearby structures, and the need for additional procedures), benefits, possible complications, pre-post procedure expectations, and alternatives. Cautioned that both procedures may not occur during same visit, depending upon preliminary pathology read. The patient voices understanding and all questions have been answered. Patient tells me he is going to [facility] for a second opinion and will call us should he decide to continue care at [facility]. Clinic phone number provided.

## 2019-06-13 NOTE — LETTER
June 17, 2019      Robert Umana MD  [address]  [address]  [facility] [address]           [facility]  [address]  [facility] [address]  Phone: [phone]          Patient: Forrest Schmidt   MR Number: [id number]   YOB: 1945   Date of Visit: 6/13/2019       Dear Dr. Robert Umana:    Thank you for referring Forrest Schmidt to me for evaluation. Attached you will find relevant portions of my assessment and plan of care.    If you have questions, please do not hesitate to call me. I look forward to following Forrest Schmidt along with you.    Sincerely,    Erinn Elam, NP    Enclosure  CC:  No Recipients    If you would like to receive this communication electronically, please contact [email or web address] or [phone] to request more information on [email or web address] Link access.    For providers and/or their staff who would like to refer a patient to [facility], please contact us through our one-stop-shop provider referral line, [facility], at [phone].    If you feel you have received this communication in error or would no longer like to receive these types of communications, please e-mail [email or web address]

## 2019-06-23 ENCOUNTER — PATIENT MESSAGE (OUTPATIENT)
Dept: CARDIOLOGY | Facility: CLINIC | Age: 74
End: 2019-06-23

## 2019-06-24 RX ORDER — LOSARTAN POTASSIUM 100 MG/1
100 TABLET ORAL DAILY
Qty: 90 TABLET | Refills: 3 | Status: SHIPPED | OUTPATIENT
Start: 2019-06-24 | End: 2019-10-21 | Stop reason: SDUPTHER

## 2019-06-24 RX ORDER — AMLODIPINE BESYLATE 5 MG/1
5 TABLET ORAL DAILY
Qty: 30 TABLET | Refills: 11 | Status: SHIPPED | OUTPATIENT
Start: 2019-06-24 | End: 2019-10-21 | Stop reason: SDUPTHER

## 2019-06-24 NOTE — TELEPHONE ENCOUNTER
Have patient stop avapro. Restart losartan at 100mg daily, continue chlorthalidone, and add amlodipine 5mg daily

## 2019-06-28 ENCOUNTER — PATIENT MESSAGE (OUTPATIENT)
Dept: HEMATOLOGY/ONCOLOGY | Facility: CLINIC | Age: 74
End: 2019-06-28

## 2019-07-25 ENCOUNTER — PATIENT MESSAGE (OUTPATIENT)
Dept: HEMATOLOGY/ONCOLOGY | Facility: CLINIC | Age: 74
End: 2019-07-25

## 2019-07-26 DIAGNOSIS — C49.A3 GIST (GASTROINTESTINAL STROMAL TUMOR) OF SMALL BOWEL, MALIGNANT: Primary | ICD-10-CM

## 2019-07-26 DIAGNOSIS — C78.7 METASTASIS TO LIVER: ICD-10-CM

## 2019-08-19 ENCOUNTER — HOSPITAL ENCOUNTER (OUTPATIENT)
Dept: RADIOLOGY | Facility: HOSPITAL | Age: 74
Discharge: HOME | End: 2019-08-19
Attending: INTERNAL MEDICINE
Payer: MEDICARE

## 2019-08-19 DIAGNOSIS — C78.7 METASTASIS TO LIVER: ICD-10-CM

## 2019-08-19 DIAGNOSIS — C49.A3 GIST (GASTROINTESTINAL STROMAL TUMOR) OF SMALL BOWEL, MALIGNANT: ICD-10-CM

## 2019-08-19 LAB
CREAT SERPL-MCNC: 0.9 MG/DL (ref 0.5–1.4)
SAMPLE: NORMAL

## 2019-08-19 PROCEDURE — 25500020 PHARM REV CODE 255: Performed by: INTERNAL MEDICINE

## 2019-08-19 PROCEDURE — 74183 MRI ABD W/O CNTR FLWD CNTR: CPT | Mod: 26,,, | Performed by: RADIOLOGY

## 2019-08-19 PROCEDURE — 74183 MRI ABD W/O CNTR FLWD CNTR: CPT | Mod: TC

## 2019-08-19 PROCEDURE — 74183 MRI ABDOMEN W WO CONTRAST: ICD-10-PCS | Mod: 26,,, | Performed by: RADIOLOGY

## 2019-08-19 PROCEDURE — A9585 GADOBUTROL INJECTION: HCPCS | Performed by: INTERNAL MEDICINE

## 2019-08-19 RX ORDER — GADOBUTROL 604.72 MG/ML
10 INJECTION INTRAVENOUS
Status: COMPLETED | OUTPATIENT
Start: 2019-08-19 | End: 2019-08-19

## 2019-08-19 RX ADMIN — GADOBUTROL 10 ML: 604.72 INJECTION INTRAVENOUS at 02:08

## 2019-08-19 NOTE — PROGRESS NOTES
Subjective:      Patient ID: Forrest Schmidt is a 74 y.o. male.     Chief Complaint:  Follow up for resected GIST       DIAGNOSIS:   1. 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 Recurrent oligametastatic GIST in the liver found on 5/20/19. S/p ablation on 7/23/19       GENOMIC PROFILE:  [facility] One (tumor sample on 2/15/18) negative for KIT, PDGFRA, SDH, BRAF, NF1, NF2 mutations     TREATMENT HISTORY:  1. He initially presented with melena, syncope, hypotension on 2/5/18 at [facility]SH. Colonoscopy showed old blood in the terminal ileum. CT abdomen/pelvis showed an ileal mass that measures approximately 5 cm. He was transferred to [facility] and underwent segmental small bowel resection by Dr. Sanchez on 2/15/18. Pathology showed a 6-cm GIST, histologic type: GIST, mixed; mitotic rate 9 mitoses/5 mm2, G2, high grade, high risk, margins negative. Cd117+, pathologic T3NxMx.  He had incisional wound infection after the surgery and took antibiotics for that.   2. Started Gleevec on 4/11/18. Foundation One results came back on 4/24/18 neg for KIT mutations. Long discussion with Mr and Mrs Schmidt on 4/25 regarding likely the lack of benefit from adjuvant Gleevec (please see note from that day for details). Mr. Schmidt would like to continue with Gleevec for now. He stopped Gleevec after he went to see Dr. Guaman at [facility]. CT abdomen/pelvis on 11/12/18 was negative for recurrent disease.   3. CT scan on 5/20/19 showed a Nonspecific hepatic hypodensity at the dome of the liver near the inferior vena cava   4. S/p liver lesion biopsy and ablation at [facility] on 7/23/19. Biopsy showed metastatic GIST positive for DOG-1 and . The amount of tumor is insufficient for molecular testing.         History of Present Illness:   Mr. Schmidt returns today for continued follow up. He underwent liver lesion biopsy and ablation at [facility] on 7/23/19.  Pathology showed metastatic GIST positive for DOG-1 and . The amount of tumor is insufficient for molecular testing. He is feeling well, no complaints. He has spoke with Dr Guaman, who recommended observation for now.         ECO     ROS:   See HPI, otherwise negative.      Physical Examination:   Vital signs reviewed.   Gen: well hydrated, well developed. In no acute distress  HEENT: normocephalic, PERRLA, EOMI, oropharynx clear  Neck: supple, no cervical LAD  Lungs: CTAB, no wheezing, rales  Heart: RRR, no M/R/G  Abd:  +hernia in mid abdomen incision line. Soft, no tenderness, non-distended, no hepatosplenomegaly, BS present  Ext: no clubbing, cyanosis, or edema  Neuro: alert and oriented x 4, in no acute distress  Psych: pleasant and appropriate mood and affect  Skin: no ulcer or open wound        Objective:      Laboratory Data:  Labs reviewed. Unremarkable.      Imaging Data:  MRI abdomen 19:    1.  Expected post-ablation therapy response within hepatic segment VII without evidence to suggest residual disease.    2.  No definite evidence of disease recurrence within the stomach in this patient with history of GIST status post resection.    3.  Stable 0.5 cm pancreatic head cystic structure, unchanged from prior MRI 2018, possibly representing an IPMN or pseudocyst.    4.  Multiple bilateral simple renal cysts.    5.  Fat containing ventral hernia associated with rectus diastasis.        Assessment/Plan:      1. 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 Metastasis to liver    3. Essential hypertension        1.2  - Mr. Schmidt is a 75 yo man with stage IIIB GIST of the small intestine  (T3N0Mx), 6 cm, mitotic rate 9 mitoses/5 mm2, s/p resection on 2/15/18. He had oligometastatic disease to the liver found on CT scan 19. He went to [facility] and underwent IR liver lesion biopsy and ablation on 19. Pathology showed metastatic GIST positive for DOG-1 and  . The amount of tumor is insufficient for molecular testing.  - reviewed MRI result. No evidence of residual disease.   - Long discussion with patient. He has wild-type GIST in which case Gleevec may or may not work. At this point, given that he has no evidence of disease, I recommend monitoring. If future surveillance scan shows limited metastatic disease that is amenable to local treatment such as resection, we can resect tumor and send for molecular testing. If future surveillance scan shows widely metastatic disease that is not amenable to resection, we can biopsy one of the lesions to send for molecular testing, and to start Gleevec at that time. They understand and agree with the plan.   - return in 3 months with surveillance scan    3.  - BP good  - c/w current meds    Answers for HPI/ROS submitted by the patient on 8/18/2019   appetite change : No  unexpected weight change: No  visual disturbance: No  cough: No  shortness of breath: No  chest pain: No  diarrhea: No  frequency: Yes  back pain: No  rash: No  headaches: Yes  adenopathy: No  nervous/ anxious: No

## 2019-08-21 ENCOUNTER — OFFICE VISIT (OUTPATIENT)
Dept: HEMATOLOGY/ONCOLOGY | Facility: CLINIC | Age: 74
End: 2019-08-21
Payer: MEDICARE

## 2019-08-21 VITALS
RESPIRATION RATE: 16 BRPM | BODY MASS INDEX: 25.65 KG/M2 | DIASTOLIC BLOOD PRESSURE: 77 MMHG | TEMPERATURE: 98 F | HEART RATE: 64 BPM | HEIGHT: 71 IN | SYSTOLIC BLOOD PRESSURE: 136 MMHG | OXYGEN SATURATION: 96 % | WEIGHT: 183.19 LBS

## 2019-08-21 DIAGNOSIS — I10 ESSENTIAL HYPERTENSION: ICD-10-CM

## 2019-08-21 DIAGNOSIS — C78.7 METASTASIS TO LIVER: ICD-10-CM

## 2019-08-21 DIAGNOSIS — C49.A3 GIST (GASTROINTESTINAL STROMAL TUMOR) OF SMALL BOWEL, MALIGNANT: Primary | ICD-10-CM

## 2019-08-21 PROCEDURE — 99999 PR PBB SHADOW E&M-EST. PATIENT-LVL IV: ICD-10-PCS | Mod: PBBFAC,,, | Performed by: INTERNAL MEDICINE

## 2019-08-21 PROCEDURE — 99214 PR OFFICE/OUTPT VISIT, EST, LEVL IV, 30-39 MIN: ICD-10-PCS | Mod: S$PBB,,, | Performed by: INTERNAL MEDICINE

## 2019-08-21 PROCEDURE — 99999 PR PBB SHADOW E&M-EST. PATIENT-LVL IV: CPT | Mod: PBBFAC,,, | Performed by: INTERNAL MEDICINE

## 2019-08-21 PROCEDURE — 99214 OFFICE O/P EST MOD 30 MIN: CPT | Mod: S$PBB,,, | Performed by: INTERNAL MEDICINE

## 2019-08-21 PROCEDURE — 99214 OFFICE O/P EST MOD 30 MIN: CPT | Mod: PBBFAC | Performed by: INTERNAL MEDICINE

## 2019-08-21 RX ORDER — ACETAMINOPHEN 650 MG/1
SUPPOSITORY RECTAL
COMMUNITY
End: 2019-10-21

## 2019-08-21 RX ORDER — CALCIUM CARBONATE 200(500)MG
500 TABLET,CHEWABLE ORAL 3 TIMES DAILY PRN
COMMUNITY

## 2019-08-21 RX ORDER — SENNOSIDES 8.6 MG
TABLET ORAL
COMMUNITY
Start: 2019-07-23 | End: 2019-10-21

## 2019-10-21 ENCOUNTER — OFFICE VISIT (OUTPATIENT)
Dept: CARDIOLOGY | Facility: CLINIC | Age: 74
End: 2019-10-21
Payer: MEDICARE

## 2019-10-21 VITALS
BODY MASS INDEX: 25.76 KG/M2 | HEIGHT: 71 IN | WEIGHT: 184 LBS | HEART RATE: 61 BPM | DIASTOLIC BLOOD PRESSURE: 68 MMHG | SYSTOLIC BLOOD PRESSURE: 133 MMHG

## 2019-10-21 DIAGNOSIS — E78.00 HYPERCHOLESTEREMIA: ICD-10-CM

## 2019-10-21 DIAGNOSIS — I25.10 CORONARY ARTERY DISEASE INVOLVING NATIVE CORONARY ARTERY OF NATIVE HEART WITHOUT ANGINA PECTORIS: Primary | ICD-10-CM

## 2019-10-21 DIAGNOSIS — I10 ESSENTIAL HYPERTENSION: ICD-10-CM

## 2019-10-21 PROBLEM — L08.9 WOUND INFECTION: Status: RESOLVED | Noted: 2018-03-02 | Resolved: 2019-10-21

## 2019-10-21 PROBLEM — K92.1 GASTROINTESTINAL HEMORRHAGE WITH MELENA: Status: RESOLVED | Noted: 2018-02-15 | Resolved: 2019-10-21

## 2019-10-21 PROBLEM — E87.6 HYPOKALEMIA: Status: RESOLVED | Noted: 2017-05-16 | Resolved: 2019-10-21

## 2019-10-21 PROBLEM — S31.109A OPEN WOUND OF ABDOMEN: Status: RESOLVED | Noted: 2018-02-24 | Resolved: 2019-10-21

## 2019-10-21 PROBLEM — T14.8XXA WOUND INFECTION: Status: RESOLVED | Noted: 2018-03-02 | Resolved: 2019-10-21

## 2019-10-21 PROBLEM — K92.2 GASTROINTESTINAL HEMORRHAGE: Status: RESOLVED | Noted: 2017-05-12 | Resolved: 2019-10-21

## 2019-10-21 PROBLEM — D50.0 IRON DEFICIENCY ANEMIA DUE TO CHRONIC BLOOD LOSS: Status: RESOLVED | Noted: 2018-04-11 | Resolved: 2019-10-21

## 2019-10-21 PROCEDURE — 99214 PR OFFICE/OUTPT VISIT, EST, LEVL IV, 30-39 MIN: ICD-10-PCS | Mod: S$PBB,,, | Performed by: INTERNAL MEDICINE

## 2019-10-21 PROCEDURE — 99213 OFFICE O/P EST LOW 20 MIN: CPT | Mod: PBBFAC,PO | Performed by: INTERNAL MEDICINE

## 2019-10-21 PROCEDURE — 99999 PR PBB SHADOW E&M-EST. PATIENT-LVL III: CPT | Mod: PBBFAC,,, | Performed by: INTERNAL MEDICINE

## 2019-10-21 PROCEDURE — 99999 PR PBB SHADOW E&M-EST. PATIENT-LVL III: ICD-10-PCS | Mod: PBBFAC,,, | Performed by: INTERNAL MEDICINE

## 2019-10-21 PROCEDURE — 99214 OFFICE O/P EST MOD 30 MIN: CPT | Mod: S$PBB,,, | Performed by: INTERNAL MEDICINE

## 2019-10-21 RX ORDER — CHLORTHALIDONE 25 MG/1
TABLET ORAL
Qty: 60 TABLET | Refills: 11 | Status: SHIPPED | OUTPATIENT
Start: 2019-10-21 | End: 2020-11-23 | Stop reason: SDUPTHER

## 2019-10-21 RX ORDER — LOSARTAN POTASSIUM 100 MG/1
100 TABLET ORAL DAILY
Qty: 90 TABLET | Refills: 3 | Status: SHIPPED | OUTPATIENT
Start: 2019-10-21 | End: 2020-05-26 | Stop reason: SDUPTHER

## 2019-10-21 RX ORDER — ATORVASTATIN CALCIUM 40 MG/1
40 TABLET, FILM COATED ORAL DAILY
Qty: 90 TABLET | Refills: 3 | Status: SHIPPED | OUTPATIENT
Start: 2019-10-21 | End: 2020-05-26 | Stop reason: SDUPTHER

## 2019-10-21 RX ORDER — PREDNISONE 20 MG/1
TABLET ORAL
COMMUNITY
Start: 2019-01-03 | End: 2020-02-13

## 2019-10-21 RX ORDER — AMLODIPINE BESYLATE 5 MG/1
5 TABLET ORAL DAILY
Qty: 90 TABLET | Refills: 3 | Status: SHIPPED | OUTPATIENT
Start: 2019-10-21 | End: 2020-05-26 | Stop reason: SDUPTHER

## 2019-10-21 NOTE — PROGRESS NOTES
Subjective:    Patient ID:  Forrest Schmidt is a 74 y.o. male who presents for evaluation of Follow-up (follow up)      HPI 75 yo WM who had coronary stent in 2004 , HTN and HLD. Denies chest pain, SOB, or edema Denies palpitations, weak spells, and syncope Brought in extensive BP log and averaging about 132/70.    Review of Systems   Constitution: Negative for decreased appetite, fever, malaise/fatigue, weight gain and weight loss.   HENT: Negative for hearing loss and nosebleeds.    Eyes: Negative for visual disturbance.   Cardiovascular: Negative for chest pain, claudication, cyanosis, dyspnea on exertion, irregular heartbeat, leg swelling, near-syncope, orthopnea, palpitations, paroxysmal nocturnal dyspnea and syncope.   Respiratory: Negative for cough, hemoptysis, shortness of breath, sleep disturbances due to breathing, snoring and wheezing.    Endocrine: Negative for cold intolerance, heat intolerance, polydipsia and polyuria.   Hematologic/Lymphatic: Negative for adenopathy and bleeding problem. Does not bruise/bleed easily.   Skin: Negative for color change, itching, poor wound healing, rash and suspicious lesions.   Musculoskeletal: Negative for arthritis, back pain, falls, joint pain, joint swelling, muscle cramps, muscle weakness and myalgias.   Gastrointestinal: Negative for bloating, abdominal pain, change in bowel habit, constipation, flatus, heartburn, hematemesis, hematochezia, hemorrhoids, jaundice, melena, nausea and vomiting.   Genitourinary: Negative for bladder incontinence, decreased libido, frequency, hematuria, hesitancy and urgency.   Neurological: Negative for brief paralysis, difficulty with concentration, excessive daytime sleepiness, dizziness, focal weakness, headaches, light-headedness, loss of balance, numbness, vertigo and weakness.   Psychiatric/Behavioral: Negative for altered mental status, depression and memory loss. The patient does not have insomnia and is not nervous/anxious.   "  Allergic/Immunologic: Negative for environmental allergies, hives and persistent infections.        Objective:    Physical Exam   Constitutional: He is oriented to person, place, and time. He appears well-developed and well-nourished. No distress.   /68   Pulse 61   Ht 5' 11" (1.803 m)   Wt 83.5 kg (184 lb)   BMI 25.66 kg/m²      HENT:   Head: Normocephalic and atraumatic.   Eyes: Pupils are equal, round, and reactive to light. Conjunctivae and lids are normal. Right eye exhibits no discharge. No scleral icterus.   Neck: Normal range of motion. Neck supple. No JVD present. No tracheal deviation present. No thyromegaly present.   Cardiovascular: Normal rate, regular rhythm, S1 normal, S2 normal, normal heart sounds and intact distal pulses. Exam reveals no gallop and no friction rub.   No murmur heard.  Pulses:       Carotid pulses are 2+ on the right side, and 2+ on the left side.       Radial pulses are 2+ on the right side, and 2+ on the left side.        Femoral pulses are 2+ on the right side, and 2+ on the left side.       Popliteal pulses are 2+ on the right side, and 2+ on the left side.        Dorsalis pedis pulses are 2+ on the right side, and 2+ on the left side.        Posterior tibial pulses are 2+ on the right side, and 2+ on the left side.   Pulmonary/Chest: Effort normal and breath sounds normal. No respiratory distress. He has no wheezes. He has no rales. He exhibits no tenderness.   Abdominal: Soft. Bowel sounds are normal. He exhibits no distension and no mass. There is no hepatosplenomegaly or hepatomegaly. There is no tenderness. There is no rebound and no guarding.   Ventral hernia   Musculoskeletal: Normal range of motion. He exhibits no edema or tenderness.   Lymphadenopathy:     He has no cervical adenopathy.   Neurological: He is alert and oriented to person, place, and time. He has normal reflexes. No cranial nerve deficit. Coordination normal.   Skin: Skin is warm and dry. No " rash noted. He is not diaphoretic. No erythema. No pallor.   Psychiatric: He has a normal mood and affect. His speech is normal and behavior is normal. Judgment and thought content normal. Cognition and memory are normal.         Assessment:       1. Coronary artery disease involving native coronary artery of native heart without angina pectoris    2. Hypercholesteremia    3. Essential hypertension         Plan:    Cardiac status stable   The current medical regimen is effective;  continue present plan and medications.     Reviewed meds and outside lab    No orders of the defined types were placed in this encounter.    Follow up in about 6 months (around 4/21/2020).

## 2019-11-13 ENCOUNTER — PATIENT MESSAGE (OUTPATIENT)
Dept: HEMATOLOGY/ONCOLOGY | Facility: CLINIC | Age: 74
End: 2019-11-13

## 2019-11-18 ENCOUNTER — HOSPITAL ENCOUNTER (OUTPATIENT)
Dept: RADIOLOGY | Facility: HOSPITAL | Age: 74
Discharge: HOME | End: 2019-11-18
Attending: INTERNAL MEDICINE
Payer: MEDICARE

## 2019-11-18 DIAGNOSIS — C49.A3 GIST (GASTROINTESTINAL STROMAL TUMOR) OF SMALL BOWEL, MALIGNANT: ICD-10-CM

## 2019-11-18 PROCEDURE — 71046 X-RAY EXAM CHEST 2 VIEWS: CPT | Mod: TC

## 2019-11-18 PROCEDURE — 71046 XR CHEST PA AND LATERAL: ICD-10-PCS | Mod: 26,,, | Performed by: RADIOLOGY

## 2019-11-18 PROCEDURE — 74183 MRI ABDOMEN W WO CONTRAST: ICD-10-PCS | Mod: 26,,, | Performed by: INTERNAL MEDICINE

## 2019-11-18 PROCEDURE — 74183 MRI ABD W/O CNTR FLWD CNTR: CPT | Mod: 26,,, | Performed by: INTERNAL MEDICINE

## 2019-11-18 PROCEDURE — A9585 GADOBUTROL INJECTION: HCPCS | Performed by: INTERNAL MEDICINE

## 2019-11-18 PROCEDURE — 74183 MRI ABD W/O CNTR FLWD CNTR: CPT | Mod: TC

## 2019-11-18 PROCEDURE — 71046 X-RAY EXAM CHEST 2 VIEWS: CPT | Mod: 26,,, | Performed by: RADIOLOGY

## 2019-11-18 PROCEDURE — 25500020 PHARM REV CODE 255: Performed by: INTERNAL MEDICINE

## 2019-11-18 RX ORDER — GADOBUTROL 604.72 MG/ML
10 INJECTION INTRAVENOUS
Status: COMPLETED | OUTPATIENT
Start: 2019-11-18 | End: 2019-11-18

## 2019-11-18 RX ADMIN — GADOBUTROL 10 ML: 604.72 INJECTION INTRAVENOUS at 11:11

## 2019-11-20 ENCOUNTER — OFFICE VISIT (OUTPATIENT)
Dept: HEMATOLOGY/ONCOLOGY | Facility: CLINIC | Age: 74
End: 2019-11-20
Payer: MEDICARE

## 2019-11-20 VITALS
TEMPERATURE: 98 F | RESPIRATION RATE: 18 BRPM | DIASTOLIC BLOOD PRESSURE: 80 MMHG | HEIGHT: 71 IN | OXYGEN SATURATION: 97 % | BODY MASS INDEX: 26.27 KG/M2 | SYSTOLIC BLOOD PRESSURE: 155 MMHG | HEART RATE: 64 BPM | WEIGHT: 187.63 LBS

## 2019-11-20 DIAGNOSIS — C49.A3 GIST (GASTROINTESTINAL STROMAL TUMOR) OF SMALL BOWEL, MALIGNANT: Primary | ICD-10-CM

## 2019-11-20 DIAGNOSIS — C78.7 METASTASIS TO LIVER: ICD-10-CM

## 2019-11-20 DIAGNOSIS — I10 ESSENTIAL HYPERTENSION: ICD-10-CM

## 2019-11-20 DIAGNOSIS — I25.10 CORONARY ARTERY DISEASE INVOLVING NATIVE CORONARY ARTERY OF NATIVE HEART WITHOUT ANGINA PECTORIS: ICD-10-CM

## 2019-11-20 LAB
CREAT SERPL-MCNC: 1.2 MG/DL (ref 0.5–1.4)
SAMPLE: NORMAL

## 2019-11-20 PROCEDURE — 1126F PR PAIN SEVERITY QUANTIFIED, NO PAIN PRESENT: ICD-10-PCS | Mod: ,,, | Performed by: INTERNAL MEDICINE

## 2019-11-20 PROCEDURE — 99999 PR PBB SHADOW E&M-EST. PATIENT-LVL IV: CPT | Mod: PBBFAC,,, | Performed by: INTERNAL MEDICINE

## 2019-11-20 PROCEDURE — 1126F AMNT PAIN NOTED NONE PRSNT: CPT | Mod: ,,, | Performed by: INTERNAL MEDICINE

## 2019-11-20 PROCEDURE — 99214 PR OFFICE/OUTPT VISIT, EST, LEVL IV, 30-39 MIN: ICD-10-PCS | Mod: S$PBB,,, | Performed by: INTERNAL MEDICINE

## 2019-11-20 PROCEDURE — 99214 OFFICE O/P EST MOD 30 MIN: CPT | Mod: PBBFAC | Performed by: INTERNAL MEDICINE

## 2019-11-20 PROCEDURE — 1159F MED LIST DOCD IN RCRD: CPT | Mod: ,,, | Performed by: INTERNAL MEDICINE

## 2019-11-20 PROCEDURE — 1159F PR MEDICATION LIST DOCUMENTED IN MEDICAL RECORD: ICD-10-PCS | Mod: ,,, | Performed by: INTERNAL MEDICINE

## 2019-11-20 PROCEDURE — 99999 PR PBB SHADOW E&M-EST. PATIENT-LVL IV: ICD-10-PCS | Mod: PBBFAC,,, | Performed by: INTERNAL MEDICINE

## 2019-11-20 PROCEDURE — 99214 OFFICE O/P EST MOD 30 MIN: CPT | Mod: S$PBB,,, | Performed by: INTERNAL MEDICINE

## 2019-11-20 NOTE — LETTER
November 20, 2019      Ahmet Sanchez MD  [address]  [facility] [address]           [facility]ematology [facility]cology  [address]  [facility] [address]  Phone: [phone]          Patient: Forrest Schmidt   MR Number: [id number]   YOB: 1945   Date of Visit: 11/20/2019       Dear Dr. Ahmet Sanchez:    Thank you for referring Forrest Schmidt to me for evaluation. Attached you will find relevant portions of my assessment and plan of care.    If you have questions, please do not hesitate to call me. I look forward to following Forrest Schmidt along with you.    Sincerely,    Robert Umana MD    Enclosure  CC:  No Recipients    If you would like to receive this communication electronically, please contact [email or web address] or [phone] to request more information on [facility] Link access.    For providers and/or their staff who would like to refer a patient to [facility], please contact us through our one-stop-shop provider referral line, [facility], at [phone].    If you feel you have received this communication in error or would no longer like to receive these types of communications, please e-mail [email or web address]

## 2019-11-20 NOTE — PROGRESS NOTES
Subjective:      Patient ID: Forrest Schmidt is a 74 y.o. male.     Chief Complaint:  Follow up for resected GIST        DIAGNOSIS:   1. 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 Recurrent oligametastatic GIST in the liver found on 19. S/p ablation on 19        GENOMIC PROFILE:  Foundation One (tumor sample on 2/15/18) negative for KIT, PDGFRA, SDH, BRAF, NF1, NF2 mutations     TREATMENT HISTORY:  1. He initially presented with melena, syncope, hypotension on 18 at OSH. Colonoscopy showed old blood in the terminal ileum. CT abdomen/pelvis showed an ileal mass that measures approximately 5 cm. He was transferred to [facility] and underwent segmental small bowel resection by Dr. Sanchez on 2/15/18. Pathology showed a 6-cm GIST, histologic type: GIST, mixed; mitotic rate 9 mitoses/5 mm2, G2, high grade, high risk, margins negative. Cd117+, pathologic T3NxMx.  He had incisional wound infection after the surgery and took antibiotics for that.   2. Started Gleevec on 18. Foundation One results came back on 18 neg for KIT mutations. Long discussion with Mr and Mrs Schmidt on  regarding likely the lack of benefit from adjuvant Gleevec (please see note from that day for details). Mr. Schmidt would like to continue with Gleevec for now. He stopped Gleevec after he went to see Dr. Guaman at [facility]. CT abdomen/pelvis on 18 was negative for recurrent disease.   3. CT scan on 19 showed a Nonspecific hepatic hypodensity at the dome of the liver near the inferior vena cava   4. S/p liver lesion biopsy and ablation at [facility] on 19. Biopsy showed metastatic GIST positive for DOG-1 and . The amount of tumor is insufficient for molecular testing.         History of Present Illness:   Mr. Schmidt returns today for continued follow up. Has been feeling well. No complaints.         ECO     ROS:   See HPI,  otherwise negative.      Physical Examination:   Vital signs reviewed.   Gen: well hydrated, well developed. In no acute distress  HEENT: normocephalic, PERRLA, EOMI, oropharynx clear  Neck: supple, no cervical LAD  Lungs: CTAB, no wheezing, rales  Heart: RRR, no M/R/G  Abd:  +hernia in mid abdomen incision line. Soft, no tenderness, non-distended, no hepatosplenomegaly, BS present  Ext: no clubbing, cyanosis, or edema  Neuro: alert and oriented x 4, in no acute distress  Psych: pleasant and appropriate mood and affect  Skin: no ulcer or open wound        Objective:      Laboratory Data:  Labs reviewed. Unremarkable.      Imaging Data:  MRI abdomen 11/18/19:  Mild decrease in size, expected post ablation therapy response within hepatic VII without evidence of residual disease.  No new or additional lesions identified on today's imaging.    No definite evidence of disease recurrence within the visualized GI tract/mesentery in this patient status post GIST tumor resection.    Stable 0.5 cm pancreatic head cystic structure, unchanged when compared to MRI 08/19/2019, with considerations including IPMN or pseudocyst.    Multiple bilateral simple renal cysts, most of which are parapelvic in location.    Large fat containing ventral hernia.        Assessment/Plan:      1. 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 Metastasis to liver    3. Essential hypertension    4. Coronary artery disease involving native coronary artery of native heart without angina pectoris        1.2  - Mr. Schmidt is a 73 yo man with stage IIIB GIST of the small intestine  (T3N0Mx), 6 cm, mitotic rate 9 mitoses/5 mm2, s/p resection on 2/15/18. His GIST is negative for KIT, PDGFRA, SDH, BRAF, NF1, NF2 mutations. He had oligometastatic disease to the liver found on CT scan 5/20/19. He went to [facility] and underwent IR liver lesion biopsy and ablation on 7/23/19. Pathology showed metastatic GIST positive for DOG-1 and .  The amount of tumor is insufficient for molecular testing.  - reviewed MRI result. Treated lesion with no e/o residual disease.   - discussed with patient in the past. He has wild-type GIST in which case Gleevec may or may not work. I recommend monitoring in the absence of disease. If future surveillance scan shows limited metastatic disease that is amenable to local treatment such as resection, we can resect tumor and send for molecular testing. If future surveillance scan shows widely metastatic disease that is not amenable to resection, we can biopsy one of the lesions to send for molecular testing, and to start Gleevec at that time. They understand and agree with the plan.   - return in 3 months with surveillance scan     3.  - BP good  - c/w current meds    4.  - asymptomatic  - c/w aspirin and lipitor    Answers for HPI/ROS submitted by the patient on 11/13/2019   appetite change : No  unexpected weight change: No  visual disturbance: No  cough: Yes  shortness of breath: No  chest pain: No  abdominal pain: No  diarrhea: No  frequency: No  back pain: Yes  rash: No  headaches: Yes  adenopathy: No  nervous/ anxious: No

## 2019-12-17 ENCOUNTER — PATIENT MESSAGE (OUTPATIENT)
Dept: HEMATOLOGY/ONCOLOGY | Facility: CLINIC | Age: 74
End: 2019-12-17

## 2020-02-11 ENCOUNTER — HOSPITAL ENCOUNTER (OUTPATIENT)
Dept: RADIOLOGY | Facility: HOSPITAL | Age: 75
Discharge: HOME | End: 2020-02-11
Attending: INTERNAL MEDICINE
Payer: MEDICARE

## 2020-02-11 DIAGNOSIS — C49.A3 GIST (GASTROINTESTINAL STROMAL TUMOR) OF SMALL BOWEL, MALIGNANT: ICD-10-CM

## 2020-02-11 PROCEDURE — 74183 MRI ABDOMEN W WO CONTRAST: ICD-10-PCS | Mod: 26,,, | Performed by: RADIOLOGY

## 2020-02-11 PROCEDURE — 25500020 PHARM REV CODE 255: Performed by: INTERNAL MEDICINE

## 2020-02-11 PROCEDURE — 74183 MRI ABD W/O CNTR FLWD CNTR: CPT | Mod: TC

## 2020-02-11 PROCEDURE — A9585 GADOBUTROL INJECTION: HCPCS | Performed by: INTERNAL MEDICINE

## 2020-02-11 PROCEDURE — 72197 MRI PELVIS W WO CONTRAST: ICD-10-PCS | Mod: 26,,, | Performed by: RADIOLOGY

## 2020-02-11 PROCEDURE — 74183 MRI ABD W/O CNTR FLWD CNTR: CPT | Mod: 26,,, | Performed by: RADIOLOGY

## 2020-02-11 PROCEDURE — 72197 MRI PELVIS W/O & W/DYE: CPT | Mod: 26,,, | Performed by: RADIOLOGY

## 2020-02-11 PROCEDURE — 72197 MRI PELVIS W/O & W/DYE: CPT | Mod: TC

## 2020-02-11 RX ORDER — GADOBUTROL 604.72 MG/ML
9 INJECTION INTRAVENOUS
Status: COMPLETED | OUTPATIENT
Start: 2020-02-11 | End: 2020-02-11

## 2020-02-11 RX ADMIN — GADOBUTROL 9 ML: 604.72 INJECTION INTRAVENOUS at 01:02

## 2020-02-12 NOTE — PROGRESS NOTES
"Subjective:      Patient ID: Forrest Schmidt is a 74 y.o. male.     Chief Complaint:  Follow up for resected GIST, wild-type     DIAGNOSIS:   1. 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 Recurrent oligametastatic GIST in the liver found on 5/20/19. S/p ablation on 7/23/19 at [facility]  3. 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 He initially presented with melena, syncope, hypotension on 2/5/18 at OSH. Colonoscopy showed old blood in the terminal ileum. CT abdomen/pelvis showed an ileal mass that measures approximately 5 cm. He was transferred to [facility] and underwent segmental small bowel resection by Dr. Sanchez on 2/15/18. Pathology showed a 6-cm GIST, histologic type: GIST, mixed; mitotic rate 9 mitoses/5 mm2, G2, high grade, high risk, margins negative. Cd117+, pathologic T3NxMx.  He had incisional wound infection after the surgery and took antibiotics for that.   2. Started Gleevec on 4/11/18. Foundation One results came back on 4/24/18 neg for KIT mutations. Long discussion with Mr and Mrs Schmidt on 4/25 regarding likely the lack of benefit from adjuvant Gleevec (please see note from that day for details). Mr. Schmidt would like to continue with Gleevec for now. He stopped Gleevec after he went to see Dr. Guaman at [facility]. CT abdomen/pelvis on 11/12/18 was negative for recurrent disease.   3. CT scan on 5/20/19 showed a Nonspecific hepatic hypodensity at the dome of the liver near the inferior vena cava   4. S/p liver lesion biopsy and ablation at [facility] on 7/23/19. Biopsy showed metastatic GIST positive for DOG-1 and . The amount of tumor was insufficient for molecular testing.   5. Surveillance MRI on 2/11/2020 showed "a right external iliac metastatic " "deposit measuring 4.6 x 3.5 cm (axial stir series 9, image 20), previously measuring 1.3 cm.  There is a metastatic deposit within the right anterior pelvis measuring 1.9 cm (series 9, image 13), previously not seen."        History of Present Illness:   Mr. Schmidt returns today for continued follow up. Has been feeling well. No complaints.         ECO     ROS:   See HPI, otherwise negative.      Physical Examination:   Vital signs reviewed.   Gen: well hydrated, well developed. In no acute distress  HEENT: normocephalic, PERRLA, EOMI, oropharynx clear  Neck: supple, no cervical LAD  Lungs: CTAB, no wheezing, rales  Heart: RRR, no M/R/G  Abd:  +hernia in mid abdomen incision line. Soft, no tenderness, non-distended, no hepatosplenomegaly, BS present  Ext: no clubbing, cyanosis, or edema  Neuro: alert and oriented x 4, in no acute distress  Psych: pleasant and appropriate mood and affect  Skin: no ulcer or open wound        Objective:      Laboratory Data:  Labs reviewed. Unremarkable.      Imaging Data:  MRI abdomen/pelvis 2020:  Liver: There is a 2.4 cm ablation cavity (series 13, image 37), unchanged in size and appearance from prior, without solid or nodular enhancement to suggest recurrent or residual tumor.  Few subcentimeter foci of apparent hyperenhancement noted within the right hepatic lobe, similar to slightly increased in conspicuity when compared to the previous MRI and overall indeterminate noting no corresponding diffusion signal abnormality.  Hepatic and portal veins are patent.    Gallbladder: Unremarkable.  No cholelithiasis or evidence of gallbladder wall thickening.    Biliary: No intra or extrahepatic biliary ductal dilatation.    Pancreas: Again seen is a 0.5 cm cyst within the pancreatic head, likely an IPMN.    Spleen: Unremarkable.    Adrenals: Unremarkable.    Kidneys: There are bilateral simple renal cortical and parapelvic cysts.  No enhancing renal mass or evidence of " hydronephrosis.    GI tract/mesentery: There is a T1 and T2 hypointense lesion adjacent to the sigmoid colon corresponding to a high density focus on prior CT, likely representing a sigmoid diverticulum.  No evidence of bowel obstruction or inflammation.  There are postsurgical changes of partial small bowel resection.    Peritoneal space: No ascites or free air.    Retroperitoneum: No evidence of lymphadenopathy.    Pelvis: There is a right external iliac metastatic deposit measuring 4.6 x 3.5 cm (axial stir series 9, image 20), previously measuring 1.3 cm.  There is a metastatic deposit within the right anterior pelvis measuring 1.9 cm (series 9, image 13), previously not seen.    Urinary bladder: Unremarkable.    Reproductive organs: The prostate and seminal vesicles are normal.    Vessels: Moderate atherosclerosis of the abdominal aorta.    Bones: Normal marrow signal.  No acute fracture or marrow replacing process.    Body wall: Ventral hernia containing small and large bowel without evidence of obstruction.      Impression       1. Enhancing lesions within the right external iliac station and the right anterior pelvis concerning for metastatic disease as detailed above.  2. Hepatic segment 7 ablation cavity without evidence of recurrent or residual tumor.  Few subcentimeter foci of arterial hyperenhancement throughout the right hepatic lobe, slightly increased in conspicuity when compared to the previous exam and presumably related to heterogeneous enhancement noting no corresponding diffusion signal abnormality.  Attention at follow-up recommended.  3. Unchanged cystic lesion within the pancreatic head, likely an IPMN.  This report was flagged in Epic as abnormal.          Assessment and Plan:      1. 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 Metastasis to liver    3. 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 Schmidt is a 75 yo man with stage IIIB GIST of the small intestine   (T3N0Mx), 6 cm, mitotic rate 9 mitoses/5 mm2, s/p resection on 2/15/18. His GIST is negative for KIT, PDGFRA, SDH, BRAF, NF1, NF2 mutations. He had oligometastatic disease to the liver found on CT scan 5/20/19. He went to [facility] and underwent IR liver lesion biopsy and ablation on 7/23/19. Pathology showed metastatic GIST positive for DOG-1 and . The amount of tumor was insufficient for molecular testing.  - Long discussion with patient and wife re MRI result. We reviewed the images together. There are metastatic deposit in the pelvis, one right external iliac deposit 4.6 x 3.5 cm, one within the right anterior pelvis 1.9 cm  - discussed that he has wild-type GIST in which case Gleevec may or may not work. Patient is also under the care of Dr Guaman's at [facility]. Advised patient to contact Dr Guaman re the new metastatic disease in the pelvis to see what she recommends.   - Patient will contact Dr Guaman and let me know her recommendation. If surgery or clinical trial is recommended, patient will go to [facility]. If standard oral targeted therapy recommended, patient will receive treatment here. He will keep me posted    Their multiple questions were answered in the clinic. Encouraged to call should issues arise.         Answers for HPI/ROS submitted by the patient on 2/11/2020   appetite change : No  unexpected weight change: No  visual disturbance: No  cough: Yes  shortness of breath: No  chest pain: No  abdominal pain: No  diarrhea: No  frequency: Yes  back pain: No  rash: No  headaches: Yes  adenopathy: No  nervous/ anxious: No

## 2020-02-13 ENCOUNTER — OFFICE VISIT (OUTPATIENT)
Dept: HEMATOLOGY/ONCOLOGY | Facility: CLINIC | Age: 75
End: 2020-02-13
Payer: MEDICARE

## 2020-02-13 VITALS
SYSTOLIC BLOOD PRESSURE: 154 MMHG | HEART RATE: 64 BPM | DIASTOLIC BLOOD PRESSURE: 74 MMHG | WEIGHT: 190.06 LBS | HEIGHT: 71 IN | BODY MASS INDEX: 26.61 KG/M2 | RESPIRATION RATE: 16 BRPM | OXYGEN SATURATION: 97 % | TEMPERATURE: 98 F

## 2020-02-13 DIAGNOSIS — C79.89 METASTATIC CANCER TO PELVIS: ICD-10-CM

## 2020-02-13 DIAGNOSIS — C78.7 METASTASIS TO LIVER: ICD-10-CM

## 2020-02-13 DIAGNOSIS — C49.A3 GIST (GASTROINTESTINAL STROMAL TUMOR) OF SMALL BOWEL, MALIGNANT: Primary | ICD-10-CM

## 2020-02-13 PROBLEM — C77.5 MALIGNANT NEOPLASM METASTATIC TO INTRAPELVIC LYMPH NODE: Status: ACTIVE | Noted: 2020-02-13

## 2020-02-13 LAB
CREAT SERPL-MCNC: 1.2 MG/DL (ref 0.5–1.4)
SAMPLE: NORMAL

## 2020-02-13 PROCEDURE — 99213 OFFICE O/P EST LOW 20 MIN: CPT | Mod: PBBFAC | Performed by: INTERNAL MEDICINE

## 2020-02-13 PROCEDURE — 99999 PR PBB SHADOW E&M-EST. PATIENT-LVL III: ICD-10-PCS | Mod: PBBFAC,,, | Performed by: INTERNAL MEDICINE

## 2020-02-13 PROCEDURE — 99999 PR PBB SHADOW E&M-EST. PATIENT-LVL III: CPT | Mod: PBBFAC,,, | Performed by: INTERNAL MEDICINE

## 2020-02-13 PROCEDURE — 99214 OFFICE O/P EST MOD 30 MIN: CPT | Mod: S$PBB,,, | Performed by: INTERNAL MEDICINE

## 2020-02-13 PROCEDURE — 99214 PR OFFICE/OUTPT VISIT, EST, LEVL IV, 30-39 MIN: ICD-10-PCS | Mod: S$PBB,,, | Performed by: INTERNAL MEDICINE

## 2020-02-14 ENCOUNTER — PATIENT MESSAGE (OUTPATIENT)
Dept: HEMATOLOGY/ONCOLOGY | Facility: CLINIC | Age: 75
End: 2020-02-14

## 2020-02-14 NOTE — TELEPHONE ENCOUNTER
Called and spoke with patient over the phone. Explained to patient. Radiologist who read the MRI this time went back to the last MRI images in November and measured the node from the last MRI images. Patient understands. He is waiting to hear back from Dr Guaman, likely will go to [facility] for a consultation visit. Asked patient to keep me posted and called me should any issues arise. He understands and agrees with the plan.

## 2020-02-19 ENCOUNTER — PATIENT MESSAGE (OUTPATIENT)
Dept: HEMATOLOGY/ONCOLOGY | Facility: CLINIC | Age: 75
End: 2020-02-19

## 2020-03-02 ENCOUNTER — TELEPHONE (OUTPATIENT)
Dept: HEMATOLOGY/ONCOLOGY | Facility: CLINIC | Age: 75
End: 2020-03-02

## 2020-03-02 NOTE — TELEPHONE ENCOUNTER
----- Message from Lucía Livingston sent at 3/2/2020  3:00 PM CST -----  Contact: Diann Guaman (with [facility]): [phone]  Diann called to speak with staff re the pt       Please contact Diann Guaman (with [facility]): [phone]

## 2020-03-04 ENCOUNTER — PATIENT MESSAGE (OUTPATIENT)
Dept: HEMATOLOGY/ONCOLOGY | Facility: CLINIC | Age: 75
End: 2020-03-04

## 2020-03-04 DIAGNOSIS — C49.A3 GIST (GASTROINTESTINAL STROMAL TUMOR) OF SMALL BOWEL, MALIGNANT: Primary | Chronic | ICD-10-CM

## 2020-03-04 DIAGNOSIS — C79.89 METASTATIC CANCER TO PELVIS: ICD-10-CM

## 2020-03-09 ENCOUNTER — TELEPHONE (OUTPATIENT)
Dept: HEMATOLOGY/ONCOLOGY | Facility: CLINIC | Age: 75
End: 2020-03-09

## 2020-03-09 NOTE — TELEPHONE ENCOUNTER
Called and spoke with Mr Schmidt this morning. Discussed that I received the email from Dr Guaman, who recommends trying gleevec and restage in 2 months. Mr Schmidt has not heard back from Dr Guaman after tumor board last week. He is going to contact her office to confirm and let me know. Once confirmed, we will start gleevec and have him return in 2 weeks for toxicity check. He understands and agrees with the plan.

## 2020-03-10 ENCOUNTER — TELEPHONE (OUTPATIENT)
Dept: PHARMACY | Facility: CLINIC | Age: 75
End: 2020-03-10

## 2020-03-10 DIAGNOSIS — C79.89 METASTATIC CANCER TO PELVIS: ICD-10-CM

## 2020-03-10 DIAGNOSIS — C49.A3 GIST (GASTROINTESTINAL STROMAL TUMOR) OF SMALL BOWEL, MALIGNANT: Primary | ICD-10-CM

## 2020-03-10 RX ORDER — IMATINIB MESYLATE 400 MG/1
400 TABLET, FILM COATED ORAL DAILY
Qty: 30 TABLET | Refills: 11 | Status: SHIPPED | OUTPATIENT
Start: 2020-03-10 | End: 2020-07-23

## 2020-03-10 NOTE — TELEPHONE ENCOUNTER
Called and spoke with Mr Schmidt over the phone. gleevec sent to specialty pharmacy. Again discussed the side effects of gleevec. Patient understands the side effects and would like to try. Will have him come back 2 weeks after he starts gleevec for toxicity check. Dr Guaman wants to have restaging scan 2 months after he has been on gleevec. Asked patient to let Dr Guaman's office know once he starts taking gleevec. Also, I do not recommend travelling that involves being on a place or cruise ship, or being with large crowds. His multiple questions were answered over the phone. Encouraged to call should questions arise.

## 2020-03-10 NOTE — TELEPHONE ENCOUNTER
Informed Patient  that [facility] received prescription for Imatinib and benefits investigation is required.  [facility] will be back in touch once insurance determination is received.

## 2020-03-11 NOTE — TELEPHONE ENCOUNTER
DOCUMENTATION ONLY:  Imatinib 400 mg Tablet #30/30 does not require a prior authorization through the patient's insurance.     Co-pay: $28.80    Patient Assistance IS required. Sending to the financial assistance team to investigate assistance options. Jojo BRUNSON

## 2020-03-13 ENCOUNTER — PATIENT MESSAGE (OUTPATIENT)
Dept: HEMATOLOGY/ONCOLOGY | Facility: CLINIC | Age: 75
End: 2020-03-13

## 2020-03-13 NOTE — TELEPHONE ENCOUNTER
Initial Gleevec consult completed on 3/13 . Gleevec (imatinib) was picked up from [facility] on 3/13 for him to start as soon as possible. $ 28.80 copay. Address confirmed, CC on file. Confirmed 2 patient identifiers - name and . Therapy Appropriate.     Patient was counseled on the administration directions for Gleevec:  Take 1 tablet by mouth once daily, at the same time daily, with a meal.  Do not chew, crush, or break the tablets.   If possible, patient was instructed tip the tablets from the RX bottle to the cap, and take directly from the cap to the mouth.  Patient may handle the medication with their hands if they wear with a latex or nitrile glove and wash their hands before and after handling the tablets.    Patient was counseled on the following possible side effects, which include, but are not limited to: Fatigue, rash, nausea, constipation/diarrhea, throat irritation, swelling, muscle/ joint pain, muscle cramps, rash, dizziness, headache, anorexia.  Warnings and precautions also reviewed: cardiac toxicity, dermatologic toxicity - Ingram-Marshal's Syndrome - hydrocortisone cream included, fluid retention, GI perforation, hepatotoxicity, Tumor Lysis Syndrome, hemorrhage, hematologic toxicity.    DDIs:   - advised to avoid grapefruit and grapefruit juice.   - Cat C: amlodipine and Gleevec - may increase concentration of amlodipine (monitor BP)  - Cat C: atorvastatin and Gleevec - Gleevec may decrease metabolism of atorvastatin. Patient on 40mg. Monitor liver function and SE (muscle aches)    Patient was given 2 patient education handouts on how to handle oral chemotherapy and specific recommendations- do's and don'ts. Instructed the patient that if they have any remaining oral chemotherapy, not to flush down the toilet or throw away in the trash; The patient or caregiver should return the unused oral chemotherapy to either the clinic or to myself in the Pharmacy where the oral chemotherapy can be disposed of  properly.     Patient confirmed understanding. Patient does not have any further questions. WCB in 2 weeks to see how patient is doing on therapy.  [facility] in 3 weeks to confirm readiness for refill.

## 2020-03-16 ENCOUNTER — PATIENT MESSAGE (OUTPATIENT)
Dept: HEMATOLOGY/ONCOLOGY | Facility: CLINIC | Age: 75
End: 2020-03-16

## 2020-03-18 NOTE — TELEPHONE ENCOUNTER
Patient may be eligible for CAGNO assistance for next months Imatinib refill. If approved, [employer] will assist with a portion of the patients monthly Imatinib copay. We will be assisting the patient in the application process. CAGNO application requires prescriber consent and signature. Sending a staff message to Dr. Umana regarding [employer] assistance for future Imatinib refills and faxing the application for her review and signature.

## 2020-03-30 ENCOUNTER — LAB VISIT (OUTPATIENT)
Dept: LAB | Facility: HOSPITAL | Age: 75
End: 2020-03-30
Attending: INTERNAL MEDICINE
Payer: MEDICARE

## 2020-03-30 DIAGNOSIS — C49.A3 GIST (GASTROINTESTINAL STROMAL TUMOR) OF SMALL BOWEL, MALIGNANT: Chronic | ICD-10-CM

## 2020-03-30 DIAGNOSIS — C79.89 METASTATIC CANCER TO PELVIS: ICD-10-CM

## 2020-03-30 LAB
ALBUMIN SERPL BCP-MCNC: 4 G/DL (ref 3.5–5.2)
ALP SERPL-CCNC: 68 U/L (ref 55–135)
ALT SERPL W/O P-5'-P-CCNC: 43 U/L (ref 10–44)
ANION GAP SERPL CALC-SCNC: 7 MMOL/L (ref 8–16)
AST SERPL-CCNC: 49 U/L (ref 10–40)
BASOPHILS # BLD AUTO: 0.04 K/UL (ref 0–0.2)
BASOPHILS NFR BLD: 0.9 % (ref 0–1.9)
BILIRUB SERPL-MCNC: 0.5 MG/DL (ref 0.1–1)
BUN SERPL-MCNC: 27 MG/DL (ref 8–23)
CALCIUM SERPL-MCNC: 8.8 MG/DL (ref 8.7–10.5)
CHLORIDE SERPL-SCNC: 101 MMOL/L (ref 95–110)
CO2 SERPL-SCNC: 29 MMOL/L (ref 23–29)
CREAT SERPL-MCNC: 1.3 MG/DL (ref 0.5–1.4)
DIFFERENTIAL METHOD: ABNORMAL
EOSINOPHIL # BLD AUTO: 0.2 K/UL (ref 0–0.5)
EOSINOPHIL NFR BLD: 3.7 % (ref 0–8)
ERYTHROCYTE [DISTWIDTH] IN BLOOD BY AUTOMATED COUNT: 13.7 % (ref 11.5–14.5)
EST. GFR  (AFRICAN AMERICAN): >60 ML/MIN/1.73 M^2
EST. GFR  (NON AFRICAN AMERICAN): 53.4 ML/MIN/1.73 M^2
GLUCOSE SERPL-MCNC: 112 MG/DL (ref 70–110)
HCT VFR BLD AUTO: 45.2 % (ref 40–54)
HGB BLD-MCNC: 15.2 G/DL (ref 14–18)
IMM GRANULOCYTES # BLD AUTO: 0 K/UL (ref 0–0.04)
IMM GRANULOCYTES NFR BLD AUTO: 0 % (ref 0–0.5)
LYMPHOCYTES # BLD AUTO: 0.7 K/UL (ref 1–4.8)
LYMPHOCYTES NFR BLD: 17.1 % (ref 18–48)
MCH RBC QN AUTO: 30.6 PG (ref 27–31)
MCHC RBC AUTO-ENTMCNC: 33.6 G/DL (ref 32–36)
MCV RBC AUTO: 91 FL (ref 82–98)
MONOCYTES # BLD AUTO: 0.9 K/UL (ref 0.3–1)
MONOCYTES NFR BLD: 20.1 % (ref 4–15)
NEUTROPHILS # BLD AUTO: 2.5 K/UL (ref 1.8–7.7)
NEUTROPHILS NFR BLD: 58.2 % (ref 38–73)
NRBC BLD-RTO: 0 /100 WBC
PLATELET # BLD AUTO: 188 K/UL (ref 150–350)
PMV BLD AUTO: 9.2 FL (ref 9.2–12.9)
POTASSIUM SERPL-SCNC: 4.1 MMOL/L (ref 3.5–5.1)
PROT SERPL-MCNC: 7 G/DL (ref 6–8.4)
RBC # BLD AUTO: 4.96 M/UL (ref 4.6–6.2)
SODIUM SERPL-SCNC: 137 MMOL/L (ref 136–145)
WBC # BLD AUTO: 4.28 K/UL (ref 3.9–12.7)

## 2020-03-30 PROCEDURE — 36415 COLL VENOUS BLD VENIPUNCTURE: CPT | Mod: PO

## 2020-03-30 PROCEDURE — 85025 COMPLETE CBC W/AUTO DIFF WBC: CPT | Mod: PO

## 2020-03-30 PROCEDURE — 80053 COMPREHEN METABOLIC PANEL: CPT

## 2020-03-31 ENCOUNTER — OFFICE VISIT (OUTPATIENT)
Dept: HEMATOLOGY/ONCOLOGY | Facility: CLINIC | Age: 75
End: 2020-03-31
Payer: MEDICARE

## 2020-03-31 ENCOUNTER — PATIENT MESSAGE (OUTPATIENT)
Dept: HEMATOLOGY/ONCOLOGY | Facility: CLINIC | Age: 75
End: 2020-03-31

## 2020-03-31 DIAGNOSIS — C49.A3 GIST (GASTROINTESTINAL STROMAL TUMOR) OF SMALL BOWEL, MALIGNANT: Primary | Chronic | ICD-10-CM

## 2020-03-31 PROCEDURE — 99214 PR OFFICE/OUTPT VISIT, EST, LEVL IV, 30-39 MIN: ICD-10-PCS | Mod: 95,,, | Performed by: INTERNAL MEDICINE

## 2020-03-31 PROCEDURE — 99214 OFFICE O/P EST MOD 30 MIN: CPT | Mod: 95,,, | Performed by: INTERNAL MEDICINE

## 2020-03-31 NOTE — Clinical Note
Please have pt get labs in about two weeks (cbc and cmp - orders have been placed) and do a virtual visit shortly after that (next day etc)

## 2020-03-31 NOTE — PROGRESS NOTES
"Subjective:      Patient ID: Forrest Schmidt is a 74 y.o. male.     Chief Complaint:  Follow up for resected GIST, wild-type     DIAGNOSIS:   1. 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 Recurrent oligametastatic GIST in the liver found on 5/20/19. S/p ablation on 7/23/19 at [facility]  3. 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 He initially presented with melena, syncope, hypotension on 2/5/18 at [facility]. Colonoscopy showed old blood in the terminal ileum. CT abdomen/pelvis showed an ileal mass that measures approximately 5 cm. He was transferred to [facility] and underwent segmental small bowel resection by Dr. Sanchez on 2/15/18. Pathology showed a 6-cm GIST, histologic type: GIST, mixed; mitotic rate 9 mitoses/5 mm2, G2, high grade, high risk, margins negative. Cd117+, pathologic T3NxMx.  He had incisional wound infection after the surgery and took antibiotics for that.   2. Started Gleevec on 4/11/18. Foundation One results came back on 4/24/18 neg for KIT mutations. Long discussion with Mr and Mrs Schmidt on 4/25 regarding likely the lack of benefit from adjuvant Gleevec (please see note from that day for details). Mr. Schmidt would like to continue with Gleevec for now. He stopped Gleevec after he went to see Dr. Guaamn at [facility]. CT abdomen/pelvis on 11/12/18 was negative for recurrent disease.   3. CT scan on 5/20/19 showed a Nonspecific hepatic hypodensity at the dome of the liver near the inferior vena cava   4. S/p liver lesion biopsy and ablation at [facility] on 7/23/19. Biopsy showed metastatic GIST positive for DOG-1 and . The amount of tumor was insufficient for molecular testing.   5. Surveillance MRI on 2/11/2020 showed "a right external iliac metastatic " "deposit measuring 4.6 x 3.5 cm (axial stir series 9, image 20), previously measuring 1.3 cm.  There is a metastatic deposit within the right anterior pelvis measuring 1.9 cm (series 9, image 13), previously not seen."        History of Present Illness:   The patient has been on Gleevec for approximately 2 weeks now.  He is scheduled to return to [facility] at the end of April but there is currently a quarantine in effect and I did discuss with him that we may need to get the scans locally.  He states that he is tolerating the medication well.  His labs are essentially stable.  He denies fevers, chills or night sweats.  He denies any worsening abdominal pain.  He states his weight is stable.  No nausea, vomiting, diarrhea or constipation.        ECO     ROS:   See HPI, otherwise negative.      Physical Examination:     Gen: well hydrated, well developed. In no acute distress  HEENT: normocephalic, PERRLA, EOMI  Lungs:  No respiratory distress  Neuro: alert and oriented x 4, in no acute distress  Psych: pleasant and appropriate mood and affect  Skin: no ulcer or open wound        Objective:      Laboratory Data:  Lab Visit on 2020   Component Date Value Ref Range Status    WBC 2020 4.28  3.90 - 12.70 K/uL Final    RBC 2020 4.96  4.60 - 6.20 M/uL Final    Hemoglobin 2020 15.2  14.0 - 18.0 g/dL Final    Hematocrit 2020 45.2  40.0 - 54.0 % Final    Mean Corpuscular Volume 2020 91  82 - 98 fL Final    Mean Corpuscular Hemoglobin 2020 30.6  27.0 - 31.0 pg Final    Mean Corpuscular Hemoglobin Conc 2020 33.6  32.0 - 36.0 g/dL Final    RDW 2020 13.7  11.5 - 14.5 % Final    Platelets 2020 188  150 - 350 K/uL Final    MPV 2020 9.2  9.2 - 12.9 fL Final    Immature Granulocytes 2020 0.0  0.0 - 0.5 % Final    Gran # (ANC) 2020 2.5  1.8 - 7.7 K/uL Final    Immature Grans (Abs) 2020 0.00  0.00 - 0.04 K/uL Final    Comment: Mild " elevation in immature granulocytes is non specific and   can be seen in a variety of conditions including stress response,   acute inflammation, trauma and pregnancy. Correlation with other   laboratory and clinical findings is essential.      Lymph # 03/30/2020 0.7* 1.0 - 4.8 K/uL Final    Mono # 03/30/2020 0.9  0.3 - 1.0 K/uL Final    Eos # 03/30/2020 0.2  0.0 - 0.5 K/uL Final    Baso # 03/30/2020 0.04  0.00 - 0.20 K/uL Final    nRBC 03/30/2020 0  0 /100 WBC Final    Gran% 03/30/2020 58.2  38.0 - 73.0 % Final    Lymph% 03/30/2020 17.1* 18.0 - 48.0 % Final    Mono% 03/30/2020 20.1* 4.0 - 15.0 % Final    Eosinophil% 03/30/2020 3.7  0.0 - 8.0 % Final    Basophil% 03/30/2020 0.9  0.0 - 1.9 % Final    Differential Method 03/30/2020 Automated   Final    Sodium 03/30/2020 137  136 - 145 mmol/L Final    Potassium 03/30/2020 4.1  3.5 - 5.1 mmol/L Final    Chloride 03/30/2020 101  95 - 110 mmol/L Final    CO2 03/30/2020 29  23 - 29 mmol/L Final    Glucose 03/30/2020 112* 70 - 110 mg/dL Final    BUN, Bld 03/30/2020 27* 8 - 23 mg/dL Final    Creatinine 03/30/2020 1.3  0.5 - 1.4 mg/dL Final    Calcium 03/30/2020 8.8  8.7 - 10.5 mg/dL Final    Total Protein 03/30/2020 7.0  6.0 - 8.4 g/dL Final    Albumin 03/30/2020 4.0  3.5 - 5.2 g/dL Final    Total Bilirubin 03/30/2020 0.5  0.1 - 1.0 mg/dL Final    Comment: For infants and newborns, interpretation of results should be based  on gestational age, weight and in agreement with clinical  observations.  Premature Infant recommended reference ranges:  Up to 24 hours.............<8.0 mg/dL  Up to 48 hours............<12.0 mg/dL  3-5 days..................<15.0 mg/dL  6-29 days.................<15.0 mg/dL      Alkaline Phosphatase 03/30/2020 68  55 - 135 U/L Final    AST 03/30/2020 49* 10 - 40 U/L Final    ALT 03/30/2020 43  10 - 44 U/L Final    Anion Gap 03/30/2020 7* 8 - 16 mmol/L Final    eGFR if African American 03/30/2020 >60.0  >60 mL/min/1.73 m^2 Final     eGFR if non African American 03/30/2020 53.4* >60 mL/min/1.73 m^2 Final    Comment: Calculation used to obtain the estimated glomerular filtration  rate (eGFR) is the CKD-EPI equation.             Imaging Data:  MRI abdomen/pelvis 2/11/2020:  Liver: There is a 2.4 cm ablation cavity (series 13, image 37), unchanged in size and appearance from prior, without solid or nodular enhancement to suggest recurrent or residual tumor.  Few subcentimeter foci of apparent hyperenhancement noted within the right hepatic lobe, similar to slightly increased in conspicuity when compared to the previous MRI and overall indeterminate noting no corresponding diffusion signal abnormality.  Hepatic and portal veins are patent.    Gallbladder: Unremarkable.  No cholelithiasis or evidence of gallbladder wall thickening.    Biliary: No intra or extrahepatic biliary ductal dilatation.    Pancreas: Again seen is a 0.5 cm cyst within the pancreatic head, likely an IPMN.    Spleen: Unremarkable.    Adrenals: Unremarkable.    Kidneys: There are bilateral simple renal cortical and parapelvic cysts.  No enhancing renal mass or evidence of hydronephrosis.    GI tract/mesentery: There is a T1 and T2 hypointense lesion adjacent to the sigmoid colon corresponding to a high density focus on prior CT, likely representing a sigmoid diverticulum.  No evidence of bowel obstruction or inflammation.  There are postsurgical changes of partial small bowel resection.    Peritoneal space: No ascites or free air.    Retroperitoneum: No evidence of lymphadenopathy.    Pelvis: There is a right external iliac metastatic deposit measuring 4.6 x 3.5 cm (axial stir series 9, image 20), previously measuring 1.3 cm.  There is a metastatic deposit within the right anterior pelvis measuring 1.9 cm (series 9, image 13), previously not seen.    Urinary bladder: Unremarkable.    Reproductive organs: The prostate and seminal vesicles are normal.    Vessels: Moderate  atherosclerosis of the abdominal aorta.    Bones: Normal marrow signal.  No acute fracture or marrow replacing process.    Body wall: Ventral hernia containing small and large bowel without evidence of obstruction.      Impression       1. Enhancing lesions within the right external iliac station and the right anterior pelvis concerning for metastatic disease as detailed above.  2. Hepatic segment 7 ablation cavity without evidence of recurrent or residual tumor.  Few subcentimeter foci of arterial hyperenhancement throughout the right hepatic lobe, slightly increased in conspicuity when compared to the previous exam and presumably related to heterogeneous enhancement noting no corresponding diffusion signal abnormality.  Attention at follow-up recommended.  3. Unchanged cystic lesion within the pancreatic head, likely an IPMN.  This report was flagged in Epic as abnormal.          Assessment and Plan:      1. GIST (gastrointestinal stromal tumor) of small bowel, malignant        1.2  - Mr. Schmidt is a 73 yo man with stage IIIB GIST of the small intestine  (T3N0Mx), 6 cm, mitotic rate 9 mitoses/5 mm2, s/p resection on 2/15/18. His GIST is negative for KIT, PDGFRA, SDH, BRAF, NF1, NF2 mutations. He had oligometastatic disease to the liver found on CT scan 5/20/19. He went to [facility] and underwent IR liver lesion biopsy and ablation on 7/23/19. Pathology showed metastatic GIST positive for DOG-1 and . The amount of tumor was insufficient for molecular testing.  There are metastatic deposit in the pelvis, one right external iliac deposit 4.6 x 3.5 cm, one within the right anterior pelvis 1.9 cm    - discussed that he has wild-type GIST in which case Gleevec may or may not work. Patient is also under the care of Dr Guaman's at [facility].  Overall he is tolerating his therapy well.  Tentatively I will ask him to return in 2 weeks with a CBC and CMP and he believes that he is already scheduled for CT scans at [facility].  I did discuss with him that these may need to be obtained locally dependent on the viral quarantine.  He is aware to contact me with any temperature over 100.4° F or any new problems and I will see him sooner.  Follow-up with [facility] as scheduled.    Their multiple questions were answered in the clinic. Encouraged to call should issues arise.

## 2020-04-03 NOTE — PROGRESS NOTES
This is an added addendum to the patient's note from March 31, 2020.    The patient location is:  personal residence  The chief complaint leading to consultation is:  Gastrointestinal stromal tumor  Visit type: Virtual visit with synchronous audio and video  Total time spent with patient:  20 min  Each patient to whom he or she provides medical services by telemedicine is:  (1) informed of the relationship between the physician and patient and the respective role of any other health care provider with respect to management of the patient; and (2) notified that he or she may decline to receive medical services by telemedicine and may withdraw from such care at any time.    Notes:  See progress note dated 03/31/2020

## 2020-04-13 ENCOUNTER — LAB VISIT (OUTPATIENT)
Dept: LAB | Facility: HOSPITAL | Age: 75
End: 2020-04-13
Attending: INTERNAL MEDICINE
Payer: MEDICARE

## 2020-04-13 DIAGNOSIS — C49.A3 GIST (GASTROINTESTINAL STROMAL TUMOR) OF SMALL BOWEL, MALIGNANT: Chronic | ICD-10-CM

## 2020-04-13 LAB
ALBUMIN SERPL BCP-MCNC: 4 G/DL (ref 3.5–5.2)
ALP SERPL-CCNC: 69 U/L (ref 55–135)
ALT SERPL W/O P-5'-P-CCNC: 32 U/L (ref 10–44)
ANION GAP SERPL CALC-SCNC: 7 MMOL/L (ref 8–16)
AST SERPL-CCNC: 36 U/L (ref 10–40)
BASOPHILS # BLD AUTO: 0.02 K/UL (ref 0–0.2)
BASOPHILS NFR BLD: 0.5 % (ref 0–1.9)
BILIRUB SERPL-MCNC: 0.3 MG/DL (ref 0.1–1)
BUN SERPL-MCNC: 22 MG/DL (ref 8–23)
CALCIUM SERPL-MCNC: 8.9 MG/DL (ref 8.7–10.5)
CHLORIDE SERPL-SCNC: 102 MMOL/L (ref 95–110)
CO2 SERPL-SCNC: 30 MMOL/L (ref 23–29)
CREAT SERPL-MCNC: 1.4 MG/DL (ref 0.5–1.4)
DIFFERENTIAL METHOD: ABNORMAL
EOSINOPHIL # BLD AUTO: 0.2 K/UL (ref 0–0.5)
EOSINOPHIL NFR BLD: 4.3 % (ref 0–8)
ERYTHROCYTE [DISTWIDTH] IN BLOOD BY AUTOMATED COUNT: 13.8 % (ref 11.5–14.5)
EST. GFR  (AFRICAN AMERICAN): 56.4 ML/MIN/1.73 M^2
EST. GFR  (NON AFRICAN AMERICAN): 48.8 ML/MIN/1.73 M^2
GLUCOSE SERPL-MCNC: 122 MG/DL (ref 70–110)
HCT VFR BLD AUTO: 41.5 % (ref 40–54)
HGB BLD-MCNC: 14.4 G/DL (ref 14–18)
IMM GRANULOCYTES # BLD AUTO: 0.01 K/UL (ref 0–0.04)
IMM GRANULOCYTES NFR BLD AUTO: 0.2 % (ref 0–0.5)
LYMPHOCYTES # BLD AUTO: 0.7 K/UL (ref 1–4.8)
LYMPHOCYTES NFR BLD: 17.5 % (ref 18–48)
MCH RBC QN AUTO: 31.4 PG (ref 27–31)
MCHC RBC AUTO-ENTMCNC: 34.7 G/DL (ref 32–36)
MCV RBC AUTO: 90 FL (ref 82–98)
MONOCYTES # BLD AUTO: 0.7 K/UL (ref 0.3–1)
MONOCYTES NFR BLD: 15.8 % (ref 4–15)
NEUTROPHILS # BLD AUTO: 2.6 K/UL (ref 1.8–7.7)
NEUTROPHILS NFR BLD: 61.7 % (ref 38–73)
NRBC BLD-RTO: 0 /100 WBC
PLATELET # BLD AUTO: 176 K/UL (ref 150–350)
PMV BLD AUTO: 8.7 FL (ref 9.2–12.9)
POTASSIUM SERPL-SCNC: 3.8 MMOL/L (ref 3.5–5.1)
PROT SERPL-MCNC: 7.1 G/DL (ref 6–8.4)
RBC # BLD AUTO: 4.59 M/UL (ref 4.6–6.2)
SODIUM SERPL-SCNC: 139 MMOL/L (ref 136–145)
WBC # BLD AUTO: 4.17 K/UL (ref 3.9–12.7)

## 2020-04-13 PROCEDURE — 36415 COLL VENOUS BLD VENIPUNCTURE: CPT | Mod: PO

## 2020-04-13 PROCEDURE — 80053 COMPREHEN METABOLIC PANEL: CPT

## 2020-04-13 PROCEDURE — 85025 COMPLETE CBC W/AUTO DIFF WBC: CPT | Mod: PO

## 2020-04-14 ENCOUNTER — OFFICE VISIT (OUTPATIENT)
Dept: HEMATOLOGY/ONCOLOGY | Facility: CLINIC | Age: 75
End: 2020-04-14
Payer: MEDICARE

## 2020-04-14 DIAGNOSIS — C49.A3 GIST (GASTROINTESTINAL STROMAL TUMOR) OF SMALL BOWEL, MALIGNANT: Primary | Chronic | ICD-10-CM

## 2020-04-14 PROCEDURE — 99214 PR OFFICE/OUTPT VISIT, EST, LEVL IV, 30-39 MIN: ICD-10-PCS | Mod: 95,,, | Performed by: INTERNAL MEDICINE

## 2020-04-14 PROCEDURE — 99214 OFFICE O/P EST MOD 30 MIN: CPT | Mod: 95,,, | Performed by: INTERNAL MEDICINE

## 2020-04-14 NOTE — PROGRESS NOTES
The patient location is:  private [facility]  The chief complaint leading to consultation is:  Gastrointestinal stromal tumor  Visit type: audiovisual  Total time spent with patient:  Approximately 25 min  Each patient to whom he or she provides medical services by telemedicine is:  (1) informed of the relationship between the physician and patient and the respective role of any other health care provider with respect to management of the patient; and (2) notified that he or she may decline to receive medical services by telemedicine and may withdraw from such care at any time.    Notes:  See below    Subjective:      Patient ID: Forrest Schmidt is a 74 y.o. male.     Chief Complaint:  Follow up for resected GIST, wild-type     DIAGNOSIS:   1. 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 Recurrent oligametastatic GIST in the liver found on 5/20/19. S/p ablation on 7/23/19 at [facility]  3. 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 He initially presented with melena, syncope, hypotension on 2/5/18 at OSH. Colonoscopy showed old blood in the terminal ileum. CT abdomen/pelvis showed an ileal mass that measures approximately 5 cm. He was transferred to [facility] and underwent segmental small bowel resection by Dr. Sanchez on 2/15/18. Pathology showed a 6-cm GIST, histologic type: GIST, mixed; mitotic rate 9 mitoses/5 mm2, G2, high grade, high risk, margins negative. Cd117+, pathologic T3NxMx.  He had incisional wound infection after the surgery and took antibiotics for that.   2. Started Gleevec on 4/11/18. Foundation One results came back on 4/24/18 neg for KIT mutations. Long discussion with Mr and Mrs Schmidt on 4/25 regarding likely the lack of benefit from adjuvant Gleevec (please see  "note from that day for details). Mr. Schmidt would like to continue with Gleevec for now. He stopped Gleevec after he went to see Dr. Guaman at [facility] CT abdomen/pelvis on 18 was negative for recurrent disease.   3. CT scan on 19 showed a Nonspecific hepatic hypodensity at the dome of the liver near the inferior vena cava   4. S/p liver lesion biopsy and ablation at [facility] on 19. Biopsy showed metastatic GIST positive for DOG-1 and . The amount of tumor was insufficient for molecular testing.   5. Surveillance MRI on 2020 showed "a right external iliac metastatic deposit measuring 4.6 x 3.5 cm (axial stir series 9, image 20), previously measuring 1.3 cm.  There is a metastatic deposit within the right anterior pelvis measuring 1.9 cm (series 9, image 13), previously not seen."        History of Present Illness:   The patient remains on Gleevec and overall feels well today.  His most recent labs are essentially within normal limits.  He denies fevers, chills or night sweats.  He denies any worsening abdominal discomfort.  He states that he is receiving the medication in a timely fashion via the mail.  He denies any CNS type symptoms.  He denies nausea, vomiting, diarrhea or constipation.  He denies jaundice or icterus.  He states that his weight is stable.        ECO     ROS:   See HPI, otherwise negative.      Physical Examination:     Gen: well hydrated, well developed. In no acute distress  HEENT: normocephalic, PERRLA, EOMI  Lungs:  No respiratory distress  Neuro: alert and oriented x 4, in no acute distress  Psych: pleasant and appropriate mood and affect  Skin: no ulcer or open wound        Objective:      Laboratory Data:  Lab Visit on 2020   Component Date Value Ref Range Status    WBC 2020 4.17  3.90 - 12.70 K/uL Final    RBC 2020 4.59* 4.60 - 6.20 M/uL Final    Hemoglobin 2020 14.4  14.0 - 18.0 g/dL Final    Hematocrit 2020 41.5  40.0 " - 54.0 % Final    Mean Corpuscular Volume 04/13/2020 90  82 - 98 fL Final    Mean Corpuscular Hemoglobin 04/13/2020 31.4* 27.0 - 31.0 pg Final    Mean Corpuscular Hemoglobin Conc 04/13/2020 34.7  32.0 - 36.0 g/dL Final    RDW 04/13/2020 13.8  11.5 - 14.5 % Final    Platelets 04/13/2020 176  150 - 350 K/uL Final    MPV 04/13/2020 8.7* 9.2 - 12.9 fL Final    Immature Granulocytes 04/13/2020 0.2  0.0 - 0.5 % Final    Gran # (ANC) 04/13/2020 2.6  1.8 - 7.7 K/uL Final    Immature Grans (Abs) 04/13/2020 0.01  0.00 - 0.04 K/uL Final    Comment: Mild elevation in immature granulocytes is non specific and   can be seen in a variety of conditions including stress response,   acute inflammation, trauma and pregnancy. Correlation with other   laboratory and clinical findings is essential.      Lymph # 04/13/2020 0.7* 1.0 - 4.8 K/uL Final    Mono # 04/13/2020 0.7  0.3 - 1.0 K/uL Final    Eos # 04/13/2020 0.2  0.0 - 0.5 K/uL Final    Baso # 04/13/2020 0.02  0.00 - 0.20 K/uL Final    nRBC 04/13/2020 0  0 /100 WBC Final    Gran% 04/13/2020 61.7  38.0 - 73.0 % Final    Lymph% 04/13/2020 17.5* 18.0 - 48.0 % Final    Mono% 04/13/2020 15.8* 4.0 - 15.0 % Final    Eosinophil% 04/13/2020 4.3  0.0 - 8.0 % Final    Basophil% 04/13/2020 0.5  0.0 - 1.9 % Final    Differential Method 04/13/2020 Automated   Final    Sodium 04/13/2020 139  136 - 145 mmol/L Final    Potassium 04/13/2020 3.8  3.5 - 5.1 mmol/L Final    Chloride 04/13/2020 102  95 - 110 mmol/L Final    CO2 04/13/2020 30* 23 - 29 mmol/L Final    Glucose 04/13/2020 122* 70 - 110 mg/dL Final    BUN, Bld 04/13/2020 22  8 - 23 mg/dL Final    Creatinine 04/13/2020 1.4  0.5 - 1.4 mg/dL Final    Calcium 04/13/2020 8.9  8.7 - 10.5 mg/dL Final    Total Protein 04/13/2020 7.1  6.0 - 8.4 g/dL Final    Albumin 04/13/2020 4.0  3.5 - 5.2 g/dL Final    Total Bilirubin 04/13/2020 0.3  0.1 - 1.0 mg/dL Final    Comment: For infants and newborns, interpretation of results  should be based  on gestational age, weight and in agreement with clinical  observations.  Premature Infant recommended reference ranges:  Up to 24 hours.............<8.0 mg/dL  Up to 48 hours............<12.0 mg/dL  3-5 days..................<15.0 mg/dL  6-29 days.................<15.0 mg/dL      Alkaline Phosphatase 04/13/2020 69  55 - 135 U/L Final    AST 04/13/2020 36  10 - 40 U/L Final    ALT 04/13/2020 32  10 - 44 U/L Final    Anion Gap 04/13/2020 7* 8 - 16 mmol/L Final    eGFR if  04/13/2020 56.4* >60 mL/min/1.73 m^2 Final    eGFR if non African American 04/13/2020 48.8* >60 mL/min/1.73 m^2 Final    Comment: Calculation used to obtain the estimated glomerular filtration  rate (eGFR) is the CKD-EPI equation.             Imaging Data:  MRI abdomen/pelvis 2/11/2020:  Liver: There is a 2.4 cm ablation cavity (series 13, image 37), unchanged in size and appearance from prior, without solid or nodular enhancement to suggest recurrent or residual tumor.  Few subcentimeter foci of apparent hyperenhancement noted within the right hepatic lobe, similar to slightly increased in conspicuity when compared to the previous MRI and overall indeterminate noting no corresponding diffusion signal abnormality.  Hepatic and portal veins are patent.    Gallbladder: Unremarkable.  No cholelithiasis or evidence of gallbladder wall thickening.    Biliary: No intra or extrahepatic biliary ductal dilatation.    Pancreas: Again seen is a 0.5 cm cyst within the pancreatic head, likely an IPMN.    Spleen: Unremarkable.    Adrenals: Unremarkable.    Kidneys: There are bilateral simple renal cortical and parapelvic cysts.  No enhancing renal mass or evidence of hydronephrosis.    GI tract/mesentery: There is a T1 and T2 hypointense lesion adjacent to the sigmoid colon corresponding to a high density focus on prior CT, likely representing a sigmoid diverticulum.  No evidence of bowel obstruction or inflammation.  There  are postsurgical changes of partial small bowel resection.    Peritoneal space: No ascites or free air.    Retroperitoneum: No evidence of lymphadenopathy.    Pelvis: There is a right external iliac metastatic deposit measuring 4.6 x 3.5 cm (axial stir series 9, image 20), previously measuring 1.3 cm.  There is a metastatic deposit within the right anterior pelvis measuring 1.9 cm (series 9, image 13), previously not seen.    Urinary bladder: Unremarkable.    Reproductive organs: The prostate and seminal vesicles are normal.    Vessels: Moderate atherosclerosis of the abdominal aorta.    Bones: Normal marrow signal.  No acute fracture or marrow replacing process.    Body wall: Ventral hernia containing small and large bowel without evidence of obstruction.      Impression       1. Enhancing lesions within the right external iliac station and the right anterior pelvis concerning for metastatic disease as detailed above.  2. Hepatic segment 7 ablation cavity without evidence of recurrent or residual tumor.  Few subcentimeter foci of arterial hyperenhancement throughout the right hepatic lobe, slightly increased in conspicuity when compared to the previous exam and presumably related to heterogeneous enhancement noting no corresponding diffusion signal abnormality.  Attention at follow-up recommended.  3. Unchanged cystic lesion within the pancreatic head, likely an IPMN.  This report was flagged in Epic as abnormal.          Assessment and Plan:      1. GIST (gastrointestinal stromal tumor) of small bowel, malignant        1.2  - Mr. Schmidt is a 75 yo man with stage IIIB GIST of the small intestine  (T3N0Mx), 6 cm, mitotic rate 9 mitoses/5 mm2, s/p resection on 2/15/18. His GIST is negative for KIT, PDGFRA, SDH, BRAF, NF1, NF2 mutations. He had oligometastatic disease to the liver found on CT scan 5/20/19. He went to [facility] and underwent IR liver lesion biopsy and ablation on 7/23/19. Pathology showed metastatic  GIST positive for DOG-1 and . The amount of tumor was insufficient for molecular testing.  There are metastatic deposit in the pelvis, one right external iliac deposit 4.6 x 3.5 cm, one within the right anterior pelvis 1.9 cm    - discussed that he has wild-type GIST in which case Gleevec may or may not work. Patient is also under the care of Dr Guaman's at [facility].  Overall he is tolerating his therapy well.  He seems to be tolerating his therapy well his labs are essentially normal today.  He is scheduled to go back to [facility] next month for repeat labs and imaging studies.  Currently they are under quarantine secondary to the viral pandemic and I have expressed to him that if for some reason he cannot get back to [facility] I will need to repeat these here locally and he is in agreement with this.  Tentatively I will see him back shortly after his return from [facility] to review his imaging studies and labs but he is aware to contact me with any temperature over 100.4° F or any new problems or difficulty with his [facility] visit and follow-up and we will certainly see him sooner.

## 2020-04-14 NOTE — Clinical Note
Pt is going to [facility] late next month so we need to see him June 5.  If he can't get into [facility] secondary to quarantine we will likely need to reschedule him earlier in May and he will call if there is any issue with the [facility] appointment.

## 2020-04-30 ENCOUNTER — TELEPHONE (OUTPATIENT)
Dept: PHARMACY | Facility: CLINIC | Age: 75
End: 2020-04-30

## 2020-04-30 NOTE — TELEPHONE ENCOUNTER
Contacted patient in regards to Gleevec refill. he has 10-12 tablets on hand - refill needed by around 5/10. Will ship 5/6 for him to receive 5/7. $28.80 copay, email sent to request [facility] and alerted the patient we will call him if it is not approved. Address confirmed. No changes in other medications, allergies, health conditions or missed doses.

## 2020-05-26 ENCOUNTER — OFFICE VISIT (OUTPATIENT)
Dept: CARDIOLOGY | Facility: CLINIC | Age: 75
End: 2020-05-26
Payer: MEDICARE

## 2020-05-26 VITALS
HEIGHT: 71 IN | SYSTOLIC BLOOD PRESSURE: 157 MMHG | DIASTOLIC BLOOD PRESSURE: 68 MMHG | BODY MASS INDEX: 25.9 KG/M2 | WEIGHT: 185 LBS | HEART RATE: 76 BPM

## 2020-05-26 DIAGNOSIS — I25.10 CORONARY ARTERY DISEASE INVOLVING NATIVE CORONARY ARTERY OF NATIVE HEART WITHOUT ANGINA PECTORIS: ICD-10-CM

## 2020-05-26 DIAGNOSIS — E78.00 HYPERCHOLESTEREMIA: ICD-10-CM

## 2020-05-26 DIAGNOSIS — C49.A3 GIST (GASTROINTESTINAL STROMAL TUMOR) OF SMALL BOWEL, MALIGNANT: Primary | Chronic | ICD-10-CM

## 2020-05-26 DIAGNOSIS — I10 ESSENTIAL HYPERTENSION: ICD-10-CM

## 2020-05-26 PROCEDURE — 99212 OFFICE O/P EST SF 10 MIN: CPT | Mod: PBBFAC,PO | Performed by: INTERNAL MEDICINE

## 2020-05-26 PROCEDURE — 99999 PR PBB SHADOW E&M-EST. PATIENT-LVL II: CPT | Mod: PBBFAC,,, | Performed by: INTERNAL MEDICINE

## 2020-05-26 PROCEDURE — 99999 PR PBB SHADOW E&M-EST. PATIENT-LVL II: ICD-10-PCS | Mod: PBBFAC,,, | Performed by: INTERNAL MEDICINE

## 2020-05-26 PROCEDURE — 99214 OFFICE O/P EST MOD 30 MIN: CPT | Mod: S$PBB,,, | Performed by: INTERNAL MEDICINE

## 2020-05-26 PROCEDURE — 99214 PR OFFICE/OUTPT VISIT, EST, LEVL IV, 30-39 MIN: ICD-10-PCS | Mod: S$PBB,,, | Performed by: INTERNAL MEDICINE

## 2020-05-26 RX ORDER — ATORVASTATIN CALCIUM 40 MG/1
40 TABLET, FILM COATED ORAL DAILY
Qty: 90 TABLET | Refills: 3 | Status: SHIPPED | OUTPATIENT
Start: 2020-05-26 | End: 2020-11-23 | Stop reason: SDUPTHER

## 2020-05-26 RX ORDER — AMLODIPINE BESYLATE 5 MG/1
5 TABLET ORAL DAILY
Qty: 90 TABLET | Refills: 3 | Status: SHIPPED | OUTPATIENT
Start: 2020-05-26 | End: 2020-11-23 | Stop reason: SDUPTHER

## 2020-05-26 RX ORDER — LOSARTAN POTASSIUM 100 MG/1
100 TABLET ORAL DAILY
Qty: 90 TABLET | Refills: 3 | Status: SHIPPED | OUTPATIENT
Start: 2020-05-26 | End: 2020-11-23 | Stop reason: SDUPTHER

## 2020-05-26 NOTE — PROGRESS NOTES
Subjective:    Patient ID:  Forrest Schmidt is a 75 y.o. male who presents for evaluation of Follow-up (follow up)      HPI 76 yo WM who had coronary stent in 2004 , HTN and HLD. Denies chest pain, SOB, or edema Denies palpitations, weak spells, and syncope . He keeps extensive record of BP and average 126/67    Review of Systems   Constitution: Negative for decreased appetite, fever, malaise/fatigue, weight gain and weight loss.   HENT: Negative for hearing loss and nosebleeds.    Eyes: Negative for visual disturbance.   Cardiovascular: Negative for chest pain, claudication, cyanosis, dyspnea on exertion, irregular heartbeat, leg swelling, near-syncope, orthopnea, palpitations, paroxysmal nocturnal dyspnea and syncope.   Respiratory: Positive for cough. Negative for hemoptysis, shortness of breath, sleep disturbances due to breathing, snoring and wheezing.    Endocrine: Negative for cold intolerance, heat intolerance, polydipsia and polyuria.   Hematologic/Lymphatic: Negative for adenopathy and bleeding problem. Does not bruise/bleed easily.   Skin: Negative for color change, itching, poor wound healing, rash and suspicious lesions.   Musculoskeletal: Positive for arthritis and joint pain. Negative for back pain, falls, joint swelling, muscle cramps, muscle weakness and myalgias.   Gastrointestinal: Negative for bloating, abdominal pain, change in bowel habit, constipation, flatus, heartburn, hematemesis, hematochezia, hemorrhoids, jaundice, melena, nausea and vomiting.   Genitourinary: Negative for bladder incontinence, decreased libido, frequency, hematuria, hesitancy and urgency.   Neurological: Negative for brief paralysis, difficulty with concentration, excessive daytime sleepiness, dizziness, focal weakness, headaches, light-headedness, loss of balance, numbness, vertigo and weakness.   Psychiatric/Behavioral: Negative for altered mental status, depression and memory loss. The patient does not have insomnia and  "is not nervous/anxious.    Allergic/Immunologic: Negative for environmental allergies, hives and persistent infections.        Objective:    Physical Exam   Constitutional: He is oriented to person, place, and time. He appears well-developed and well-nourished. No distress.   BP (!) 157/68   Pulse 76   Ht 5' 11" (1.803 m)   Wt 83.9 kg (185 lb)   BMI 25.80 kg/m²      HENT:   Head: Normocephalic and atraumatic.   Eyes: Pupils are equal, round, and reactive to light. Conjunctivae and lids are normal. Right eye exhibits no discharge. No scleral icterus.   Neck: Normal range of motion. Neck supple. No JVD present. No tracheal deviation present. No thyromegaly present.   Cardiovascular: Normal rate, regular rhythm, S1 normal, S2 normal, normal heart sounds and intact distal pulses. Exam reveals no gallop and no friction rub.   No murmur heard.  Pulses:       Carotid pulses are 2+ on the right side, and 2+ on the left side.       Radial pulses are 2+ on the right side, and 2+ on the left side.        Femoral pulses are 2+ on the right side, and 2+ on the left side.       Popliteal pulses are 2+ on the right side, and 2+ on the left side.        Dorsalis pedis pulses are 2+ on the right side, and 2+ on the left side.        Posterior tibial pulses are 2+ on the right side, and 2+ on the left side.   Pulmonary/Chest: Effort normal and breath sounds normal. No respiratory distress. He has no wheezes. He has no rales. He exhibits no tenderness.   Abdominal: Soft. Bowel sounds are normal. He exhibits no distension and no mass. There is no hepatosplenomegaly or hepatomegaly. There is no tenderness. There is no rebound and no guarding.   Musculoskeletal: Normal range of motion. He exhibits no edema or tenderness.   Lymphadenopathy:     He has no cervical adenopathy.   Neurological: He is alert and oriented to person, place, and time. He has normal reflexes. No cranial nerve deficit. Coordination normal.   Skin: Skin is warm " and dry. No rash noted. He is not diaphoretic. No erythema. No pallor.   Psychiatric: He has a normal mood and affect. His speech is normal and behavior is normal. Judgment and thought content normal. Cognition and memory are normal.         Assessment:       1. 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 Coronary artery disease involving native coronary artery of native heart without angina pectoris    3. Hypercholesteremia    4. Essential hypertension         Plan:     The current medical regimen is effective;  continue present plan and medications.   BP controlled. Has lab at outside facility    No orders of the defined types were placed in this encounter.    Follow up in about 6 months (around 11/26/2020).

## 2020-06-02 ENCOUNTER — TELEPHONE (OUTPATIENT)
Dept: PHARMACY | Facility: CLINIC | Age: 75
End: 2020-06-02

## 2020-06-04 ENCOUNTER — TELEPHONE (OUTPATIENT)
Dept: HEMATOLOGY/ONCOLOGY | Facility: CLINIC | Age: 75
End: 2020-06-04

## 2020-06-05 ENCOUNTER — TELEPHONE (OUTPATIENT)
Dept: HEMATOLOGY/ONCOLOGY | Facility: CLINIC | Age: 75
End: 2020-06-05

## 2020-06-05 NOTE — TELEPHONE ENCOUNTER
Spoke with patient.  Explained to him, per dr oakes's request, he needs to speak with dr jiang about surgery. Canceled appointment with dr oakes for today.    Will send message to dr jiang's staff to contact patient with clinic appointment asap to discuss sx.      He understands dr jiang's staff will be in touch.    Message routed to dr jiang's staff

## 2020-06-08 NOTE — TELEPHONE ENCOUNTER
Called and spoke with Mr Schmidt. Informed Mr Schmidt, I was calling to rescheduled his appointment with Dr Umana. Asking which he preferred, a virtual or audio visit? Mr Schmidt asking to be scheduled with Dr Umana in clinic. Done. The appointment has been scheduled on June 11th @ 3:00 pm at [facility].

## 2020-06-11 ENCOUNTER — OFFICE VISIT (OUTPATIENT)
Dept: HEMATOLOGY/ONCOLOGY | Facility: CLINIC | Age: 75
End: 2020-06-11
Payer: MEDICARE

## 2020-06-11 VITALS
RESPIRATION RATE: 18 BRPM | OXYGEN SATURATION: 97 % | BODY MASS INDEX: 26.11 KG/M2 | WEIGHT: 186.5 LBS | HEIGHT: 71 IN | SYSTOLIC BLOOD PRESSURE: 154 MMHG | DIASTOLIC BLOOD PRESSURE: 73 MMHG | HEART RATE: 73 BPM

## 2020-06-11 DIAGNOSIS — I10 ESSENTIAL HYPERTENSION: ICD-10-CM

## 2020-06-11 DIAGNOSIS — C78.7 METASTASIS TO LIVER: ICD-10-CM

## 2020-06-11 DIAGNOSIS — C49.A3 GIST (GASTROINTESTINAL STROMAL TUMOR) OF SMALL BOWEL, MALIGNANT: Primary | ICD-10-CM

## 2020-06-11 DIAGNOSIS — C79.89 METASTATIC CANCER TO PELVIS: ICD-10-CM

## 2020-06-11 PROCEDURE — 99999 PR PBB SHADOW E&M-EST. PATIENT-LVL III: CPT | Mod: PBBFAC,,, | Performed by: INTERNAL MEDICINE

## 2020-06-11 PROCEDURE — 99214 PR OFFICE/OUTPT VISIT, EST, LEVL IV, 30-39 MIN: ICD-10-PCS | Mod: S$PBB,,, | Performed by: INTERNAL MEDICINE

## 2020-06-11 PROCEDURE — 99213 OFFICE O/P EST LOW 20 MIN: CPT | Mod: PBBFAC | Performed by: INTERNAL MEDICINE

## 2020-06-11 PROCEDURE — 99999 PR PBB SHADOW E&M-EST. PATIENT-LVL III: ICD-10-PCS | Mod: PBBFAC,,, | Performed by: INTERNAL MEDICINE

## 2020-06-11 PROCEDURE — 99214 OFFICE O/P EST MOD 30 MIN: CPT | Mod: S$PBB,,, | Performed by: INTERNAL MEDICINE

## 2020-06-11 NOTE — PROGRESS NOTES
"   Patient ID: Forrest Schmidt is a 75 y.o. male.     Chief Complaint:  Follow up for resected GIST, wild-type     DIAGNOSIS:   1. 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 Recurrent oligametastatic GIST in the liver found on 5/20/19. S/p ablation on 7/23/19 at [facility]  3. 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 He initially presented with melena, syncope, hypotension on 2/5/18 at OSH. Colonoscopy showed old blood in the terminal ileum. CT abdomen/pelvis showed an ileal mass that measures approximately 5 cm. He was transferred to [facility] and underwent segmental small bowel resection by Dr. Sanchez on 2/15/18. Pathology showed a 6-cm GIST, histologic type: GIST, mixed; mitotic rate 9 mitoses/5 mm2, G2, high grade, high risk, margins negative. Cd117+, pathologic T3NxMx.  He had incisional wound infection after the surgery and took antibiotics for that.   2. Started Gleevec on 4/11/18. Foundation One results came back on 4/24/18 neg for KIT mutations. Long discussion with Mr and Mrs Schmidt on 4/25 regarding likely the lack of benefit from adjuvant Gleevec (please see note from that day for details). Mr. Schmidt would like to continue with Gleevec for now. He stopped Gleevec after he went to see Dr. Guaman at [facility]. CT abdomen/pelvis on 11/12/18 was negative for recurrent disease.   3. CT scan on 5/20/19 showed a Nonspecific hepatic hypodensity at the dome of the liver near the inferior vena cava   4. S/p liver lesion biopsy and ablation at [facility] on 7/23/19. Biopsy showed metastatic GIST positive for DOG-1 and . The amount of tumor was insufficient for molecular testing.   5. Surveillance MRI on 2/11/2020 showed "a right external iliac metastatic deposit " "measuring 4.6 x 3.5 cm (axial stir series 9, image 20), previously measuring 1.3 cm.  There is a metastatic deposit within the right anterior pelvis measuring 1.9 cm (series 9, image 13), previously not seen."  6. Gleevec 400 mg daily from 3/14/20 to present. CT at [facility] on 20 showed progressive disease in the peritoneal masses        History of Present Illness:   Mr. Schmidt returns today for continued follow up. Has been feeling well. 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 Does not want to take narcotics. Seen by Dr Guaman. CT showed progression in the peritoneal masses. Surgery recommended. He has decided to proceed with surgery. Scheduled for 7/10.         ECO     ROS:   See HPI, otherwise negative.      Physical Examination:   Vital signs reviewed.   Gen: well hydrated, well developed. In no acute distress  HEENT: normocephalic, PERRLA, EOMI, oropharynx clear  Neck: supple, no cervical LAD  Lungs: CTAB, no wheezing, rales  Heart: RRR, no M/R/G  Abd:  +hernia in mid abdomen incision line. Soft, no tenderness, non-distended, no hepatosplenomegaly, BS present  Ext: no clubbing, cyanosis, or edema  Neuro: alert and oriented x 4, in no acute distress  Psych: pleasant and appropriate mood and affect  Skin: no ulcer or open wound        Objective:      Laboratory Data:  Labs reviewed. Unremarkable.      Imaging Data:  CT A/P 20:  In the peritoneal metastases have significantly increased in size. As an example, a 3.7 x 2.9 cm omental mass in the right lower quadrant (6/118), compared with 2.4 x 1.6 cm previously. 6.8 x 5.2 cm lobulated pelvic mass (6/136), compared with 5.2 x 4.2 cm previously.    There are postoperative changes in the in the small bowel. The bowel is unobstructed. Wide neck ventral abdominal wall hernias containing omental fat.    No pathologically enlarged retroperitoneal, retrocrural or pelvic lymph nodes.    The prostate and " seminal vesicles are unremarkable in CT. No bladder masses. No enlarged inguinal lymph nodes.    Bones:    Degenerative changes in the spine-axial skeleton.    IMPRESSION:    1. Stable appearance of hepatic metastasis.    2. Interval increase in size of peritoneal metastases.       Assessment and Plan:      1. 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 Metastasis to liver    3. 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 Essential hypertension        1-3  - Mr. Schmidt is a 76 yo man with stage IIIB GIST of the small intestine  (T3N0Mx), 6 cm, mitotic rate 9 mitoses/5 mm2, s/p resection on 2/15/18. His GIST is negative for KIT, PDGFRA, SDH, BRAF, NF1, NF2 mutations. He had oligometastatic disease to the liver found on CT scan 5/20/19. He went to [facility] and underwent IR liver lesion biopsy and ablation on 7/23/19. Pathology showed metastatic GIST positive for DOG-1 and .   - surveillance MRI in Feb 2020 showed progressive disease with new peritoneal mets. Patient was seen by Dr Guaman. Gleevec was recommended.   - CT after 2.5 month of Gleevec showed progressive peritoneal disease. Surgery is favored over regorafenib.   - Patient has decided to undergo surgery. Scheduled for surgery on 7/10.  - asked patient to let us know re plan after surgery. Will schedule accordingly. Will also touch base with Dr Guaman re plan of regorafenib vs close surveillance after surgery  - will upload discs to our system and print out pictures for wife as per patient's request    4.  - BP slightly elevated  - monitor     Their multiple questions were answered in the clinic. Encouraged to call should issues arise.

## 2020-06-12 ENCOUNTER — TELEPHONE (OUTPATIENT)
Dept: PHARMACY | Facility: CLINIC | Age: 75
End: 2020-06-12

## 2020-06-12 NOTE — TELEPHONE ENCOUNTER
"Imatinib follow-up:  Patient reached for ongoing imatinib therapy.  Mr. Schmidt is feeling well overall and is currently preparing for surgery.  He is unsure if he will continue the imatinib after surgery, or switch treatment.  He currently has enough medication on hand through 7/11, and surgery is scheduled for 7/10.  Patient aware he may decline refill at the next call if he wishes to wait until after surgery to see if he will continue.  At the time of the call, we reviewed the following.    Dosing, how taking Imatinib: Taking 1 - 400mg tablet in the morning with breakfast. Avoids grapefruit products.  Opened brand new bottle today.  No missed doses.  Storage: Room temperature in kitchen.  Handling: Continues to use medication bottle cap to avoid handling medication directly.  Side effects: Denies any rash, N/V/D, peripheral edema, etc.  He does have some facial swelling, but denies any difficulty breathing or shortness of breath.  Discussed with Dr. Umana yesterday but patient stated she did not "seem concerned".  Advised that facial swelling can be seen with Gleevec due to an increase in fluid retention, however, he should continue to monitor and if it worsens, he develops any difficulty swallowing or breathing, he should inform provider or seek immediate medical attention.  Additionally, he reports having a "red spot" on his shoulder recently.  He is unsure if it was from the medication, or a bite, but it was itchy.  It resolved with no intervention.    Recent infections: No recent infections, fevers or visits to the urgent care/ER.  He denies any unusual bruising or bleeding.    Pain: Back has been hurting for a few weeks - APAP didn't help much, so he stopped taking it.  He has been doing some back exercises that he utilized in past PT session.  Reports the pain doesn't hurt most of the time, but occurs only he bends over.  Advised should it become more frequent, chronic in nature or occur when standing or sitting, " "let [facility] or [facility] know as we may provide other treatment suggestions if needed.  Appetite: Eating well.  Drinking plenty of water.   Energy, fatigue: Good - no changes feeling "pretty good".  Continues to take care of all necessary tasks around the house and completes ADLs independently.  Health, mood, QOL: Only concern at this time is with upcoming surgery.  He is nervous.  Advised that the anesthesia and surgical team should review everything with him in detail prior to procedure which hopefully will put him at ease.  Also, there is some uncertainty of how his therapy will proceed after surgery.  They mentioned switching to Stivarga or possibly proceeding with observation depending on the outcome of surgery.  Next clinical follow up: 3 months if he remains on therapy.  Medication list reviewed. No new allergies or health conditions. Patient did not review current list, but states he has not had any changes.    Labs reviewed from: 4/13/2020:  WBC, ANC and platelets all normal.   Renal function stable, hepatic function normal.  Cholesterol and A1c normal.  No dose adjustment required at this time.  Therapy remains appropriate for now.          "

## 2020-06-29 ENCOUNTER — TELEPHONE (OUTPATIENT)
Dept: PHARMACY | Facility: CLINIC | Age: 75
End: 2020-06-29

## 2020-07-21 ENCOUNTER — TELEPHONE (OUTPATIENT)
Dept: PHARMACY | Facility: CLINIC | Age: 75
End: 2020-07-21

## 2020-07-21 DIAGNOSIS — C49.A3 GIST (GASTROINTESTINAL STROMAL TUMOR) OF SMALL BOWEL, MALIGNANT: Primary | ICD-10-CM

## 2020-07-21 NOTE — PROGRESS NOTES
Got email from Dr Guaman. Patient had an extensive surgery on 7/10/20. Dr Guaman recc adjuvant regorafenib 120 mg po with no weeks off. Called and spoke with Mr Schmidt. He is recovering from surgery. Discussed regorafenib. But he needs to start it at the earliest one month after surgery (after Aug 10) and definitely after the surgeons clear him. He understands and agrees with the plan. He will message me once he starts regorafenib. I will see him back one week after that.

## 2020-07-22 ENCOUNTER — PATIENT MESSAGE (OUTPATIENT)
Dept: HEMATOLOGY/ONCOLOGY | Facility: CLINIC | Age: 75
End: 2020-07-22

## 2020-07-23 ENCOUNTER — TELEPHONE (OUTPATIENT)
Dept: HEMATOLOGY/ONCOLOGY | Facility: CLINIC | Age: 75
End: 2020-07-23

## 2020-07-23 ENCOUNTER — TELEPHONE (OUTPATIENT)
Dept: PHARMACY | Facility: CLINIC | Age: 75
End: 2020-07-23

## 2020-07-23 DIAGNOSIS — R53.83 FATIGUE, UNSPECIFIED TYPE: ICD-10-CM

## 2020-07-23 DIAGNOSIS — C49.A3 GIST (GASTROINTESTINAL STROMAL TUMOR) OF SMALL BOWEL, MALIGNANT: Primary | Chronic | ICD-10-CM

## 2020-07-23 DIAGNOSIS — C49.A3 GIST (GASTROINTESTINAL STROMAL TUMOR) OF SMALL BOWEL, MALIGNANT: Primary | ICD-10-CM

## 2020-07-23 RX ORDER — SUNITINIB MALATE 37.5 MG/1
37.5 CAPSULE ORAL DAILY
Qty: 30 CAPSULE | Refills: 11 | Status: SHIPPED | OUTPATIENT
Start: 2020-07-23 | End: 2021-06-30 | Stop reason: SDUPTHER

## 2020-07-23 NOTE — TELEPHONE ENCOUNTER
DOCUMENTATION ONLY:  The prior authorization request for Stivarga 40 mg Tablet #84/28  was denied by the patient's insurance, the insurance wants the patient to try and fail Sutent first.   Forwarded to the clinical pharmacist for review.  Jojo BRUNSON

## 2020-07-23 NOTE — TELEPHONE ENCOUNTER
Notified by insurance company that they would not approve regorafenib unless patient fails sutent. Discussed with Dr Guaman who agrees with trying sutent first. Called and spoke with patient. Discussed side effects of sutent. Will mail out information of sutent to patient's house. Discussed that sutent can impair wound healing. He is to start sutent no sooner than one month after surgery (After Aug 10) and definitely after cleared by surgeon. He understands and agrees with the plan.

## 2020-07-23 NOTE — TELEPHONE ENCOUNTER
Spoke with patient.  Informed him of the Stivarga denial.  Discussed that Dr. Umana is aware and she is checking with his oncologist at [facility] to see if Sutent would be a good option for him since this is what the insurance prefers.  If so, she will switch him, if not, we will appeal the decision regarding Stivarga.  Discussed that we will keep him updated once we know more.  Patient expressed understanding.

## 2020-07-23 NOTE — TELEPHONE ENCOUNTER
Informed Patient  that [facility] received prescription for Sutent and prior authorization is required.  [facility] will be back in touch once insurance determination is received.

## 2020-07-24 NOTE — TELEPHONE ENCOUNTER
DOCUMENTATION ONLY:  Prior authorization for Sutent 37.5 mg Tablet #30/30 approved from 07/23/2020 to 07/23/2021  Case ID: [id number]    Co-pay: $79.80    Patient Assistance IS required. Sending to the financial assistance team to investigate assistance options. Jojo BRUNSON

## 2020-07-29 DIAGNOSIS — C49.A3 GIST (GASTROINTESTINAL STROMAL TUMOR) OF SMALL BOWEL, MALIGNANT: Primary | Chronic | ICD-10-CM

## 2020-07-29 RX ORDER — ONDANSETRON 8 MG/1
8 TABLET, ORALLY DISINTEGRATING ORAL EVERY 6 HOURS PRN
Qty: 30 TABLET | Refills: 2 | Status: SHIPPED | OUTPATIENT
Start: 2020-07-29 | End: 2023-01-01

## 2020-07-29 RX ORDER — PROMETHAZINE HYDROCHLORIDE 25 MG/1
25 TABLET ORAL EVERY 6 HOURS PRN
Qty: 60 TABLET | Refills: 2 | Status: SHIPPED | OUTPATIENT
Start: 2020-07-29 | End: 2023-01-01

## 2020-07-29 RX ORDER — METHOCARBAMOL 500 MG/1
500 TABLET, FILM COATED ORAL DAILY
COMMUNITY
End: 2020-10-20

## 2020-07-29 NOTE — TELEPHONE ENCOUNTER
Initial Sutent consult completed on 2020. Sutent will be shipped on 2020 to arrive at patient's home on 2020 via [employer]. $ 0.00 copay ([place] to pay $74.48 copay). Patient plans to start Sutent on 8/10/2020. Address confirmed. Confirmed 2 patient identifiers - name and . Therapy Appropriate.     Patient was counseled on the administration directions:  -Take 1 capsule (37.5 mg) by mouth once daily with or without food  -Do not chew, crush, or break the capsules.   If possible, patient was instructed tip the capsule from the RX bottle to the cap, and take directly from the cap to the mouth.  Patient may handle the medication with their hands if they wear with a latex or nitrile glove and wash their hands before and after handling the tablets.    Patient was counseled on the following possible side effects, which include, but are not limited to:  hypertension. peripheral edema, fatigue, headaches, skin discoloration, skin rash, hand-foot syndrome,  dry skin, diarrhea, nausea, mouth sores, vomiting, anemia, increased risk of infection, increased risk of bleeding, weakness, back pain, cough, and shortness of breath.  Patient was given Eucerin cream for dry skin, and hydrocortisone cream for dermatitis.     DDIs: Medication list reviewed, reviewed DDI with testosterone.      Patient was given 2 patient education handouts on how to handle oral chemotherapy and specific recommendations- do's and don'ts. Instructed the patient that if they have any remaining oral chemotherapy, not to flush down the toilet or throw away in the trash; the patient or caregiver should return the unused oral chemotherapy to either the clinic or call the pharmacy for instructions on how to dispose of the oral chemotherapy properly.     Patient confirmed understanding. Patient did not have additional questions.   Patient plans to start Sutent on 8/10/2020. Consultation included: indication; goals of treatment; administration;  storage and handling; side effects; how to handle side effects; the importance of compliance; how to handle missed doses; the importance of laboratory monitoring; the importance of keeping all follow up appointments.  Patient understands to report any medication changes to [facility] and provider. All questions answered and addressed to patients satisfaction. I will f/u with patient in 1 week from start, [facility] to contact patient in 3 weeks for refills.

## 2020-08-06 ENCOUNTER — TELEPHONE (OUTPATIENT)
Dept: HEMATOLOGY/ONCOLOGY | Facility: CLINIC | Age: 75
End: 2020-08-06

## 2020-08-06 NOTE — TELEPHONE ENCOUNTER
"Call to pt.  Pt unavailable.   Left message for pt to return call.   Callback number provided.       ----- Message from Robert Umana MD sent at 8/6/2020  4:03 PM CDT -----  It may be the surgery team at [facility] who wants home health. Can you call patient and ask if there is specific service patient needs other than home aide, PT/OT (I don't know if he needs ostomy bag care etc.)  ----- Message -----  From: Kaycee Abraham  Sent: 8/6/2020   9:31 AM CDT  To: Robert Umana MD    Looks like last seen in June- are you agreeable to set up HH? If so, need orders and will have Suzanne assist with setting up?  Thanks  ----- Message -----  From: Josy Li RN  Sent: 8/6/2020   9:23 AM CDT  To: Eneida Ayon RN      ----- Message -----  From: Peter Araya  Sent: 8/6/2020   9:13 AM CDT  To: [facility] Staff    Consult/Advisory:    Name Of Caller: [facility]   Relationship to the Pt?: [facility]   Contact Preference?: [phone] Fax [phone]    Provider Name: Dr Umana     What is the nature of the call?:  Home health agency is requesting an order to admit him to  home health services in [place] per patient's request since he has been discharged from [place] location after surgery at [facility] sent via fax [phone] ASAP Please contact to discuss    Additional Notes:  "Thank you for all that you do for our patients'"                   "

## 2020-08-06 NOTE — TELEPHONE ENCOUNTER
"Message fwd to MD.       ----- Message from Josy Li RN sent at 8/6/2020  9:23 AM CDT -----    ----- Message -----  From: Peter Araya  Sent: 8/6/2020   9:13 AM CDT  To: Dong Jones Staff    Consult/Advisory:    Name Of Caller: [facility]   Relationship to the Pt?: [facility]   Contact Preference?: [phone] Fax [phone]    Provider Name: Dr Umana     What is the nature of the call?:  Home health agency is requesting an order to admit him to  home health services in [place] per patient's request since he has been discharged from [place] location after surgery at [facility] sent via fax [phone] ASAP Please contact to discuss    Additional Notes:  "Thank you for all that you do for our patients'"               "

## 2020-08-07 ENCOUNTER — TELEPHONE (OUTPATIENT)
Dept: HEMATOLOGY/ONCOLOGY | Facility: CLINIC | Age: 75
End: 2020-08-07

## 2020-08-07 NOTE — TELEPHONE ENCOUNTER
[facility]oke to [facility] .   Who is stating that this request is a continuation of   Home health order post op from [facility] . Care was  With sister agency and request being made to transfer to local agency.  Need provider to accept order.  [facility] will send copy of orders that were being received for Dr Jiang and then can be ordered.  Will forward to Dr jiang when receive

## 2020-08-07 NOTE — TELEPHONE ENCOUNTER
"----- Message from Nimco Leija MA sent at 8/7/2020  3:17 PM CDT -----    ----- Message -----  From: Josy Li RN  Sent: 8/7/2020   3:02 PM CDT  To: Eneida Ayon RN, Nimco Leija MA      ----- Message -----  From: Mary Tovar  Sent: 8/7/2020   2:51 PM CDT  To: [facility] Staff    Patient Assist    Name of caller: Home Health   Provider name:  Dong Jones MD   Contact Preference:  [phone] / Fax [phone]  Current patient or new patient?:  Does Patient feel the need to see the MD today?  What is the nature of the call?    - need orders for home health sent to [facility].     Additional Notes:   "Thank you for all that you do for our patients'"              "

## 2020-08-09 ENCOUNTER — PATIENT MESSAGE (OUTPATIENT)
Dept: HEMATOLOGY/ONCOLOGY | Facility: CLINIC | Age: 75
End: 2020-08-09

## 2020-08-10 DIAGNOSIS — C49.A3 GIST (GASTROINTESTINAL STROMAL TUMOR) OF SMALL BOWEL, MALIGNANT: Chronic | ICD-10-CM

## 2020-08-10 DIAGNOSIS — K63.89 MASS OF SMALL INTESTINE: Primary | ICD-10-CM

## 2020-08-11 ENCOUNTER — DOCUMENTATION ONLY (OUTPATIENT)
Dept: HEMATOLOGY/ONCOLOGY | Facility: CLINIC | Age: 75
End: 2020-08-11

## 2020-08-11 NOTE — PROGRESS NOTES
Consult received to arrange for home health. Pt. Currently followed by [facility]  ([phone]). Contacted [facility], VM left awaiting to return call from  ([facility]). Will follow.

## 2020-08-12 NOTE — PROGRESS NOTES
Spoke with [facility] at [facility], faxed  orders to the office ([phone]). Pt. Will be seen for services beginning tomorrow (8/12/20). No other needs identified.

## 2020-08-17 ENCOUNTER — OFFICE VISIT (OUTPATIENT)
Dept: HEMATOLOGY/ONCOLOGY | Facility: CLINIC | Age: 75
End: 2020-08-17
Payer: MEDICARE

## 2020-08-17 ENCOUNTER — LAB VISIT (OUTPATIENT)
Dept: LAB | Facility: HOSPITAL | Age: 75
End: 2020-08-17
Attending: INTERNAL MEDICINE
Payer: MEDICARE

## 2020-08-17 VITALS
RESPIRATION RATE: 16 BRPM | SYSTOLIC BLOOD PRESSURE: 137 MMHG | WEIGHT: 164.69 LBS | BODY MASS INDEX: 23.06 KG/M2 | DIASTOLIC BLOOD PRESSURE: 84 MMHG | TEMPERATURE: 98 F | OXYGEN SATURATION: 97 % | HEART RATE: 75 BPM | HEIGHT: 71 IN

## 2020-08-17 DIAGNOSIS — C78.7 METASTASIS TO LIVER: ICD-10-CM

## 2020-08-17 DIAGNOSIS — C49.A3 GIST (GASTROINTESTINAL STROMAL TUMOR) OF SMALL BOWEL, MALIGNANT: Primary | ICD-10-CM

## 2020-08-17 DIAGNOSIS — D75.839 THROMBOCYTOSIS: ICD-10-CM

## 2020-08-17 DIAGNOSIS — R53.83 FATIGUE, UNSPECIFIED TYPE: ICD-10-CM

## 2020-08-17 DIAGNOSIS — D64.9 ANEMIA, UNSPECIFIED TYPE: ICD-10-CM

## 2020-08-17 DIAGNOSIS — R51.9 CHRONIC NONINTRACTABLE HEADACHE, UNSPECIFIED HEADACHE TYPE: ICD-10-CM

## 2020-08-17 DIAGNOSIS — C49.A3 GIST (GASTROINTESTINAL STROMAL TUMOR) OF SMALL BOWEL, MALIGNANT: Chronic | ICD-10-CM

## 2020-08-17 DIAGNOSIS — G89.3 CANCER-RELATED PAIN: ICD-10-CM

## 2020-08-17 DIAGNOSIS — G89.29 CHRONIC NONINTRACTABLE HEADACHE, UNSPECIFIED HEADACHE TYPE: ICD-10-CM

## 2020-08-17 DIAGNOSIS — C79.89 METASTATIC CANCER TO PELVIS: ICD-10-CM

## 2020-08-17 LAB
ALBUMIN SERPL BCP-MCNC: 3.3 G/DL (ref 3.5–5.2)
ALP SERPL-CCNC: 123 U/L (ref 55–135)
ALT SERPL W/O P-5'-P-CCNC: 19 U/L (ref 10–44)
ANION GAP SERPL CALC-SCNC: 8 MMOL/L (ref 8–16)
AST SERPL-CCNC: 19 U/L (ref 10–40)
BASOPHILS # BLD AUTO: 0.05 K/UL (ref 0–0.2)
BASOPHILS NFR BLD: 0.7 % (ref 0–1.9)
BILIRUB SERPL-MCNC: 0.2 MG/DL (ref 0.1–1)
BUN SERPL-MCNC: 20 MG/DL (ref 8–23)
CALCIUM SERPL-MCNC: 9 MG/DL (ref 8.7–10.5)
CHLORIDE SERPL-SCNC: 103 MMOL/L (ref 95–110)
CO2 SERPL-SCNC: 27 MMOL/L (ref 23–29)
CREAT SERPL-MCNC: 1.1 MG/DL (ref 0.5–1.4)
DIFFERENTIAL METHOD: ABNORMAL
EOSINOPHIL # BLD AUTO: 0.3 K/UL (ref 0–0.5)
EOSINOPHIL NFR BLD: 4 % (ref 0–8)
ERYTHROCYTE [DISTWIDTH] IN BLOOD BY AUTOMATED COUNT: 14.3 % (ref 11.5–14.5)
EST. GFR  (AFRICAN AMERICAN): >60 ML/MIN/1.73 M^2
EST. GFR  (NON AFRICAN AMERICAN): >60 ML/MIN/1.73 M^2
GLUCOSE SERPL-MCNC: 99 MG/DL (ref 70–110)
HCT VFR BLD AUTO: 36.9 % (ref 40–54)
HGB BLD-MCNC: 11.3 G/DL (ref 14–18)
IMM GRANULOCYTES # BLD AUTO: 0.02 K/UL (ref 0–0.04)
IMM GRANULOCYTES NFR BLD AUTO: 0.3 % (ref 0–0.5)
LYMPHOCYTES # BLD AUTO: 0.9 K/UL (ref 1–4.8)
LYMPHOCYTES NFR BLD: 13.2 % (ref 18–48)
MCH RBC QN AUTO: 29.7 PG (ref 27–31)
MCHC RBC AUTO-ENTMCNC: 30.6 G/DL (ref 32–36)
MCV RBC AUTO: 97 FL (ref 82–98)
MONOCYTES # BLD AUTO: 0.7 K/UL (ref 0.3–1)
MONOCYTES NFR BLD: 10 % (ref 4–15)
NEUTROPHILS # BLD AUTO: 5 K/UL (ref 1.8–7.7)
NEUTROPHILS NFR BLD: 71.8 % (ref 38–73)
NRBC BLD-RTO: 0 /100 WBC
PHOSPHATE SERPL-MCNC: 3.1 MG/DL (ref 2.7–4.5)
PLATELET # BLD AUTO: 430 K/UL (ref 150–350)
PMV BLD AUTO: 9 FL (ref 9.2–12.9)
POTASSIUM SERPL-SCNC: 4.3 MMOL/L (ref 3.5–5.1)
PROT SERPL-MCNC: 7.2 G/DL (ref 6–8.4)
RBC # BLD AUTO: 3.8 M/UL (ref 4.6–6.2)
SODIUM SERPL-SCNC: 138 MMOL/L (ref 136–145)
TSH SERPL DL<=0.005 MIU/L-ACNC: 1.54 UIU/ML (ref 0.4–4)
WBC # BLD AUTO: 6.98 K/UL (ref 3.9–12.7)

## 2020-08-17 PROCEDURE — 85025 COMPLETE CBC W/AUTO DIFF WBC: CPT

## 2020-08-17 PROCEDURE — 99999 PR PBB SHADOW E&M-EST. PATIENT-LVL V: ICD-10-PCS | Mod: PBBFAC,,, | Performed by: INTERNAL MEDICINE

## 2020-08-17 PROCEDURE — 99214 PR OFFICE/OUTPT VISIT, EST, LEVL IV, 30-39 MIN: ICD-10-PCS | Mod: S$PBB,,, | Performed by: INTERNAL MEDICINE

## 2020-08-17 PROCEDURE — 36415 COLL VENOUS BLD VENIPUNCTURE: CPT

## 2020-08-17 PROCEDURE — 99215 OFFICE O/P EST HI 40 MIN: CPT | Mod: PBBFAC | Performed by: INTERNAL MEDICINE

## 2020-08-17 PROCEDURE — 84443 ASSAY THYROID STIM HORMONE: CPT

## 2020-08-17 PROCEDURE — 84100 ASSAY OF PHOSPHORUS: CPT

## 2020-08-17 PROCEDURE — 99214 OFFICE O/P EST MOD 30 MIN: CPT | Mod: S$PBB,,, | Performed by: INTERNAL MEDICINE

## 2020-08-17 PROCEDURE — 80053 COMPREHEN METABOLIC PANEL: CPT

## 2020-08-17 PROCEDURE — 99999 PR PBB SHADOW E&M-EST. PATIENT-LVL V: CPT | Mod: PBBFAC,,, | Performed by: INTERNAL MEDICINE

## 2020-08-17 RX ORDER — TRAMADOL HYDROCHLORIDE 50 MG/1
50 TABLET ORAL EVERY 6 HOURS PRN
Qty: 60 TABLET | Refills: 0 | Status: SHIPPED | OUTPATIENT
Start: 2020-08-17 | End: 2020-10-20

## 2020-08-17 NOTE — TELEPHONE ENCOUNTER
Start date for Sutent: 8/10/20  Dosing, how taking: Takes 1 capsule (37.5mg) by mouth every morning with breakfast.. Denies any missed doses.   Storage: room temperature  Handling: pours into cap of container  Side effects: notes no side effects. Denies any issues with N/V/D

## 2020-08-17 NOTE — PROGRESS NOTES
"Patient ID: Forrest Schmidt is a 75 y.o. male.     Chief Complaint:  Follow up for resected GIST, wild-type     DIAGNOSIS:   1. 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 Recurrent oligametastatic GIST in the liver found on 5/20/19. S/p ablation on 7/23/19 at [facility]  3. 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 He initially presented with melena, syncope, hypotension on 2/5/18 at OSH. Colonoscopy showed old blood in the terminal ileum. CT abdomen/pelvis showed an ileal mass that measures approximately 5 cm. He was transferred to [facility] and underwent segmental small bowel resection by Dr. Sanchez on 2/15/18. Pathology showed a 6-cm GIST, histologic type: GIST, mixed; mitotic rate 9 mitoses/5 mm2, G2, high grade, high risk, margins negative. Cd117+, pathologic T3NxMx.  He had incisional wound infection after the surgery and took antibiotics for that.   2. Started Gleevec on 4/11/18. Foundation One results came back on 4/24/18 neg for KIT mutations. Long discussion with Mr and Mrs Schmidt on 4/25 regarding likely the lack of benefit from adjuvant Gleevec (please see note from that day for details). Mr. Schmidt would like to continue with Gleevec for now. He stopped Gleevec after he went to see Dr. Guaman at [facility]. CT abdomen/pelvis on 11/12/18 was negative for recurrent disease.   3. CT scan on 5/20/19 showed a Nonspecific hepatic hypodensity at the dome of the liver near the inferior vena cava   4. S/p liver lesion biopsy and ablation at [facility] on 7/23/19. Biopsy showed metastatic GIST positive for DOG-1 and . The amount of tumor was insufficient for molecular testing.   5. Surveillance MRI on 2/11/2020 showed "a right external iliac metastatic deposit measuring " "4.6 x 3.5 cm (axial stir series 9, image 20), previously measuring 1.3 cm.  There is a metastatic deposit within the right anterior pelvis measuring 1.9 cm (series 9, image 13), previously not seen."  6. Gleevec 400 mg daily from 3/14/20 to present. CT at [facility] on 20 showed progressive disease in the peritoneal masses.   7. Underwent cytoreductive surgery at [facility] on 7/10/2020.   8. Dr Guaman recommends starting regorafenib after surgery. However, insurance does not cover it unless patient fails sutent. Patient started sutent 37.5 mg daily on 8/10/2020.      History of Present Illness:   Mr. Schmidt returns today for continued follow up. He had cytoreductive surgery at [facility] on 7/10/20. Surgery was uneventful. Dr Guaman recommends regorafenib. However, insurance does not cover it unless patient fails sutent. Patient started sutent one week ago. Does have headaches. Denies other complaints.         ECO     ROS:   See HPI, otherwise negative.      Physical Examination:   Vital signs reviewed.   Gen: well hydrated, well developed. In no acute distress  HEENT: normocephalic, PERRLA, EOMI, oropharynx clear  Neck: supple, no cervical LAD  Lungs: CTAB, no wheezing, rales  Heart: RRR, no M/R/G  Abd:  Soft, no tenderness, non-distended, no hepatosplenomegaly, midline surgical scar well healed. No mass. BS present  Ext: no clubbing, cyanosis, or edema  Neuro: alert and oriented x 4, in no acute distress  Psych: pleasant and appropriate mood and affect  Skin: no ulcer or open wound        Objective:      Laboratory Data:  Labs reviewed. Unremarkable.      Imaging Data:  CT A/P 20:  In the peritoneal metastases have significantly increased in size. As an example, a 3.7 x 2.9 cm omental mass in the right lower quadrant (6/118), compared with 2.4 x 1.6 cm previously. 6.8 x 5.2 cm lobulated pelvic mass (6/136), compared with 5.2 x 4.2 cm previously.    There are postoperative changes in the in the small bowel. The " bowel is unobstructed. Wide neck ventral abdominal wall hernias containing omental fat.    No pathologically enlarged retroperitoneal, retrocrural or pelvic lymph nodes.    The prostate and seminal vesicles are unremarkable in CT. No bladder masses. No enlarged inguinal lymph nodes.    Bones:    Degenerative changes in the spine-axial skeleton.    IMPRESSION:    1. Stable appearance of hepatic metastasis.    2. Interval increase in size of peritoneal metastases.     CT C/A/P 7/7/2020:  Chest:  Stable 5 mm nodule left lower lobe series 8 image 191.  No suspicious new pulmonary nodules identified atelectasis lung bases  Stable Prominent borderline mediastinal hilar lymphadenopathy again noted. Right hilar lymph node measuring 1 x 1.3 cm, series 3 image 56 and 0.9 cm image 57.  Left hilar lymphadenopathy measuring 1.2 cm image 63.  Stable subcentimeter axillary lymph nodes.    Abdomen pelvis:  Stable appearance of segment 7 metastasis series 3 image 165 measuring 2.1 x 1.7 cm, no suspicious new liver lesions.  No significant bile duct dilation  Gallbladder not significantly distended CBD normal caliber.  Stable appearance of the pancreas, spleen and bilateral adrenal glands.  Stable bilateral adrenal glands.  Stable appearance of the bilateral kidneys, parapelvic cysts noted  Stable parenchymal cysts. No hydronephrosis.  Stable borderline mesenteric lymphadenopathy noted, series 3 image 219 measures 1.7 x 1.7 cm.  Stable subcentimeter retroperitoneal lymph nodes.  Prominent right pelvic mass series 3 image 279 measures 6.5 x 5.4 cm before measuring 6.8 x 5.1 cm.  Right iliac mass measures 2 x 1.5 cm series 3 image 265 before measured 1.4 x 1.3 cm.  Right iliac fossa mass measures 4.5 x 4 cm image 257 before measuring 2.9 x 3.6 cm.  Ventral hernia noted no evidence of bowel obstruction.  prostate mildly enlarged measures 5 cm  No suspicious new osseous lesions    IMPRESSION:  Interval increase in size of peritoneal  pelvic masses in comparison to prior study  Prominent mesenteric lymphadenopathy  Stable prominent borderline mediastinal and hilar lymphadenopathy  Stable hepatic metastasis.     Assessment and Plan:      1. 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 Metastasis to liver    3. 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 Cancer-related pain    5. Chronic nonintractable headache, unspecified headache type    6. Anemia, unspecified type    7. Thrombocytosis        1-3  - Mr. Schmidt is a 76 yo man with stage IIIB GIST of the small intestine  (T3N0Mx), 6 cm, mitotic rate 9 mitoses/5 mm2, s/p resection on 2/15/18. His GIST is negative for KIT, PDGFRA, SDH, BRAF, NF1, NF2 mutations. He had oligometastatic disease to the liver found on CT scan 5/20/19. He went to [facility] and underwent IR liver lesion biopsy and ablation on 7/23/19. Pathology showed metastatic GIST positive for DOG-1 and .   - surveillance MRI in Feb 2020 showed progressive disease with new peritoneal mets. Patient was seen by Dr Guaman. Gleevec was recommended.   - CT after 2.5 month of Gleevec showed progressive peritoneal disease.   - underwent cytoreductive surgery at [facility] on 7/10/20  - started sutent 37.5 mg daily on 8/10/20. Tolerating it well  - c/w sutent  - labs and virtual visit in 2 weeks    4.5.  - start tramadol prn    6.  - 2/2 surgery. Mild  - monitor    7.  - likely reactive.   - monitor    Their multiple questions were answered in the clinic. Encouraged to call should issues arise.

## 2020-08-27 ENCOUNTER — PATIENT MESSAGE (OUTPATIENT)
Dept: CARDIOLOGY | Facility: CLINIC | Age: 75
End: 2020-08-27

## 2020-08-28 RX ORDER — NITROGLYCERIN 0.4 MG/1
TABLET SUBLINGUAL
Qty: 30 TABLET | Refills: 5 | Status: SHIPPED | OUTPATIENT
Start: 2020-08-28 | End: 2022-02-25

## 2020-08-31 ENCOUNTER — LAB VISIT (OUTPATIENT)
Dept: LAB | Facility: HOSPITAL | Age: 75
End: 2020-08-31
Attending: INTERNAL MEDICINE
Payer: MEDICARE

## 2020-08-31 ENCOUNTER — TELEPHONE (OUTPATIENT)
Dept: PHARMACY | Facility: CLINIC | Age: 75
End: 2020-08-31

## 2020-08-31 DIAGNOSIS — C49.A3 GIST (GASTROINTESTINAL STROMAL TUMOR) OF SMALL BOWEL, MALIGNANT: ICD-10-CM

## 2020-08-31 LAB
BASOPHILS # BLD AUTO: 0.02 K/UL (ref 0–0.2)
BASOPHILS NFR BLD: 0.5 % (ref 0–1.9)
BILIRUB UR QL STRIP: NEGATIVE
CLARITY UR: CLEAR
COLOR UR: YELLOW
DIFFERENTIAL METHOD: ABNORMAL
EOSINOPHIL # BLD AUTO: 0.3 K/UL (ref 0–0.5)
EOSINOPHIL NFR BLD: 7.7 % (ref 0–8)
ERYTHROCYTE [DISTWIDTH] IN BLOOD BY AUTOMATED COUNT: 15.9 % (ref 11.5–14.5)
GLUCOSE UR QL STRIP: NEGATIVE
HCT VFR BLD AUTO: 36.5 % (ref 40–54)
HGB BLD-MCNC: 11.8 G/DL (ref 14–18)
HGB UR QL STRIP: NEGATIVE
IMM GRANULOCYTES # BLD AUTO: 0.01 K/UL (ref 0–0.04)
IMM GRANULOCYTES NFR BLD AUTO: 0.3 % (ref 0–0.5)
KETONES UR QL STRIP: NEGATIVE
LEUKOCYTE ESTERASE UR QL STRIP: NEGATIVE
LYMPHOCYTES # BLD AUTO: 0.7 K/UL (ref 1–4.8)
LYMPHOCYTES NFR BLD: 20.1 % (ref 18–48)
MCH RBC QN AUTO: 30.6 PG (ref 27–31)
MCHC RBC AUTO-ENTMCNC: 32.3 G/DL (ref 32–36)
MCV RBC AUTO: 95 FL (ref 82–98)
MONOCYTES # BLD AUTO: 0.5 K/UL (ref 0.3–1)
MONOCYTES NFR BLD: 14.8 % (ref 4–15)
NEUTROPHILS # BLD AUTO: 2.1 K/UL (ref 1.8–7.7)
NEUTROPHILS NFR BLD: 56.6 % (ref 38–73)
NITRITE UR QL STRIP: NEGATIVE
NRBC BLD-RTO: 0 /100 WBC
PH UR STRIP: 6 [PH] (ref 5–8)
PLATELET # BLD AUTO: 169 K/UL (ref 150–350)
PMV BLD AUTO: 9 FL (ref 9.2–12.9)
PROT UR QL STRIP: NEGATIVE
RBC # BLD AUTO: 3.85 M/UL (ref 4.6–6.2)
SP GR UR STRIP: 1.01 (ref 1–1.03)
URN SPEC COLLECT METH UR: NORMAL
WBC # BLD AUTO: 3.64 K/UL (ref 3.9–12.7)

## 2020-08-31 PROCEDURE — 80053 COMPREHEN METABOLIC PANEL: CPT

## 2020-08-31 PROCEDURE — 84100 ASSAY OF PHOSPHORUS: CPT

## 2020-08-31 PROCEDURE — 36415 COLL VENOUS BLD VENIPUNCTURE: CPT | Mod: PO

## 2020-08-31 PROCEDURE — 85025 COMPLETE CBC W/AUTO DIFF WBC: CPT | Mod: PO

## 2020-08-31 PROCEDURE — 81002 URINALYSIS NONAUTO W/O SCOPE: CPT | Mod: PO

## 2020-09-01 LAB
ALBUMIN SERPL BCP-MCNC: 3.3 G/DL (ref 3.5–5.2)
ALP SERPL-CCNC: 75 U/L (ref 55–135)
ALT SERPL W/O P-5'-P-CCNC: 22 U/L (ref 10–44)
ANION GAP SERPL CALC-SCNC: 9 MMOL/L (ref 8–16)
AST SERPL-CCNC: 27 U/L (ref 10–40)
BILIRUB SERPL-MCNC: 0.3 MG/DL (ref 0.1–1)
BUN SERPL-MCNC: 18 MG/DL (ref 8–23)
CALCIUM SERPL-MCNC: 8.4 MG/DL (ref 8.7–10.5)
CHLORIDE SERPL-SCNC: 102 MMOL/L (ref 95–110)
CO2 SERPL-SCNC: 27 MMOL/L (ref 23–29)
CREAT SERPL-MCNC: 1 MG/DL (ref 0.5–1.4)
EST. GFR  (AFRICAN AMERICAN): >60 ML/MIN/1.73 M^2
EST. GFR  (NON AFRICAN AMERICAN): >60 ML/MIN/1.73 M^2
GLUCOSE SERPL-MCNC: 93 MG/DL (ref 70–110)
PHOSPHATE SERPL-MCNC: 2.6 MG/DL (ref 2.7–4.5)
POTASSIUM SERPL-SCNC: 4.1 MMOL/L (ref 3.5–5.1)
PROT SERPL-MCNC: 6.4 G/DL (ref 6–8.4)
SODIUM SERPL-SCNC: 138 MMOL/L (ref 136–145)

## 2020-09-02 ENCOUNTER — OFFICE VISIT (OUTPATIENT)
Dept: HEMATOLOGY/ONCOLOGY | Facility: CLINIC | Age: 75
End: 2020-09-02
Payer: MEDICARE

## 2020-09-02 DIAGNOSIS — C79.89 METASTATIC CANCER TO PELVIS: ICD-10-CM

## 2020-09-02 DIAGNOSIS — R11.0 NAUSEA: ICD-10-CM

## 2020-09-02 DIAGNOSIS — R53.83 FATIGUE, UNSPECIFIED TYPE: ICD-10-CM

## 2020-09-02 DIAGNOSIS — I25.10 CORONARY ARTERY DISEASE INVOLVING NATIVE CORONARY ARTERY OF NATIVE HEART WITHOUT ANGINA PECTORIS: ICD-10-CM

## 2020-09-02 DIAGNOSIS — R51.9 CHRONIC NONINTRACTABLE HEADACHE, UNSPECIFIED HEADACHE TYPE: ICD-10-CM

## 2020-09-02 DIAGNOSIS — C78.7 METASTASIS TO LIVER: ICD-10-CM

## 2020-09-02 DIAGNOSIS — I10 ESSENTIAL HYPERTENSION: ICD-10-CM

## 2020-09-02 DIAGNOSIS — E83.39 LOW PHOSPHATE LEVELS: ICD-10-CM

## 2020-09-02 DIAGNOSIS — G89.29 CHRONIC NONINTRACTABLE HEADACHE, UNSPECIFIED HEADACHE TYPE: ICD-10-CM

## 2020-09-02 DIAGNOSIS — C49.A3 GIST (GASTROINTESTINAL STROMAL TUMOR) OF SMALL BOWEL, MALIGNANT: Primary | ICD-10-CM

## 2020-09-02 PROCEDURE — 99215 OFFICE O/P EST HI 40 MIN: CPT | Mod: 95,,, | Performed by: INTERNAL MEDICINE

## 2020-09-02 PROCEDURE — 99215 PR OFFICE/OUTPT VISIT, EST, LEVL V, 40-54 MIN: ICD-10-PCS | Mod: 95,,, | Performed by: INTERNAL MEDICINE

## 2020-09-02 RX ORDER — SODIUM,POTASSIUM PHOSPHATES 280-250MG
1 POWDER IN PACKET (EA) ORAL
Qty: 8 PACKET | Refills: 0 | Status: SHIPPED | OUTPATIENT
Start: 2020-09-02 | End: 2020-09-04

## 2020-09-02 NOTE — PROGRESS NOTES
The patient location is: home  The chief complaint leading to consultation is: follow up for GIST    Visit type: audiovisual    Face to Face time with patient: 20 min  30 minutes of total time spent on the encounter, which includes face to face time and non-face to face time preparing to see the patient (eg, review of tests), Obtaining and/or reviewing separately obtained history, Documenting clinical information in the electronic or other health record, Independently interpreting results (not separately reported) and communicating results to the patient/family/caregiver, or Care coordination (not separately reported).         Each patient to whom he or she provides medical services by telemedicine is:  (1) informed of the relationship between the physician and patient and the respective role of any other health care provider with respect to management of the patient; and (2) notified that he or she may decline to receive medical services by telemedicine and may withdraw from such care at any time.    Notes:       Patient ID: Forrest Schmidt is a 75 y.o. male.     Chief Complaint:  Follow up for resected GIST, wild-type     DIAGNOSIS:   1. 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 Recurrent oligametastatic GIST in the liver found on 5/20/19. S/p ablation on 7/23/19 at [facility]  3. 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 He initially presented with melena, syncope, hypotension on 2/5/18 at OSH. Colonoscopy showed old blood in the terminal ileum. CT abdomen/pelvis showed an ileal mass that measures approximately 5 cm. He was transferred to [facility] and underwent segmental small bowel resection by Dr. Sanchez on 2/15/18. Pathology showed a 6-cm GIST, histologic type: GIST,  "mixed; mitotic rate 9 mitoses/5 mm2, G2, high grade, high risk, margins negative. Cd117+, pathologic T3NxMx.  He had incisional wound infection after the surgery and took antibiotics for that.   2. Started Gleevec on 4/11/18. Foundation One results came back on 4/24/18 neg for KIT mutations. Long discussion with Mr and Mrs Schmidt on 4/25 regarding likely the lack of benefit from adjuvant Gleevec (please see note from that day for details). Mr. Schmidt would like to continue with Gleevec for now. He stopped Gleevec after he went to see Dr. Guaman at [facility]. CT abdomen/pelvis on 11/12/18 was negative for recurrent disease.   3. CT scan on 5/20/19 showed a Nonspecific hepatic hypodensity at the dome of the liver near the inferior vena cava   4. S/p liver lesion biopsy and ablation at [facility] on 7/23/19. Biopsy showed metastatic GIST positive for DOG-1 and . The amount of tumor was insufficient for molecular testing.   5. Surveillance MRI on 2/11/2020 showed "a right external iliac metastatic deposit measuring 4.6 x 3.5 cm (axial stir series 9, image 20), previously measuring 1.3 cm.  There is a metastatic deposit within the right anterior pelvis measuring 1.9 cm (series 9, image 13), previously not seen."  6. Gleevec 400 mg daily from 3/14/20 to present. CT at [facility] on 6/1/20 showed progressive disease in the peritoneal masses.   7. Underwent cytoreductive surgery at [facility] on 7/10/2020.   8. Dr Guaman recommends starting regorafenib after surgery. However, insurance does not cover it unless patient fails sutent. Patient started sutent 37.5 mg daily on 8/10/2020.      History of Present Illness:   Mr. Schmidt returns today for continued follow up. Has been on sutent 37.5 mg daily since 8/10/20. Tolerating it well. His chronic headache did get worse since he started sutent. It seems to be improving over the past 2 weeks. He took the tramadol I gave him but felt nauseated for a couple of days after that. Also felt " tired and sleepy. He is now taking tylenol twice a day for the headache. Takes prilosec 20 mg bid for nausea which helps. Denies other complaints. Checks his BP every day. SBP in the 130s usually.         ECO     ROS:   See HPI, otherwise negative.      Physical Examination:   Gen: well hydrated, well developed. In no acute distress  HEENT: normocephalic, anicteric sclerae, EOMI  Neck: supple  Psych: pleasant and appropriate mood and affect  Neuro: alert and oriented x 4, in no acute distress        Objective:      Laboratory Data:  Labs reviewed. Phosphorus level mildly low     Imaging Data:  CT A/P 20:  In the peritoneal metastases have significantly increased in size. As an example, a 3.7 x 2.9 cm omental mass in the right lower quadrant (/118), compared with 2.4 x 1.6 cm previously. 6.8 x 5.2 cm lobulated pelvic mass (/136), compared with 5.2 x 4.2 cm previously.    There are postoperative changes in the in the small bowel. The bowel is unobstructed. Wide neck ventral abdominal wall hernias containing omental fat.    No pathologically enlarged retroperitoneal, retrocrural or pelvic lymph nodes.    The prostate and seminal vesicles are unremarkable in CT. No bladder masses. No enlarged inguinal lymph nodes.    Bones:    Degenerative changes in the spine-axial skeleton.    IMPRESSION:    1. Stable appearance of hepatic metastasis.    2. Interval increase in size of peritoneal metastases.     CT C/A/P 2020:  Chest:  Stable 5 mm nodule left lower lobe series 8 image 191.  No suspicious new pulmonary nodules identified atelectasis lung bases  Stable Prominent borderline mediastinal hilar lymphadenopathy again noted. Right hilar lymph node measuring 1 x 1.3 cm, series 3 image 56 and 0.9 cm image 57.  Left hilar lymphadenopathy measuring 1.2 cm image 63.  Stable subcentimeter axillary lymph nodes.    Abdomen pelvis:  Stable appearance of segment 7 metastasis series 3 image 165 measuring 2.1 x 1.7 cm, no  suspicious new liver lesions.  No significant bile duct dilation  Gallbladder not significantly distended CBD normal caliber.  Stable appearance of the pancreas, spleen and bilateral adrenal glands.  Stable bilateral adrenal glands.  Stable appearance of the bilateral kidneys, parapelvic cysts noted  Stable parenchymal cysts. No hydronephrosis.  Stable borderline mesenteric lymphadenopathy noted, series 3 image 219 measures 1.7 x 1.7 cm.  Stable subcentimeter retroperitoneal lymph nodes.  Prominent right pelvic mass series 3 image 279 measures 6.5 x 5.4 cm before measuring 6.8 x 5.1 cm.  Right iliac mass measures 2 x 1.5 cm series 3 image 265 before measured 1.4 x 1.3 cm.  Right iliac fossa mass measures 4.5 x 4 cm image 257 before measuring 2.9 x 3.6 cm.  Ventral hernia noted no evidence of bowel obstruction.  prostate mildly enlarged measures 5 cm  No suspicious new osseous lesions    IMPRESSION:  Interval increase in size of peritoneal pelvic masses in comparison to prior study  Prominent mesenteric lymphadenopathy  Stable prominent borderline mediastinal and hilar lymphadenopathy  Stable hepatic metastasis.     Assessment and Plan:      1. 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 Metastasis to liver    3. 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 Low phosphate levels    5. Essential hypertension    6. Coronary artery disease involving native coronary artery of native heart without angina pectoris    7. Chronic nonintractable headache, unspecified headache type    8. Nausea        1-3  - Mr. Schmidt is a 76 yo man with stage IIIB GIST of the small intestine  (T3N0Mx), 6 cm, mitotic rate 9 mitoses/5 mm2, s/p resection on 2/15/18. His GIST is negative for KIT, PDGFRA, SDH, BRAF, NF1, NF2 mutations. He had oligometastatic disease to the liver found on CT scan 5/20/19. He went to [facility] and underwent IR liver lesion biopsy and ablation on 7/23/19. Pathology showed metastatic GIST positive for  DOG-1 and .   - surveillance MRI in Feb 2020 showed progressive disease with new peritoneal mets. Patient was seen by Dr Guaman. Gleevec was recommended.   - CT after 2.5 month of Gleevec showed progressive peritoneal disease.   - underwent cytoreductive surgery at [facility] on 7/10/20  - started sutent 37.5 mg daily on 8/10/20. Tolerating it well  - c/w sutent 37.5 mg daily  - labs and virtual visit in 3 weeks  - scheduled to return to [facility] with restaging scan in the beginning of October 4.  - neutraphos x 2 days    5.  - asked patient to continue to monitor BP daily and to message me if BP >160/100 mmHg    6.  - c/w aspirin and lipitor    7.  - c/w tylenol bid    8.  - c/w prilosec 20 mg bid. Patient feels this helps with the nausea most

## 2020-09-22 ENCOUNTER — LAB VISIT (OUTPATIENT)
Dept: LAB | Facility: HOSPITAL | Age: 75
End: 2020-09-22
Attending: INTERNAL MEDICINE
Payer: MEDICARE

## 2020-09-22 DIAGNOSIS — C49.A3 GIST (GASTROINTESTINAL STROMAL TUMOR) OF SMALL BOWEL, MALIGNANT: ICD-10-CM

## 2020-09-22 DIAGNOSIS — R53.83 FATIGUE, UNSPECIFIED TYPE: ICD-10-CM

## 2020-09-22 LAB
ALBUMIN SERPL BCP-MCNC: 3.6 G/DL (ref 3.5–5.2)
ALP SERPL-CCNC: 76 U/L (ref 55–135)
ALT SERPL W/O P-5'-P-CCNC: 27 U/L (ref 10–44)
ANION GAP SERPL CALC-SCNC: 7 MMOL/L (ref 8–16)
AST SERPL-CCNC: 30 U/L (ref 10–40)
BASOPHILS # BLD AUTO: 0.02 K/UL (ref 0–0.2)
BASOPHILS NFR BLD: 0.5 % (ref 0–1.9)
BILIRUB SERPL-MCNC: 0.3 MG/DL (ref 0.1–1)
BILIRUB UR QL STRIP: NEGATIVE
BUN SERPL-MCNC: 16 MG/DL (ref 8–23)
CALCIUM SERPL-MCNC: 8.7 MG/DL (ref 8.7–10.5)
CHLORIDE SERPL-SCNC: 103 MMOL/L (ref 95–110)
CLARITY UR: CLEAR
CO2 SERPL-SCNC: 26 MMOL/L (ref 23–29)
COLOR UR: YELLOW
CREAT SERPL-MCNC: 1 MG/DL (ref 0.5–1.4)
DIFFERENTIAL METHOD: ABNORMAL
EOSINOPHIL # BLD AUTO: 0.4 K/UL (ref 0–0.5)
EOSINOPHIL NFR BLD: 9.1 % (ref 0–8)
ERYTHROCYTE [DISTWIDTH] IN BLOOD BY AUTOMATED COUNT: 17.1 % (ref 11.5–14.5)
EST. GFR  (AFRICAN AMERICAN): >60 ML/MIN/1.73 M^2
EST. GFR  (NON AFRICAN AMERICAN): >60 ML/MIN/1.73 M^2
GLUCOSE SERPL-MCNC: 119 MG/DL (ref 70–110)
GLUCOSE UR QL STRIP: NEGATIVE
HCT VFR BLD AUTO: 37.2 % (ref 40–54)
HGB BLD-MCNC: 12.3 G/DL (ref 14–18)
HGB UR QL STRIP: NEGATIVE
IMM GRANULOCYTES # BLD AUTO: 0.01 K/UL (ref 0–0.04)
IMM GRANULOCYTES NFR BLD AUTO: 0.3 % (ref 0–0.5)
KETONES UR QL STRIP: NEGATIVE
LEUKOCYTE ESTERASE UR QL STRIP: NEGATIVE
LYMPHOCYTES # BLD AUTO: 0.7 K/UL (ref 1–4.8)
LYMPHOCYTES NFR BLD: 17.3 % (ref 18–48)
MCH RBC QN AUTO: 31 PG (ref 27–31)
MCHC RBC AUTO-ENTMCNC: 33.1 G/DL (ref 32–36)
MCV RBC AUTO: 94 FL (ref 82–98)
MONOCYTES # BLD AUTO: 0.4 K/UL (ref 0.3–1)
MONOCYTES NFR BLD: 11.2 % (ref 4–15)
NEUTROPHILS # BLD AUTO: 2.4 K/UL (ref 1.8–7.7)
NEUTROPHILS NFR BLD: 61.6 % (ref 38–73)
NITRITE UR QL STRIP: NEGATIVE
NRBC BLD-RTO: 0 /100 WBC
PH UR STRIP: 6 [PH] (ref 5–8)
PHOSPHATE SERPL-MCNC: 2.6 MG/DL (ref 2.7–4.5)
PLATELET # BLD AUTO: 199 K/UL (ref 150–350)
PMV BLD AUTO: 8.5 FL (ref 9.2–12.9)
POTASSIUM SERPL-SCNC: 4 MMOL/L (ref 3.5–5.1)
PROT SERPL-MCNC: 6.8 G/DL (ref 6–8.4)
PROT UR QL STRIP: NEGATIVE
RBC # BLD AUTO: 3.97 M/UL (ref 4.6–6.2)
SODIUM SERPL-SCNC: 136 MMOL/L (ref 136–145)
SP GR UR STRIP: 1.01 (ref 1–1.03)
TSH SERPL DL<=0.005 MIU/L-ACNC: 2.1 UIU/ML (ref 0.4–4)
URN SPEC COLLECT METH UR: NORMAL
WBC # BLD AUTO: 3.94 K/UL (ref 3.9–12.7)

## 2020-09-22 PROCEDURE — 81002 URINALYSIS NONAUTO W/O SCOPE: CPT | Mod: PO

## 2020-09-22 PROCEDURE — 85025 COMPLETE CBC W/AUTO DIFF WBC: CPT | Mod: PO

## 2020-09-22 PROCEDURE — 84100 ASSAY OF PHOSPHORUS: CPT

## 2020-09-22 PROCEDURE — 36415 COLL VENOUS BLD VENIPUNCTURE: CPT | Mod: PO

## 2020-09-22 PROCEDURE — 80053 COMPREHEN METABOLIC PANEL: CPT

## 2020-09-22 PROCEDURE — 84443 ASSAY THYROID STIM HORMONE: CPT

## 2020-09-23 ENCOUNTER — OFFICE VISIT (OUTPATIENT)
Dept: HEMATOLOGY/ONCOLOGY | Facility: CLINIC | Age: 75
End: 2020-09-23
Payer: MEDICARE

## 2020-09-23 DIAGNOSIS — I25.10 CORONARY ARTERY DISEASE INVOLVING NATIVE CORONARY ARTERY OF NATIVE HEART WITHOUT ANGINA PECTORIS: ICD-10-CM

## 2020-09-23 DIAGNOSIS — C79.89 METASTATIC CANCER TO PELVIS: ICD-10-CM

## 2020-09-23 DIAGNOSIS — C49.A3 GIST (GASTROINTESTINAL STROMAL TUMOR) OF SMALL BOWEL, MALIGNANT: Primary | ICD-10-CM

## 2020-09-23 DIAGNOSIS — R53.83 FATIGUE, UNSPECIFIED TYPE: ICD-10-CM

## 2020-09-23 DIAGNOSIS — C78.7 METASTASIS TO LIVER: ICD-10-CM

## 2020-09-23 DIAGNOSIS — E83.39 HYPOPHOSPHATEMIA: ICD-10-CM

## 2020-09-23 DIAGNOSIS — I10 ESSENTIAL HYPERTENSION: ICD-10-CM

## 2020-09-23 PROCEDURE — 99214 OFFICE O/P EST MOD 30 MIN: CPT | Mod: 95,,, | Performed by: INTERNAL MEDICINE

## 2020-09-23 PROCEDURE — 99214 PR OFFICE/OUTPT VISIT, EST, LEVL IV, 30-39 MIN: ICD-10-PCS | Mod: 95,,, | Performed by: INTERNAL MEDICINE

## 2020-09-23 RX ORDER — SODIUM,POTASSIUM PHOSPHATES 280-250MG
1 POWDER IN PACKET (EA) ORAL
Qty: 8 PACKET | Refills: 2 | Status: SHIPPED | OUTPATIENT
Start: 2020-09-23 | End: 2020-09-25

## 2020-09-23 NOTE — PROGRESS NOTES
The patient location is: home  The chief complaint leading to consultation is: follow up for GIST     Visit type: audiovisual     Face to Face time with patient: 20 min  30 minutes of total time spent on the encounter, which includes face to face time and non-face to face time preparing to see the patient (eg, review of tests), Obtaining and/or reviewing separately obtained history, Documenting clinical information in the electronic or other health record, Independently interpreting results (not separately reported) and communicating results to the patient/family/caregiver, or Care coordination (not separately reported).            Each patient to whom he or she provides medical services by telemedicine is:  (1) informed of the relationship between the physician and patient and the respective role of any other health care provider with respect to management of the patient; and (2) notified that he or she may decline to receive medical services by telemedicine and may withdraw from such care at any time.     Notes:         Patient ID: Forrest Schmidt is a 75 y.o. male.     Chief Complaint:  Follow up for resected GIST, wild-type     DIAGNOSIS:   1. 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 Recurrent oligametastatic GIST in the liver found on 5/20/19. S/p ablation on 7/23/19 at [facility]  3. 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 He initially presented with melena, syncope, hypotension on 2/5/18 at OSH. Colonoscopy showed old blood in the terminal ileum. CT abdomen/pelvis showed an ileal mass that measures approximately 5 cm. He was transferred to [facility] and underwent segmental small bowel resection by Dr. Sanchez on 2/15/18. Pathology showed a 6-cm GIST, histologic type:  "GIST, mixed; mitotic rate 9 mitoses/5 mm2, G2, high grade, high risk, margins negative. Cd117+, pathologic T3NxMx.  He had incisional wound infection after the surgery and took antibiotics for that.   2. Started Gleevec on 4/11/18. Foundation One results came back on 4/24/18 neg for KIT mutations. Long discussion with Mr and Mrs Schmidt on 4/25 regarding likely the lack of benefit from adjuvant Gleevec (please see note from that day for details). Mr. Schmidt would like to continue with Gleevec for now. He stopped Gleevec after he went to see Dr. Guaman at [facility] [place]. CT abdomen/pelvis on 11/12/18 was negative for recurrent disease.   3. CT scan on 5/20/19 showed a Nonspecific hepatic hypodensity at the dome of the liver near the inferior vena cava   4. S/p liver lesion biopsy and ablation at [facility] [place] on 7/23/19. Biopsy showed metastatic GIST positive for DOG-1 and . The amount of tumor was insufficient for molecular testing.   5. Surveillance MRI on 2/11/2020 showed "a right external iliac metastatic deposit measuring 4.6 x 3.5 cm (axial stir series 9, image 20), previously measuring 1.3 cm.  There is a metastatic deposit within the right anterior pelvis measuring 1.9 cm (series 9, image 13), previously not seen."  6. Gleevec 400 mg daily from 3/14/20 to present. CT at [facility] on 6/1/20 showed progressive disease in the peritoneal masses.   7. Underwent cytoreductive surgery at [facility] on 7/10/2020.   8. Dr Guaman recommends starting regorafenib after surgery. However, insurance does not cover it unless patient fails sutent. Patient started sutent 37.5 mg daily on 8/10/2020.      History of Present Illness:   Mr. Schmidt returns today for continued follow up. Has been on sutent 37.5 mg daily since 8/10/20. Tolerating it well. Mild headache which is chronic for him but did get a little bit worse after started sutent. Does not take anything as tylenol does not resolve it completely. Very mild nausea. Eating and drinking " well. Denies other complaints. He monitors his BP at home every day. SBP usually in the 130s. There was once it was in the 150's range. DBP usually in the 60's range.          ECO     ROS:   See HPI, otherwise negative.      Physical Examination:   Gen: well hydrated, well developed. In no acute distress  HEENT: normocephalic, anicteric sclerae, EOMI  Neck: supple  Psych: pleasant and appropriate mood and affect  Neuro: alert and oriented x 4, in no acute distress        Objective:      Laboratory Data:  Labs reviewed. Phosphorus level mildly low     Imaging Data:  CT A/P 20:  In the peritoneal metastases have significantly increased in size. As an example, a 3.7 x 2.9 cm omental mass in the right lower quadrant (/118), compared with 2.4 x 1.6 cm previously. 6.8 x 5.2 cm lobulated pelvic mass (6/136), compared with 5.2 x 4.2 cm previously.    There are postoperative changes in the in the small bowel. The bowel is unobstructed. Wide neck ventral abdominal wall hernias containing omental fat.    No pathologically enlarged retroperitoneal, retrocrural or pelvic lymph nodes.    The prostate and seminal vesicles are unremarkable in CT. No bladder masses. No enlarged inguinal lymph nodes.    Bones:    Degenerative changes in the spine-axial skeleton.    IMPRESSION:    1. Stable appearance of hepatic metastasis.    2. Interval increase in size of peritoneal metastases.     CT C/A/P 2020:  Chest:  Stable 5 mm nodule left lower lobe series 8 image 191.  No suspicious new pulmonary nodules identified atelectasis lung bases  Stable Prominent borderline mediastinal hilar lymphadenopathy again noted. Right hilar lymph node measuring 1 x 1.3 cm, series 3 image 56 and 0.9 cm image 57.  Left hilar lymphadenopathy measuring 1.2 cm image 63.  Stable subcentimeter axillary lymph nodes.    Abdomen pelvis:  Stable appearance of segment 7 metastasis series 3 image 165 measuring 2.1 x 1.7 cm, no suspicious new liver  lesions.  No significant bile duct dilation  Gallbladder not significantly distended CBD normal caliber.  Stable appearance of the pancreas, spleen and bilateral adrenal glands.  Stable bilateral adrenal glands.  Stable appearance of the bilateral kidneys, parapelvic cysts noted  Stable parenchymal cysts. No hydronephrosis.  Stable borderline mesenteric lymphadenopathy noted, series 3 image 219 measures 1.7 x 1.7 cm.  Stable subcentimeter retroperitoneal lymph nodes.  Prominent right pelvic mass series 3 image 279 measures 6.5 x 5.4 cm before measuring 6.8 x 5.1 cm.  Right iliac mass measures 2 x 1.5 cm series 3 image 265 before measured 1.4 x 1.3 cm.  Right iliac fossa mass measures 4.5 x 4 cm image 257 before measuring 2.9 x 3.6 cm.  Ventral hernia noted no evidence of bowel obstruction.  prostate mildly enlarged measures 5 cm  No suspicious new osseous lesions    IMPRESSION:  Interval increase in size of peritoneal pelvic masses in comparison to prior study  Prominent mesenteric lymphadenopathy  Stable prominent borderline mediastinal and hilar lymphadenopathy  Stable hepatic metastasis.     Assessment and Plan:      1. 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 Metastasis to liver    3. 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 Essential hypertension    5. Coronary artery disease involving native coronary artery of native heart without angina pectoris    6. Hypophosphatemia    7. Fatigue, unspecified type           1-3  - Mr. Schmidt is a 74 yo man with stage IIIB GIST of the small intestine  (T3N0Mx), 6 cm, mitotic rate 9 mitoses/5 mm2, s/p resection on 2/15/18. His GIST is negative for KIT, PDGFRA, SDH, BRAF, NF1, NF2 mutations. He had oligometastatic disease to the liver found on CT scan 5/20/19. He went to [facility] and underwent IR liver lesion biopsy and ablation on 7/23/19. Pathology showed metastatic GIST positive for DOG-1 and .   - surveillance MRI in Feb 2020 showed progressive  disease with new peritoneal mets. Patient was seen by Dr Guaman. Gleevec was recommended.   - CT after 2.5 month of Gleevec showed progressive peritoneal disease.   - underwent cytoreductive surgery at [facility] on 7/10/20  - started sutent 37.5 mg daily on 8/10/20. Tolerating it well  - c/w sutent 37.5 mg daily  - He is having scans at [facility] on Oct 4 and seeing Dr Guaman on Oct 5. If no progression, I will see him back in the beginning of November 4.  - BP has been good at home. Asked patient to continue to check BP twice a day. Call my office if SBP above 150 and/or DBP above 100  - c/w current meds    5.  - c/w aspirin and lipitor    6.  - 2/2 sutent  - neutraphos    7.  - monitor TSH when on sutent    Their multiple questions were answered in the clinic. Encouraged to call should issues arise.

## 2020-10-06 ENCOUNTER — PATIENT MESSAGE (OUTPATIENT)
Dept: HEMATOLOGY/ONCOLOGY | Facility: CLINIC | Age: 75
End: 2020-10-06

## 2020-10-20 ENCOUNTER — SPECIALTY PHARMACY (OUTPATIENT)
Dept: PHARMACY | Facility: CLINIC | Age: 75
End: 2020-10-20

## 2020-10-20 NOTE — TELEPHONE ENCOUNTER
Specialty Pharmacy - Clinical Reassessment    Specialty Medication Orders Linked to Encounter      Most Recent Value   Medication #1  SUNItinib (SUTENT) 37.5 mg Cap (Order#[id number], Rx#[id number])        Contacted patient for his Sutent clinical follow up.    Subjective    Forrest Schmidt is a 75 y.o. male, who is followed by the specialty pharmacy service for management and education.    Encounters since last clinical assessment   No encounters found.   Clinical call attempts since last clinical assessment   No call attempts found.     Today he received follow up education for his specialty medication(s).    Current Outpatient Medications   Medication Sig    amLODIPine (NORVASC) 5 MG tablet Take 1 tablet (5 mg total) by mouth once daily.    arginine 500 mg tablet Take 1,000 mg by mouth once daily.     aspirin (ECOTRIN) 81 MG EC tablet Take 81 mg by mouth.    atorvastatin (LIPITOR) 40 MG tablet Take 1 tablet (40 mg total) by mouth once daily.    BORON ORAL Take 3 mg by mouth once daily.    calcium carbonate (TUMS) 200 mg calcium (500 mg) chewable tablet Take 500 mg by mouth.    chlorthalidone (HYGROTEN) 25 MG Tab One half tablet po Monday-Wednseday and Friday    cholecalciferol, vitamin D3, (VITAMIN D3) 2,000 unit Cap Take 1 capsule by mouth once daily.    fish oil-omega-3 fatty acids 300-1,000 mg capsule Take 1 g by mouth.    fluticasone (FLONASE) 50 mcg/actuation nasal spray 1 spray by Each Nare route 2 (two) times daily.    FLUZONE HIGH-DOSE 2019-20, PF, 180 mcg/0.5 mL Syrg ADM 0.5ML IM UTD    influenza (FLUZONE HIGH-DOSE 2018-19, PF,) 180 mcg/0.5 mL vaccine ADM 0.5ML IM UTD    losartan (COZAAR) 100 MG tablet Take 1 tablet (100 mg total) by mouth once daily.    magnesium oxide-Mg AA chelate (MG-PLUS-PROTEIN) 133 mg Tab Take 1 tablet by mouth.    multivitamin (THERAGRAN) per tablet Take 1 tablet by mouth once daily.    nitroGLYCERIN (NITROSTAT) 0.4 MG SL tablet DISSOLVE ONE TABLET UNDER TONGUE EVERY  "5 MINUTES FOR 3 TIMES THEN GO TO EMERGY ROOM    omeprazole (PRILOSEC) 20 MG capsule Take 20 mg by mouth 2 (two) times a day.    ondansetron (ZOFRAN-ODT) 8 MG TbDL Take 1 tablet (8 mg total) by mouth every 6 (six) hours as needed (nausea).    promethazine (PHENERGAN) 25 MG tablet Take 1 tablet (25 mg total) by mouth every 6 (six) hours as needed for Nausea.    SOD CHLOR,SOD BICARB/NETI POT (NEILMED NASAFLO NASL) 1 spray by Nasal route every evening.    SODIUM CHLORIDE (FE-128 OPHT) Place 1 drop into the right eye 4 (four) times daily.     SUNItinib (SUTENT) 37.5 mg Cap Take 1 capsule (37.5 mg total) by mouth once daily.    tadalafil (CIALIS) 5 MG tablet Take 5 mg by mouth once daily at 6am.    testosterone cypionate (DEPOTESTOTERONE CYPIONATE) 200 mg/mL injection Inject 200 mg into the muscle every 14 (fourteen) days.     zinc gluconate 50 mg tablet Take 50 mg by mouth every Monday and Thursday.    Last reviewed on 10/20/2020  1:55 PM by Lainey Hernandez, PharmD    Review of patient's allergies indicates:   Allergen Reactions    Augmentin [amoxicillin-pot clavulanate] Shortness Of Breath     disoriented    Fexofenadine Other (See Comments)     Pt states he can't remember the details but it was a bad effect.    Singulair [montelukast] Other (See Comments)     Pt "felt strange"  Pt "felt strange" bloating, Intestinal irritation, abd cramping      Ace inhibitors Other (See Comments)     cough    Captopril     Doxycycline Nausea And Vomiting     headache    Horse/equine containing products Hives    Hydrocodone Nausea Only    Scopolamine hbr Other (See Comments)     Nausea, headache, changes in BP    Last reviewed on  10/20/2020 1:51 PM by Lainey Hernandez    Drug Interactions    Drug interactions evaluated: yes  Clinically relevant drug interactions identified: yes   Interactions list: Sutent + Testosterone (Cat C) - testosterone may enhance the hypoglycemic effects of Sutent due to is hypoglycemic " potential.    Drug management plan: Patient is not a diabetic. His last glucose level was 119. Patient has been on both medications for a few months, and blood glucose appear to be stable.    Provided the patient with educational material regarding drug interactions: not applicable       Medication Adherence    What concerns does the patient have in regards to their medications: None  Patient reported X missed doses in the last month: 0  Any gaps in refill history greater than 2 weeks in the last 3 months: no  Demonstrates understanding of importance of adherence: yes  Informant: patient  Reliability of informant: reliable  Provider-estimated medication adherence level: good  Reasons for non-adherence: no problems identified   Other adherence tool: Daily routines   Support network for adherence: family member  Confirmed plan for next specialty medication refill: not applicable  Refills needed for supportive medications: not needed       Adverse Effects    Photosensitivity: Pos  Nausea: Pos  Frequency: Pos  Postnasal drip: Pos  Headaches: Pos  Constitution: System reviewed and is negative  Eyes: System reviewed and is negative  Endocrine and Metabolic: System reviewed and is negative  Cardiovascular: System reviewed and is negative  Musculoskeletal: System reviewed and is negative  Psychiatric: System reviewed and is negative  Respiratory: System reviewed and is negative       Assessment Questions - Documented Responses      Most Recent Value   Medication #1 Assessment Info   Patient status  Existing medication   Is this medication appropriate for the patient?  Yes   Is this medication effective?  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    Tried/failed  "medications: Gleevec    BP Readings from Last 4 Encounters:   08/17/20 137/84   06/11/20 (!) 154/73   05/26/20 (!) 157/68   02/13/20 (!) 154/74     Ht Readings from Last 4 Encounters:   08/17/20 5' 11" (1.803 m)   06/11/20 5' 11" (1.803 m)   05/26/20 5' 11" (1.803 m)   02/13/20 5' 11" (1.803 m)     Wt Readings from Last 4 Encounters:   08/17/20 74.7 kg (164 lb 11.2 oz)   06/11/20 84.6 kg (186 lb 8.2 oz)   05/26/20 83.9 kg (185 lb)   02/13/20 86.2 kg (190 lb 0.6 oz)     Recent Labs   Lab Result Units 09/22/20  1138 08/31/20  1311 08/17/20  1219   RBC M/uL 3.97 L 3.85 L 3.80 L   Hemoglobin g/dL 12.3 L 11.8 L 11.3 L   Hematocrit % 37.2 L 36.5 L 36.9 L   WBC K/uL 3.94 3.64 L 6.98   Gran # (ANC) K/uL 2.4 2.1 5.0   Gran% % 61.6 56.6 71.8   Platelets K/uL 199 169 430 H   Sodium mmol/L 136 138 138   Potassium mmol/L 4.0 4.1 4.3   Chloride mmol/L 103 102 103   Glucose mg/dL 119 H 93 99   BUN, Bld mg/dL 16 18 20   Creatinine mg/dL 1.0 1.0 1.1   Calcium mg/dL 8.7 8.4 L 9.0   Total Protein g/dL 6.8 6.4 7.2   Albumin g/dL 3.6 3.3 L 3.3 L   Total Bilirubin mg/dL 0.3 0.3 0.2   Alkaline Phosphatase U/L 76 75 123   AST U/L 30 27 19   ALT U/L 27 22 19     The goals of cancer treatment include:  · Achieving remission of cancer, if possible  · Reducing tumor size and spread of cancer, if remission is not possible  · Minimizing pain and symptoms of the cancer  · Preventing infection and other complications of treatment  · Promoting adequate nutrition  · Encouraging proper hydration  · Improving or maintaining quality of life  · Maintaining optimal therapy adherence  · Minimizing and managing side effects     Assessment/Plan  Patient plans to continue therapy without changes    Indication, dosage, appropriateness, effectiveness, safety and convenience of his specialty medication(s) were reviewed today.     Patient Counseling    Counseled the patient on the following: doses and administration discussed, safe handling, storage, and " "disposal discussed, possible adverse effects and management discussed, possible drug and prescription drug interactions discussed, possible drug and OTC drug and food interactions discussed, lab monitoring and follow-up discussed, therapeutic rationale discussed, cost of medications and cost implications discussed, adherence and missed doses discussed, pharmacy contact information discussed       Patient takes Sutent 37.5 mg - takes 1 capsule by mouth once daily at breakfast. Denies missed doses. Avoids grapefruit/grapefruit juice. Patient stores medication on kitchen table at room temperature. Patient does not touch medication directly with his hands - uses the cap of the bottle.     Reviewed common SEs: N/V (Rx Zofran and Phenergan); diarrhea (stay well hydrated and OTC Imodium - use as directed; if still >4 loose stools/day, notify [facility] due to risk for dehydration); mouth sores (home remedy - swish and spit 1 cup warm water + 1/2 tsp salt + 1/2 tsp baking soda); skin discoloration; fatigue (monitor for changes in energy levels); increased blood pressure (underlying HTN; monitor for increases in BP); swelling (elevate legs above hips for 1 hr/day); mouth pain.    Reviewed the following warnings/precautions: hepatotoxicity, renal toxicity, severe skin reaction, bleeding, and cardiomyopathy/cardiovascular event.      Patient reports that he is experiencing nausea, which is well controlled with Prilosec. Patient also reports experiencing HF syndrome - sensitivity and peeling to the palm of his hands. He currently uses eucerin cream, which he states helps. Patient states his BP usually runs ~130/70 (well controlled) - checks 1 to 2 times/day.    Patient denies recent infections - no fever, achy chills, or wet persistent cough. Patient denies issues with pain (0/10). Patient reports his appetite is "pretty good" - eats 3 meals/day and snacks a lot in between.     Patient reports having good energy and being able to " complete daily activities without limitations. He has not missed out on any planned activities. He denies issues with anxiety, stress, and depression. Reports good support from family and friends.     Patient has not had to go to the ER or urgent care in the last 4 weeks.     Labs reviewed from 9/22/2020 and 10/4/2020. CBC and CMP - stable. Renal and hepatic function are stable. No action needed. Therapy and dose are appropriate for GIST off-label dosing: Oral: 37.5 mg once daily, continuous daily dosing. Per telephone encounter between patient and MD, his last CT scan shows new liver metastasis. His MD would like to continue with Sutent at this time to see if it has any effect of limiting or reducing the growth of the tumor as it is uncertain whether the tumor was present prior to surgery. The patient is to get repeat scans in 2 months at which point we may have a better idea of the effectiveness of Sutent therapy.    Patient has no questions or concerns at this time. He is aware to contact [facility] if he needs anything.    Tasks added this encounter   No tasks added.   Tasks due within next 3 months   10/20/2020 - Clinical - Follow Up Assesement (90 day)  10/26/2020 - Refill Call     Lainey Hernandez PharmD  [facility]  [address] [place] [id number]  Phone: [phone]  Fax: [phone]

## 2020-10-27 ENCOUNTER — SPECIALTY PHARMACY (OUTPATIENT)
Dept: PHARMACY | Facility: CLINIC | Age: 75
End: 2020-10-27

## 2020-10-27 NOTE — TELEPHONE ENCOUNTER
Specialty Pharmacy - Refill Coordination    Specialty Medication Orders Linked to Encounter      Most Recent Value   Medication #1  SUNItinib (SUTENT) 37.5 mg Cap (Order#[id number], Rx#[id number])          Refill Questions - Documented Responses      Most Recent Value   Relationship to patient of person spoken to?  Self   HIPAA/medical authority confirmed?  Yes   Any changes in contact preferences or allowed representatives?  No   Has the patient had any insurance changes?  No   Has the patient had any changes to specialty medication, dose, or instructions?  No   Has the patient started taking any new medications, herbals, or supplements?  No   Has the patient been diagnosed with any new medical conditions?  No   Does the patient have any new allergies to medications or foods?  No   Does the patient have any concerns about side effects?  No   Can the patient store medication/sharps container properly (at the correct temperature, away from children/pets, etc.)?  Yes   Can the patient call emergency services (431) in the event of an emergency?  Yes   Does the patient have any concerns or questions about taking or administering this medication as prescribed?  No   How many doses did the patient miss in the past 4 weeks or since the last fill?  0   How many doses does the patient have on hand?  5   How many days does the patient report on hand quantity will last?  5   Does the number of doses/days supply remaining match pharmacy expected amounts?  Yes   How will the patient receive the medication?  Mail   When does the patient need to receive the medication?  10/31/20   Shipping Address  Home   Address in [facility] confirmed and updated if neccessary?  Yes   Expected Copay ($)  72.24   Is the patient able to afford the medication copay?  Yes   Payment Method  one time CC provided   Days supply of Refill  28   Would patient like to speak to a pharmacist?  No   Do you want to trigger an intervention?  No   Do you  want to trigger an additional referral task?  No   Refill activity completed?  Yes   Refill activity plan  Refill scheduled   Shipment/Pickup Date:  10/29/20          Current Outpatient Medications   Medication Sig    amLODIPine (NORVASC) 5 MG tablet Take 1 tablet (5 mg total) by mouth once daily.    arginine 500 mg tablet Take 1,000 mg by mouth once daily.     aspirin (ECOTRIN) 81 MG EC tablet Take 81 mg by mouth.    atorvastatin (LIPITOR) 40 MG tablet Take 1 tablet (40 mg total) by mouth once daily.    BORON ORAL Take 3 mg by mouth once daily.    calcium carbonate (TUMS) 200 mg calcium (500 mg) chewable tablet Take 500 mg by mouth.    chlorthalidone (HYGROTEN) 25 MG Tab One half tablet po Monday-Wednseday and Friday    cholecalciferol, vitamin D3, (VITAMIN D3) 2,000 unit Cap Take 1 capsule by mouth once daily.    fish oil-omega-3 fatty acids 300-1,000 mg capsule Take 1 g by mouth.    fluticasone (FLONASE) 50 mcg/actuation nasal spray 1 spray by Each Nare route 2 (two) times daily.    FLUZONE HIGH-DOSE 2019-20, PF, 180 mcg/0.5 mL Syrg ADM 0.5ML IM UTD    influenza (FLUZONE HIGH-DOSE 2018-19, PF,) 180 mcg/0.5 mL vaccine ADM 0.5ML IM UTD    losartan (COZAAR) 100 MG tablet Take 1 tablet (100 mg total) by mouth once daily.    magnesium oxide-Mg AA chelate (MG-PLUS-PROTEIN) 133 mg Tab Take 1 tablet by mouth.    multivitamin (THERAGRAN) per tablet Take 1 tablet by mouth once daily.    nitroGLYCERIN (NITROSTAT) 0.4 MG SL tablet DISSOLVE ONE TABLET UNDER TONGUE EVERY 5 MINUTES FOR 3 TIMES THEN GO TO [facility] ROOM    omeprazole (PRILOSEC) 20 MG capsule Take 20 mg by mouth 2 (two) times a day.    ondansetron (ZOFRAN-ODT) 8 MG TbDL Take 1 tablet (8 mg total) by mouth every 6 (six) hours as needed (nausea).    promethazine (PHENERGAN) 25 MG tablet Take 1 tablet (25 mg total) by mouth every 6 (six) hours as needed for Nausea.    SOD CHLOR,SOD BICARB/NETI POT (NEILMED NASAFLO NASL) 1 spray by Nasal route every  "evening.    SODIUM CHLORIDE (FE-128 OPHT) Place 1 drop into the right eye 4 (four) times daily.     SUNItinib (SUTENT) 37.5 mg Cap Take 1 capsule (37.5 mg total) by mouth once daily.    tadalafil (CIALIS) 5 MG tablet Take 5 mg by mouth once daily at 6am.    testosterone cypionate (DEPOTESTOTERONE CYPIONATE) 200 mg/mL injection Inject 200 mg into the muscle every 14 (fourteen) days.     zinc gluconate 50 mg tablet Take 50 mg by mouth every Monday and Thursday.    Last reviewed on 10/27/2020 10:15 AM by Gely Pollard    Review of patient's allergies indicates:   Allergen Reactions    Augmentin [amoxicillin-pot clavulanate] Shortness Of Breath     disoriented    Fexofenadine Other (See Comments)     Pt states he can't remember the details but it was a bad effect.    Singulair [montelukast] Other (See Comments)     Pt "felt strange"  Pt "felt strange" bloating, Intestinal irritation, abd cramping      Ace inhibitors Other (See Comments)     cough    Captopril     Doxycycline Nausea And Vomiting     headache    Horse/equine containing products Hives    Hydrocodone Nausea Only    Scopolamine hbr Other (See Comments)     Nausea, headache, changes in BP     Last reviewed on  10/27/2020 10:14 AM by Gely Pollard      Tasks added this encounter   No tasks added.   Tasks due within next 3 months   10/26/2020 - Refill Call  1/12/2021 - Clinical - Follow Up Assesement (90 day)     GELY POLLARD  [facility]  [address] [place] [id number]  Phone: [phone]  Fax: [phone]      "

## 2020-11-10 ENCOUNTER — LAB VISIT (OUTPATIENT)
Dept: LAB | Facility: HOSPITAL | Age: 75
End: 2020-11-10
Attending: INTERNAL MEDICINE
Payer: MEDICARE

## 2020-11-10 DIAGNOSIS — R53.83 FATIGUE, UNSPECIFIED TYPE: ICD-10-CM

## 2020-11-10 DIAGNOSIS — C49.A3 GIST (GASTROINTESTINAL STROMAL TUMOR) OF SMALL BOWEL, MALIGNANT: ICD-10-CM

## 2020-11-10 LAB
ALBUMIN SERPL BCP-MCNC: 3.5 G/DL (ref 3.5–5.2)
ALP SERPL-CCNC: 58 U/L (ref 55–135)
ALT SERPL W/O P-5'-P-CCNC: 25 U/L (ref 10–44)
ANION GAP SERPL CALC-SCNC: 8 MMOL/L (ref 8–16)
AST SERPL-CCNC: 30 U/L (ref 10–40)
BASOPHILS # BLD AUTO: 0.01 K/UL (ref 0–0.2)
BASOPHILS NFR BLD: 0.3 % (ref 0–1.9)
BILIRUB SERPL-MCNC: 0.3 MG/DL (ref 0.1–1)
BUN SERPL-MCNC: 27 MG/DL (ref 8–23)
CALCIUM SERPL-MCNC: 8.3 MG/DL (ref 8.7–10.5)
CHLORIDE SERPL-SCNC: 103 MMOL/L (ref 95–110)
CO2 SERPL-SCNC: 28 MMOL/L (ref 23–29)
CREAT SERPL-MCNC: 1.2 MG/DL (ref 0.5–1.4)
DIFFERENTIAL METHOD: ABNORMAL
EOSINOPHIL # BLD AUTO: 0.3 K/UL (ref 0–0.5)
EOSINOPHIL NFR BLD: 8.4 % (ref 0–8)
ERYTHROCYTE [DISTWIDTH] IN BLOOD BY AUTOMATED COUNT: 19 % (ref 11.5–14.5)
EST. GFR  (AFRICAN AMERICAN): >60 ML/MIN/1.73 M^2
EST. GFR  (NON AFRICAN AMERICAN): 58.4 ML/MIN/1.73 M^2
GLUCOSE SERPL-MCNC: 140 MG/DL (ref 70–110)
HCT VFR BLD AUTO: 39.5 % (ref 40–54)
HGB BLD-MCNC: 12.9 G/DL (ref 14–18)
IMM GRANULOCYTES # BLD AUTO: 0.01 K/UL (ref 0–0.04)
IMM GRANULOCYTES NFR BLD AUTO: 0.3 % (ref 0–0.5)
LYMPHOCYTES # BLD AUTO: 0.5 K/UL (ref 1–4.8)
LYMPHOCYTES NFR BLD: 16.8 % (ref 18–48)
MCH RBC QN AUTO: 31.6 PG (ref 27–31)
MCHC RBC AUTO-ENTMCNC: 32.7 G/DL (ref 32–36)
MCV RBC AUTO: 97 FL (ref 82–98)
MONOCYTES # BLD AUTO: 0.5 K/UL (ref 0.3–1)
MONOCYTES NFR BLD: 16.4 % (ref 4–15)
NEUTROPHILS # BLD AUTO: 1.7 K/UL (ref 1.8–7.7)
NEUTROPHILS NFR BLD: 58.1 % (ref 38–73)
NRBC BLD-RTO: 0 /100 WBC
PHOSPHATE SERPL-MCNC: 2.1 MG/DL (ref 2.7–4.5)
PLATELET # BLD AUTO: 163 K/UL (ref 150–350)
PMV BLD AUTO: 8.5 FL (ref 9.2–12.9)
POTASSIUM SERPL-SCNC: 3.8 MMOL/L (ref 3.5–5.1)
PROT SERPL-MCNC: 6.4 G/DL (ref 6–8.4)
RBC # BLD AUTO: 4.08 M/UL (ref 4.6–6.2)
SODIUM SERPL-SCNC: 139 MMOL/L (ref 136–145)
TSH SERPL DL<=0.005 MIU/L-ACNC: 1.74 UIU/ML (ref 0.4–4)
WBC # BLD AUTO: 2.98 K/UL (ref 3.9–12.7)

## 2020-11-10 PROCEDURE — 84100 ASSAY OF PHOSPHORUS: CPT

## 2020-11-10 PROCEDURE — 84443 ASSAY THYROID STIM HORMONE: CPT

## 2020-11-10 PROCEDURE — 80053 COMPREHEN METABOLIC PANEL: CPT

## 2020-11-10 PROCEDURE — 36415 COLL VENOUS BLD VENIPUNCTURE: CPT | Mod: PO

## 2020-11-10 PROCEDURE — 85025 COMPLETE CBC W/AUTO DIFF WBC: CPT | Mod: PO

## 2020-11-11 ENCOUNTER — HOSPITAL ENCOUNTER (OUTPATIENT)
Dept: RADIOLOGY | Facility: HOSPITAL | Age: 75
Discharge: HOME | End: 2020-11-11
Attending: INTERNAL MEDICINE
Payer: MEDICARE

## 2020-11-11 ENCOUNTER — OFFICE VISIT (OUTPATIENT)
Dept: HEMATOLOGY/ONCOLOGY | Facility: CLINIC | Age: 75
End: 2020-11-11
Payer: MEDICARE

## 2020-11-11 VITALS
WEIGHT: 175.63 LBS | DIASTOLIC BLOOD PRESSURE: 76 MMHG | HEIGHT: 71 IN | BODY MASS INDEX: 24.59 KG/M2 | OXYGEN SATURATION: 97 % | RESPIRATION RATE: 16 BRPM | TEMPERATURE: 98 F | HEART RATE: 72 BPM | SYSTOLIC BLOOD PRESSURE: 145 MMHG

## 2020-11-11 DIAGNOSIS — C49.A3 GIST (GASTROINTESTINAL STROMAL TUMOR) OF SMALL BOWEL, MALIGNANT: Primary | ICD-10-CM

## 2020-11-11 DIAGNOSIS — N13.30 HYDRONEPHROSIS, UNSPECIFIED HYDRONEPHROSIS TYPE: ICD-10-CM

## 2020-11-11 DIAGNOSIS — I10 ESSENTIAL HYPERTENSION: ICD-10-CM

## 2020-11-11 DIAGNOSIS — E83.39 HYPOPHOSPHATEMIA: ICD-10-CM

## 2020-11-11 DIAGNOSIS — C78.7 METASTASIS TO LIVER: ICD-10-CM

## 2020-11-11 DIAGNOSIS — I25.10 CORONARY ARTERY DISEASE INVOLVING NATIVE CORONARY ARTERY OF NATIVE HEART WITHOUT ANGINA PECTORIS: ICD-10-CM

## 2020-11-11 DIAGNOSIS — C79.89 METASTATIC CANCER TO PELVIS: ICD-10-CM

## 2020-11-11 PROCEDURE — 76770 US RETROPERITONEAL COMPLETE: ICD-10-PCS | Mod: 26,,, | Performed by: RADIOLOGY

## 2020-11-11 PROCEDURE — 99215 OFFICE O/P EST HI 40 MIN: CPT | Mod: S$PBB,,, | Performed by: INTERNAL MEDICINE

## 2020-11-11 PROCEDURE — 99999 PR PBB SHADOW E&M-EST. PATIENT-LVL V: ICD-10-PCS | Mod: PBBFAC,,, | Performed by: INTERNAL MEDICINE

## 2020-11-11 PROCEDURE — 99999 PR PBB SHADOW E&M-EST. PATIENT-LVL V: CPT | Mod: PBBFAC,,, | Performed by: INTERNAL MEDICINE

## 2020-11-11 PROCEDURE — 99215 PR OFFICE/OUTPT VISIT, EST, LEVL V, 40-54 MIN: ICD-10-PCS | Mod: S$PBB,,, | Performed by: INTERNAL MEDICINE

## 2020-11-11 PROCEDURE — 76770 US EXAM ABDO BACK WALL COMP: CPT | Mod: TC

## 2020-11-11 PROCEDURE — 99215 OFFICE O/P EST HI 40 MIN: CPT | Mod: PBBFAC,25 | Performed by: INTERNAL MEDICINE

## 2020-11-11 PROCEDURE — 76770 US EXAM ABDO BACK WALL COMP: CPT | Mod: 26,,, | Performed by: RADIOLOGY

## 2020-11-11 RX ORDER — SODIUM,POTASSIUM PHOSPHATES 280-250MG
1 POWDER IN PACKET (EA) ORAL
Qty: 28 PACKET | Refills: 2 | Status: SHIPPED | OUTPATIENT
Start: 2020-11-11 | End: 2020-11-18

## 2020-11-11 NOTE — PROGRESS NOTES
"Patient ID: Forrest Schmidt is a 75 y.o. male.     Chief Complaint:  Follow up for GIST, wild-type     DIAGNOSIS:   1. 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 Recurrent oligametastatic GIST in the liver found on 5/20/19. S/p ablation on 7/23/19 at [facility]  3. 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 He initially presented with melena, syncope, hypotension on 2/5/18 at OSH. Colonoscopy showed old blood in the terminal ileum. CT abdomen/pelvis showed an ileal mass that measures approximately 5 cm. He was transferred to [facility] and underwent segmental small bowel resection by Dr. Sanchez on 2/15/18. Pathology showed a 6-cm GIST, histologic type: GIST, mixed; mitotic rate 9 mitoses/5 mm2, G2, high grade, high risk, margins negative. Cd117+, pathologic T3NxMx.  He had incisional wound infection after the surgery and took antibiotics for that.   2. Started Gleevec on 4/11/18. Foundation One results came back on 4/24/18 neg for KIT mutations. Long discussion with Mr and Mrs Schmidt on 4/25 regarding likely the lack of benefit from adjuvant Gleevec (please see note from that day for details). Mr. Schmidt would like to continue with Gleevec for now. He stopped Gleevec after he went to see Dr. Guaman at [facility]. CT abdomen/pelvis on 11/12/18 was negative for recurrent disease.   3. CT scan on 5/20/19 showed a Nonspecific hepatic hypodensity at the dome of the liver near the inferior vena cava   4. S/p liver lesion biopsy and ablation at [facility] on 7/23/19. Biopsy showed metastatic GIST positive for DOG-1 and . The amount of tumor was insufficient for molecular testing.   5. Surveillance MRI on 2/11/2020 showed "a right external iliac " "metastatic deposit measuring 4.6 x 3.5 cm (axial stir series 9, image 20), previously measuring 1.3 cm.  There is a metastatic deposit within the right anterior pelvis measuring 1.9 cm (series 9, image 13), previously not seen."  6. Gleevec 400 mg daily from 3/14/20 to present. CT at [facility] on 20 showed progressive disease in the peritoneal masses.   7. Underwent cytoreductive surgery at [facility] on 7/10/2020.   8. Dr Guaman recommends starting regorafenib after surgery. However, insurance does not cover it unless patient fails sutent. Patient started sutent 37.5 mg daily on 8/10/2020.   9. CT scan 10/5/20: A metastatic lesion in segment VII of the liver adjacent to the right hepatic vein and inferior vena cava remains overall stable, measuring approximately 2.1 cm (series 6 image 166). There is another lesion in segment V measuring approximately 1.1 cm (series 6 image 188), suspect for metastatic disease and not well seen on the prior study.     History of Present Illness:   Mr. Schmidt returns today for continued follow up. Has been on sutent 37.5 mg daily since 8/10/20. Tolerating it well. Was seen by Dr Guaman at [facility] one month ago. CT showed stable liver lesion 2.1 cm. One new small liver lesion in segment V 1.1 cm. sutent was continued. He is scheduled for restaging scan on . Plan is to ablate the liver lesion. He monitors his BP at home 3 times daily. BP has been within good range. He saw his urologist locally and mentioned to him that he had a ureteral stent placed during surgery. An RP US was done in the office. He was told he had something/blockage in the kidney and may need a pyelogram.  Of note, his CT scan from [facility] on 10/6/20 showed no hydronephrosis.      ECO     ROS:   See HPI, otherwise negative.      Physical Examination:   Vital signs reviewed  Gen: well hydrated, well developed. In no acute distress  HEENT: normocephalic, anicteric sclerae, EOMI  Neck: supple, no JVD, " thyromegaly, or cervical LAD  Lungs: CTAB, no wheezing or rales  Heart: RRR, no M/R/G  Abdomen: soft, no tenderness, nondistended, no hepatosplenomegaly, BS present  Ext: no clubbing, cyanosis or edema  Psych: pleasant and appropriate mood and affect  Neuro: alert and oriented x 4, in no acute distress        Objective:      Laboratory Data:  Labs reviewed. Phosphorus level 2.1. creatinine 1.2     Imaging Data:  CT A/P 6/1/20:  In the peritoneal metastases have significantly increased in size. As an example, a 3.7 x 2.9 cm omental mass in the right lower quadrant (6/118), compared with 2.4 x 1.6 cm previously. 6.8 x 5.2 cm lobulated pelvic mass (6/136), compared with 5.2 x 4.2 cm previously.    There are postoperative changes in the in the small bowel. The bowel is unobstructed. Wide neck ventral abdominal wall hernias containing omental fat.    No pathologically enlarged retroperitoneal, retrocrural or pelvic lymph nodes.    The prostate and seminal vesicles are unremarkable in CT. No bladder masses. No enlarged inguinal lymph nodes.    Bones:    Degenerative changes in the spine-axial skeleton.    IMPRESSION:    1. Stable appearance of hepatic metastasis.    2. Interval increase in size of peritoneal metastases.     CT C/A/P 7/7/2020:  Chest:  Stable 5 mm nodule left lower lobe series 8 image 191.  No suspicious new pulmonary nodules identified atelectasis lung bases  Stable Prominent borderline mediastinal hilar lymphadenopathy again noted. Right hilar lymph node measuring 1 x 1.3 cm, series 3 image 56 and 0.9 cm image 57.  Left hilar lymphadenopathy measuring 1.2 cm image 63.  Stable subcentimeter axillary lymph nodes.    Abdomen pelvis:  Stable appearance of segment 7 metastasis series 3 image 165 measuring 2.1 x 1.7 cm, no suspicious new liver lesions.  No significant bile duct dilation  Gallbladder not significantly distended CBD normal caliber.  Stable appearance of the pancreas, spleen and bilateral adrenal  glands.  Stable bilateral adrenal glands.  Stable appearance of the bilateral kidneys, parapelvic cysts noted  Stable parenchymal cysts. No hydronephrosis.  Stable borderline mesenteric lymphadenopathy noted, series 3 image 219 measures 1.7 x 1.7 cm.  Stable subcentimeter retroperitoneal lymph nodes.  Prominent right pelvic mass series 3 image 279 measures 6.5 x 5.4 cm before measuring 6.8 x 5.1 cm.  Right iliac mass measures 2 x 1.5 cm series 3 image 265 before measured 1.4 x 1.3 cm.  Right iliac fossa mass measures 4.5 x 4 cm image 257 before measuring 2.9 x 3.6 cm.  Ventral hernia noted no evidence of bowel obstruction.  prostate mildly enlarged measures 5 cm  No suspicious new osseous lesions    IMPRESSION:  Interval increase in size of peritoneal pelvic masses in comparison to prior study  Prominent mesenteric lymphadenopathy  Stable prominent borderline mediastinal and hilar lymphadenopathy  Stable hepatic metastasis.    CT scan 10/5/20: A metastatic lesion in segment VII of the liver adjacent to the right hepatic vein and inferior vena cava remains overall stable, measuring approximately 2.1 cm (series 6 image 166). There is another lesion in segment V measuring approximately 1.1 cm (series 6 image 188), suspect for metastatic disease and not well seen on the prior study.       Assessment and Plan:      1. 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 Metastasis to liver    3. 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 Hydronephrosis, unspecified hydronephrosis type    5. Essential hypertension    6. Coronary artery disease involving native coronary artery of native heart without angina pectoris    7. Hypophosphatemia           1-3  - Mr. Schmidt is a 76 yo man with stage IIIB GIST of the small intestine  (T3N0Mx), 6 cm, mitotic rate 9 mitoses/5 mm2, s/p resection on 2/15/18. His GIST is negative for KIT, PDGFRA, SDH, BRAF, NF1, NF2 mutations. He had oligometastatic disease to the liver found on  CT scan 5/20/19. He went to [facility] and underwent IR liver lesion biopsy and ablation on 7/23/19. Pathology showed metastatic GIST positive for DOG-1 and .   - surveillance MRI in Feb 2020 showed progressive disease with new peritoneal mets. Patient was seen by Dr Guaman. Gleevec was recommended.   - CT after 2.5 month of Gleevec showed progressive peritoneal disease.   - underwent cytoreductive surgery at [facility] on 7/10/20  - started sutent 37.5 mg daily on 8/10/20. Tolerating it well  - c/w sutent 37.5 mg daily  - scheduled for repeat scan at [facility] on Dec 7 with tentative plan of ablation  - will schedule f/u visit after his next visit at [facility]     4.  - last CT at [facility] one month ago did not show hydronephrosis. He does have kidney cysts  - get RP US today to r/o hydronephrosis    5.  - BP good  - c/w current meds  - continue to monitor BP at home    6.  - c/w aspirin and lipitor     7  - 2/2 sutent  - neutraphos x 1 week       Their multiple questions were answered in the clinic. Encouraged to call should issues arise

## 2020-11-23 ENCOUNTER — OFFICE VISIT (OUTPATIENT)
Dept: CARDIOLOGY | Facility: CLINIC | Age: 75
End: 2020-11-23
Payer: MEDICARE

## 2020-11-23 VITALS
DIASTOLIC BLOOD PRESSURE: 71 MMHG | HEART RATE: 62 BPM | SYSTOLIC BLOOD PRESSURE: 150 MMHG | BODY MASS INDEX: 24.5 KG/M2 | WEIGHT: 175 LBS | HEIGHT: 71 IN

## 2020-11-23 DIAGNOSIS — I10 ESSENTIAL HYPERTENSION: ICD-10-CM

## 2020-11-23 DIAGNOSIS — I25.10 CORONARY ARTERY DISEASE INVOLVING NATIVE CORONARY ARTERY OF NATIVE HEART WITHOUT ANGINA PECTORIS: ICD-10-CM

## 2020-11-23 DIAGNOSIS — C49.A3 GIST (GASTROINTESTINAL STROMAL TUMOR) OF SMALL BOWEL, MALIGNANT: Chronic | ICD-10-CM

## 2020-11-23 DIAGNOSIS — E78.00 HYPERCHOLESTEREMIA: Primary | ICD-10-CM

## 2020-11-23 PROCEDURE — 99214 PR OFFICE/OUTPT VISIT, EST, LEVL IV, 30-39 MIN: ICD-10-PCS | Mod: S$PBB,,, | Performed by: INTERNAL MEDICINE

## 2020-11-23 PROCEDURE — 99213 OFFICE O/P EST LOW 20 MIN: CPT | Mod: PBBFAC,PO | Performed by: INTERNAL MEDICINE

## 2020-11-23 PROCEDURE — 99999 PR PBB SHADOW E&M-EST. PATIENT-LVL III: ICD-10-PCS | Mod: PBBFAC,,, | Performed by: INTERNAL MEDICINE

## 2020-11-23 PROCEDURE — 99999 PR PBB SHADOW E&M-EST. PATIENT-LVL III: CPT | Mod: PBBFAC,,, | Performed by: INTERNAL MEDICINE

## 2020-11-23 PROCEDURE — 99214 OFFICE O/P EST MOD 30 MIN: CPT | Mod: S$PBB,,, | Performed by: INTERNAL MEDICINE

## 2020-11-23 RX ORDER — CHLORTHALIDONE 25 MG/1
TABLET ORAL
Qty: 60 TABLET | Refills: 11 | Status: SHIPPED | OUTPATIENT
Start: 2020-11-23 | End: 2022-01-25 | Stop reason: SDUPTHER

## 2020-11-23 RX ORDER — LOSARTAN POTASSIUM 100 MG/1
100 TABLET ORAL DAILY
Qty: 90 TABLET | Refills: 3 | Status: SHIPPED | OUTPATIENT
Start: 2020-11-23 | End: 2022-02-04 | Stop reason: SDUPTHER

## 2020-11-23 RX ORDER — ATORVASTATIN CALCIUM 40 MG/1
40 TABLET, FILM COATED ORAL DAILY
Qty: 90 TABLET | Refills: 3 | Status: SHIPPED | OUTPATIENT
Start: 2020-11-23 | End: 2021-12-14 | Stop reason: SDUPTHER

## 2020-11-23 RX ORDER — AMLODIPINE BESYLATE 5 MG/1
5 TABLET ORAL DAILY
Qty: 90 TABLET | Refills: 3 | Status: SHIPPED | OUTPATIENT
Start: 2020-11-23 | End: 2021-05-10

## 2020-11-23 NOTE — PROGRESS NOTES
Subjective:    Patient ID:  Forrest Schmidt is a 75 y.o. male who presents for evaluation of Follow-up (follow up )      HPI  76 yo WM who had coronary stent in 2004 , HTN and HLD. Has had extensive surgeries because of his GIST. Denies chest pain, SOB, or edema    Review of Systems   Constitution: Negative for decreased appetite, fever, malaise/fatigue, weight gain and weight loss.   HENT: Negative for hearing loss and nosebleeds.    Eyes: Negative for visual disturbance.   Cardiovascular: Negative for chest pain, claudication, cyanosis, dyspnea on exertion, irregular heartbeat, leg swelling, near-syncope, orthopnea, palpitations, paroxysmal nocturnal dyspnea and syncope.   Respiratory: Negative for cough, hemoptysis, shortness of breath, sleep disturbances due to breathing, snoring and wheezing.    Endocrine: Negative for cold intolerance, heat intolerance, polydipsia and polyuria.   Hematologic/Lymphatic: Negative for adenopathy and bleeding problem. Does not bruise/bleed easily.   Skin: Negative for color change, itching, poor wound healing, rash and suspicious lesions.   Musculoskeletal: Negative for arthritis, back pain, falls, joint pain, joint swelling, muscle cramps, muscle weakness and myalgias.   Gastrointestinal: Negative for bloating, abdominal pain, change in bowel habit, constipation, flatus, heartburn, hematemesis, hematochezia, hemorrhoids, jaundice, melena, nausea and vomiting.   Genitourinary: Negative for bladder incontinence, decreased libido, frequency, hematuria, hesitancy and urgency.   Neurological: Negative for brief paralysis, difficulty with concentration, excessive daytime sleepiness, dizziness, focal weakness, headaches, light-headedness, loss of balance, numbness, vertigo and weakness.   Psychiatric/Behavioral: Negative for altered mental status, depression and memory loss. The patient does not have insomnia and is not nervous/anxious.    Allergic/Immunologic: Negative for environmental  "allergies, hives and persistent infections.        Objective:    Physical Exam   Constitutional: He is oriented to person, place, and time. He appears well-developed and well-nourished. No distress.   BP (!) 150/71   Pulse 62   Ht 5' 11" (1.803 m)   Wt 79.4 kg (175 lb)   BMI 24.41 kg/m²      HENT:   Head: Normocephalic and atraumatic.   Eyes: Pupils are equal, round, and reactive to light. Conjunctivae and lids are normal. Right eye exhibits no discharge. No scleral icterus.   Neck: Normal range of motion. Neck supple. No JVD present. No tracheal deviation present. No thyromegaly present.   Cardiovascular: Normal rate, regular rhythm, S1 normal, S2 normal, normal heart sounds and intact distal pulses. Exam reveals no gallop and no friction rub.   No murmur heard.  Pulses:       Carotid pulses are 2+ on the right side and 2+ on the left side.       Radial pulses are 2+ on the right side and 2+ on the left side.        Femoral pulses are 2+ on the right side and 2+ on the left side.       Popliteal pulses are 2+ on the right side and 2+ on the left side.        Dorsalis pedis pulses are 2+ on the right side and 2+ on the left side.        Posterior tibial pulses are 2+ on the right side and 2+ on the left side.   Pulmonary/Chest: Effort normal and breath sounds normal. No respiratory distress. He has no wheezes. He has no rales. He exhibits no tenderness.   Abdominal: Soft. Bowel sounds are normal. He exhibits no distension and no mass. There is no hepatosplenomegaly or hepatomegaly. There is no abdominal tenderness. There is no rebound and no guarding.   Musculoskeletal: Normal range of motion.         General: No tenderness or edema.   Lymphadenopathy:     He has no cervical adenopathy.   Neurological: He is alert and oriented to person, place, and time. He has normal reflexes. No cranial nerve deficit. Coordination normal.   Skin: Skin is warm and dry. No rash noted. He is not diaphoretic. No erythema. No " pallor.   Psychiatric: He has a normal mood and affect. His speech is normal and behavior is normal. Judgment and thought content normal. Cognition and memory are normal.         Assessment:       1. Hypercholesteremia    2. Essential hypertension    3. Coronary artery disease involving native coronary artery of native heart without angina pectoris    4. GIST (gastrointestinal stromal tumor) of small bowel, malignant         Plan:     Brought in a log of BP's and reasonable control.     The current medical regimen is effective;  continue present plan and medications.    Reviewed lab and OK    No orders of the defined types were placed in this encounter.    Follow up in about 6 months (around 5/23/2021).

## 2020-12-11 ENCOUNTER — PATIENT MESSAGE (OUTPATIENT)
Dept: HEMATOLOGY/ONCOLOGY | Facility: CLINIC | Age: 75
End: 2020-12-11

## 2020-12-11 DIAGNOSIS — C49.A3 GIST (GASTROINTESTINAL STROMAL TUMOR) OF SMALL BOWEL, MALIGNANT: Primary | ICD-10-CM

## 2020-12-11 DIAGNOSIS — C78.7 METASTASIS TO LIVER: ICD-10-CM

## 2020-12-11 DIAGNOSIS — R53.83 FATIGUE, UNSPECIFIED TYPE: ICD-10-CM

## 2020-12-17 ENCOUNTER — SPECIALTY PHARMACY (OUTPATIENT)
Dept: PHARMACY | Facility: CLINIC | Age: 75
End: 2020-12-17

## 2020-12-17 NOTE — TELEPHONE ENCOUNTER
Specialty Pharmacy - Refill Coordination    Specialty Medication Orders Linked to Encounter      Most Recent Value   Medication #1  SUNItinib (SUTENT) 37.5 mg Cap (Order#[id number], Rx#[id number])          Refill Questions - Documented Responses      Most Recent Value   Relationship to patient of person spoken to?  Self   HIPAA/medical authority confirmed?  Yes   Any changes in contact preferences or allowed representatives?  No   Has the patient had any insurance changes?  No   Has the patient had any changes to specialty medication, dose, or instructions?  No   Has the patient started taking any new medications, herbals, or supplements?  No   Has the patient been diagnosed with any new medical conditions?  No   Does the patient have any new allergies to medications or foods?  No   Does the patient have any concerns about side effects?  No   Can the patient store medication/sharps container properly (at the correct temperature, away from children/pets, etc.)?  Yes   Can the patient call emergency services (911) in the event of an emergency?  Yes   Does the patient have any concerns or questions about taking or administering this medication as prescribed?  No   How many doses did the patient miss in the past 4 weeks or since the last fill?  0   How many doses does the patient have on hand?  0   How many days does the patient report on hand quantity will last?  7   Does the number of doses/days supply remaining match pharmacy expected amounts?  Yes   Does the patient feel that this medication is effective?  Yes   During the past 4 weeks, has patient missed any activities due to condition or medication?  No   During the past 4 weeks, did patient have any of the following urgent care visits?  None   How will the patient receive the medication?  Mail   When does the patient need to receive the medication?  12/24/20   Shipping Address  Home   Address in [facility] confirmed and updated if neccessary?  Yes    Expected Copay ($)  0   Is the patient able to afford the medication copay?  Yes   Payment Method  zero copay   Days supply of Refill  28   Would patient like to speak to a pharmacist?  No   Do you want to trigger an intervention?  No   Do you want to trigger an additional referral task?  No   Refill activity completed?  Yes   Refill activity plan  Refill scheduled   Shipment/Pickup Date:  12/21/20          Current Outpatient Medications   Medication Sig    amLODIPine (NORVASC) 5 MG tablet Take 1 tablet (5 mg total) by mouth once daily.    arginine 500 mg tablet Take 1,000 mg by mouth once daily.     aspirin (ECOTRIN) 81 MG EC tablet Take 81 mg by mouth.    atorvastatin (LIPITOR) 40 MG tablet Take 1 tablet (40 mg total) by mouth once daily.    BORON ORAL Take 3 mg by mouth once daily.    calcium carbonate (TUMS) 200 mg calcium (500 mg) chewable tablet Take 500 mg by mouth.    chlorthalidone (HYGROTEN) 25 MG Tab One half tablet po Monday-Wednseday and Friday    cholecalciferol, vitamin D3, (VITAMIN D3) 2,000 unit Cap Take 1 capsule by mouth once daily.    fish oil-omega-3 fatty acids 300-1,000 mg capsule Take 1 g by mouth.    fluticasone (FLONASE) 50 mcg/actuation nasal spray 1 spray by Each Nare route 2 (two) times daily.    FLUZONE HIGH-DOSE 2019-20, PF, 180 mcg/0.5 mL Syrg ADM 0.5ML IM UTD    influenza (FLUZONE HIGH-DOSE 2018-19, PF,) 180 mcg/0.5 mL vaccine ADM 0.5ML IM UTD    losartan (COZAAR) 100 MG tablet Take 1 tablet (100 mg total) by mouth once daily.    magnesium oxide-Mg AA chelate (MG-PLUS-PROTEIN) 133 mg Tab Take 1 tablet by mouth.    multivitamin (THERAGRAN) per tablet Take 1 tablet by mouth once daily.    nitroGLYCERIN (NITROSTAT) 0.4 MG SL tablet DISSOLVE ONE TABLET UNDER TONGUE EVERY 5 MINUTES FOR 3 TIMES THEN GO TO EMERGY ROOM    omeprazole (PRILOSEC) 20 MG capsule Take 20 mg by mouth 2 (two) times a day.    ondansetron (ZOFRAN-ODT) 8 MG TbDL Take 1 tablet (8 mg total) by mouth  "every 6 (six) hours as needed (nausea).    promethazine (PHENERGAN) 25 MG tablet Take 1 tablet (25 mg total) by mouth every 6 (six) hours as needed for Nausea.    SOD CHLOR,SOD BICARB/NETI POT (NEILMED NASAFLO NASL) 1 spray by Nasal route every evening.    SODIUM CHLORIDE (FE-128 OPHT) Place 1 drop into the right eye 4 (four) times daily.     SUNItinib (SUTENT) 37.5 mg Cap Take 1 capsule (37.5 mg total) by mouth once daily.    tadalafil (CIALIS) 5 MG tablet Take 5 mg by mouth once daily at 6am.    testosterone cypionate (DEPOTESTOTERONE CYPIONATE) 200 mg/mL injection Inject 200 mg into the muscle every 14 (fourteen) days.     zinc gluconate 50 mg tablet Take 50 mg by mouth every Monday and Thursday.    Last reviewed on 11/23/2020 11:27 AM by Tera Upton Jr., MD    Review of patient's allergies indicates:   Allergen Reactions    Augmentin [amoxicillin-pot clavulanate] Shortness Of Breath     disoriented    Fexofenadine Other (See Comments)     Pt states he can't remember the details but it was a bad effect.    Singulair [montelukast] Other (See Comments)     Pt "felt strange"  Pt "felt strange" bloating, Intestinal irritation, abd cramping      Ace inhibitors Other (See Comments)     cough    Captopril     Doxycycline Nausea And Vomiting     headache    Horse/equine containing products Hives    Hydrocodone Nausea Only    Scopolamine hbr Other (See Comments)     Nausea, headache, changes in BP     Last reviewed on  11/23/2020 11:17 AM by Janine Chan      Tasks added this encounter   No tasks added.   Tasks due within next 3 months   1/12/2021 - Clinical - Follow Up Assesement (90 day)  12/17/2020 - Refill Call (Auto Added)     Billy Hager  [facility]  [address] [place] [address]  Phone: [phone]  Fax: [phone]      "

## 2020-12-17 NOTE — TELEPHONE ENCOUNTER
Faxed AR request to [employer] in the amount of $72.24 for December 2020 Sutent refill. Approval pending.

## 2020-12-17 NOTE — TELEPHONE ENCOUNTER
Specialty Pharmacy - Refill Coordination    Specialty Medication Orders Linked to Encounter      Most Recent Value   Medication #1  SUNItinib (SUTENT) 37.5 mg Cap (Order#[id number], Rx#[id number])          Refill Questions - Documented Responses      Most Recent Value   Relationship to patient of person spoken to?  Self   HIPAA/medical authority confirmed?  Yes   Any changes in contact preferences or allowed representatives?  No   Has the patient had any insurance changes?  No   Has the patient had any changes to specialty medication, dose, or instructions?  No   Has the patient started taking any new medications, herbals, or supplements?  No   Has the patient been diagnosed with any new medical conditions?  No   Does the patient have any new allergies to medications or foods?  No   Does the patient have any concerns about side effects?  No   Can the patient store medication/sharps container properly (at the correct temperature, away from children/pets, etc.)?  Yes   Can the patient call emergency services (911) in the event of an emergency?  Yes   Does the patient have any concerns or questions about taking or administering this medication as prescribed?  No   How many doses did the patient miss in the past 4 weeks or since the last fill?  0   How many doses does the patient have on hand?  12   How many days does the patient report on hand quantity will last?  12   Does the number of doses/days supply remaining match pharmacy expected amounts?  Yes   Does the patient feel that this medication is effective?  Yes   During the past 4 weeks, has patient missed any activities due to condition or medication?  No   During the past 4 weeks, did patient have any of the following urgent care visits?  None   How will the patient receive the medication?  Mail   When does the patient need to receive the medication?  12/28/20   Shipping Address  Home   Address in [facility] confirmed and updated if neccessary?  Yes    Expected Copay ($)  72.24   Is the patient able to afford the medication copay?  Yes   Payment Method  -- [CAGNO-pending]   Days supply of Refill  28   Would patient like to speak to a pharmacist?  No   Do you want to trigger an intervention?  No   Do you want to trigger an additional referral task?  No   Refill activity completed?  Yes   Refill activity plan  Refill scheduled   Shipment/Pickup Date:  12/22/20          Current Outpatient Medications   Medication Sig    amLODIPine (NORVASC) 5 MG tablet Take 1 tablet (5 mg total) by mouth once daily.    arginine 500 mg tablet Take 1,000 mg by mouth once daily.     aspirin (ECOTRIN) 81 MG EC tablet Take 81 mg by mouth.    atorvastatin (LIPITOR) 40 MG tablet Take 1 tablet (40 mg total) by mouth once daily.    BORON ORAL Take 3 mg by mouth once daily.    calcium carbonate (TUMS) 200 mg calcium (500 mg) chewable tablet Take 500 mg by mouth.    chlorthalidone (HYGROTEN) 25 MG Tab One half tablet po Monday-Wednseday and Friday    cholecalciferol, vitamin D3, (VITAMIN D3) 2,000 unit Cap Take 1 capsule by mouth once daily.    fish oil-omega-3 fatty acids 300-1,000 mg capsule Take 1 g by mouth.    fluticasone (FLONASE) 50 mcg/actuation nasal spray 1 spray by Each Nare route 2 (two) times daily.    FLUZONE HIGH-DOSE 2019-20, PF, 180 mcg/0.5 mL Syrg ADM 0.5ML IM UTD    influenza (FLUZONE HIGH-DOSE 2018-19, PF,) 180 mcg/0.5 mL vaccine ADM 0.5ML IM UTD    losartan (COZAAR) 100 MG tablet Take 1 tablet (100 mg total) by mouth once daily.    magnesium oxide-Mg AA chelate (MG-PLUS-PROTEIN) 133 mg Tab Take 1 tablet by mouth.    multivitamin (THERAGRAN) per tablet Take 1 tablet by mouth once daily.    nitroGLYCERIN (NITROSTAT) 0.4 MG SL tablet DISSOLVE ONE TABLET UNDER TONGUE EVERY 5 MINUTES FOR 3 TIM[facility]    omeprazole (PRILOSEC) 20 MG capsule Take 20 mg by mouth 2 (two) times a day.    ondansetron (ZOFRAN-ODT) 8 MG TbDL Take 1 tablet (8 mg  "total) by mouth every 6 (six) hours as needed (nausea).    promethazine (PHENERGAN) 25 MG tablet Take 1 tablet (25 mg total) by mouth every 6 (six) hours as needed for Nausea.    SOD CHLOR,SOD BICARB/NETI POT (NEILMED NASAFLO NASL) 1 spray by Nasal route every evening.    SODIUM CHLORIDE (FE-128 OPHT) Place 1 drop into the right eye 4 (four) times daily.     SUNItinib (SUTENT) 37.5 mg Cap Take 1 capsule (37.5 mg total) by mouth once daily.    tadalafil (CIALIS) 5 MG tablet Take 5 mg by mouth once daily at 6am.    testosterone cypionate (DEPOTESTOTERONE CYPIONATE) 200 mg/mL injection Inject 200 mg into the muscle every 14 (fourteen) days.     zinc gluconate 50 mg tablet Take 50 mg by mouth every Monday and Thursday.    Last reviewed on 11/23/2020 11:27 AM by Tera Upton Jr., MD    Review of patient's allergies indicates:   Allergen Reactions    Augmentin [amoxicillin-pot clavulanate] Shortness Of Breath     disoriented    Fexofenadine Other (See Comments)     Pt states he can't remember the details but it was a bad effect.    Singulair [montelukast] Other (See Comments)     Pt "felt strange"  Pt "felt strange" bloating, Intestinal irritation, abd cramping      Ace inhibitors Other (See Comments)     cough    Captopril     Doxycycline Nausea And Vomiting     headache    Horse/equine containing products Hives    Hydrocodone Nausea Only    Scopolamine hbr Other (See Comments)     Nausea, headache, changes in BP     Last reviewed on  11/23/2020 11:17 AM by Janine Chan      Tasks added this encounter   1/14/2021 - Refill Call (Auto Added)   Tasks due within next 3 months   1/12/2021 - Clinical - Follow Up Assesement (90 day)     Billy Hager  [facility]  [address] [place] [id number]  Phone: [phone]  Fax: [phone]      "

## 2020-12-29 ENCOUNTER — PATIENT MESSAGE (OUTPATIENT)
Dept: HEMATOLOGY/ONCOLOGY | Facility: CLINIC | Age: 75
End: 2020-12-29

## 2021-01-08 ENCOUNTER — LAB VISIT (OUTPATIENT)
Dept: LAB | Facility: HOSPITAL | Age: 76
End: 2021-01-08
Attending: INTERNAL MEDICINE
Payer: MEDICARE

## 2021-01-08 DIAGNOSIS — R53.83 FATIGUE, UNSPECIFIED TYPE: ICD-10-CM

## 2021-01-08 DIAGNOSIS — C49.A3 GIST (GASTROINTESTINAL STROMAL TUMOR) OF SMALL BOWEL, MALIGNANT: ICD-10-CM

## 2021-01-08 LAB
ALBUMIN SERPL BCP-MCNC: 3.8 G/DL (ref 3.5–5.2)
ALP SERPL-CCNC: 70 U/L (ref 55–135)
ALT SERPL W/O P-5'-P-CCNC: 32 U/L (ref 10–44)
ANION GAP SERPL CALC-SCNC: 8 MMOL/L (ref 8–16)
AST SERPL-CCNC: 38 U/L (ref 10–40)
BASOPHILS # BLD AUTO: 0.01 K/UL (ref 0–0.2)
BASOPHILS NFR BLD: 0.4 % (ref 0–1.9)
BILIRUB SERPL-MCNC: 0.4 MG/DL (ref 0.1–1)
BILIRUB UR QL STRIP: NEGATIVE
BUN SERPL-MCNC: 27 MG/DL (ref 8–23)
CALCIUM SERPL-MCNC: 8.9 MG/DL (ref 8.7–10.5)
CHLORIDE SERPL-SCNC: 101 MMOL/L (ref 95–110)
CLARITY UR: CLEAR
CO2 SERPL-SCNC: 29 MMOL/L (ref 23–29)
COLOR UR: YELLOW
CREAT SERPL-MCNC: 1.3 MG/DL (ref 0.5–1.4)
DIFFERENTIAL METHOD: ABNORMAL
EOSINOPHIL # BLD AUTO: 0.2 K/UL (ref 0–0.5)
EOSINOPHIL NFR BLD: 8.7 % (ref 0–8)
ERYTHROCYTE [DISTWIDTH] IN BLOOD BY AUTOMATED COUNT: 16.6 % (ref 11.5–14.5)
EST. GFR  (AFRICAN AMERICAN): >60 ML/MIN/1.73 M^2
EST. GFR  (NON AFRICAN AMERICAN): 53 ML/MIN/1.73 M^2
GLUCOSE SERPL-MCNC: 105 MG/DL (ref 70–110)
GLUCOSE UR QL STRIP: NEGATIVE
HCT VFR BLD AUTO: 38.9 % (ref 40–54)
HGB BLD-MCNC: 12.8 G/DL (ref 14–18)
HGB UR QL STRIP: NEGATIVE
IMM GRANULOCYTES # BLD AUTO: 0.01 K/UL (ref 0–0.04)
IMM GRANULOCYTES NFR BLD AUTO: 0.4 % (ref 0–0.5)
KETONES UR QL STRIP: NEGATIVE
LEUKOCYTE ESTERASE UR QL STRIP: NEGATIVE
LYMPHOCYTES # BLD AUTO: 0.5 K/UL (ref 1–4.8)
LYMPHOCYTES NFR BLD: 20.2 % (ref 18–48)
MCH RBC QN AUTO: 33.7 PG (ref 27–31)
MCHC RBC AUTO-ENTMCNC: 32.9 G/DL (ref 32–36)
MCV RBC AUTO: 102 FL (ref 82–98)
MONOCYTES # BLD AUTO: 0.4 K/UL (ref 0.3–1)
MONOCYTES NFR BLD: 16.2 % (ref 4–15)
NEUTROPHILS # BLD AUTO: 1.4 K/UL (ref 1.8–7.7)
NEUTROPHILS NFR BLD: 54.5 % (ref 38–73)
NITRITE UR QL STRIP: NEGATIVE
NRBC BLD-RTO: 0 /100 WBC
PH UR STRIP: 6.5 [PH] (ref 5–8)
PHOSPHATE SERPL-MCNC: 2.7 MG/DL (ref 2.7–4.5)
PLATELET # BLD AUTO: 143 K/UL (ref 150–350)
PMV BLD AUTO: 9 FL (ref 9.2–12.9)
POTASSIUM SERPL-SCNC: 4 MMOL/L (ref 3.5–5.1)
PROT SERPL-MCNC: 6.9 G/DL (ref 6–8.4)
PROT UR QL STRIP: NEGATIVE
RBC # BLD AUTO: 3.8 M/UL (ref 4.6–6.2)
SODIUM SERPL-SCNC: 138 MMOL/L (ref 136–145)
SP GR UR STRIP: 1.01 (ref 1–1.03)
TSH SERPL DL<=0.005 MIU/L-ACNC: 2.21 UIU/ML (ref 0.4–4)
URN SPEC COLLECT METH UR: NORMAL
WBC # BLD AUTO: 2.53 K/UL (ref 3.9–12.7)

## 2021-01-08 PROCEDURE — 84100 ASSAY OF PHOSPHORUS: CPT

## 2021-01-08 PROCEDURE — 85025 COMPLETE CBC W/AUTO DIFF WBC: CPT | Mod: PO

## 2021-01-08 PROCEDURE — 80053 COMPREHEN METABOLIC PANEL: CPT

## 2021-01-08 PROCEDURE — 36415 COLL VENOUS BLD VENIPUNCTURE: CPT | Mod: PO

## 2021-01-08 PROCEDURE — 81002 URINALYSIS NONAUTO W/O SCOPE: CPT | Mod: PO

## 2021-01-08 PROCEDURE — 84443 ASSAY THYROID STIM HORMONE: CPT

## 2021-01-11 ENCOUNTER — OFFICE VISIT (OUTPATIENT)
Dept: HEMATOLOGY/ONCOLOGY | Facility: CLINIC | Age: 76
End: 2021-01-11
Payer: MEDICARE

## 2021-01-11 VITALS
BODY MASS INDEX: 24.59 KG/M2 | OXYGEN SATURATION: 98 % | SYSTOLIC BLOOD PRESSURE: 155 MMHG | RESPIRATION RATE: 18 BRPM | TEMPERATURE: 98 F | HEART RATE: 65 BPM | WEIGHT: 175.63 LBS | DIASTOLIC BLOOD PRESSURE: 70 MMHG | HEIGHT: 71 IN

## 2021-01-11 DIAGNOSIS — R53.83 FATIGUE, UNSPECIFIED TYPE: ICD-10-CM

## 2021-01-11 DIAGNOSIS — I10 ESSENTIAL HYPERTENSION: ICD-10-CM

## 2021-01-11 DIAGNOSIS — L27.1 HAND FOOT SYNDROME: ICD-10-CM

## 2021-01-11 DIAGNOSIS — I25.10 CORONARY ARTERY DISEASE INVOLVING NATIVE CORONARY ARTERY OF NATIVE HEART WITHOUT ANGINA PECTORIS: ICD-10-CM

## 2021-01-11 DIAGNOSIS — C79.89 METASTATIC CANCER TO PELVIS: ICD-10-CM

## 2021-01-11 DIAGNOSIS — C49.A3 GIST (GASTROINTESTINAL STROMAL TUMOR) OF SMALL BOWEL, MALIGNANT: Primary | ICD-10-CM

## 2021-01-11 DIAGNOSIS — C78.7 METASTASIS TO LIVER: ICD-10-CM

## 2021-01-11 PROCEDURE — 99215 OFFICE O/P EST HI 40 MIN: CPT | Mod: PBBFAC | Performed by: INTERNAL MEDICINE

## 2021-01-11 PROCEDURE — 99999 PR PBB SHADOW E&M-EST. PATIENT-LVL V: CPT | Mod: PBBFAC,,, | Performed by: INTERNAL MEDICINE

## 2021-01-11 PROCEDURE — 99999 PR PBB SHADOW E&M-EST. PATIENT-LVL V: ICD-10-PCS | Mod: PBBFAC,,, | Performed by: INTERNAL MEDICINE

## 2021-01-11 PROCEDURE — 99214 OFFICE O/P EST MOD 30 MIN: CPT | Mod: S$PBB,,, | Performed by: INTERNAL MEDICINE

## 2021-01-11 PROCEDURE — 99214 PR OFFICE/OUTPT VISIT, EST, LEVL IV, 30-39 MIN: ICD-10-PCS | Mod: S$PBB,,, | Performed by: INTERNAL MEDICINE

## 2021-01-13 ENCOUNTER — SPECIALTY PHARMACY (OUTPATIENT)
Dept: PHARMACY | Facility: CLINIC | Age: 76
End: 2021-01-13

## 2021-01-13 DIAGNOSIS — C49.A3 GIST (GASTROINTESTINAL STROMAL TUMOR) OF SMALL BOWEL, MALIGNANT: Primary | ICD-10-CM

## 2021-02-18 ENCOUNTER — SPECIALTY PHARMACY (OUTPATIENT)
Dept: PHARMACY | Facility: CLINIC | Age: 76
End: 2021-02-18

## 2021-03-15 ENCOUNTER — SPECIALTY PHARMACY (OUTPATIENT)
Dept: PHARMACY | Facility: CLINIC | Age: 76
End: 2021-03-15

## 2021-04-08 ENCOUNTER — SPECIALTY PHARMACY (OUTPATIENT)
Dept: PHARMACY | Facility: CLINIC | Age: 76
End: 2021-04-08

## 2021-04-08 DIAGNOSIS — C49.A3 GIST (GASTROINTESTINAL STROMAL TUMOR) OF SMALL BOWEL, MALIGNANT: Primary | ICD-10-CM

## 2021-04-09 ENCOUNTER — LAB VISIT (OUTPATIENT)
Dept: LAB | Facility: HOSPITAL | Age: 76
End: 2021-04-09
Attending: INTERNAL MEDICINE
Payer: MEDICARE

## 2021-04-09 DIAGNOSIS — R53.83 FATIGUE, UNSPECIFIED TYPE: ICD-10-CM

## 2021-04-09 DIAGNOSIS — C49.A3 GIST (GASTROINTESTINAL STROMAL TUMOR) OF SMALL BOWEL, MALIGNANT: ICD-10-CM

## 2021-04-09 LAB
BASOPHILS # BLD AUTO: 0.02 K/UL (ref 0–0.2)
BASOPHILS NFR BLD: 0.6 % (ref 0–1.9)
BILIRUB UR QL STRIP: NEGATIVE
CLARITY UR: CLEAR
COLOR UR: YELLOW
DIFFERENTIAL METHOD: ABNORMAL
EOSINOPHIL # BLD AUTO: 0.5 K/UL (ref 0–0.5)
EOSINOPHIL NFR BLD: 17.2 % (ref 0–8)
ERYTHROCYTE [DISTWIDTH] IN BLOOD BY AUTOMATED COUNT: 14.6 % (ref 11.5–14.5)
GLUCOSE UR QL STRIP: NEGATIVE
HCT VFR BLD AUTO: 38.8 % (ref 40–54)
HGB BLD-MCNC: 13.1 G/DL (ref 14–18)
HGB UR QL STRIP: NEGATIVE
IMM GRANULOCYTES # BLD AUTO: 0 K/UL (ref 0–0.04)
IMM GRANULOCYTES NFR BLD AUTO: 0 % (ref 0–0.5)
KETONES UR QL STRIP: NEGATIVE
LEUKOCYTE ESTERASE UR QL STRIP: NEGATIVE
LYMPHOCYTES # BLD AUTO: 0.6 K/UL (ref 1–4.8)
LYMPHOCYTES NFR BLD: 20.4 % (ref 18–48)
MCH RBC QN AUTO: 35.5 PG (ref 27–31)
MCHC RBC AUTO-ENTMCNC: 33.8 G/DL (ref 32–36)
MCV RBC AUTO: 105 FL (ref 82–98)
MONOCYTES # BLD AUTO: 0.4 K/UL (ref 0.3–1)
MONOCYTES NFR BLD: 13.7 % (ref 4–15)
NEUTROPHILS # BLD AUTO: 1.5 K/UL (ref 1.8–7.7)
NEUTROPHILS NFR BLD: 48.1 % (ref 38–73)
NITRITE UR QL STRIP: NEGATIVE
NRBC BLD-RTO: 0 /100 WBC
PH UR STRIP: 6 [PH] (ref 5–8)
PLATELET # BLD AUTO: 161 K/UL (ref 150–450)
PMV BLD AUTO: 9.1 FL (ref 9.2–12.9)
PROT UR QL STRIP: NEGATIVE
RBC # BLD AUTO: 3.69 M/UL (ref 4.6–6.2)
SP GR UR STRIP: 1.01 (ref 1–1.03)
URN SPEC COLLECT METH UR: NORMAL
WBC # BLD AUTO: 3.14 K/UL (ref 3.9–12.7)

## 2021-04-09 PROCEDURE — 85025 COMPLETE CBC W/AUTO DIFF WBC: CPT | Mod: PO | Performed by: INTERNAL MEDICINE

## 2021-04-09 PROCEDURE — 36415 COLL VENOUS BLD VENIPUNCTURE: CPT | Mod: PO | Performed by: INTERNAL MEDICINE

## 2021-04-09 PROCEDURE — 80053 COMPREHEN METABOLIC PANEL: CPT | Performed by: INTERNAL MEDICINE

## 2021-04-09 PROCEDURE — 84100 ASSAY OF PHOSPHORUS: CPT | Performed by: INTERNAL MEDICINE

## 2021-04-09 PROCEDURE — 84443 ASSAY THYROID STIM HORMONE: CPT | Performed by: INTERNAL MEDICINE

## 2021-04-09 PROCEDURE — 81002 URINALYSIS NONAUTO W/O SCOPE: CPT | Mod: PO | Performed by: INTERNAL MEDICINE

## 2021-04-10 LAB
ALBUMIN SERPL BCP-MCNC: 3.7 G/DL (ref 3.5–5.2)
ALP SERPL-CCNC: 73 U/L (ref 55–135)
ALT SERPL W/O P-5'-P-CCNC: 37 U/L (ref 10–44)
ANION GAP SERPL CALC-SCNC: 8 MMOL/L (ref 8–16)
AST SERPL-CCNC: 37 U/L (ref 10–40)
BILIRUB SERPL-MCNC: 0.4 MG/DL (ref 0.1–1)
BUN SERPL-MCNC: 24 MG/DL (ref 8–23)
CALCIUM SERPL-MCNC: 8.6 MG/DL (ref 8.7–10.5)
CHLORIDE SERPL-SCNC: 102 MMOL/L (ref 95–110)
CO2 SERPL-SCNC: 26 MMOL/L (ref 23–29)
CREAT SERPL-MCNC: 1.2 MG/DL (ref 0.5–1.4)
EST. GFR  (AFRICAN AMERICAN): >60 ML/MIN/1.73 M^2
EST. GFR  (NON AFRICAN AMERICAN): 58.4 ML/MIN/1.73 M^2
GLUCOSE SERPL-MCNC: 87 MG/DL (ref 70–110)
PHOSPHATE SERPL-MCNC: 2.8 MG/DL (ref 2.7–4.5)
POTASSIUM SERPL-SCNC: 4.6 MMOL/L (ref 3.5–5.1)
PROT SERPL-MCNC: 6.5 G/DL (ref 6–8.4)
SODIUM SERPL-SCNC: 136 MMOL/L (ref 136–145)
TSH SERPL DL<=0.005 MIU/L-ACNC: 2.22 UIU/ML (ref 0.4–4)

## 2021-04-12 ENCOUNTER — OFFICE VISIT (OUTPATIENT)
Dept: HEMATOLOGY/ONCOLOGY | Facility: CLINIC | Age: 76
End: 2021-04-12
Payer: MEDICARE

## 2021-04-12 VITALS
OXYGEN SATURATION: 98 % | RESPIRATION RATE: 16 BRPM | TEMPERATURE: 98 F | BODY MASS INDEX: 24.85 KG/M2 | DIASTOLIC BLOOD PRESSURE: 77 MMHG | HEART RATE: 57 BPM | WEIGHT: 177.5 LBS | HEIGHT: 71 IN | SYSTOLIC BLOOD PRESSURE: 153 MMHG

## 2021-04-12 DIAGNOSIS — C78.7 METASTASIS TO LIVER: ICD-10-CM

## 2021-04-12 DIAGNOSIS — C79.89 METASTATIC CANCER TO PELVIS: ICD-10-CM

## 2021-04-12 DIAGNOSIS — R68.89 COLD INTOLERANCE: ICD-10-CM

## 2021-04-12 DIAGNOSIS — I10 ESSENTIAL HYPERTENSION: ICD-10-CM

## 2021-04-12 DIAGNOSIS — R53.83 FATIGUE, UNSPECIFIED TYPE: ICD-10-CM

## 2021-04-12 DIAGNOSIS — I25.10 CORONARY ARTERY DISEASE INVOLVING NATIVE CORONARY ARTERY OF NATIVE HEART WITHOUT ANGINA PECTORIS: ICD-10-CM

## 2021-04-12 DIAGNOSIS — C49.A3 GIST (GASTROINTESTINAL STROMAL TUMOR) OF SMALL BOWEL, MALIGNANT: Primary | ICD-10-CM

## 2021-04-12 PROCEDURE — 99215 PR OFFICE/OUTPT VISIT, EST, LEVL V, 40-54 MIN: ICD-10-PCS | Mod: S$PBB,,, | Performed by: INTERNAL MEDICINE

## 2021-04-12 PROCEDURE — 99215 OFFICE O/P EST HI 40 MIN: CPT | Mod: S$PBB,,, | Performed by: INTERNAL MEDICINE

## 2021-04-12 PROCEDURE — 99999 PR PBB SHADOW E&M-EST. PATIENT-LVL V: CPT | Mod: PBBFAC,,, | Performed by: INTERNAL MEDICINE

## 2021-04-12 PROCEDURE — 99999 PR PBB SHADOW E&M-EST. PATIENT-LVL V: ICD-10-PCS | Mod: PBBFAC,,, | Performed by: INTERNAL MEDICINE

## 2021-04-12 PROCEDURE — 99215 OFFICE O/P EST HI 40 MIN: CPT | Mod: PBBFAC | Performed by: INTERNAL MEDICINE

## 2021-04-24 ENCOUNTER — PATIENT MESSAGE (OUTPATIENT)
Dept: HEMATOLOGY/ONCOLOGY | Facility: CLINIC | Age: 76
End: 2021-04-24

## 2021-05-06 ENCOUNTER — SPECIALTY PHARMACY (OUTPATIENT)
Dept: PHARMACY | Facility: CLINIC | Age: 76
End: 2021-05-06

## 2021-05-07 ENCOUNTER — LAB VISIT (OUTPATIENT)
Dept: LAB | Facility: HOSPITAL | Age: 76
End: 2021-05-07
Attending: INTERNAL MEDICINE
Payer: MEDICARE

## 2021-05-07 DIAGNOSIS — C49.A3 GIST (GASTROINTESTINAL STROMAL TUMOR) OF SMALL BOWEL, MALIGNANT: ICD-10-CM

## 2021-05-07 DIAGNOSIS — R53.83 FATIGUE, UNSPECIFIED TYPE: ICD-10-CM

## 2021-05-07 LAB
ALBUMIN SERPL BCP-MCNC: 3.6 G/DL (ref 3.5–5.2)
ALP SERPL-CCNC: 66 U/L (ref 55–135)
ALT SERPL W/O P-5'-P-CCNC: 36 U/L (ref 10–44)
ANION GAP SERPL CALC-SCNC: 7 MMOL/L (ref 8–16)
AST SERPL-CCNC: 42 U/L (ref 10–40)
BASOPHILS # BLD AUTO: 0.01 K/UL (ref 0–0.2)
BASOPHILS NFR BLD: 0.4 % (ref 0–1.9)
BILIRUB SERPL-MCNC: 0.5 MG/DL (ref 0.1–1)
BUN SERPL-MCNC: 24 MG/DL (ref 8–23)
CALCIUM SERPL-MCNC: 8.5 MG/DL (ref 8.7–10.5)
CHLORIDE SERPL-SCNC: 105 MMOL/L (ref 95–110)
CO2 SERPL-SCNC: 25 MMOL/L (ref 23–29)
CREAT SERPL-MCNC: 1.4 MG/DL (ref 0.5–1.4)
DIFFERENTIAL METHOD: ABNORMAL
EOSINOPHIL # BLD AUTO: 0.5 K/UL (ref 0–0.5)
EOSINOPHIL NFR BLD: 19.2 % (ref 0–8)
ERYTHROCYTE [DISTWIDTH] IN BLOOD BY AUTOMATED COUNT: 14.8 % (ref 11.5–14.5)
EST. GFR  (AFRICAN AMERICAN): 55.6 ML/MIN/1.73 M^2
EST. GFR  (NON AFRICAN AMERICAN): 48.1 ML/MIN/1.73 M^2
GLUCOSE SERPL-MCNC: 115 MG/DL (ref 70–110)
HCT VFR BLD AUTO: 37.7 % (ref 40–54)
HGB BLD-MCNC: 12.7 G/DL (ref 14–18)
IMM GRANULOCYTES # BLD AUTO: 0 K/UL (ref 0–0.04)
IMM GRANULOCYTES NFR BLD AUTO: 0 % (ref 0–0.5)
LYMPHOCYTES # BLD AUTO: 0.5 K/UL (ref 1–4.8)
LYMPHOCYTES NFR BLD: 22 % (ref 18–48)
MCH RBC QN AUTO: 34.9 PG (ref 27–31)
MCHC RBC AUTO-ENTMCNC: 33.7 G/DL (ref 32–36)
MCV RBC AUTO: 104 FL (ref 82–98)
MONOCYTES # BLD AUTO: 0.4 K/UL (ref 0.3–1)
MONOCYTES NFR BLD: 15.5 % (ref 4–15)
NEUTROPHILS # BLD AUTO: 1.1 K/UL (ref 1.8–7.7)
NEUTROPHILS NFR BLD: 42.9 % (ref 38–73)
NRBC BLD-RTO: 0 /100 WBC
PHOSPHATE SERPL-MCNC: 2.4 MG/DL (ref 2.7–4.5)
PLATELET # BLD AUTO: 190 K/UL (ref 150–450)
PMV BLD AUTO: 9 FL (ref 9.2–12.9)
POTASSIUM SERPL-SCNC: 4.2 MMOL/L (ref 3.5–5.1)
PROT SERPL-MCNC: 6.2 G/DL (ref 6–8.4)
RBC # BLD AUTO: 3.64 M/UL (ref 4.6–6.2)
SODIUM SERPL-SCNC: 137 MMOL/L (ref 136–145)
TSH SERPL DL<=0.005 MIU/L-ACNC: 1.89 UIU/ML (ref 0.4–4)
WBC # BLD AUTO: 2.45 K/UL (ref 3.9–12.7)

## 2021-05-07 PROCEDURE — 36415 COLL VENOUS BLD VENIPUNCTURE: CPT | Mod: PO | Performed by: INTERNAL MEDICINE

## 2021-05-07 PROCEDURE — 84100 ASSAY OF PHOSPHORUS: CPT | Performed by: INTERNAL MEDICINE

## 2021-05-07 PROCEDURE — 85025 COMPLETE CBC W/AUTO DIFF WBC: CPT | Mod: PO | Performed by: INTERNAL MEDICINE

## 2021-05-07 PROCEDURE — 84443 ASSAY THYROID STIM HORMONE: CPT | Performed by: INTERNAL MEDICINE

## 2021-05-07 PROCEDURE — 80053 COMPREHEN METABOLIC PANEL: CPT | Performed by: INTERNAL MEDICINE

## 2021-05-09 ENCOUNTER — PATIENT MESSAGE (OUTPATIENT)
Dept: HEMATOLOGY/ONCOLOGY | Facility: CLINIC | Age: 76
End: 2021-05-09

## 2021-05-10 ENCOUNTER — TELEPHONE (OUTPATIENT)
Dept: HEMATOLOGY/ONCOLOGY | Facility: CLINIC | Age: 76
End: 2021-05-10

## 2021-05-10 ENCOUNTER — OFFICE VISIT (OUTPATIENT)
Dept: HEMATOLOGY/ONCOLOGY | Facility: CLINIC | Age: 76
End: 2021-05-10
Payer: MEDICARE

## 2021-05-10 DIAGNOSIS — C78.7 METASTASIS TO LIVER: ICD-10-CM

## 2021-05-10 DIAGNOSIS — E83.39 HYPOPHOSPHATEMIA: ICD-10-CM

## 2021-05-10 DIAGNOSIS — D70.2 OTHER DRUG-INDUCED NEUTROPENIA: ICD-10-CM

## 2021-05-10 DIAGNOSIS — D49.9 IMMUNODEFICIENCY SECONDARY TO NEOPLASM: ICD-10-CM

## 2021-05-10 DIAGNOSIS — C49.A3 GIST (GASTROINTESTINAL STROMAL TUMOR) OF SMALL BOWEL, MALIGNANT: Primary | ICD-10-CM

## 2021-05-10 DIAGNOSIS — D84.81 IMMUNODEFICIENCY SECONDARY TO NEOPLASM: ICD-10-CM

## 2021-05-10 DIAGNOSIS — D53.9 NUTRITIONAL ANEMIA: ICD-10-CM

## 2021-05-10 DIAGNOSIS — C79.89 METASTATIC CANCER TO PELVIS: ICD-10-CM

## 2021-05-10 DIAGNOSIS — I10 ESSENTIAL HYPERTENSION: ICD-10-CM

## 2021-05-10 DIAGNOSIS — R53.83 FATIGUE, UNSPECIFIED TYPE: ICD-10-CM

## 2021-05-10 DIAGNOSIS — I25.10 CORONARY ARTERY DISEASE INVOLVING NATIVE CORONARY ARTERY OF NATIVE HEART WITHOUT ANGINA PECTORIS: ICD-10-CM

## 2021-05-10 PROCEDURE — 99215 PR OFFICE/OUTPT VISIT, EST, LEVL V, 40-54 MIN: ICD-10-PCS | Mod: 95,,, | Performed by: INTERNAL MEDICINE

## 2021-05-10 PROCEDURE — 99215 OFFICE O/P EST HI 40 MIN: CPT | Mod: 95,,, | Performed by: INTERNAL MEDICINE

## 2021-05-10 RX ORDER — AMLODIPINE BESYLATE 10 MG/1
10 TABLET ORAL DAILY
Qty: 30 TABLET | Refills: 11 | Status: SHIPPED | OUTPATIENT
Start: 2021-05-10 | End: 2021-07-12

## 2021-05-10 RX ORDER — SODIUM,POTASSIUM PHOSPHATES 280-250MG
1 POWDER IN PACKET (EA) ORAL
Qty: 12 PACKET | Refills: 0 | Status: SHIPPED | OUTPATIENT
Start: 2021-05-10 | End: 2021-05-11

## 2021-05-11 ENCOUNTER — LAB VISIT (OUTPATIENT)
Dept: LAB | Facility: HOSPITAL | Age: 76
End: 2021-05-11
Attending: INTERNAL MEDICINE
Payer: MEDICARE

## 2021-05-11 DIAGNOSIS — D53.9 NUTRITIONAL ANEMIA: ICD-10-CM

## 2021-05-11 PROCEDURE — 82746 ASSAY OF FOLIC ACID SERUM: CPT | Performed by: INTERNAL MEDICINE

## 2021-05-11 PROCEDURE — 82607 VITAMIN B-12: CPT | Performed by: INTERNAL MEDICINE

## 2021-05-11 PROCEDURE — 36415 COLL VENOUS BLD VENIPUNCTURE: CPT | Mod: PO | Performed by: INTERNAL MEDICINE

## 2021-05-12 LAB
FOLATE SERPL-MCNC: 17.7 NG/ML (ref 4–24)
VIT B12 SERPL-MCNC: 250 PG/ML (ref 210–950)

## 2021-05-13 ENCOUNTER — PATIENT MESSAGE (OUTPATIENT)
Dept: HEMATOLOGY/ONCOLOGY | Facility: CLINIC | Age: 76
End: 2021-05-13

## 2021-06-07 ENCOUNTER — SPECIALTY PHARMACY (OUTPATIENT)
Dept: PHARMACY | Facility: CLINIC | Age: 76
End: 2021-06-07

## 2021-06-30 ENCOUNTER — SPECIALTY PHARMACY (OUTPATIENT)
Dept: PHARMACY | Facility: CLINIC | Age: 76
End: 2021-06-30

## 2021-06-30 DIAGNOSIS — C49.A3 GIST (GASTROINTESTINAL STROMAL TUMOR) OF SMALL BOWEL, MALIGNANT: Primary | ICD-10-CM

## 2021-06-30 DIAGNOSIS — C49.A3 GIST (GASTROINTESTINAL STROMAL TUMOR) OF SMALL BOWEL, MALIGNANT: ICD-10-CM

## 2021-06-30 RX ORDER — SUNITINIB MALATE 37.5 MG/1
37.5 CAPSULE ORAL DAILY
Qty: 28 CAPSULE | Refills: 11 | Status: SHIPPED | OUTPATIENT
Start: 2021-06-30 | End: 2022-01-13

## 2021-07-07 ENCOUNTER — SPECIALTY PHARMACY (OUTPATIENT)
Dept: PHARMACY | Facility: CLINIC | Age: 76
End: 2021-07-07

## 2021-07-08 ENCOUNTER — OFFICE VISIT (OUTPATIENT)
Dept: CARDIOLOGY | Facility: CLINIC | Age: 76
End: 2021-07-08
Payer: MEDICARE

## 2021-07-08 VITALS
DIASTOLIC BLOOD PRESSURE: 70 MMHG | BODY MASS INDEX: 25.26 KG/M2 | WEIGHT: 181.13 LBS | SYSTOLIC BLOOD PRESSURE: 140 MMHG

## 2021-07-08 DIAGNOSIS — I10 ESSENTIAL HYPERTENSION: Primary | ICD-10-CM

## 2021-07-08 DIAGNOSIS — N18.32 STAGE 3B CHRONIC KIDNEY DISEASE: ICD-10-CM

## 2021-07-08 DIAGNOSIS — E78.00 HYPERCHOLESTEREMIA: ICD-10-CM

## 2021-07-08 DIAGNOSIS — I25.10 CORONARY ARTERY DISEASE INVOLVING NATIVE CORONARY ARTERY OF NATIVE HEART WITHOUT ANGINA PECTORIS: ICD-10-CM

## 2021-07-08 PROBLEM — N18.30 STAGE 3 CHRONIC KIDNEY DISEASE: Status: ACTIVE | Noted: 2021-07-08

## 2021-07-08 PROCEDURE — 99214 OFFICE O/P EST MOD 30 MIN: CPT | Mod: S$PBB,,, | Performed by: INTERNAL MEDICINE

## 2021-07-08 PROCEDURE — 99214 PR OFFICE/OUTPT VISIT, EST, LEVL IV, 30-39 MIN: ICD-10-PCS | Mod: S$PBB,,, | Performed by: INTERNAL MEDICINE

## 2021-07-08 PROCEDURE — 99213 OFFICE O/P EST LOW 20 MIN: CPT | Mod: PBBFAC,PO | Performed by: INTERNAL MEDICINE

## 2021-07-08 PROCEDURE — 99999 PR PBB SHADOW E&M-EST. PATIENT-LVL III: CPT | Mod: PBBFAC,,, | Performed by: INTERNAL MEDICINE

## 2021-07-08 PROCEDURE — 99999 PR PBB SHADOW E&M-EST. PATIENT-LVL III: ICD-10-PCS | Mod: PBBFAC,,, | Performed by: INTERNAL MEDICINE

## 2021-07-08 RX ORDER — METOPROLOL SUCCINATE 25 MG/1
25 TABLET, EXTENDED RELEASE ORAL DAILY
Qty: 90 TABLET | Refills: 3 | Status: SHIPPED | OUTPATIENT
Start: 2021-07-08 | End: 2022-01-25 | Stop reason: SDUPTHER

## 2021-07-09 ENCOUNTER — LAB VISIT (OUTPATIENT)
Dept: LAB | Facility: HOSPITAL | Age: 76
End: 2021-07-09
Payer: MEDICARE

## 2021-07-09 DIAGNOSIS — C49.A3 GIST (GASTROINTESTINAL STROMAL TUMOR) OF SMALL BOWEL, MALIGNANT: ICD-10-CM

## 2021-07-09 DIAGNOSIS — R53.83 FATIGUE, UNSPECIFIED TYPE: ICD-10-CM

## 2021-07-09 LAB
ALBUMIN SERPL BCP-MCNC: 3.6 G/DL (ref 3.5–5.2)
ALP SERPL-CCNC: 81 U/L (ref 55–135)
ALT SERPL W/O P-5'-P-CCNC: 41 U/L (ref 10–44)
ANION GAP SERPL CALC-SCNC: 6 MMOL/L (ref 8–16)
AST SERPL-CCNC: 41 U/L (ref 10–40)
BASOPHILS # BLD AUTO: 0.01 K/UL (ref 0–0.2)
BASOPHILS NFR BLD: 0.3 % (ref 0–1.9)
BILIRUB SERPL-MCNC: 0.5 MG/DL (ref 0.1–1)
BUN SERPL-MCNC: 27 MG/DL (ref 8–23)
CALCIUM SERPL-MCNC: 9.2 MG/DL (ref 8.7–10.5)
CHLORIDE SERPL-SCNC: 103 MMOL/L (ref 95–110)
CO2 SERPL-SCNC: 29 MMOL/L (ref 23–29)
CREAT SERPL-MCNC: 1.4 MG/DL (ref 0.5–1.4)
DIFFERENTIAL METHOD: ABNORMAL
EOSINOPHIL # BLD AUTO: 0.7 K/UL (ref 0–0.5)
EOSINOPHIL NFR BLD: 20.4 % (ref 0–8)
ERYTHROCYTE [DISTWIDTH] IN BLOOD BY AUTOMATED COUNT: 15.6 % (ref 11.5–14.5)
EST. GFR  (AFRICAN AMERICAN): 55.6 ML/MIN/1.73 M^2
EST. GFR  (NON AFRICAN AMERICAN): 48.1 ML/MIN/1.73 M^2
GLUCOSE SERPL-MCNC: 107 MG/DL (ref 70–110)
HCT VFR BLD AUTO: 38 % (ref 40–54)
HGB BLD-MCNC: 12.4 G/DL (ref 14–18)
IMM GRANULOCYTES # BLD AUTO: 0.01 K/UL (ref 0–0.04)
IMM GRANULOCYTES NFR BLD AUTO: 0.3 % (ref 0–0.5)
LYMPHOCYTES # BLD AUTO: 0.6 K/UL (ref 1–4.8)
LYMPHOCYTES NFR BLD: 16 % (ref 18–48)
MCH RBC QN AUTO: 34 PG (ref 27–31)
MCHC RBC AUTO-ENTMCNC: 32.6 G/DL (ref 32–36)
MCV RBC AUTO: 104 FL (ref 82–98)
MONOCYTES # BLD AUTO: 0.6 K/UL (ref 0.3–1)
MONOCYTES NFR BLD: 16.8 % (ref 4–15)
NEUTROPHILS # BLD AUTO: 1.7 K/UL (ref 1.8–7.7)
NEUTROPHILS NFR BLD: 46.5 % (ref 38–73)
NRBC BLD-RTO: 0 /100 WBC
PHOSPHATE SERPL-MCNC: 2.6 MG/DL (ref 2.7–4.5)
PLATELET # BLD AUTO: 196 K/UL (ref 150–450)
PMV BLD AUTO: 9 FL (ref 9.2–12.9)
POTASSIUM SERPL-SCNC: 4.7 MMOL/L (ref 3.5–5.1)
PROT SERPL-MCNC: 6.5 G/DL (ref 6–8.4)
RBC # BLD AUTO: 3.65 M/UL (ref 4.6–6.2)
SODIUM SERPL-SCNC: 138 MMOL/L (ref 136–145)
TSH SERPL DL<=0.005 MIU/L-ACNC: 3.13 UIU/ML (ref 0.4–4)
WBC # BLD AUTO: 3.57 K/UL (ref 3.9–12.7)

## 2021-07-09 PROCEDURE — 80053 COMPREHEN METABOLIC PANEL: CPT | Performed by: INTERNAL MEDICINE

## 2021-07-09 PROCEDURE — 85025 COMPLETE CBC W/AUTO DIFF WBC: CPT | Mod: PO | Performed by: INTERNAL MEDICINE

## 2021-07-09 PROCEDURE — 84443 ASSAY THYROID STIM HORMONE: CPT | Performed by: INTERNAL MEDICINE

## 2021-07-09 PROCEDURE — 36415 COLL VENOUS BLD VENIPUNCTURE: CPT | Mod: PO | Performed by: INTERNAL MEDICINE

## 2021-07-09 PROCEDURE — 84100 ASSAY OF PHOSPHORUS: CPT | Performed by: INTERNAL MEDICINE

## 2021-07-12 ENCOUNTER — OFFICE VISIT (OUTPATIENT)
Dept: HEMATOLOGY/ONCOLOGY | Facility: CLINIC | Age: 76
End: 2021-07-12
Payer: MEDICARE

## 2021-07-12 VITALS
HEIGHT: 71 IN | DIASTOLIC BLOOD PRESSURE: 76 MMHG | WEIGHT: 183.19 LBS | RESPIRATION RATE: 18 BRPM | OXYGEN SATURATION: 97 % | SYSTOLIC BLOOD PRESSURE: 156 MMHG | TEMPERATURE: 98 F | HEART RATE: 58 BPM | BODY MASS INDEX: 25.65 KG/M2

## 2021-07-12 DIAGNOSIS — C79.89 METASTATIC CANCER TO PELVIS: ICD-10-CM

## 2021-07-12 DIAGNOSIS — T45.1X5A IMMUNODEFICIENCY SECONDARY TO CHEMOTHERAPY: ICD-10-CM

## 2021-07-12 DIAGNOSIS — Z79.899 IMMUNODEFICIENCY SECONDARY TO CHEMOTHERAPY: ICD-10-CM

## 2021-07-12 DIAGNOSIS — D53.9 NUTRITIONAL ANEMIA: Primary | ICD-10-CM

## 2021-07-12 DIAGNOSIS — C78.7 METASTASIS TO LIVER: ICD-10-CM

## 2021-07-12 DIAGNOSIS — D84.81 IMMUNODEFICIENCY SECONDARY TO NEOPLASM: ICD-10-CM

## 2021-07-12 DIAGNOSIS — D84.821 IMMUNODEFICIENCY SECONDARY TO CHEMOTHERAPY: ICD-10-CM

## 2021-07-12 DIAGNOSIS — D49.9 IMMUNODEFICIENCY SECONDARY TO NEOPLASM: ICD-10-CM

## 2021-07-12 DIAGNOSIS — E83.39 HYPOPHOSPHATEMIA: ICD-10-CM

## 2021-07-12 DIAGNOSIS — C49.A3 GIST (GASTROINTESTINAL STROMAL TUMOR) OF SMALL BOWEL, MALIGNANT: Primary | ICD-10-CM

## 2021-07-12 DIAGNOSIS — R53.83 FATIGUE, UNSPECIFIED TYPE: ICD-10-CM

## 2021-07-12 DIAGNOSIS — D63.0 ANEMIA IN NEOPLASTIC DISEASE: ICD-10-CM

## 2021-07-12 DIAGNOSIS — I10 ESSENTIAL HYPERTENSION: ICD-10-CM

## 2021-07-12 DIAGNOSIS — D70.2 OTHER DRUG-INDUCED NEUTROPENIA: ICD-10-CM

## 2021-07-12 PROCEDURE — 99215 PR OFFICE/OUTPT VISIT, EST, LEVL V, 40-54 MIN: ICD-10-PCS | Mod: S$PBB,,, | Performed by: INTERNAL MEDICINE

## 2021-07-12 PROCEDURE — 99999 PR PBB SHADOW E&M-EST. PATIENT-LVL V: CPT | Mod: PBBFAC,,, | Performed by: INTERNAL MEDICINE

## 2021-07-12 PROCEDURE — 99999 PR PBB SHADOW E&M-EST. PATIENT-LVL V: ICD-10-PCS | Mod: PBBFAC,,, | Performed by: INTERNAL MEDICINE

## 2021-07-12 PROCEDURE — 99215 OFFICE O/P EST HI 40 MIN: CPT | Mod: PBBFAC | Performed by: INTERNAL MEDICINE

## 2021-07-12 PROCEDURE — 99215 OFFICE O/P EST HI 40 MIN: CPT | Mod: S$PBB,,, | Performed by: INTERNAL MEDICINE

## 2021-07-12 RX ORDER — AMLODIPINE BESYLATE 10 MG/1
10 TABLET ORAL DAILY
Qty: 90 TABLET | Refills: 3 | Status: SHIPPED | OUTPATIENT
Start: 2021-07-12 | End: 2022-02-02

## 2021-07-12 RX ORDER — SODIUM,POTASSIUM PHOSPHATES 280-250MG
1 POWDER IN PACKET (EA) ORAL
Qty: 8 PACKET | Refills: 0 | Status: SHIPPED | OUTPATIENT
Start: 2021-07-12 | End: 2021-07-14

## 2021-07-13 ENCOUNTER — TELEPHONE (OUTPATIENT)
Dept: HEMATOLOGY/ONCOLOGY | Facility: CLINIC | Age: 76
End: 2021-07-13

## 2021-08-04 ENCOUNTER — SPECIALTY PHARMACY (OUTPATIENT)
Dept: PHARMACY | Facility: CLINIC | Age: 76
End: 2021-08-04

## 2021-08-13 ENCOUNTER — LAB VISIT (OUTPATIENT)
Dept: LAB | Facility: HOSPITAL | Age: 76
End: 2021-08-13
Attending: INTERNAL MEDICINE
Payer: MEDICARE

## 2021-08-13 DIAGNOSIS — C49.A3 GIST (GASTROINTESTINAL STROMAL TUMOR) OF SMALL BOWEL, MALIGNANT: ICD-10-CM

## 2021-08-13 DIAGNOSIS — R53.83 FATIGUE, UNSPECIFIED TYPE: ICD-10-CM

## 2021-08-13 DIAGNOSIS — D53.9 NUTRITIONAL ANEMIA: ICD-10-CM

## 2021-08-13 LAB
ALBUMIN SERPL BCP-MCNC: 3.7 G/DL (ref 3.5–5.2)
ALP SERPL-CCNC: 80 U/L (ref 55–135)
ALT SERPL W/O P-5'-P-CCNC: 41 U/L (ref 10–44)
ANION GAP SERPL CALC-SCNC: 4 MMOL/L (ref 8–16)
AST SERPL-CCNC: 41 U/L (ref 10–40)
BASOPHILS # BLD AUTO: 0.02 K/UL (ref 0–0.2)
BASOPHILS NFR BLD: 0.6 % (ref 0–1.9)
BILIRUB SERPL-MCNC: 0.4 MG/DL (ref 0.1–1)
BUN SERPL-MCNC: 33 MG/DL (ref 8–23)
CALCIUM SERPL-MCNC: 8.9 MG/DL (ref 8.7–10.5)
CHLORIDE SERPL-SCNC: 105 MMOL/L (ref 95–110)
CO2 SERPL-SCNC: 28 MMOL/L (ref 23–29)
CREAT SERPL-MCNC: 1.3 MG/DL (ref 0.5–1.4)
DIFFERENTIAL METHOD: ABNORMAL
EOSINOPHIL # BLD AUTO: 0.6 K/UL (ref 0–0.5)
EOSINOPHIL NFR BLD: 19.9 % (ref 0–8)
ERYTHROCYTE [DISTWIDTH] IN BLOOD BY AUTOMATED COUNT: 14.7 % (ref 11.5–14.5)
EST. GFR  (AFRICAN AMERICAN): >60 ML/MIN/1.73 M^2
EST. GFR  (NON AFRICAN AMERICAN): 52.6 ML/MIN/1.73 M^2
GLUCOSE SERPL-MCNC: 116 MG/DL (ref 70–110)
HCT VFR BLD AUTO: 36.8 % (ref 40–54)
HGB BLD-MCNC: 12.1 G/DL (ref 14–18)
IMM GRANULOCYTES # BLD AUTO: 0 K/UL (ref 0–0.04)
IMM GRANULOCYTES NFR BLD AUTO: 0 % (ref 0–0.5)
LYMPHOCYTES # BLD AUTO: 0.6 K/UL (ref 1–4.8)
LYMPHOCYTES NFR BLD: 18.6 % (ref 18–48)
MCH RBC QN AUTO: 34.4 PG (ref 27–31)
MCHC RBC AUTO-ENTMCNC: 32.9 G/DL (ref 32–36)
MCV RBC AUTO: 105 FL (ref 82–98)
MONOCYTES # BLD AUTO: 0.5 K/UL (ref 0.3–1)
MONOCYTES NFR BLD: 15.8 % (ref 4–15)
NEUTROPHILS # BLD AUTO: 1.4 K/UL (ref 1.8–7.7)
NEUTROPHILS NFR BLD: 45.1 % (ref 38–73)
NRBC BLD-RTO: 0 /100 WBC
PHOSPHATE SERPL-MCNC: 2.6 MG/DL (ref 2.7–4.5)
PLATELET # BLD AUTO: 170 K/UL (ref 150–450)
PMV BLD AUTO: 9.1 FL (ref 9.2–12.9)
POTASSIUM SERPL-SCNC: 4.3 MMOL/L (ref 3.5–5.1)
PROT SERPL-MCNC: 6.3 G/DL (ref 6–8.4)
RBC # BLD AUTO: 3.52 M/UL (ref 4.6–6.2)
SODIUM SERPL-SCNC: 137 MMOL/L (ref 136–145)
TSH SERPL DL<=0.005 MIU/L-ACNC: 3.04 UIU/ML (ref 0.4–4)
VIT B12 SERPL-MCNC: 771 PG/ML (ref 210–950)
WBC # BLD AUTO: 3.17 K/UL (ref 3.9–12.7)

## 2021-08-13 PROCEDURE — 82607 VITAMIN B-12: CPT | Performed by: INTERNAL MEDICINE

## 2021-08-13 PROCEDURE — 84443 ASSAY THYROID STIM HORMONE: CPT | Performed by: INTERNAL MEDICINE

## 2021-08-13 PROCEDURE — 36415 COLL VENOUS BLD VENIPUNCTURE: CPT | Mod: PO | Performed by: INTERNAL MEDICINE

## 2021-08-13 PROCEDURE — 84100 ASSAY OF PHOSPHORUS: CPT | Performed by: INTERNAL MEDICINE

## 2021-08-13 PROCEDURE — 80053 COMPREHEN METABOLIC PANEL: CPT | Performed by: INTERNAL MEDICINE

## 2021-08-13 PROCEDURE — 85025 COMPLETE CBC W/AUTO DIFF WBC: CPT | Mod: PO | Performed by: INTERNAL MEDICINE

## 2021-08-15 ENCOUNTER — PATIENT MESSAGE (OUTPATIENT)
Dept: HEMATOLOGY/ONCOLOGY | Facility: CLINIC | Age: 76
End: 2021-08-15

## 2021-08-16 ENCOUNTER — OFFICE VISIT (OUTPATIENT)
Dept: HEMATOLOGY/ONCOLOGY | Facility: CLINIC | Age: 76
End: 2021-08-16
Payer: MEDICARE

## 2021-08-16 DIAGNOSIS — C78.7 METASTASIS TO LIVER: ICD-10-CM

## 2021-08-16 DIAGNOSIS — D49.9 IMMUNODEFICIENCY SECONDARY TO NEOPLASM: ICD-10-CM

## 2021-08-16 DIAGNOSIS — T45.1X5A IMMUNODEFICIENCY SECONDARY TO CHEMOTHERAPY: ICD-10-CM

## 2021-08-16 DIAGNOSIS — L27.1 HAND FOOT SYNDROME: ICD-10-CM

## 2021-08-16 DIAGNOSIS — R51.9 CHRONIC NONINTRACTABLE HEADACHE, UNSPECIFIED HEADACHE TYPE: ICD-10-CM

## 2021-08-16 DIAGNOSIS — I10 ESSENTIAL HYPERTENSION: ICD-10-CM

## 2021-08-16 DIAGNOSIS — C79.89 METASTATIC CANCER TO PELVIS: ICD-10-CM

## 2021-08-16 DIAGNOSIS — E83.39 HYPOPHOSPHATEMIA: ICD-10-CM

## 2021-08-16 DIAGNOSIS — Z79.899 IMMUNODEFICIENCY SECONDARY TO CHEMOTHERAPY: ICD-10-CM

## 2021-08-16 DIAGNOSIS — D84.81 IMMUNODEFICIENCY SECONDARY TO NEOPLASM: ICD-10-CM

## 2021-08-16 DIAGNOSIS — D61.818 PANCYTOPENIA: ICD-10-CM

## 2021-08-16 DIAGNOSIS — C49.A3 GIST (GASTROINTESTINAL STROMAL TUMOR) OF SMALL BOWEL, MALIGNANT: Primary | ICD-10-CM

## 2021-08-16 DIAGNOSIS — G89.29 CHRONIC NONINTRACTABLE HEADACHE, UNSPECIFIED HEADACHE TYPE: ICD-10-CM

## 2021-08-16 DIAGNOSIS — D84.821 IMMUNODEFICIENCY SECONDARY TO CHEMOTHERAPY: ICD-10-CM

## 2021-08-16 PROCEDURE — 99214 OFFICE O/P EST MOD 30 MIN: CPT | Mod: 95,,, | Performed by: INTERNAL MEDICINE

## 2021-08-16 PROCEDURE — 99214 PR OFFICE/OUTPT VISIT, EST, LEVL IV, 30-39 MIN: ICD-10-PCS | Mod: 95,,, | Performed by: INTERNAL MEDICINE

## 2021-08-16 RX ORDER — SODIUM,POTASSIUM PHOSPHATES 280-250MG
1 POWDER IN PACKET (EA) ORAL
Qty: 16 PACKET | Refills: 2 | Status: SHIPPED | OUTPATIENT
Start: 2021-08-16 | End: 2021-08-17

## 2021-08-17 ENCOUNTER — PATIENT MESSAGE (OUTPATIENT)
Dept: HEMATOLOGY/ONCOLOGY | Facility: CLINIC | Age: 76
End: 2021-08-17

## 2021-08-20 ENCOUNTER — SPECIALTY PHARMACY (OUTPATIENT)
Dept: PHARMACY | Facility: CLINIC | Age: 76
End: 2021-08-20

## 2021-09-18 ENCOUNTER — SPECIALTY PHARMACY (OUTPATIENT)
Dept: PHARMACY | Facility: CLINIC | Age: 76
End: 2021-09-18

## 2021-09-23 ENCOUNTER — SPECIALTY PHARMACY (OUTPATIENT)
Dept: PHARMACY | Facility: CLINIC | Age: 76
End: 2021-09-23

## 2021-10-02 ENCOUNTER — PATIENT MESSAGE (OUTPATIENT)
Dept: HEMATOLOGY/ONCOLOGY | Facility: CLINIC | Age: 76
End: 2021-10-02

## 2021-10-25 ENCOUNTER — SPECIALTY PHARMACY (OUTPATIENT)
Dept: PHARMACY | Facility: CLINIC | Age: 76
End: 2021-10-25
Payer: MEDICARE

## 2021-10-28 ENCOUNTER — LAB VISIT (OUTPATIENT)
Dept: LAB | Facility: HOSPITAL | Age: 76
End: 2021-10-28
Attending: INTERNAL MEDICINE
Payer: MEDICARE

## 2021-10-28 DIAGNOSIS — C79.89 METASTATIC CANCER TO PELVIS: ICD-10-CM

## 2021-10-28 DIAGNOSIS — C78.7 METASTASIS TO LIVER: ICD-10-CM

## 2021-10-28 DIAGNOSIS — C49.A3 GIST (GASTROINTESTINAL STROMAL TUMOR) OF SMALL BOWEL, MALIGNANT: ICD-10-CM

## 2021-10-28 LAB
ALBUMIN SERPL BCP-MCNC: 3.5 G/DL (ref 3.5–5.2)
ALP SERPL-CCNC: 65 U/L (ref 55–135)
ALT SERPL W/O P-5'-P-CCNC: 30 U/L (ref 10–44)
ANION GAP SERPL CALC-SCNC: 10 MMOL/L (ref 8–16)
AST SERPL-CCNC: 34 U/L (ref 10–40)
BASOPHILS # BLD AUTO: 0.01 K/UL (ref 0–0.2)
BASOPHILS NFR BLD: 0.3 % (ref 0–1.9)
BILIRUB SERPL-MCNC: 0.3 MG/DL (ref 0.1–1)
BUN SERPL-MCNC: 26 MG/DL (ref 8–23)
CALCIUM SERPL-MCNC: 8.7 MG/DL (ref 8.7–10.5)
CHLORIDE SERPL-SCNC: 106 MMOL/L (ref 95–110)
CO2 SERPL-SCNC: 23 MMOL/L (ref 23–29)
CREAT SERPL-MCNC: 1.5 MG/DL (ref 0.5–1.4)
DIFFERENTIAL METHOD: ABNORMAL
EOSINOPHIL # BLD AUTO: 0.4 K/UL (ref 0–0.5)
EOSINOPHIL NFR BLD: 13.4 % (ref 0–8)
ERYTHROCYTE [DISTWIDTH] IN BLOOD BY AUTOMATED COUNT: 15 % (ref 11.5–14.5)
EST. GFR  (AFRICAN AMERICAN): 51.2 ML/MIN/1.73 M^2
EST. GFR  (NON AFRICAN AMERICAN): 44.3 ML/MIN/1.73 M^2
GLUCOSE SERPL-MCNC: 95 MG/DL (ref 70–110)
HCT VFR BLD AUTO: 36.6 % (ref 40–54)
HGB BLD-MCNC: 11.9 G/DL (ref 14–18)
IMM GRANULOCYTES # BLD AUTO: 0.01 K/UL (ref 0–0.04)
IMM GRANULOCYTES NFR BLD AUTO: 0.3 % (ref 0–0.5)
LYMPHOCYTES # BLD AUTO: 0.5 K/UL (ref 1–4.8)
LYMPHOCYTES NFR BLD: 17.7 % (ref 18–48)
MCH RBC QN AUTO: 34.6 PG (ref 27–31)
MCHC RBC AUTO-ENTMCNC: 32.5 G/DL (ref 32–36)
MCV RBC AUTO: 106 FL (ref 82–98)
MONOCYTES # BLD AUTO: 0.5 K/UL (ref 0.3–1)
MONOCYTES NFR BLD: 15.1 % (ref 4–15)
NEUTROPHILS # BLD AUTO: 1.6 K/UL (ref 1.8–7.7)
NEUTROPHILS NFR BLD: 53.5 % (ref 38–73)
NRBC BLD-RTO: 0 /100 WBC
PHOSPHATE SERPL-MCNC: 2.7 MG/DL (ref 2.7–4.5)
PLATELET # BLD AUTO: 187 K/UL (ref 150–450)
PMV BLD AUTO: 9.6 FL (ref 9.2–12.9)
POTASSIUM SERPL-SCNC: 5 MMOL/L (ref 3.5–5.1)
PROT SERPL-MCNC: 6.3 G/DL (ref 6–8.4)
RBC # BLD AUTO: 3.44 M/UL (ref 4.6–6.2)
SODIUM SERPL-SCNC: 139 MMOL/L (ref 136–145)
TSH SERPL DL<=0.005 MIU/L-ACNC: 2.4 UIU/ML (ref 0.4–4)
WBC # BLD AUTO: 3.05 K/UL (ref 3.9–12.7)

## 2021-10-28 PROCEDURE — 85025 COMPLETE CBC W/AUTO DIFF WBC: CPT | Mod: PO | Performed by: INTERNAL MEDICINE

## 2021-10-28 PROCEDURE — 84100 ASSAY OF PHOSPHORUS: CPT | Performed by: INTERNAL MEDICINE

## 2021-10-28 PROCEDURE — 84443 ASSAY THYROID STIM HORMONE: CPT | Performed by: INTERNAL MEDICINE

## 2021-10-28 PROCEDURE — 36415 COLL VENOUS BLD VENIPUNCTURE: CPT | Mod: PO | Performed by: INTERNAL MEDICINE

## 2021-10-28 PROCEDURE — 80053 COMPREHEN METABOLIC PANEL: CPT | Performed by: INTERNAL MEDICINE

## 2021-10-29 ENCOUNTER — OFFICE VISIT (OUTPATIENT)
Dept: HEMATOLOGY/ONCOLOGY | Facility: CLINIC | Age: 76
End: 2021-10-29
Payer: MEDICARE

## 2021-10-29 VITALS
DIASTOLIC BLOOD PRESSURE: 74 MMHG | WEIGHT: 177.69 LBS | SYSTOLIC BLOOD PRESSURE: 140 MMHG | HEIGHT: 71 IN | RESPIRATION RATE: 18 BRPM | BODY MASS INDEX: 24.88 KG/M2 | HEART RATE: 54 BPM | TEMPERATURE: 98 F | OXYGEN SATURATION: 97 %

## 2021-10-29 DIAGNOSIS — C49.A3 GIST (GASTROINTESTINAL STROMAL TUMOR) OF SMALL BOWEL, MALIGNANT: Primary | ICD-10-CM

## 2021-10-29 DIAGNOSIS — C79.89 METASTATIC CANCER TO PELVIS: ICD-10-CM

## 2021-10-29 DIAGNOSIS — T45.1X5A CHEMOTHERAPY INDUCED NEUTROPENIA: ICD-10-CM

## 2021-10-29 DIAGNOSIS — T45.1X5A IMMUNODEFICIENCY SECONDARY TO CHEMOTHERAPY: ICD-10-CM

## 2021-10-29 DIAGNOSIS — D89.89 OTHER SPECIFIED DISORDERS INVOLVING THE IMMUNE MECHANISM, NOT ELSEWHERE CLASSIFIED: ICD-10-CM

## 2021-10-29 DIAGNOSIS — R19.7 DIARRHEA, UNSPECIFIED TYPE: ICD-10-CM

## 2021-10-29 DIAGNOSIS — Z79.899 IMMUNODEFICIENCY SECONDARY TO CHEMOTHERAPY: ICD-10-CM

## 2021-10-29 DIAGNOSIS — N17.9 AKI (ACUTE KIDNEY INJURY): ICD-10-CM

## 2021-10-29 DIAGNOSIS — D84.81 IMMUNODEFICIENCY SECONDARY TO NEOPLASM: ICD-10-CM

## 2021-10-29 DIAGNOSIS — N18.32 STAGE 3B CHRONIC KIDNEY DISEASE: ICD-10-CM

## 2021-10-29 DIAGNOSIS — D49.9 IMMUNODEFICIENCY SECONDARY TO NEOPLASM: ICD-10-CM

## 2021-10-29 DIAGNOSIS — C78.7 METASTASIS TO LIVER: ICD-10-CM

## 2021-10-29 DIAGNOSIS — D70.1 CHEMOTHERAPY INDUCED NEUTROPENIA: ICD-10-CM

## 2021-10-29 DIAGNOSIS — I25.10 CORONARY ARTERY DISEASE INVOLVING NATIVE CORONARY ARTERY OF NATIVE HEART WITHOUT ANGINA PECTORIS: ICD-10-CM

## 2021-10-29 DIAGNOSIS — D84.821 IMMUNODEFICIENCY SECONDARY TO CHEMOTHERAPY: ICD-10-CM

## 2021-10-29 DIAGNOSIS — I10 ESSENTIAL HYPERTENSION: ICD-10-CM

## 2021-10-29 PROCEDURE — 99215 OFFICE O/P EST HI 40 MIN: CPT | Mod: PBBFAC | Performed by: INTERNAL MEDICINE

## 2021-10-29 PROCEDURE — 99999 PR PBB SHADOW E&M-EST. PATIENT-LVL V: CPT | Mod: PBBFAC,,, | Performed by: INTERNAL MEDICINE

## 2021-10-29 PROCEDURE — 99999 PR PBB SHADOW E&M-EST. PATIENT-LVL V: ICD-10-PCS | Mod: PBBFAC,,, | Performed by: INTERNAL MEDICINE

## 2021-10-29 PROCEDURE — 99215 OFFICE O/P EST HI 40 MIN: CPT | Mod: S$PBB,,, | Performed by: INTERNAL MEDICINE

## 2021-10-29 PROCEDURE — 99215 PR OFFICE/OUTPT VISIT, EST, LEVL V, 40-54 MIN: ICD-10-PCS | Mod: S$PBB,,, | Performed by: INTERNAL MEDICINE

## 2021-10-29 RX ORDER — DIPHENOXYLATE HYDROCHLORIDE AND ATROPINE SULFATE 2.5; .025 MG/1; MG/1
1 TABLET ORAL 4 TIMES DAILY PRN
Qty: 30 TABLET | Refills: 1 | Status: SHIPPED | OUTPATIENT
Start: 2021-10-29 | End: 2022-01-01

## 2021-11-26 ENCOUNTER — LAB VISIT (OUTPATIENT)
Dept: LAB | Facility: HOSPITAL | Age: 76
End: 2021-11-26
Attending: INTERNAL MEDICINE
Payer: MEDICARE

## 2021-11-26 ENCOUNTER — SPECIALTY PHARMACY (OUTPATIENT)
Dept: PHARMACY | Facility: CLINIC | Age: 76
End: 2021-11-26
Payer: MEDICARE

## 2021-11-26 DIAGNOSIS — D89.89 OTHER SPECIFIED DISORDERS INVOLVING THE IMMUNE MECHANISM, NOT ELSEWHERE CLASSIFIED: ICD-10-CM

## 2021-11-26 DIAGNOSIS — C49.A3 GIST (GASTROINTESTINAL STROMAL TUMOR) OF SMALL BOWEL, MALIGNANT: ICD-10-CM

## 2021-11-26 LAB
ALBUMIN SERPL BCP-MCNC: 3.3 G/DL (ref 3.5–5.2)
ALP SERPL-CCNC: 62 U/L (ref 55–135)
ALT SERPL W/O P-5'-P-CCNC: 25 U/L (ref 10–44)
ANION GAP SERPL CALC-SCNC: 7 MMOL/L (ref 8–16)
AST SERPL-CCNC: 31 U/L (ref 10–40)
BILIRUB SERPL-MCNC: 0.3 MG/DL (ref 0.1–1)
BUN SERPL-MCNC: 23 MG/DL (ref 8–23)
CALCIUM SERPL-MCNC: 8.7 MG/DL (ref 8.7–10.5)
CHLORIDE SERPL-SCNC: 103 MMOL/L (ref 95–110)
CO2 SERPL-SCNC: 23 MMOL/L (ref 23–29)
CREAT SERPL-MCNC: 1.4 MG/DL (ref 0.5–1.4)
ERYTHROCYTE [DISTWIDTH] IN BLOOD BY AUTOMATED COUNT: 15.2 % (ref 11.5–14.5)
EST. GFR  (AFRICAN AMERICAN): 55.6 ML/MIN/1.73 M^2
EST. GFR  (NON AFRICAN AMERICAN): 48.1 ML/MIN/1.73 M^2
GLUCOSE SERPL-MCNC: 114 MG/DL (ref 70–110)
HCT VFR BLD AUTO: 34.8 % (ref 40–54)
HGB BLD-MCNC: 11.2 G/DL (ref 14–18)
IMM GRANULOCYTES # BLD AUTO: 0.01 K/UL (ref 0–0.04)
MCH RBC QN AUTO: 34.5 PG (ref 27–31)
MCHC RBC AUTO-ENTMCNC: 32.2 G/DL (ref 32–36)
MCV RBC AUTO: 107 FL (ref 82–98)
NEUTROPHILS # BLD AUTO: 2 K/UL (ref 1.8–7.7)
PHOSPHATE SERPL-MCNC: 2.5 MG/DL (ref 2.7–4.5)
PLATELET # BLD AUTO: 203 K/UL (ref 150–450)
PMV BLD AUTO: 9.5 FL (ref 9.2–12.9)
POTASSIUM SERPL-SCNC: 4.4 MMOL/L (ref 3.5–5.1)
PROT SERPL-MCNC: 6 G/DL (ref 6–8.4)
RBC # BLD AUTO: 3.25 M/UL (ref 4.6–6.2)
SODIUM SERPL-SCNC: 133 MMOL/L (ref 136–145)
TSH SERPL DL<=0.005 MIU/L-ACNC: 2.03 UIU/ML (ref 0.4–4)
WBC # BLD AUTO: 3.31 K/UL (ref 3.9–12.7)

## 2021-11-26 PROCEDURE — 36415 COLL VENOUS BLD VENIPUNCTURE: CPT | Mod: PO | Performed by: INTERNAL MEDICINE

## 2021-11-26 PROCEDURE — 84443 ASSAY THYROID STIM HORMONE: CPT | Performed by: INTERNAL MEDICINE

## 2021-11-26 PROCEDURE — 85027 COMPLETE CBC AUTOMATED: CPT | Performed by: INTERNAL MEDICINE

## 2021-11-26 PROCEDURE — 80053 COMPREHEN METABOLIC PANEL: CPT | Performed by: INTERNAL MEDICINE

## 2021-11-26 PROCEDURE — 84100 ASSAY OF PHOSPHORUS: CPT | Performed by: INTERNAL MEDICINE

## 2021-11-29 ENCOUNTER — OFFICE VISIT (OUTPATIENT)
Dept: HEMATOLOGY/ONCOLOGY | Facility: CLINIC | Age: 76
End: 2021-11-29
Payer: MEDICARE

## 2021-11-29 VITALS
HEART RATE: 56 BPM | DIASTOLIC BLOOD PRESSURE: 66 MMHG | TEMPERATURE: 98 F | WEIGHT: 177.38 LBS | BODY MASS INDEX: 24.83 KG/M2 | HEIGHT: 71 IN | RESPIRATION RATE: 18 BRPM | OXYGEN SATURATION: 97 % | SYSTOLIC BLOOD PRESSURE: 140 MMHG

## 2021-11-29 DIAGNOSIS — E83.39 HYPOPHOSPHATEMIA: ICD-10-CM

## 2021-11-29 DIAGNOSIS — Z79.899 IMMUNODEFICIENCY SECONDARY TO CHEMOTHERAPY: ICD-10-CM

## 2021-11-29 DIAGNOSIS — T45.1X5A IMMUNODEFICIENCY SECONDARY TO CHEMOTHERAPY: ICD-10-CM

## 2021-11-29 DIAGNOSIS — D84.821 IMMUNODEFICIENCY SECONDARY TO CHEMOTHERAPY: ICD-10-CM

## 2021-11-29 DIAGNOSIS — R53.83 FATIGUE, UNSPECIFIED TYPE: ICD-10-CM

## 2021-11-29 DIAGNOSIS — D49.9 IMMUNODEFICIENCY SECONDARY TO NEOPLASM: ICD-10-CM

## 2021-11-29 DIAGNOSIS — R19.7 DIARRHEA, UNSPECIFIED TYPE: ICD-10-CM

## 2021-11-29 DIAGNOSIS — D84.81 IMMUNODEFICIENCY SECONDARY TO NEOPLASM: ICD-10-CM

## 2021-11-29 DIAGNOSIS — C49.A3 GIST (GASTROINTESTINAL STROMAL TUMOR) OF SMALL BOWEL, MALIGNANT: Primary | ICD-10-CM

## 2021-11-29 DIAGNOSIS — C79.89 METASTATIC CANCER TO PELVIS: ICD-10-CM

## 2021-11-29 DIAGNOSIS — C78.7 METASTASIS TO LIVER: ICD-10-CM

## 2021-11-29 DIAGNOSIS — I10 ESSENTIAL HYPERTENSION: ICD-10-CM

## 2021-11-29 DIAGNOSIS — D61.818 PANCYTOPENIA: ICD-10-CM

## 2021-11-29 PROCEDURE — 99215 OFFICE O/P EST HI 40 MIN: CPT | Mod: PBBFAC | Performed by: INTERNAL MEDICINE

## 2021-11-29 PROCEDURE — 99215 OFFICE O/P EST HI 40 MIN: CPT | Mod: S$PBB,,, | Performed by: INTERNAL MEDICINE

## 2021-11-29 PROCEDURE — 99999 PR PBB SHADOW E&M-EST. PATIENT-LVL V: CPT | Mod: PBBFAC,,, | Performed by: INTERNAL MEDICINE

## 2021-11-29 PROCEDURE — 99999 PR PBB SHADOW E&M-EST. PATIENT-LVL V: ICD-10-PCS | Mod: PBBFAC,,, | Performed by: INTERNAL MEDICINE

## 2021-11-29 PROCEDURE — 99215 PR OFFICE/OUTPT VISIT, EST, LEVL V, 40-54 MIN: ICD-10-PCS | Mod: S$PBB,,, | Performed by: INTERNAL MEDICINE

## 2021-11-29 RX ORDER — SODIUM,POTASSIUM PHOSPHATES 280-250MG
1 POWDER IN PACKET (EA) ORAL
Qty: 20 PACKET | Refills: 0 | Status: SHIPPED | OUTPATIENT
Start: 2021-11-29 | End: 2021-12-01

## 2021-12-14 ENCOUNTER — PATIENT MESSAGE (OUTPATIENT)
Dept: CARDIOLOGY | Facility: CLINIC | Age: 76
End: 2021-12-14
Payer: MEDICARE

## 2021-12-14 DIAGNOSIS — E78.00 HYPERCHOLESTEREMIA: Primary | ICD-10-CM

## 2021-12-14 RX ORDER — ATORVASTATIN CALCIUM 40 MG/1
40 TABLET, FILM COATED ORAL DAILY
Qty: 90 TABLET | Refills: 3 | Status: SHIPPED | OUTPATIENT
Start: 2021-12-14 | End: 2022-09-12 | Stop reason: SDUPTHER

## 2021-12-23 ENCOUNTER — SPECIALTY PHARMACY (OUTPATIENT)
Dept: PHARMACY | Facility: CLINIC | Age: 76
End: 2021-12-23
Payer: MEDICARE

## 2021-12-23 ENCOUNTER — LAB VISIT (OUTPATIENT)
Dept: LAB | Facility: HOSPITAL | Age: 76
End: 2021-12-23
Attending: INTERNAL MEDICINE
Payer: MEDICARE

## 2021-12-23 DIAGNOSIS — R53.83 FATIGUE, UNSPECIFIED TYPE: ICD-10-CM

## 2021-12-23 DIAGNOSIS — C49.A3 GIST (GASTROINTESTINAL STROMAL TUMOR) OF SMALL BOWEL, MALIGNANT: ICD-10-CM

## 2021-12-23 LAB
ALBUMIN SERPL BCP-MCNC: 3.3 G/DL (ref 3.5–5.2)
ALP SERPL-CCNC: 69 U/L (ref 55–135)
ALT SERPL W/O P-5'-P-CCNC: 20 U/L (ref 10–44)
ANION GAP SERPL CALC-SCNC: 8 MMOL/L (ref 8–16)
AST SERPL-CCNC: 28 U/L (ref 10–40)
BASOPHILS # BLD AUTO: 0.01 K/UL (ref 0–0.2)
BASOPHILS NFR BLD: 0.3 % (ref 0–1.9)
BILIRUB SERPL-MCNC: 0.3 MG/DL (ref 0.1–1)
BUN SERPL-MCNC: 22 MG/DL (ref 8–23)
CALCIUM SERPL-MCNC: 7.9 MG/DL (ref 8.7–10.5)
CHLORIDE SERPL-SCNC: 106 MMOL/L (ref 95–110)
CO2 SERPL-SCNC: 24 MMOL/L (ref 23–29)
CREAT SERPL-MCNC: 1.3 MG/DL (ref 0.5–1.4)
DIFFERENTIAL METHOD: ABNORMAL
EOSINOPHIL # BLD AUTO: 0.3 K/UL (ref 0–0.5)
EOSINOPHIL NFR BLD: 8.4 % (ref 0–8)
ERYTHROCYTE [DISTWIDTH] IN BLOOD BY AUTOMATED COUNT: 14.7 % (ref 11.5–14.5)
EST. GFR  (AFRICAN AMERICAN): >60 ML/MIN/1.73 M^2
EST. GFR  (NON AFRICAN AMERICAN): 52.6 ML/MIN/1.73 M^2
GLUCOSE SERPL-MCNC: 99 MG/DL (ref 70–110)
HCT VFR BLD AUTO: 34 % (ref 40–54)
HGB BLD-MCNC: 11.1 G/DL (ref 14–18)
IMM GRANULOCYTES # BLD AUTO: 0 K/UL (ref 0–0.04)
IMM GRANULOCYTES NFR BLD AUTO: 0 % (ref 0–0.5)
LYMPHOCYTES # BLD AUTO: 0.4 K/UL (ref 1–4.8)
LYMPHOCYTES NFR BLD: 12 % (ref 18–48)
MCH RBC QN AUTO: 33.7 PG (ref 27–31)
MCHC RBC AUTO-ENTMCNC: 32.6 G/DL (ref 32–36)
MCV RBC AUTO: 103 FL (ref 82–98)
MONOCYTES # BLD AUTO: 0.7 K/UL (ref 0.3–1)
MONOCYTES NFR BLD: 19.8 % (ref 4–15)
NEUTROPHILS # BLD AUTO: 2.1 K/UL (ref 1.8–7.7)
NEUTROPHILS NFR BLD: 59.5 % (ref 38–73)
NRBC BLD-RTO: 0 /100 WBC
PHOSPHATE SERPL-MCNC: 2.4 MG/DL (ref 2.7–4.5)
PLATELET # BLD AUTO: 185 K/UL (ref 150–450)
PMV BLD AUTO: 8.6 FL (ref 9.2–12.9)
POTASSIUM SERPL-SCNC: 4.6 MMOL/L (ref 3.5–5.1)
PROT SERPL-MCNC: 5.6 G/DL (ref 6–8.4)
RBC # BLD AUTO: 3.29 M/UL (ref 4.6–6.2)
SODIUM SERPL-SCNC: 138 MMOL/L (ref 136–145)
TSH SERPL DL<=0.005 MIU/L-ACNC: 2.55 UIU/ML (ref 0.4–4)
WBC # BLD AUTO: 3.58 K/UL (ref 3.9–12.7)

## 2021-12-23 PROCEDURE — 84443 ASSAY THYROID STIM HORMONE: CPT | Performed by: INTERNAL MEDICINE

## 2021-12-23 PROCEDURE — 80053 COMPREHEN METABOLIC PANEL: CPT | Performed by: INTERNAL MEDICINE

## 2021-12-23 PROCEDURE — 84100 ASSAY OF PHOSPHORUS: CPT | Performed by: INTERNAL MEDICINE

## 2021-12-23 PROCEDURE — 36415 COLL VENOUS BLD VENIPUNCTURE: CPT | Mod: PO | Performed by: INTERNAL MEDICINE

## 2021-12-23 PROCEDURE — 85025 COMPLETE CBC W/AUTO DIFF WBC: CPT | Mod: PO | Performed by: INTERNAL MEDICINE

## 2022-01-01 ENCOUNTER — PATIENT MESSAGE (OUTPATIENT)
Dept: ADMINISTRATIVE | Facility: OTHER | Age: 77
End: 2022-01-01
Payer: MEDICARE

## 2022-01-01 ENCOUNTER — PATIENT MESSAGE (OUTPATIENT)
Dept: HEMATOLOGY/ONCOLOGY | Facility: CLINIC | Age: 77
End: 2022-01-01
Payer: MEDICARE

## 2022-01-01 ENCOUNTER — LAB VISIT (OUTPATIENT)
Dept: LAB | Facility: HOSPITAL | Age: 77
End: 2022-01-01
Attending: INTERNAL MEDICINE
Payer: MEDICARE

## 2022-01-01 ENCOUNTER — SPECIALTY PHARMACY (OUTPATIENT)
Dept: PHARMACY | Facility: CLINIC | Age: 77
End: 2022-01-01
Payer: MEDICARE

## 2022-01-01 ENCOUNTER — OFFICE VISIT (OUTPATIENT)
Dept: HEMATOLOGY/ONCOLOGY | Facility: CLINIC | Age: 77
End: 2022-01-01
Payer: MEDICARE

## 2022-01-01 ENCOUNTER — PATIENT MESSAGE (OUTPATIENT)
Dept: HEMATOLOGY/ONCOLOGY | Facility: CLINIC | Age: 77
End: 2022-01-01

## 2022-01-01 ENCOUNTER — LAB VISIT (OUTPATIENT)
Dept: LAB | Facility: HOSPITAL | Age: 77
End: 2022-01-01
Attending: FAMILY MEDICINE
Payer: MEDICARE

## 2022-01-01 ENCOUNTER — HOSPITAL ENCOUNTER (OUTPATIENT)
Dept: RADIOLOGY | Facility: HOSPITAL | Age: 77
Discharge: HOME | End: 2022-10-20
Attending: INTERNAL MEDICINE
Payer: MEDICARE

## 2022-01-01 VITALS
WEIGHT: 190.81 LBS | DIASTOLIC BLOOD PRESSURE: 74 MMHG | TEMPERATURE: 98 F | RESPIRATION RATE: 18 BRPM | HEIGHT: 71 IN | OXYGEN SATURATION: 96 % | BODY MASS INDEX: 26.71 KG/M2 | SYSTOLIC BLOOD PRESSURE: 139 MMHG | HEART RATE: 57 BPM

## 2022-01-01 DIAGNOSIS — C79.89 METASTATIC CANCER TO PELVIS: ICD-10-CM

## 2022-01-01 DIAGNOSIS — C49.A3 GIST (GASTROINTESTINAL STROMAL TUMOR) OF SMALL BOWEL, MALIGNANT: ICD-10-CM

## 2022-01-01 DIAGNOSIS — C49.A3 GIST (GASTROINTESTINAL STROMAL TUMOR) OF SMALL BOWEL, MALIGNANT: Primary | ICD-10-CM

## 2022-01-01 DIAGNOSIS — R53.83 FATIGUE, UNSPECIFIED TYPE: ICD-10-CM

## 2022-01-01 DIAGNOSIS — C49.A3 GIST (GASTROINTESTINAL STROMAL TUMOR) OF SMALL BOWEL, MALIGNANT: Chronic | ICD-10-CM

## 2022-01-01 DIAGNOSIS — C78.7 METASTASIS TO LIVER: ICD-10-CM

## 2022-01-01 DIAGNOSIS — C49.A3 GIST (GASTROINTESTINAL STROMAL TUMOR) OF SMALL BOWEL, MALIGNANT: Primary | Chronic | ICD-10-CM

## 2022-01-01 DIAGNOSIS — D84.81 IMMUNODEFICIENCY SECONDARY TO NEOPLASM: ICD-10-CM

## 2022-01-01 DIAGNOSIS — D49.9 IMMUNODEFICIENCY SECONDARY TO NEOPLASM: ICD-10-CM

## 2022-01-01 DIAGNOSIS — T45.1X5A IMMUNODEFICIENCY SECONDARY TO CHEMOTHERAPY: ICD-10-CM

## 2022-01-01 DIAGNOSIS — I25.10 CORONARY ARTERY DISEASE INVOLVING NATIVE CORONARY ARTERY OF NATIVE HEART WITHOUT ANGINA PECTORIS: ICD-10-CM

## 2022-01-01 DIAGNOSIS — D84.821 IMMUNODEFICIENCY SECONDARY TO CHEMOTHERAPY: ICD-10-CM

## 2022-01-01 DIAGNOSIS — I10 PRIMARY HYPERTENSION: ICD-10-CM

## 2022-01-01 DIAGNOSIS — Z79.899 IMMUNODEFICIENCY SECONDARY TO CHEMOTHERAPY: ICD-10-CM

## 2022-01-01 DIAGNOSIS — N18.32 STAGE 3B CHRONIC KIDNEY DISEASE: ICD-10-CM

## 2022-01-01 DIAGNOSIS — H53.8 BLURRED VISION: ICD-10-CM

## 2022-01-01 DIAGNOSIS — N17.9 AKI (ACUTE KIDNEY INJURY): ICD-10-CM

## 2022-01-01 DIAGNOSIS — I10 ESSENTIAL HYPERTENSION: ICD-10-CM

## 2022-01-01 LAB
ALBUMIN SERPL BCP-MCNC: 3.2 G/DL (ref 3.5–5.2)
ALBUMIN SERPL BCP-MCNC: 3.5 G/DL (ref 3.5–5.2)
ALBUMIN SERPL BCP-MCNC: 3.6 G/DL (ref 3.5–5.2)
ALP SERPL-CCNC: 171 U/L (ref 55–135)
ALP SERPL-CCNC: 177 U/L (ref 55–135)
ALP SERPL-CCNC: 178 U/L (ref 55–135)
ALT SERPL W/O P-5'-P-CCNC: 39 U/L (ref 10–44)
ALT SERPL W/O P-5'-P-CCNC: 41 U/L (ref 10–44)
ALT SERPL W/O P-5'-P-CCNC: 44 U/L (ref 10–44)
ANION GAP SERPL CALC-SCNC: 10 MMOL/L (ref 8–16)
ANION GAP SERPL CALC-SCNC: 7 MMOL/L (ref 8–16)
ANION GAP SERPL CALC-SCNC: 9 MMOL/L (ref 8–16)
AST SERPL-CCNC: 39 U/L (ref 10–40)
AST SERPL-CCNC: 42 U/L (ref 10–40)
AST SERPL-CCNC: 46 U/L (ref 10–40)
BASOPHILS # BLD AUTO: 0.02 K/UL (ref 0–0.2)
BASOPHILS # BLD AUTO: 0.03 K/UL (ref 0–0.2)
BASOPHILS # BLD AUTO: 0.03 K/UL (ref 0–0.2)
BASOPHILS NFR BLD: 0.4 % (ref 0–1.9)
BASOPHILS NFR BLD: 0.5 % (ref 0–1.9)
BASOPHILS NFR BLD: 0.5 % (ref 0–1.9)
BILIRUB SERPL-MCNC: 0.4 MG/DL (ref 0.1–1)
BILIRUB SERPL-MCNC: 0.5 MG/DL (ref 0.1–1)
BILIRUB SERPL-MCNC: 0.7 MG/DL (ref 0.1–1)
BUN SERPL-MCNC: 22 MG/DL (ref 8–23)
BUN SERPL-MCNC: 27 MG/DL (ref 8–23)
BUN SERPL-MCNC: 27 MG/DL (ref 8–23)
CALCIUM SERPL-MCNC: 8.7 MG/DL (ref 8.7–10.5)
CALCIUM SERPL-MCNC: 9.3 MG/DL (ref 8.7–10.5)
CALCIUM SERPL-MCNC: 9.6 MG/DL (ref 8.7–10.5)
CHLORIDE SERPL-SCNC: 101 MMOL/L (ref 95–110)
CHLORIDE SERPL-SCNC: 101 MMOL/L (ref 95–110)
CHLORIDE SERPL-SCNC: 106 MMOL/L (ref 95–110)
CO2 SERPL-SCNC: 25 MMOL/L (ref 23–29)
CO2 SERPL-SCNC: 25 MMOL/L (ref 23–29)
CO2 SERPL-SCNC: 26 MMOL/L (ref 23–29)
CREAT SERPL-MCNC: 1.3 MG/DL (ref 0.5–1.4)
CREAT SERPL-MCNC: 1.4 MG/DL (ref 0.5–1.4)
CREAT SERPL-MCNC: 1.6 MG/DL (ref 0.5–1.4)
DIFFERENTIAL METHOD: ABNORMAL
EOSINOPHIL # BLD AUTO: 0.1 K/UL (ref 0–0.5)
EOSINOPHIL # BLD AUTO: 0.2 K/UL (ref 0–0.5)
EOSINOPHIL # BLD AUTO: 0.2 K/UL (ref 0–0.5)
EOSINOPHIL NFR BLD: 2.5 % (ref 0–8)
EOSINOPHIL NFR BLD: 2.6 % (ref 0–8)
EOSINOPHIL NFR BLD: 3.5 % (ref 0–8)
ERYTHROCYTE [DISTWIDTH] IN BLOOD BY AUTOMATED COUNT: 14.3 % (ref 11.5–14.5)
ERYTHROCYTE [DISTWIDTH] IN BLOOD BY AUTOMATED COUNT: 15.3 % (ref 11.5–14.5)
ERYTHROCYTE [DISTWIDTH] IN BLOOD BY AUTOMATED COUNT: 16.5 % (ref 11.5–14.5)
EST. GFR  (NO RACE VARIABLE): 43.8 ML/MIN/1.73 M^2
EST. GFR  (NO RACE VARIABLE): 51.4 ML/MIN/1.73 M^2
EST. GFR  (NO RACE VARIABLE): 56.2 ML/MIN/1.73 M^2
GLUCOSE SERPL-MCNC: 102 MG/DL (ref 70–110)
GLUCOSE SERPL-MCNC: 86 MG/DL (ref 70–110)
GLUCOSE SERPL-MCNC: 99 MG/DL (ref 70–110)
HCT VFR BLD AUTO: 40.6 % (ref 40–54)
HCT VFR BLD AUTO: 44.4 % (ref 40–54)
HCT VFR BLD AUTO: 44.9 % (ref 40–54)
HGB BLD-MCNC: 13.4 G/DL (ref 14–18)
HGB BLD-MCNC: 14.6 G/DL (ref 14–18)
HGB BLD-MCNC: 14.7 G/DL (ref 14–18)
IMM GRANULOCYTES # BLD AUTO: 0.01 K/UL (ref 0–0.04)
IMM GRANULOCYTES # BLD AUTO: 0.01 K/UL (ref 0–0.04)
IMM GRANULOCYTES # BLD AUTO: 0.02 K/UL (ref 0–0.04)
IMM GRANULOCYTES NFR BLD AUTO: 0.2 % (ref 0–0.5)
IMM GRANULOCYTES NFR BLD AUTO: 0.2 % (ref 0–0.5)
IMM GRANULOCYTES NFR BLD AUTO: 0.3 % (ref 0–0.5)
LYMPHOCYTES # BLD AUTO: 0.4 K/UL (ref 1–4.8)
LYMPHOCYTES # BLD AUTO: 0.4 K/UL (ref 1–4.8)
LYMPHOCYTES # BLD AUTO: 0.5 K/UL (ref 1–4.8)
LYMPHOCYTES NFR BLD: 5.6 % (ref 18–48)
LYMPHOCYTES NFR BLD: 8 % (ref 18–48)
LYMPHOCYTES NFR BLD: 9.1 % (ref 18–48)
MCH RBC QN AUTO: 30.5 PG (ref 27–31)
MCH RBC QN AUTO: 31 PG (ref 27–31)
MCH RBC QN AUTO: 31.1 PG (ref 27–31)
MCHC RBC AUTO-ENTMCNC: 32.5 G/DL (ref 32–36)
MCHC RBC AUTO-ENTMCNC: 33 G/DL (ref 32–36)
MCHC RBC AUTO-ENTMCNC: 33.1 G/DL (ref 32–36)
MCV RBC AUTO: 93 FL (ref 82–98)
MCV RBC AUTO: 94 FL (ref 82–98)
MCV RBC AUTO: 95 FL (ref 82–98)
MONOCYTES # BLD AUTO: 0.9 K/UL (ref 0.3–1)
MONOCYTES # BLD AUTO: 1.1 K/UL (ref 0.3–1)
MONOCYTES # BLD AUTO: 1.4 K/UL (ref 0.3–1)
MONOCYTES NFR BLD: 16.2 % (ref 4–15)
MONOCYTES NFR BLD: 19 % (ref 4–15)
MONOCYTES NFR BLD: 21.1 % (ref 4–15)
NEUTROPHILS # BLD AUTO: 4 K/UL (ref 1.8–7.7)
NEUTROPHILS # BLD AUTO: 4 K/UL (ref 1.8–7.7)
NEUTROPHILS # BLD AUTO: 4.6 K/UL (ref 1.8–7.7)
NEUTROPHILS NFR BLD: 68.8 % (ref 38–73)
NEUTROPHILS NFR BLD: 69.3 % (ref 38–73)
NEUTROPHILS NFR BLD: 72.9 % (ref 38–73)
NRBC BLD-RTO: 0 /100 WBC
PLATELET # BLD AUTO: 177 K/UL (ref 150–450)
PLATELET # BLD AUTO: 181 K/UL (ref 150–450)
PLATELET # BLD AUTO: 181 K/UL (ref 150–450)
PMV BLD AUTO: 8.8 FL (ref 9.2–12.9)
PMV BLD AUTO: 8.9 FL (ref 9.2–12.9)
PMV BLD AUTO: 9.3 FL (ref 9.2–12.9)
POTASSIUM SERPL-SCNC: 4.5 MMOL/L (ref 3.5–5.1)
POTASSIUM SERPL-SCNC: 4.6 MMOL/L (ref 3.5–5.1)
POTASSIUM SERPL-SCNC: 4.8 MMOL/L (ref 3.5–5.1)
PROT SERPL-MCNC: 6.6 G/DL (ref 6–8.4)
PROT SERPL-MCNC: 6.6 G/DL (ref 6–8.4)
PROT SERPL-MCNC: 7.3 G/DL (ref 6–8.4)
RBC # BLD AUTO: 4.39 M/UL (ref 4.6–6.2)
RBC # BLD AUTO: 4.71 M/UL (ref 4.6–6.2)
RBC # BLD AUTO: 4.72 M/UL (ref 4.6–6.2)
SODIUM SERPL-SCNC: 135 MMOL/L (ref 136–145)
SODIUM SERPL-SCNC: 137 MMOL/L (ref 136–145)
SODIUM SERPL-SCNC: 138 MMOL/L (ref 136–145)
TSH SERPL DL<=0.005 MIU/L-ACNC: 1.8 UIU/ML (ref 0.4–4)
TSH SERPL DL<=0.005 MIU/L-ACNC: 1.96 UIU/ML (ref 0.4–4)
TSH SERPL DL<=0.005 MIU/L-ACNC: 2.34 UIU/ML (ref 0.4–4)
WBC # BLD AUTO: 5.5 K/UL (ref 3.9–12.7)
WBC # BLD AUTO: 5.74 K/UL (ref 3.9–12.7)
WBC # BLD AUTO: 6.62 K/UL (ref 3.9–12.7)

## 2022-01-01 PROCEDURE — 84443 ASSAY THYROID STIM HORMONE: CPT | Performed by: INTERNAL MEDICINE

## 2022-01-01 PROCEDURE — 36415 COLL VENOUS BLD VENIPUNCTURE: CPT | Mod: PO | Performed by: INTERNAL MEDICINE

## 2022-01-01 PROCEDURE — 85025 COMPLETE CBC W/AUTO DIFF WBC: CPT | Mod: PO | Performed by: INTERNAL MEDICINE

## 2022-01-01 PROCEDURE — 80053 COMPREHEN METABOLIC PANEL: CPT | Performed by: INTERNAL MEDICINE

## 2022-01-01 PROCEDURE — 99213 PR OFFICE/OUTPT VISIT, EST, LEVL III, 20-29 MIN: ICD-10-PCS | Mod: 95,,, | Performed by: PHYSICIAN ASSISTANT

## 2022-01-01 PROCEDURE — 99215 OFFICE O/P EST HI 40 MIN: CPT | Mod: 95,,, | Performed by: INTERNAL MEDICINE

## 2022-01-01 PROCEDURE — 76770 US EXAM ABDO BACK WALL COMP: CPT | Mod: TC,PO

## 2022-01-01 PROCEDURE — 99213 OFFICE O/P EST LOW 20 MIN: CPT | Mod: 95,,, | Performed by: PHYSICIAN ASSISTANT

## 2022-01-01 PROCEDURE — 99215 PR OFFICE/OUTPT VISIT, EST, LEVL V, 40-54 MIN: ICD-10-PCS | Mod: 95,,, | Performed by: INTERNAL MEDICINE

## 2022-01-01 PROCEDURE — 99999 PR PBB SHADOW E&M-EST. PATIENT-LVL V: CPT | Mod: PBBFAC,,, | Performed by: INTERNAL MEDICINE

## 2022-01-01 PROCEDURE — 99215 OFFICE O/P EST HI 40 MIN: CPT | Mod: S$PBB,,, | Performed by: INTERNAL MEDICINE

## 2022-01-01 PROCEDURE — 99215 PR OFFICE/OUTPT VISIT, EST, LEVL V, 40-54 MIN: ICD-10-PCS | Mod: S$PBB,,, | Performed by: INTERNAL MEDICINE

## 2022-01-01 PROCEDURE — 76770 US EXAM ABDO BACK WALL COMP: CPT | Mod: 26,,, | Performed by: RADIOLOGY

## 2022-01-01 PROCEDURE — 99215 OFFICE O/P EST HI 40 MIN: CPT | Mod: PBBFAC | Performed by: INTERNAL MEDICINE

## 2022-01-01 PROCEDURE — 99999 PR PBB SHADOW E&M-EST. PATIENT-LVL V: ICD-10-PCS | Mod: PBBFAC,,, | Performed by: INTERNAL MEDICINE

## 2022-01-01 PROCEDURE — 76770 US RETROPERITONEAL COMPLETE: ICD-10-PCS | Mod: 26,,, | Performed by: RADIOLOGY

## 2022-01-01 RX ORDER — PAZOPANIB 200 MG/1
800 TABLET ORAL DAILY
Qty: 90 TABLET | Refills: 11 | Status: SHIPPED | OUTPATIENT
Start: 2022-01-01 | End: 2023-01-01

## 2022-01-01 RX ORDER — PAZOPANIB 200 MG/1
600 TABLET ORAL DAILY
Qty: 90 TABLET | Refills: 11 | Status: SHIPPED | OUTPATIENT
Start: 2022-01-01 | End: 2022-01-01

## 2022-01-01 NOTE — PROGRESS NOTES
PROGRESS NOTE    Subjective:       Patient ID: Forrest Schmidt is a 72 y.o. male.    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 He initially presented with melena, syncope, hypotension on 18 at OSH. Colonoscopy showed old blood in the terminal ileum. CT abdomen/pelvis showed an ileal mass that measures approximately 5 cm. He was transferred to [facility] and underwent segmental small bowel resection by Dr. Sanchez on 2/15/18. Pathology showed a 6-cm GIST, histologic type: GIST, mixed; mitotic rate 9 mitoses/5 mm2, G2, high grade, high risk, margins negative. Cd117+, pathologic T3NxMx.  He had incisional wound infection after the surgery and took antibiotics for that.         History of Present Illness:   Mr. Schmidt returns today for continued follow up for resected GIST. He has not received Gleevec yet. Previous wound infection has improved significantly. Also taking iron tab twice daily for CLINT.     ECO    ROS:   See HPI, otherwise negative.     Physical Examination:   Vital signs reviewed.   Gen: well hydrated, well developed. In no acute distress  HEENT: normocephalic, PERRLA, EOMI, oropharynx clear  Neck: supple, no cervical LAD  Lungs: CTAB, no wheezing, rales  Heart: RRR, no M/R/G  Abd: midline surgical scar healing well, no signs of infection. Soft, no tenderness, non-distended, no hepatosplenomegaly, BS present  Ext: no clubbing, cyanosis, or edema  Neuro: alert and oriented x 4, in no acute distress  Psych: pleasant and appropriate mood and affect    Objective:     Laboratory Data:  CBC showed Hb of 9.1, improved. CMP, TSH normal.     Assessment/Plan:     1. 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 Iron deficiency anemia due to chronic blood loss    3.  Essential hypertension    4. Coronary artery disease involving native coronary artery of native heart without angina pectoris      1. Spoke with pharmacy. Gleevec is approved but he has a $77 copay each month. They will call patient today to discuss. Spoke with Mr. Schmidt. He is willing to pay the copay. They will go to specialty pharmacy today to  the medication. Start Gleevec 400 mg daily. Plan to do adjuvant Gleevec for at least 3 years  2. C/w iron pills two tab a day  3. Well controlled. C/w current meds  4. Stable. Continue to monitor    RTC in 2 weeks with CBC, CMP    Electronically signed by Robert Pichardo for HPI/ROS submitted by the patient on 4/9/2018   appetite change : No  unexpected weight change: No  visual disturbance: No  cough: No  shortness of breath: No  chest pain: No  abdominal pain: No  diarrhea: No  frequency: No  back pain: No  rash: No  headaches: No  adenopathy: No  nervous/ anxious: No     Negative Screen

## 2022-01-13 ENCOUNTER — PATIENT MESSAGE (OUTPATIENT)
Dept: HEMATOLOGY/ONCOLOGY | Facility: CLINIC | Age: 77
End: 2022-01-13
Payer: MEDICARE

## 2022-01-13 DIAGNOSIS — C49.A3 GIST (GASTROINTESTINAL STROMAL TUMOR) OF SMALL BOWEL, MALIGNANT: Primary | ICD-10-CM

## 2022-01-13 NOTE — PROGRESS NOTES
Received email from Dr Guaman. CT A/P at [facility] showed small (subcentimeter, ranging in size from 6 mm to 9 mm) new hypodense lesions in the right liver that were suspicious for metastases.  There are about 6 of them. [facility] MRI liver to confirm and switching therapy to regorafenib.   Called Mr Schmidt. NA. Called and spoke with Mrs Schmidt. Discussed Dr Guaman's recommendations. Mrs Schmidt would like us to get MRI liver and to prescribe regorafenib. Discussed regorafenib 120 mg daily for 21 days then stop for 7 days, every 28-day cycle. Discussed for the first cycle, start at 80 mg daily for 1 week then increase to 120 mg daily. Mrs Schmidt understands and agrees with the plan. She will talk to Mr Schmidt when he gets home. If Mr Schmidt does not agree with the plan he will message us. He is scheduled to see me with labs in mid Feb. Will keep those appointments.     MRI ordered. Prescription of regorafenib sent to specialty pharmacy.

## 2022-01-18 ENCOUNTER — SPECIALTY PHARMACY (OUTPATIENT)
Dept: PHARMACY | Facility: CLINIC | Age: 77
End: 2022-01-18
Payer: MEDICARE

## 2022-01-18 ENCOUNTER — TELEPHONE (OUTPATIENT)
Dept: HEMATOLOGY/ONCOLOGY | Facility: CLINIC | Age: 77
End: 2022-01-18
Payer: MEDICARE

## 2022-01-18 DIAGNOSIS — C49.A3 MALIGNANT GASTROINTESTINAL STROMAL TUMOR (GIST) OF SMALL INTESTINE: Primary | ICD-10-CM

## 2022-01-18 RX ORDER — SUNITINIB MALATE 37.5 MG/1
37.5 CAPSULE ORAL DAILY
Qty: 30 CAPSULE | Refills: 0 | Status: SHIPPED | OUTPATIENT
Start: 2022-01-18 | End: 2022-03-03 | Stop reason: ALTCHOICE

## 2022-01-18 NOTE — TELEPHONE ENCOUNTER
Incoming call from patient regarding Sutent refill. Confirmed with patient that he was seen at [facility] recently and his MD there has requested he switch from Sutent to Stivarga. [facility] has received new prescription for Stivarga, however, he will need to conitnue Sutent in the interim. Sutent rx has been discontinued.     Inbasket message sent to MD office to request permission to reactivate Sutent for 1 more month while we obtain approval for Stivarga. Will follow-up with patient once we have received response from provider.

## 2022-01-18 NOTE — TELEPHONE ENCOUNTER
PA for Stivarga submitted to patient's insurance via fax as an expedited request. Currently awaiting response.

## 2022-01-18 NOTE — TELEPHONE ENCOUNTER
----- Message from Carol Atkins PharmD sent at 1/18/2022  9:54 AM CST -----  Regarding: Sutent  Good morning.    I spoke with Mr. Schmidt today regarding his Sutent refill. He is aware that he is switching from Sutent to Stivarga, however, he only has a few days left on his Sutent and PA  for Stivarga is still pending. Can we reactivate his Sutent prescription so that he may continue until Stivarga is approved and affordable?    Please advise. Thanks!    Carol Atkins, Pharm D., CSP  [facility]  [phone]

## 2022-01-20 NOTE — TELEPHONE ENCOUNTER
Specialty Pharmacy - Refill Coordination    Specialty Medication Orders Linked to Encounter    Flowsheet Row Most Recent Value   Medication #1 SUNItinib (SUTENT) 37.5 mg Cap (Order#[id number], Rx#[id number])        Received refills on Sutent. Patient understands to continue treatment until Stivarga is approved. [facility]ign [employer]A for $77.40 copay.     Refill Questions - Documented Responses    Flowsheet Row Most Recent Value   Patient Availability and HIPAA Verification    Does patient want to proceed with activity? Yes   HIPAA/medical authority confirmed? Yes   Relationship to patient of person spoken to? Self   Refill Screening Questions    Changes to allergies? No   Changes to medications? No   New conditions since last clinic visit? No   Unplanned office visit, urgent care, ED, or hospital admission in the last 4 weeks? No   How does patient/caregiver feel medication is working? Good   Financial problems or insurance changes? No   How many doses of your specialty medications were missed in the last 4 weeks? 0   Would patient like to speak to a pharmacist? No   When does the patient need to receive the medication? 01/24/22   Refill Delivery Questions    How will the patient receive the medication? Delivery Rachelle   When does the patient need to receive the medication? 01/24/22   Shipping Address Home   Address in [facility] confirmed and updated if neccessary? Yes   Expected Copay ($) 77.4   Is the patient able to afford the medication copay? Yes   Payment Method CC on file  [pending AUDRA]   Days supply of Refill 30   Supplies needed? No supplies needed   Refill activity completed? Yes   Refill activity plan Refill scheduled   Shipment/Pickup Date: 01/21/22          Current Outpatient Medications   Medication Sig    amLODIPine (NORVASC) 10 MG tablet Take 1 tablet (10 mg total) by mouth once daily.    arginine 500 mg tablet Take 1,000 mg by mouth once daily.     aspirin (ECOTRIN) 81 MG EC tablet Take 81 mg by  mouth.    atorvastatin (LIPITOR) 40 MG tablet Take 1 tablet (40 mg total) by mouth once daily.    BORON ORAL Take 3 mg by mouth once daily.    calcium carbonate (TUMS) 200 mg calcium (500 mg) chewable tablet Take 500 mg by mouth.    chlorthalidone (HYGROTEN) 25 MG Tab One half tablet po Monday-Wednseday and Friday    cholecalciferol, vitamin D3, (VITAMIN D3) 2,000 unit Cap Take 1 capsule by mouth once daily.    diphenoxylate-atropine 2.5-0.025 mg (LOMOTIL) 2.5-0.025 mg per tablet Take 1 tablet by mouth 4 (four) times daily as needed for Diarrhea.    fish oil-omega-3 fatty acids 300-1,000 mg capsule Take 1 g by mouth once daily.    fluticasone (FLONASE) 50 mcg/actuation nasal spray 1 spray by Each Nare route 2 (two) times daily.    losartan (COZAAR) 100 MG tablet Take 1 tablet (100 mg total) by mouth once daily.    magnesium oxide-Mg AA chelate (MG-PLUS-PROTEIN) 133 mg Tab Take 1 tablet by mouth.    metoprolol succinate (TOPROL-XL) 25 MG 24 hr tablet Take 1 tablet (25 mg total) by mouth once daily.    multivitamin (THERAGRAN) per tablet Take 1 tablet by mouth once daily.    nitroGLYCERIN (NITROSTAT) 0.4 MG SL tablet DISSOLVE ONE TABLET UNDER TONGUE EVERY 5 MINUTES FOR 3 TIMES THEN GO TO [place] RO[facility]    omeprazole (PRILOSEC) 20 MG capsule Take 20 mg by mouth 2 (two) times a day.    ondansetron (ZOFRAN-ODT) 8 MG TbDL Take 1 tablet (8 mg total) by mouth every 6 (six) hours as needed (nausea).    promethazine (PHENERGAN) 25 MG tablet Take 1 tablet (25 mg total) by mouth every 6 (six) hours as needed for Nausea.    regorafenib (STIVARGA) 40 mg Tab Take 120 mg by mouth once daily for 21 days then hold for 7 days, every 28-day cycle. For 1st cycle, start 80 mg daily x 1 week.    SOD CHLOR,SOD BICARB/NETI POT (NEILMED NASAFLO NASL) 1 spray by Nasal route every evening.    SODIUM CHLORIDE (FE-128 OPHT) Place 1 drop into the right eye 3 (three) times daily.    SUNItinib (SUTENT) 37.5 mg Cap Take 1 capsule  "(37.5 mg total) by mouth once daily.    tadalafil (CIALIS) 5 MG tablet Take 5 mg by mouth once daily at 6am.    testosterone cypionate (DEPOTESTOTERONE CYPIONATE) 200 mg/mL injection Inject 200 mg into the muscle every 14 (fourteen) days.     zinc gluconate 50 mg tablet Take 50 mg by mouth every Monday and Thursday.    Last reviewed on 11/29/2021  1:46 PM by Robert Umana MD    Review of patient's allergies indicates:   Allergen Reactions    Augmentin [amoxicillin-pot clavulanate] Shortness Of Breath     disoriented    Fexofenadine Other (See Comments)     Pt states he can't remember the details but it was a bad effect.    Singulair [montelukast] Other (See Comments)     Pt "felt strange"  Pt "felt strange" bloating, Intestinal irritation, abd cramping      Ace inhibitors Other (See Comments)     cough    Captopril     Doxycycline Nausea And Vomiting     headache    Horse/equine containing products Hives    Hydrocodone Nausea Only    Scopolamine hbr Other (See Comments)     Nausea, headache, changes in BP     Last reviewed on  1/18/2022 10:50 AM by Robert Umana      Tasks added this encounter   2/16/2022 - Refill Call (Auto Added)   Tasks due within next 3 months   1/16/2022 - Clinical - Follow Up Assesement (90 day)     Leander Hung, PharmD  [facility]  [facility] [address]  Phone: [phone]  Fax: [phone]      "

## 2022-01-24 ENCOUNTER — PATIENT MESSAGE (OUTPATIENT)
Dept: HEMATOLOGY/ONCOLOGY | Facility: CLINIC | Age: 77
End: 2022-01-24
Payer: MEDICARE

## 2022-01-25 ENCOUNTER — OFFICE VISIT (OUTPATIENT)
Dept: CARDIOLOGY | Facility: CLINIC | Age: 77
End: 2022-01-25
Payer: MEDICARE

## 2022-01-25 ENCOUNTER — PATIENT MESSAGE (OUTPATIENT)
Dept: CARDIOLOGY | Facility: CLINIC | Age: 77
End: 2022-01-25

## 2022-01-25 VITALS
SYSTOLIC BLOOD PRESSURE: 157 MMHG | DIASTOLIC BLOOD PRESSURE: 73 MMHG | HEART RATE: 61 BPM | WEIGHT: 178.13 LBS | BODY MASS INDEX: 24.94 KG/M2 | HEIGHT: 71 IN

## 2022-01-25 DIAGNOSIS — I25.10 CORONARY ARTERY DISEASE INVOLVING NATIVE CORONARY ARTERY OF NATIVE HEART WITHOUT ANGINA PECTORIS: ICD-10-CM

## 2022-01-25 DIAGNOSIS — C78.7 METASTASIS TO LIVER: ICD-10-CM

## 2022-01-25 DIAGNOSIS — I10 HYPERTENSION, UNSPECIFIED TYPE: Primary | ICD-10-CM

## 2022-01-25 DIAGNOSIS — C49.A3 GIST (GASTROINTESTINAL STROMAL TUMOR) OF SMALL BOWEL, MALIGNANT: Chronic | ICD-10-CM

## 2022-01-25 PROCEDURE — 99999 PR PBB SHADOW E&M-EST. PATIENT-LVL IV: CPT | Mod: PBBFAC,,, | Performed by: INTERNAL MEDICINE

## 2022-01-25 PROCEDURE — 99214 PR OFFICE/OUTPT VISIT, EST, LEVL IV, 30-39 MIN: ICD-10-PCS | Mod: S$PBB,,, | Performed by: INTERNAL MEDICINE

## 2022-01-25 PROCEDURE — 99214 OFFICE O/P EST MOD 30 MIN: CPT | Mod: PBBFAC,PO | Performed by: INTERNAL MEDICINE

## 2022-01-25 PROCEDURE — 99214 OFFICE O/P EST MOD 30 MIN: CPT | Mod: S$PBB,,, | Performed by: INTERNAL MEDICINE

## 2022-01-25 PROCEDURE — 99999 PR PBB SHADOW E&M-EST. PATIENT-LVL IV: ICD-10-PCS | Mod: PBBFAC,,, | Performed by: INTERNAL MEDICINE

## 2022-01-25 RX ORDER — METOPROLOL SUCCINATE 25 MG/1
25 TABLET, EXTENDED RELEASE ORAL 2 TIMES DAILY
Qty: 180 TABLET | Refills: 3 | Status: SHIPPED | OUTPATIENT
Start: 2022-01-25 | End: 2022-02-16

## 2022-01-25 RX ORDER — CHLORTHALIDONE 25 MG/1
TABLET ORAL
Qty: 60 TABLET | Refills: 11 | Status: SHIPPED | OUTPATIENT
Start: 2022-01-25 | End: 2022-05-13 | Stop reason: SDUPTHER

## 2022-01-25 NOTE — PROGRESS NOTES
Subjective:    Patient ID:  Forrest Schmidt is a 76 y.o. male who presents for evaluation of Hypertension      HPI76 yo WM with GIST being treated by oncology and hx of HTN. BP has been creeping up. Keeps detailed log of BP readings. Denies chest pain, SOB, or edema    Review of Systems   Constitutional: Negative for decreased appetite, fever, malaise/fatigue, weight gain and weight loss.   HENT: Negative for hearing loss and nosebleeds.    Eyes: Negative for visual disturbance.   Cardiovascular: Negative for chest pain, claudication, cyanosis, dyspnea on exertion, irregular heartbeat, leg swelling, near-syncope, orthopnea, palpitations, paroxysmal nocturnal dyspnea and syncope.   Respiratory: Negative for cough, hemoptysis, shortness of breath, sleep disturbances due to breathing, snoring and wheezing.    Endocrine: Negative for cold intolerance, heat intolerance, polydipsia and polyuria.   Hematologic/Lymphatic: Negative for adenopathy and bleeding problem. Does not bruise/bleed easily.   Skin: Negative for color change, itching, poor wound healing, rash and suspicious lesions.   Musculoskeletal: Negative for arthritis, back pain, falls, joint pain, joint swelling, muscle cramps, muscle weakness and myalgias.   Gastrointestinal: Negative for bloating, abdominal pain, change in bowel habit, constipation, flatus, heartburn, hematemesis, hematochezia, hemorrhoids, jaundice, melena, nausea and vomiting.   Genitourinary: Negative for bladder incontinence, decreased libido, frequency, hematuria, hesitancy and urgency.   Neurological: Negative for brief paralysis, difficulty with concentration, excessive daytime sleepiness, dizziness, focal weakness, headaches, light-headedness, loss of balance, numbness, vertigo and weakness.   Psychiatric/Behavioral: Negative for altered mental status, depression and memory loss. The patient does not have insomnia and is not nervous/anxious.    Allergic/Immunologic: Negative for  "environmental allergies, hives and persistent infections.        Objective:    Physical Exam  Constitutional:       General: He is not in acute distress.     Appearance: He is well-developed and well-nourished. He is not diaphoretic.      Comments: BP (!) 157/73 (BP Location: Left arm, Patient Position: Sitting, BP Method: Large (Automatic))   Pulse 61   Ht 5' 11" (1.803 m)   Wt 80.8 kg (178 lb 2.1 oz)   BMI 24.84 kg/m²      HENT:      Head: Normocephalic and atraumatic.   Eyes:      General: Lids are normal. No scleral icterus.        Right eye: No discharge.      Conjunctiva/sclera: Conjunctivae normal.      Pupils: Pupils are equal, round, and reactive to light.   Neck:      Thyroid: No thyromegaly.      Vascular: No JVD.      Trachea: No tracheal deviation.   Cardiovascular:      Rate and Rhythm: Normal rate and regular rhythm.      Pulses: Intact distal pulses.           Carotid pulses are 2+ on the right side and 2+ on the left side.       Radial pulses are 2+ on the right side and 2+ on the left side.        Femoral pulses are 2+ on the right side and 2+ on the left side.       Popliteal pulses are 2+ on the right side and 2+ on the left side.        Dorsalis pedis pulses are 2+ on the right side and 2+ on the left side.        Posterior tibial pulses are 2+ on the right side and 2+ on the left side.      Heart sounds: Normal heart sounds, S1 normal and S2 normal. No murmur heard.  No friction rub. No gallop.    Pulmonary:      Effort: Pulmonary effort is normal. No respiratory distress.      Breath sounds: Normal breath sounds. No wheezing or rales.   Chest:      Chest wall: No tenderness.   Abdominal:      General: Bowel sounds are normal. There is no distension.      Palpations: Abdomen is soft. There is no hepatomegaly, hepatosplenomegaly or mass.      Tenderness: There is no abdominal tenderness. There is no guarding or rebound.   Musculoskeletal:         General: No tenderness or edema. Normal range " of motion.      Cervical back: Normal range of motion and neck supple.   Lymphadenopathy:      Cervical: No cervical adenopathy.   Skin:     General: Skin is warm and dry.      Coloration: Skin is not pale.      Findings: No erythema or rash.   Neurological:      Mental Status: He is alert and oriented to person, place, and time.      Cranial Nerves: No cranial nerve deficit.      Coordination: Coordination normal.      Deep Tendon Reflexes: Reflexes are normal and symmetric.   Psychiatric:         Mood and Affect: Mood and affect normal.         Speech: Speech normal.         Behavior: Behavior normal.         Thought Content: Thought content normal.         Cognition and Memory: Cognition and memory normal.         Judgment: Judgment normal.           Assessment:       1. Hypertension, unspecified type    2. Metastasis to liver    3. GIST (gastrointestinal stromal tumor) of small bowel, malignant    4. Coronary artery disease involving native coronary artery of native heart without angina pectoris         Plan:     Will increase metoprolol to BID   Take chlorthalidone 12.5 mg daily   Follow kidney function carefully    Low salt   Orders Placed This Encounter   Procedures    Basic Metabolic Panel     Follow up in about 3 months (around 4/25/2022).

## 2022-01-26 ENCOUNTER — SPECIALTY PHARMACY (OUTPATIENT)
Dept: PHARMACY | Facility: CLINIC | Age: 77
End: 2022-01-26
Payer: MEDICARE

## 2022-01-26 ENCOUNTER — PATIENT MESSAGE (OUTPATIENT)
Dept: HEMATOLOGY/ONCOLOGY | Facility: CLINIC | Age: 77
End: 2022-01-26
Payer: MEDICARE

## 2022-01-26 DIAGNOSIS — C49.A3 GIST (GASTROINTESTINAL STROMAL TUMOR) OF SMALL BOWEL, MALIGNANT: ICD-10-CM

## 2022-01-26 NOTE — TELEPHONE ENCOUNTER
Benefit investigation - Medicare Part D:     Estimated copay $80. [facility] in network. Claim pushes patient into the coverage gap but he has a cap on copays of $80. Will proceed with initial consult/shipment.

## 2022-01-26 NOTE — TELEPHONE ENCOUNTER
Called to check status of patient's Stiv[employer]a PA since test claim now pays for $80 copay meaning PA should be approved.     Spoke with Marvin, PA representative with patient insurance. Per Marvin, there is no record of PA submission on 1/18, however, per Marvin, Stiv[employer] no longer requires a PA.     Will proceed with benefit investigation.

## 2022-01-26 NOTE — TELEPHONE ENCOUNTER
Specialty Pharmacy - Initial Clinical Assessment    Specialty Medication Orders Linked to Encounter    Flowsheet Row Most Recent Value   Medication #1 regorafenib (STIVARGA) 40 mg Tab (Order#[id number], Rx#[id number])        Radha Schmidt is a 76 y.o. male, who is followed by the specialty pharmacy service for management and education.    Recent Encounters     Date Type Provider Description    01/26/2022 Specialty Pharmacy Renita Mckeon PharmD Initial Clinical Assessment    01/18/2022 Specialty Pharmacy Carol Atkins PharmD Referral Authorization    01/18/2022 Specialty Pharmacy Carol Atkins PharmD Refill Coordination    12/23/2021 Specialty Pharmacy Angela LOPEZ Resor Refill Coordination    11/26/2021 Specialty Pharmacy Angela LOPEZ [facility] Refill Coordination        Clinical call attempts since last clinical assessment   No call attempts found.     Today he received education before his first fill with [facility].    Current Outpatient Medications   Medication Sig    amLODIPine (NORVASC) 10 MG tablet Take 1 tablet (10 mg total) by mouth once daily.    arginine 500 mg tablet Take 1,000 mg by mouth once daily.     aspirin (ECOTRIN) 81 MG EC tablet Take 81 mg by mouth.    atorvastatin (LIPITOR) 40 MG tablet Take 1 tablet (40 mg total) by mouth once daily.    BORON ORAL Take 3 mg by mouth once daily.    calcium carbonate (TUMS) 200 mg calcium (500 mg) chewable tablet Take 500 mg by mouth.    chlorthalidone (HYGROTEN) 25 MG Tab Half tablet po daily    cholecalciferol, vitamin D3, (VITAMIN D3) 50 mcg (2,000 unit) Cap Take 1 capsule by mouth once daily.    diphenoxylate-atropine 2.5-0.025 mg (LOMOTIL) 2.5-0.025 mg per tablet Take 1 tablet by mouth 4 (four) times daily as needed for Diarrhea. (Patient not taking: Reported on 1/25/2022)    fish oil-omega-3 fatty acids 300-1,000 mg capsule Take 1 g by mouth once daily.    fluticasone (FLONASE) 50 mcg/actuation nasal spray 1 spray by Each Nare  route 2 (two) times daily.    losartan (COZAAR) 100 MG tablet Take 1 tablet (100 mg total) by mouth once daily.    magnesium oxide-Mg AA chelate (MG-PLUS-PROTEIN) 133 mg Tab Take 1 tablet by mouth.    metoprolol succinate (TOPROL-XL) 25 MG 24 hr tablet Take 1 tablet (25 mg total) by mouth 2 (two) times daily.    multivitamin (THERAGRAN) per tablet Take 1 tablet by mouth once daily.    nitroGLYCERIN (NITROSTAT) 0.4 MG SL tablet DISSOLVE ONE TABLET UNDER TONGUE EVERY 5 MINUTES FOR 3 [facility]    omeprazole (PRILOSEC) 20 MG capsule Take 20 mg by mouth 2 (two) times a day.    ondansetron (ZOFRAN-ODT) 8 MG TbDL Take 1 tablet (8 mg total) by mouth every 6 (six) hours as needed (nausea).    promethazine (PHENERGAN) 25 MG tablet Take 1 tablet (25 mg total) by mouth every 6 (six) hours as needed for Nausea. (Patient not taking: Reported on 1/25/2022)    regorafenib (STIVARGA) 40 mg Tab Take 120 mg by mouth once daily for 21 days then hold for 7 days, every 28-day cycle. For 1st cycle, start 80 mg daily x 1 week, then increase to 120mg daily.    SOD CHLOR,SOD BICARB/NETI POT (NEILMED NASAFLO NASL) 1 spray by Nasal route every evening.    SODIUM CHLORIDE (FE-128 OPHT) Place 1 drop into the right eye 3 (three) times daily.    SUNItinib (SUTENT) 37.5 mg Cap Take 1 capsule (37.5 mg total) by mouth once daily.    tadalafil (CIALIS) 5 MG tablet Take 5 mg by mouth once daily at 6am.    testosterone cypionate (DEPOTESTOTERONE CYPIONATE) 200 mg/mL injection Inject 200 mg into the muscle every 14 (fourteen) days.     zinc gluconate 50 mg tablet Take 50 mg by mouth every Monday and Thursday.    Last reviewed on 1/25/2022  2:09 PM by Tera Upton Jr., MD    Review of patient's allergies indicates:   Allergen Reactions    Augmentin [amoxicillin-pot clavulanate] Shortness Of Breath     disoriented    Fexofenadine Other (See Comments)     Pt states he can't remember the details but it was a bad  "effect.    Singulair [montelukast] Other (See Comments)     Pt "felt strange"  Pt "felt strange" bloating, Intestinal irritation, abd cramping      Ace inhibitors Other (See Comments)     cough    Captopril     Doxycycline Nausea And Vomiting     headache    Horse/equine containing products Hives    Hydrocodone Nausea Only    Scopolamine hbr Other (See Comments)     Nausea, headache, changes in BP    Last reviewed on  1/25/2022 1:45 PM by Kaycee Mathew    Drug Interactions    Clinically relevant drug interactions identified: yes   Interactions list: Sutent + Testosterone (Cat C) - testosterone may enhance the hypoglycemic effects of Sutent due to is hypoglycemic potential.    Drug management plan: Patient is not a diabetic. His last glucose level was 115. Patient has been on both medications for a few months, and blood glucose appears to be stable.         Medication Adherence     Other adherence tool: Daily routines   Support network for adherence: family member       Adverse Effects    *All other systems reviewed and are negative       Assessment Questions - Documented Responses    Flowsheet Row Most Recent Value   Assessment    Medication Reconciliation completed for patient Yes   During the past 4 weeks, has patient missed any activities due to condition or medication? No   During the past 4 weeks, did patient have any of the following urgent care visits? None   Goals of Therapy Status Discussed (new start)   Welcome packet contents reviewed and discussed with patient? Yes   Assesment completed? Yes   Plan Therapy being initiated   Do you need to open a clinical intervention (i-vent)? No   Do you want to schedule first shipment? Yes   Medication #1 Assessment Info    Patient status Existing medication, Exisiting to [facility]   Is this medication appropriate for the patient? Yes   Is this medication effective? Not yet started        Refill Questions - Documented Responses    Flowsheet Row Most Recent Value " "  Patient Availability and HIPAA Verification    Does patient want to proceed with activity? Yes   HIPAA/medical authority confirmed? Yes   Relationship to patient of person spoken to? Self   Refill Screening Questions    When does the patient need to receive the medication? 01/27/22   Refill Delivery Questions    How will the patient receive the medication? Delivery Rachelle   When does the patient need to receive the medication? 01/27/22   Shipping Address Home   Address in [facility] confirmed and updated if neccessary? Yes   Expected Copay ($) 80   Is the patient able to afford the medication copay? Yes   Payment Method CC on file   Days supply of Refill 21   Supplies needed? No supplies needed   Refill activity completed? 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    Tried/failed medications: sutent    BP Readings from Last 4 Encounters:   01/25/22 (!) 157/73   11/29/21 (!) 140/66   10/29/21 (!) 140/74   07/12/21 (!) 156/76     Ht Readings from Last 4 Encounters:   01/25/22 5' 11" (1.803 m)   11/29/21 5' 11" (1.803 m)   10/29/21 5' 11" (1.803 m)   07/12/21 5' 11" (1.803 m)     Wt Readings from Last 4 Encounters:   01/25/22 80.8 kg (178 lb 2.1 oz)   11/29/21 80.5 kg (177 lb 6.4 oz)   10/29/21 80.6 kg (177 lb 11.2 oz)   07/12/21 83.1 kg (183 lb 3.2 oz)     Recent Labs   Lab Result Units 12/23/21  1049 11/26/21  1111   RBC M/uL 3.29 L 3.25 L   Hemoglobin g/dL 11.1 L 11.2 L   Hematocrit % 34.0 L 34.8 L   WBC K/uL 3.58 L 3.31 L   Gran # (ANC) K/uL 2.1 2.0   Gran % % 59.5  --    Platelets K/uL 185 203   Sodium mmol/L 138 133 L   Potassium mmol/L 4.6 4.4   Chloride mmol/L 106 103   Glucose " mg/dL 99 114 H   BUN mg/dL 22 23   Creatinine mg/dL 1.3 1.4   Calcium mg/dL 7.9 L 8.7   Total Protein g/dL 5.6 L 6.0   Albumin g/dL 3.3 L 3.3 L   Total Bilirubin mg/dL 0.3 0.3   Alkaline Phosphatase U/L 69 62   AST U/L 28 31   ALT U/L 20 25     The goals of cancer treatment include:  · Achieving remission of cancer, if possible  · Reducing tumor size and spread of cancer, if remission is not possible  · Minimizing pain and symptoms of the cancer  · Preventing infection and other complications of treatment  · Promoting adequate nutrition  · Encouraging proper hydration  · Improving or maintaining quality of life  · Maintaining optimal therapy adherence  · Minimizing and managing side effects    Goals of Therapy Status: Discussed (new start)    Assessment/Plan  Patient plans to start therapy on 01/28/22      Indication, dosage, appropriateness, effectiveness, safety and convenience of his specialty medication(s) were reviewed today.     Patient Counseling    Counseled the patient on the following: doses and administration discussed, safe handling, storage, and disposal discussed, possible adverse effects and management discussed, possible drug and prescription drug interactions discussed, possible drug and OTC drug and food interactions discussed, lab monitoring and follow-up discussed, use of contraception discussed, therapeutic rationale discussed, cost of medications and cost implications discussed, adherence and missed doses discussed, pharmacy contact information discussed         Tasks added this encounter   No tasks added.   Tasks due within next 3 months   1/16/2022 - Clinical - Follow Up Assesement (90 day)  2/16/2022 - Refill Call (Auto Added)  2/9/2022 - Refill Call (Auto Added)  4/19/2022 - Clinical - Follow Up Assesement (90 day)  2/3/2022 - 7 Day Post Start Touchbase     Renita Mckeon, PharmD  [facility]  [address]  [facility] [address]  Phone:  [phone]  Fax: [phone]

## 2022-01-26 NOTE — TELEPHONE ENCOUNTER
Pt called to check status on Stiv[employer]a. Informed pt [facility] has not yet received decision from insurance regarding PA. Forwarding to assigned [facility] to check status and update pt. Pt was very appreciative.

## 2022-02-02 ENCOUNTER — HOSPITAL ENCOUNTER (OUTPATIENT)
Dept: RADIOLOGY | Facility: HOSPITAL | Age: 77
Discharge: HOME | End: 2022-02-02
Attending: INTERNAL MEDICINE
Payer: MEDICARE

## 2022-02-02 DIAGNOSIS — C49.A3 GIST (GASTROINTESTINAL STROMAL TUMOR) OF SMALL BOWEL, MALIGNANT: ICD-10-CM

## 2022-02-02 PROCEDURE — 25500020 PHARM REV CODE 255: Performed by: INTERNAL MEDICINE

## 2022-02-02 PROCEDURE — 74183 MRI ABDOMEN W WO CONTRAST: ICD-10-PCS | Mod: 26,,, | Performed by: RADIOLOGY

## 2022-02-02 PROCEDURE — A9585 GADOBUTROL INJECTION: HCPCS | Performed by: INTERNAL MEDICINE

## 2022-02-02 PROCEDURE — 74183 MRI ABD W/O CNTR FLWD CNTR: CPT | Mod: TC

## 2022-02-02 PROCEDURE — 74183 MRI ABD W/O CNTR FLWD CNTR: CPT | Mod: 26,,, | Performed by: RADIOLOGY

## 2022-02-02 RX ORDER — GADOBUTROL 604.72 MG/ML
10 INJECTION INTRAVENOUS
Status: COMPLETED | OUTPATIENT
Start: 2022-02-02 | End: 2022-02-02

## 2022-02-02 RX ADMIN — GADOBUTROL 10 ML: 604.72 INJECTION INTRAVENOUS at 10:02

## 2022-02-03 ENCOUNTER — SPECIALTY PHARMACY (OUTPATIENT)
Dept: PHARMACY | Facility: CLINIC | Age: 77
End: 2022-02-03
Payer: MEDICARE

## 2022-02-03 NOTE — TELEPHONE ENCOUNTER
7 Day Touchbase - Documented Responses    Flowsheet Row Most Recent Value   Have you started taking your medication? Yes   What day did you start? 01/29/22   How are you feeling?  Patient reports feeling well overall. He reports having some more headaches and some dryness in his hands.   How are you taking your medication? Are you having any problems taking your medication? Patient reports taking 80 mg of the stivarga once a week for this first week.Patient reports storing the Stivarga at room temperature and not missing any doses.   Do you have any questions or concerns that we can help you with today? All of the patients questions/concerns were addressed.            Renita Mckeon, PharmD  [facility]  [address] [facility] [address]  Phone: [phone]  Fax: [phone]

## 2022-02-04 ENCOUNTER — PATIENT MESSAGE (OUTPATIENT)
Dept: CARDIOLOGY | Facility: CLINIC | Age: 77
End: 2022-02-04
Payer: MEDICARE

## 2022-02-04 RX ORDER — LOSARTAN POTASSIUM 100 MG/1
100 TABLET ORAL DAILY
Qty: 90 TABLET | Refills: 3 | Status: SHIPPED | OUTPATIENT
Start: 2022-02-04 | End: 2023-01-01 | Stop reason: SDUPTHER

## 2022-02-08 ENCOUNTER — LAB VISIT (OUTPATIENT)
Dept: LAB | Facility: HOSPITAL | Age: 77
End: 2022-02-08
Attending: INTERNAL MEDICINE
Payer: MEDICARE

## 2022-02-08 DIAGNOSIS — I10 HYPERTENSION, UNSPECIFIED TYPE: ICD-10-CM

## 2022-02-08 LAB
ANION GAP SERPL CALC-SCNC: 9 MMOL/L (ref 8–16)
BUN SERPL-MCNC: 30 MG/DL (ref 8–23)
CALCIUM SERPL-MCNC: 9.1 MG/DL (ref 8.7–10.5)
CHLORIDE SERPL-SCNC: 103 MMOL/L (ref 95–110)
CO2 SERPL-SCNC: 25 MMOL/L (ref 23–29)
CREAT SERPL-MCNC: 1.3 MG/DL (ref 0.5–1.4)
EST. GFR  (AFRICAN AMERICAN): >60 ML/MIN/1.73 M^2
EST. GFR  (NON AFRICAN AMERICAN): 52.6 ML/MIN/1.73 M^2
GLUCOSE SERPL-MCNC: 79 MG/DL (ref 70–110)
POTASSIUM SERPL-SCNC: 5.2 MMOL/L (ref 3.5–5.1)
SODIUM SERPL-SCNC: 137 MMOL/L (ref 136–145)

## 2022-02-08 PROCEDURE — 36415 COLL VENOUS BLD VENIPUNCTURE: CPT | Mod: PO | Performed by: INTERNAL MEDICINE

## 2022-02-08 PROCEDURE — 80048 BASIC METABOLIC PNL TOTAL CA: CPT | Performed by: INTERNAL MEDICINE

## 2022-02-09 ENCOUNTER — PATIENT MESSAGE (OUTPATIENT)
Dept: CARDIOLOGY | Facility: CLINIC | Age: 77
End: 2022-02-09
Payer: MEDICARE

## 2022-02-09 ENCOUNTER — TELEPHONE (OUTPATIENT)
Dept: CARDIOLOGY | Facility: CLINIC | Age: 77
End: 2022-02-09
Payer: MEDICARE

## 2022-02-10 ENCOUNTER — SPECIALTY PHARMACY (OUTPATIENT)
Dept: PHARMACY | Facility: CLINIC | Age: 77
End: 2022-02-10
Payer: MEDICARE

## 2022-02-10 NOTE — TELEPHONE ENCOUNTER
Specialty Pharmacy - Refill Coordination    Specialty Medication Orders Linked to Encounter    Flowsheet Row Most Recent Value   Medication #1 regorafenib (STIVARGA) 40 mg Tab (Order#[id number], Rx#[id number])          Refill Questions - Documented Responses    Flowsheet Row Most Recent Value   Patient Availability and HIPAA Verification    Does patient want to proceed with activity? Yes   HIPAA/medical authority confirmed? Yes   Relationship to patient of person spoken to? Self   Refill Screening Questions    Changes to allergies? No   Changes to medications? No   New conditions since last clinic visit? No   Unplanned office visit, urgent care, ED, or hospital admission in the last 4 weeks? No   How does patient/caregiver feel medication is working? Good   Financial problems or insurance changes? No   How many doses of your specialty medications were missed in the last 4 weeks? 0   Would patient like to speak to a pharmacist? No   When does the patient need to receive the medication? 02/16/22   Refill Delivery Questions    How will the patient receive the medication? Delivery Rachelle   When does the patient need to receive the medication? 02/16/22   Shipping Address Home   Address in [facility] confirmed and updated if neccessary? Yes   Expected Copay ($) 80   Is the patient able to afford the medication copay? Yes   Payment Method CC on file   Days supply of Refill 21   Supplies needed? No supplies needed   Refill activity completed? Yes   Refill activity plan Refill scheduled   Shipment/Pickup Date: 02/15/22          Current Outpatient Medications   Medication Sig    amLODIPine (NORVASC) 10 MG tablet TAKE 1 TABLET BY MOUTH EVERY DAY    arginine 500 mg tablet Take 1,000 mg by mouth once daily.     aspirin (ECOTRIN) 81 MG EC tablet Take 81 mg by mouth.    atorvastatin (LIPITOR) 40 MG tablet Take 1 tablet (40 mg total) by mouth once daily.    BORON ORAL Take 3 mg by mouth once daily.    calcium carbonate  (TUMS) 200 mg calcium (500 mg) chewable tablet Take 500 mg by mouth.    chlorthalidone (HYGROTEN) 25 MG Tab Half tablet po daily    cholecalciferol, vitamin D3, (VITAMIN D3) 50 mcg (2,000 unit) Cap Take 1 capsule by mouth once daily.    diphenoxylate-atropine 2.5-0.025 mg (LOMOTIL) 2.5-0.025 mg per tablet Take 1 tablet by mouth 4 (four) times daily as needed for Diarrhea. (Patient not taking: Reported on 1/25/2022)    fish oil-omega-3 fatty acids 300-1,000 mg capsule Take 1 g by mouth once daily.    fluticasone (FLONASE) 50 mcg/actuation nasal spray 1 spray by Each Nare route 2 (two) times daily.    losartan (COZAAR) 100 MG tablet Take 1 tablet (100 mg total) by mouth once daily.    magnesium oxide-Mg AA chelate (MG-PLUS-PROTEIN) 133 mg Tab Take 1 tablet by mouth.    metoprolol succinate (TOPROL-XL) 25 MG 24 hr tablet Take 1 tablet (25 mg total) by mouth 2 (two) times daily.    multivitamin (THERAGRAN) per tablet Take 1 tablet by mouth once daily.    nitroGLYCERIN (NITROSTAT) 0.4 MG SL tablet DISSOLVE ONE TABLET UNDER TONGUE EVERY 5 MINUTES FOR 3 TIMES THEN GO TO EMERGY ROOM    omeprazole (PRILOSEC) 20 MG capsule Take 20 mg by mouth 2 (two) times a day.    ondansetron (ZOFRAN-ODT) 8 MG TbDL Take 1 tablet (8 mg total) by mouth every 6 (six) hours as needed (nausea).    promethazine (PHENERGAN) 25 MG tablet Take 1 tablet (25 mg total) by mouth every 6 (six) hours as needed for Nausea. (Patient not taking: Reported on 1/25/2022)    regorafenib (STIVARGA) 40 mg Tab Take 120 mg by mouth once daily for 21 days then hold for 7 days, every 28-day cycle. For 1st cycle, start 80 mg daily x 1 week, then increase to 120mg daily.    SOD CHLOR,SOD BICARB/NETI POT (NEILMED NASAFLO NASL) 1 spray by Nasal route every evening.    SODIUM CHLORIDE (FE-128 OPHT) Place 1 drop into the right eye 3 (three) times daily.    SUNItinib (SUTENT) 37.5 mg Cap Take 1 capsule (37.5 mg total) by mouth once daily.    tadalafil  "(CIALIS) 5 MG tablet Take 5 mg by mouth once daily at 6am.    testosterone cypionate (DEPOTESTOTERONE CYPIONATE) 200 mg/mL injection Inject 200 mg into the muscle every 14 (fourteen) days.     zinc gluconate 50 mg tablet Take 50 mg by mouth every Monday and Thursday.    Last reviewed on 1/25/2022  2:09 PM by Trea Upton Jr., MD    Review of patient's allergies indicates:   Allergen Reactions    Augmentin [amoxicillin-pot clavulanate] Shortness Of Breath     disoriented    Fexofenadine Other (See Comments)     Pt states he can't remember the details but it was a bad effect.    Singulair [montelukast] Other (See Comments)     Pt "felt strange"  Pt "felt strange" bloating, Intestinal irritation, abd cramping      Ace inhibitors Other (See Comments)     cough    Captopril     Doxycycline Nausea And Vomiting     headache    Horse/equine containing products Hives    Hydrocodone Nausea Only    Scopolamine hbr Other (See Comments)     Nausea, headache, changes in BP     Last reviewed on  2/2/2022 10:41 AM by Ahmet Jeffrey      Tasks added this encounter   2/28/2022 - Refill Call (Auto Added)   Tasks due within next 3 months   4/19/2022 - Clinical - Follow Up Assesement (90 day)     [facility]  [address] [place], [place] [address]  [facility] [address]  Phone: [phone]  Fax: [phone]      "

## 2022-02-15 ENCOUNTER — LAB VISIT (OUTPATIENT)
Dept: LAB | Facility: HOSPITAL | Age: 77
End: 2022-02-15
Attending: INTERNAL MEDICINE
Payer: MEDICARE

## 2022-02-15 DIAGNOSIS — C49.A3 GIST (GASTROINTESTINAL STROMAL TUMOR) OF SMALL BOWEL, MALIGNANT: ICD-10-CM

## 2022-02-15 DIAGNOSIS — R53.83 FATIGUE, UNSPECIFIED TYPE: ICD-10-CM

## 2022-02-15 LAB
ALBUMIN SERPL BCP-MCNC: 3.4 G/DL (ref 3.5–5.2)
ALP SERPL-CCNC: 71 U/L (ref 55–135)
ALT SERPL W/O P-5'-P-CCNC: 21 U/L (ref 10–44)
ANION GAP SERPL CALC-SCNC: 10 MMOL/L (ref 8–16)
AST SERPL-CCNC: 29 U/L (ref 10–40)
BASOPHILS # BLD AUTO: 0.02 K/UL (ref 0–0.2)
BASOPHILS NFR BLD: 0.4 % (ref 0–1.9)
BILIRUB SERPL-MCNC: 0.3 MG/DL (ref 0.1–1)
BUN SERPL-MCNC: 29 MG/DL (ref 8–23)
CALCIUM SERPL-MCNC: 8.5 MG/DL (ref 8.7–10.5)
CHLORIDE SERPL-SCNC: 106 MMOL/L (ref 95–110)
CO2 SERPL-SCNC: 21 MMOL/L (ref 23–29)
CREAT SERPL-MCNC: 1.3 MG/DL (ref 0.5–1.4)
DIFFERENTIAL METHOD: ABNORMAL
EOSINOPHIL # BLD AUTO: 0.5 K/UL (ref 0–0.5)
EOSINOPHIL NFR BLD: 9.8 % (ref 0–8)
ERYTHROCYTE [DISTWIDTH] IN BLOOD BY AUTOMATED COUNT: 14.3 % (ref 11.5–14.5)
EST. GFR  (AFRICAN AMERICAN): >60 ML/MIN/1.73 M^2
EST. GFR  (NON AFRICAN AMERICAN): 52.6 ML/MIN/1.73 M^2
GLUCOSE SERPL-MCNC: 112 MG/DL (ref 70–110)
HCT VFR BLD AUTO: 35.2 % (ref 40–54)
HGB BLD-MCNC: 11.6 G/DL (ref 14–18)
IMM GRANULOCYTES # BLD AUTO: 0.04 K/UL (ref 0–0.04)
IMM GRANULOCYTES NFR BLD AUTO: 0.8 % (ref 0–0.5)
LYMPHOCYTES # BLD AUTO: 0.6 K/UL (ref 1–4.8)
LYMPHOCYTES NFR BLD: 11.3 % (ref 18–48)
MCH RBC QN AUTO: 33.3 PG (ref 27–31)
MCHC RBC AUTO-ENTMCNC: 33 G/DL (ref 32–36)
MCV RBC AUTO: 101 FL (ref 82–98)
MONOCYTES # BLD AUTO: 1 K/UL (ref 0.3–1)
MONOCYTES NFR BLD: 18 % (ref 4–15)
NEUTROPHILS # BLD AUTO: 3.2 K/UL (ref 1.8–7.7)
NEUTROPHILS NFR BLD: 60.5 % (ref 38–73)
NRBC BLD-RTO: 0 /100 WBC
PHOSPHATE SERPL-MCNC: 2.4 MG/DL (ref 2.7–4.5)
PLATELET # BLD AUTO: 239 K/UL (ref 150–450)
PMV BLD AUTO: 9.2 FL (ref 9.2–12.9)
POTASSIUM SERPL-SCNC: 4.6 MMOL/L (ref 3.5–5.1)
PROT SERPL-MCNC: 6.8 G/DL (ref 6–8.4)
RBC # BLD AUTO: 3.48 M/UL (ref 4.6–6.2)
SODIUM SERPL-SCNC: 137 MMOL/L (ref 136–145)
TSH SERPL DL<=0.005 MIU/L-ACNC: 2.86 UIU/ML (ref 0.4–4)
WBC # BLD AUTO: 5.33 K/UL (ref 3.9–12.7)

## 2022-02-15 PROCEDURE — 80053 COMPREHEN METABOLIC PANEL: CPT | Performed by: INTERNAL MEDICINE

## 2022-02-15 PROCEDURE — 84443 ASSAY THYROID STIM HORMONE: CPT | Performed by: INTERNAL MEDICINE

## 2022-02-15 PROCEDURE — 85025 COMPLETE CBC W/AUTO DIFF WBC: CPT | Mod: PO | Performed by: INTERNAL MEDICINE

## 2022-02-15 PROCEDURE — 36415 COLL VENOUS BLD VENIPUNCTURE: CPT | Mod: PO | Performed by: INTERNAL MEDICINE

## 2022-02-15 PROCEDURE — 84100 ASSAY OF PHOSPHORUS: CPT | Performed by: INTERNAL MEDICINE

## 2022-02-16 ENCOUNTER — OFFICE VISIT (OUTPATIENT)
Dept: HEMATOLOGY/ONCOLOGY | Facility: CLINIC | Age: 77
End: 2022-02-16
Payer: MEDICARE

## 2022-02-16 ENCOUNTER — PATIENT MESSAGE (OUTPATIENT)
Dept: ADMINISTRATIVE | Facility: OTHER | Age: 77
End: 2022-02-16
Payer: MEDICARE

## 2022-02-16 VITALS
HEIGHT: 71 IN | WEIGHT: 173 LBS | HEART RATE: 60 BPM | DIASTOLIC BLOOD PRESSURE: 86 MMHG | SYSTOLIC BLOOD PRESSURE: 197 MMHG | BODY MASS INDEX: 24.22 KG/M2 | OXYGEN SATURATION: 97 % | TEMPERATURE: 98 F

## 2022-02-16 DIAGNOSIS — C78.7 METASTASIS TO LIVER: ICD-10-CM

## 2022-02-16 DIAGNOSIS — D49.9 IMMUNODEFICIENCY SECONDARY TO NEOPLASM: ICD-10-CM

## 2022-02-16 DIAGNOSIS — C49.A3 GIST (GASTROINTESTINAL STROMAL TUMOR) OF SMALL BOWEL, MALIGNANT: Primary | ICD-10-CM

## 2022-02-16 DIAGNOSIS — T45.1X5A IMMUNODEFICIENCY SECONDARY TO CHEMOTHERAPY: ICD-10-CM

## 2022-02-16 DIAGNOSIS — R53.83 FATIGUE, UNSPECIFIED TYPE: ICD-10-CM

## 2022-02-16 DIAGNOSIS — D84.81 IMMUNODEFICIENCY SECONDARY TO NEOPLASM: ICD-10-CM

## 2022-02-16 DIAGNOSIS — D84.821 IMMUNODEFICIENCY SECONDARY TO CHEMOTHERAPY: ICD-10-CM

## 2022-02-16 DIAGNOSIS — I10 PRIMARY HYPERTENSION: ICD-10-CM

## 2022-02-16 DIAGNOSIS — E83.39 HYPOPHOSPHATEMIA: ICD-10-CM

## 2022-02-16 DIAGNOSIS — Z79.899 IMMUNODEFICIENCY SECONDARY TO CHEMOTHERAPY: ICD-10-CM

## 2022-02-16 PROCEDURE — 99215 OFFICE O/P EST HI 40 MIN: CPT | Mod: S$PBB,,, | Performed by: INTERNAL MEDICINE

## 2022-02-16 PROCEDURE — 99999 PR PBB SHADOW E&M-EST. PATIENT-LVL V: CPT | Mod: PBBFAC,,, | Performed by: INTERNAL MEDICINE

## 2022-02-16 PROCEDURE — 99215 PR OFFICE/OUTPT VISIT, EST, LEVL V, 40-54 MIN: ICD-10-PCS | Mod: S$PBB,,, | Performed by: INTERNAL MEDICINE

## 2022-02-16 PROCEDURE — 99999 PR PBB SHADOW E&M-EST. PATIENT-LVL V: ICD-10-PCS | Mod: PBBFAC,,, | Performed by: INTERNAL MEDICINE

## 2022-02-16 PROCEDURE — 99215 OFFICE O/P EST HI 40 MIN: CPT | Mod: PBBFAC | Performed by: INTERNAL MEDICINE

## 2022-02-16 RX ORDER — METOPROLOL SUCCINATE 100 MG/1
100 TABLET, EXTENDED RELEASE ORAL DAILY
Qty: 30 TABLET | Refills: 11 | Status: SHIPPED | OUTPATIENT
Start: 2022-02-16 | End: 2022-04-25

## 2022-02-16 RX ORDER — SODIUM,POTASSIUM PHOSPHATES 280-250MG
1 POWDER IN PACKET (EA) ORAL
Qty: 28 PACKET | Refills: 0 | Status: SHIPPED | OUTPATIENT
Start: 2022-02-16 | End: 2022-02-23

## 2022-02-16 NOTE — PROGRESS NOTES
"  Patient ID: Forrest Schmidt is a 76 y.o. male.     Chief Complaint:  Follow up for GIST, wild-type     DIAGNOSIS:   1. 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 Recurrent oligametastatic GIST in the liver found on 5/20/19. S/p ablation on 7/23/19 at [facility]  3. 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 He initially presented with melena, syncope, hypotension on 2/5/18 at OSH. Colonoscopy showed old blood in the terminal ileum. CT abdomen/pelvis showed an ileal mass that measures approximately 5 cm. He was transferred to [facility] and underwent segmental small bowel resection by Dr. Sanchez on 2/15/18. Pathology showed a 6-cm GIST, histologic type: GIST, mixed; mitotic rate 9 mitoses/5 mm2, G2, high grade, high risk, margins negative. Cd117+, pathologic T3NxMx.  He had incisional wound infection after the surgery and took antibiotics for that.   2. Started Gleevec on 4/11/18. Foundation One results came back on 4/24/18 neg for KIT mutations. Long discussion with Mr and Mrs Schmidt on 4/25 regarding likely the lack of benefit from adjuvant Gleevec (please see note from that day for details). Mr. Schmidt would like to continue with Gleevec for now. He stopped Gleevec after he went to see Dr. Guaman at [facility]. CT abdomen/pelvis on 11/12/18 was negative for recurrent disease.   3. CT scan on 5/20/19 showed a Nonspecific hepatic hypodensity at the dome of the liver near the inferior vena cava   4. S/p liver lesion biopsy and ablation at [facility] on 7/23/19. Biopsy showed metastatic GIST positive for DOG-1 and . The amount of tumor was insufficient for molecular testing.   5. Surveillance MRI on 2/11/2020 showed "a right external iliac " "metastatic deposit measuring 4.6 x 3.5 cm (axial stir series 9, image 20), previously measuring 1.3 cm.  There is a metastatic deposit within the right anterior pelvis measuring 1.9 cm (series 9, image 13), previously not seen."  6. Gleevec 400 mg daily from 3/14/20 to present. CT at [facility] on 6/1/20 showed progressive disease in the peritoneal masses.   7. Underwent cytoreductive surgery at [facility] on 7/10/2020.   8. Dr Guaman recommends starting regorafenib after surgery. However, insurance does not cover it unless patient fails sutent. Patient started sutent 37.5 mg daily on 8/10/2020.   9. CT scan 10/5/20: A metastatic lesion in segment VII of the liver adjacent to the right hepatic vein and inferior vena cava remains overall stable, measuring approximately 2.1 cm (series 6 image 166). There is another lesion in segment V measuring approximately 1.1 cm (series 6 image 188), suspect for metastatic disease and not well seen on the prior study.  10. CT scan 12/7/2020: "Mild enlargement of liver metastases. A new (nonspecific) subcentimeter hypodensity in segment 6 can be followed on future exams. Mild areas of pneumatosis affecting the ileum in the lower abdomen. Recommend clinical correlation with abdominal pain and drug regimen." Patient underwent IR ablation to segment 5 lesion and re-treatment of segment 7 lesion. Sutent was continued           11. CT scan 9/30/21: No definitive CT evidence of new or increasing metastatic disease in the chest, abdomen or pelvis.  Decrease in size of segment 5 and 7 ablation zones/cavities. Stable subcentimeter pulmonary nodules/nodular densities.                   12. CT CAP at [facility] 1/12/22: New small liver lesions are suspicious for metastases. An enlarging 4 mm left upper lobe pulmonary nodule may be infectious/inflammatory or metastatic. Treatment was changed to regorafenib. Patient started                  On 1/29/2022     History of Present Illness:   Mr. Schmidt returns today for " continued follow up. He started regorafenib on 22. Finishing up week 3 now. Tolerating 120 mg daily well. Noted hair thinning, elevated BP, sensitive palms, no rash, occasional slight gum bleeding with teeth brushing, worse headache, dry mouth, unable to increase weight. Scheduled for CT at [facility] on 3/23/22.  He is on Toprol XL 50 mg daily (increased from 25 mg daily a few days before he started regorafenib), amlodipine 10 mg, losartan 100 mg daily.     ECO     ROS:   See HPI, otherwise negative.      Physical Examination:   Vital signs reviewed. /86 mmHg  Gen: well hydrated, well developed. In no acute distress  HEENT: normocephalic, anicteric sclerae, EOMI  Neck: supple, no cervical or supraclavicular lymphadenopathy, no thyromegaly  Lungs: clear breath sounds bilaterally, no wheezing, rales or rhonchi  Heart: RRR, no M/R/G  Abdomen: soft, no tenderness, nondistended, no organomegaly, no mass, hernia, BS present  Ext: no clubbing, cyanosis or edema  Psych: pleasant and appropriate mood and affect  Neuro: alert and oriented x 4     Objective:      Laboratory Data:  Labs reviewed. Cr 1.4. TSH normal. phosphorus 2.4     Imaging Data:  CT C/A/P 22:  Chest:     Enlarging 4 mm left upper lobe pulmonary nodule, previously 1-2 mm and barely visualized. Otherwise stable nonspecific sub 5 mm nodules annotated on series 4.     Lingular and left basilar atelectasis. No pleural effusion.     No enlarging thoracic lymph nodes. Stable prominent nonspecific AP window node (3/50). Stable 1 cm right hilar node (3/55).     Mild cardiomegaly. No pericardial effusion.     Small hiatal hernia. Presence of oral contrast in the mid esophagus may indicate gastroesophageal reflux.     Abdomen and pelvis:     Stable postablation changes in the liver. There are new ill defined hypodense lesions in the inferior right liver:     9 mm (3/172)   6 mm (3/183)   9 mm (3/186)   9 mm (3/189)   6 mm (3/193)   9 mm (3/195)     No  biliary ductal dilation. The gallbladder, spleen, and pancreas are unremarkable.     No hydronephrosis. Stable bilateral renal cortical and parapelvic cysts.     No lymphadenopathy. No ascites.     Postsurgical changes in the proximal small bowel. No anastomotic recurrence. No dilated bowel loops. Colonic diverticulosis without diverticulitis.     Prostatic hypertrophy. Herniation of the right anterior aspect of the urinary bladder into the right inguinal canal.     Ventral abdominal hernia mesh repair.     Bones:     No suspicious osseous lesion.       IMPRESSION:     1. New small liver lesions are suspicious for metastases.     2. An enlarging 4 mm left upper lobe pulmonary nodule may be infectious/inflammatory or metastatic.    Assessment and Plan:      1. 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 Metastasis to liver    3. Immunodeficiency secondary to neoplasm    4. Immunodeficiency secondary to chemotherapy    5. Primary hypertension    6. Hypophosphatemia    7. Fatigue, unspecified type      1-4  - Mr. Schmidt is a 75 yo man with stage IIIB GIST of the small intestine  (T3N0Mx), 6 cm, mitotic rate 9 mitoses/5 mm2, s/p resection on 2/15/18. His GIST is negative for KIT, PDGFRA, SDH, BRAF, NF1, NF2 mutations. He had oligometastatic disease to the liver found on CT scan 5/20/19. He went to [facility] and underwent IR liver lesion biopsy and ablation on 7/23/19. Pathology showed metastatic GIST positive for DOG-1 and .   - surveillance MRI in Feb 2020 showed progressive disease with new peritoneal mets. Patient was seen by Dr Guaman. Gleevec was recommended.   - CT after 2.5 month of Gleevec showed progressive peritoneal disease.   - underwent cytoreductive surgery at [facility] on 7/10/20  - started sutent 37.5 mg daily on 8/10/20.   - s/p ablation to segment 5 lesion and re-treatment of segment 7 lesion in Dec 2020.   - CT scan 1/12/22 showed progression. Treatment was switched  to regorafenib. Started on 1/29/22  - doing well. Continue with regorafenib. He will finish cycle 1 this Friday.    - He will start cycle 2 on 2/26/2022. He knows to take 120 mg a day, 3 weeks on, 1 week off, starting cycle 2  - He is scheduled for CT at [facility] on 3/23/22. I will see him back one month after that  - management of BP see below. Enroll in chemo     5.  - BP higher due to regorafenib  - c/w amlodipine 10 mg, losartan 100 mg daily  - increase Toprol XL from 50 mg daily to 100 mg daily  - enroll in chemo   - discussed with patient that if BP remains elevated and pulse is low, I will stop amlodipine and start nifedipine. They understand and agree with the plan  - messaged Dr Upton about BP medication changes    6.  - neutraphos x 7 days    7.  - monitor TSH when on TKI    See Dr Guaman in one month  See me in two months  Encouraged to call should issues arise

## 2022-02-18 ENCOUNTER — PATIENT MESSAGE (OUTPATIENT)
Dept: HEMATOLOGY/ONCOLOGY | Facility: CLINIC | Age: 77
End: 2022-02-18
Payer: MEDICARE

## 2022-02-18 ENCOUNTER — PATIENT MESSAGE (OUTPATIENT)
Dept: ADMINISTRATIVE | Facility: OTHER | Age: 77
End: 2022-02-18
Payer: MEDICARE

## 2022-02-19 ENCOUNTER — PATIENT MESSAGE (OUTPATIENT)
Dept: ADMINISTRATIVE | Facility: OTHER | Age: 77
End: 2022-02-19
Payer: MEDICARE

## 2022-02-20 ENCOUNTER — PATIENT MESSAGE (OUTPATIENT)
Dept: ADMINISTRATIVE | Facility: OTHER | Age: 77
End: 2022-02-20
Payer: MEDICARE

## 2022-02-21 ENCOUNTER — PATIENT MESSAGE (OUTPATIENT)
Dept: ADMINISTRATIVE | Facility: OTHER | Age: 77
End: 2022-02-21
Payer: MEDICARE

## 2022-02-22 ENCOUNTER — PATIENT MESSAGE (OUTPATIENT)
Dept: ADMINISTRATIVE | Facility: OTHER | Age: 77
End: 2022-02-22
Payer: MEDICARE

## 2022-02-23 ENCOUNTER — PATIENT MESSAGE (OUTPATIENT)
Dept: ADMINISTRATIVE | Facility: OTHER | Age: 77
End: 2022-02-23
Payer: MEDICARE

## 2022-02-24 ENCOUNTER — PATIENT MESSAGE (OUTPATIENT)
Dept: ADMINISTRATIVE | Facility: OTHER | Age: 77
End: 2022-02-24
Payer: MEDICARE

## 2022-02-25 ENCOUNTER — PATIENT MESSAGE (OUTPATIENT)
Dept: ADMINISTRATIVE | Facility: OTHER | Age: 77
End: 2022-02-25
Payer: MEDICARE

## 2022-02-25 ENCOUNTER — PATIENT MESSAGE (OUTPATIENT)
Dept: CARDIOLOGY | Facility: CLINIC | Age: 77
End: 2022-02-25
Payer: MEDICARE

## 2022-02-25 DIAGNOSIS — I10 HYPERTENSION, UNSPECIFIED TYPE: Primary | ICD-10-CM

## 2022-02-25 RX ORDER — NITROGLYCERIN 0.4 MG/1
0.4 TABLET SUBLINGUAL EVERY 5 MIN PRN
Qty: 20 TABLET | Refills: 3 | Status: SHIPPED | OUTPATIENT
Start: 2022-02-25 | End: 2022-09-12 | Stop reason: SDUPTHER

## 2022-02-25 RX ORDER — NITROGLYCERIN 0.4 MG/1
TABLET SUBLINGUAL
Qty: 30 TABLET | Refills: 5 | OUTPATIENT
Start: 2022-02-25

## 2022-02-25 NOTE — TELEPHONE ENCOUNTER
Rx done. Let patient know he cannot take NTG along with Cialis(tadalafil). Also don't know why the pharmacy would fax a request when everything is being done electronically on the computer.

## 2022-02-26 ENCOUNTER — PATIENT MESSAGE (OUTPATIENT)
Dept: ADMINISTRATIVE | Facility: OTHER | Age: 77
End: 2022-02-26
Payer: MEDICARE

## 2022-02-27 ENCOUNTER — PATIENT MESSAGE (OUTPATIENT)
Dept: ADMINISTRATIVE | Facility: OTHER | Age: 77
End: 2022-02-27
Payer: MEDICARE

## 2022-02-28 ENCOUNTER — PATIENT MESSAGE (OUTPATIENT)
Dept: ADMINISTRATIVE | Facility: OTHER | Age: 77
End: 2022-02-28
Payer: MEDICARE

## 2022-03-01 ENCOUNTER — PATIENT MESSAGE (OUTPATIENT)
Dept: ADMINISTRATIVE | Facility: OTHER | Age: 77
End: 2022-03-01
Payer: MEDICARE

## 2022-03-02 ENCOUNTER — PATIENT MESSAGE (OUTPATIENT)
Dept: ADMINISTRATIVE | Facility: OTHER | Age: 77
End: 2022-03-02
Payer: MEDICARE

## 2022-03-03 ENCOUNTER — TELEPHONE (OUTPATIENT)
Dept: HEMATOLOGY/ONCOLOGY | Facility: CLINIC | Age: 77
End: 2022-03-03
Payer: MEDICARE

## 2022-03-03 ENCOUNTER — SPECIALTY PHARMACY (OUTPATIENT)
Dept: PHARMACY | Facility: CLINIC | Age: 77
End: 2022-03-03
Payer: MEDICARE

## 2022-03-03 ENCOUNTER — PATIENT MESSAGE (OUTPATIENT)
Dept: ADMINISTRATIVE | Facility: OTHER | Age: 77
End: 2022-03-03
Payer: MEDICARE

## 2022-03-03 DIAGNOSIS — I10 ESSENTIAL HYPERTENSION: Primary | ICD-10-CM

## 2022-03-03 DIAGNOSIS — I10 ESSENTIAL HYPERTENSION: ICD-10-CM

## 2022-03-03 DIAGNOSIS — I10 PRIMARY HYPERTENSION: Primary | ICD-10-CM

## 2022-03-03 RX ORDER — NIFEDIPINE 30 MG/1
30 TABLET, EXTENDED RELEASE ORAL DAILY
Qty: 30 TABLET | Refills: 11 | Status: SHIPPED | OUTPATIENT
Start: 2022-03-03 | End: 2022-03-21

## 2022-03-03 NOTE — TELEPHONE ENCOUNTER
"----- Message from Brenna Pimentel sent at 3/3/2022  9:59 AM CST -----  Regarding: Consult/Advisory:  Name Of Caller: self    Contact Preference?: [phone]    What is the nature of the call?: in regards to abnormal blood pressure           Additional Notes:  "Thank you for all that you do for our patients'"     "

## 2022-03-03 NOTE — TELEPHONE ENCOUNTER
Spoke with patient. CCC flagged for abnormal blood pressure readings. Restarted Stivarga last Saturday. Saw PCP yesterday and informed of BP readings; no changes to BP meds necessary per PCP. Has headache, but states this is normal and not any worse than prior headaches. Otherwise he is asymptomatic. Unsure what to do regarding BP readings since BP normal when on break from Stivarga.

## 2022-03-03 NOTE — TELEPHONE ENCOUNTER
Spoke with patient. Informed of medication change. Patient to discontinue amlodipine 10 mg daily and change to nifedipine 30 mg daily. Instructed to recheck blood pressure after 1 hour, and if SBP > 160 to take a second tablet for a total of 60 mg. Verbalized understanding. Pharmacy verified with patient. Will begin medication tonight and call with any questions or concerns.

## 2022-03-04 ENCOUNTER — PATIENT MESSAGE (OUTPATIENT)
Dept: ADMINISTRATIVE | Facility: OTHER | Age: 77
End: 2022-03-04
Payer: MEDICARE

## 2022-03-05 ENCOUNTER — PATIENT MESSAGE (OUTPATIENT)
Dept: ADMINISTRATIVE | Facility: OTHER | Age: 77
End: 2022-03-05
Payer: MEDICARE

## 2022-03-06 ENCOUNTER — PATIENT MESSAGE (OUTPATIENT)
Dept: ADMINISTRATIVE | Facility: OTHER | Age: 77
End: 2022-03-06
Payer: MEDICARE

## 2022-03-07 ENCOUNTER — PATIENT MESSAGE (OUTPATIENT)
Dept: ADMINISTRATIVE | Facility: OTHER | Age: 77
End: 2022-03-07
Payer: MEDICARE

## 2022-03-08 ENCOUNTER — PATIENT MESSAGE (OUTPATIENT)
Dept: ADMINISTRATIVE | Facility: OTHER | Age: 77
End: 2022-03-08
Payer: MEDICARE

## 2022-03-08 ENCOUNTER — PATIENT MESSAGE (OUTPATIENT)
Dept: PHARMACY | Facility: CLINIC | Age: 77
End: 2022-03-08
Payer: MEDICARE

## 2022-03-08 NOTE — TELEPHONE ENCOUNTER
Specialty Pharmacy - Refill Coordination    Specialty Medication Orders Linked to Encounter    Flowsheet Row Most Recent Value   Medication #1 regorafenib (STIVARGA) 40 mg Tab (Order#[id number], Rx#[id number])          Refill Questions - Documented Responses    Flowsheet Row Most Recent Value   Patient Availability and HIPAA Verification    Does patient want to proceed with activity? Yes   HIPAA/medical authority confirmed? Yes   Relationship to patient of person spoken to? Self   Refill Screening Questions    Changes to allergies? No   Changes to medications? No   New conditions since last clinic visit? No   Unplanned office visit, urgent care, ED, or hospital admission in the last 4 weeks? No   How does patient/caregiver feel medication is working? Good   Financial problems or insurance changes? No   How many doses of your specialty medications were missed in the last 4 weeks? 0   Would patient like to speak to a pharmacist? No   When does the patient need to receive the medication? 03/19/22   Refill Delivery Questions    How will the patient receive the medication? Delivery Rachelle   When does the patient need to receive the medication? 03/19/22   Shipping Address Home   Address in [facility] confirmed and updated if neccessary? Yes   Expected Copay ($) 80   Is the patient able to afford the medication copay? Yes   Payment Method CC on file   Days supply of Refill 21   Supplies needed? No supplies needed   Refill activity completed? Yes   Refill activity plan Refill scheduled   Shipment/Pickup Date: 03/15/22          Current Outpatient Medications   Medication Sig    arginine 500 mg tablet Take 1,000 mg by mouth once daily.     aspirin (ECOTRIN) 81 MG EC tablet Take 81 mg by mouth.    atorvastatin (LIPITOR) 40 MG tablet Take 1 tablet (40 mg total) by mouth once daily.    BORON ORAL Take 3 mg by mouth once daily.    calcium carbonate (TUMS) 200 mg calcium (500 mg) chewable tablet Take 500 mg by mouth.     chlorthalidone (HYGROTEN) 25 MG Tab Half tablet po daily    cholecalciferol, vitamin D3, (VITAMIN D3) 50 mcg (2,000 unit) Cap Take 1 capsule by mouth once daily.    diphenoxylate-atropine 2.5-0.025 mg (LOMOTIL) 2.5-0.025 mg per tablet Take 1 tablet by mouth 4 (four) times daily as needed for Diarrhea. (Patient not taking: Reported on 1/25/2022)    fish oil-omega-3 fatty acids 300-1,000 mg capsule Take 1 g by mouth once daily.    fluticasone (FLONASE) 50 mcg/actuation nasal spray 1 spray by Each Nare route 2 (two) times daily.    losartan (COZAAR) 100 MG tablet Take 1 tablet (100 mg total) by mouth once daily.    magnesium oxide-Mg AA chelate (MG-PLUS-PROTEIN) 133 mg Tab Take 1 tablet by mouth.    metoprolol succinate (TOPROL XL) 100 MG 24 hr tablet Take 1 tablet (100 mg total) by mouth once daily.    multivitamin (THERAGRAN) per tablet Take 1 tablet by mouth once daily.    NIFEdipine (PROCARDIA-XL) 30 MG (OSM) 24 hr tablet Take 1 tablet (30 mg total) by mouth once daily.    nitroGLYCERIN (NITROSTAT) 0.4 MG SL tablet Place 1 tablet (0.4 mg total) under the tongue every 5 (five) minutes as needed for Chest pain.    omeprazole (PRILOSEC) 20 MG capsule Take 20 mg by mouth 2 (two) times a day.    ondansetron (ZOFRAN-ODT) 8 MG TbDL Take 1 tablet (8 mg total) by mouth every 6 (six) hours as needed (nausea).    promethazine (PHENERGAN) 25 MG tablet Take 1 tablet (25 mg total) by mouth every 6 (six) hours as needed for Nausea. (Patient not taking: Reported on 1/25/2022)    regorafenib (STIVARGA) 40 mg Tab Take 120 mg by mouth once daily for 21 days then hold for 7 days, every 28-day cycle. For 1st cycle, start 80 mg daily x 1 week, then increase to 120mg daily.    SOD CHLOR,SOD BICARB/NETI POT (NEILMED NASAFLO NASL) 1 spray by Nasal route every evening.    SODIUM CHLORIDE (FE-128 OPHT) Place 1 drop into the right eye 3 (three) times daily.    tadalafil (CIALIS) 5 MG tablet Take 5 mg by mouth once daily at  "6am.    testosterone cypionate (DEPOTESTOTERONE CYPIONATE) 200 mg/mL injection Inject 200 mg into the muscle every 14 (fourteen) days.     zinc gluconate 50 mg tablet Take 50 mg by mouth every Monday and Thursday.    Last reviewed on 2/16/2022  2:19 PM by Robert Umana MD    Review of patient's allergies indicates:   Allergen Reactions    Augmentin [amoxicillin-pot clavulanate] Shortness Of Breath     disoriented    Fexofenadine Other (See Comments)     Pt states he can't remember the details but it was a bad effect.    Singulair [montelukast] Other (See Comments)     Pt "felt strange"  Pt "felt strange" bloating, Intestinal irritation, abd cramping      Ace inhibitors Other (See Comments)     cough    Captopril     Doxycycline Nausea And Vomiting     headache    Horse/equine containing products Hives    Hydrocodone Nausea Only    Scopolamine hbr Other (See Comments)     Nausea, headache, changes in BP     Last reviewed on  3/3/2022 1:54 PM by Robert Umana      Tasks added this encounter   4/2/2022 - Refill Call (Auto Added)   Tasks due within next 3 months   4/19/2022 - Clinical - Follow Up Assesement (90 day)     Nancy Guerrero, PharmD  [facility]  [address] [place] [id number]  Phone: [phone]  Fax: [phone]      "

## 2022-03-09 ENCOUNTER — PATIENT MESSAGE (OUTPATIENT)
Dept: ADMINISTRATIVE | Facility: OTHER | Age: 77
End: 2022-03-09
Payer: MEDICARE

## 2022-03-10 ENCOUNTER — PATIENT MESSAGE (OUTPATIENT)
Dept: ADMINISTRATIVE | Facility: OTHER | Age: 77
End: 2022-03-10
Payer: MEDICARE

## 2022-03-11 ENCOUNTER — PATIENT MESSAGE (OUTPATIENT)
Dept: ADMINISTRATIVE | Facility: OTHER | Age: 77
End: 2022-03-11
Payer: MEDICARE

## 2022-03-12 ENCOUNTER — PATIENT MESSAGE (OUTPATIENT)
Dept: ADMINISTRATIVE | Facility: OTHER | Age: 77
End: 2022-03-12
Payer: MEDICARE

## 2022-03-13 ENCOUNTER — PATIENT MESSAGE (OUTPATIENT)
Dept: ADMINISTRATIVE | Facility: OTHER | Age: 77
End: 2022-03-13
Payer: MEDICARE

## 2022-03-14 ENCOUNTER — PATIENT MESSAGE (OUTPATIENT)
Dept: ADMINISTRATIVE | Facility: OTHER | Age: 77
End: 2022-03-14
Payer: MEDICARE

## 2022-03-15 ENCOUNTER — PATIENT MESSAGE (OUTPATIENT)
Dept: ADMINISTRATIVE | Facility: OTHER | Age: 77
End: 2022-03-15
Payer: MEDICARE

## 2022-03-16 ENCOUNTER — PATIENT MESSAGE (OUTPATIENT)
Dept: ADMINISTRATIVE | Facility: OTHER | Age: 77
End: 2022-03-16
Payer: MEDICARE

## 2022-03-17 ENCOUNTER — PATIENT MESSAGE (OUTPATIENT)
Dept: ADMINISTRATIVE | Facility: OTHER | Age: 77
End: 2022-03-17
Payer: MEDICARE

## 2022-03-18 ENCOUNTER — PATIENT MESSAGE (OUTPATIENT)
Dept: ADMINISTRATIVE | Facility: OTHER | Age: 77
End: 2022-03-18
Payer: MEDICARE

## 2022-03-19 ENCOUNTER — PATIENT MESSAGE (OUTPATIENT)
Dept: ADMINISTRATIVE | Facility: OTHER | Age: 77
End: 2022-03-19
Payer: MEDICARE

## 2022-03-20 ENCOUNTER — PATIENT MESSAGE (OUTPATIENT)
Dept: ADMINISTRATIVE | Facility: OTHER | Age: 77
End: 2022-03-20
Payer: MEDICARE

## 2022-03-21 ENCOUNTER — PATIENT MESSAGE (OUTPATIENT)
Dept: HEMATOLOGY/ONCOLOGY | Facility: CLINIC | Age: 77
End: 2022-03-21
Payer: MEDICARE

## 2022-03-21 ENCOUNTER — PATIENT MESSAGE (OUTPATIENT)
Dept: ADMINISTRATIVE | Facility: OTHER | Age: 77
End: 2022-03-21
Payer: MEDICARE

## 2022-03-21 DIAGNOSIS — I10 ESSENTIAL HYPERTENSION: ICD-10-CM

## 2022-03-21 RX ORDER — NIFEDIPINE 30 MG/1
30 TABLET, EXTENDED RELEASE ORAL DAILY
Qty: 90 TABLET | Refills: 3 | Status: SHIPPED | OUTPATIENT
Start: 2022-03-21 | End: 2022-07-11

## 2022-03-22 ENCOUNTER — PATIENT MESSAGE (OUTPATIENT)
Dept: ADMINISTRATIVE | Facility: OTHER | Age: 77
End: 2022-03-22
Payer: MEDICARE

## 2022-03-23 ENCOUNTER — PATIENT MESSAGE (OUTPATIENT)
Dept: ADMINISTRATIVE | Facility: OTHER | Age: 77
End: 2022-03-23
Payer: MEDICARE

## 2022-03-24 ENCOUNTER — PATIENT MESSAGE (OUTPATIENT)
Dept: ADMINISTRATIVE | Facility: OTHER | Age: 77
End: 2022-03-24
Payer: MEDICARE

## 2022-03-25 ENCOUNTER — PATIENT MESSAGE (OUTPATIENT)
Dept: ADMINISTRATIVE | Facility: OTHER | Age: 77
End: 2022-03-25
Payer: MEDICARE

## 2022-03-26 ENCOUNTER — PATIENT MESSAGE (OUTPATIENT)
Dept: ADMINISTRATIVE | Facility: OTHER | Age: 77
End: 2022-03-26
Payer: MEDICARE

## 2022-03-27 ENCOUNTER — PATIENT MESSAGE (OUTPATIENT)
Dept: ADMINISTRATIVE | Facility: OTHER | Age: 77
End: 2022-03-27
Payer: MEDICARE

## 2022-03-28 ENCOUNTER — PATIENT MESSAGE (OUTPATIENT)
Dept: ADMINISTRATIVE | Facility: OTHER | Age: 77
End: 2022-03-28
Payer: MEDICARE

## 2022-03-28 DIAGNOSIS — C49.A3 MALIGNANT GASTROINTESTINAL STROMAL TUMOR (GIST) OF SMALL INTESTINE: Primary | ICD-10-CM

## 2022-03-28 DIAGNOSIS — C49.9 SARCOMA: ICD-10-CM

## 2022-03-28 NOTE — PLAN OF CARE
START OFF PATHWAY REGIMEN - Sarcoma            Dacarbazine     **Always confirm dose/schedule in your pharmacy ordering system**    Patient Characteristics:  GIST: Small Intestinal, Esophageal, Colorectal, Mesenteric, and Peritoneal,   Recurrent/Metastatic, Unresectable, Fourth Line and Beyond  Histology/Anatomic Site: GIST: Small Intestinal, Esophageal, Colorectal,   Mesenteric, and Peritoneal  Mitotic rate: High  Therapeutic Status: Metastatic  AJCC T Category: TX  AJCC M Category: M1  AJCC N Category: N0  AJCC 8 Stage Grouping: IV  Line of Therapy: Fourth Line and Beyond    Intent of Therapy:  Non-Curative / Palliative Intent, Discussed with Patient

## 2022-03-29 ENCOUNTER — PATIENT MESSAGE (OUTPATIENT)
Dept: ADMINISTRATIVE | Facility: OTHER | Age: 77
End: 2022-03-29
Payer: MEDICARE

## 2022-03-29 ENCOUNTER — PATIENT MESSAGE (OUTPATIENT)
Dept: HEMATOLOGY/ONCOLOGY | Facility: CLINIC | Age: 77
End: 2022-03-29
Payer: MEDICARE

## 2022-03-29 DIAGNOSIS — C49.A3 MALIGNANT GASTROINTESTINAL STROMAL TUMOR (GIST) OF SMALL INTESTINE: Primary | ICD-10-CM

## 2022-03-29 RX ORDER — TEMOZOLOMIDE 20 MG/1
20 CAPSULE ORAL DAILY
Qty: 21 CAPSULE | Refills: 11 | Status: SHIPPED | OUTPATIENT
Start: 2022-03-29 | End: 2022-06-08 | Stop reason: CLARIF

## 2022-03-29 RX ORDER — TEMOZOLOMIDE 140 MG/1
140 CAPSULE ORAL DAILY
Qty: 21 CAPSULE | Refills: 11 | Status: SHIPPED | OUTPATIENT
Start: 2022-03-29 | End: 2022-06-08 | Stop reason: CLARIF

## 2022-03-30 ENCOUNTER — PATIENT MESSAGE (OUTPATIENT)
Dept: ADMINISTRATIVE | Facility: OTHER | Age: 77
End: 2022-03-30
Payer: MEDICARE

## 2022-03-31 ENCOUNTER — PATIENT MESSAGE (OUTPATIENT)
Dept: ADMINISTRATIVE | Facility: OTHER | Age: 77
End: 2022-03-31
Payer: MEDICARE

## 2022-04-01 ENCOUNTER — PATIENT MESSAGE (OUTPATIENT)
Dept: ADMINISTRATIVE | Facility: OTHER | Age: 77
End: 2022-04-01
Payer: MEDICARE

## 2022-04-02 ENCOUNTER — PATIENT MESSAGE (OUTPATIENT)
Dept: ADMINISTRATIVE | Facility: OTHER | Age: 77
End: 2022-04-02
Payer: MEDICARE

## 2022-04-03 ENCOUNTER — PATIENT MESSAGE (OUTPATIENT)
Dept: ADMINISTRATIVE | Facility: OTHER | Age: 77
End: 2022-04-03
Payer: MEDICARE

## 2022-04-04 ENCOUNTER — PATIENT MESSAGE (OUTPATIENT)
Dept: ADMINISTRATIVE | Facility: OTHER | Age: 77
End: 2022-04-04
Payer: MEDICARE

## 2022-04-05 ENCOUNTER — PATIENT MESSAGE (OUTPATIENT)
Dept: ADMINISTRATIVE | Facility: OTHER | Age: 77
End: 2022-04-05
Payer: MEDICARE

## 2022-04-05 ENCOUNTER — SPECIALTY PHARMACY (OUTPATIENT)
Dept: PHARMACY | Facility: CLINIC | Age: 77
End: 2022-04-05
Payer: MEDICARE

## 2022-04-05 DIAGNOSIS — C49.A3 GIST (GASTROINTESTINAL STROMAL TUMOR) OF SMALL BOWEL, MALIGNANT: Primary | ICD-10-CM

## 2022-04-05 NOTE — TELEPHONE ENCOUNTER
Pt has discontinued Stivarga. Confirmed with Dr. Umana. Pt confirmed he is no longer taking. Medication switched to Temodar. He does have remaining tablets of Stivarga on hand and we discussed proper disposal. Pt confirmed understanding.

## 2022-04-05 NOTE — TELEPHONE ENCOUNTER
Following up on scripts for Temodar. Appears that NOT covered under Part D but IS covered under Part B.   Estimated copay for both scripts is $50.59.   Pt is not eligible for [employer] and no  assistance available at this time. GIST grants not open at this time.     Will reach out to pt to see if this is affordable and reach out to FA if needed.

## 2022-04-05 NOTE — TELEPHONE ENCOUNTER
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 male, who is followed by the specialty pharmacy service for management and education.    Recent Encounters     Date Type Provider Description    04/05/2022 Specialty Pharmacy Montserrat Araya, PharmD Initial Clinical Assessment    04/05/2022 Specialty Pharmacy Montserrat Araya, PharmD Referral Authorization    04/05/2022 Specialty Pharmacy Dave Onelia Refill Coordination    03/03/2022 Specialty Pharmacy Elsaalton Quach-Lior Refill Coordination    02/10/2022 Specialty Pharmacy Elsa Quach-Lior Refill Coordination        Clinical call attempts since last clinical assessment   No call attempts found.     Current Outpatient Medications   Medication Sig    arginine 500 mg tablet Take 1,000 mg by mouth once daily.     aspirin (ECOTRIN) 81 MG EC tablet Take 81 mg by mouth.    atorvastatin (LIPITOR) 40 MG tablet Take 1 tablet (40 mg total) by mouth once daily.    BORON ORAL Take 3 mg by mouth once daily.    calcium carbonate (TUMS) 200 mg calcium (500 mg) chewable tablet Take 500 mg by mouth.    chlorthalidone (HYGROTEN) 25 MG Tab Half tablet po daily    cholecalciferol, vitamin D3, (VITAMIN D3) 50 mcg (2,000 unit) Cap Take 1 capsule by mouth once daily.    diphenoxylate-atropine 2.5-0.025 mg (LOMOTIL) 2.5-0.025 mg per tablet Take 1 tablet by mouth 4 (four) times daily as needed for Diarrhea. (Patient not taking: Reported on 1/25/2022)    fish oil-omega-3 fatty acids 300-1,000 mg capsule Take 1 g by mouth once daily.    fluticasone (FLONASE) 50 mcg/actuation nasal  spray 1 spray by Each Nare route 2 (two) times daily.    losartan (COZAAR) 100 MG tablet Take 1 tablet (100 mg total) by mouth once daily.    magnesium oxide-Mg AA chelate (MG-PLUS-PROTEIN) 133 mg Tab Take 1 tablet by mouth.    metoprolol succinate (TOPROL XL) 100 MG 24 hr tablet Take 1 tablet (100 mg total) by mouth once daily.    multivitamin (THERAGRAN) per tablet Take 1 tablet by mouth once daily.    NIFEdipine (PROCARDIA-XL) 30 MG (OSM) 24 hr tablet Take 1 tablet (30 mg total) by mouth once daily.    nitroGLYCERIN (NITROSTAT) 0.4 MG SL tablet Place 1 tablet (0.4 mg total) under the tongue every 5 (five) minutes as needed for Chest pain.    omeprazole (PRILOSEC) 20 MG capsule Take 20 mg by mouth 2 (two) times a day.    ondansetron (ZOFRAN-ODT) 8 MG TbDL Take 1 tablet (8 mg total) by mouth every 6 (six) hours as needed (nausea).    promethazine (PHENERGAN) 25 MG tablet Take 1 tablet (25 mg total) by mouth every 6 (six) hours as needed for Nausea. (Patient not taking: Reported on 1/25/2022)    SOD CHLOR,SOD BICARB/NETI POT (NEILMED NASAFLO NASL) 1 spray by Nasal route every evening.    SODIUM CHLORIDE (FE-128 OPHT) Place 1 drop into the right eye 3 (three) times daily.    tadalafil (CIALIS) 5 MG tablet Take 5 mg by mouth once daily at 6am.    temozolomide (TEMODAR) 140 MG capsule Take 1 capsule (140 mg total) by mouth once daily Take with another 20 mg tab (160 mg total) daily for 21 days, then off 7 days, every 28-day cycle.    temozolomide (TEMODAR) 20 MG capsule Take 1 capsule (20 mg total) by mouth once daily Take with another 140 mg tab (160 mg total) daily for 21 days, then off 7 days, every 28-day cycle.    testosterone cypionate (DEPOTESTOTERONE CYPIONATE) 200 mg/mL injection Inject 200 mg into the muscle every 14 (fourteen) days.     zinc gluconate 50 mg tablet Take 50 mg by mouth every Monday and Thursday.    Last reviewed on 4/5/2022  2:52 PM by Montserrat Araya PharmD    Review of  "patient's allergies indicates:   Allergen Reactions    Augmentin [amoxicillin-pot clavulanate] Shortness Of Breath     disoriented    Fexofenadine Other (See Comments)     Pt states he can't remember the details but it was a bad effect.    Singulair [montelukast] Other (See Comments)     Pt "felt strange"  Pt "felt strange" bloating, Intestinal irritation, abd cramping      Ace inhibitors Other (See Comments)     cough    Captopril     Doxycycline Nausea And Vomiting     headache    Horse/equine containing products Hives    Hydrocodone Nausea Only    Scopolamine hbr Other (See Comments)     Nausea, headache, changes in BP    Last reviewed on  4/5/2022 2:52 PM by Montserrat Araya    Drug Interactions    Drug interactions evaluated: yes  Clinically relevant drug interactions identified: no  Provided the patient with educational material regarding drug interactions: not applicable       Medication Adherence     Other adherence tool: Daily routines   Support network for adherence: family member       Adverse Effects    Diarrhea: Pos (Comment: currently taking imodium daily)  Constitution: System reviewed and is negative  Skin: System reviewed and is negative  Eyes: System reviewed and is negative  Endocrine and Metabolic: System reviewed and is negative  Genitourinary: System reviewed and is negative  Cardiovascular: System reviewed and is negative  Hematologic: System reviewed and is negative  Musculoskeletal: System reviewed and is negative  HENT: System reviewed and is negative  Neurological: System reviewed and is negative  Psychiatric: System reviewed and is negative  Respiratory: System reviewed and is negative       Assessment Questions - Documented Responses    Flowsheet Row Most Recent Value   Assessment    Medication Reconciliation completed for patient Yes   During the past 4 weeks, has patient missed any activities due to condition or medication? No   During the past 4 weeks, did patient have any of " the following urgent care visits? None   Goals of Therapy Status Discussed (new start)   Status of the patients ability to self-administer: Is Able   All education points have been covered with patient? Yes, supplemental printed education provided   Welcome packet contents reviewed and discussed with patient? Yes   Assesment completed? Yes   Plan Therapy being initiated   Do you need to open a clinical intervention (i-vent)? No   Do you want to schedule first shipment? Yes   Medication #1 Assessment Info    Patient status Exisiting to [facility], New medication   Is this medication appropriate for the patient? Yes   Is this medication effective? Not yet started   Medication #2 Assessment Info    Patient status New medication, Exisiting to [facility]   Is this medication appropriate for the patient? Yes   Is this medication effective? Not yet started        Refill Questions - Documented Responses    Flowsheet Row Most Recent Value   Patient Availability and HIPAA Verification    Does patient want to proceed with activity? Yes   HIPAA/medical authority confirmed? Yes   Relationship to patient of person spoken to? Self   Refill Screening Questions    When does the patient need to receive the medication? 04/06/22   Refill Delivery Questions    How will the patient receive the medication? Delivery Rachelle   When does the patient need to receive the medication? 04/06/22   Shipping Address Home   Address in [facility] confirmed and updated if neccessary? Yes   Expected Copay ($) 48.99   Is the patient able to afford the medication copay? Yes   Payment Method CC on file   Days supply of Refill 28   Supplies needed? No supplies needed   Refill activity completed? Yes   Refill activity plan Refill scheduled   Shipment/Pickup Date: 04/06/22          Objective    He has a past medical history of Anticoagulant long-term use, Arthritis, BPH (benign prostatic hyperplasia), Coronary artery disease, Hypercholesteremia, Hypertension, Low  "iron, 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 (2/15/2018), and Osteopenia.    Tried/failed medications: Gleevec, Stivarga    BP Readings from Last 4 Encounters:   02/16/22 (!) 197/86   01/25/22 (!) 157/73   11/29/21 (!) 140/66   10/29/21 (!) 140/74     Ht Readings from Last 4 Encounters:   02/16/22 5' 11" (1.803 m)   01/25/22 5' 11" (1.803 m)   11/29/21 5' 11" (1.803 m)   10/29/21 5' 11" (1.803 m)     Wt Readings from Last 4 Encounters:   02/16/22 78.5 kg (173 lb)   01/25/22 80.8 kg (178 lb 2.1 oz)   11/29/21 80.5 kg (177 lb 6.4 oz)   10/29/21 80.6 kg (177 lb 11.2 oz)     Recent Labs   Lab Result Units 02/15/22  1103 02/08/22  1235   RBC M/uL 3.48 L  --    Hemoglobin g/dL 11.6 L  --    Hematocrit % 35.2 L  --    WBC K/uL 5.33  --    Gran # (ANC) K/uL 3.2  --    Gran % % 60.5  --    Platelets K/uL 239  --    Sodium mmol/L 137 137   Potassium mmol/L 4.6 5.2 H   Chloride mmol/L 106 103   Glucose mg/dL 112 H 79   BUN mg/dL 29 H 30 H   Creatinine mg/dL 1.3 1.3   Calcium mg/dL 8.5 L 9.1   Total Protein g/dL 6.8  --    Albumin g/dL 3.4 L  --    Total Bilirubin mg/dL 0.3  --    Alkaline Phosphatase U/L 71  --    AST U/L 29  --    ALT U/L 21  --      The goals of cancer treatment include:  · Achieving remission of cancer, if possible  · Reducing tumor size and spread of cancer, if remission is not possible  · Minimizing pain and symptoms of the cancer  · Preventing infection and other complications of treatment  · Promoting adequate nutrition  · Encouraging proper hydration  · Improving or maintaining quality of life  · Maintaining optimal therapy adherence  · Minimizing and managing side effects    Goals of Therapy Status: Discussed (new start)    Assessment/Plan  Patient plans to start therapy on 04/06/22      Indication, dosage, appropriateness, effectiveness, safety and convenience of his specialty medication(s) were reviewed today.     Patient Education   Patient received education on the " following:    Expectations and possible outcomes of therapy   Proper use, timely administration, and missed dose management   Duration of therapy   Side effects, including prevention, minimization, and management   Contraindications and safety precautions   New or changed medications, including prescribe and over the counter medications and supplements   Reviews recommended vaccinations, as appropriate   Storage, safe handling, and disposal    https://pubmed.ncbi.nlm.nih.gov/34439468/    Tasks added this encounter   No tasks added.   Tasks due within next 3 months   4/5/2022 - Clinical - Initial Assessment     Montserrat Araya, PharmD  [facility]  [address] [place] [id number]  Phone: [phone]  Fax: [phone]

## 2022-04-06 ENCOUNTER — PATIENT MESSAGE (OUTPATIENT)
Dept: ADMINISTRATIVE | Facility: OTHER | Age: 77
End: 2022-04-06
Payer: MEDICARE

## 2022-04-07 ENCOUNTER — PATIENT MESSAGE (OUTPATIENT)
Dept: ADMINISTRATIVE | Facility: OTHER | Age: 77
End: 2022-04-07
Payer: MEDICARE

## 2022-04-07 ENCOUNTER — PATIENT MESSAGE (OUTPATIENT)
Dept: HEMATOLOGY/ONCOLOGY | Facility: CLINIC | Age: 77
End: 2022-04-07
Payer: MEDICARE

## 2022-04-08 ENCOUNTER — PATIENT MESSAGE (OUTPATIENT)
Dept: ADMINISTRATIVE | Facility: OTHER | Age: 77
End: 2022-04-08
Payer: MEDICARE

## 2022-04-09 ENCOUNTER — PATIENT MESSAGE (OUTPATIENT)
Dept: ADMINISTRATIVE | Facility: OTHER | Age: 77
End: 2022-04-09
Payer: MEDICARE

## 2022-04-10 ENCOUNTER — PATIENT MESSAGE (OUTPATIENT)
Dept: ADMINISTRATIVE | Facility: OTHER | Age: 77
End: 2022-04-10
Payer: MEDICARE

## 2022-04-11 ENCOUNTER — PATIENT MESSAGE (OUTPATIENT)
Dept: ADMINISTRATIVE | Facility: OTHER | Age: 77
End: 2022-04-11
Payer: MEDICARE

## 2022-04-12 ENCOUNTER — PATIENT MESSAGE (OUTPATIENT)
Dept: ADMINISTRATIVE | Facility: OTHER | Age: 77
End: 2022-04-12
Payer: MEDICARE

## 2022-04-13 ENCOUNTER — PATIENT MESSAGE (OUTPATIENT)
Dept: ADMINISTRATIVE | Facility: OTHER | Age: 77
End: 2022-04-13
Payer: MEDICARE

## 2022-04-14 ENCOUNTER — PATIENT MESSAGE (OUTPATIENT)
Dept: ADMINISTRATIVE | Facility: OTHER | Age: 77
End: 2022-04-14
Payer: MEDICARE

## 2022-04-15 ENCOUNTER — PATIENT MESSAGE (OUTPATIENT)
Dept: ADMINISTRATIVE | Facility: OTHER | Age: 77
End: 2022-04-15
Payer: MEDICARE

## 2022-04-16 ENCOUNTER — PATIENT MESSAGE (OUTPATIENT)
Dept: ADMINISTRATIVE | Facility: OTHER | Age: 77
End: 2022-04-16
Payer: MEDICARE

## 2022-04-17 ENCOUNTER — PATIENT MESSAGE (OUTPATIENT)
Dept: ADMINISTRATIVE | Facility: OTHER | Age: 77
End: 2022-04-17
Payer: MEDICARE

## 2022-04-18 ENCOUNTER — PATIENT MESSAGE (OUTPATIENT)
Dept: ADMINISTRATIVE | Facility: OTHER | Age: 77
End: 2022-04-18
Payer: MEDICARE

## 2022-04-19 ENCOUNTER — LAB VISIT (OUTPATIENT)
Dept: LAB | Facility: HOSPITAL | Age: 77
End: 2022-04-19
Attending: INTERNAL MEDICINE
Payer: MEDICARE

## 2022-04-19 ENCOUNTER — PATIENT MESSAGE (OUTPATIENT)
Dept: ADMINISTRATIVE | Facility: OTHER | Age: 77
End: 2022-04-19
Payer: MEDICARE

## 2022-04-19 DIAGNOSIS — C49.A3 GIST (GASTROINTESTINAL STROMAL TUMOR) OF SMALL BOWEL, MALIGNANT: ICD-10-CM

## 2022-04-19 DIAGNOSIS — R53.83 FATIGUE, UNSPECIFIED TYPE: ICD-10-CM

## 2022-04-19 LAB
ALBUMIN SERPL BCP-MCNC: 3.6 G/DL (ref 3.5–5.2)
ALP SERPL-CCNC: 69 U/L (ref 55–135)
ALT SERPL W/O P-5'-P-CCNC: 41 U/L (ref 10–44)
ANION GAP SERPL CALC-SCNC: 5 MMOL/L (ref 8–16)
AST SERPL-CCNC: 36 U/L (ref 10–40)
BASOPHILS # BLD AUTO: 0.03 K/UL (ref 0–0.2)
BASOPHILS NFR BLD: 0.5 % (ref 0–1.9)
BILIRUB SERPL-MCNC: 0.4 MG/DL (ref 0.1–1)
BUN SERPL-MCNC: 35 MG/DL (ref 8–23)
CALCIUM SERPL-MCNC: 9 MG/DL (ref 8.7–10.5)
CHLORIDE SERPL-SCNC: 105 MMOL/L (ref 95–110)
CO2 SERPL-SCNC: 27 MMOL/L (ref 23–29)
CREAT SERPL-MCNC: 1.3 MG/DL (ref 0.5–1.4)
DIFFERENTIAL METHOD: ABNORMAL
EOSINOPHIL # BLD AUTO: 0.2 K/UL (ref 0–0.5)
EOSINOPHIL NFR BLD: 4.1 % (ref 0–8)
ERYTHROCYTE [DISTWIDTH] IN BLOOD BY AUTOMATED COUNT: 15.3 % (ref 11.5–14.5)
EST. GFR  (AFRICAN AMERICAN): >60 ML/MIN/1.73 M^2
EST. GFR  (NON AFRICAN AMERICAN): 52.6 ML/MIN/1.73 M^2
GLUCOSE SERPL-MCNC: 120 MG/DL (ref 70–110)
HCT VFR BLD AUTO: 38.8 % (ref 40–54)
HGB BLD-MCNC: 12.4 G/DL (ref 14–18)
IMM GRANULOCYTES # BLD AUTO: 0.02 K/UL (ref 0–0.04)
IMM GRANULOCYTES NFR BLD AUTO: 0.4 % (ref 0–0.5)
LYMPHOCYTES # BLD AUTO: 0.6 K/UL (ref 1–4.8)
LYMPHOCYTES NFR BLD: 9.9 % (ref 18–48)
MCH RBC QN AUTO: 30.5 PG (ref 27–31)
MCHC RBC AUTO-ENTMCNC: 32 G/DL (ref 32–36)
MCV RBC AUTO: 96 FL (ref 82–98)
MONOCYTES # BLD AUTO: 1 K/UL (ref 0.3–1)
MONOCYTES NFR BLD: 18.1 % (ref 4–15)
NEUTROPHILS # BLD AUTO: 3.8 K/UL (ref 1.8–7.7)
NEUTROPHILS NFR BLD: 67.4 % (ref 38–73)
NRBC BLD-RTO: 0 /100 WBC
PHOSPHATE SERPL-MCNC: 2.9 MG/DL (ref 2.7–4.5)
PLATELET # BLD AUTO: 199 K/UL (ref 150–450)
PMV BLD AUTO: 9.2 FL (ref 9.2–12.9)
POTASSIUM SERPL-SCNC: 5.1 MMOL/L (ref 3.5–5.1)
PROT SERPL-MCNC: 6.9 G/DL (ref 6–8.4)
RBC # BLD AUTO: 4.06 M/UL (ref 4.6–6.2)
SODIUM SERPL-SCNC: 137 MMOL/L (ref 136–145)
TSH SERPL DL<=0.005 MIU/L-ACNC: 1.51 UIU/ML (ref 0.4–4)
WBC # BLD AUTO: 5.65 K/UL (ref 3.9–12.7)

## 2022-04-19 PROCEDURE — 80053 COMPREHEN METABOLIC PANEL: CPT | Performed by: INTERNAL MEDICINE

## 2022-04-19 PROCEDURE — 84443 ASSAY THYROID STIM HORMONE: CPT | Performed by: INTERNAL MEDICINE

## 2022-04-19 PROCEDURE — 85025 COMPLETE CBC W/AUTO DIFF WBC: CPT | Mod: PO | Performed by: INTERNAL MEDICINE

## 2022-04-19 PROCEDURE — 36415 COLL VENOUS BLD VENIPUNCTURE: CPT | Mod: PO | Performed by: INTERNAL MEDICINE

## 2022-04-19 PROCEDURE — 84100 ASSAY OF PHOSPHORUS: CPT | Performed by: INTERNAL MEDICINE

## 2022-04-20 ENCOUNTER — PATIENT MESSAGE (OUTPATIENT)
Dept: ADMINISTRATIVE | Facility: OTHER | Age: 77
End: 2022-04-20
Payer: MEDICARE

## 2022-04-21 ENCOUNTER — OFFICE VISIT (OUTPATIENT)
Dept: HEMATOLOGY/ONCOLOGY | Facility: CLINIC | Age: 77
End: 2022-04-21
Payer: MEDICARE

## 2022-04-21 ENCOUNTER — PATIENT MESSAGE (OUTPATIENT)
Dept: ADMINISTRATIVE | Facility: OTHER | Age: 77
End: 2022-04-21
Payer: MEDICARE

## 2022-04-21 VITALS
DIASTOLIC BLOOD PRESSURE: 66 MMHG | RESPIRATION RATE: 18 BRPM | OXYGEN SATURATION: 98 % | HEART RATE: 52 BPM | BODY MASS INDEX: 25.42 KG/M2 | WEIGHT: 181.56 LBS | HEIGHT: 71 IN | TEMPERATURE: 98 F | SYSTOLIC BLOOD PRESSURE: 137 MMHG

## 2022-04-21 DIAGNOSIS — I25.10 CORONARY ARTERY DISEASE INVOLVING NATIVE CORONARY ARTERY OF NATIVE HEART WITHOUT ANGINA PECTORIS: ICD-10-CM

## 2022-04-21 DIAGNOSIS — C49.A3 GIST (GASTROINTESTINAL STROMAL TUMOR) OF SMALL BOWEL, MALIGNANT: Primary | ICD-10-CM

## 2022-04-21 DIAGNOSIS — D49.9 IMMUNODEFICIENCY SECONDARY TO NEOPLASM: ICD-10-CM

## 2022-04-21 DIAGNOSIS — C79.89 METASTATIC CANCER TO PELVIS: ICD-10-CM

## 2022-04-21 DIAGNOSIS — Z79.899 IMMUNODEFICIENCY SECONDARY TO CHEMOTHERAPY: ICD-10-CM

## 2022-04-21 DIAGNOSIS — D84.821 IMMUNODEFICIENCY SECONDARY TO CHEMOTHERAPY: ICD-10-CM

## 2022-04-21 DIAGNOSIS — C78.7 METASTASIS TO LIVER: ICD-10-CM

## 2022-04-21 DIAGNOSIS — I10 ESSENTIAL HYPERTENSION: ICD-10-CM

## 2022-04-21 DIAGNOSIS — C78.6 PERITONEAL CARCINOMATOSIS: ICD-10-CM

## 2022-04-21 DIAGNOSIS — C78.02 SECONDARY MALIGNANT NEOPLASM OF LEFT LUNG: ICD-10-CM

## 2022-04-21 DIAGNOSIS — D84.81 IMMUNODEFICIENCY SECONDARY TO NEOPLASM: ICD-10-CM

## 2022-04-21 DIAGNOSIS — T45.1X5A IMMUNODEFICIENCY SECONDARY TO CHEMOTHERAPY: ICD-10-CM

## 2022-04-21 DIAGNOSIS — E03.9 HYPOTHYROIDISM, UNSPECIFIED TYPE: ICD-10-CM

## 2022-04-21 PROCEDURE — 99999 PR PBB SHADOW E&M-EST. PATIENT-LVL V: CPT | Mod: PBBFAC,,, | Performed by: INTERNAL MEDICINE

## 2022-04-21 PROCEDURE — 99999 PR PBB SHADOW E&M-EST. PATIENT-LVL V: ICD-10-PCS | Mod: PBBFAC,,, | Performed by: INTERNAL MEDICINE

## 2022-04-21 PROCEDURE — 99215 OFFICE O/P EST HI 40 MIN: CPT | Mod: PBBFAC | Performed by: INTERNAL MEDICINE

## 2022-04-21 PROCEDURE — 99215 OFFICE O/P EST HI 40 MIN: CPT | Mod: S$PBB,,, | Performed by: INTERNAL MEDICINE

## 2022-04-21 PROCEDURE — 99215 PR OFFICE/OUTPT VISIT, EST, LEVL V, 40-54 MIN: ICD-10-PCS | Mod: S$PBB,,, | Performed by: INTERNAL MEDICINE

## 2022-04-21 NOTE — PROGRESS NOTES
"  Patient ID: Forrest Schmidt is a 76 y.o. male.     Chief Complaint:  Follow up for GIST, wild-type     DIAGNOSIS:   1. 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 Recurrent oligametastatic GIST in the liver found on 5/20/19. S/p ablation on 7/23/19 at [facility]  3. 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 He initially presented with melena, syncope, hypotension on 2/5/18 at OSH. Colonoscopy showed old blood in the terminal ileum. CT abdomen/pelvis showed an ileal mass that measures approximately 5 cm. He was transferred to [facility] and underwent segmental small bowel resection by Dr. Sanchez on 2/15/18. Pathology showed a 6-cm GIST, histologic type: GIST, mixed; mitotic rate 9 mitoses/5 mm2, G2, high grade, high risk, margins negative. Cd117+, pathologic T3NxMx.  He had incisional wound infection after the surgery and took antibiotics for that.   2. Started Gleevec on 4/11/18. Foundation One results came back on 4/24/18 neg for KIT mutations. Long discussion with Mr and Mrs Schmidt on 4/25 regarding likely the lack of benefit from adjuvant Gleevec (please see note from that day for details). Mr. Schmidt would like to continue with Gleevec for now. He stopped Gleevec after he went to see Dr. Guaman at [facility]. CT abdomen/pelvis on 11/12/18 was negative for recurrent disease.   3. CT scan on 5/20/19 showed a Nonspecific hepatic hypodensity at the dome of the liver near the inferior vena cava   4. S/p liver lesion biopsy and ablation at [facility] on 7/23/19. Biopsy showed metastatic GIST positive for DOG-1 and . The amount of tumor was insufficient for molecular testing.   5. Surveillance MRI on 2/11/2020 showed "a right external iliac " "metastatic deposit measuring 4.6 x 3.5 cm (axial stir series 9, image 20), previously measuring 1.3 cm.  There is a metastatic deposit within the right anterior pelvis measuring 1.9 cm (series 9, image 13), previously not seen."  6. Gleevec 400 mg daily from 3/14/20 to present. CT at [facility] on 6/1/20 showed progressive disease in the peritoneal masses.   7. Underwent cytoreductive surgery at [facility] on 7/10/2020.   8. Dr Guaman recommends starting regorafenib after surgery. However, insurance does not cover it unless patient fails sutent. Patient started sutent 37.5 mg daily on 8/10/2020.   9. CT scan 10/5/20: A metastatic lesion in segment VII of the liver adjacent to the right hepatic vein and inferior vena cava remains overall stable, measuring approximately 2.1 cm (series 6 image 166). There is another lesion in segment V measuring approximately 1.1 cm (series 6 image 188), suspect for metastatic disease and not well seen on the prior study.  10. CT scan 12/7/2020: "Mild enlargement of liver metastases. A new (nonspecific) subcentimeter hypodensity in segment 6 can be followed on future exams. Mild areas of pneumatosis affecting the ileum in the lower abdomen. Recommend clinical correlation with abdominal pain and drug regimen." Patient underwent IR ablation to segment 5 lesion and re-treatment of segment 7 lesion. Sutent was continued           11. CT scan 9/30/21: No definitive CT evidence of new or increasing metastatic disease in the chest, abdomen or pelvis.  Decrease in size of segment 5 and 7 ablation zones/cavities. Stable subcentimeter pulmonary nodules/nodular densities.                   12. CT CAP at [facility] 1/12/22: New small liver lesions are suspicious for metastases. An enlarging 4 mm left upper lobe pulmonary nodule may be infectious/inflammatory or metastatic. Treatment was changed to regorafenib. Patient started                  On 1/29/2022   13. Progression noted on CT scan 3/23/22: "Further increased " "size of multiple small hypoenhancing liver lesions, in keeping with metastases. Liver protocol CT at follow-up may be helpful for improved visualization of small liver metastases. Enlarging peritoneal nodules, in keeping with carcinomatosis. Enlarging left upper lobe nodule, concerning for metastasis."  14. Patient started temodar 85 mg/m2 daily x 21 days every 28-day cycle on 2022.     History of Present Illness:   Mr. Schmidt returns today for continued follow up. CT at [facility] on 3/23/22 showed increased size of liver metastases. Enlarging peritoneal nodules and enlarging left upper lobe nodule. Patient started temodar 160 mg daily on 22. No nausea at all. Feeling well. BP better after he stopped regorafenib.     ECO     ROS:   See HPI, otherwise negative.      Physical Examination:   Vital signs reviewed. BP normal  Gen: well hydrated, well developed. In no acute distress  HEENT: normocephalic, anicteric sclerae, EOMI  Neck: supple, no cervical or supraclavicular lymphadenopathy, no thyromegaly  Lungs: clear breath sounds bilaterally, no wheezing, rales or rhonchi  Heart: RRR, no M/R/G  Abdomen: soft, no tenderness, nondistended, no organomegaly, no mass, hernia, BS present  Ext: no clubbing, cyanosis or edema  Psych: pleasant and appropriate mood and affect  Neuro: alert and oriented x 4     Objective:      Laboratory Data:  Labs reviewed. Mild anemia. Rest of labs unremarkable. TSH normal.      Imaging Data:  CT C/A/P 22:  Chest:     Enlarging 4 mm left upper lobe pulmonary nodule, previously 1-2 mm and barely visualized. Otherwise stable nonspecific sub 5 mm nodules annotated on series 4.     Lingular and left basilar atelectasis. No pleural effusion.     No enlarging thoracic lymph nodes. Stable prominent nonspecific AP window node (3/50). Stable 1 cm right hilar node (3/55).     Mild cardiomegaly. No pericardial effusion.     Small hiatal hernia. Presence of oral contrast in the mid esophagus may " "indicate gastroesophageal reflux.     Abdomen and pelvis:     Stable postablation changes in the liver. There are new ill defined hypodense lesions in the inferior right liver:     9 mm (3/172)   6 mm (3/183)   9 mm (3/186)   9 mm (3/189)   6 mm (3/193)   9 mm (3/195)     No biliary ductal dilation. The gallbladder, spleen, and pancreas are unremarkable.     No hydronephrosis. Stable bilateral renal cortical and parapelvic cysts.     No lymphadenopathy. No ascites.     Postsurgical changes in the proximal small bowel. No anastomotic recurrence. No dilated bowel loops. Colonic diverticulosis without diverticulitis.     Prostatic hypertrophy. Herniation of the right anterior aspect of the urinary bladder into the right inguinal canal.     Ventral abdominal hernia mesh repair.     Bones:     No suspicious osseous lesion.       IMPRESSION:     1. New small liver lesions are suspicious for metastases.     2. An enlarging 4 mm left upper lobe pulmonary nodule may be infectious/inflammatory or metastatic.    CT C/A/P at [facility] 3/23/22:  "Further increased size of multiple small hypoenhancing liver lesions, in keeping with metastases. Liver protocol CT at follow-up may be helpful for improved visualization of small liver metastases. Enlarging peritoneal nodules, in keeping with carcinomatosis. Enlarging left upper lobe nodule, concerning for metastasis."      Assessment and Plan:      1. 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 Metastasis to liver    3. 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 Peritoneal carcinomatosis    5. Secondary malignant neoplasm of left lung    6. Immunodeficiency secondary to neoplasm    7. Immunodeficiency secondary to chemotherapy    8. Essential hypertension    9. Coronary artery disease involving native coronary artery of native heart without angina pectoris      1-7  - Mr. Schmidt is a 77 yo man with stage IIIB GIST of the small intestine  (T3N0Mx), 6 cm, mitotic rate 9 " mitoses/5 mm2, s/p resection on 2/15/18. His GIST is negative for KIT, PDGFRA, SDH, BRAF, NF1, NF2 mutations. He had oligometastatic disease to the liver found on CT scan 5/20/19. He went to [facility] and underwent IR liver lesion biopsy and ablation on 7/23/19. Pathology showed metastatic GIST positive for DOG-1 and .   - surveillance MRI in Feb 2020 showed progressive disease with new peritoneal mets. Patient was seen by Dr Guaman. Gleevec was recommended.   - CT after 2.5 month of Gleevec showed progressive peritoneal disease.   - underwent cytoreductive surgery at [facility] on 7/10/20  - started sutent 37.5 mg daily on 8/10/20.   - s/p ablation to segment 5 lesion and re-treatment of segment 7 lesion in Dec 2020.   - CT scan 1/12/22 showed progression. Treatment was switched to regorafenib. Started on 1/29/22  - CT 3/23/22 showed progression in liver metastases, peritoneal mets and left lung nodule  - started temodar 85 mg/m2 3 weeks on, 1 week off on 4/6/22  - tolerating temodar well. continue  - labs on 5/3 before cycle 2  - see me the 3rd week of cycle 2  - scheduled for CT at [facility] on 5/31/22 and see Dr Guaman on 6/1/22.     8.  - enrolled in chemo care.   - prior high BP due to regorafenib  - BP better now that he has stopped regorafenib  - c/w amlodipine 10 mg, losartan 100 mg daily, Toprol  mg daily  - If BP gets low will decrease Toprol to 50 mg first    9.  - doing well. C/w current meds  - f/u with cardiology    Encouraged to call should issues arise      Route Chart for Scheduling    Med Onc Chart Routing      Follow up with physician 1 month. Local labs. CBC, CMP on 5/3. CBC, CMP, TSH on 5/23. virtual visit with me on 5/24   Follow up with [facility]    Labs CBC, CMP and TSH   Lab interval:     Imaging    Pharmacy appointment    Other referrals

## 2022-04-22 ENCOUNTER — PATIENT MESSAGE (OUTPATIENT)
Dept: ADMINISTRATIVE | Facility: OTHER | Age: 77
End: 2022-04-22
Payer: MEDICARE

## 2022-04-23 ENCOUNTER — PATIENT MESSAGE (OUTPATIENT)
Dept: ADMINISTRATIVE | Facility: OTHER | Age: 77
End: 2022-04-23
Payer: MEDICARE

## 2022-04-24 ENCOUNTER — PATIENT MESSAGE (OUTPATIENT)
Dept: ADMINISTRATIVE | Facility: OTHER | Age: 77
End: 2022-04-24
Payer: MEDICARE

## 2022-04-25 ENCOUNTER — PATIENT MESSAGE (OUTPATIENT)
Dept: HEMATOLOGY/ONCOLOGY | Facility: CLINIC | Age: 77
End: 2022-04-25
Payer: MEDICARE

## 2022-04-25 ENCOUNTER — PATIENT MESSAGE (OUTPATIENT)
Dept: ADMINISTRATIVE | Facility: OTHER | Age: 77
End: 2022-04-25
Payer: MEDICARE

## 2022-04-25 DIAGNOSIS — I10 PRIMARY HYPERTENSION: ICD-10-CM

## 2022-04-25 RX ORDER — METOPROLOL SUCCINATE 100 MG/1
100 TABLET, EXTENDED RELEASE ORAL DAILY
Qty: 90 TABLET | Refills: 3 | Status: SHIPPED | OUTPATIENT
Start: 2022-04-25 | End: 2022-05-25

## 2022-04-26 ENCOUNTER — PATIENT MESSAGE (OUTPATIENT)
Dept: ADMINISTRATIVE | Facility: OTHER | Age: 77
End: 2022-04-26
Payer: MEDICARE

## 2022-04-27 ENCOUNTER — SPECIALTY PHARMACY (OUTPATIENT)
Dept: PHARMACY | Facility: CLINIC | Age: 77
End: 2022-04-27
Payer: MEDICARE

## 2022-04-27 ENCOUNTER — PATIENT MESSAGE (OUTPATIENT)
Dept: ADMINISTRATIVE | Facility: OTHER | Age: 77
End: 2022-04-27
Payer: MEDICARE

## 2022-04-27 ENCOUNTER — PATIENT MESSAGE (OUTPATIENT)
Dept: PHARMACY | Facility: CLINIC | Age: 77
End: 2022-04-27
Payer: MEDICARE

## 2022-04-27 ENCOUNTER — PATIENT MESSAGE (OUTPATIENT)
Dept: HEMATOLOGY/ONCOLOGY | Facility: CLINIC | Age: 77
End: 2022-04-27
Payer: MEDICARE

## 2022-04-27 NOTE — TELEPHONE ENCOUNTER
Specialty Pharmacy - Refill Coordination    Specialty Medication Orders Linked to Encounter    Flowsheet Row Most Recent Value   Medication #1 temozolomide (TEMODAR) 20 MG capsule (Order#[id number], Rx#[id number])   Medication #2 temozolomide (TEMODAR) 140 MG capsule (Order#[id number], Rx#[id number])          Refill Questions - Documented Responses    Flowsheet Row Most Recent Value   Patient Availability and HIPAA Verification    Does patient want to proceed with activity? Yes   HIPAA/medical authority confirmed? Yes   Relationship to patient of person spoken to? Self   Refill Screening Questions    Changes to allergies? No   Changes to medications? No   New conditions since last clinic visit? No   Unplanned office visit, urgent care, ED, or hospital admission in the last 4 weeks? No   How does patient/caregiver feel medication is working? Good   Financial problems or insurance changes? No   How many doses of your specialty medications were missed in the last 4 weeks? 0   Would patient like to speak to a pharmacist? No   When does the patient need to receive the medication? 05/05/22   Refill Delivery Questions    How will the patient receive the medication? Delivery Rachelle   When does the patient need to receive the medication? 05/05/22   Shipping Address Home   Address in [facility] confirmed and updated if neccessary? Yes   Expected Copay ($) 47.39   Is the patient able to afford the medication copay? Yes   Payment Method CC on file   Days supply of Refill 28   Supplies needed? No supplies needed   Refill activity completed? Yes   Refill activity plan Refill scheduled   Shipment/Pickup Date: 04/29/22          Current Outpatient Medications   Medication Sig    arginine 500 mg tablet Take 1,000 mg by mouth once daily.     aspirin (ECOTRIN) 81 MG EC tablet Take 81 mg by mouth.    atorvastatin (LIPITOR) 40 MG tablet Take 1 tablet (40 mg total) by mouth once daily.    BORON ORAL Take 3 mg by mouth once daily.     calcium carbonate (TUMS) 200 mg calcium (500 mg) chewable tablet Take 500 mg by mouth.    chlorthalidone (HYGROTEN) 25 MG Tab Half tablet po daily    cholecalciferol, vitamin D3, (VITAMIN D3) 50 mcg (2,000 unit) Cap Take 1 capsule by mouth once daily.    diphenoxylate-atropine 2.5-0.025 mg (LOMOTIL) 2.5-0.025 mg per tablet Take 1 tablet by mouth 4 (four) times daily as needed for Diarrhea. (Patient not taking: Reported on 1/25/2022)    fish oil-omega-3 fatty acids 300-1,000 mg capsule Take 1 g by mouth once daily.    fluticasone (FLONASE) 50 mcg/actuation nasal spray 1 spray by Each Nare route 2 (two) times daily.    losartan (COZAAR) 100 MG tablet Take 1 tablet (100 mg total) by mouth once daily.    magnesium oxide-Mg AA chelate (MG-PLUS-PROTEIN) 133 mg Tab Take 1 tablet by mouth.    metoprolol succinate (TOPROL XL) 100 MG 24 hr tablet Take 1 tablet (100 mg total) by mouth once daily.    multivitamin (THERAGRAN) per tablet Take 1 tablet by mouth once daily.    NIFEdipine (PROCARDIA-XL) 30 MG (OSM) 24 hr tablet Take 1 tablet (30 mg total) by mouth once daily.    nitroGLYCERIN (NITROSTAT) 0.4 MG SL tablet Place 1 tablet (0.4 mg total) under the tongue every 5 (five) minutes as needed for Chest pain.    omeprazole (PRILOSEC) 20 MG capsule Take 20 mg by mouth 2 (two) times a day.    ondansetron (ZOFRAN-ODT) 8 MG TbDL Take 1 tablet (8 mg total) by mouth every 6 (six) hours as needed (nausea).    promethazine (PHENERGAN) 25 MG tablet Take 1 tablet (25 mg total) by mouth every 6 (six) hours as needed for Nausea. (Patient not taking: Reported on 1/25/2022)    SOD CHLOR,SOD BICARB/NETI POT (NEILMED NASAFLO NASL) 1 spray by Nasal route every evening.    SODIUM CHLORIDE (FE-128 OPHT) Place 1 drop into the right eye 3 (three) times daily.    tadalafil (CIALIS) 5 MG tablet Take 5 mg by mouth once daily at 6am.    temozolomide (TEMODAR) 140 MG capsule Take 1 capsule (140 mg total) by mouth once daily Take  "with another 20 mg tab (160 mg total) daily for 21 days, then off 7 days, every 28-day cycle.    temozolomide (TEMODAR) 20 MG capsule Take 1 capsule (20 mg total) by mouth once daily Take with another 140 mg tab (160 mg total) daily for 21 days, then off 7 days, every 28-day cycle.    testosterone cypionate (DEPOTESTOTERONE CYPIONATE) 200 mg/mL injection Inject 200 mg into the muscle every 14 (fourteen) days.     zinc gluconate 50 mg tablet Take 50 mg by mouth every Monday and Thursday.    Last reviewed on 4/21/2022 10:30 AM by Robert Umana MD    Review of patient's allergies indicates:   Allergen Reactions    Augmentin [amoxicillin-pot clavulanate] Shortness Of Breath     disoriented    Fexofenadine Other (See Comments)     Pt states he can't remember the details but it was a bad effect.    Singulair [montelukast] Other (See Comments)     Pt "felt strange"  Pt "felt strange" bloating, Intestinal irritation, abd cramping      Ace inhibitors Other (See Comments)     cough    Captopril     Doxycycline Nausea And Vomiting     headache    Horse/equine containing products Hives    Hydrocodone Nausea Only    Scopolamine hbr Other (See Comments)     Nausea, headache, changes in BP     Last reviewed on  4/25/2022 1:50 PM by Robert Umana      Tasks added this encounter   5/26/2022 - Refill Call (Auto Added)   Tasks due within next 3 months   No tasks due.     [facility] - Specialty Pharmacy  [address]  [facility] [address]  Phone: [phone]  Fax: [phone]      "

## 2022-04-28 ENCOUNTER — PATIENT MESSAGE (OUTPATIENT)
Dept: ADMINISTRATIVE | Facility: OTHER | Age: 77
End: 2022-04-28
Payer: MEDICARE

## 2022-04-29 ENCOUNTER — PATIENT MESSAGE (OUTPATIENT)
Dept: ADMINISTRATIVE | Facility: OTHER | Age: 77
End: 2022-04-29
Payer: MEDICARE

## 2022-04-30 ENCOUNTER — PATIENT MESSAGE (OUTPATIENT)
Dept: ADMINISTRATIVE | Facility: OTHER | Age: 77
End: 2022-04-30
Payer: MEDICARE

## 2022-05-01 ENCOUNTER — PATIENT MESSAGE (OUTPATIENT)
Dept: HEMATOLOGY/ONCOLOGY | Facility: CLINIC | Age: 77
End: 2022-05-01
Payer: MEDICARE

## 2022-05-01 ENCOUNTER — PATIENT MESSAGE (OUTPATIENT)
Dept: ADMINISTRATIVE | Facility: OTHER | Age: 77
End: 2022-05-01
Payer: MEDICARE

## 2022-05-02 ENCOUNTER — PATIENT MESSAGE (OUTPATIENT)
Dept: ADMINISTRATIVE | Facility: OTHER | Age: 77
End: 2022-05-02
Payer: MEDICARE

## 2022-05-02 ENCOUNTER — HOSPITAL ENCOUNTER (OUTPATIENT)
Dept: RADIOLOGY | Facility: HOSPITAL | Age: 77
Discharge: HOME | End: 2022-05-02
Attending: INTERNAL MEDICINE
Payer: MEDICARE

## 2022-05-02 DIAGNOSIS — R60.0 BILATERAL LEG EDEMA: Primary | ICD-10-CM

## 2022-05-02 DIAGNOSIS — R60.0 BILATERAL LEG EDEMA: ICD-10-CM

## 2022-05-02 PROCEDURE — 93970 EXTREMITY STUDY: CPT | Mod: 26,,, | Performed by: RADIOLOGY

## 2022-05-02 PROCEDURE — 93970 US LOWER EXTREMITY VEINS BILATERAL: ICD-10-PCS | Mod: 26,,, | Performed by: RADIOLOGY

## 2022-05-02 PROCEDURE — 93970 EXTREMITY STUDY: CPT | Mod: TC,PO

## 2022-05-03 ENCOUNTER — PATIENT MESSAGE (OUTPATIENT)
Dept: ADMINISTRATIVE | Facility: OTHER | Age: 77
End: 2022-05-03
Payer: MEDICARE

## 2022-05-04 ENCOUNTER — PATIENT MESSAGE (OUTPATIENT)
Dept: ADMINISTRATIVE | Facility: OTHER | Age: 77
End: 2022-05-04
Payer: MEDICARE

## 2022-05-05 ENCOUNTER — PATIENT MESSAGE (OUTPATIENT)
Dept: ADMINISTRATIVE | Facility: OTHER | Age: 77
End: 2022-05-05
Payer: MEDICARE

## 2022-05-06 ENCOUNTER — PATIENT MESSAGE (OUTPATIENT)
Dept: ADMINISTRATIVE | Facility: OTHER | Age: 77
End: 2022-05-06
Payer: MEDICARE

## 2022-05-07 ENCOUNTER — PATIENT MESSAGE (OUTPATIENT)
Dept: ADMINISTRATIVE | Facility: OTHER | Age: 77
End: 2022-05-07
Payer: MEDICARE

## 2022-05-08 ENCOUNTER — PATIENT MESSAGE (OUTPATIENT)
Dept: ADMINISTRATIVE | Facility: OTHER | Age: 77
End: 2022-05-08
Payer: MEDICARE

## 2022-05-09 ENCOUNTER — PATIENT MESSAGE (OUTPATIENT)
Dept: ADMINISTRATIVE | Facility: OTHER | Age: 77
End: 2022-05-09
Payer: MEDICARE

## 2022-05-10 ENCOUNTER — PATIENT MESSAGE (OUTPATIENT)
Dept: ADMINISTRATIVE | Facility: OTHER | Age: 77
End: 2022-05-10
Payer: MEDICARE

## 2022-05-11 ENCOUNTER — PATIENT MESSAGE (OUTPATIENT)
Dept: ADMINISTRATIVE | Facility: OTHER | Age: 77
End: 2022-05-11
Payer: MEDICARE

## 2022-05-12 ENCOUNTER — PATIENT MESSAGE (OUTPATIENT)
Dept: ADMINISTRATIVE | Facility: OTHER | Age: 77
End: 2022-05-12
Payer: MEDICARE

## 2022-05-13 ENCOUNTER — PATIENT MESSAGE (OUTPATIENT)
Dept: CARDIOLOGY | Facility: CLINIC | Age: 77
End: 2022-05-13
Payer: MEDICARE

## 2022-05-13 ENCOUNTER — PATIENT MESSAGE (OUTPATIENT)
Dept: ADMINISTRATIVE | Facility: OTHER | Age: 77
End: 2022-05-13
Payer: MEDICARE

## 2022-05-13 DIAGNOSIS — I25.10 CORONARY ARTERY DISEASE INVOLVING NATIVE CORONARY ARTERY OF NATIVE HEART WITHOUT ANGINA PECTORIS: Primary | ICD-10-CM

## 2022-05-13 RX ORDER — CHLORTHALIDONE 25 MG/1
TABLET ORAL
Qty: 45 TABLET | Refills: 2 | Status: SHIPPED | OUTPATIENT
Start: 2022-05-13 | End: 2023-01-01

## 2022-05-14 ENCOUNTER — PATIENT MESSAGE (OUTPATIENT)
Dept: ADMINISTRATIVE | Facility: OTHER | Age: 77
End: 2022-05-14
Payer: MEDICARE

## 2022-05-15 ENCOUNTER — PATIENT MESSAGE (OUTPATIENT)
Dept: ADMINISTRATIVE | Facility: OTHER | Age: 77
End: 2022-05-15
Payer: MEDICARE

## 2022-05-16 ENCOUNTER — PATIENT MESSAGE (OUTPATIENT)
Dept: ADMINISTRATIVE | Facility: OTHER | Age: 77
End: 2022-05-16
Payer: MEDICARE

## 2022-05-17 ENCOUNTER — PATIENT MESSAGE (OUTPATIENT)
Dept: ADMINISTRATIVE | Facility: OTHER | Age: 77
End: 2022-05-17
Payer: MEDICARE

## 2022-05-18 ENCOUNTER — PATIENT MESSAGE (OUTPATIENT)
Dept: ADMINISTRATIVE | Facility: OTHER | Age: 77
End: 2022-05-18
Payer: MEDICARE

## 2022-05-19 ENCOUNTER — PATIENT MESSAGE (OUTPATIENT)
Dept: ADMINISTRATIVE | Facility: OTHER | Age: 77
End: 2022-05-19
Payer: MEDICARE

## 2022-05-20 ENCOUNTER — SPECIALTY PHARMACY (OUTPATIENT)
Dept: PHARMACY | Facility: CLINIC | Age: 77
End: 2022-05-20
Payer: MEDICARE

## 2022-05-20 ENCOUNTER — PATIENT MESSAGE (OUTPATIENT)
Dept: ADMINISTRATIVE | Facility: OTHER | Age: 77
End: 2022-05-20
Payer: MEDICARE

## 2022-05-20 NOTE — TELEPHONE ENCOUNTER
Specialty Pharmacy - Refill Coordination    Specialty Medication Orders Linked to Encounter    Flowsheet Row Most Recent Value   Medication #1 temozolomide (TEMODAR) 20 MG capsule (Order#[id number], Rx#[id number])   Medication #2 temozolomide (TEMODAR) 140 MG capsule (Order#[id number], Rx#[id number])        Pt started 3rd week of current cycle on 5/19 so next cycle will start on 6/2    Refill Questions - Documented Responses    Flowsheet Row Most Recent Value   Patient Availability and HIPAA Verification    Does patient want to proceed with activity? Yes   HIPAA/medical authority confirmed? Yes   Relationship to patient of person spoken to? Self   Refill Screening Questions    Changes to allergies? No   Changes to medications? No   New conditions since last clinic visit? No   Unplanned office visit, urgent care, ED, or hospital admission in the last 4 weeks? No   How does patient/caregiver feel medication is working? Good   Financial problems or insurance changes? No   How many doses of your specialty medications were missed in the last 4 weeks? 0   Would patient like to speak to a pharmacist? No   When does the patient need to receive the medication? 05/26/22   Refill Delivery Questions    How will the patient receive the medication? Delivery Rachelle   When does the patient need to receive the medication? 05/26/22   Shipping Address Home   Address in [facility] confirmed and updated if neccessary? Yes   Expected Copay ($) 47.39   Is the patient able to afford the medication copay? Yes   Payment Method CC on file   Days supply of Refill 28   Supplies needed? No supplies needed   Refill activity completed? Yes   Refill activity plan Refill scheduled   Shipment/Pickup Date: 05/25/22          Current Outpatient Medications   Medication Sig    arginine 500 mg tablet Take 1,000 mg by mouth once daily.     aspirin (ECOTRIN) 81 MG EC tablet Take 81 mg by mouth.    atorvastatin (LIPITOR) 40 MG tablet Take 1 tablet  (40 mg total) by mouth once daily.    BORON ORAL Take 3 mg by mouth once daily.    calcium carbonate (TUMS) 200 mg calcium (500 mg) chewable tablet Take 500 mg by mouth.    chlorthalidone (HYGROTEN) 25 MG Tab Half tablet po daily    cholecalciferol, vitamin D3, (VITAMIN D3) 50 mcg (2,000 unit) Cap Take 1 capsule by mouth once daily.    diphenoxylate-atropine 2.5-0.025 mg (LOMOTIL) 2.5-0.025 mg per tablet Take 1 tablet by mouth 4 (four) times daily as needed for Diarrhea. (Patient not taking: Reported on 1/25/2022)    fish oil-omega-3 fatty acids 300-1,000 mg capsule Take 1 g by mouth once daily.    fluticasone (FLONASE) 50 mcg/actuation nasal spray 1 spray by Each Nare route 2 (two) times daily.    losartan (COZAAR) 100 MG tablet Take 1 tablet (100 mg total) by mouth once daily.    magnesium oxide-Mg AA chelate (MG-PLUS-PROTEIN) 133 mg Tab Take 1 tablet by mouth.    metoprolol succinate (TOPROL XL) 100 MG 24 hr tablet Take 1 tablet (100 mg total) by mouth once daily.    multivitamin (THERAGRAN) per tablet Take 1 tablet by mouth once daily.    NIFEdipine (PROCARDIA-XL) 30 MG (OSM) 24 hr tablet Take 1 tablet (30 mg total) by mouth once daily.    nitroGLYCERIN (NITROSTAT) 0.4 MG SL tablet Place 1 tablet (0.4 mg total) under the tongue every 5 (five) minutes as needed for Chest pain.    omeprazole (PRILOSEC) 20 MG capsule Take 20 mg by mouth 2 (two) times a day.    ondansetron (ZOFRAN-ODT) 8 MG TbDL Take 1 tablet (8 mg total) by mouth every 6 (six) hours as needed (nausea).    promethazine (PHENERGAN) 25 MG tablet Take 1 tablet (25 mg total) by mouth every 6 (six) hours as needed for Nausea. (Patient not taking: Reported on 1/25/2022)    SOD CHLOR,SOD BICARB/NETI POT (NEILMED NASAFLO NASL) 1 spray by Nasal route every evening.    SODIUM CHLORIDE (FE-128 OPHT) Place 1 drop into the right eye 3 (three) times daily.    tadalafil (CIALIS) 5 MG tablet Take 5 mg by mouth once daily at 6am.     "temozolomide (TEMODAR) 140 MG capsule Take 1 capsule (140 mg total) by mouth once daily Take with another 20 mg tab (160 mg total) daily for 21 days, then off 7 days, every 28-day cycle.    temozolomide (TEMODAR) 20 MG capsule Take 1 capsule (20 mg total) by mouth once daily Take with another 140 mg tab (160 mg total) daily for 21 days, then off 7 days, every 28-day cycle.    testosterone cypionate (DEPOTESTOTERONE CYPIONATE) 200 mg/mL injection Inject 200 mg into the muscle every 14 (fourteen) days.     zinc gluconate 50 mg tablet Take 50 mg by mouth every Monday and Thursday.    Last reviewed on 5/2/2022  8:38 AM by Robert Umana MD    Review of patient's allergies indicates:   Allergen Reactions    Augmentin [amoxicillin-pot clavulanate] Shortness Of Breath     disoriented    Fexofenadine Other (See Comments)     Pt states he can't remember the details but it was a bad effect.    Singulair [montelukast] Other (See Comments)     Pt "felt strange"  Pt "felt strange" bloating, Intestinal irritation, abd cramping      Ace inhibitors Other (See Comments)     cough    Captopril     Doxycycline Nausea And Vomiting     headache    Horse/equine containing products Hives    Hydrocodone Nausea Only    Scopolamine hbr Other (See Comments)     Nausea, headache, changes in BP     Last reviewed on  5/13/2022 10:02 AM by Helen Jones      Tasks added this encounter   No tasks added.   Tasks due within next 3 months   6/16/2022 - Refill Call (Auto Added)     Tammy Sheth, PharmD  [facility] - [facility]  [address] [place] [id number]  Phone: [phone]  Fax: [phone]      "

## 2022-05-21 ENCOUNTER — PATIENT MESSAGE (OUTPATIENT)
Dept: ADMINISTRATIVE | Facility: OTHER | Age: 77
End: 2022-05-21
Payer: MEDICARE

## 2022-05-22 ENCOUNTER — PATIENT MESSAGE (OUTPATIENT)
Dept: ADMINISTRATIVE | Facility: OTHER | Age: 77
End: 2022-05-22
Payer: MEDICARE

## 2022-05-23 ENCOUNTER — PATIENT MESSAGE (OUTPATIENT)
Dept: ADMINISTRATIVE | Facility: OTHER | Age: 77
End: 2022-05-23
Payer: MEDICARE

## 2022-05-23 ENCOUNTER — LAB VISIT (OUTPATIENT)
Dept: LAB | Facility: HOSPITAL | Age: 77
End: 2022-05-23
Attending: INTERNAL MEDICINE
Payer: MEDICARE

## 2022-05-23 DIAGNOSIS — E03.9 HYPOTHYROIDISM, UNSPECIFIED TYPE: ICD-10-CM

## 2022-05-23 DIAGNOSIS — C49.A3 GIST (GASTROINTESTINAL STROMAL TUMOR) OF SMALL BOWEL, MALIGNANT: ICD-10-CM

## 2022-05-23 LAB
ALBUMIN SERPL BCP-MCNC: 3.6 G/DL (ref 3.5–5.2)
ALP SERPL-CCNC: 60 U/L (ref 55–135)
ALT SERPL W/O P-5'-P-CCNC: 35 U/L (ref 10–44)
ANION GAP SERPL CALC-SCNC: 9 MMOL/L (ref 8–16)
AST SERPL-CCNC: 38 U/L (ref 10–40)
BASOPHILS # BLD AUTO: 0.04 K/UL (ref 0–0.2)
BASOPHILS NFR BLD: 0.7 % (ref 0–1.9)
BILIRUB SERPL-MCNC: 0.3 MG/DL (ref 0.1–1)
BUN SERPL-MCNC: 37 MG/DL (ref 8–23)
CALCIUM SERPL-MCNC: 8.7 MG/DL (ref 8.7–10.5)
CHLORIDE SERPL-SCNC: 107 MMOL/L (ref 95–110)
CO2 SERPL-SCNC: 22 MMOL/L (ref 23–29)
CREAT SERPL-MCNC: 1.5 MG/DL (ref 0.5–1.4)
DIFFERENTIAL METHOD: ABNORMAL
EOSINOPHIL # BLD AUTO: 0.2 K/UL (ref 0–0.5)
EOSINOPHIL NFR BLD: 3.4 % (ref 0–8)
ERYTHROCYTE [DISTWIDTH] IN BLOOD BY AUTOMATED COUNT: 15 % (ref 11.5–14.5)
EST. GFR  (AFRICAN AMERICAN): 50.8 ML/MIN/1.73 M^2
EST. GFR  (NON AFRICAN AMERICAN): 44 ML/MIN/1.73 M^2
GLUCOSE SERPL-MCNC: 95 MG/DL (ref 70–110)
HCT VFR BLD AUTO: 36.6 % (ref 40–54)
HGB BLD-MCNC: 11.8 G/DL (ref 14–18)
IMM GRANULOCYTES # BLD AUTO: 0.02 K/UL (ref 0–0.04)
IMM GRANULOCYTES NFR BLD AUTO: 0.3 % (ref 0–0.5)
LYMPHOCYTES # BLD AUTO: 0.5 K/UL (ref 1–4.8)
LYMPHOCYTES NFR BLD: 8.3 % (ref 18–48)
MCH RBC QN AUTO: 29.7 PG (ref 27–31)
MCHC RBC AUTO-ENTMCNC: 32.2 G/DL (ref 32–36)
MCV RBC AUTO: 92 FL (ref 82–98)
MONOCYTES # BLD AUTO: 1.2 K/UL (ref 0.3–1)
MONOCYTES NFR BLD: 20 % (ref 4–15)
NEUTROPHILS # BLD AUTO: 4.2 K/UL (ref 1.8–7.7)
NEUTROPHILS NFR BLD: 67.6 % (ref 38–73)
NRBC BLD-RTO: 0 /100 WBC
PLATELET # BLD AUTO: 189 K/UL (ref 150–450)
PMV BLD AUTO: 9.7 FL (ref 9.2–12.9)
POTASSIUM SERPL-SCNC: 5.2 MMOL/L (ref 3.5–5.1)
PROT SERPL-MCNC: 6.3 G/DL (ref 6–8.4)
RBC # BLD AUTO: 3.97 M/UL (ref 4.6–6.2)
SODIUM SERPL-SCNC: 138 MMOL/L (ref 136–145)
TSH SERPL DL<=0.005 MIU/L-ACNC: 2.04 UIU/ML (ref 0.4–4)
WBC # BLD AUTO: 6.15 K/UL (ref 3.9–12.7)

## 2022-05-23 PROCEDURE — 80053 COMPREHEN METABOLIC PANEL: CPT | Performed by: INTERNAL MEDICINE

## 2022-05-23 PROCEDURE — 36415 COLL VENOUS BLD VENIPUNCTURE: CPT | Mod: PO | Performed by: INTERNAL MEDICINE

## 2022-05-23 PROCEDURE — 85025 COMPLETE CBC W/AUTO DIFF WBC: CPT | Mod: PO | Performed by: INTERNAL MEDICINE

## 2022-05-23 PROCEDURE — 84443 ASSAY THYROID STIM HORMONE: CPT | Performed by: INTERNAL MEDICINE

## 2022-05-24 ENCOUNTER — PATIENT MESSAGE (OUTPATIENT)
Dept: ADMINISTRATIVE | Facility: OTHER | Age: 77
End: 2022-05-24
Payer: MEDICARE

## 2022-05-24 NOTE — PROGRESS NOTES
The patient location is: home  The chief complaint leading to consultation is: follow up for GIST    Visit type: audiovisual    Face to Face time with patient: 16 min  35 minutes of total time spent on the encounter, which includes face to face time and non-face to face time preparing to see the patient (eg, review of tests), Obtaining and/or reviewing separately obtained history, Documenting clinical information in the electronic or other health record, Independently interpreting results (not separately reported) and communicating results to the patient/family/caregiver, or Care coordination (not separately reported).         Each patient to whom he or she provides medical services by telemedicine is:  (1) informed of the relationship between the physician and patient and the respective role of any other health care provider with respect to management of the patient; and (2) notified that he or she may decline to receive medical services by telemedicine and may withdraw from such care at any time.    Notes:     Patient ID: Forrest Schmidt is a 77 y.o. male.     Chief Complaint:  Follow up for GIST, wild-type     DIAGNOSIS:   1. 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 Recurrent oligametastatic GIST in the liver found on 5/20/19. S/p ablation on 7/23/19 at [facility]  3. 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 He initially presented with melena, syncope, hypotension on 2/5/18 at OSH. Colonoscopy showed old blood in the terminal ileum. CT abdomen/pelvis showed an ileal mass that measures approximately 5 cm. He was transferred to [facility] and underwent segmental small bowel resection by Dr. Sanchez on 2/15/18. Pathology showed a 6-cm GIST,  "histologic type: GIST, mixed; mitotic rate 9 mitoses/5 mm2, G2, high grade, high risk, margins negative. Cd117+, pathologic T3NxMx.  He had incisional wound infection after the surgery and took antibiotics for that.   2. Started Gleevec on 4/11/18. Foundation One results came back on 4/24/18 neg for KIT mutations. Long discussion with Mr and Mrs Schmidt on 4/25 regarding likely the lack of benefit from adjuvant Gleevec (please see note from that day for details). Mr. Schmidt would like to continue with Gleevec for now. He stopped Gleevec after he went to see Dr. Guaman at [facility]. CT abdomen/pelvis on 11/12/18 was negative for recurrent disease.   3. CT scan on 5/20/19 showed a Nonspecific hepatic hypodensity at the dome of the liver near the inferior vena cava   4. S/p liver lesion biopsy and ablation at [facility] on 7/23/19. Biopsy showed metastatic GIST positive for DOG-1 and . The amount of tumor was insufficient for molecular testing.   5. Surveillance MRI on 2/11/2020 showed "a right external iliac metastatic deposit measuring 4.6 x 3.5 cm (axial stir series 9, image 20), previously measuring 1.3 cm.  There is a metastatic deposit within the right anterior pelvis measuring 1.9 cm (series 9, image 13), previously not seen."  6. Gleevec 400 mg daily from 3/14/20 to present. CT at [facility] on 6/1/20 showed progressive disease in the peritoneal masses.   7. Underwent cytoreductive surgery at [facility] on 7/10/2020.   8. Dr Guaman recommends starting regorafenib after surgery. However, insurance does not cover it unless patient fails sutent. Patient started sutent 37.5 mg daily on 8/10/2020.   9. CT scan 10/5/20: A metastatic lesion in segment VII of the liver adjacent to the right hepatic vein and inferior vena cava remains overall stable, measuring approximately 2.1 cm (series 6 image 166). There is another lesion in segment V measuring approximately 1.1 cm (series 6 image 188), suspect for metastatic disease and not " "well seen on the prior study.  10. CT scan 2020: "Mild enlargement of liver metastases. A new (nonspecific) subcentimeter hypodensity in segment 6 can be followed on future exams. Mild areas of pneumatosis affecting the ileum in the lower abdomen. Recommend clinical correlation with abdominal pain and drug regimen." Patient underwent IR ablation to segment 5 lesion and re-treatment of segment 7 lesion. Sutent was continued           11. CT scan 21: No definitive CT evidence of new or increasing metastatic disease in the chest, abdomen or pelvis.  Decrease in size of segment 5 and 7 ablation zones/cavities. Stable subcentimeter pulmonary nodules/nodular densities.                   12. CT CAP at [facility] 22: New small liver lesions are suspicious for metastases. An enlarging 4 mm left upper lobe pulmonary nodule may be infectious/inflammatory or metastatic. Treatment was changed to regorafenib. Patient started                  On 2022   13. Progression noted on CT scan 3/23/22: "Further increased size of multiple small hypoenhancing liver lesions, in keeping with metastases. Liver protocol CT at follow-up may be helpful for improved visualization of small liver metastases. Enlarging peritoneal nodules, in keeping with carcinomatosis. Enlarging left upper lobe nodule, concerning for metastasis."  14. Patient started temodar 85 mg/m2 daily x 21 days every 28-day cycle on 2022. Cycle 2 started on 22.     History of Present Illness:   Mr. Schmidt returns today for continued follow up. Patient started temodar 160 mg daily on 22. Started cycle 2 on 22. Today is the last day of the 3rd week of the second cycle. No nausea at all. Feeling well. BP better after he stopped regorafenib. Sometimes SBP dropped to 90s. He tries to drink 8 cups of water a day but sometimes cannot make it. He will be out of town from 22-7/10/22.     ECO     ROS:   See HPI, otherwise negative.      Physical " "Examination:   Vital signs from patient's log reviewed. BP normal, sometimes on the lower end  Gen: well hydrated, well developed. In no acute distress  HEENT: normocephalic, anicteric sclerae, EOMI  Psych: pleasant and appropriate mood and affect  Neuro: alert and oriented x 4     Objective:      Laboratory Data:  Labs reviewed. Cr 1.5     Imaging Data:  CT C/A/P 1/12/22:  Chest:     Enlarging 4 mm left upper lobe pulmonary nodule, previously 1-2 mm and barely visualized. Otherwise stable nonspecific sub 5 mm nodules annotated on series 4.     Lingular and left basilar atelectasis. No pleural effusion.     No enlarging thoracic lymph nodes. Stable prominent nonspecific AP window node (3/50). Stable 1 cm right hilar node (3/55).     Mild cardiomegaly. No pericardial effusion.     Small hiatal hernia. Presence of oral contrast in the mid esophagus may indicate gastroesophageal reflux.     Abdomen and pelvis:     Stable postablation changes in the liver. There are new ill defined hypodense lesions in the inferior right liver:     9 mm (3/172)   6 mm (3/183)   9 mm (3/186)   9 mm (3/189)   6 mm (3/193)   9 mm (3/195)     No biliary ductal dilation. The gallbladder, spleen, and pancreas are unremarkable.     No hydronephrosis. Stable bilateral renal cortical and parapelvic cysts.     No lymphadenopathy. No ascites.     Postsurgical changes in the proximal small bowel. No anastomotic recurrence. No dilated bowel loops. Colonic diverticulosis without diverticulitis.     Prostatic hypertrophy. Herniation of the right anterior aspect of the urinary bladder into the right inguinal canal.     Ventral abdominal hernia mesh repair.     Bones:     No suspicious osseous lesion.       IMPRESSION:     1. New small liver lesions are suspicious for metastases.     2. An enlarging 4 mm left upper lobe pulmonary nodule may be infectious/inflammatory or metastatic.    CT C/A/P at [facility] 3/23/22:  "Further increased size of multiple small " "hypoenhancing liver lesions, in keeping with metastases. Liver protocol CT at follow-up may be helpful for improved visualization of small liver metastases. Enlarging peritoneal nodules, in keeping with carcinomatosis. Enlarging left upper lobe nodule, concerning for metastasis."      Assessment and Plan:      1. 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 Metastasis to liver    3. 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 Immunodeficiency secondary to neoplasm    5. Immunodeficiency secondary to chemotherapy    6. Essential hypertension    7. RENÉ (acute kidney injury)    8. Stage 3b chronic kidney disease      1-5  - Mr. Schmidt is a 76 yo man with stage IIIB GIST of the small intestine  (T3N0Mx), 6 cm, mitotic rate 9 mitoses/5 mm2, s/p resection on 2/15/18. His GIST is negative for KIT, PDGFRA, SDH, BRAF, NF1, NF2 mutations. He had oligometastatic disease to the liver found on CT scan 5/20/19. He went to [facility] and underwent IR liver lesion biopsy and ablation on 7/23/19. Pathology showed metastatic GIST positive for DOG-1 and .   - surveillance MRI in Feb 2020 showed progressive disease with new peritoneal mets. Patient was seen by Dr Guaman. Gleevec was recommended.   - CT after 2.5 month of Gleevec showed progressive peritoneal disease.   - underwent cytoreductive surgery at [facility] on 7/10/20  - started sutent 37.5 mg daily on 8/10/20.   - s/p ablation to segment 5 lesion and re-treatment of segment 7 lesion in Dec 2020.   - CT scan 1/12/22 showed progression. Treatment was switched to regorafenib. Started on 1/29/22  - CT 3/23/22 showed progression in liver metastases, peritoneal mets and left lung nodule  - started temodar 85 mg/m2 3 weeks on, 1 week off on 4/6/22  - cycle 2 temodar started on 5/5/22. Last day of cycle 2 today  - tolerating temodar well. continue  - scheduled for CT at [facility] on 5/31/22 and see Dr Guaman on 6/1/22.   - He will be out of town from 6/27/22-7/10/22. " I will see him back after he returns    6.   - prior high BP due to TKI  - BP better now that he has stopped regorafenib. Sometimes on the lower end  - c/w amlodipine 10 mg, losartan 100 mg daily  - decrease Toprol XL from 100 mg to 50 mg. Prescription sent  - monitor BP    7.8  - reviewed lab results. Discussed increasing oral hydration to 10 cups a day. Patient understands and agrees with the plan.   - scheduled for repeat labs and CT scan at [facility] next week    Encouraged to call should issues arise      Route Chart for Scheduling    Med Onc Chart Routing      Follow up with physician 1 month. See me on 7/15 with CBC, CMP, TSH   Follow up with WALLY    Labs CBC, CMP and TSH   Lab interval:     Imaging    Pharmacy appointment    Other referrals

## 2022-05-25 ENCOUNTER — OFFICE VISIT (OUTPATIENT)
Dept: HEMATOLOGY/ONCOLOGY | Facility: CLINIC | Age: 77
End: 2022-05-25
Payer: MEDICARE

## 2022-05-25 ENCOUNTER — PATIENT MESSAGE (OUTPATIENT)
Dept: HEMATOLOGY/ONCOLOGY | Facility: CLINIC | Age: 77
End: 2022-05-25
Payer: MEDICARE

## 2022-05-25 ENCOUNTER — PATIENT MESSAGE (OUTPATIENT)
Dept: ADMINISTRATIVE | Facility: OTHER | Age: 77
End: 2022-05-25
Payer: MEDICARE

## 2022-05-25 ENCOUNTER — PATIENT MESSAGE (OUTPATIENT)
Dept: HEMATOLOGY/ONCOLOGY | Facility: CLINIC | Age: 77
End: 2022-05-25

## 2022-05-25 DIAGNOSIS — R53.83 FATIGUE, UNSPECIFIED TYPE: ICD-10-CM

## 2022-05-25 DIAGNOSIS — C79.89 METASTATIC CANCER TO PELVIS: ICD-10-CM

## 2022-05-25 DIAGNOSIS — C49.A3 GIST (GASTROINTESTINAL STROMAL TUMOR) OF SMALL BOWEL, MALIGNANT: Primary | ICD-10-CM

## 2022-05-25 DIAGNOSIS — C78.7 METASTASIS TO LIVER: ICD-10-CM

## 2022-05-25 DIAGNOSIS — D84.821 IMMUNODEFICIENCY SECONDARY TO CHEMOTHERAPY: ICD-10-CM

## 2022-05-25 DIAGNOSIS — N18.32 STAGE 3B CHRONIC KIDNEY DISEASE: ICD-10-CM

## 2022-05-25 DIAGNOSIS — Z79.899 IMMUNODEFICIENCY SECONDARY TO CHEMOTHERAPY: ICD-10-CM

## 2022-05-25 DIAGNOSIS — I10 ESSENTIAL HYPERTENSION: ICD-10-CM

## 2022-05-25 DIAGNOSIS — D84.81 IMMUNODEFICIENCY SECONDARY TO NEOPLASM: ICD-10-CM

## 2022-05-25 DIAGNOSIS — D49.9 IMMUNODEFICIENCY SECONDARY TO NEOPLASM: ICD-10-CM

## 2022-05-25 DIAGNOSIS — T45.1X5A IMMUNODEFICIENCY SECONDARY TO CHEMOTHERAPY: ICD-10-CM

## 2022-05-25 DIAGNOSIS — N17.9 AKI (ACUTE KIDNEY INJURY): ICD-10-CM

## 2022-05-25 PROCEDURE — 99215 OFFICE O/P EST HI 40 MIN: CPT | Mod: 95,,, | Performed by: INTERNAL MEDICINE

## 2022-05-25 PROCEDURE — 99215 PR OFFICE/OUTPT VISIT, EST, LEVL V, 40-54 MIN: ICD-10-PCS | Mod: 95,,, | Performed by: INTERNAL MEDICINE

## 2022-05-25 RX ORDER — METOPROLOL SUCCINATE 50 MG/1
50 TABLET, EXTENDED RELEASE ORAL DAILY
Qty: 30 TABLET | Refills: 11 | Status: SHIPPED | OUTPATIENT
Start: 2022-05-25 | End: 2022-06-20 | Stop reason: SDUPTHER

## 2022-05-26 ENCOUNTER — PATIENT MESSAGE (OUTPATIENT)
Dept: ADMINISTRATIVE | Facility: OTHER | Age: 77
End: 2022-05-26
Payer: MEDICARE

## 2022-05-27 ENCOUNTER — PATIENT MESSAGE (OUTPATIENT)
Dept: ADMINISTRATIVE | Facility: OTHER | Age: 77
End: 2022-05-27
Payer: MEDICARE

## 2022-05-28 ENCOUNTER — PATIENT MESSAGE (OUTPATIENT)
Dept: ADMINISTRATIVE | Facility: OTHER | Age: 77
End: 2022-05-28
Payer: MEDICARE

## 2022-05-29 ENCOUNTER — PATIENT MESSAGE (OUTPATIENT)
Dept: ADMINISTRATIVE | Facility: OTHER | Age: 77
End: 2022-05-29
Payer: MEDICARE

## 2022-05-30 ENCOUNTER — PATIENT MESSAGE (OUTPATIENT)
Dept: ADMINISTRATIVE | Facility: OTHER | Age: 77
End: 2022-05-30
Payer: MEDICARE

## 2022-05-31 ENCOUNTER — PATIENT MESSAGE (OUTPATIENT)
Dept: ADMINISTRATIVE | Facility: OTHER | Age: 77
End: 2022-05-31
Payer: MEDICARE

## 2022-06-01 ENCOUNTER — PATIENT MESSAGE (OUTPATIENT)
Dept: ADMINISTRATIVE | Facility: OTHER | Age: 77
End: 2022-06-01
Payer: MEDICARE

## 2022-06-01 DIAGNOSIS — C49.A3 GIST (GASTROINTESTINAL STROMAL TUMOR) OF SMALL BOWEL, MALIGNANT: Primary | ICD-10-CM

## 2022-06-01 RX ORDER — PAZOPANIB 200 MG/1
800 TABLET ORAL DAILY
Qty: 120 TABLET | Refills: 11 | Status: SHIPPED | OUTPATIENT
Start: 2022-06-01 | End: 2022-10-06

## 2022-06-01 NOTE — PROGRESS NOTES
Received email from Dr Guaman. GIST has progressed on temodar. recc switching to votrient. votrient prescribed. Will see patient in 3 weeks to assess toxicities.

## 2022-06-01 NOTE — Clinical Note
Please schedule patient to see me in 3 weeks with CBC, CMP, TSH, phosphorus done at local lab 1 day prior to return

## 2022-06-02 ENCOUNTER — PATIENT MESSAGE (OUTPATIENT)
Dept: ADMINISTRATIVE | Facility: OTHER | Age: 77
End: 2022-06-02
Payer: MEDICARE

## 2022-06-03 ENCOUNTER — PATIENT MESSAGE (OUTPATIENT)
Dept: ADMINISTRATIVE | Facility: OTHER | Age: 77
End: 2022-06-03
Payer: MEDICARE

## 2022-06-04 ENCOUNTER — PATIENT MESSAGE (OUTPATIENT)
Dept: ADMINISTRATIVE | Facility: OTHER | Age: 77
End: 2022-06-04
Payer: MEDICARE

## 2022-06-05 ENCOUNTER — PATIENT MESSAGE (OUTPATIENT)
Dept: ADMINISTRATIVE | Facility: OTHER | Age: 77
End: 2022-06-05
Payer: MEDICARE

## 2022-06-05 ENCOUNTER — PATIENT MESSAGE (OUTPATIENT)
Dept: HEMATOLOGY/ONCOLOGY | Facility: CLINIC | Age: 77
End: 2022-06-05
Payer: MEDICARE

## 2022-06-05 ENCOUNTER — SPECIALTY PHARMACY (OUTPATIENT)
Dept: PHARMACY | Facility: CLINIC | Age: 77
End: 2022-06-05
Payer: MEDICARE

## 2022-06-05 NOTE — TELEPHONE ENCOUNTER
Votrient PA required and approved  CaseId:[id number]  Approved 05/06/2022 until 06/04/2025    Estimated copay ~$77. Will forward to FA for assistance if needed.

## 2022-06-06 ENCOUNTER — PATIENT MESSAGE (OUTPATIENT)
Dept: ADMINISTRATIVE | Facility: OTHER | Age: 77
End: 2022-06-06
Payer: MEDICARE

## 2022-06-07 ENCOUNTER — PATIENT MESSAGE (OUTPATIENT)
Dept: HEMATOLOGY/ONCOLOGY | Facility: CLINIC | Age: 77
End: 2022-06-07
Payer: MEDICARE

## 2022-06-07 ENCOUNTER — PATIENT MESSAGE (OUTPATIENT)
Dept: ADMINISTRATIVE | Facility: OTHER | Age: 77
End: 2022-06-07
Payer: MEDICARE

## 2022-06-08 ENCOUNTER — SPECIALTY PHARMACY (OUTPATIENT)
Dept: PHARMACY | Facility: CLINIC | Age: 77
End: 2022-06-08
Payer: MEDICARE

## 2022-06-08 ENCOUNTER — PATIENT MESSAGE (OUTPATIENT)
Dept: ADMINISTRATIVE | Facility: OTHER | Age: 77
End: 2022-06-08
Payer: MEDICARE

## 2022-06-08 DIAGNOSIS — C49.A3 GIST (GASTROINTESTINAL STROMAL TUMOR) OF SMALL BOWEL, MALIGNANT: Primary | ICD-10-CM

## 2022-06-08 NOTE — TELEPHONE ENCOUNTER
Sent AR request to [employer] to see if they will be able to assist with $77 copay amount for Votrient. Patient reached ceiling for CAGNO back in September of 2021 so it is possible that he will not be eligible for more funds. He is aware that  assistance is available if CAGNO is denied but he declined  assistance and says he will pay $77 copay if CAGNO is not approved. CANDELARIONO determination pending. Will continue to follow up until final determination is made.

## 2022-06-08 NOTE — TELEPHONE ENCOUNTER
Patient called in to check the status of new medication. Informed him of the approval and the copay amount. Patient is able to afford the copay, but would like us to obtain CAGNO if possible (might be exhausted). Lin will check into it and push to initial if unable to obtain.

## 2022-06-08 NOTE — TELEPHONE ENCOUNTER
[employer] approved AR in the amount of $77 for Votrient copay. Approved ONE TIME ONLY for this month - patient reached his ceiling for CAGNO in Sept 2021 and they will only be able to assist with this fill but will not be able to assist moving forward. AR form received. Forwarding to assigned [facility] for initial consult.

## 2022-06-08 NOTE — TELEPHONE ENCOUNTER
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 male, who is followed by the specialty pharmacy service for management and education.    Recent Encounters     Date Type Provider Description    06/08/2022 Specialty Pharmacy Montserrat Araya, PharmD Initial Clinical Assessment    06/05/2022 Specialty Pharmacy Montserrat Araya, PharmD Referral Authorization    05/20/2022 Specialty Pharmacy Tammy Sheth, Estella Refill Coordination    04/27/2022 Specialty Pharmacy Fanz Onelia Refill Coordination    04/05/2022 Specialty Pharmacy Montserrat Araya, Estella Initial Clinical Assessment        Clinical call attempts since last clinical assessment   No call attempts found.     Current Outpatient Medications   Medication Sig    arginine 500 mg tablet Take 1,000 mg by mouth once daily.     aspirin (ECOTRIN) 81 MG EC tablet Take 81 mg by mouth.    atorvastatin (LIPITOR) 40 MG tablet Take 1 tablet (40 mg total) by mouth once daily.    BORON ORAL Take 3 mg by mouth once daily.    calcium carbonate (TUMS) 200 mg calcium (500 mg) chewable tablet Take 500 mg by mouth.    chlorthalidone (HYGROTEN) 25 MG Tab Half tablet po daily    cholecalciferol, vitamin D3, (VITAMIN D3) 50 mcg (2,000 unit) Cap Take 1 capsule by mouth once daily.    diphenoxylate-atropine 2.5-0.025 mg (LOMOTIL) 2.5-0.025 mg per tablet Take 1 tablet by mouth 4 (four) times daily as needed for Diarrhea. (Patient not taking: Reported on 1/25/2022)    fish oil-omega-3 fatty acids 300-1,000 mg capsule Take 1 g by mouth once daily.    fluticasone (FLONASE) 50 mcg/actuation nasal spray 1 spray by Each Nare route 2 (two) times daily.    losartan (COZAAR) 100  "MG tablet Take 1 tablet (100 mg total) by mouth once daily.    magnesium oxide-Mg AA chelate (MG-PLUS-PROTEIN) 133 mg Tab Take 1 tablet by mouth.    metoprolol succinate (TOPROL XL) 50 MG 24 hr tablet Take 1 tablet (50 mg total) by mouth once daily.    multivitamin (THERAGRAN) per tablet Take 1 tablet by mouth once daily.    NIFEdipine (PROCARDIA-XL) 30 MG (OSM) 24 hr tablet Take 1 tablet (30 mg total) by mouth once daily.    nitroGLYCERIN (NITROSTAT) 0.4 MG SL tablet Place 1 tablet (0.4 mg total) under the tongue every 5 (five) minutes as needed for Chest pain.    omeprazole (PRILOSEC) 20 MG capsule Take 20 mg by mouth 2 (two) times a day.    ondansetron (ZOFRAN-ODT) 8 MG TbDL Take 1 tablet (8 mg total) by mouth every 6 (six) hours as needed (nausea).    PAZOpanib (VOTRIENT) 200 mg Tab Take 4 tablets (800 mg) by mouth once daily at 6am.    promethazine (PHENERGAN) 25 MG tablet Take 1 tablet (25 mg total) by mouth every 6 (six) hours as needed for Nausea. (Patient not taking: Reported on 1/25/2022)    SOD CHLOR,SOD BICARB/NETI POT (NEILMED NASAFLO NASL) 1 spray by Nasal route every evening.    SODIUM CHLORIDE (FE-128 OPHT) Place 1 drop into the right eye 3 (three) times daily.    tadalafil (CIALIS) 5 MG tablet Take 5 mg by mouth once daily at 6am.    testosterone cypionate (DEPOTESTOTERONE CYPIONATE) 200 mg/mL injection Inject 200 mg into the muscle every 14 (fourteen) days.     zinc gluconate 50 mg tablet Take 50 mg by mouth every Monday and Thursday.    Last reviewed on 6/8/2022  4:48 PM by Montserrat Araya, PharmD    Review of patient's allergies indicates:   Allergen Reactions    Augmentin [amoxicillin-pot clavulanate] Shortness Of Breath     disoriented    Fexofenadine Other (See Comments)     Pt states he can't remember the details but it was a bad effect.    Singulair [montelukast] Other (See Comments)     Pt "felt strange"  Pt "felt strange" bloating, Intestinal irritation, abd cramping   "    Ace inhibitors Other (See Comments)     cough    Captopril     Doxycycline Nausea And Vomiting     headache    Horse/equine containing products Hives    Hydrocodone Nausea Only    Scopolamine hbr Other (See Comments)     Nausea, headache, changes in BP    Last reviewed on  6/8/2022 4:48 PM by Montserrat Araya    Drug Interactions    Drug interactions evaluated: yes  Clinically relevant drug interactions identified: yes   Interactions list: Cat X: PPI and Votrient - decrease efficacy of Votrient   Drug management plan: Cat X: PPI and Votrient - decrease efficacy of Votrient. Messaged MD concerning DDI to follow up on efficacy of medication. Patient has been on multiple treatments for GIST and this is 5th line therapy for GIST.    Provided the patient with educational material regarding drug interactions: not applicable       Medication Adherence     Other adherence tool: Daily routines   Support network for adherence: family member       Adverse Effects    Headaches: Pos  *All other systems reviewed and are negative       Assessment Questions - Documented Responses    Flowsheet Row Most Recent Value   Assessment    Medication Reconciliation completed for patient Yes   During the past 4 weeks, has patient missed any activities due to condition or medication? No   During the past 4 weeks, did patient have any of the following urgent care visits? None   Goals of Therapy Status Discussed (new start)   Status of the patients ability to self-administer: Is Able   All education points have been covered with patient? Yes, supplemental printed education provided   Welcome packet contents reviewed and discussed with patient? Yes   Assesment completed? Yes   Plan Therapy being initiated   Do you need to open a clinical intervention (i-vent)? Yes   Do you want to schedule first shipment? Yes   Medication #1 Assessment Info    Patient status New medication, Exisiting to OSP   Is this medication appropriate for the  "patient? Yes   Is this medication effective? Not yet started        Refill Questions - Documented Responses    Flowsheet Row Most Recent Value   Patient Availability and HIPAA Verification    Does patient want to proceed with activity? Yes   HIPAA/medical authority confirmed? Yes   Relationship to patient of person spoken to? Self   Refill Screening Questions    When does the patient need to receive the medication? 06/10/22   Refill Delivery Questions    How will the patient receive the medication? Delivery Rachelle   When does the patient need to receive the medication? 06/10/22   Shipping Address Home   Address in [facility] confirmed and updated if neccessary? Yes   Expected Copay ($) 77   Is the patient able to afford the medication copay? Yes   Payment Method invoice (approval required)  [[employer] approved]   Days supply of Refill 30   Supplies needed? No supplies needed   Refill activity completed? Yes   Refill activity plan Refill scheduled   Shipment/Pickup Date: 06/10/22          Objective    He has a past medical history of Anticoagulant long-term use, Arthritis, BPH (benign prostatic hyperplasia), Coronary artery disease, Hypercholesteremia, Hypertension, Low iron, 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 (2/15/2018), and Osteopenia.    Tried/failed medications: Gleevec, Sutent, Stivarga, Temodar    BP Readings from Last 4 Encounters:   04/21/22 137/66   02/16/22 (!) 197/86   01/25/22 (!) 157/73   11/29/21 (!) 140/66     Ht Readings from Last 4 Encounters:   04/21/22 5' 11" (1.803 m)   02/16/22 5' 11" (1.803 m)   01/25/22 5' 11" (1.803 m)   11/29/21 5' 11" (1.803 m)     Wt Readings from Last 4 Encounters:   04/21/22 82.3 kg (181 lb 8.8 oz)   02/16/22 78.5 kg (173 lb)   01/25/22 80.8 kg (178 lb 2.1 oz)   11/29/21 80.5 kg (177 lb 6.4 oz)     Recent Labs   Lab Result Units 05/23/22  1335 05/02/22  1439 04/19/22  1038   RBC M/uL 3.97 L 3.94 L 4.06 L   Hemoglobin g/dL 11.8 L 11.7 L " 12.4 L   Hematocrit % 36.6 L 36.4 L 38.8 L   WBC K/uL 6.15 5.01 5.65   Gran # (ANC) K/uL 4.2 3.0 3.8   Gran % % 67.6 59.6 67.4   Platelets K/uL 189 243 199   Sodium mmol/L 138 135 L 137   Potassium mmol/L 5.2 H 4.9 5.1   Chloride mmol/L 107 103 105   Glucose mg/dL 95 116 H 120 H   BUN mg/dL 37 H 29 H 35 H   Creatinine mg/dL 1.5 H 1.2 1.3   Calcium mg/dL 8.7 8.8 9.0   Total Protein g/dL 6.3 6.2 6.9   Albumin g/dL 3.6 3.7 3.6   Total Bilirubin mg/dL 0.3 0.4 0.4   Alkaline Phosphatase U/L 60 68 69   AST U/L 38 33 36   ALT U/L 35 38 41     The goals of cancer treatment include:  · Achieving remission of cancer, if possible  · Reducing tumor size and spread of cancer, if remission is not possible  · Minimizing pain and symptoms of the cancer  · Preventing infection and other complications of treatment  · Promoting adequate nutrition  · Encouraging proper hydration  · Improving or maintaining quality of life  · Maintaining optimal therapy adherence  · Minimizing and managing side effects    Goals of Therapy Status: Discussed (new start)    Assessment/Plan  Patient plans to start therapy on 06/10/22      Indication, dosage, appropriateness, effectiveness, safety and convenience of his specialty medication(s) were reviewed today.     Patient Education   Patient received education on the following:    Expectations and possible outcomes of therapy   Proper use, timely administration, and missed dose management   Duration of therapy   Side effects, including prevention, minimization, and management   Contraindications and safety precautions   New or changed medications, including prescribe and over the counter medications and supplements   Reviews recommended vaccinations, as appropriate   Storage, safe handling, and disposal    Messaged MD concerning DDI with Votrient and PPI.  Patient checks BP regularly and reports BP about 120/60    Tasks added this encounter   7/3/2022 - Refill Call (Auto Added)  11/28/2022 -  Clinical - Follow Up Assesement (180 day)   Tasks due within next 3 months   No tasks due.     Montserrat Araya, PharmD  [facility]  [address]  [facility] [address]  Phone: [phone]  Fax: [phone]

## 2022-06-09 ENCOUNTER — PATIENT MESSAGE (OUTPATIENT)
Dept: ADMINISTRATIVE | Facility: OTHER | Age: 77
End: 2022-06-09
Payer: MEDICARE

## 2022-06-10 ENCOUNTER — PATIENT MESSAGE (OUTPATIENT)
Dept: ADMINISTRATIVE | Facility: OTHER | Age: 77
End: 2022-06-10
Payer: MEDICARE

## 2022-06-10 NOTE — TELEPHONE ENCOUNTER
Dr. Umana is aware of DDI with Votrient and PPI. Patient is not willing to stop PPI at this time but will reduce to once daily dosing. Was able to reach Dr. Guaman at [facility], and confirmed that he should take the Votrient 200mmg (1 tablet ) for 3 days  400mg (2 tablets) for 3 days   600mg (3 tablets) for 7 days  Then increase to 800mg once daily.    Was able to review this information with Mr. Schmidt today who confirmed understanding.

## 2022-06-11 ENCOUNTER — PATIENT MESSAGE (OUTPATIENT)
Dept: ADMINISTRATIVE | Facility: OTHER | Age: 77
End: 2022-06-11
Payer: MEDICARE

## 2022-06-12 ENCOUNTER — PATIENT MESSAGE (OUTPATIENT)
Dept: ADMINISTRATIVE | Facility: OTHER | Age: 77
End: 2022-06-12
Payer: MEDICARE

## 2022-06-13 ENCOUNTER — PATIENT MESSAGE (OUTPATIENT)
Dept: ADMINISTRATIVE | Facility: OTHER | Age: 77
End: 2022-06-13
Payer: MEDICARE

## 2022-06-14 ENCOUNTER — PATIENT MESSAGE (OUTPATIENT)
Dept: ADMINISTRATIVE | Facility: OTHER | Age: 77
End: 2022-06-14
Payer: MEDICARE

## 2022-06-15 ENCOUNTER — PATIENT MESSAGE (OUTPATIENT)
Dept: ADMINISTRATIVE | Facility: OTHER | Age: 77
End: 2022-06-15
Payer: MEDICARE

## 2022-06-16 ENCOUNTER — PATIENT MESSAGE (OUTPATIENT)
Dept: ADMINISTRATIVE | Facility: OTHER | Age: 77
End: 2022-06-16
Payer: MEDICARE

## 2022-06-17 ENCOUNTER — PATIENT MESSAGE (OUTPATIENT)
Dept: ADMINISTRATIVE | Facility: OTHER | Age: 77
End: 2022-06-17
Payer: MEDICARE

## 2022-06-18 ENCOUNTER — PATIENT MESSAGE (OUTPATIENT)
Dept: ADMINISTRATIVE | Facility: OTHER | Age: 77
End: 2022-06-18
Payer: MEDICARE

## 2022-06-19 ENCOUNTER — PATIENT MESSAGE (OUTPATIENT)
Dept: HEMATOLOGY/ONCOLOGY | Facility: CLINIC | Age: 77
End: 2022-06-19
Payer: MEDICARE

## 2022-06-20 ENCOUNTER — PATIENT MESSAGE (OUTPATIENT)
Dept: ADMINISTRATIVE | Facility: OTHER | Age: 77
End: 2022-06-20
Payer: MEDICARE

## 2022-06-20 DIAGNOSIS — I10 ESSENTIAL HYPERTENSION: ICD-10-CM

## 2022-06-20 RX ORDER — METOPROLOL SUCCINATE 50 MG/1
50 TABLET, EXTENDED RELEASE ORAL DAILY
Qty: 90 TABLET | Refills: 3 | Status: SHIPPED | OUTPATIENT
Start: 2022-06-20 | End: 2022-09-12 | Stop reason: SDUPTHER

## 2022-06-21 ENCOUNTER — LAB VISIT (OUTPATIENT)
Dept: LAB | Facility: HOSPITAL | Age: 77
End: 2022-06-21
Attending: INTERNAL MEDICINE
Payer: MEDICARE

## 2022-06-21 ENCOUNTER — PATIENT MESSAGE (OUTPATIENT)
Dept: ADMINISTRATIVE | Facility: OTHER | Age: 77
End: 2022-06-21
Payer: MEDICARE

## 2022-06-21 DIAGNOSIS — C49.A3 GIST (GASTROINTESTINAL STROMAL TUMOR) OF SMALL BOWEL, MALIGNANT: ICD-10-CM

## 2022-06-21 DIAGNOSIS — R53.83 FATIGUE, UNSPECIFIED TYPE: ICD-10-CM

## 2022-06-21 LAB
ALBUMIN SERPL BCP-MCNC: 3.9 G/DL (ref 3.5–5.2)
ALP SERPL-CCNC: 79 U/L (ref 55–135)
ALT SERPL W/O P-5'-P-CCNC: 31 U/L (ref 10–44)
ANION GAP SERPL CALC-SCNC: 9 MMOL/L (ref 8–16)
AST SERPL-CCNC: 42 U/L (ref 10–40)
BASOPHILS # BLD AUTO: 0.04 K/UL (ref 0–0.2)
BASOPHILS NFR BLD: 0.9 % (ref 0–1.9)
BILIRUB SERPL-MCNC: 0.5 MG/DL (ref 0.1–1)
BUN SERPL-MCNC: 33 MG/DL (ref 8–23)
CALCIUM SERPL-MCNC: 9 MG/DL (ref 8.7–10.5)
CHLORIDE SERPL-SCNC: 105 MMOL/L (ref 95–110)
CO2 SERPL-SCNC: 23 MMOL/L (ref 23–29)
CREAT SERPL-MCNC: 1.6 MG/DL (ref 0.5–1.4)
DIFFERENTIAL METHOD: ABNORMAL
EOSINOPHIL # BLD AUTO: 0.1 K/UL (ref 0–0.5)
EOSINOPHIL NFR BLD: 3.1 % (ref 0–8)
ERYTHROCYTE [DISTWIDTH] IN BLOOD BY AUTOMATED COUNT: 14.3 % (ref 11.5–14.5)
EST. GFR  (AFRICAN AMERICAN): 47 ML/MIN/1.73 M^2
EST. GFR  (NON AFRICAN AMERICAN): 40.7 ML/MIN/1.73 M^2
GLUCOSE SERPL-MCNC: 133 MG/DL (ref 70–110)
HCT VFR BLD AUTO: 40.8 % (ref 40–54)
HGB BLD-MCNC: 13.6 G/DL (ref 14–18)
IMM GRANULOCYTES # BLD AUTO: 0.01 K/UL (ref 0–0.04)
IMM GRANULOCYTES NFR BLD AUTO: 0.2 % (ref 0–0.5)
LYMPHOCYTES # BLD AUTO: 0.6 K/UL (ref 1–4.8)
LYMPHOCYTES NFR BLD: 14 % (ref 18–48)
MCH RBC QN AUTO: 28.6 PG (ref 27–31)
MCHC RBC AUTO-ENTMCNC: 33.3 G/DL (ref 32–36)
MCV RBC AUTO: 86 FL (ref 82–98)
MONOCYTES # BLD AUTO: 0.8 K/UL (ref 0.3–1)
MONOCYTES NFR BLD: 18.3 % (ref 4–15)
NEUTROPHILS # BLD AUTO: 2.9 K/UL (ref 1.8–7.7)
NEUTROPHILS NFR BLD: 63.7 % (ref 38–73)
NRBC BLD-RTO: 0 /100 WBC
PHOSPHATE SERPL-MCNC: 2.9 MG/DL (ref 2.7–4.5)
PLATELET # BLD AUTO: 182 K/UL (ref 150–450)
PMV BLD AUTO: 9.6 FL (ref 9.2–12.9)
POTASSIUM SERPL-SCNC: 4.5 MMOL/L (ref 3.5–5.1)
PROT SERPL-MCNC: 6.6 G/DL (ref 6–8.4)
RBC # BLD AUTO: 4.75 M/UL (ref 4.6–6.2)
SODIUM SERPL-SCNC: 137 MMOL/L (ref 136–145)
TSH SERPL DL<=0.005 MIU/L-ACNC: 2.83 UIU/ML (ref 0.4–4)
WBC # BLD AUTO: 4.49 K/UL (ref 3.9–12.7)

## 2022-06-21 PROCEDURE — 36415 COLL VENOUS BLD VENIPUNCTURE: CPT | Mod: PO | Performed by: INTERNAL MEDICINE

## 2022-06-21 PROCEDURE — 84443 ASSAY THYROID STIM HORMONE: CPT | Performed by: INTERNAL MEDICINE

## 2022-06-21 PROCEDURE — 85025 COMPLETE CBC W/AUTO DIFF WBC: CPT | Mod: PO | Performed by: INTERNAL MEDICINE

## 2022-06-21 PROCEDURE — 84100 ASSAY OF PHOSPHORUS: CPT | Performed by: INTERNAL MEDICINE

## 2022-06-21 PROCEDURE — 80053 COMPREHEN METABOLIC PANEL: CPT | Performed by: INTERNAL MEDICINE

## 2022-06-22 ENCOUNTER — PATIENT MESSAGE (OUTPATIENT)
Dept: ADMINISTRATIVE | Facility: OTHER | Age: 77
End: 2022-06-22
Payer: MEDICARE

## 2022-06-23 ENCOUNTER — SPECIALTY PHARMACY (OUTPATIENT)
Dept: PHARMACY | Facility: CLINIC | Age: 77
End: 2022-06-23
Payer: MEDICARE

## 2022-06-23 ENCOUNTER — PATIENT MESSAGE (OUTPATIENT)
Dept: ADMINISTRATIVE | Facility: OTHER | Age: 77
End: 2022-06-23
Payer: MEDICARE

## 2022-06-23 DIAGNOSIS — C49.A3 GIST (GASTROINTESTINAL STROMAL TUMOR) OF SMALL BOWEL, MALIGNANT: Primary | ICD-10-CM

## 2022-06-23 NOTE — TELEPHONE ENCOUNTER
Specialty Pharmacy - Refill Coordination    Specialty Medication Orders Linked to Encounter    Flowsheet Row Most Recent Value   Medication #1 PAZOpanib (VOTRIENT) 200 mg Tab (Order#[id number], Rx#[id number])      Pt requests early fill because he will be going out of town for a month. Vacation override submitted. He will pick-up after his appt 6/24/22 and pay copayment if cagno is not yet approved.       Refill Questions - Documented Responses    Flowsheet Row Most Recent Value   Patient Availability and HIPAA Verification    Does patient want to proceed with activity? Yes   HIPAA/medical authority confirmed? Yes   Relationship to patient of person spoken to? Self   Refill Screening Questions    Changes to allergies? No   Changes to medications? No   New conditions since last clinic visit? No   Unplanned office visit, urgent care, ED, or hospital admission in the last 4 weeks? No   How does patient/caregiver feel medication is working? Good   Financial problems or insurance changes? No   How many doses of your specialty medications were missed in the last 4 weeks? 0   Would patient like to speak to a pharmacist? No   When does the patient need to receive the medication? 07/10/22   Refill Delivery Questions    How will the patient receive the medication? Pickup   When does the patient need to receive the medication? 07/10/22   Expected Copay ($) 77   Is the patient able to afford the medication copay? Yes   Payment Method invoice (approval required)  [cagno]   Days supply of Refill 30   Supplies needed? No supplies needed   Refill activity completed? Yes   Refill activity plan Refill scheduled   Shipment/Pickup Date: 06/24/22          Current Outpatient Medications   Medication Sig    arginine 500 mg tablet Take 1,000 mg by mouth once daily.     aspirin (ECOTRIN) 81 MG EC tablet Take 81 mg by mouth.    atorvastatin (LIPITOR) 40 MG tablet Take 1 tablet (40 mg total) by mouth once daily.    BORON ORAL Take 3 mg by  mouth once daily.    calcium carbonate (TUMS) 200 mg calcium (500 mg) chewable tablet Take 500 mg by mouth.    chlorthalidone (HYGROTEN) 25 MG Tab Half tablet po daily    cholecalciferol, vitamin D3, (VITAMIN D3) 50 mcg (2,000 unit) Cap Take 1 capsule by mouth once daily.    diphenoxylate-atropine 2.5-0.025 mg (LOMOTIL) 2.5-0.025 mg per tablet Take 1 tablet by mouth 4 (four) times daily as needed for Diarrhea. (Patient not taking: Reported on 1/25/2022)    fish oil-omega-3 fatty acids 300-1,000 mg capsule Take 1 g by mouth once daily.    fluticasone (FLONASE) 50 mcg/actuation nasal spray 1 spray by Each Nare route 2 (two) times daily.    losartan (COZAAR) 100 MG tablet Take 1 tablet (100 mg total) by mouth once daily.    magnesium oxide-Mg AA chelate (MG-PLUS-PROTEIN) 133 mg Tab Take 1 tablet by mouth.    metoprolol succinate (TOPROL XL) 50 MG 24 hr tablet Take 1 tablet (50 mg total) by mouth once daily.    multivitamin (THERAGRAN) per tablet Take 1 tablet by mouth once daily.    NIFEdipine (PROCARDIA-XL) 30 MG (OSM) 24 hr tablet Take 1 tablet (30 mg total) by mouth once daily.    nitroGLYCERIN (NITROSTAT) 0.4 MG SL tablet Place 1 tablet (0.4 mg total) under the tongue every 5 (five) minutes as needed for Chest pain.    omeprazole (PRILOSEC) 20 MG capsule Take 20 mg by mouth 2 (two) times a day.    ondansetron (ZOFRAN-ODT) 8 MG TbDL Take 1 tablet (8 mg total) by mouth every 6 (six) hours as needed (nausea).    PAZOpanib (VOTRIENT) 200 mg Tab Take 4 tablets (800 mg) by mouth once daily at 6am.    promethazine (PHENERGAN) 25 MG tablet Take 1 tablet (25 mg total) by mouth every 6 (six) hours as needed for Nausea. (Patient not taking: Reported on 1/25/2022)    SOD CHLOR,SOD BICARB/NETI POT (NEILMED NASAFLO NASL) 1 spray by Nasal route every evening.    SODIUM CHLORIDE (FE-128 OPHT) Place 1 drop into the right eye 3 (three) times daily.    tadalafil (CIALIS) 5 MG tablet Take 5 mg by mouth once daily  "at 6am.    testosterone cypionate (DEPOTESTOTERONE CYPIONATE) 200 mg/mL injection Inject 200 mg into the muscle every 14 (fourteen) days.     zinc gluconate 50 mg tablet Take 50 mg by mouth every Monday and Thursday.    Last reviewed on 6/8/2022  4:48 PM by Montserrat Araya, Estella    Review of patient's allergies indicates:   Allergen Reactions    Augmentin [amoxicillin-pot clavulanate] Shortness Of Breath     disoriented    Fexofenadine Other (See Comments)     Pt states he can't remember the details but it was a bad effect.    Singulair [montelukast] Other (See Comments)     Pt "felt strange"  Pt "felt strange" bloating, Intestinal irritation, abd cramping      Ace inhibitors Other (See Comments)     cough    Captopril     Doxycycline Nausea And Vomiting     headache    Horse/equine containing products Hives    Hydrocodone Nausea Only    Scopolamine hbr Other (See Comments)     Nausea, headache, changes in BP     Last reviewed on  6/20/2022 10:34 AM by Robert Umana      Tasks added this encounter   8/2/2022 - Refill Call (Auto Added)  6/25/2022 - Pickup Reminder   Tasks due within next 3 months   No tasks due.     Emely Bains, SriramD  [facility]  [address] [facility] [address]  Phone: [phone]  Fax: [phone]          "

## 2022-06-23 NOTE — TELEPHONE ENCOUNTER
Patient has reached his ceiling for funding and [employer] is no longer able to assist. Patient will be responsible for $77 copay amount.

## 2022-06-24 ENCOUNTER — OFFICE VISIT (OUTPATIENT)
Dept: HEMATOLOGY/ONCOLOGY | Facility: CLINIC | Age: 77
End: 2022-06-24
Payer: MEDICARE

## 2022-06-24 ENCOUNTER — PATIENT MESSAGE (OUTPATIENT)
Dept: ADMINISTRATIVE | Facility: OTHER | Age: 77
End: 2022-06-24
Payer: MEDICARE

## 2022-06-24 VITALS
RESPIRATION RATE: 18 BRPM | WEIGHT: 181.56 LBS | BODY MASS INDEX: 25.42 KG/M2 | OXYGEN SATURATION: 95 % | TEMPERATURE: 98 F | DIASTOLIC BLOOD PRESSURE: 82 MMHG | HEART RATE: 63 BPM | SYSTOLIC BLOOD PRESSURE: 147 MMHG | HEIGHT: 71 IN

## 2022-06-24 DIAGNOSIS — K21.9 GASTROESOPHAGEAL REFLUX DISEASE, UNSPECIFIED WHETHER ESOPHAGITIS PRESENT: ICD-10-CM

## 2022-06-24 DIAGNOSIS — D84.821 IMMUNODEFICIENCY SECONDARY TO CHEMOTHERAPY: ICD-10-CM

## 2022-06-24 DIAGNOSIS — T45.1X5A IMMUNODEFICIENCY SECONDARY TO CHEMOTHERAPY: ICD-10-CM

## 2022-06-24 DIAGNOSIS — C49.A3 GIST (GASTROINTESTINAL STROMAL TUMOR) OF SMALL BOWEL, MALIGNANT: Primary | ICD-10-CM

## 2022-06-24 DIAGNOSIS — Z79.899 IMMUNODEFICIENCY SECONDARY TO CHEMOTHERAPY: ICD-10-CM

## 2022-06-24 DIAGNOSIS — R51.9 CHRONIC NONINTRACTABLE HEADACHE, UNSPECIFIED HEADACHE TYPE: ICD-10-CM

## 2022-06-24 DIAGNOSIS — D49.9 IMMUNODEFICIENCY SECONDARY TO NEOPLASM: ICD-10-CM

## 2022-06-24 DIAGNOSIS — N17.9 AKI (ACUTE KIDNEY INJURY): ICD-10-CM

## 2022-06-24 DIAGNOSIS — D84.81 IMMUNODEFICIENCY SECONDARY TO NEOPLASM: ICD-10-CM

## 2022-06-24 DIAGNOSIS — C78.02 MALIGNANT NEOPLASM METASTATIC TO LEFT LUNG: ICD-10-CM

## 2022-06-24 DIAGNOSIS — I10 ESSENTIAL HYPERTENSION: ICD-10-CM

## 2022-06-24 DIAGNOSIS — G89.29 CHRONIC NONINTRACTABLE HEADACHE, UNSPECIFIED HEADACHE TYPE: ICD-10-CM

## 2022-06-24 DIAGNOSIS — C79.89 METASTATIC CANCER TO PELVIS: ICD-10-CM

## 2022-06-24 DIAGNOSIS — C78.7 METASTASIS TO LIVER: ICD-10-CM

## 2022-06-24 PROCEDURE — 99999 PR PBB SHADOW E&M-EST. PATIENT-LVL V: CPT | Mod: PBBFAC,,, | Performed by: INTERNAL MEDICINE

## 2022-06-24 PROCEDURE — 99215 OFFICE O/P EST HI 40 MIN: CPT | Mod: PBBFAC | Performed by: INTERNAL MEDICINE

## 2022-06-24 PROCEDURE — 99215 PR OFFICE/OUTPT VISIT, EST, LEVL V, 40-54 MIN: ICD-10-PCS | Mod: S$PBB,,, | Performed by: INTERNAL MEDICINE

## 2022-06-24 PROCEDURE — 99215 OFFICE O/P EST HI 40 MIN: CPT | Mod: S$PBB,,, | Performed by: INTERNAL MEDICINE

## 2022-06-24 PROCEDURE — 99999 PR PBB SHADOW E&M-EST. PATIENT-LVL V: ICD-10-PCS | Mod: PBBFAC,,, | Performed by: INTERNAL MEDICINE

## 2022-06-24 RX ORDER — TOPIRAMATE SPINKLE 25 MG/1
25 CAPSULE ORAL DAILY
Qty: 90 CAPSULE | Refills: 2 | Status: SHIPPED | OUTPATIENT
Start: 2022-06-24 | End: 2022-06-27

## 2022-06-24 NOTE — PROGRESS NOTES
"  Patient ID: Forrest Schmidt is a 77 y.o. male.     Chief Complaint:  Follow up for GIST, wild-type     DIAGNOSIS:   1. 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 Recurrent oligametastatic GIST in the liver found on 5/20/19. S/p ablation on 7/23/19 at [facility]  3. 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 He initially presented with melena, syncope, hypotension on 2/5/18 at [facility]. Colonoscopy showed old blood in the terminal ileum. CT abdomen/pelvis showed an ileal mass that measures approximately 5 cm. He was transferred to [facility] and underwent segmental small bowel resection by Dr. Sanchez on 2/15/18. Pathology showed a 6-cm GIST, histologic type: GIST, mixed; mitotic rate 9 mitoses/5 mm2, G2, high grade, high risk, margins negative. Cd117+, pathologic T3NxMx.  He had incisional wound infection after the surgery and took antibiotics for that.   2. Started Gleevec on 4/11/18. Foundation One results came back on 4/24/18 neg for KIT mutations. Long discussion with Mr and Mrs Schmidt on 4/25 regarding likely the lack of benefit from adjuvant Gleevec (please see note from that day for details). Mr. Schmidt would like to continue with Gleevec for now. He stopped Gleevec after he went to see Dr. Guaman at [facility]. CT abdomen/pelvis on 11/12/18 was negative for recurrent disease.   3. CT scan on 5/20/19 showed a Nonspecific hepatic hypodensity at the dome of the liver near the inferior vena cava   4. S/p liver lesion biopsy and ablation at [facility] on 7/23/19. Biopsy showed metastatic GIST positive for DOG-1 and . The amount of tumor was insufficient for molecular testing.   5. Surveillance MRI on 2/11/2020 showed "a right external iliac " "metastatic deposit measuring 4.6 x 3.5 cm (axial stir series 9, image 20), previously measuring 1.3 cm.  There is a metastatic deposit within the right anterior pelvis measuring 1.9 cm (series 9, image 13), previously not seen."  6. Gleevec 400 mg daily from 3/14/20 to present. CT at [facility] on 6/1/20 showed progressive disease in the peritoneal masses.   7. Underwent cytoreductive surgery at [facility] on 7/10/2020.   8. Dr Guaman recommends starting regorafenib after surgery. However, insurance does not cover it unless patient fails sutent. Patient started sutent 37.5 mg daily on 8/10/2020.   9. CT scan 10/5/20: A metastatic lesion in segment VII of the liver adjacent to the right hepatic vein and inferior vena cava remains overall stable, measuring approximately 2.1 cm (series 6 image 166). There is another lesion in segment V measuring approximately 1.1 cm (series 6 image 188), suspect for metastatic disease and not well seen on the prior study.  10. CT scan 12/7/2020: "Mild enlargement of liver metastases. A new (nonspecific) subcentimeter hypodensity in segment 6 can be followed on future exams. Mild areas of pneumatosis affecting the ileum in the lower abdomen. Recommend clinical correlation with abdominal pain and drug regimen." Patient underwent IR ablation to segment 5 lesion and re-treatment of segment 7 lesion. Sutent was continued           11. CT scan 9/30/21: No definitive CT evidence of new or increasing metastatic disease in the chest, abdomen or pelvis.  Decrease in size of segment 5 and 7 ablation zones/cavities. Stable subcentimeter pulmonary nodules/nodular densities.                   12. CT CAP at [facility] 1/12/22: New small liver lesions are suspicious for metastases. An enlarging 4 mm left upper lobe pulmonary nodule may be infectious/inflammatory or metastatic. Treatment was changed to regorafenib. Patient started on 1/29/2022   13. Progression noted on CT scan 3/23/22: "Further increased size of multiple " "small hypoenhancing liver lesions, in keeping with metastases. Liver protocol CT at follow-up may be helpful for improved visualization of small liver metastases. Enlarging peritoneal nodules, in keeping with carcinomatosis. Enlarging left upper lobe nodule, concerning for metastasis."  14. Patient started temodar 85 mg/m2 daily x 21 days every 28-day cycle on 2022. Cycle 2 started on 22.   15. CT C/A/P 22 showed "Enlarging peritoneal and hepatic metastases compared to 2022. Enlarging left upper lobe pulmonary nodules suspicious for metastatic disease"  16. Treatment switched to pazopanib. Patient started on 6/10/22. Started with escalating dose.       History of Present Illness:   Mr. Schmidt returns today for continued follow up. Treatment was switched to votrient after CT on 22 showed progression. Feeling well. No side effects from votrient. 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      ECO     ROS:   See HPI, otherwise negative.      Physical Examination:   Vital signs from patient's log reviewed. BP normal, sometimes on the lower end  Gen: well hydrated, well developed. In no acute distress  HEENT: normocephalic, anicteric sclerae, EOMI  Psych: pleasant and appropriate mood and affect  Neuro: alert and oriented x 4     Objective:      Laboratory Data:  Labs reviewed. Cr 1.6     Imaging Data:  CT C/A/P 22:  Chest:   No pleural effusion.     Stable reactive appearing nodes are in the mediastinum and hilar regions.     A left upper lobe pulmonary nodule on series 302 image 35 is larger measuring 0.6 cm previously 0.4 cm. This is concerning for a metastasis.     Other punctate pulmonary nodules in the left upper lobe on image 32, right upper lobe on image 40 and left lower lobe on image 100 are stable and may be benign postinflammatory scarring     Abdomen and pelvis:   The hepatic metastases are larger. Examples include a 1 cm " metastasis in segment VII on series 301 image 185 which measures 0.5 cm     A 2.3 cm metastasis in segment V on image 196 measured 1.4 cm     A 1.6 cm metastasis in segment V on image 207 measured 1.1 cm     There are stable ablation sites in segments V and VII.     The gallbladder, pancreas, spleen and adrenals are normal     The kidneys are functioning without hydronephrosis. Renal cysts are stable.     There is stable herniation of the bladder into the right inguinal canal.     No retroperitoneal adenopathy.     No bowel obstruction     Peritoneal metastases are larger. A 2.6 cm implant adjacent to the sigmoid colon on series 301 image 281 previously measured 1.7 cm and a 2.6 cm implant anterior to the rectum on image 291 measured 1.1 cm    Impression:  1. Enlarging peritoneal and hepatic metastases compared to 03/23/2022     2. Enlarging left upper lobe pulmonary nodules suspicious for metastatic disease      Assessment and Plan:      1. 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 Metastasis to liver    3. 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 Malignant neoplasm metastatic to left lung    5. Immunodeficiency secondary to neoplasm    6. Immunodeficiency secondary to chemotherapy    7. Essential hypertension    8. Chronic nonintractable headache, unspecified headache type    9. Gastroesophageal reflux disease, unspecified whether esophagitis present    10. RENÉ (acute kidney injury)      1-6  - Mr. Schmidt is a 76 yo man with stage IIIB GIST of the small intestine  (T3N0Mx), 6 cm, mitotic rate 9 mitoses/5 mm2, s/p resection on 2/15/18. His GIST is negative for KIT, PDGFRA, SDH, BRAF, NF1, NF2 mutations. He had oligometastatic disease to the liver found on CT scan 5/20/19. He went to [facility] and underwent IR liver lesion biopsy and ablation on 7/23/19. Pathology showed metastatic GIST positive for DOG-1 and .   - surveillance MRI in Feb 2020 showed progressive disease with new peritoneal  mets. Patient was seen by Dr Guaman. Gleevec was recommended.   - CT after 2.5 month of Gleevec showed progressive peritoneal disease.   - underwent cytoreductive surgery at [facility] on 7/10/20  - started sutent 37.5 mg daily on 8/10/20.   - s/p ablation to segment 5 lesion and re-treatment of segment 7 lesion in Dec 2020.   - CT scan 1/12/22 showed progression. Treatment was switched to regorafenib. Started on 1/29/22  - CT 3/23/22 showed progression in liver metastases, peritoneal mets and left lung nodule  - started temodar 85 mg/m2 3 weeks on, 1 week off on 4/6/22. CT on 5/31/22 showed progression  - started pazopanib on 6/10/22 with an escalating dose. First day of 800 mg today  - c/w votrient 800 mg dialy  - He will be out of town from 6/27/22-7/10/22.   - He is going back to [facility] on 7/19/22-7/20/22 for Y90 mapping with plan to do Y90 one month later.  - scheduled for restaging scan and seeing Dr Guaman on 8/9/22 and 8/10/22  - rogaratinib clinical trial available at [facility]. They reached out to [facility] today  - virtual visit with me on 7/15. Local labs 7/14    7.   - prior high BP due to TKI. Toprol decreased to 50 mg daily when he was on temodar as BP was low  - discussed HTN is a side effect of votrient  - closely monitor BP. If SBP stays in 150-160 will increase Toprol XL to 100 mg daily  - c/w amlodipine 10 mg, losartan 100 mg daily    8.  - taking two tylenol a day  - try topamax. Start at low dose 25 mg daily    9.  - taking prilosec 20 mg long term for heart burn  - discussed switching to pepcid 20 mg bid. prilosec decreases the absorption of votrient. Patient understands and agrees with the plan.     10.  - reviewed lab results. Discussed drinking more as he is sweating a lot. Patient understands and agrees with the plan.     Return in 3 weeks    Encouraged to call should issues arise      Route Chart for Scheduling    Med Onc Chart Routing      Follow up with physician 3 weeks. Please move his lab appt  on 7/15 to 7/14 and done locally (lives far away). please change visit with me on 7/15 to virtual   Follow up with [facility]    Infusion scheduling note    Injection scheduling note    Labs CBC, CMP and TSH   Lab interval:     Imaging    Pharmacy appointment    Other referrals

## 2022-06-27 DIAGNOSIS — G44.021 INTRACTABLE CHRONIC CLUSTER HEADACHE: ICD-10-CM

## 2022-06-27 DIAGNOSIS — G44.029 CHRONIC CLUSTER HEADACHE, NOT INTRACTABLE: Primary | ICD-10-CM

## 2022-06-27 RX ORDER — TOPIRAMATE 25 MG/1
25 TABLET ORAL DAILY PRN
Qty: 30 TABLET | Refills: 2 | Status: SHIPPED | OUTPATIENT
Start: 2022-06-27 | End: 2022-01-01

## 2022-06-29 ENCOUNTER — PATIENT MESSAGE (OUTPATIENT)
Dept: ADMINISTRATIVE | Facility: OTHER | Age: 77
End: 2022-06-29
Payer: MEDICARE

## 2022-06-30 ENCOUNTER — PATIENT MESSAGE (OUTPATIENT)
Dept: ADMINISTRATIVE | Facility: OTHER | Age: 77
End: 2022-06-30
Payer: MEDICARE

## 2022-07-01 ENCOUNTER — PATIENT MESSAGE (OUTPATIENT)
Dept: ADMINISTRATIVE | Facility: OTHER | Age: 77
End: 2022-07-01
Payer: MEDICARE

## 2022-07-02 ENCOUNTER — PATIENT MESSAGE (OUTPATIENT)
Dept: ADMINISTRATIVE | Facility: OTHER | Age: 77
End: 2022-07-02
Payer: MEDICARE

## 2022-07-03 ENCOUNTER — PATIENT MESSAGE (OUTPATIENT)
Dept: ADMINISTRATIVE | Facility: OTHER | Age: 77
End: 2022-07-03
Payer: MEDICARE

## 2022-07-04 ENCOUNTER — PATIENT MESSAGE (OUTPATIENT)
Dept: ADMINISTRATIVE | Facility: OTHER | Age: 77
End: 2022-07-04
Payer: MEDICARE

## 2022-07-05 ENCOUNTER — PATIENT MESSAGE (OUTPATIENT)
Dept: ADMINISTRATIVE | Facility: OTHER | Age: 77
End: 2022-07-05
Payer: MEDICARE

## 2022-07-06 ENCOUNTER — PATIENT MESSAGE (OUTPATIENT)
Dept: ADMINISTRATIVE | Facility: OTHER | Age: 77
End: 2022-07-06
Payer: MEDICARE

## 2022-07-07 ENCOUNTER — PATIENT MESSAGE (OUTPATIENT)
Dept: ADMINISTRATIVE | Facility: OTHER | Age: 77
End: 2022-07-07
Payer: MEDICARE

## 2022-07-08 ENCOUNTER — PATIENT MESSAGE (OUTPATIENT)
Dept: ADMINISTRATIVE | Facility: OTHER | Age: 77
End: 2022-07-08
Payer: MEDICARE

## 2022-07-09 ENCOUNTER — PATIENT MESSAGE (OUTPATIENT)
Dept: ADMINISTRATIVE | Facility: OTHER | Age: 77
End: 2022-07-09
Payer: MEDICARE

## 2022-07-10 ENCOUNTER — PATIENT MESSAGE (OUTPATIENT)
Dept: HEMATOLOGY/ONCOLOGY | Facility: CLINIC | Age: 77
End: 2022-07-10
Payer: MEDICARE

## 2022-07-10 ENCOUNTER — PATIENT MESSAGE (OUTPATIENT)
Dept: ADMINISTRATIVE | Facility: OTHER | Age: 77
End: 2022-07-10
Payer: MEDICARE

## 2022-07-11 ENCOUNTER — PATIENT MESSAGE (OUTPATIENT)
Dept: ADMINISTRATIVE | Facility: OTHER | Age: 77
End: 2022-07-11
Payer: MEDICARE

## 2022-07-11 DIAGNOSIS — I10 ESSENTIAL HYPERTENSION: Primary | ICD-10-CM

## 2022-07-11 DIAGNOSIS — I10 ESSENTIAL HYPERTENSION: ICD-10-CM

## 2022-07-11 RX ORDER — NIFEDIPINE 60 MG/1
60 TABLET, EXTENDED RELEASE ORAL DAILY
Qty: 90 TABLET | Refills: 3 | Status: ON HOLD | OUTPATIENT
Start: 2022-07-11 | End: 2023-01-01 | Stop reason: CLARIF

## 2022-07-11 RX ORDER — NIFEDIPINE 60 MG/1
60 TABLET, EXTENDED RELEASE ORAL DAILY
Qty: 30 TABLET | Refills: 11 | Status: SHIPPED | OUTPATIENT
Start: 2022-07-11 | End: 2022-07-11

## 2022-07-12 ENCOUNTER — PATIENT MESSAGE (OUTPATIENT)
Dept: ADMINISTRATIVE | Facility: OTHER | Age: 77
End: 2022-07-12
Payer: MEDICARE

## 2022-07-13 ENCOUNTER — PATIENT MESSAGE (OUTPATIENT)
Dept: ADMINISTRATIVE | Facility: OTHER | Age: 77
End: 2022-07-13
Payer: MEDICARE

## 2022-07-14 ENCOUNTER — PATIENT MESSAGE (OUTPATIENT)
Dept: ADMINISTRATIVE | Facility: OTHER | Age: 77
End: 2022-07-14
Payer: MEDICARE

## 2022-07-14 ENCOUNTER — LAB VISIT (OUTPATIENT)
Dept: LAB | Facility: HOSPITAL | Age: 77
End: 2022-07-14
Attending: INTERNAL MEDICINE
Payer: MEDICARE

## 2022-07-14 DIAGNOSIS — R53.83 FATIGUE, UNSPECIFIED TYPE: ICD-10-CM

## 2022-07-14 DIAGNOSIS — C49.A3 GIST (GASTROINTESTINAL STROMAL TUMOR) OF SMALL BOWEL, MALIGNANT: ICD-10-CM

## 2022-07-14 LAB
ALBUMIN SERPL BCP-MCNC: 3.9 G/DL (ref 3.5–5.2)
ALP SERPL-CCNC: 73 U/L (ref 55–135)
ALT SERPL W/O P-5'-P-CCNC: 32 U/L (ref 10–44)
ANION GAP SERPL CALC-SCNC: 8 MMOL/L (ref 8–16)
AST SERPL-CCNC: 35 U/L (ref 10–40)
BASOPHILS # BLD AUTO: 0.03 K/UL (ref 0–0.2)
BASOPHILS NFR BLD: 0.7 % (ref 0–1.9)
BILIRUB SERPL-MCNC: 0.5 MG/DL (ref 0.1–1)
BUN SERPL-MCNC: 27 MG/DL (ref 8–23)
CALCIUM SERPL-MCNC: 9 MG/DL (ref 8.7–10.5)
CHLORIDE SERPL-SCNC: 99 MMOL/L (ref 95–110)
CO2 SERPL-SCNC: 25 MMOL/L (ref 23–29)
CREAT SERPL-MCNC: 1.3 MG/DL (ref 0.5–1.4)
DIFFERENTIAL METHOD: ABNORMAL
EOSINOPHIL # BLD AUTO: 0.2 K/UL (ref 0–0.5)
EOSINOPHIL NFR BLD: 4.9 % (ref 0–8)
ERYTHROCYTE [DISTWIDTH] IN BLOOD BY AUTOMATED COUNT: 16.3 % (ref 11.5–14.5)
EST. GFR  (AFRICAN AMERICAN): >60 ML/MIN/1.73 M^2
EST. GFR  (NON AFRICAN AMERICAN): 52.3 ML/MIN/1.73 M^2
GLUCOSE SERPL-MCNC: 110 MG/DL (ref 70–110)
HCT VFR BLD AUTO: 42.2 % (ref 40–54)
HGB BLD-MCNC: 14.1 G/DL (ref 14–18)
IMM GRANULOCYTES # BLD AUTO: 0.01 K/UL (ref 0–0.04)
IMM GRANULOCYTES NFR BLD AUTO: 0.2 % (ref 0–0.5)
LYMPHOCYTES # BLD AUTO: 0.5 K/UL (ref 1–4.8)
LYMPHOCYTES NFR BLD: 12.1 % (ref 18–48)
MCH RBC QN AUTO: 29 PG (ref 27–31)
MCHC RBC AUTO-ENTMCNC: 33.4 G/DL (ref 32–36)
MCV RBC AUTO: 87 FL (ref 82–98)
MONOCYTES # BLD AUTO: 0.8 K/UL (ref 0.3–1)
MONOCYTES NFR BLD: 18 % (ref 4–15)
NEUTROPHILS # BLD AUTO: 2.8 K/UL (ref 1.8–7.7)
NEUTROPHILS NFR BLD: 64.3 % (ref 38–73)
NRBC BLD-RTO: 0 /100 WBC
PLATELET # BLD AUTO: 176 K/UL (ref 150–450)
PMV BLD AUTO: 8.7 FL (ref 9.2–12.9)
POTASSIUM SERPL-SCNC: 4.4 MMOL/L (ref 3.5–5.1)
PROT SERPL-MCNC: 7 G/DL (ref 6–8.4)
RBC # BLD AUTO: 4.86 M/UL (ref 4.6–6.2)
SODIUM SERPL-SCNC: 132 MMOL/L (ref 136–145)
TSH SERPL DL<=0.005 MIU/L-ACNC: 1.96 UIU/ML (ref 0.4–4)
WBC # BLD AUTO: 4.28 K/UL (ref 3.9–12.7)

## 2022-07-14 PROCEDURE — 84443 ASSAY THYROID STIM HORMONE: CPT | Performed by: INTERNAL MEDICINE

## 2022-07-14 PROCEDURE — 80053 COMPREHEN METABOLIC PANEL: CPT | Performed by: INTERNAL MEDICINE

## 2022-07-14 PROCEDURE — 36415 COLL VENOUS BLD VENIPUNCTURE: CPT | Mod: PO | Performed by: INTERNAL MEDICINE

## 2022-07-14 PROCEDURE — 85025 COMPLETE CBC W/AUTO DIFF WBC: CPT | Mod: PO | Performed by: INTERNAL MEDICINE

## 2022-07-15 ENCOUNTER — OFFICE VISIT (OUTPATIENT)
Dept: HEMATOLOGY/ONCOLOGY | Facility: CLINIC | Age: 77
End: 2022-07-15
Payer: MEDICARE

## 2022-07-15 ENCOUNTER — PATIENT MESSAGE (OUTPATIENT)
Dept: HEMATOLOGY/ONCOLOGY | Facility: CLINIC | Age: 77
End: 2022-07-15

## 2022-07-15 ENCOUNTER — PATIENT MESSAGE (OUTPATIENT)
Dept: ADMINISTRATIVE | Facility: OTHER | Age: 77
End: 2022-07-15
Payer: MEDICARE

## 2022-07-15 DIAGNOSIS — R53.83 FATIGUE, UNSPECIFIED TYPE: ICD-10-CM

## 2022-07-15 DIAGNOSIS — I10 ESSENTIAL HYPERTENSION: ICD-10-CM

## 2022-07-15 DIAGNOSIS — I25.10 CORONARY ARTERY DISEASE INVOLVING NATIVE CORONARY ARTERY OF NATIVE HEART WITHOUT ANGINA PECTORIS: ICD-10-CM

## 2022-07-15 DIAGNOSIS — Z79.899 IMMUNODEFICIENCY SECONDARY TO CHEMOTHERAPY: ICD-10-CM

## 2022-07-15 DIAGNOSIS — T45.1X5A IMMUNODEFICIENCY SECONDARY TO CHEMOTHERAPY: ICD-10-CM

## 2022-07-15 DIAGNOSIS — D84.821 IMMUNODEFICIENCY SECONDARY TO CHEMOTHERAPY: ICD-10-CM

## 2022-07-15 DIAGNOSIS — D49.9 IMMUNODEFICIENCY SECONDARY TO NEOPLASM: ICD-10-CM

## 2022-07-15 DIAGNOSIS — C78.7 METASTASIS TO LIVER: ICD-10-CM

## 2022-07-15 DIAGNOSIS — C49.A3 GIST (GASTROINTESTINAL STROMAL TUMOR) OF SMALL BOWEL, MALIGNANT: Primary | ICD-10-CM

## 2022-07-15 DIAGNOSIS — D84.81 IMMUNODEFICIENCY SECONDARY TO NEOPLASM: ICD-10-CM

## 2022-07-15 DIAGNOSIS — C79.89 METASTATIC CANCER TO PELVIS: ICD-10-CM

## 2022-07-15 DIAGNOSIS — G89.29 CHRONIC NONINTRACTABLE HEADACHE, UNSPECIFIED HEADACHE TYPE: ICD-10-CM

## 2022-07-15 DIAGNOSIS — N18.32 STAGE 3B CHRONIC KIDNEY DISEASE: ICD-10-CM

## 2022-07-15 DIAGNOSIS — R51.9 CHRONIC NONINTRACTABLE HEADACHE, UNSPECIFIED HEADACHE TYPE: ICD-10-CM

## 2022-07-15 PROCEDURE — 99215 OFFICE O/P EST HI 40 MIN: CPT | Mod: 95,,, | Performed by: INTERNAL MEDICINE

## 2022-07-15 PROCEDURE — 99215 PR OFFICE/OUTPT VISIT, EST, LEVL V, 40-54 MIN: ICD-10-PCS | Mod: 95,,, | Performed by: INTERNAL MEDICINE

## 2022-07-15 NOTE — PROGRESS NOTES
The patient location is: home  The chief complaint leading to consultation is: follow up for GIST    Visit type: audiovisual    Face to Face time with patient: 15 min  30 minutes of total time spent on the encounter, which includes face to face time and non-face to face time preparing to see the patient (eg, review of tests), Obtaining and/or reviewing separately obtained history, Documenting clinical information in the electronic or other health record, Independently interpreting results (not separately reported) and communicating results to the patient/family/caregiver, or Care coordination (not separately reported).         Each patient to whom he or she provides medical services by telemedicine is:  (1) informed of the relationship between the physician and patient and the respective role of any other health care provider with respect to management of the patient; and (2) notified that he or she may decline to receive medical services by telemedicine and may withdraw from such care at any time.    Notes:     Patient ID: Forrest Schmidt is a 77 y.o. male.     Chief Complaint:  Follow up for GIST, wild-type     DIAGNOSIS:   1. 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 Recurrent oligametastatic GIST in the liver found on 5/20/19. S/p ablation on 7/23/19 at [facility]  3. 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 He initially presented with melena, syncope, hypotension on 2/5/18 at OSH. Colonoscopy showed old blood in the terminal ileum. CT abdomen/pelvis showed an ileal mass that measures approximately 5 cm. He was transferred to [facility] and underwent segmental small bowel resection by Dr. Sanchez on 2/15/18. Pathology showed a 6-cm GIST,  "histologic type: GIST, mixed; mitotic rate 9 mitoses/5 mm2, G2, high grade, high risk, margins negative. Cd117+, pathologic T3NxMx.  He had incisional wound infection after the surgery and took antibiotics for that.   2. Started Gleevec on 4/11/18. Foundation One results came back on 4/24/18 neg for KIT mutations. Long discussion with Mr and Mrs Schmidt on 4/25 regarding likely the lack of benefit from adjuvant Gleevec (please see note from that day for details). Mr. Schmidt would like to continue with Gleevec for now. He stopped Gleevec after he went to see Dr. Guaman at [facility]. CT abdomen/pelvis on 11/12/18 was negative for recurrent disease.   3. CT scan on 5/20/19 showed a Nonspecific hepatic hypodensity at the dome of the liver near the inferior vena cava   4. S/p liver lesion biopsy and ablation at [facility] on 7/23/19. Biopsy showed metastatic GIST positive for DOG-1 and . The amount of tumor was insufficient for molecular testing.   5. Surveillance MRI on 2/11/2020 showed "a right external iliac metastatic deposit measuring 4.6 x 3.5 cm (axial stir series 9, image 20), previously measuring 1.3 cm.  There is a metastatic deposit within the right anterior pelvis measuring 1.9 cm (series 9, image 13), previously not seen."  6. Gleevec 400 mg daily from 3/14/20 to present. CT at [facility] on 6/1/20 showed progressive disease in the peritoneal masses.   7. Underwent cytoreductive surgery at [facility] on 7/10/2020.   8. Dr Guaman recommends starting regorafenib after surgery. However, insurance does not cover it unless patient fails sutent. Patient started sutent 37.5 mg daily on 8/10/2020.   9. CT scan 10/5/20: A metastatic lesion in segment VII of the liver adjacent to the right hepatic vein and inferior vena cava remains overall stable, measuring approximately 2.1 cm (series 6 image 166). There is another lesion in segment V measuring approximately 1.1 cm (series 6 image 188), suspect for metastatic disease and not " "well seen on the prior study.  10. CT scan 12/7/2020: "Mild enlargement of liver metastases. A new (nonspecific) subcentimeter hypodensity in segment 6 can be followed on future exams. Mild areas of pneumatosis affecting the ileum in the lower abdomen. Recommend clinical correlation with abdominal pain and drug regimen." Patient underwent IR ablation to segment 5 lesion and re-treatment of segment 7 lesion. Sutent was continued           11. CT scan 9/30/21: No definitive CT evidence of new or increasing metastatic disease in the chest, abdomen or pelvis.  Decrease in size of segment 5 and 7 ablation zones/cavities. Stable subcentimeter pulmonary nodules/nodular densities.                   12. CT CAP at [facility] 1/12/22: New small liver lesions are suspicious for metastases. An enlarging 4 mm left upper lobe pulmonary nodule may be infectious/inflammatory or metastatic. Treatment was changed to regorafenib. Patient started on 1/29/2022   13. Progression noted on CT scan 3/23/22: "Further increased size of multiple small hypoenhancing liver lesions, in keeping with metastases. Liver protocol CT at follow-up may be helpful for improved visualization of small liver metastases. Enlarging peritoneal nodules, in keeping with carcinomatosis. Enlarging left upper lobe nodule, concerning for metastasis."  14. Patient started temodar 85 mg/m2 daily x 21 days every 28-day cycle on 4/6/2022. Cycle 2 started on 5/5/22.   15. CT C/A/P 5/31/22 showed "Enlarging peritoneal and hepatic metastases compared to 03/23/2022. Enlarging left upper lobe pulmonary nodules suspicious for metastatic disease"  16. Treatment switched to pazopanib. Patient started on 6/10/22. Started with escalating dose. Reached 800 mg daily on 6/24/22.       History of Present Illness:   Mr. Schmidt returns today for continued follow up. Has been on votrient for a month. Tolerating 800 mg daily well. Headaches is slightly worse. topamax made it worse. Back on tylenol. " SBP 170s after votrient increased to 800 mg daily. Toprol XL increased to 100 mg daily. Nifedipine increased to 60 mg daily. BP has been good since then.     ECO     ROS:   See HPI, otherwise negative.      Physical Examination:   Vital signs from [facility]  reviewed. BP good after the above medication changes  Gen: well hydrated, well developed. In no acute distress  HEENT: normocephalic, anicteric sclerae, EOMI  Psych: pleasant and appropriate mood and affect  Neuro: alert and oriented x 4     Objective:      Laboratory Data:  Labs reviewed. Cr 1.3 improved     Imaging Data:  CT C/A/P 22:  Chest:   No pleural effusion.     Stable reactive appearing nodes are in the mediastinum and hilar regions.     A left upper lobe pulmonary nodule on series 302 image 35 is larger measuring 0.6 cm previously 0.4 cm. This is concerning for a metastasis.     Other punctate pulmonary nodules in the left upper lobe on image 32, right upper lobe on image 40 and left lower lobe on image 100 are stable and may be benign postinflammatory scarring     Abdomen and pelvis:   The hepatic metastases are larger. Examples include a 1 cm metastasis in segment VII on series 301 image 185 which measures 0.5 cm     A 2.3 cm metastasis in segment V on image 196 measured 1.4 cm     A 1.6 cm metastasis in segment V on image 207 measured 1.1 cm     There are stable ablation sites in segments V and VII.     The gallbladder, pancreas, spleen and adrenals are normal     The kidneys are functioning without hydronephrosis. Renal cysts are stable.     There is stable herniation of the bladder into the right inguinal canal.     No retroperitoneal adenopathy.     No bowel obstruction     Peritoneal metastases are larger. A 2.6 cm implant adjacent to the sigmoid colon on series 301 image 281 previously measured 1.7 cm and a 2.6 cm implant anterior to the rectum on image 291 measured 1.1 cm    Impression:  1. Enlarging peritoneal and hepatic  metastases compared to 03/23/2022     2. Enlarging left upper lobe pulmonary nodules suspicious for metastatic disease      Assessment and Plan:      1. 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 Metastasis to liver    3. 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 Immunodeficiency secondary to neoplasm    5. Immunodeficiency secondary to chemotherapy    6. Essential hypertension    7. Coronary artery disease involving native coronary artery of native heart without angina pectoris    8. Stage 3b chronic kidney disease    9. Chronic nonintractable headache, unspecified headache type      1-5  - Mr. Schmidt is a 78 yo man with stage IIIB GIST of the small intestine  (T3N0Mx), 6 cm, mitotic rate 9 mitoses/5 mm2, s/p resection on 2/15/18. His GIST is negative for KIT, PDGFRA, SDH, BRAF, NF1, NF2 mutations. He had oligometastatic disease to the liver found on CT scan 5/20/19. He went to [facility] and underwent IR liver lesion biopsy and ablation on 7/23/19. Pathology showed metastatic GIST positive for DOG-1 and .   - surveillance MRI in Feb 2020 showed progressive disease with new peritoneal mets. Patient was seen by Dr Guaman. Gleevec was recommended.   - CT after 2.5 month of Gleevec showed progressive peritoneal disease.   - underwent cytoreductive surgery at [facility] on 7/10/20  - started sutent 37.5 mg daily on 8/10/20.   - s/p ablation to segment 5 lesion and re-treatment of segment 7 lesion in Dec 2020.   - CT scan 1/12/22 showed progression. Treatment was switched to regorafenib. Started on 1/29/22  - CT 3/23/22 showed progression in liver metastases, peritoneal mets and left lung nodule  - started temodar 85 mg/m2 3 weeks on, 1 week off on 4/6/22. CT on 5/31/22 showed progression  - started pazopanib on 6/10/22 with an escalating dose, reached 800 mg daily on 6/24/22  - c/w votrient 800 mg daily  - He is going back to [facility] on 7/19/22-7/20/22 for Y90 mapping with plan to do Y90 one month  later.  - scheduled for restaging scan and seeing Dr Guaman on 8/9/22 and 8/10/22. Plan to change it to around his Y90 on 8/17/22.   - rogaratinib clinical trial available at [facility]. They will reach out to them after [facility] visits  - see me in mid September, approximately one month after his visit with Dr Guaman    6.  - baseline HTN. Worse when on TKI  - BP better now  - c/w losartan 100 mg daily, Toprol 100 mg daily, nifedipine 60 mg daily  - c/w chemo  monitoring    7.  - c/w aspirin and lipitor    8.  - Cr was 1.6 before, now down to 1.3  - encouraged oral hydration    9.  - chronic. Worse on TKI  - topamax made it worse  - c/w tylenol prn  - asked patient to let me know if he wants to see neurology. Patient understands and agrees with the plan.     Return in mid September    Encouraged to call should issues arise      Route Chart for Scheduling    Med Onc Chart Routing      Follow up with physician 2 months. See me in mid September with CBC, CMP, TSH checked at local labs one day prior   Follow up with [facility]    Infusion scheduling note    Injection scheduling note    Labs CBC, CMP and TSH   Lab interval:  In 2 months   Imaging    Pharmacy appointment    Other referrals

## 2022-07-16 ENCOUNTER — PATIENT MESSAGE (OUTPATIENT)
Dept: ADMINISTRATIVE | Facility: OTHER | Age: 77
End: 2022-07-16
Payer: MEDICARE

## 2022-07-17 ENCOUNTER — PATIENT MESSAGE (OUTPATIENT)
Dept: ADMINISTRATIVE | Facility: OTHER | Age: 77
End: 2022-07-17
Payer: MEDICARE

## 2022-07-18 ENCOUNTER — PATIENT MESSAGE (OUTPATIENT)
Dept: ADMINISTRATIVE | Facility: OTHER | Age: 77
End: 2022-07-18
Payer: MEDICARE

## 2022-07-21 ENCOUNTER — PATIENT MESSAGE (OUTPATIENT)
Dept: ADMINISTRATIVE | Facility: OTHER | Age: 77
End: 2022-07-21
Payer: MEDICARE

## 2022-07-22 ENCOUNTER — PATIENT MESSAGE (OUTPATIENT)
Dept: ADMINISTRATIVE | Facility: OTHER | Age: 77
End: 2022-07-22
Payer: MEDICARE

## 2022-07-23 ENCOUNTER — PATIENT MESSAGE (OUTPATIENT)
Dept: ADMINISTRATIVE | Facility: OTHER | Age: 77
End: 2022-07-23
Payer: MEDICARE

## 2022-07-24 ENCOUNTER — PATIENT MESSAGE (OUTPATIENT)
Dept: ADMINISTRATIVE | Facility: OTHER | Age: 77
End: 2022-07-24
Payer: MEDICARE

## 2022-07-25 ENCOUNTER — PATIENT MESSAGE (OUTPATIENT)
Dept: ADMINISTRATIVE | Facility: OTHER | Age: 77
End: 2022-07-25
Payer: MEDICARE

## 2022-07-26 ENCOUNTER — PATIENT MESSAGE (OUTPATIENT)
Dept: ADMINISTRATIVE | Facility: OTHER | Age: 77
End: 2022-07-26
Payer: MEDICARE

## 2022-07-27 ENCOUNTER — PATIENT MESSAGE (OUTPATIENT)
Dept: ADMINISTRATIVE | Facility: OTHER | Age: 77
End: 2022-07-27
Payer: MEDICARE

## 2022-07-28 ENCOUNTER — PATIENT MESSAGE (OUTPATIENT)
Dept: ADMINISTRATIVE | Facility: OTHER | Age: 77
End: 2022-07-28
Payer: MEDICARE

## 2022-07-29 ENCOUNTER — PATIENT MESSAGE (OUTPATIENT)
Dept: ADMINISTRATIVE | Facility: OTHER | Age: 77
End: 2022-07-29
Payer: MEDICARE

## 2022-07-30 ENCOUNTER — PATIENT MESSAGE (OUTPATIENT)
Dept: ADMINISTRATIVE | Facility: OTHER | Age: 77
End: 2022-07-30
Payer: MEDICARE

## 2022-07-31 ENCOUNTER — PATIENT MESSAGE (OUTPATIENT)
Dept: ADMINISTRATIVE | Facility: OTHER | Age: 77
End: 2022-07-31
Payer: MEDICARE

## 2022-08-01 ENCOUNTER — PATIENT MESSAGE (OUTPATIENT)
Dept: ADMINISTRATIVE | Facility: OTHER | Age: 77
End: 2022-08-01
Payer: MEDICARE

## 2022-08-02 ENCOUNTER — PATIENT MESSAGE (OUTPATIENT)
Dept: ADMINISTRATIVE | Facility: OTHER | Age: 77
End: 2022-08-02
Payer: MEDICARE

## 2022-08-02 ENCOUNTER — SPECIALTY PHARMACY (OUTPATIENT)
Dept: PHARMACY | Facility: CLINIC | Age: 77
End: 2022-08-02
Payer: MEDICARE

## 2022-08-02 NOTE — TELEPHONE ENCOUNTER
Specialty Pharmacy - Refill Coordination    Specialty Medication Orders Linked to Encounter    Flowsheet Row Most Recent Value   Medication #1 PAZOpanib (VOTRIENT) 200 mg Tab (Order#[id number], Rx#[id number])          Refill Questions - Documented Responses    Flowsheet Row Most Recent Value   Patient Availability and HIPAA Verification    Does patient want to proceed with activity? Yes   HIPAA/medical authority confirmed? Yes   Relationship to patient of person spoken to? Self   Refill Screening Questions    Changes to allergies? No   Changes to medications? No   New conditions since last clinic visit? No   Unplanned office visit, urgent care, ED, or hospital admission in the last 4 weeks? No   How does patient/caregiver feel medication is working? Good   Financial problems or insurance changes? No   How many doses of your specialty medications were missed in the last 4 weeks? 0   Would patient like to speak to a pharmacist? No   When does the patient need to receive the medication? 08/08/22   Refill Delivery Questions    When does the patient need to receive the medication? 08/08/22   Shipping Address Home   Address in [facility] confirmed and updated if neccessary? Yes   Expected Copay ($) 77.4   Is the patient able to afford the medication copay? Yes   Payment Method invoice (approval required), CC on file   Days supply of Refill 30   Supplies needed? No supplies needed   Refill activity completed? Yes   Refill activity plan Refill scheduled   Shipment/Pickup Date: 08/04/22          Current Outpatient Medications   Medication Sig    arginine 500 mg tablet Take 1,000 mg by mouth once daily.     aspirin (ECOTRIN) 81 MG EC tablet Take 81 mg by mouth.    atorvastatin (LIPITOR) 40 MG tablet Take 1 tablet (40 mg total) by mouth once daily.    BORON ORAL Take 3 mg by mouth once daily.    calcium carbonate (TUMS) 200 mg calcium (500 mg) chewable tablet Take 500 mg by mouth.    chlorthalidone (HYGROTEN) 25  MG Tab Half tablet po daily    cholecalciferol, vitamin D3, (VITAMIN D3) 50 mcg (2,000 unit) Cap Take 1 capsule by mouth once daily.    diphenoxylate-atropine 2.5-0.025 mg (LOMOTIL) 2.5-0.025 mg per tablet Take 1 tablet by mouth 4 (four) times daily as needed for Diarrhea. (Patient not taking: Reported on 1/25/2022)    fish oil-omega-3 fatty acids 300-1,000 mg capsule Take 1 g by mouth once daily.    fluticasone (FLONASE) 50 mcg/actuation nasal spray 1 spray by Each Nare route 2 (two) times daily.    losartan (COZAAR) 100 MG tablet Take 1 tablet (100 mg total) by mouth once daily.    magnesium oxide-Mg AA chelate (MG-PLUS-PROTEIN) 133 mg Tab Take 1 tablet by mouth.    metoprolol succinate (TOPROL XL) 50 MG 24 hr tablet Take 1 tablet (50 mg total) by mouth once daily.    multivitamin (THERAGRAN) per tablet Take 1 tablet by mouth once daily.    NIFEdipine (PROCARDIA-XL) 60 MG (OSM) 24 hr tablet Take 1 tablet (60 mg total) by mouth once daily.    nitroGLYCERIN (NITROSTAT) 0.4 MG SL tablet Place 1 tablet (0.4 mg total) under the tongue every 5 (five) minutes as needed for Chest pain.    ondansetron (ZOFRAN-ODT) 8 MG TbDL Take 1 tablet (8 mg total) by mouth every 6 (six) hours as needed (nausea).    PAZOpanib (VOTRIENT) 200 mg Tab Take 4 tablets (800 mg) by mouth once daily at 6am.    promethazine (PHENERGAN) 25 MG tablet Take 1 tablet (25 mg total) by mouth every 6 (six) hours as needed for Nausea. (Patient not taking: Reported on 1/25/2022)    SOD CHLOR,SOD BICARB/NETI POT (NEILMED NASAFLO NASL) 1 spray by Nasal route every evening.    SODIUM CHLORIDE (FE-128 OPHT) Place 1 drop into the right eye 3 (three) times daily.    tadalafil (CIALIS) 5 MG tablet Take 5 mg by mouth once daily at 6am.    testosterone cypionate (DEPOTESTOTERONE CYPIONATE) 200 mg/mL injection Inject 200 mg into the muscle every 14 (fourteen) days.     topiramate (TOPAMAX) 25 MG tablet Take 1 tablet (25 mg total) by mouth daily as  "needed (headache).    zinc gluconate 50 mg tablet Take 50 mg by mouth every Monday and Thursday.    Last reviewed on 7/15/2022  1:24 PM by Robert Umana MD    Review of patient's allergies indicates:   Allergen Reactions    Augmentin [amoxicillin-pot clavulanate] Shortness Of Breath     disoriented    Fexofenadine Other (See Comments)     Pt states he can't remember the details but it was a bad effect.    Singulair [montelukast] Other (See Comments)     Pt "felt strange"  Pt "felt strange" bloating, Intestinal irritation, abd cramping      Ace inhibitors Other (See Comments)     cough    Captopril     Doxycycline Nausea And Vomiting     headache    Horse/equine containing products Hives    Hydrocodone Nausea Only    Scopolamine hbr Other (See Comments)     Nausea, headache, changes in BP     Last reviewed on  7/15/2022 1:24 PM by Robert Umana      Tasks added this encounter   8/26/2022 - Refill Call (Auto Added)   Tasks due within next 3 months   No tasks due.     Heidi Hyde, PharmD  [facility]  [address] [place] [id number]  Phone: [phone]  Fax: [phone]      "

## 2022-08-03 ENCOUNTER — PATIENT MESSAGE (OUTPATIENT)
Dept: ADMINISTRATIVE | Facility: OTHER | Age: 77
End: 2022-08-03
Payer: MEDICARE

## 2022-08-04 ENCOUNTER — PATIENT MESSAGE (OUTPATIENT)
Dept: ADMINISTRATIVE | Facility: OTHER | Age: 77
End: 2022-08-04
Payer: MEDICARE

## 2022-08-05 ENCOUNTER — PATIENT MESSAGE (OUTPATIENT)
Dept: ADMINISTRATIVE | Facility: OTHER | Age: 77
End: 2022-08-05
Payer: MEDICARE

## 2022-08-06 ENCOUNTER — PATIENT MESSAGE (OUTPATIENT)
Dept: ADMINISTRATIVE | Facility: OTHER | Age: 77
End: 2022-08-06
Payer: MEDICARE

## 2022-08-07 ENCOUNTER — PATIENT MESSAGE (OUTPATIENT)
Dept: ADMINISTRATIVE | Facility: OTHER | Age: 77
End: 2022-08-07
Payer: MEDICARE

## 2022-08-08 ENCOUNTER — PATIENT MESSAGE (OUTPATIENT)
Dept: ADMINISTRATIVE | Facility: OTHER | Age: 77
End: 2022-08-08
Payer: MEDICARE

## 2022-08-09 ENCOUNTER — PATIENT MESSAGE (OUTPATIENT)
Dept: HEMATOLOGY/ONCOLOGY | Facility: CLINIC | Age: 77
End: 2022-08-09
Payer: MEDICARE

## 2022-08-09 ENCOUNTER — PATIENT MESSAGE (OUTPATIENT)
Dept: ADMINISTRATIVE | Facility: OTHER | Age: 77
End: 2022-08-09
Payer: MEDICARE

## 2022-08-10 ENCOUNTER — PATIENT MESSAGE (OUTPATIENT)
Dept: ADMINISTRATIVE | Facility: OTHER | Age: 77
End: 2022-08-10
Payer: MEDICARE

## 2022-08-11 ENCOUNTER — PATIENT MESSAGE (OUTPATIENT)
Dept: ADMINISTRATIVE | Facility: OTHER | Age: 77
End: 2022-08-11
Payer: MEDICARE

## 2022-08-11 DIAGNOSIS — C49.A3 GIST (GASTROINTESTINAL STROMAL TUMOR) OF SMALL BOWEL, MALIGNANT: Primary | ICD-10-CM

## 2022-08-11 DIAGNOSIS — R53.83 FATIGUE, UNSPECIFIED TYPE: ICD-10-CM

## 2022-08-12 ENCOUNTER — PATIENT MESSAGE (OUTPATIENT)
Dept: ADMINISTRATIVE | Facility: OTHER | Age: 77
End: 2022-08-12
Payer: MEDICARE

## 2022-08-21 ENCOUNTER — PATIENT MESSAGE (OUTPATIENT)
Dept: ADMINISTRATIVE | Facility: OTHER | Age: 77
End: 2022-08-21
Payer: MEDICARE

## 2022-08-22 ENCOUNTER — PATIENT MESSAGE (OUTPATIENT)
Dept: ADMINISTRATIVE | Facility: OTHER | Age: 77
End: 2022-08-22
Payer: MEDICARE

## 2022-08-23 ENCOUNTER — PATIENT MESSAGE (OUTPATIENT)
Dept: ADMINISTRATIVE | Facility: OTHER | Age: 77
End: 2022-08-23
Payer: MEDICARE

## 2022-08-23 ENCOUNTER — PATIENT MESSAGE (OUTPATIENT)
Dept: HEMATOLOGY/ONCOLOGY | Facility: CLINIC | Age: 77
End: 2022-08-23
Payer: MEDICARE

## 2022-08-23 NOTE — TELEPHONE ENCOUNTER
Care Companion Intervention    Reason for intervention: Questionnaire response  Comment: reported missing doses of medication and feeling dizzy/lightheaded    Intervention: Education provided to patient  Comment: Patient had liver embolization last week at [facility]. Has stopped Votrient in preparation of procedure. Feels fatigue, pain, and dizziness are d/t recovering from procedure. Bloodwork to be drawn tomorrow. Encouraged to remain hydrated and as active as possible.

## 2022-08-24 ENCOUNTER — PATIENT MESSAGE (OUTPATIENT)
Dept: HEMATOLOGY/ONCOLOGY | Facility: CLINIC | Age: 77
End: 2022-08-24
Payer: MEDICARE

## 2022-08-24 ENCOUNTER — LAB VISIT (OUTPATIENT)
Dept: LAB | Facility: HOSPITAL | Age: 77
End: 2022-08-24
Attending: INTERNAL MEDICINE
Payer: MEDICARE

## 2022-08-24 ENCOUNTER — PATIENT MESSAGE (OUTPATIENT)
Dept: ADMINISTRATIVE | Facility: OTHER | Age: 77
End: 2022-08-24
Payer: MEDICARE

## 2022-08-24 DIAGNOSIS — C49.A3 GIST (GASTROINTESTINAL STROMAL TUMOR) OF SMALL BOWEL, MALIGNANT: ICD-10-CM

## 2022-08-24 DIAGNOSIS — R53.83 FATIGUE, UNSPECIFIED TYPE: ICD-10-CM

## 2022-08-24 LAB
ALBUMIN SERPL BCP-MCNC: 3 G/DL (ref 3.5–5.2)
ALP SERPL-CCNC: 396 U/L (ref 55–135)
ALT SERPL W/O P-5'-P-CCNC: 234 U/L (ref 10–44)
ANION GAP SERPL CALC-SCNC: 8 MMOL/L (ref 8–16)
AST SERPL-CCNC: 177 U/L (ref 10–40)
BASOPHILS # BLD AUTO: 0.03 K/UL (ref 0–0.2)
BASOPHILS NFR BLD: 0.5 % (ref 0–1.9)
BILIRUB SERPL-MCNC: 0.5 MG/DL (ref 0.1–1)
BUN SERPL-MCNC: 38 MG/DL (ref 8–23)
CALCIUM SERPL-MCNC: 9.6 MG/DL (ref 8.7–10.5)
CHLORIDE SERPL-SCNC: 104 MMOL/L (ref 95–110)
CO2 SERPL-SCNC: 26 MMOL/L (ref 23–29)
CREAT SERPL-MCNC: 1.3 MG/DL (ref 0.5–1.4)
DIFFERENTIAL METHOD: ABNORMAL
EOSINOPHIL # BLD AUTO: 0.2 K/UL (ref 0–0.5)
EOSINOPHIL NFR BLD: 3.7 % (ref 0–8)
ERYTHROCYTE [DISTWIDTH] IN BLOOD BY AUTOMATED COUNT: 20.8 % (ref 11.5–14.5)
EST. GFR  (NO RACE VARIABLE): 56.2 ML/MIN/1.73 M^2
GLUCOSE SERPL-MCNC: 123 MG/DL (ref 70–110)
HCT VFR BLD AUTO: 39.1 % (ref 40–54)
HGB BLD-MCNC: 13.2 G/DL (ref 14–18)
IMM GRANULOCYTES # BLD AUTO: 0.22 K/UL (ref 0–0.04)
IMM GRANULOCYTES NFR BLD AUTO: 3.9 % (ref 0–0.5)
LYMPHOCYTES # BLD AUTO: 0.2 K/UL (ref 1–4.8)
LYMPHOCYTES NFR BLD: 4.2 % (ref 18–48)
MCH RBC QN AUTO: 30.3 PG (ref 27–31)
MCHC RBC AUTO-ENTMCNC: 33.8 G/DL (ref 32–36)
MCV RBC AUTO: 90 FL (ref 82–98)
MONOCYTES # BLD AUTO: 0.9 K/UL (ref 0.3–1)
MONOCYTES NFR BLD: 16.6 % (ref 4–15)
NEUTROPHILS # BLD AUTO: 4.3 K/UL (ref 1.8–7.7)
NEUTROPHILS NFR BLD: 75 % (ref 38–73)
NRBC BLD-RTO: 0 /100 WBC
PHOSPHATE SERPL-MCNC: 3.4 MG/DL (ref 2.7–4.5)
PLATELET # BLD AUTO: 208 K/UL (ref 150–450)
PMV BLD AUTO: 9.6 FL (ref 9.2–12.9)
POTASSIUM SERPL-SCNC: 4.8 MMOL/L (ref 3.5–5.1)
PROT SERPL-MCNC: 7.4 G/DL (ref 6–8.4)
RBC # BLD AUTO: 4.36 M/UL (ref 4.6–6.2)
SODIUM SERPL-SCNC: 138 MMOL/L (ref 136–145)
T4 FREE SERPL-MCNC: 0.9 NG/DL (ref 0.71–1.51)
TSH SERPL DL<=0.005 MIU/L-ACNC: 4.37 UIU/ML (ref 0.4–4)
WBC # BLD AUTO: 5.67 K/UL (ref 3.9–12.7)

## 2022-08-24 PROCEDURE — 84100 ASSAY OF PHOSPHORUS: CPT | Performed by: INTERNAL MEDICINE

## 2022-08-24 PROCEDURE — 84443 ASSAY THYROID STIM HORMONE: CPT | Performed by: INTERNAL MEDICINE

## 2022-08-24 PROCEDURE — 36415 COLL VENOUS BLD VENIPUNCTURE: CPT | Mod: PO | Performed by: INTERNAL MEDICINE

## 2022-08-24 PROCEDURE — 84439 ASSAY OF FREE THYROXINE: CPT | Performed by: INTERNAL MEDICINE

## 2022-08-24 PROCEDURE — 85025 COMPLETE CBC W/AUTO DIFF WBC: CPT | Mod: PO | Performed by: INTERNAL MEDICINE

## 2022-08-24 PROCEDURE — 80053 COMPREHEN METABOLIC PANEL: CPT | Performed by: INTERNAL MEDICINE

## 2022-08-25 ENCOUNTER — PATIENT MESSAGE (OUTPATIENT)
Dept: ADMINISTRATIVE | Facility: OTHER | Age: 77
End: 2022-08-25
Payer: MEDICARE

## 2022-08-26 ENCOUNTER — SPECIALTY PHARMACY (OUTPATIENT)
Dept: PHARMACY | Facility: CLINIC | Age: 77
End: 2022-08-26

## 2022-08-26 ENCOUNTER — PATIENT MESSAGE (OUTPATIENT)
Dept: ADMINISTRATIVE | Facility: OTHER | Age: 77
End: 2022-08-26
Payer: MEDICARE

## 2022-08-26 ENCOUNTER — PATIENT MESSAGE (OUTPATIENT)
Dept: HEMATOLOGY/ONCOLOGY | Facility: CLINIC | Age: 77
End: 2022-08-26
Payer: MEDICARE

## 2022-08-26 NOTE — TELEPHONE ENCOUNTER
Patient had to discontinue Votrient for two weeks due to a liver procedure. He still has two week on hand and is waiting until his office visit on Monday to restart.

## 2022-08-27 ENCOUNTER — PATIENT MESSAGE (OUTPATIENT)
Dept: ADMINISTRATIVE | Facility: OTHER | Age: 77
End: 2022-08-27
Payer: MEDICARE

## 2022-08-28 ENCOUNTER — PATIENT MESSAGE (OUTPATIENT)
Dept: ADMINISTRATIVE | Facility: OTHER | Age: 77
End: 2022-08-28
Payer: MEDICARE

## 2022-08-29 ENCOUNTER — PATIENT MESSAGE (OUTPATIENT)
Dept: ADMINISTRATIVE | Facility: OTHER | Age: 77
End: 2022-08-29
Payer: MEDICARE

## 2022-08-30 ENCOUNTER — PATIENT MESSAGE (OUTPATIENT)
Dept: ADMINISTRATIVE | Facility: OTHER | Age: 77
End: 2022-08-30
Payer: MEDICARE

## 2022-08-31 ENCOUNTER — LAB VISIT (OUTPATIENT)
Dept: LAB | Facility: HOSPITAL | Age: 77
End: 2022-08-31
Attending: INTERNAL MEDICINE
Payer: MEDICARE

## 2022-08-31 ENCOUNTER — PATIENT MESSAGE (OUTPATIENT)
Dept: ADMINISTRATIVE | Facility: OTHER | Age: 77
End: 2022-08-31
Payer: MEDICARE

## 2022-08-31 DIAGNOSIS — C49.A3 GIST (GASTROINTESTINAL STROMAL TUMOR) OF SMALL BOWEL, MALIGNANT: ICD-10-CM

## 2022-08-31 LAB
ALBUMIN SERPL BCP-MCNC: 3.2 G/DL (ref 3.5–5.2)
ALP SERPL-CCNC: 399 U/L (ref 55–135)
ALT SERPL W/O P-5'-P-CCNC: 136 U/L (ref 10–44)
ANION GAP SERPL CALC-SCNC: 6 MMOL/L (ref 8–16)
AST SERPL-CCNC: 82 U/L (ref 10–40)
BILIRUB SERPL-MCNC: 0.4 MG/DL (ref 0.1–1)
BUN SERPL-MCNC: 36 MG/DL (ref 8–23)
CALCIUM SERPL-MCNC: 8.9 MG/DL (ref 8.7–10.5)
CHLORIDE SERPL-SCNC: 106 MMOL/L (ref 95–110)
CO2 SERPL-SCNC: 25 MMOL/L (ref 23–29)
CREAT SERPL-MCNC: 1.4 MG/DL (ref 0.5–1.4)
EST. GFR  (NO RACE VARIABLE): 51.4 ML/MIN/1.73 M^2
GLUCOSE SERPL-MCNC: 113 MG/DL (ref 70–110)
POTASSIUM SERPL-SCNC: 4.8 MMOL/L (ref 3.5–5.1)
PROT SERPL-MCNC: 7 G/DL (ref 6–8.4)
SODIUM SERPL-SCNC: 137 MMOL/L (ref 136–145)

## 2022-08-31 PROCEDURE — 80053 COMPREHEN METABOLIC PANEL: CPT | Performed by: INTERNAL MEDICINE

## 2022-08-31 PROCEDURE — 36415 COLL VENOUS BLD VENIPUNCTURE: CPT | Mod: PO | Performed by: INTERNAL MEDICINE

## 2022-08-31 NOTE — TELEPHONE ENCOUNTER
Patient states he is still holding. He may restart on tomorrow, depending on lab work. Patient states he has between 14-30 days on hand. He is unsure. Patient is agreeable to a call on next week. We will need to determine whether he has restarted and obtain an exact on-hand quantity.

## 2022-09-01 ENCOUNTER — PATIENT MESSAGE (OUTPATIENT)
Dept: ADMINISTRATIVE | Facility: OTHER | Age: 77
End: 2022-09-01
Payer: MEDICARE

## 2022-09-01 ENCOUNTER — PATIENT MESSAGE (OUTPATIENT)
Dept: HEMATOLOGY/ONCOLOGY | Facility: CLINIC | Age: 77
End: 2022-09-01
Payer: MEDICARE

## 2022-09-02 ENCOUNTER — PATIENT MESSAGE (OUTPATIENT)
Dept: ADMINISTRATIVE | Facility: OTHER | Age: 77
End: 2022-09-02
Payer: MEDICARE

## 2022-09-03 ENCOUNTER — PATIENT MESSAGE (OUTPATIENT)
Dept: ADMINISTRATIVE | Facility: OTHER | Age: 77
End: 2022-09-03
Payer: MEDICARE

## 2022-09-04 ENCOUNTER — PATIENT MESSAGE (OUTPATIENT)
Dept: ADMINISTRATIVE | Facility: OTHER | Age: 77
End: 2022-09-04
Payer: MEDICARE

## 2022-09-05 ENCOUNTER — PATIENT MESSAGE (OUTPATIENT)
Dept: ADMINISTRATIVE | Facility: OTHER | Age: 77
End: 2022-09-05
Payer: MEDICARE

## 2022-09-06 ENCOUNTER — LAB VISIT (OUTPATIENT)
Dept: LAB | Facility: HOSPITAL | Age: 77
End: 2022-09-06
Attending: INTERNAL MEDICINE
Payer: MEDICARE

## 2022-09-06 ENCOUNTER — PATIENT MESSAGE (OUTPATIENT)
Dept: ADMINISTRATIVE | Facility: OTHER | Age: 77
End: 2022-09-06
Payer: MEDICARE

## 2022-09-06 DIAGNOSIS — C49.A3 GIST (GASTROINTESTINAL STROMAL TUMOR) OF SMALL BOWEL, MALIGNANT: ICD-10-CM

## 2022-09-06 LAB
ALBUMIN SERPL BCP-MCNC: 3.4 G/DL (ref 3.5–5.2)
ALP SERPL-CCNC: 283 U/L (ref 55–135)
ALT SERPL W/O P-5'-P-CCNC: 62 U/L (ref 10–44)
ANION GAP SERPL CALC-SCNC: 6 MMOL/L (ref 8–16)
AST SERPL-CCNC: 47 U/L (ref 10–40)
BILIRUB SERPL-MCNC: 0.4 MG/DL (ref 0.1–1)
BUN SERPL-MCNC: 29 MG/DL (ref 8–23)
CALCIUM SERPL-MCNC: 9.3 MG/DL (ref 8.7–10.5)
CHLORIDE SERPL-SCNC: 103 MMOL/L (ref 95–110)
CO2 SERPL-SCNC: 24 MMOL/L (ref 23–29)
CREAT SERPL-MCNC: 1.4 MG/DL (ref 0.5–1.4)
EST. GFR  (NO RACE VARIABLE): 51.4 ML/MIN/1.73 M^2
GLUCOSE SERPL-MCNC: 98 MG/DL (ref 70–110)
POTASSIUM SERPL-SCNC: 4.7 MMOL/L (ref 3.5–5.1)
PROT SERPL-MCNC: 7.1 G/DL (ref 6–8.4)
SODIUM SERPL-SCNC: 133 MMOL/L (ref 136–145)

## 2022-09-06 PROCEDURE — 36415 COLL VENOUS BLD VENIPUNCTURE: CPT | Mod: PO | Performed by: INTERNAL MEDICINE

## 2022-09-06 PROCEDURE — 80053 COMPREHEN METABOLIC PANEL: CPT | Performed by: INTERNAL MEDICINE

## 2022-09-07 ENCOUNTER — PATIENT MESSAGE (OUTPATIENT)
Dept: ADMINISTRATIVE | Facility: OTHER | Age: 77
End: 2022-09-07
Payer: MEDICARE

## 2022-09-08 ENCOUNTER — PATIENT MESSAGE (OUTPATIENT)
Dept: ADMINISTRATIVE | Facility: OTHER | Age: 77
End: 2022-09-08
Payer: MEDICARE

## 2022-09-10 ENCOUNTER — PATIENT MESSAGE (OUTPATIENT)
Dept: ADMINISTRATIVE | Facility: OTHER | Age: 77
End: 2022-09-10
Payer: MEDICARE

## 2022-09-11 ENCOUNTER — PATIENT MESSAGE (OUTPATIENT)
Dept: ADMINISTRATIVE | Facility: OTHER | Age: 77
End: 2022-09-11
Payer: MEDICARE

## 2022-09-12 ENCOUNTER — PATIENT MESSAGE (OUTPATIENT)
Dept: ADMINISTRATIVE | Facility: OTHER | Age: 77
End: 2022-09-12
Payer: MEDICARE

## 2022-09-12 ENCOUNTER — LAB VISIT (OUTPATIENT)
Dept: LAB | Facility: HOSPITAL | Age: 77
End: 2022-09-12
Attending: INTERNAL MEDICINE
Payer: MEDICARE

## 2022-09-12 ENCOUNTER — OFFICE VISIT (OUTPATIENT)
Dept: CARDIOLOGY | Facility: CLINIC | Age: 77
End: 2022-09-12
Payer: MEDICARE

## 2022-09-12 VITALS
SYSTOLIC BLOOD PRESSURE: 170 MMHG | HEIGHT: 71 IN | DIASTOLIC BLOOD PRESSURE: 80 MMHG | BODY MASS INDEX: 25.32 KG/M2 | HEART RATE: 56 BPM | OXYGEN SATURATION: 98 %

## 2022-09-12 DIAGNOSIS — E78.00 HYPERCHOLESTEREMIA: ICD-10-CM

## 2022-09-12 DIAGNOSIS — Z00.00 ANNUAL PHYSICAL EXAM: ICD-10-CM

## 2022-09-12 DIAGNOSIS — I25.10 CORONARY ARTERY DISEASE INVOLVING NATIVE CORONARY ARTERY OF NATIVE HEART WITHOUT ANGINA PECTORIS: Primary | ICD-10-CM

## 2022-09-12 DIAGNOSIS — R53.83 FATIGUE, UNSPECIFIED TYPE: ICD-10-CM

## 2022-09-12 DIAGNOSIS — I10 PRIMARY HYPERTENSION: ICD-10-CM

## 2022-09-12 DIAGNOSIS — C49.A3 GIST (GASTROINTESTINAL STROMAL TUMOR) OF SMALL BOWEL, MALIGNANT: ICD-10-CM

## 2022-09-12 LAB
ALBUMIN SERPL BCP-MCNC: 3.5 G/DL (ref 3.5–5.2)
ALP SERPL-CCNC: 273 U/L (ref 55–135)
ALT SERPL W/O P-5'-P-CCNC: 78 U/L (ref 10–44)
ANION GAP SERPL CALC-SCNC: 8 MMOL/L (ref 8–16)
AST SERPL-CCNC: 56 U/L (ref 10–40)
BASOPHILS # BLD AUTO: 0.03 K/UL (ref 0–0.2)
BASOPHILS NFR BLD: 0.5 % (ref 0–1.9)
BILIRUB SERPL-MCNC: 0.5 MG/DL (ref 0.1–1)
BUN SERPL-MCNC: 28 MG/DL (ref 8–23)
CALCIUM SERPL-MCNC: 9.7 MG/DL (ref 8.7–10.5)
CHLORIDE SERPL-SCNC: 102 MMOL/L (ref 95–110)
CO2 SERPL-SCNC: 25 MMOL/L (ref 23–29)
CREAT SERPL-MCNC: 1.2 MG/DL (ref 0.5–1.4)
DIFFERENTIAL METHOD: ABNORMAL
EOSINOPHIL # BLD AUTO: 0.1 K/UL (ref 0–0.5)
EOSINOPHIL NFR BLD: 1.1 % (ref 0–8)
ERYTHROCYTE [DISTWIDTH] IN BLOOD BY AUTOMATED COUNT: 18.4 % (ref 11.5–14.5)
EST. GFR  (NO RACE VARIABLE): >60 ML/MIN/1.73 M^2
GLUCOSE SERPL-MCNC: 123 MG/DL (ref 70–110)
HCT VFR BLD AUTO: 40.7 % (ref 40–54)
HGB BLD-MCNC: 13.5 G/DL (ref 14–18)
IMM GRANULOCYTES # BLD AUTO: 0.05 K/UL (ref 0–0.04)
IMM GRANULOCYTES NFR BLD AUTO: 0.8 % (ref 0–0.5)
LYMPHOCYTES # BLD AUTO: 0.4 K/UL (ref 1–4.8)
LYMPHOCYTES NFR BLD: 5.7 % (ref 18–48)
MCH RBC QN AUTO: 30.5 PG (ref 27–31)
MCHC RBC AUTO-ENTMCNC: 33.2 G/DL (ref 32–36)
MCV RBC AUTO: 92 FL (ref 82–98)
MONOCYTES # BLD AUTO: 1.1 K/UL (ref 0.3–1)
MONOCYTES NFR BLD: 16.5 % (ref 4–15)
NEUTROPHILS # BLD AUTO: 5 K/UL (ref 1.8–7.7)
NEUTROPHILS NFR BLD: 76.2 % (ref 38–73)
NRBC BLD-RTO: 0 /100 WBC
PLATELET # BLD AUTO: 155 K/UL (ref 150–450)
PMV BLD AUTO: 10.5 FL (ref 9.2–12.9)
POTASSIUM SERPL-SCNC: 4.2 MMOL/L (ref 3.5–5.1)
PROT SERPL-MCNC: 7.1 G/DL (ref 6–8.4)
RBC # BLD AUTO: 4.43 M/UL (ref 4.6–6.2)
SODIUM SERPL-SCNC: 135 MMOL/L (ref 136–145)
TSH SERPL DL<=0.005 MIU/L-ACNC: 1.79 UIU/ML (ref 0.4–4)
WBC # BLD AUTO: 6.54 K/UL (ref 3.9–12.7)

## 2022-09-12 PROCEDURE — 84443 ASSAY THYROID STIM HORMONE: CPT | Performed by: INTERNAL MEDICINE

## 2022-09-12 PROCEDURE — 99214 OFFICE O/P EST MOD 30 MIN: CPT | Mod: S$PBB,,, | Performed by: INTERNAL MEDICINE

## 2022-09-12 PROCEDURE — 93010 ELECTROCARDIOGRAM REPORT: CPT | Mod: ,,, | Performed by: INTERNAL MEDICINE

## 2022-09-12 PROCEDURE — 99214 PR OFFICE/OUTPT VISIT, EST, LEVL IV, 30-39 MIN: ICD-10-PCS | Mod: S$PBB,,, | Performed by: INTERNAL MEDICINE

## 2022-09-12 PROCEDURE — 99999 PR PBB SHADOW E&M-EST. PATIENT-LVL IV: CPT | Mod: PBBFAC,,, | Performed by: INTERNAL MEDICINE

## 2022-09-12 PROCEDURE — 99214 OFFICE O/P EST MOD 30 MIN: CPT | Mod: PBBFAC,PO | Performed by: INTERNAL MEDICINE

## 2022-09-12 PROCEDURE — 36415 COLL VENOUS BLD VENIPUNCTURE: CPT | Mod: PO | Performed by: INTERNAL MEDICINE

## 2022-09-12 PROCEDURE — 85025 COMPLETE CBC W/AUTO DIFF WBC: CPT | Mod: PO | Performed by: INTERNAL MEDICINE

## 2022-09-12 PROCEDURE — 93010 EKG 12-LEAD: ICD-10-PCS | Mod: ,,, | Performed by: INTERNAL MEDICINE

## 2022-09-12 PROCEDURE — 80053 COMPREHEN METABOLIC PANEL: CPT | Performed by: INTERNAL MEDICINE

## 2022-09-12 PROCEDURE — 99999 PR PBB SHADOW E&M-EST. PATIENT-LVL IV: ICD-10-PCS | Mod: PBBFAC,,, | Performed by: INTERNAL MEDICINE

## 2022-09-12 PROCEDURE — 93005 ELECTROCARDIOGRAM TRACING: CPT | Mod: PO

## 2022-09-12 RX ORDER — PREDNISONE 20 MG/1
20 TABLET ORAL DAILY
COMMUNITY
Start: 2022-09-09 | End: 2022-01-01

## 2022-09-12 RX ORDER — NITROGLYCERIN 0.4 MG/1
0.4 TABLET SUBLINGUAL EVERY 5 MIN PRN
Qty: 20 TABLET | Refills: 3 | Status: SHIPPED | OUTPATIENT
Start: 2022-09-12 | End: 2023-01-01

## 2022-09-12 RX ORDER — CETIRIZINE HYDROCHLORIDE 5 MG/1
5 TABLET ORAL NIGHTLY
COMMUNITY

## 2022-09-12 RX ORDER — METOPROLOL SUCCINATE 100 MG/1
100 TABLET, EXTENDED RELEASE ORAL DAILY
COMMUNITY
Start: 2022-08-13 | End: 2022-01-01

## 2022-09-12 RX ORDER — LANOLIN ALCOHOL/MO/W.PET/CERES
1000 CREAM (GRAM) TOPICAL DAILY
COMMUNITY

## 2022-09-12 RX ORDER — ATORVASTATIN CALCIUM 40 MG/1
40 TABLET, FILM COATED ORAL DAILY
Qty: 90 TABLET | Refills: 3 | Status: SHIPPED | OUTPATIENT
Start: 2022-09-12

## 2022-09-12 RX ORDER — SODIUM CHLORIDE 50 MG/ML
1 SOLUTION/ DROPS OPHTHALMIC 3 TIMES DAILY
COMMUNITY

## 2022-09-12 RX ORDER — GUAIFENESIN 1200 MG/1
1200 TABLET, EXTENDED RELEASE ORAL 2 TIMES DAILY
COMMUNITY

## 2022-09-12 NOTE — PROGRESS NOTES
"Subjective:    Patient ID:  Forrest Schmidt is a 77 y.o. male who presents for follow-up of Follow-up      HPI77 yo WM with GIST being treated by oncology at [facility]. Also hx of PTCA in 2004, HTn and HLD. BP has been fluctuating depending on type of therapy he is getting for the GIST. Denies chest pain, SOB, or edema  Denies palpitations, weak spells, and syncope     Review of Systems   Cardiovascular:  Negative for chest pain, claudication, cyanosis, dyspnea on exertion, irregular heartbeat, leg swelling, near-syncope, orthopnea, palpitations, paroxysmal nocturnal dyspnea and syncope.      Objective:    Physical Exam  Constitutional:       General: He is not in acute distress.     Appearance: He is well-developed. He is not diaphoretic.      Comments: BP (!) 170/80   Pulse (!) 56   Ht 5' 11" (1.803 m)   SpO2 98%   BMI 25.32 kg/m²      HENT:      Head: Normocephalic and atraumatic.   Eyes:      General: Lids are normal. No scleral icterus.        Right eye: No discharge.      Conjunctiva/sclera: Conjunctivae normal.      Pupils: Pupils are equal, round, and reactive to light.   Neck:      Thyroid: No thyromegaly.      Vascular: No JVD.      Trachea: No tracheal deviation.   Cardiovascular:      Rate and Rhythm: Normal rate and regular rhythm.      Pulses: Intact distal pulses.           Carotid pulses are 2+ on the right side and 2+ on the left side.       Radial pulses are 2+ on the right side and 2+ on the left side.        Femoral pulses are 2+ on the right side and 2+ on the left side.       Popliteal pulses are 2+ on the right side and 2+ on the left side.        Dorsalis pedis pulses are 2+ on the right side and 2+ on the left side.        Posterior tibial pulses are 2+ on the right side and 2+ on the left side.      Heart sounds: Normal heart sounds, S1 normal and S2 normal. No murmur heard.    No friction rub. No gallop.   Pulmonary:      Effort: Pulmonary effort is normal. No respiratory distress.      " Breath sounds: Normal breath sounds. No wheezing or rales.   Chest:      Chest wall: No tenderness.   Abdominal:      General: Bowel sounds are normal. There is no distension.      Palpations: Abdomen is soft. There is no hepatomegaly or mass.      Tenderness: There is no abdominal tenderness. There is no guarding or rebound.   Musculoskeletal:         General: No tenderness. Normal range of motion.      Cervical back: Normal range of motion and neck supple.   Lymphadenopathy:      Cervical: No cervical adenopathy.   Skin:     General: Skin is warm and dry.      Coloration: Skin is not pale.      Findings: No erythema or rash.   Neurological:      Mental Status: He is alert and oriented to person, place, and time.      Cranial Nerves: No cranial nerve deficit.      Coordination: Coordination normal.      Deep Tendon Reflexes: Reflexes are normal and symmetric.   Psychiatric:         Speech: Speech normal.         Behavior: Behavior normal.         Thought Content: Thought content normal.         Judgment: Judgment normal.         Assessment:       1. Coronary artery disease involving native coronary artery of native heart without angina pectoris    2. Hypercholesteremia    3. Primary hypertension         Plan:     The current medical regimen is effective;  continue present plan and medications.     Gets routine lab and BP follow ups with Oncology    No orders of the defined types were placed in this encounter.    Follow up in about 6 months (around 3/12/2023).

## 2022-09-13 ENCOUNTER — PATIENT MESSAGE (OUTPATIENT)
Dept: ADMINISTRATIVE | Facility: OTHER | Age: 77
End: 2022-09-13
Payer: MEDICARE

## 2022-09-13 NOTE — TELEPHONE ENCOUNTER
Outgoing call to patient. Per patient restarted Votrient 9/8 on Votrient 3 tabs PO Qday (rather then 4 tabs Qday) per pt has 1 whole bottle + 5 tabs (more then 1 month on hand). Will pend refill for 3 weeks. Asked pt to call back if needed medication sooner.

## 2022-09-14 ENCOUNTER — PATIENT MESSAGE (OUTPATIENT)
Dept: ADMINISTRATIVE | Facility: OTHER | Age: 77
End: 2022-09-14
Payer: MEDICARE

## 2022-09-14 ENCOUNTER — OFFICE VISIT (OUTPATIENT)
Dept: HEMATOLOGY/ONCOLOGY | Facility: CLINIC | Age: 77
End: 2022-09-14
Payer: MEDICARE

## 2022-09-14 VITALS
RESPIRATION RATE: 18 BRPM | HEIGHT: 71 IN | WEIGHT: 179.25 LBS | HEART RATE: 58 BPM | SYSTOLIC BLOOD PRESSURE: 121 MMHG | DIASTOLIC BLOOD PRESSURE: 59 MMHG | BODY MASS INDEX: 25.1 KG/M2 | OXYGEN SATURATION: 95 % | TEMPERATURE: 98 F

## 2022-09-14 DIAGNOSIS — C49.A3 GIST (GASTROINTESTINAL STROMAL TUMOR) OF SMALL BOWEL, MALIGNANT: Primary | Chronic | ICD-10-CM

## 2022-09-14 DIAGNOSIS — C78.7 METASTASIS TO LIVER: ICD-10-CM

## 2022-09-14 DIAGNOSIS — R74.01 TRANSAMINITIS: ICD-10-CM

## 2022-09-14 DIAGNOSIS — R53.83 FATIGUE, UNSPECIFIED TYPE: ICD-10-CM

## 2022-09-14 DIAGNOSIS — C79.89 METASTATIC CANCER TO PELVIS: ICD-10-CM

## 2022-09-14 DIAGNOSIS — I10 ESSENTIAL HYPERTENSION: ICD-10-CM

## 2022-09-14 PROCEDURE — 99999 PR PBB SHADOW E&M-EST. PATIENT-LVL III: ICD-10-PCS | Mod: PBBFAC,,, | Performed by: INTERNAL MEDICINE

## 2022-09-14 PROCEDURE — 99213 OFFICE O/P EST LOW 20 MIN: CPT | Mod: PBBFAC | Performed by: INTERNAL MEDICINE

## 2022-09-14 PROCEDURE — 99999 PR PBB SHADOW E&M-EST. PATIENT-LVL III: CPT | Mod: PBBFAC,,, | Performed by: INTERNAL MEDICINE

## 2022-09-14 PROCEDURE — 99215 OFFICE O/P EST HI 40 MIN: CPT | Mod: S$PBB,,, | Performed by: INTERNAL MEDICINE

## 2022-09-14 PROCEDURE — 99215 PR OFFICE/OUTPT VISIT, EST, LEVL V, 40-54 MIN: ICD-10-PCS | Mod: S$PBB,,, | Performed by: INTERNAL MEDICINE

## 2022-09-14 NOTE — PROGRESS NOTES
"                  Patient ID: Forrest Schmidt is a 77 y.o. male.     Chief Complaint:  Follow up for GIST, wild-type     DIAGNOSIS:   1. 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 Recurrent oligametastatic GIST in the liver found on 5/20/19. S/p ablation on 7/23/19 at [facility]  3. 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 He initially presented with melena, syncope, hypotension on 2/5/18 at [facility]. Colonoscopy showed old blood in the terminal ileum. CT abdomen/pelvis showed an ileal mass that measures approximately 5 cm. He was transferred to [facility] and underwent segmental small bowel resection by Dr. Sanchez on 2/15/18. Pathology showed a 6-cm GIST, histologic type: GIST, mixed; mitotic rate 9 mitoses/5 mm2, G2, high grade, high risk, margins negative. Cd117+, pathologic T3NxMx.  He had incisional wound infection after the surgery and took antibiotics for that.   2. Started Gleevec on 4/11/18. Foundation One results came back on 4/24/18 neg for KIT mutations. Long discussion with Mr and Mrs Schmidt on 4/25 regarding likely the lack of benefit from adjuvant Gleevec (please see note from that day for details). Mr. Schmidt would like to continue with Gleevec for now. He stopped Gleevec after he went to see Dr. Guaman at [facility]. CT abdomen/pelvis on 11/12/18 was negative for recurrent disease.   3. CT scan on 5/20/19 showed a Nonspecific hepatic hypodensity at the dome of the liver near the inferior vena cava   4. S/p liver lesion biopsy and ablation at [facility] on 7/23/19. Biopsy showed metastatic GIST positive for DOG-1 and . The amount of tumor was insufficient for molecular testing.   5. Surveillance MRI on 2/11/2020 showed "a right " "external iliac metastatic deposit measuring 4.6 x 3.5 cm (axial stir series 9, image 20), previously measuring 1.3 cm.  There is a metastatic deposit within the right anterior pelvis measuring 1.9 cm (series 9, image 13), previously not seen."  6. Gleevec 400 mg daily from 3/14/20 to present. CT at [facility] on 6/1/20 showed progressive disease in the peritoneal masses.   7. Underwent cytoreductive surgery at [facility] on 7/10/2020.   8. Dr Guaman recommends starting regorafenib after surgery. However, insurance does not cover it unless patient fails sutent. Patient started sutent 37.5 mg daily on 8/10/2020.   9. CT scan 10/5/20: A metastatic lesion in segment VII of the liver adjacent to the right hepatic vein and inferior vena cava remains overall stable, measuring approximately 2.1 cm (series 6 image 166). There is another lesion in segment V measuring approximately 1.1 cm (series 6 image 188), suspect for metastatic disease and not well seen on the prior study.  10. CT scan 12/7/2020: "Mild enlargement of liver metastases. A new (nonspecific) subcentimeter hypodensity in segment 6 can be followed on future exams. Mild areas of pneumatosis affecting the ileum in the lower abdomen. Recommend clinical correlation with abdominal pain and drug regimen." Patient underwent IR ablation to segment 5 lesion and re-treatment of segment 7 lesion. Sutent was continued           11. CT scan 9/30/21: No definitive CT evidence of new or increasing metastatic disease in the chest, abdomen or pelvis.  Decrease in size of segment 5 and 7 ablation zones/cavities. Stable subcentimeter pulmonary nodules/nodular densities.  12. CT CAP at [facility] 1/12/22: New small liver lesions are suspicious for metastases. An enlarging 4 mm left upper lobe pulmonary nodule may be infectious/inflammatory or metastatic. Treatment was changed to regorafenib. Patient started on 1/29/2022   13. Progression noted on CT scan 3/23/22: "Further increased size of multiple " "small hypoenhancing liver lesions, in keeping with metastases. Liver protocol CT at follow-up may be helpful for improved visualization of small liver metastases. Enlarging peritoneal nodules, in keeping with carcinomatosis. Enlarging left upper lobe nodule, concerning for metastasis."  14. Patient started temodar 85 mg/m2 daily x 21 days every 28-day cycle on 2022. Cycle 2 started on 22.   15. CT C/A/P 22 showed "Enlarging peritoneal and hepatic metastases compared to 2022. Enlarging left upper lobe pulmonary nodules suspicious for metastatic disease"  16. Treatment switched to pazopanib. Patient started on 6/10/22. Started with escalating dose. Reached 800 mg daily on 22. Held for 1 week before and 3 weeks after Y90 on 22. Resumed at 600 mg on 22. Had significant fatigue when he was on 800 mg daily.       History of Present Illness:   Mr. Schmidt returns today for continued follow up. He started on 6/10/22. Started with escalating dose. Reached 800 mg daily on 22. Held for 1 week before and 3 weeks after Y90 on 22. Resumed at 600 mg on 22. Hdad significant fatigue when on 800 mg daily. He is feeling well. Needs to take prevacid 40 mg for 6 weeks after Y90. He is concerned about decreased absorption of votrient. He does have bad heart burn. He had RLQ pain last week. Was given prednisone x 4 days by PCP. Pain is better.     ECO     ROS:   See HPI, otherwise negative.      Physical Examination:   Vital signs from [facility]  and in clinic reviewed.   Gen: well hydrated, well developed. In no acute distress  HEENT: normocephalic, anicteric sclerae, EOMI  Neck: supple, no JVD or cervical LAD  Lungs: CTAB, no wheezing/rales/rhonchi  Heart: RRR, no M/R/G  Psych: pleasant and appropriate mood and affect  Neuro: alert and oriented x 4     Objective:      Laboratory Data:  Labs reviewed. Cr 1.2     Imaging Data:  CT C/A/P 8/15/22:  1. Slightly enlarging hepatic " metastasis compared to CT of 5/31/2022.     2. Stable and enlarging peritoneal metastasis.     3. Stable pulmonary nodules.    Assessment and Plan:      1. 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 Metastasis to liver    3. 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 Fatigue, unspecified type    5. Transaminitis    6. Essential hypertension      1-3  - Mr. 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 His GIST is negative for KIT, PDGFRA, SDH, BRAF, NF1, NF2 mutations. He had oligometastatic disease to the liver found on CT scan 5/20/19. He went to [facility] and underwent IR liver lesion biopsy and ablation on 7/23/19. Pathology showed metastatic GIST positive for DOG-1 and .   - surveillance MRI in Feb 2020 showed progressive disease with new peritoneal mets. Patient was seen by Dr Guaman. Gleevec was recommended.   - CT after 2.5 month of Gleevec showed progressive peritoneal disease.   - underwent cytoreductive surgery at [facility] on 7/10/20  - started sutent 37.5 mg daily on 8/10/20.   - s/p ablation to segment 5 lesion and re-treatment of segment 7 lesion in Dec 2020.   - CT scan 1/12/22 showed progression. Treatment was switched to regorafenib. Started on 1/29/22  - CT 3/23/22 showed progression in liver metastases, peritoneal mets and left lung nodule  - started temodar 85 mg/m2 3 weeks on, 1 week off on 4/6/22. CT on 5/31/22 showed progression  - started pazopanib on 6/10/22 with an escalating dose, reached 800 mg daily on 6/24/22  - Held for 1 week before and 3 weeks after Y90 on 8/17/22. Resumed at 600 mg on 9/8/22. Had significant fatigue when he was on 800 mg daily.   - given mild elevated in AST/ALT, will continue with votrient 600 mg for another 2 weeks. Repeat CMP at that time. If liver enzymes stable and he is tolerating it well, will increase votrient to 800 mg daily  - scheduled  to see Dr Guaman the beginning of November.   - rogaratinib clinical trial available at [facility].   - virtual visit with me in one month  - discussed with patient if RLQ pain gets worse, he needs to let me know and we will get a scan. Patient understands and agrees with the plan. Pain is better now.     4.  - monitor TSH when on TKI    5.  - mild. 2/2 Y90 and TKI  - monitor    6.  - baseline HTN. Worse when on TKI  - BP controlled    RTC in one month  Encouraged to call should issues arise      Route Chart for Scheduling    Med Onc Chart Routing      Follow up with physician 1 month. local labs at [place]. CMP in 2 weeks. virtual visit with me in one month with CBC, CMP, TSH checked 1 day prior at [place]   Follow up with [facility]    Infusion scheduling note    Injection scheduling note    Labs CBC, CMP and TSH   Lab interval:  CMP in 2 weeks, CBC, CMP, TSH in one month   Imaging    Pharmacy appointment    Other referrals

## 2022-09-15 ENCOUNTER — PATIENT MESSAGE (OUTPATIENT)
Dept: ADMINISTRATIVE | Facility: OTHER | Age: 77
End: 2022-09-15
Payer: MEDICARE

## 2022-09-15 ENCOUNTER — TELEPHONE (OUTPATIENT)
Dept: CARDIOLOGY | Facility: CLINIC | Age: 77
End: 2022-09-15
Payer: MEDICARE

## 2022-09-15 DIAGNOSIS — Z00.00 ANNUAL PHYSICAL EXAM: Primary | ICD-10-CM

## 2022-09-15 NOTE — TELEPHONE ENCOUNTER
----- Message from AMADA Peralta sent at 9/15/2022 12:57 PM CDT -----  Mr Schmidt had EKG done on 9/12/22 and there is no order in EPIC.  Please place order in EPIC so that EKG can be read and processed.    Thanks  Elmo

## 2022-09-16 ENCOUNTER — PATIENT MESSAGE (OUTPATIENT)
Dept: ADMINISTRATIVE | Facility: OTHER | Age: 77
End: 2022-09-16
Payer: MEDICARE

## 2022-09-17 ENCOUNTER — PATIENT MESSAGE (OUTPATIENT)
Dept: ADMINISTRATIVE | Facility: OTHER | Age: 77
End: 2022-09-17
Payer: MEDICARE

## 2022-09-18 ENCOUNTER — PATIENT MESSAGE (OUTPATIENT)
Dept: ADMINISTRATIVE | Facility: OTHER | Age: 77
End: 2022-09-18
Payer: MEDICARE

## 2022-09-19 ENCOUNTER — PATIENT MESSAGE (OUTPATIENT)
Dept: ADMINISTRATIVE | Facility: OTHER | Age: 77
End: 2022-09-19
Payer: MEDICARE

## 2022-09-20 ENCOUNTER — PATIENT MESSAGE (OUTPATIENT)
Dept: ADMINISTRATIVE | Facility: OTHER | Age: 77
End: 2022-09-20
Payer: MEDICARE

## 2022-09-21 ENCOUNTER — PATIENT MESSAGE (OUTPATIENT)
Dept: ADMINISTRATIVE | Facility: OTHER | Age: 77
End: 2022-09-21
Payer: MEDICARE

## 2022-09-22 ENCOUNTER — PATIENT MESSAGE (OUTPATIENT)
Dept: ADMINISTRATIVE | Facility: OTHER | Age: 77
End: 2022-09-22
Payer: MEDICARE

## 2022-09-23 ENCOUNTER — PATIENT MESSAGE (OUTPATIENT)
Dept: ADMINISTRATIVE | Facility: OTHER | Age: 77
End: 2022-09-23
Payer: MEDICARE

## 2022-09-24 ENCOUNTER — PATIENT MESSAGE (OUTPATIENT)
Dept: ADMINISTRATIVE | Facility: OTHER | Age: 77
End: 2022-09-24
Payer: MEDICARE

## 2022-09-25 ENCOUNTER — PATIENT MESSAGE (OUTPATIENT)
Dept: ADMINISTRATIVE | Facility: OTHER | Age: 77
End: 2022-09-25
Payer: MEDICARE

## 2022-09-26 ENCOUNTER — PATIENT MESSAGE (OUTPATIENT)
Dept: ADMINISTRATIVE | Facility: OTHER | Age: 77
End: 2022-09-26
Payer: MEDICARE

## 2022-09-26 NOTE — PROGRESS NOTES
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 Pancolonic wall thickening and submucosal enhancement. Correlate for  infectious versus inflammatory colitis. 2.  No pulmonary embolism or acute airspace disease. Chronic constipation  - failed linzess, now on Trulance PRN buthasn't ever taken any  - BM every few days -miralax and fiber didn't help  - Very little to no rectal tone - hasn't done suppositories. 2019 : [facility]  -- ulceration, mucosal friability and hemorrhage in the left colon, from rectum to splenic flexure, biopsied; appearance most consistent with left-sided ulcerative colitis  -- prolapsed internal hemorrhoids  -- lax rectal tone  -- sub-optimal bowel preparation  Bx: Although the distribution is somewhat unusual, the morphologic features of   superficial mucosal necrosis and ulceration favor ischemic colitis. Also in the differential diagnosis is self-limited colitis (possibly pseudomembranous). Lymphoplasmacytic infiltrate in the lamina propria, as associated with ulcerative colitis, is not identified. 3/2022 : [facility]   -Minimal erythema of sigmoid and descending colon from 20-40cm; biopsied  -Remainder of colon is unremarkable; biopsied are obtained in the ascending colon and the rectum separately  -Small grade 1 internal hemorrhoid  Bx:  In the reference to specimen #2, the findings are focal and [facility]  Adult Nutrition  Progress Note    SUMMARY     Recommendations    Recommendation/Intervention: ADAT to Cl as tolerated. IF tolerated cont. to escalate diet to GI soft. Encourage PO intake > 75%.  If PO intake< 50% Provide Boost (breeze for CL Plus for more adv'ed diet). RD to follow  Goals: pt to consume nutrients >/= 85% EEN/EPN   Nutrition Goal Status: progressing towards goal  Communication of RD Recs: reviewed with RN    Continuum of Care Plan         Reason for Assessment    Reason for Assessment: per organizational policy (NPO>5days)  Diagnosis:  (Gastrointestinal hemorrhage)  Relevant Medical History: anticoagulants, HTN, GIST   Interdisciplinary Rounds: did not attend     General Information Comments: pt still complaining of  abdominal pain/ Bloating. state 1 very sm BM yesterday. RN noted Enema scheduled for today. no c/o NVD    Nutrition Discharge Planning: JUAN    Nutrition Prescription Ordered    Current Diet Order: NPO     Evaluation of Received Nutrients/Fluid Intake     Energy Calories Required: not meeting needs  % Kcal Needs: 0   Protein Required: not meeting needs  % Protein Needs: 0  IV Fluid (mL): 2400   I/O: +2.9L since admit  Fluid Required: meeting needs  Comments: LBM: 2/19/18     % Intake of Estimated Energy Needs: 0 - 25 %  % Meal Intake: NPO     Nutrition Risk Screen     Nutrition Risk Screen: no indicators present    Nutrition/Diet History    Patient Reported Diet/Restrictions/Preferences: general  Typical Food/Fluid Intake: adequate PTA  Food Preferences: pt to consume nutrients >/= 85% EEN/EPN      Labs/Tests/Procedures/Meds    Diagnostic Test/Procedure Review: reviewed  Pertinent Labs Reviewed: reviewed  Pertinent Labs Comments: Glu-120, Ca-8.4, Alb-2.4  Pertinent Medications Reviewed: reviewed  Pertinent Medications Comments:    atorvastatin  40 mg Oral QHS    Cialis (tadalafil) 5 mg - half tablet  5 mg Oral Daily    docusate sodium  100 mg Oral BID  "   enoxaparin  40 mg Subcutaneous Daily    fluticasone  1 spray Each Nare BID    magnesium sulfate IVPB  2 g Intravenous Once    pantoprazole  40 mg Oral BID      sodium chloride    dextrose 50%    glucagon (human recombinant)    glucose    ondansetron    oxyCODONE-acetaminophen    promethazine (PHENERGAN) IVPB    sodium chloride 0.9%     Physical Findings    Overall Physical Appearance: lethargic, nourished     Anthropometrics    Temp: 97.3 °F (36.3 °C)  Height: 5' 10.98" (180.3 cm)  Weight Method: Bed Scale  Weight: 73 kg (160 lb 15 oz)  Ideal Body Weight (IBW), Male: 171.88 lb  % Ideal Body Weight, Male (lb): 93.64 lb  BMI (Calculated): 22.5  BMI Grade: 18.5-24.9 - normal        Estimated/Assessed Needs    Weight Used For Calorie Calculations: 73 kg (160 lb 15 oz)   Energy Calorie Requirements (kcal): 1764  Energy Need Method: Sanford-St Jeor (x 1.2)   RMR (Sanford-St. Jeor Equation): 1496.87   Weight Used For Protein Calculations: 73 kg (160 lb 15 oz)  Protein Requirements: 73-88  Fluid Requirements (mL): per MD  Fluid Need Method: RDA Method   RDA Method (mL): 1764   CHO Requirement: na     Assessment and Plan    * Gastrointestinal hemorrhage    Nutrition Problem  Inadequate oral intake    Related to (etiology):   GI hemorrhage 2/2 ileus    Signs and Symptoms (as evidenced by):   NPO Day+5    Interventions/Recommendations (treatment strategy):  See recs above     Nutrition Diagnosis Status:   New                Monitor and Evaluation    Food and Nutrient Intake: energy intake, food and beverage intake  Food and Nutrient Adminstration: diet order  Physical Activity and Function: nutrition-related ADLs and IADLs  Anthropometric Measurements: weight, weight change  Biochemical Data, Medical Tests and Procedures:  (All labs)  Nutrition-Focused Physical Findings: overall appearance    Nutrition Risk    Level of Risk:  (f/u 2x/wk)    Nutrition Follow-Up    RD Follow-up?: Yes  " minimal.   Possible causes include acute self-limited/ infectious colitis, ischemia,   NSAID or bowel preparation changes and less likely inflammatory bowel   disease related changes. Hypovolemic  shock - on pressors  Concern for sepsis      Plan:   No plan for colonoscopy at this time - potentially later in the week if stabilizes. Added IBD panel  Optimize blood pressure   Follow labs  Continue with antibiotics. Complete stool studies for cdiff and enteric panel  Rest per primary team.     Plan discussed with Dr Facundo Luna. Thank you for consultation. Subjective:     HISTORY OF PRESENT ILLNESS:     Veronica Zarco is an 63 y.o.  female who we are asked to see for complaint of recurrent colitis - pan colitis on CT scan worse on left side. Medical history includes spina bifida, HTN, chronic constipation, neurogenic bladder. Pt presented to the hospital yesterday for sudden on set of elevated HR and chest pain. Pt was having some abdominal cramping and thought she was going to have a BM but laid down on bed ad symptoms quickly turned for feeling weak, dizzy, chest pain, and heart rate at home was in the 150s. She denies any abdominal pain (beside cramping), hematochezia, nausea, vomiting. She had large BM in ER with some manual disimpaction. She was hypotensive on arrival and currently on pressors but is alert and oriented and proving all history. On chart review, pt has had an interesting history from having pan colitis in 2019 with bx showing likely ischemic vs ulcerative colitis. Pt was offered but never started or staying on mesalamine. Then  thought simply ischemic colitis. Pt was struggling with constipation and has been on linzess (stopped as it gave her shortness of breath) and now on trulance PRN. She was using miralax and metamucil but didn't feel it helped. She also had colitis and acute decompensation in 3/2022 and another colonoscopy noting colitis but less likely IBD.      Past Medical History:   Diagnosis Date    COVID-19 05/2021    Hypercholesterolemia     Hypertension     Spina bifida [facility])       Past Surgical History:   Procedure Laterality Date    COLONOSCOPY N/A 5/1/2019    COLONOSCOPY performed by Indira Hyatt MD at [facility] ENDOSCOPY    COLONOSCOPY N/A 3/17/2022    COLONOSCOPY performed by Michael Lau MD at [facility] ENDOSCOPY    COLONOSCOPY,DIAGNOSTIC  5/1/2019         COLONOSCOPY,DIAGNOSTIC  3/17/2022         HX COLONOSCOPY  03/2022    HX OTHER SURGICAL  1890s    sacral wound flap repair     Social History     Tobacco Use    Smoking status: Never    Smokeless tobacco: Never   Substance Use Topics    Alcohol use: Never      Family History   Problem Relation Age of Onset    Other Mother         breast cancer, Aortic Dissection     Other Father         Bypass x5, shrinking brain     Other Sister         heart stents, asthma, no spleen       Allergies   Allergen Reactions    Sulfa (Sulfonamide Antibiotics) Nausea and Vomiting    Topiramate Other (comments)      tingling sensation in hands and face      Prior to Admission medications    Medication Sig Start Date End Date Taking? Authorizing Provider   lisinopriL (PRINIVIL, ZESTRIL) 5 mg tablet Take 1 Tablet by mouth daily. 3/23/22  Yes Vince Le III, DO   simvastatin (ZOCOR) 20 mg tablet TAKE 1 TABLET BY MOUTH EVERY DAY 2/3/22  Yes Vince Le III, DO   lisinopriL (PRINIVIL, ZESTRIL) 10 mg tablet TAKE 1 TABLET BY MOUTH EVERY DAY 8/28/22   Daryle Rosales B III, DO   celecoxib (CELEBREX) 200 mg capsule Take 1 Capsule by mouth every twelve (12) hours as needed for Pain. 8/1/22   Daryle Rosales B III, DO   cyanocobalamin (VITAMIN B12) 1,000 mcg/mL injection 1 mL by IntraMUSCular route every thirty (30) days. 6/1/22   Daryle Rosales B III, DO   acetaminophen (TYLENOL) 500 mg tablet Take 500 mg by mouth every six (6) hours as needed for Pain.     Provider, Historical   plecanatide (Trulance) 3 mg tab Take 3 mg by mouth daily. 3/23/22   Ray Cost, DO       Patient Active Problem List   Diagnosis Code    Hypotension I95.9    Severe sepsis ([facility]) A41.9, R65.20    B12 deficiency E53.8    Severe obesity ([facility]) E66.01    Iron deficiency anemia D50.9    Neurogenic bladder N31.9    Spina bifida ([facility] [address] Q05.9    Mixed hyperlipidemia E78.2    Colitis K52.9    Arnold-Chiari malformation, type I ([facility]) G93.5    Hydrocephalus ([facility]) G91.9    Tension vascular headache G44.209    Sinus headache R51.9    Paraplegia ([facility]) G82.20    Acute alteration in mental status R41.82    Convulsion ([facility]) R56.9       REVIEW OF SYSTEMS:    Constitutional: negative fever, negative chills, negative weight loss  Eyes:   negative visual changes  ENT:   negative sore throat, tongue or lip swelling   Respiratory:  negative cough, negative dyspnea  Cards:  negative for chest pain, palpitations, lower extremity edema  GI:   See HPI  :  negative for frequency, dysuria  Integument:  negative for rash and pruritus  Heme:  negative for easy bruising and gum/nose bleeding  Musculoskel: negative for myalgias,  back pain   Neuro: negative for headaches, dizziness, vertigo  Psych: negative for feelings of anxiety, depression     Pertinent Positives:  Weakness, tachycardia    Objective:   VITALS:    Visit Vitals  /75   Pulse (!) 109   Temp 99 °F (37.2 °C)   Resp 23   Ht 4' 6\" (1.372 m)   Wt 64 kg (141 lb 1.5 oz)   SpO2 97%   BMI 34.02 kg/m²       PHYSICAL EXAM:   General:          Alert, hx of spina bifida, WN, cooperative, no distress, appears stated age. Head:               Normocephalic, without obvious abnormality, atraumatic. Eyes:               Conjunctivae clear and pale, anicteric sclerae. Pupils are equal  Nose:               Nares normal. No drainage or sinus tenderness. Throat:             Lips, mucosa, and tongue normal.   Neck:               Supple, symmetrical,  no adenopathy, thyroid: non tender  Back:               Symmetric.   Lungs: CTA bilaterally. No wheezing/rhonchi/rales. Chest wall:      No tenderness or deformity. No Accessory muscle use. Heart:              Regular rate and rhythm, tachycardic, no murmur, rub or gallop. Abdomen:        Soft, non-tender. Not distended. Bowel sounds normal.   Extremities:     Atraumatic, No cyanosis. No edema. Short and contracted. Skin:                Texture, turgor normal. No rashes/lesions/jaundice  Lymph:            Cervical, supraclavicular normal.  Psych:             Good insight. Not depressed. Not anxious or agitated. Neurologic:      EOMs intact. No facial asymmetry. No aphasia or slurred speech. Wheelchair boun,d hx of of spina bifida, A/O X 3.      LAB DATA REVIEWED:    Recent Results (from the past 24 hour(s))   EKG, 12 LEAD, INITIAL    Collection Time: 09/25/22  2:55 PM   Result Value Ref Range    Ventricular Rate 142 BPM    Atrial Rate 142 BPM    P-R Interval 124 ms    QRS Duration 74 ms    Q-T Interval 296 ms    QTC Calculation (Bezet) 455 ms    Calculated P Axis 48 degrees    Calculated R Axis 67 degrees    Calculated T Axis 32 degrees    Diagnosis       Sinus tachycardia  When compared with ECG of 28-APR-2019 19:21,  Nonspecific T wave abnormality has replaced inverted T waves in Inferior   leads     BLOOD GAS,CHEM8,LACTIC ACID POC    Collection Time: 09/25/22  3:25 PM   Result Value Ref Range    Calcium, ionized (POC) 1.17 1.12 - 1.32 mmol/L    BICARBONATE 21 mmol/L    Base deficit (POC) 3.1 mmol/L    Sample source VENOUS BLOOD      CO2, POC 21 19 - 24 MMOL/L    Sodium,  136 - 145 MMOL/L    Potassium, POC 5.9 (H) 3.5 - 5.5 MMOL/L    Chloride,  100 - 108 MMOL/L    Glucose,  (H) 74 - 106 MG/DL    Creatinine, POC 0.6 0.6 - 1.3 MG/DL    Lactic Acid (POC) 3.51 (HH) 0.40 - 2.00 mmol/L    Critical value read back SEGURA     pH, venous (POC) 7.39 7.32 - 7.42      pCO2, venous (POC) 35.1 (L) 41 - 51 MMHG    pO2, venous (POC) 52 (H) 25 - 40 mmHg   CBC WITH AUTOMATED DIFF    Collection Time: 09/25/22  4:15 PM   Result Value Ref Range    WBC 11.0 3.6 - 11.0 K/uL    RBC 5.09 3.80 - 5.20 M/uL    HGB 15.8 11.5 - 16.0 g/dL    HCT 47.7 (H) 35.0 - 47.0 %    MCV 93.7 80.0 - 99.0 FL    MCH 31.0 26.0 - 34.0 PG    MCHC 33.1 30.0 - 36.5 g/dL    RDW 11.9 11.5 - 14.5 %    PLATELET 703 (H) 781 - 400 K/uL    MPV 10.2 8.9 - 12.9 FL    NRBC 0.0 0  WBC    ABSOLUTE NRBC 0.00 0.00 - 0.01 K/uL    NEUTROPHILS 67 32 - 75 %    LYMPHOCYTES 29 12 - 49 %    MONOCYTES 4 (L) 5 - 13 %    EOSINOPHILS 0 0 - 7 %    BASOPHILS 0 0 - 1 %    IMMATURE GRANULOCYTES 0 0.0 - 0.5 %    ABS. NEUTROPHILS 7.2 1.8 - 8.0 K/UL    ABS. LYMPHOCYTES 3.2 0.8 - 3.5 K/UL    ABS. MONOCYTES 0.4 0.0 - 1.0 K/UL    ABS. EOSINOPHILS 0.0 0.0 - 0.4 K/UL    ABS. BASOPHILS 0.0 0.0 - 0.1 K/UL    ABS. IMM. GRANS. 0.0 0.00 - 0.04 K/UL    DF AUTOMATED     METABOLIC PANEL, COMPREHENSIVE    Collection Time: 09/25/22  4:15 PM   Result Value Ref Range    Sodium 135 (L) 136 - 145 mmol/L    Potassium 4.1 3.5 - 5.1 mmol/L    Chloride 102 97 - 108 mmol/L    CO2 19 (L) 21 - 32 mmol/L    Anion gap 14 5 - 15 mmol/L    Glucose 174 (H) 65 - 100 mg/dL    BUN 17 6 - 20 MG/DL    Creatinine 0.71 0.55 - 1.02 MG/DL    BUN/Creatinine ratio 24 (H) 12 - 20      GFR est AA >60 >60 ml/min/1.73m2    GFR est non-AA >60 >60 ml/min/1.73m2    Calcium 10.5 (H) 8.5 - 10.1 MG/DL    Bilirubin, total 1.3 (H) 0.2 - 1.0 MG/DL    ALT (SGPT) 97 (H) 12 - 78 U/L    AST (SGOT) 43 (H) 15 - 37 U/L    Alk.  phosphatase 91 45 - 117 U/L    Protein, total 8.6 (H) 6.4 - 8.2 g/dL    Albumin 4.8 3.5 - 5.0 g/dL    Globulin 3.8 2.0 - 4.0 g/dL    A-G Ratio 1.3 1.1 - 2.2     NT-PRO BNP    Collection Time: 09/25/22  4:15 PM   Result Value Ref Range    NT pro-BNP 46 <125 PG/ML   TROPONIN-HIGH SENSITIVITY    Collection Time: 09/25/22  4:15 PM   Result Value Ref Range    Troponin-High Sensitivity 48 0 - 51 ng/L   CULTURE, BLOOD, PAIRED    Collection Time: 09/25/22  4:15 PM    Specimen: Blood   Result Value Ref Range    Special Requests: NO SPECIAL REQUESTS      Culture result: NO GROWTH AFTER 15 HOURS     MAGNESIUM    Collection Time: 09/25/22  4:15 PM   Result Value Ref Range    Magnesium 2.6 (H) 1.6 - 2.4 mg/dL   URINALYSIS W/ REFLEX CULTURE    Collection Time: 09/26/22  1:50 AM    Specimen: Miscellaneous sample; Urine    Urine specimen   Result Value Ref Range    Color DARK YELLOW      Appearance CLEAR CLEAR      Specific gravity 1.015 1.003 - 1.030      pH (UA) 5.0 5.0 - 8.0      Protein Negative NEG mg/dL    Glucose Negative NEG mg/dL    Ketone 15 (A) NEG mg/dL    Blood Negative NEG      Urobilinogen 1.0 0.2 - 1.0 EU/dL    Nitrites Negative NEG      Leukocyte Esterase TRACE (A) NEG      UA:UC IF INDICATED CULTURE NOT INDICATED BY UA RESULT CNI      WBC 0-4 0 - 4 /hpf    RBC 0-5 0 - 5 /hpf    Epithelial cells FEW FEW /lpf    Bacteria Negative NEG /hpf    Hyaline cast 2-5 0 - 2 /lpf   BILIRUBIN, CONFIRM    Collection Time: 09/26/22  1:50 AM   Result Value Ref Range    Bilirubin UA, confirm Negative NEG     LACTIC ACID    Collection Time: 09/26/22  2:04 AM   Result Value Ref Range    Lactic acid 2.9 (HH) 0.4 - 2.0 MMOL/L   CBC W/O DIFF    Collection Time: 09/26/22  2:04 AM   Result Value Ref Range    WBC 17.3 (H) 3.6 - 11.0 K/uL    RBC 4.12 3.80 - 5.20 M/uL    HGB 13.1 11.5 - 16.0 g/dL    HCT 39.6 35.0 - 47.0 %    MCV 96.1 80.0 - 99.0 FL    MCH 31.8 26.0 - 34.0 PG    MCHC 33.1 30.0 - 36.5 g/dL    RDW 12.3 11.5 - 14.5 %    PLATELET 818 378 - 323 K/uL    MPV 9.5 8.9 - 12.9 FL    NRBC 0.0 0  WBC    ABSOLUTE NRBC 0.00 0.00 - 5.84 K/uL   METABOLIC PANEL, BASIC    Collection Time: 09/26/22  2:04 AM   Result Value Ref Range    Sodium 138 136 - 145 mmol/L    Potassium 3.8 3.5 - 5.1 mmol/L    Chloride 111 (H) 97 - 108 mmol/L    CO2 20 (L) 21 - 32 mmol/L    Anion gap 7 5 - 15 mmol/L    Glucose 172 (H) 65 - 100 mg/dL    BUN 12 6 - 20 MG/DL    Creatinine 0.72 0.55 - 1.02 MG/DL    BUN/Creatinine ratio 17 12 - 20      GFR est AA >60 >60 ml/min/1.73m2    GFR est non-AA >60 >60 ml/min/1.73m2    Calcium 8.2 (L) 8.5 - 10.1 MG/DL   MAGNESIUM    Collection Time: 09/26/22  2:04 AM   Result Value Ref Range    Magnesium 2.2 1.6 - 2.4 mg/dL   PHOSPHORUS    Collection Time: 09/26/22  2:04 AM   Result Value Ref Range    Phosphorus 2.4 (L) 2.6 - 4.7 MG/DL   PROCALCITONIN    Collection Time: 09/26/22  2:04 AM   Result Value Ref Range    Procalcitonin 6.12 ng/mL   LACTIC ACID    Collection Time: 09/26/22  9:46 AM   Result Value Ref Range    Lactic acid 1.5 0.4 - 2.0 MMOL/L       IMAGING RESULTS:  I have personally reviewed the imaging reports    DATE 9/25/22 : EXAM: CTA CHEST W OR W WO CONT     INDICATION: Tachycardic, hypotension'      COMPARISON: None      CONTRAST: 100 mL of Isovue-370. TECHNIQUE:   Helical thin section chest CT following uneventful intravenous administration of  nonionic contrast according to departmental PE protocol. Coronal and sagittal  reformats were performed. 3D/MIP post processing was performed. CT dose  reduction was achieved through use of a standardized protocol tailored for this  examination and automatic exposure control for dose modulation. Following the uneventful intravenous administration of contrast, thin axial  images were obtained through the abdomen and pelvis. Coronal and sagittal  reconstructions were generated. Oral contrast was not administered. CT dose  reduction was achieved through use of a standardized protocol tailored for this  examination and automatic exposure control for dose modulation. FINDINGS:      This is a good quality study for the evaluation of pulmonary embolism to the  first subsegmental arterial level. There is no pulmonary embolism to this level. CHEST WALL: No axillary or supraclavicular lymphadenopathy. THYROID: No nodule. MEDIASTINUM: No mass or lymphadenopathy. RONAN: No mass or lymphadenopathy. THORACIC AORTA: No aneurysm.   HEART: Normal in size. ESOPHAGUS: No wall thickening or dilatation. TRACHEA/BRONCHI: Patent. PLEURA: No effusion or pneumothorax. LUNGS: No nodule, mass, or airspace disease. LIVER: Hepatic steatosis. No focal masses. BILIARY TREE: Gallbladder is within normal limits. CBD is not dilated. SPLEEN: within normal limits. PANCREAS: No mass or ductal dilatation. ADRENALS: Unremarkable. KIDNEYS: No mass, calculus, or hydronephrosis. STOMACH: Unremarkable. SMALL BOWEL: No dilatation or wall thickening. COLON: Diffuse wall thickening of the colon with submucosal enhancement, most  affecting the left colon, sigmoid, and rectum. APPENDIX: Unremarkable  PERITONEUM: No ascites or pneumoperitoneum. RETROPERITONEUM: No lymphadenopathy or aortic aneurysm. REPRODUCTIVE ORGANS: Uterus is unremarkable. No adnexal masses. URINARY BLADDER: Decompressed by Tee catheter. BONES: No destructive bone lesion. Chronic deformities of the bilateral femoral  heads with mild subluxation. ABDOMINAL WALL: No mass or hernia. ADDITIONAL COMMENTS: N/A     IMPRESSION  1. Pancolonic wall thickening and submucosal enhancement. Correlate for  infectious versus inflammatory colitis. 2.  No pulmonary embolism or acute airspace disease. Total time spent with patient: 50 minutes ________________________________________________________________________  Care Plan discussed with:  Patient y   Family  Y- sister   ALENA Lanier              Consultant:       CT  9/26/2022:  ________________________________________________________________________    ___________________________________________________  Consulting Provider:  Alda Sanchez NP      9/26/2022  1:25 PM

## 2022-09-27 ENCOUNTER — PATIENT MESSAGE (OUTPATIENT)
Dept: ADMINISTRATIVE | Facility: OTHER | Age: 77
End: 2022-09-27
Payer: MEDICARE

## 2022-09-28 ENCOUNTER — PATIENT MESSAGE (OUTPATIENT)
Dept: ADMINISTRATIVE | Facility: OTHER | Age: 77
End: 2022-09-28
Payer: MEDICARE

## 2022-09-28 ENCOUNTER — LAB VISIT (OUTPATIENT)
Dept: LAB | Facility: HOSPITAL | Age: 77
End: 2022-09-28
Attending: INTERNAL MEDICINE
Payer: MEDICARE

## 2022-09-28 DIAGNOSIS — C49.A3 GIST (GASTROINTESTINAL STROMAL TUMOR) OF SMALL BOWEL, MALIGNANT: Chronic | ICD-10-CM

## 2022-09-28 LAB
ALBUMIN SERPL BCP-MCNC: 3.5 G/DL (ref 3.5–5.2)
ALP SERPL-CCNC: 209 U/L (ref 55–135)
ALT SERPL W/O P-5'-P-CCNC: 86 U/L (ref 10–44)
ANION GAP SERPL CALC-SCNC: 7 MMOL/L (ref 8–16)
AST SERPL-CCNC: 60 U/L (ref 10–40)
BILIRUB SERPL-MCNC: 0.5 MG/DL (ref 0.1–1)
BUN SERPL-MCNC: 25 MG/DL (ref 8–23)
CALCIUM SERPL-MCNC: 8.9 MG/DL (ref 8.7–10.5)
CHLORIDE SERPL-SCNC: 104 MMOL/L (ref 95–110)
CO2 SERPL-SCNC: 27 MMOL/L (ref 23–29)
CREAT SERPL-MCNC: 1.2 MG/DL (ref 0.5–1.4)
EST. GFR  (NO RACE VARIABLE): >60 ML/MIN/1.73 M^2
GLUCOSE SERPL-MCNC: 116 MG/DL (ref 70–110)
POTASSIUM SERPL-SCNC: 4.7 MMOL/L (ref 3.5–5.1)
PROT SERPL-MCNC: 6.2 G/DL (ref 6–8.4)
SODIUM SERPL-SCNC: 138 MMOL/L (ref 136–145)

## 2022-09-28 PROCEDURE — 80053 COMPREHEN METABOLIC PANEL: CPT | Performed by: INTERNAL MEDICINE

## 2022-09-28 PROCEDURE — 36415 COLL VENOUS BLD VENIPUNCTURE: CPT | Mod: PO | Performed by: INTERNAL MEDICINE

## 2022-09-29 ENCOUNTER — PATIENT MESSAGE (OUTPATIENT)
Dept: HEMATOLOGY/ONCOLOGY | Facility: CLINIC | Age: 77
End: 2022-09-29
Payer: MEDICARE

## 2022-09-29 ENCOUNTER — PATIENT MESSAGE (OUTPATIENT)
Dept: ADMINISTRATIVE | Facility: OTHER | Age: 77
End: 2022-09-29
Payer: MEDICARE

## 2022-09-30 ENCOUNTER — PATIENT MESSAGE (OUTPATIENT)
Dept: ADMINISTRATIVE | Facility: OTHER | Age: 77
End: 2022-09-30
Payer: MEDICARE

## 2022-10-01 ENCOUNTER — PATIENT MESSAGE (OUTPATIENT)
Dept: ADMINISTRATIVE | Facility: OTHER | Age: 77
End: 2022-10-01
Payer: MEDICARE

## 2022-10-02 ENCOUNTER — PATIENT MESSAGE (OUTPATIENT)
Dept: ADMINISTRATIVE | Facility: OTHER | Age: 77
End: 2022-10-02
Payer: MEDICARE

## 2022-10-03 ENCOUNTER — PATIENT MESSAGE (OUTPATIENT)
Dept: ADMINISTRATIVE | Facility: OTHER | Age: 77
End: 2022-10-03
Payer: MEDICARE

## 2022-10-04 ENCOUNTER — PATIENT MESSAGE (OUTPATIENT)
Dept: ADMINISTRATIVE | Facility: OTHER | Age: 77
End: 2022-10-04
Payer: MEDICARE

## 2022-10-05 ENCOUNTER — PATIENT MESSAGE (OUTPATIENT)
Dept: ADMINISTRATIVE | Facility: OTHER | Age: 77
End: 2022-10-05
Payer: MEDICARE

## 2022-10-05 ENCOUNTER — PATIENT MESSAGE (OUTPATIENT)
Dept: PHARMACY | Facility: CLINIC | Age: 77
End: 2022-10-05
Payer: MEDICARE

## 2022-10-05 NOTE — TELEPHONE ENCOUNTER
Patient states he has 45- Votrient 200 mg tablets on hand and is taking 3 tablets daily as instructed by his MD Kian oncologist. His next appt and labs are on 10/12 with Dr. Umana. Will follow up on 10/13 to determine whether he will remain on current dose: 3 per daily or be switched back to 4 per day.     If he will remain on 3 tablets per day, then we will require another prescription.

## 2022-10-06 ENCOUNTER — PATIENT MESSAGE (OUTPATIENT)
Dept: ADMINISTRATIVE | Facility: OTHER | Age: 77
End: 2022-10-06
Payer: MEDICARE

## 2022-10-06 DIAGNOSIS — C49.A3 GIST (GASTROINTESTINAL STROMAL TUMOR) OF SMALL BOWEL, MALIGNANT: ICD-10-CM

## 2022-10-06 RX ORDER — PAZOPANIB 200 MG/1
600 TABLET ORAL DAILY
Qty: 90 TABLET | Refills: 11 | OUTPATIENT
Start: 2022-10-06 | End: 2022-01-01 | Stop reason: SDUPTHER

## 2022-10-14 NOTE — TELEPHONE ENCOUNTER
Specialty Pharmacy - Refill Coordination    Specialty Medication Orders Linked to Encounter      Flowsheet Row Most Recent Value   Medication #1 PAZOpanib (VOTRIENT) 200 mg Tab (Order#[id number], Rx#[id number])            Refill Questions - Documented Responses      Flowsheet Row Most Recent Value   Patient Availability and HIPAA Verification    Does patient want to proceed with activity? Yes   HIPAA/medical authority confirmed? Yes   Relationship to patient of person spoken to? Self   Refill Screening Questions    Changes to allergies? No   Changes to medications? No   New conditions since last clinic visit? No   Unplanned office visit, urgent care, ED, or hospital admission in the last 4 weeks? No   How does patient/caregiver feel medication is working? Excellent   Financial problems or insurance changes? No   How many doses of your specialty medications were missed in the last 4 weeks? 0   Would patient like to speak to a pharmacist? No   When does the patient need to receive the medication? 10/18/22   Refill Delivery Questions    How will the patient receive the medication? [facility]   When does the patient need to receive the medication? 10/18/22   Shipping Address Home   Address in [facility] confirmed and updated if neccessary? Yes   Expected Copay ($) 77.4   Is the patient able to afford the medication copay? Yes   Payment Method CC on file   Days supply of Refill 30   Supplies needed? No supplies needed   Refill activity completed? Yes   Refill activity plan Refill scheduled   Shipment/Pickup Date: 10/17/22            Current Outpatient Medications   Medication Sig    arginine 500 mg tablet Take 1,000 mg by mouth once daily.     aspirin (ECOTRIN) 81 MG EC tablet Take 81 mg by mouth.    atorvastatin (LIPITOR) 40 MG tablet Take 1 tablet (40 mg total) by mouth once daily.    BORON ORAL Take 3 mg by mouth once daily.    calcium carbonate (TUMS) 200 mg calcium (500 mg) chewable tablet Take 500 mg by mouth.     cetirizine (ZYRTEC) 5 MG tablet Take 5 mg by mouth.    chlorthalidone (HYGROTEN) 25 MG Tab Half tablet po daily    cholecalciferol, vitamin D3, (VITAMIN D3) 50 mcg (2,000 unit) Cap Take 1 capsule by mouth once daily.    cyanocobalamin (VITAMIN B-12) 1000 MCG tablet Take 100 mcg by mouth once daily.    diphenoxylate-atropine 2.5-0.025 mg (LOMOTIL) 2.5-0.025 mg per tablet Take 1 tablet by mouth 4 (four) times daily as needed for Diarrhea. (Patient not taking: No sig reported)    fish oil-omega-3 fatty acids 300-1,000 mg capsule Take 1 g by mouth once daily.    fluticasone (FLONASE) 50 mcg/actuation nasal spray 1 spray by Each Nare route 2 (two) times daily.    guaiFENesin 1,200 mg Ta12 Take by mouth.    losartan (COZAAR) 100 MG tablet Take 1 tablet (100 mg total) by mouth once daily.    magnesium oxide-Mg AA chelate (MG-PLUS-PROTEIN) 133 mg Tab Take 1 tablet by mouth.    metoprolol succinate (TOPROL-XL) 100 MG 24 hr tablet Take 100 mg by mouth once daily.    multivitamin (THERAGRAN) per tablet Take 1 tablet by mouth once daily.    NIFEdipine (PROCARDIA-XL) 60 MG (OSM) 24 hr tablet Take 1 tablet (60 mg total) by mouth once daily.    nitroGLYCERIN (NITROSTAT) 0.4 MG SL tablet Place 1 tablet (0.4 mg total) under the tongue every 5 (five) minutes as needed for Chest pain.    ondansetron (ZOFRAN-ODT) 8 MG TbDL Take 1 tablet (8 mg total) by mouth every 6 (six) hours as needed (nausea). (Patient not taking: Reported on 9/12/2022)    PAZOpanib (VOTRIENT) 200 mg Tab Take 600 mg by mouth once daily.    predniSONE (DELTASONE) 20 MG tablet Take 20 mg by mouth once daily.    promethazine (PHENERGAN) 25 MG tablet Take 1 tablet (25 mg total) by mouth every 6 (six) hours as needed for Nausea. (Patient not taking: No sig reported)    SOD CHLOR,SOD BICARB/NETI POT (NEILMED NASAFLO NASL) 1 spray by Nasal route every evening.    sodium chloride 5% () 5 % ophthalmic solution Apply 1 drop to eye.    tadalafil (CIALIS) 5 MG tablet  "Take 5 mg by mouth once daily at 6am.    testosterone cypionate (DEPOTESTOTERONE CYPIONATE) 200 mg/mL injection Inject 200 mg into the muscle every 14 (fourteen) days.     topiramate (TOPAMAX) 25 MG tablet Take 1 tablet (25 mg total) by mouth daily as needed (headache). (Patient not taking: Reported on 9/12/2022)    zinc gluconate 50 mg tablet Take 50 mg by mouth every Monday and Thursday.    Last reviewed on 9/14/2022  4:44 PM by Robert Umana MD    Review of patient's allergies indicates:   Allergen Reactions    Augmentin [amoxicillin-pot clavulanate] Shortness Of Breath     disoriented    Fexofenadine Other (See Comments)     Pt states he can't remember the details but it was a bad effect.    Singulair [montelukast] Other (See Comments)     Pt "felt strange"  Pt "felt strange" bloating, Intestinal irritation, abd cramping      Ace inhibitors Other (See Comments)     cough    Captopril     Doxycycline Nausea And Vomiting     headache    Horse/equine containing products Hives    Hydrocodone Nausea Only    Scopolamine hbr Other (See Comments)     Nausea, headache, changes in BP     Last reviewed on  10/6/2022 12:09 PM by Robert Umana      Tasks added this encounter   11/10/2022 - Refill Call (Auto Added)   Tasks due within next 3 months   11/28/2022 - Clinical - Follow Up Assesement (180 day)     Levon Resendiz, PharmD  [facility]  [address] [place] [id number]  Phone: [phone]  Fax: [phone]        "

## 2022-10-17 NOTE — TELEPHONE ENCOUNTER
New RX received for Votrient 800 mg/day. Outgoing call to pt to  on dose increase. Pt voiced understanding.

## 2022-10-17 NOTE — PROGRESS NOTES
The patient location is: home  The chief complaint leading to consultation is: metastatic GIST    Visit type: audiovisual    Face to Face time with patient: 15 min   30 minutes of total time spent on the encounter, which includes face to face time and non-face to face time preparing to see the patient (eg, review of tests), Obtaining and/or reviewing separately obtained history, Documenting clinical information in the electronic or other health record, Independently interpreting results (not separately reported) and communicating results to the patient/family/caregiver, or Care coordination (not separately reported).         Each patient to whom he or she provides medical services by telemedicine is:  (1) informed of the relationship between the physician and patient and the respective role of any other health care provider with respect to management of the patient; and (2) notified that he or she may decline to receive medical services by telemedicine and may withdraw from such care at any time.    Notes:                       Patient ID: Forrest Schmidt is a 77 y.o. male.     Chief Complaint:  Follow up for GIST, wild-type     DIAGNOSIS:   1. 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 Recurrent oligametastatic GIST in the liver found on 5/20/19. S/p ablation on 7/23/19 at [facility]  3. 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 He initially presented with melena, syncope, hypotension on 2/5/18 at OSH. Colonoscopy showed old blood in the terminal ileum. CT abdomen/pelvis showed an ileal mass that measures approximately 5 cm. He was transferred to [facility] and underwent segmental small bowel resection by Dr. Sanchez on 2/15/18. Pathology showed a  "6-cm GIST, histologic type: GIST, mixed; mitotic rate 9 mitoses/5 mm2, G2, high grade, high risk, margins negative. Cd117+, pathologic T3NxMx.  He had incisional wound infection after the surgery and took antibiotics for that.   2. Started Gleevec on 4/11/18. Foundation One results came back on 4/24/18 neg for KIT mutations. Long discussion with Mr and Mrs Schmidt on 4/25 regarding likely the lack of benefit from adjuvant Gleevec (please see note from that day for details). Mr. Schmidt would like to continue with Gleevec for now. He stopped Gleevec after he went to see Dr. Guaman at [facility]. CT abdomen/pelvis on 11/12/18 was negative for recurrent disease.   3. CT scan on 5/20/19 showed a Nonspecific hepatic hypodensity at the dome of the liver near the inferior vena cava   4. S/p liver lesion biopsy and ablation at [facility] on 7/23/19. Biopsy showed metastatic GIST positive for DOG-1 and . The amount of tumor was insufficient for molecular testing.   5. Surveillance MRI on 2/11/2020 showed "a right external iliac metastatic deposit measuring 4.6 x 3.5 cm (axial stir series 9, image 20), previously measuring 1.3 cm.  There is a metastatic deposit within the right anterior pelvis measuring 1.9 cm (series 9, image 13), previously not seen."  6. Gleevec 400 mg daily from 3/14/20 to present. CT at [facility] on 6/1/20 showed progressive disease in the peritoneal masses.   7. Underwent cytoreductive surgery at [facility] on 7/10/2020.   8. Dr Guaman recommends starting regorafenib after surgery. However, insurance does not cover it unless patient fails sutent. Patient started sutent 37.5 mg daily on 8/10/2020.   9. CT scan 10/5/20: A metastatic lesion in segment VII of the liver adjacent to the right hepatic vein and inferior vena cava remains overall stable, measuring approximately 2.1 cm (series 6 image 166). There is another lesion in segment V measuring approximately 1.1 cm (series 6 image 188), suspect for metastatic " "disease and not well seen on the prior study.  10. CT scan 12/7/2020: "Mild enlargement of liver metastases. A new (nonspecific) subcentimeter hypodensity in segment 6 can be followed on future exams. Mild areas of pneumatosis affecting the ileum in the lower abdomen. Recommend clinical correlation with abdominal pain and drug regimen." Patient underwent IR ablation to segment 5 lesion and re-treatment of segment 7 lesion. Sutent was continued           11. CT scan 9/30/21: No definitive CT evidence of new or increasing metastatic disease in the chest, abdomen or pelvis.  Decrease in size of segment 5 and 7 ablation zones/cavities. Stable subcentimeter pulmonary nodules/nodular densities.  12. CT CAP at [facility] 1/12/22: New small liver lesions are suspicious for metastases. An enlarging 4 mm left upper lobe pulmonary nodule may be infectious/inflammatory or metastatic. Treatment was changed to regorafenib. Patient started on 1/29/2022   13. Progression noted on CT scan 3/23/22: "Further increased size of multiple small hypoenhancing liver lesions, in keeping with metastases. Liver protocol CT at follow-up may be helpful for improved visualization of small liver metastases. Enlarging peritoneal nodules, in keeping with carcinomatosis. Enlarging left upper lobe nodule, concerning for metastasis."  14. Patient started temodar 85 mg/m2 daily x 21 days every 28-day cycle on 4/6/2022. Cycle 2 started on 5/5/22.   15. CT C/A/P 5/31/22 showed "Enlarging peritoneal and hepatic metastases compared to 03/23/2022. Enlarging left upper lobe pulmonary nodules suspicious for metastatic disease"  16. Treatment switched to pazopanib. Patient started on 6/10/22. Started with escalating dose. Reached 800 mg daily on 6/24/22. Held for 1 week before and 3 weeks after Y90 on 8/17/22. Resumed at 600 mg on 9/8/22. Had significant fatigue when he was on 800 mg daily.     History of Present Illness:   Mr. Schmidt returns today for continued follow " up. Resumed votrient at 600 mg on 22. Has been tolerating it well so far. BP well controlled. Mild fatigue and stable headache. Drinking plenty of fluids.     ECO     ROS:   See HPI, otherwise negative.      Physical Examination:   Vital signs from [facility]  reviewed. BP good  Gen: well hydrated, well developed. In no acute distress  HEENT: normocephalic, anicteric sclerae, EOMI  Psych: pleasant and appropriate mood and affect  Neuro: alert and oriented x 4     Objective:      Laboratory Data:  Labs reviewed. Cr 1.6     Imaging Data:  CT C/A/P 8/15/22:  1. Slightly enlarging hepatic metastasis compared to CT of 2022.     2. Stable and enlarging peritoneal metastasis.     3. Stable pulmonary nodules.    Assessment and Plan:      1. 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 Metastasis to liver    3. 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 Immunodeficiency secondary to neoplasm    6. Essential hypertension    7. RENÉ (acute kidney injury)    8. Fatigue, unspecified type        1-5  - Mr. 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 His GIST is negative for KIT, PDGFRA, SDH, BRAF, NF1, NF2 mutations. He had oligometastatic disease to the liver found on CT scan 19. He went to [facility] and underwent IR liver lesion biopsy and ablation on 19. Pathology showed metastatic GIST positive for DOG-1 and .   - surveillance MRI in 2020 showed progressive disease with new peritoneal mets. Patient was seen by Dr Guaman. Gleevec was recommended.   - CT after 2.5 month of Gleevec showed progressive peritoneal disease.   - underwent cytoreductive surgery at [facility] on 7/10/20  - started sutent 37.5 mg daily on 8/10/20.   - s/p ablation to segment 5 lesion and re-treatment of segment 7 lesion in Dec 2020.   - CT scan 22  showed progression. Treatment was switched to regorafenib. Started on 1/29/22  - CT 3/23/22 showed progression in liver metastases, peritoneal mets and left lung nodule  - started temodar 85 mg/m2 3 weeks on, 1 week off on 4/6/22. CT on 5/31/22 showed progression  - started pazopanib on 6/10/22 with an escalating dose, reached 800 mg daily on 6/24/22  - Held for 1 week before and 3 weeks after Y90 on 8/17/22. Resumed at 600 mg on 9/8/22. Had significant fatigue when he was on 800 mg daily.   - doing well. Elevatedion in AST/ALT after Y90 has resolved  - Increase votrient to 800 mg daily. He will increase it after he is seen at [facility] next week. Going to [facility] next week to discuss rogaratinib clinical trial. Going to [facility] on 11/2 and 11/3 for restaging and a consultation with surgeon   - see me in one month for follow up    6.  - home log reviewed. BP good  - c/w current meds    7.  - patient feels well. Drinking plenty of fluids  - check US renal at [facility] to r/o hydronephrosis    8.  - monitor TSH when on TKI        RTC in one month  Encouraged to call should issues arise      Route Chart for Scheduling    Med Onc Chart Routing  Urgent    Follow up with physician 1 month. please schedule US renal at [facility]. see me in 1 month with labs checked at [facility] 1-2 days prior to return   Follow up with [facility]    Infusion scheduling note    Injection scheduling note    Labs CBC, CMP and TSH   Lab interval: every 4 weeks     Imaging Other   US renal at [facility]   Pharmacy appointment    Other referrals

## 2022-11-01 NOTE — TELEPHONE ENCOUNTER
Per Dr. Umana, pt is still on 600 mg dose. Gave permission to addend rx to 600 mg/day from 800 mg/day. Updated rx in [facility].

## 2022-11-01 NOTE — TELEPHONE ENCOUNTER
New RX received for 800 mg of Votrient. Outgoing call to pt to  on dose increase and pt stated he is still taking the 600 mg and has not been told to increase to 800 mg. Message sent to [facility] to confirm dose. Will continue to follow up.

## 2022-11-09 NOTE — TELEPHONE ENCOUNTER
Outgoing call to patient: Monitor pt for restarting medication. Pt reports to stopping his medication 11/10 due being enrolled in clinical trial and requiring a 2 week period without Votrient before starting in trial. Pt reports 12 days of Votrient on hand. Pt ok to have follow-up 11/16 after provider appt 11/15. Asked pt to call back if medication restarted or if he needs medication sooner

## 2022-11-15 NOTE — PROGRESS NOTES
"                  Patient ID: Forrest Schmidt is a 77 y.o. male.     Chief Complaint:  Follow up for GIST, wild-type     DIAGNOSIS:   1. 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 Recurrent oligametastatic GIST in the liver found on 5/20/19. S/p ablation on 7/23/19 at [facility]  3. 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 He initially presented with melena, syncope, hypotension on 2/5/18 at [facility]. Colonoscopy showed old blood in the terminal ileum. CT abdomen/pelvis showed an ileal mass that measures approximately 5 cm. He was transferred to [facility] and underwent segmental small bowel resection by Dr. Sanchez on 2/15/18. Pathology showed a 6-cm GIST, histologic type: GIST, mixed; mitotic rate 9 mitoses/5 mm2, G2, high grade, high risk, margins negative. Cd117+, pathologic T3NxMx.  He had incisional wound infection after the surgery and took antibiotics for that.   2. Started Gleevec on 4/11/18. Foundation One results came back on 4/24/18 neg for KIT mutations. Long discussion with Mr and Mrs Schmidt on 4/25 regarding likely the lack of benefit from adjuvant Gleevec (please see note from that day for details). Mr. Schmidt would like to continue with Gleevec for now. He stopped Gleevec after he went to see Dr. Guaman at [facility]. CT abdomen/pelvis on 11/12/18 was negative for recurrent disease.   3. CT scan on 5/20/19 showed a Nonspecific hepatic hypodensity at the dome of the liver near the inferior vena cava   4. S/p liver lesion biopsy and ablation at [facility] on 7/23/19. Biopsy showed metastatic GIST positive for DOG-1 and . The amount of tumor was insufficient for molecular testing.   5. Surveillance MRI on 2/11/2020 showed "a right " "external iliac metastatic deposit measuring 4.6 x 3.5 cm (axial stir series 9, image 20), previously measuring 1.3 cm.  There is a metastatic deposit within the right anterior pelvis measuring 1.9 cm (series 9, image 13), previously not seen."  6. Gleevec 400 mg daily from 3/14/20 to present. CT at [facility] on 6/1/20 showed progressive disease in the peritoneal masses.   7. Underwent cytoreductive surgery at [facility] on 7/10/2020.   8. Dr Guaman recommends starting regorafenib after surgery. However, insurance does not cover it unless patient fails sutent. Patient started sutent 37.5 mg daily on 8/10/2020.   9. CT scan 10/5/20: A metastatic lesion in segment VII of the liver adjacent to the right hepatic vein and inferior vena cava remains overall stable, measuring approximately 2.1 cm (series 6 image 166). There is another lesion in segment V measuring approximately 1.1 cm (series 6 image 188), suspect for metastatic disease and not well seen on the prior study.  10. CT scan 12/7/2020: "Mild enlargement of liver metastases. A new (nonspecific) subcentimeter hypodensity in segment 6 can be followed on future exams. Mild areas of pneumatosis affecting the ileum in the lower abdomen. Recommend clinical correlation with abdominal pain and drug regimen." Patient underwent IR ablation to segment 5 lesion and re-treatment of segment 7 lesion. Sutent was continued           11. CT scan 9/30/21: No definitive CT evidence of new or increasing metastatic disease in the chest, abdomen or pelvis.  Decrease in size of segment 5 and 7 ablation zones/cavities. Stable subcentimeter pulmonary nodules/nodular densities.  12. CT CAP at [facility] 1/12/22: New small liver lesions are suspicious for metastases. An enlarging 4 mm left upper lobe pulmonary nodule may be infectious/inflammatory or metastatic. Treatment was changed to regorafenib. Patient started on 1/29/2022   13. Progression noted on CT scan 3/23/22: "Further increased size of multiple " "small hypoenhancing liver lesions, in keeping with metastases. Liver protocol CT at follow-up may be helpful for improved visualization of small liver metastases. Enlarging peritoneal nodules, in keeping with carcinomatosis. Enlarging left upper lobe nodule, concerning for metastasis."  14. Patient started temodar 85 mg/m2 daily x 21 days every 28-day cycle on 2022. Cycle 2 started on 22.   15. CT C/A/P 22 showed "Enlarging peritoneal and hepatic metastases compared to 2022. Enlarging left upper lobe pulmonary nodules suspicious for metastatic disease"  16. Treatment switched to pazopanib. Patient started on 6/10/22. Started with escalating dose. Reached 800 mg daily on 22. Held for 1 week before and 3 weeks after Y90 on 22. Resumed at 600 mg on 22. Had significant fatigue when he was on 800 mg daily. Stopped votrient on 11/10/22 for [facility] trial.     History of Present Illness:   Mr. Schmidt returns today for continued follow up. Resumed votrient at 600 mg on 22. Stopped votrient on 11/10/22. Going to [facility] on . Scheduled for restaging MRI.     ECO     ROS:   See HPI, otherwise negative.      Physical Examination:   Vital signs reviewed. BP normal  Gen: well hydrated, well developed. In no acute distress  HEENT: normocephalic, anicteric sclerae, EOMI  Neck: supple, no cervical or supraclavicular lymphadenopathy, no thyromegaly  Lungs: clear breath sounds bilaterally, no wheezing, rales or rhonchi  Heart: RRR, no M/R/G  Abdomen: soft, no tenderness, nondistended, no organomegaly, no mass, hernia, BS present  Ext: no clubbing, cyanosis or edema  Psych: pleasant and appropriate mood and affect  Neuro: alert and oriented x 4     Objective:      Laboratory Data:  Labs reviewed. Cr 1.6     Imaging Data:  CT C/A/P 8/15/22:  1. Slightly enlarging hepatic metastasis compared to CT of 2022.     2. Stable and enlarging peritoneal metastasis.     3. Stable pulmonary " nodules.    Assessment and Plan:      1. 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 Metastasis to liver    3. 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 Immunodeficiency secondary to neoplasm    5. Immunodeficiency secondary to chemotherapy    6. Primary hypertension    7. Stage 3b chronic kidney disease    8. Coronary artery disease involving native coronary artery of native heart without angina pectoris      1-5  - Mr. 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 His GIST is negative for KIT, PDGFRA, SDH, BRAF, NF1, NF2 mutations. He had oligometastatic disease to the liver found on CT scan 5/20/19. He went to [facility] and underwent IR liver lesion biopsy and ablation on 7/23/19. Pathology showed metastatic GIST positive for DOG-1 and .   - surveillance MRI in Feb 2020 showed progressive disease with new peritoneal mets. Patient was seen by Dr Guaman. Gleevec was recommended.   - CT after 2.5 month of Gleevec showed progressive peritoneal disease.   - underwent cytoreductive surgery at [facility] on 7/10/20  - started sutent 37.5 mg daily on 8/10/20.   - s/p ablation to segment 5 lesion and re-treatment of segment 7 lesion in Dec 2020.   - CT scan 1/12/22 showed progression. Treatment was switched to regorafenib. Started on 1/29/22  - CT 3/23/22 showed progression in liver metastases, peritoneal mets and left lung nodule  - started temodar 85 mg/m2 3 weeks on, 1 week off on 4/6/22. CT on 5/31/22 showed progression  - started pazopanib on 6/10/22 with an escalating dose, reached 800 mg daily on 6/24/22  - Held for 1 week before and 3 weeks after Y90 on 8/17/22. Resumed at 600 mg on 9/8/22. Had significant fatigue when he was on 800 mg daily. Stopped votrient on 11/10/22 for [facility] trial.   - patient told me he had MRI at [facility] a month ago. One liver lesion grew. Seen by Dr Guaman. Going to [facility] in 2  weeks for clinical trial. If not eligible, may try ripretinib before trying immunotherapy  - labs and virtual in one month    6.  - home log reviewed. BP good  - c/w current meds  - goal BP <150/100 mmHg    7.  - better. Drinking plenty of fluids    8.  - asymptomatic. F/u with cardiology        RTC in one month  Encouraged to call should issues arise      Route Chart for Scheduling    Med Onc Chart Routing      Follow up with physician 1 month. labs at [facility] in one month. virtual with me the next day   Follow up with [facility]    Infusion scheduling note    Injection scheduling note    Labs CBC, CMP and TSH   Lab interval:  in one month at [facility]   Imaging    Pharmacy appointment    Other referrals

## 2022-11-16 NOTE — TELEPHONE ENCOUNTER
Votrient: Outgoing call. Pt stated that he has an assessment with [facility] for a clinical trial on 11/29. He is currently still holding Votrient. Per OV notes and pt's statement, he will not be restarting Votrient. His next option will be Qinlock (Ripretinib) if patient is not eligible for [facility] clinical trial. Will be closing pt out of [facility].

## 2022-12-16 NOTE — PROGRESS NOTES
The patient location is: Home  The chief complaint leading to consultation is: GIST    Visit type: audiovisual    Face to Face time with patient: 20 mins of total time spent on the encounter, which includes face to face time and non-face to face time preparing to see the patient (eg, review of tests), Obtaining and/or reviewing separately obtained history, Documenting clinical information in the electronic or other health record, Independently interpreting results (not separately reported) and communicating results to the patient/family/caregiver, or Care coordination (not separately reported).     Each patient to whom he or she provides medical services by telemedicine is:  (1) informed of the relationship between the physician and patient and the respective role of any other health care provider with respect to management of the patient; and (2) notified that he or she may decline to receive medical services by telemedicine and may withdraw from such care at any time.    Notes:  Video streaming quality was good                    Patient ID: Forrest Schmidt is a 77 y.o. male.     Chief Complaint:  Follow up for GIST, wild-type     DIAGNOSIS:   1. 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 Recurrent oligametastatic GIST in the liver found on 5/20/19. S/p ablation on 7/23/19 at [facility]  3. 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 He initially presented with melena, syncope, hypotension on 2/5/18 at OSH. Colonoscopy showed old blood in the terminal ileum. CT abdomen/pelvis showed an ileal mass that measures approximately 5 cm. He was transferred to [facility] and underwent segmental small bowel resection by Dr. Sanchez  "on 2/15/18. Pathology showed a 6-cm GIST, histologic type: GIST, mixed; mitotic rate 9 mitoses/5 mm2, G2, high grade, high risk, margins negative. Cd117+, pathologic T3NxMx.  He had incisional wound infection after the surgery and took antibiotics for that.   2. Started Gleevec on 4/11/18. Foundation One results came back on 4/24/18 neg for KIT mutations. Long discussion with Mr and Mrs Schmidt on 4/25 regarding likely the lack of benefit from adjuvant Gleevec (please see note from that day for details). Mr. Schmidt would like to continue with Gleevec for now. He stopped Gleevec after he went to see Dr. Guaman at [facility]. CT abdomen/pelvis on 11/12/18 was negative for recurrent disease.   3. CT scan on 5/20/19 showed a Nonspecific hepatic hypodensity at the dome of the liver near the inferior vena cava   4. S/p liver lesion biopsy and ablation at [facility] on 7/23/19. Biopsy showed metastatic GIST positive for DOG-1 and . The amount of tumor was insufficient for molecular testing.   5. Surveillance MRI on 2/11/2020 showed "a right external iliac metastatic deposit measuring 4.6 x 3.5 cm (axial stir series 9, image 20), previously measuring 1.3 cm.  There is a metastatic deposit within the right anterior pelvis measuring 1.9 cm (series 9, image 13), previously not seen."  6. Gleevec 400 mg daily from 3/14/20 to present. CT at [facility] on 6/1/20 showed progressive disease in the peritoneal masses.   7. Underwent cytoreductive surgery at [facility] on 7/10/2020.   8. Dr Guaman recommends starting regorafenib after surgery. However, insurance does not cover it unless patient fails sutent. Patient started sutent 37.5 mg daily on 8/10/2020.   9. CT scan 10/5/20: A metastatic lesion in segment VII of the liver adjacent to the right hepatic vein and inferior vena cava remains overall stable, measuring approximately 2.1 cm (series 6 image 166). There is another lesion in segment V measuring approximately 1.1 cm (series 6 image " "188), suspect for metastatic disease and not well seen on the prior study.  10. CT scan 12/7/2020: "Mild enlargement of liver metastases. A new (nonspecific) subcentimeter hypodensity in segment 6 can be followed on future exams. Mild areas of pneumatosis affecting the ileum in the lower abdomen. Recommend clinical correlation with abdominal pain and drug regimen." Patient underwent IR ablation to segment 5 lesion and re-treatment of segment 7 lesion. Sutent was continued           11. CT scan 9/30/21: No definitive CT evidence of new or increasing metastatic disease in the chest, abdomen or pelvis.  Decrease in size of segment 5 and 7 ablation zones/cavities. Stable subcentimeter pulmonary nodules/nodular densities.  12. CT CAP at [facility] 1/12/22: New small liver lesions are suspicious for metastases. An enlarging 4 mm left upper lobe pulmonary nodule may be infectious/inflammatory or metastatic. Treatment was changed to regorafenib. Patient started on 1/29/2022   13. Progression noted on CT scan 3/23/22: "Further increased size of multiple small hypoenhancing liver lesions, in keeping with metastases. Liver protocol CT at follow-up may be helpful for improved visualization of small liver metastases. Enlarging peritoneal nodules, in keeping with carcinomatosis. Enlarging left upper lobe nodule, concerning for metastasis."  14. Patient started temodar 85 mg/m2 daily x 21 days every 28-day cycle on 4/6/2022. Cycle 2 started on 5/5/22.   15. CT C/A/P 5/31/22 showed "Enlarging peritoneal and hepatic metastases compared to 03/23/2022. Enlarging left upper lobe pulmonary nodules suspicious for metastatic disease"  16. Treatment switched to pazopanib. Patient started on 6/10/22. Started with escalating dose. Reached 800 mg daily on 6/24/22. Held for 1 week before and 3 weeks after Y90 on 8/17/22. Resumed at 600 mg on 9/8/22. Had significant fatigue when he was on 800 mg daily. Stopped votrient on 11/10/22 for [facility] trial. "     History of Present Illness:   Mr. Schmidt returns today for continued follow up and started on the clinic trial on  at [facility].  Resumed votrient at 600 mg on 22. Stopped votrient on 11/10/22. Went to [facility] on  to begin a clinical trial. He is eating well and has a good appetite. He was taken off of the Omeprazole and placed on Pepcid plus Carafate due to concern for prolonged QT. He takes Tums for breakthrough reflux and this regimen seems to help. Occasional nausea but no vomiting. He also had medication changes to his blood pressure medication. He does report increasing blurred vision. He does have an Ophthalmologist that he follow up with. He does have headaches as well that are chronic. Otherwise, he is doing fairly well.                                                                                                                                                ECO. He follows up with [facility] q2 weeks while on the trial. He will return to the [facility] on . He is going to have imaging studies as well q2 mos. Wife is present as well.      ROS:   See HPI, otherwise negative.      Physical Examination: General exam limited due to nature of virtual visit  Vital signs reviewed. BP normal  Gen: well hydrated, well developed. In no acute distress  HEENT: normocephalic, anicteric sclerae, EOMI  Neck: supple  Lungs: No signs of respiratory distress  Heart: No report of chest pain  Ext: no clubbing, cyanosis or edema  Psych: pleasant and appropriate mood and affect  Neuro: alert and oriented x 4     Objective:      Laboratory Data:  Labs reviewed. Cr 1.4     Imaging Data:  CT C/A/P 8/15/22:  1. Slightly enlarging hepatic metastasis compared to CT of 2022.     2. Stable and enlarging peritoneal metastasis.     3. Stable pulmonary nodules.    Assessment and Plan:      No diagnosis found.    1-5  - Mr. Schmidt is a 78 yo man with stage IIIB GIST of the small intestine  (T3N0Mx), 6 cm, mitotic rate 9 mitoses/5  mm2, s/p resection on 2/15/18. His GIST is negative for KIT, PDGFRA, SDH, BRAF, NF1, NF2 mutations. He had oligometastatic disease to the liver found on CT scan 5/20/19. He went to [facility] and underwent IR liver lesion biopsy and ablation on 7/23/19. Pathology showed metastatic GIST positive for DOG-1 and .   - surveillance MRI in Feb 2020 showed progressive disease with new peritoneal mets. Patient was seen by Dr Guaman. Gleevec was recommended.   - CT after 2.5 month of Gleevec showed progressive peritoneal disease.   - underwent cytoreductive surgery at [facility] on 7/10/20  - started sutent 37.5 mg daily on 8/10/20.   - s/p ablation to segment 5 lesion and re-treatment of segment 7 lesion in Dec 2020.   - CT scan 1/12/22 showed progression. Treatment was switched to regorafenib. Started on 1/29/22  - CT 3/23/22 showed progression in liver metastases, peritoneal mets and left lung nodule  - started temodar 85 mg/m2 3 weeks on, 1 week off on 4/6/22. CT on 5/31/22 showed progression  - started pazopanib on 6/10/22 with an escalating dose, reached 800 mg daily on 6/24/22  - Held for 1 week before and 3 weeks after Y90 on 8/17/22. Resumed at 600 mg on 9/8/22. Had significant fatigue when he was on 800 mg daily. Stopped votrient on 11/10/22 for [facility] trial.   - patient told me he had MRI at [facility] a month ago. One liver lesion grew. Seen by Dr Guaman and [facility] for possible clinic trial. He was accepted into a clinical trial there and started on 11/30.  - He is doing well so far on the clinical trial treatment. The clinical trial has him scheduled for lab work q2 weeks and imaging studies q2 mos. We will continue to support him as needed. Pt is agreeable.    - labs q2 weeks and virtual in one month. Will add phosphorus to his lab work as requested by [facility].  - will continue to f/u with [facility] for clinical trial. If there is progression in the future, we can try ripretinib before immunotherapy  - Asked him to bring  copies of his clinical notes from the [facility] with him to his visits, as we do not have access to his medical record for [facility]. He is agreeable.     6.  - home log reviewed. BP good. Medication was decreased by [facility], as he is on the clinical trial.   - c/w current meds  - goal BP <150/100 mmHg    7.  - better. Drinking plenty of fluids and was given IVF  - will continue to monitor his kidney function and give IVF as needed.     8.  - asymptomatic. F/u with cardiology    9.   - Recommend to have appointment with Ophthalmologist   - He will continue to f/u with his physician    RTC in one month. Lab work q2 weeks.   Encouraged to call should issues arise      Route Chart for Scheduling    Med Onc Chart Routing      Follow up with physician 4 weeks. See Dr. Umana monthly with lab work and clinic visit. Pt is currently on clinical trial at [facility] as well. Will have lab work q2 weeks   Follow up with [facility]    Infusion scheduling note    Injection scheduling note    Labs CBC, CMP and phosphorus   Lab interval: every 2 weeks  Will only have clinic visit q4 weeks, unless changed by Dr. Umana   Imaging    Pharmacy appointment    Other referrals

## 2023-01-01 ENCOUNTER — PATIENT MESSAGE (OUTPATIENT)
Dept: ADMINISTRATIVE | Facility: OTHER | Age: 78
End: 2023-01-01
Payer: MEDICARE

## 2023-01-01 ENCOUNTER — PATIENT MESSAGE (OUTPATIENT)
Dept: HEMATOLOGY/ONCOLOGY | Facility: CLINIC | Age: 78
End: 2023-01-01
Payer: MEDICARE

## 2023-01-01 ENCOUNTER — OFFICE VISIT (OUTPATIENT)
Dept: HEMATOLOGY/ONCOLOGY | Facility: CLINIC | Age: 78
End: 2023-01-01
Payer: MEDICARE

## 2023-01-01 ENCOUNTER — LAB VISIT (OUTPATIENT)
Dept: LAB | Facility: HOSPITAL | Age: 78
End: 2023-01-01
Attending: INTERNAL MEDICINE
Payer: MEDICARE

## 2023-01-01 ENCOUNTER — TELEPHONE (OUTPATIENT)
Dept: SURGERY | Facility: CLINIC | Age: 78
End: 2023-01-01
Payer: MEDICARE

## 2023-01-01 ENCOUNTER — ANESTHESIA EVENT (OUTPATIENT)
Dept: SURGERY | Facility: HOSPITAL | Age: 78
DRG: 330 | End: 2023-01-01
Payer: MEDICARE

## 2023-01-01 ENCOUNTER — DOCUMENT SCAN (OUTPATIENT)
Dept: HOME HEALTH SERVICES | Facility: HOSPITAL | Age: 78
End: 2023-01-01
Payer: MEDICARE

## 2023-01-01 ENCOUNTER — TELEPHONE (OUTPATIENT)
Dept: HEMATOLOGY/ONCOLOGY | Facility: CLINIC | Age: 78
End: 2023-01-01
Payer: MEDICARE

## 2023-01-01 ENCOUNTER — PATIENT MESSAGE (OUTPATIENT)
Dept: CARDIOLOGY | Facility: CLINIC | Age: 78
End: 2023-01-01
Payer: MEDICARE

## 2023-01-01 ENCOUNTER — DOCUMENTATION ONLY (OUTPATIENT)
Dept: HEMATOLOGY/ONCOLOGY | Facility: CLINIC | Age: 78
End: 2023-01-01
Payer: MEDICARE

## 2023-01-01 ENCOUNTER — OFFICE VISIT (OUTPATIENT)
Dept: UROLOGY | Facility: CLINIC | Age: 78
End: 2023-01-01
Payer: MEDICARE

## 2023-01-01 ENCOUNTER — TELEPHONE (OUTPATIENT)
Dept: CARDIOLOGY | Facility: CLINIC | Age: 78
End: 2023-01-01
Payer: MEDICARE

## 2023-01-01 ENCOUNTER — TELEPHONE (OUTPATIENT)
Dept: UROLOGY | Facility: CLINIC | Age: 78
End: 2023-01-01
Payer: MEDICARE

## 2023-01-01 ENCOUNTER — HOSPITAL ENCOUNTER (INPATIENT)
Facility: HOSPITAL | Age: 78
LOS: 18 days | Discharge: HOME HEALTH | DRG: 330 | End: 2023-05-12
Attending: EMERGENCY MEDICINE | Admitting: INTERNAL MEDICINE
Payer: MEDICARE

## 2023-01-01 ENCOUNTER — PATIENT MESSAGE (OUTPATIENT)
Dept: UROLOGY | Facility: CLINIC | Age: 78
End: 2023-01-01
Payer: MEDICARE

## 2023-01-01 ENCOUNTER — OFFICE VISIT (OUTPATIENT)
Dept: SURGERY | Facility: CLINIC | Age: 78
End: 2023-01-01
Payer: MEDICARE

## 2023-01-01 ENCOUNTER — EXTERNAL HOME HEALTH (OUTPATIENT)
Dept: HOME HEALTH SERVICES | Facility: HOSPITAL | Age: 78
End: 2023-01-01
Payer: MEDICARE

## 2023-01-01 ENCOUNTER — PATIENT MESSAGE (OUTPATIENT)
Dept: SURGERY | Facility: CLINIC | Age: 78
End: 2023-01-01

## 2023-01-01 ENCOUNTER — PATIENT MESSAGE (OUTPATIENT)
Dept: HEMATOLOGY/ONCOLOGY | Facility: CLINIC | Age: 78
End: 2023-01-01

## 2023-01-01 ENCOUNTER — OFFICE VISIT (OUTPATIENT)
Dept: WOUND CARE | Facility: CLINIC | Age: 78
End: 2023-01-01
Payer: MEDICARE

## 2023-01-01 ENCOUNTER — LAB VISIT (OUTPATIENT)
Dept: LAB | Facility: HOSPITAL | Age: 78
End: 2023-01-01
Attending: PHYSICIAN ASSISTANT
Payer: MEDICARE

## 2023-01-01 ENCOUNTER — TELEPHONE (OUTPATIENT)
Dept: PHARMACY | Facility: CLINIC | Age: 78
End: 2023-01-01
Payer: MEDICARE

## 2023-01-01 ENCOUNTER — OFFICE VISIT (OUTPATIENT)
Dept: PODIATRY | Facility: CLINIC | Age: 78
End: 2023-01-01
Payer: MEDICARE

## 2023-01-01 ENCOUNTER — ANESTHESIA (OUTPATIENT)
Dept: SURGERY | Facility: HOSPITAL | Age: 78
DRG: 330 | End: 2023-01-01
Payer: MEDICARE

## 2023-01-01 ENCOUNTER — SPECIALTY PHARMACY (OUTPATIENT)
Dept: PHARMACY | Facility: CLINIC | Age: 78
End: 2023-01-01
Payer: MEDICARE

## 2023-01-01 ENCOUNTER — PATIENT MESSAGE (OUTPATIENT)
Dept: SURGERY | Facility: CLINIC | Age: 78
End: 2023-01-01
Payer: MEDICARE

## 2023-01-01 ENCOUNTER — OFFICE VISIT (OUTPATIENT)
Dept: CARDIOLOGY | Facility: CLINIC | Age: 78
End: 2023-01-01
Payer: MEDICARE

## 2023-01-01 ENCOUNTER — HOSPITAL ENCOUNTER (OUTPATIENT)
Dept: RADIOLOGY | Facility: HOSPITAL | Age: 78
Discharge: HOME | End: 2023-08-10
Attending: INTERNAL MEDICINE
Payer: MEDICARE

## 2023-01-01 ENCOUNTER — HOSPITAL ENCOUNTER (OUTPATIENT)
Dept: RADIATION THERAPY | Facility: HOSPITAL | Age: 78
Discharge: HOME | End: 2023-05-01
Attending: STUDENT IN AN ORGANIZED HEALTH CARE EDUCATION/TRAINING PROGRAM
Payer: MEDICARE

## 2023-01-01 ENCOUNTER — LAB VISIT (OUTPATIENT)
Dept: LAB | Facility: HOSPITAL | Age: 78
End: 2023-01-01
Payer: MEDICARE

## 2023-01-01 ENCOUNTER — OFFICE VISIT (OUTPATIENT)
Dept: PALLIATIVE MEDICINE | Facility: CLINIC | Age: 78
End: 2023-01-01
Payer: MEDICARE

## 2023-01-01 ENCOUNTER — HOSPITAL ENCOUNTER (OUTPATIENT)
Dept: RADIATION THERAPY | Facility: HOSPITAL | Age: 78
Discharge: HOME | End: 2023-04-03
Attending: STUDENT IN AN ORGANIZED HEALTH CARE EDUCATION/TRAINING PROGRAM
Payer: MEDICARE

## 2023-01-01 ENCOUNTER — TELEPHONE (OUTPATIENT)
Dept: PALLIATIVE MEDICINE | Facility: CLINIC | Age: 78
End: 2023-01-01
Payer: MEDICARE

## 2023-01-01 VITALS
OXYGEN SATURATION: 96 % | WEIGHT: 176.81 LBS | TEMPERATURE: 99 F | RESPIRATION RATE: 18 BRPM | HEART RATE: 74 BPM | HEIGHT: 71 IN | BODY MASS INDEX: 24.75 KG/M2 | DIASTOLIC BLOOD PRESSURE: 77 MMHG | SYSTOLIC BLOOD PRESSURE: 149 MMHG

## 2023-01-01 VITALS
DIASTOLIC BLOOD PRESSURE: 63 MMHG | TEMPERATURE: 98 F | HEIGHT: 71 IN | SYSTOLIC BLOOD PRESSURE: 125 MMHG | OXYGEN SATURATION: 97 % | BODY MASS INDEX: 24.35 KG/M2 | RESPIRATION RATE: 18 BRPM | HEART RATE: 92 BPM | WEIGHT: 173.94 LBS

## 2023-01-01 VITALS
BODY MASS INDEX: 25.73 KG/M2 | HEIGHT: 71 IN | OXYGEN SATURATION: 98 % | WEIGHT: 183.81 LBS | DIASTOLIC BLOOD PRESSURE: 60 MMHG | SYSTOLIC BLOOD PRESSURE: 122 MMHG | HEART RATE: 68 BPM

## 2023-01-01 VITALS
DIASTOLIC BLOOD PRESSURE: 57 MMHG | BODY MASS INDEX: 21.94 KG/M2 | SYSTOLIC BLOOD PRESSURE: 105 MMHG | HEART RATE: 78 BPM | WEIGHT: 156.75 LBS | HEIGHT: 71 IN | OXYGEN SATURATION: 98 %

## 2023-01-01 VITALS
SYSTOLIC BLOOD PRESSURE: 136 MMHG | HEART RATE: 82 BPM | BODY MASS INDEX: 24.08 KG/M2 | HEIGHT: 71 IN | DIASTOLIC BLOOD PRESSURE: 66 MMHG | WEIGHT: 172 LBS

## 2023-01-01 VITALS
TEMPERATURE: 98 F | SYSTOLIC BLOOD PRESSURE: 125 MMHG | HEART RATE: 68 BPM | BODY MASS INDEX: 25.7 KG/M2 | RESPIRATION RATE: 18 BRPM | HEIGHT: 71 IN | OXYGEN SATURATION: 96 % | DIASTOLIC BLOOD PRESSURE: 68 MMHG | WEIGHT: 183.56 LBS

## 2023-01-01 VITALS
DIASTOLIC BLOOD PRESSURE: 66 MMHG | SYSTOLIC BLOOD PRESSURE: 123 MMHG | WEIGHT: 156 LBS | HEART RATE: 75 BPM | BODY MASS INDEX: 21.84 KG/M2 | HEIGHT: 71 IN

## 2023-01-01 DIAGNOSIS — D84.81 IMMUNODEFICIENCY SECONDARY TO NEOPLASM: ICD-10-CM

## 2023-01-01 DIAGNOSIS — R53.1 WEAKNESS: ICD-10-CM

## 2023-01-01 DIAGNOSIS — K59.00 CONSTIPATION, UNSPECIFIED CONSTIPATION TYPE: ICD-10-CM

## 2023-01-01 DIAGNOSIS — R19.8 TENESMUS: ICD-10-CM

## 2023-01-01 DIAGNOSIS — R07.9 CHEST PAIN: ICD-10-CM

## 2023-01-01 DIAGNOSIS — C49.A3 GIST (GASTROINTESTINAL STROMAL TUMOR) OF SMALL BOWEL, MALIGNANT: ICD-10-CM

## 2023-01-01 DIAGNOSIS — R31.9 HEMATURIA, UNSPECIFIED TYPE: ICD-10-CM

## 2023-01-01 DIAGNOSIS — T45.1X5S ADVERSE EFFECT OF CHEMOTHERAPY, SEQUELA: ICD-10-CM

## 2023-01-01 DIAGNOSIS — C49.0: Primary | ICD-10-CM

## 2023-01-01 DIAGNOSIS — D84.821 IMMUNODEFICIENCY SECONDARY TO CHEMOTHERAPY: ICD-10-CM

## 2023-01-01 DIAGNOSIS — R10.9 ABDOMINAL PAIN: ICD-10-CM

## 2023-01-01 DIAGNOSIS — C79.89 METASTATIC CANCER TO PELVIS: Chronic | ICD-10-CM

## 2023-01-01 DIAGNOSIS — C49.A4 GIST (GASTROINTESTINAL STROMA TUMOR), MALIGNANT, COLON: Primary | ICD-10-CM

## 2023-01-01 DIAGNOSIS — C78.7 METASTASIS TO LIVER: Chronic | ICD-10-CM

## 2023-01-01 DIAGNOSIS — C49.A3 GIST (GASTROINTESTINAL STROMAL TUMOR) OF SMALL BOWEL, MALIGNANT: Chronic | ICD-10-CM

## 2023-01-01 DIAGNOSIS — C49.A3 GASTROINTESTINAL STROMAL TUMOR OF SMALL INTESTINE: Primary | ICD-10-CM

## 2023-01-01 DIAGNOSIS — Z79.899 IMMUNODEFICIENCY DUE TO DRUGS: ICD-10-CM

## 2023-01-01 DIAGNOSIS — D62 ACUTE BLOOD LOSS ANEMIA: ICD-10-CM

## 2023-01-01 DIAGNOSIS — I25.10 CORONARY ARTERY DISEASE INVOLVING NATIVE CORONARY ARTERY OF NATIVE HEART WITHOUT ANGINA PECTORIS: ICD-10-CM

## 2023-01-01 DIAGNOSIS — C49.A3 GIST (GASTROINTESTINAL STROMAL TUMOR) OF SMALL BOWEL, MALIGNANT: Primary | Chronic | ICD-10-CM

## 2023-01-01 DIAGNOSIS — I10 ESSENTIAL HYPERTENSION: ICD-10-CM

## 2023-01-01 DIAGNOSIS — Z51.5 ENCOUNTER FOR PALLIATIVE CARE: ICD-10-CM

## 2023-01-01 DIAGNOSIS — Z79.899 IMMUNODEFICIENCY SECONDARY TO CHEMOTHERAPY: ICD-10-CM

## 2023-01-01 DIAGNOSIS — E78.2 MIXED HYPERLIPIDEMIA: ICD-10-CM

## 2023-01-01 DIAGNOSIS — C49.A3 GIST (GASTROINTESTINAL STROMAL TUMOR) OF SMALL BOWEL, MALIGNANT: Primary | ICD-10-CM

## 2023-01-01 DIAGNOSIS — D63.0 ANEMIA IN NEOPLASTIC DISEASE: ICD-10-CM

## 2023-01-01 DIAGNOSIS — N18.32 STAGE 3B CHRONIC KIDNEY DISEASE: Chronic | ICD-10-CM

## 2023-01-01 DIAGNOSIS — K12.30 MUCOSITIS: ICD-10-CM

## 2023-01-01 DIAGNOSIS — D50.0 ANEMIA DUE TO CHRONIC BLOOD LOSS: ICD-10-CM

## 2023-01-01 DIAGNOSIS — C79.89 METASTATIC CANCER TO PELVIS: ICD-10-CM

## 2023-01-01 DIAGNOSIS — N32.89 BLADDER SPASM: ICD-10-CM

## 2023-01-01 DIAGNOSIS — N18.32 STAGE 3B CHRONIC KIDNEY DISEASE: ICD-10-CM

## 2023-01-01 DIAGNOSIS — R10.9 ABDOMINAL PAIN, UNSPECIFIED ABDOMINAL LOCATION: ICD-10-CM

## 2023-01-01 DIAGNOSIS — D49.9 IMMUNODEFICIENCY SECONDARY TO NEOPLASM: ICD-10-CM

## 2023-01-01 DIAGNOSIS — R10.9 ABDOMINAL PAIN: Primary | ICD-10-CM

## 2023-01-01 DIAGNOSIS — E55.9 AVITAMINOSIS D: ICD-10-CM

## 2023-01-01 DIAGNOSIS — G89.3 CANCER RELATED PAIN: ICD-10-CM

## 2023-01-01 DIAGNOSIS — C49.A0 STROMAL TUMOR OF DIGESTIVE SYSTEM: ICD-10-CM

## 2023-01-01 DIAGNOSIS — I10 PRIMARY HYPERTENSION: ICD-10-CM

## 2023-01-01 DIAGNOSIS — E83.39 HYPERPHOSPHATEMIA: ICD-10-CM

## 2023-01-01 DIAGNOSIS — T65.91XA ONYCHOLYSIS DUE TO CHEMICAL: Primary | ICD-10-CM

## 2023-01-01 DIAGNOSIS — K56.609: ICD-10-CM

## 2023-01-01 DIAGNOSIS — R33.9 INCOMPLETE EMPTYING OF BLADDER: Primary | ICD-10-CM

## 2023-01-01 DIAGNOSIS — L60.1 ONYCHOLYSIS DUE TO CHEMICAL: Primary | ICD-10-CM

## 2023-01-01 DIAGNOSIS — K64.9 HEMORRHOIDS, UNSPECIFIED HEMORRHOID TYPE: ICD-10-CM

## 2023-01-01 DIAGNOSIS — M81.8 IDIOPATHIC OSTEOPOROSIS: ICD-10-CM

## 2023-01-01 DIAGNOSIS — R07.9 CHEST PAIN, UNSPECIFIED TYPE: ICD-10-CM

## 2023-01-01 DIAGNOSIS — C49.A4 GIST (GASTROINTESTINAL STROMA TUMOR), MALIGNANT, COLON: ICD-10-CM

## 2023-01-01 DIAGNOSIS — E29.1 3-OXO-5 ALPHA-STEROID DELTA 4-DEHYDROGENASE DEFICIENCY: Primary | ICD-10-CM

## 2023-01-01 DIAGNOSIS — K56.609 INTESTINAL OBSTRUCTION, UNSPECIFIED CAUSE, UNSPECIFIED WHETHER PARTIAL OR COMPLETE: ICD-10-CM

## 2023-01-01 DIAGNOSIS — D84.821 IMMUNODEFICIENCY DUE TO DRUGS: ICD-10-CM

## 2023-01-01 DIAGNOSIS — D62 ACUTE POSTHEMORRHAGIC ANEMIA: ICD-10-CM

## 2023-01-01 DIAGNOSIS — G62.2 TOXIC NEUROPATHY: ICD-10-CM

## 2023-01-01 DIAGNOSIS — E78.00 HYPERCHOLESTEREMIA: ICD-10-CM

## 2023-01-01 DIAGNOSIS — C78.00 MALIGNANT NEOPLASM METASTATIC TO LUNG, UNSPECIFIED LATERALITY: ICD-10-CM

## 2023-01-01 DIAGNOSIS — Z97.8 INDWELLING FOLEY CATHETER PRESENT: ICD-10-CM

## 2023-01-01 DIAGNOSIS — R53.83 FATIGUE, UNSPECIFIED TYPE: ICD-10-CM

## 2023-01-01 DIAGNOSIS — L27.1 HAND FOOT SYNDROME: ICD-10-CM

## 2023-01-01 DIAGNOSIS — R19.7 DIARRHEA, UNSPECIFIED TYPE: ICD-10-CM

## 2023-01-01 DIAGNOSIS — C78.6 SECONDARY MALIGNANT NEOPLASM OF RETROPERITONEUM AND PERITONEUM: ICD-10-CM

## 2023-01-01 DIAGNOSIS — F41.9 ANXIETY: ICD-10-CM

## 2023-01-01 DIAGNOSIS — T45.1X5A IMMUNODEFICIENCY SECONDARY TO CHEMOTHERAPY: ICD-10-CM

## 2023-01-01 DIAGNOSIS — I10 ESSENTIAL HYPERTENSION: Primary | ICD-10-CM

## 2023-01-01 DIAGNOSIS — G89.18 POSTOPERATIVE PAIN: ICD-10-CM

## 2023-01-01 DIAGNOSIS — I25.10 CORONARY ARTERY DISEASE INVOLVING NATIVE CORONARY ARTERY OF NATIVE HEART WITHOUT ANGINA PECTORIS: Chronic | ICD-10-CM

## 2023-01-01 DIAGNOSIS — E83.39 HYPERPHOSPHATEMIA: Primary | ICD-10-CM

## 2023-01-01 DIAGNOSIS — C78.7 METASTASIS TO LIVER: ICD-10-CM

## 2023-01-01 DIAGNOSIS — I10 PRIMARY HYPERTENSION: Chronic | ICD-10-CM

## 2023-01-01 DIAGNOSIS — Z46.6 INDWELLING CATHETER REPLACED: ICD-10-CM

## 2023-01-01 DIAGNOSIS — Z71.89 ENCOUNTER FOR OSTOMY CARE EDUCATION: ICD-10-CM

## 2023-01-01 DIAGNOSIS — C49.A4 GASTROINTESTINAL STROMAL SARCOMA OF APPENDIX: Primary | ICD-10-CM

## 2023-01-01 DIAGNOSIS — I10 PRIMARY HYPERTENSION: Primary | Chronic | ICD-10-CM

## 2023-01-01 DIAGNOSIS — I10 ESSENTIAL HYPERTENSION, MALIGNANT: ICD-10-CM

## 2023-01-01 DIAGNOSIS — I25.10 CORONARY ARTERY DISEASE INVOLVING NATIVE CORONARY ARTERY OF NATIVE HEART WITHOUT ANGINA PECTORIS: Primary | ICD-10-CM

## 2023-01-01 DIAGNOSIS — R19.00 PELVIC MASS: ICD-10-CM

## 2023-01-01 DIAGNOSIS — Q71.899: ICD-10-CM

## 2023-01-01 DIAGNOSIS — Z43.3 ATTENTION TO COLOSTOMY: Primary | ICD-10-CM

## 2023-01-01 DIAGNOSIS — N17.9 AKI (ACUTE KIDNEY INJURY): ICD-10-CM

## 2023-01-01 DIAGNOSIS — D50.0 IRON DEFICIENCY ANEMIA DUE TO CHRONIC BLOOD LOSS: ICD-10-CM

## 2023-01-01 LAB
ABO + RH BLD: NORMAL
ALBUMIN SERPL BCP-MCNC: 1.8 G/DL (ref 3.5–5.2)
ALBUMIN SERPL BCP-MCNC: 1.9 G/DL (ref 3.5–5.2)
ALBUMIN SERPL BCP-MCNC: 1.9 G/DL (ref 3.5–5.2)
ALBUMIN SERPL BCP-MCNC: 2 G/DL (ref 3.5–5.2)
ALBUMIN SERPL BCP-MCNC: 2.1 G/DL (ref 3.5–5.2)
ALBUMIN SERPL BCP-MCNC: 2.2 G/DL (ref 3.5–5.2)
ALBUMIN SERPL BCP-MCNC: 2.3 G/DL (ref 3.5–5.2)
ALBUMIN SERPL BCP-MCNC: 2.4 G/DL (ref 3.5–5.2)
ALBUMIN SERPL BCP-MCNC: 2.5 G/DL (ref 3.5–5.2)
ALBUMIN SERPL BCP-MCNC: 2.6 G/DL (ref 3.5–5.2)
ALBUMIN SERPL BCP-MCNC: 2.6 G/DL (ref 3.5–5.2)
ALBUMIN SERPL BCP-MCNC: 2.7 G/DL (ref 3.5–5.2)
ALBUMIN SERPL BCP-MCNC: 2.8 G/DL (ref 3.5–5.2)
ALBUMIN SERPL BCP-MCNC: 2.8 G/DL (ref 3.5–5.2)
ALBUMIN SERPL BCP-MCNC: 3 G/DL (ref 3.5–5.2)
ALBUMIN SERPL BCP-MCNC: 3.4 G/DL (ref 3.5–5.2)
ALBUMIN SERPL BCP-MCNC: 3.6 G/DL (ref 3.5–5.2)
ALP SERPL-CCNC: 123 U/L (ref 55–135)
ALP SERPL-CCNC: 125 U/L (ref 55–135)
ALP SERPL-CCNC: 125 U/L (ref 55–135)
ALP SERPL-CCNC: 127 U/L (ref 55–135)
ALP SERPL-CCNC: 128 U/L (ref 55–135)
ALP SERPL-CCNC: 146 U/L (ref 55–135)
ALP SERPL-CCNC: 153 U/L (ref 55–135)
ALP SERPL-CCNC: 157 U/L (ref 55–135)
ALP SERPL-CCNC: 159 U/L (ref 55–135)
ALP SERPL-CCNC: 165 U/L (ref 55–135)
ALP SERPL-CCNC: 185 U/L (ref 55–135)
ALP SERPL-CCNC: 233 U/L (ref 55–135)
ALP SERPL-CCNC: 235 U/L (ref 55–135)
ALP SERPL-CCNC: 245 U/L (ref 55–135)
ALP SERPL-CCNC: 246 U/L (ref 55–135)
ALP SERPL-CCNC: 250 U/L (ref 55–135)
ALP SERPL-CCNC: 256 U/L (ref 55–135)
ALP SERPL-CCNC: 282 U/L (ref 55–135)
ALP SERPL-CCNC: 292 U/L (ref 55–135)
ALP SERPL-CCNC: 301 U/L (ref 55–135)
ALP SERPL-CCNC: 320 U/L (ref 55–135)
ALP SERPL-CCNC: 324 U/L (ref 55–135)
ALP SERPL-CCNC: 325 U/L (ref 55–135)
ALP SERPL-CCNC: 341 U/L (ref 55–135)
ALP SERPL-CCNC: 345 U/L (ref 55–135)
ALP SERPL-CCNC: 346 U/L (ref 55–135)
ALP SERPL-CCNC: 369 U/L (ref 55–135)
ALP SERPL-CCNC: 371 U/L (ref 55–135)
ALP SERPL-CCNC: 376 U/L (ref 55–135)
ALP SERPL-CCNC: 385 U/L (ref 55–135)
ALP SERPL-CCNC: 396 U/L (ref 55–135)
ALP SERPL-CCNC: 403 U/L (ref 55–135)
ALP SERPL-CCNC: 417 U/L (ref 55–135)
ALP SERPL-CCNC: 481 U/L (ref 55–135)
ALP SERPL-CCNC: 511 U/L (ref 55–135)
ALT SERPL W/O P-5'-P-CCNC: 101 U/L (ref 10–44)
ALT SERPL W/O P-5'-P-CCNC: 130 U/L (ref 10–44)
ALT SERPL W/O P-5'-P-CCNC: 15 U/L (ref 10–44)
ALT SERPL W/O P-5'-P-CCNC: 17 U/L (ref 10–44)
ALT SERPL W/O P-5'-P-CCNC: 18 U/L (ref 10–44)
ALT SERPL W/O P-5'-P-CCNC: 19 U/L (ref 10–44)
ALT SERPL W/O P-5'-P-CCNC: 20 U/L (ref 10–44)
ALT SERPL W/O P-5'-P-CCNC: 24 U/L (ref 10–44)
ALT SERPL W/O P-5'-P-CCNC: 26 U/L (ref 10–44)
ALT SERPL W/O P-5'-P-CCNC: 27 U/L (ref 10–44)
ALT SERPL W/O P-5'-P-CCNC: 27 U/L (ref 10–44)
ALT SERPL W/O P-5'-P-CCNC: 31 U/L (ref 10–44)
ALT SERPL W/O P-5'-P-CCNC: 35 U/L (ref 10–44)
ALT SERPL W/O P-5'-P-CCNC: 35 U/L (ref 10–44)
ALT SERPL W/O P-5'-P-CCNC: 36 U/L (ref 10–44)
ALT SERPL W/O P-5'-P-CCNC: 37 U/L (ref 10–44)
ALT SERPL W/O P-5'-P-CCNC: 38 U/L (ref 10–44)
ALT SERPL W/O P-5'-P-CCNC: 38 U/L (ref 10–44)
ALT SERPL W/O P-5'-P-CCNC: 39 U/L (ref 10–44)
ALT SERPL W/O P-5'-P-CCNC: 41 U/L (ref 10–44)
ALT SERPL W/O P-5'-P-CCNC: 49 U/L (ref 10–44)
ALT SERPL W/O P-5'-P-CCNC: 56 U/L (ref 10–44)
ALT SERPL W/O P-5'-P-CCNC: 57 U/L (ref 10–44)
ALT SERPL W/O P-5'-P-CCNC: 58 U/L (ref 10–44)
ALT SERPL W/O P-5'-P-CCNC: 61 U/L (ref 10–44)
ALT SERPL W/O P-5'-P-CCNC: 64 U/L (ref 10–44)
ALT SERPL W/O P-5'-P-CCNC: 67 U/L (ref 10–44)
ALT SERPL W/O P-5'-P-CCNC: 67 U/L (ref 10–44)
ALT SERPL W/O P-5'-P-CCNC: 73 U/L (ref 10–44)
ALT SERPL W/O P-5'-P-CCNC: 77 U/L (ref 10–44)
ALT SERPL W/O P-5'-P-CCNC: 77 U/L (ref 10–44)
ALT SERPL W/O P-5'-P-CCNC: 80 U/L (ref 10–44)
ALT SERPL W/O P-5'-P-CCNC: 80 U/L (ref 10–44)
ALT SERPL W/O P-5'-P-CCNC: 93 U/L (ref 10–44)
ALT SERPL W/O P-5'-P-CCNC: 97 U/L (ref 10–44)
ANION GAP SERPL CALC-SCNC: 10 MMOL/L (ref 8–16)
ANION GAP SERPL CALC-SCNC: 11 MMOL/L (ref 8–16)
ANION GAP SERPL CALC-SCNC: 11 MMOL/L (ref 8–16)
ANION GAP SERPL CALC-SCNC: 12 MMOL/L (ref 8–16)
ANION GAP SERPL CALC-SCNC: 13 MMOL/L (ref 8–16)
ANION GAP SERPL CALC-SCNC: 13 MMOL/L (ref 8–16)
ANION GAP SERPL CALC-SCNC: 15 MMOL/L (ref 8–16)
ANION GAP SERPL CALC-SCNC: 4 MMOL/L (ref 8–16)
ANION GAP SERPL CALC-SCNC: 6 MMOL/L (ref 8–16)
ANION GAP SERPL CALC-SCNC: 7 MMOL/L (ref 8–16)
ANION GAP SERPL CALC-SCNC: 8 MMOL/L (ref 8–16)
ANION GAP SERPL CALC-SCNC: 9 MMOL/L (ref 8–16)
AST SERPL-CCNC: 117 U/L (ref 10–40)
AST SERPL-CCNC: 25 U/L (ref 10–40)
AST SERPL-CCNC: 25 U/L (ref 10–40)
AST SERPL-CCNC: 27 U/L (ref 10–40)
AST SERPL-CCNC: 29 U/L (ref 10–40)
AST SERPL-CCNC: 29 U/L (ref 10–40)
AST SERPL-CCNC: 36 U/L (ref 10–40)
AST SERPL-CCNC: 37 U/L (ref 10–40)
AST SERPL-CCNC: 40 U/L (ref 10–40)
AST SERPL-CCNC: 43 U/L (ref 10–40)
AST SERPL-CCNC: 44 U/L (ref 10–40)
AST SERPL-CCNC: 45 U/L (ref 10–40)
AST SERPL-CCNC: 45 U/L (ref 10–40)
AST SERPL-CCNC: 46 U/L (ref 10–40)
AST SERPL-CCNC: 47 U/L (ref 10–40)
AST SERPL-CCNC: 49 U/L (ref 10–40)
AST SERPL-CCNC: 52 U/L (ref 10–40)
AST SERPL-CCNC: 52 U/L (ref 10–40)
AST SERPL-CCNC: 56 U/L (ref 10–40)
AST SERPL-CCNC: 67 U/L (ref 10–40)
AST SERPL-CCNC: 67 U/L (ref 10–40)
AST SERPL-CCNC: 68 U/L (ref 10–40)
AST SERPL-CCNC: 68 U/L (ref 10–40)
AST SERPL-CCNC: 73 U/L (ref 10–40)
AST SERPL-CCNC: 75 U/L (ref 10–40)
AST SERPL-CCNC: 77 U/L (ref 10–40)
AST SERPL-CCNC: 78 U/L (ref 10–40)
AST SERPL-CCNC: 80 U/L (ref 10–40)
AST SERPL-CCNC: 87 U/L (ref 10–40)
AST SERPL-CCNC: 88 U/L (ref 10–40)
AST SERPL-CCNC: 91 U/L (ref 10–40)
AST SERPL-CCNC: 99 U/L (ref 10–40)
BACTERIA UR CULT: NORMAL
BACTERIA UR CULT: NORMAL
BASOPHILS # BLD AUTO: 0.01 K/UL (ref 0–0.2)
BASOPHILS # BLD AUTO: 0.02 K/UL (ref 0–0.2)
BASOPHILS # BLD AUTO: 0.03 K/UL (ref 0–0.2)
BASOPHILS # BLD AUTO: 0.04 K/UL (ref 0–0.2)
BASOPHILS # BLD AUTO: 0.05 K/UL (ref 0–0.2)
BASOPHILS # BLD AUTO: 0.05 K/UL (ref 0–0.2)
BASOPHILS NFR BLD: 0.2 % (ref 0–1.9)
BASOPHILS NFR BLD: 0.3 % (ref 0–1.9)
BASOPHILS NFR BLD: 0.4 % (ref 0–1.9)
BASOPHILS NFR BLD: 0.5 % (ref 0–1.9)
BASOPHILS NFR BLD: 0.6 % (ref 0–1.9)
BASOPHILS NFR BLD: 0.6 % (ref 0–1.9)
BILIRUB DIRECT SERPL-MCNC: 0.2 MG/DL (ref 0.1–0.3)
BILIRUB SERPL-MCNC: 0.2 MG/DL (ref 0.1–1)
BILIRUB SERPL-MCNC: 0.3 MG/DL (ref 0.1–1)
BILIRUB SERPL-MCNC: 0.4 MG/DL (ref 0.1–1)
BILIRUB SERPL-MCNC: 0.5 MG/DL (ref 0.1–1)
BILIRUB SERPL-MCNC: 0.5 MG/DL (ref 0.1–1)
BILIRUB SERPL-MCNC: 0.6 MG/DL (ref 0.1–1)
BILIRUB SERPL-MCNC: 0.6 MG/DL (ref 0.1–1)
BILIRUB UR QL STRIP: NEGATIVE
BLD GP AB SCN CELLS X3 SERPL QL: NORMAL
BUN SERPL-MCNC: 11 MG/DL (ref 8–23)
BUN SERPL-MCNC: 13 MG/DL (ref 8–23)
BUN SERPL-MCNC: 15 MG/DL (ref 8–23)
BUN SERPL-MCNC: 16 MG/DL (ref 8–23)
BUN SERPL-MCNC: 17 MG/DL (ref 8–23)
BUN SERPL-MCNC: 18 MG/DL (ref 8–23)
BUN SERPL-MCNC: 18 MG/DL (ref 8–23)
BUN SERPL-MCNC: 19 MG/DL (ref 8–23)
BUN SERPL-MCNC: 19 MG/DL (ref 8–23)
BUN SERPL-MCNC: 20 MG/DL (ref 8–23)
BUN SERPL-MCNC: 21 MG/DL (ref 8–23)
BUN SERPL-MCNC: 22 MG/DL (ref 8–23)
BUN SERPL-MCNC: 23 MG/DL (ref 8–23)
BUN SERPL-MCNC: 24 MG/DL (ref 8–23)
BUN SERPL-MCNC: 24 MG/DL (ref 8–23)
BUN SERPL-MCNC: 25 MG/DL (ref 8–23)
BUN SERPL-MCNC: 26 MG/DL (ref 8–23)
BUN SERPL-MCNC: 28 MG/DL (ref 8–23)
BUN SERPL-MCNC: 28 MG/DL (ref 8–23)
BUN SERPL-MCNC: 36 MG/DL (ref 8–23)
CALCIUM SERPL-MCNC: 10 MG/DL (ref 8.7–10.5)
CALCIUM SERPL-MCNC: 10.4 MG/DL (ref 8.7–10.5)
CALCIUM SERPL-MCNC: 10.7 MG/DL (ref 8.7–10.5)
CALCIUM SERPL-MCNC: 8 MG/DL (ref 8.7–10.5)
CALCIUM SERPL-MCNC: 8.3 MG/DL (ref 8.7–10.5)
CALCIUM SERPL-MCNC: 8.3 MG/DL (ref 8.7–10.5)
CALCIUM SERPL-MCNC: 8.4 MG/DL (ref 8.7–10.5)
CALCIUM SERPL-MCNC: 8.5 MG/DL (ref 8.7–10.5)
CALCIUM SERPL-MCNC: 8.6 MG/DL (ref 8.7–10.5)
CALCIUM SERPL-MCNC: 8.7 MG/DL (ref 8.7–10.5)
CALCIUM SERPL-MCNC: 8.8 MG/DL (ref 8.7–10.5)
CALCIUM SERPL-MCNC: 8.9 MG/DL (ref 8.7–10.5)
CALCIUM SERPL-MCNC: 9 MG/DL (ref 8.7–10.5)
CALCIUM SERPL-MCNC: 9.1 MG/DL (ref 8.7–10.5)
CALCIUM SERPL-MCNC: 9.2 MG/DL (ref 8.7–10.5)
CALCIUM SERPL-MCNC: 9.2 MG/DL (ref 8.7–10.5)
CALCIUM SERPL-MCNC: 9.3 MG/DL (ref 8.7–10.5)
CALCIUM SERPL-MCNC: 9.4 MG/DL (ref 8.7–10.5)
CALCIUM SERPL-MCNC: 9.5 MG/DL (ref 8.7–10.5)
CALCIUM SERPL-MCNC: 9.6 MG/DL (ref 8.7–10.5)
CALCIUM SERPL-MCNC: 9.8 MG/DL (ref 8.7–10.5)
CALCIUM SERPL-MCNC: 9.8 MG/DL (ref 8.7–10.5)
CALCIUM SERPL-MCNC: 9.9 MG/DL (ref 8.7–10.5)
CALCIUM SERPL-MCNC: 9.9 MG/DL (ref 8.7–10.5)
CHLORIDE SERPL-SCNC: 100 MMOL/L (ref 95–110)
CHLORIDE SERPL-SCNC: 101 MMOL/L (ref 95–110)
CHLORIDE SERPL-SCNC: 102 MMOL/L (ref 95–110)
CHLORIDE SERPL-SCNC: 103 MMOL/L (ref 95–110)
CHLORIDE SERPL-SCNC: 104 MMOL/L (ref 95–110)
CHLORIDE SERPL-SCNC: 104 MMOL/L (ref 95–110)
CHLORIDE SERPL-SCNC: 105 MMOL/L (ref 95–110)
CHLORIDE SERPL-SCNC: 106 MMOL/L (ref 95–110)
CHLORIDE SERPL-SCNC: 97 MMOL/L (ref 95–110)
CHLORIDE SERPL-SCNC: 97 MMOL/L (ref 95–110)
CHLORIDE SERPL-SCNC: 98 MMOL/L (ref 95–110)
CHLORIDE SERPL-SCNC: 98 MMOL/L (ref 95–110)
CHLORIDE SERPL-SCNC: 99 MMOL/L (ref 95–110)
CLARITY UR REFRACT.AUTO: ABNORMAL
CO2 SERPL-SCNC: 20 MMOL/L (ref 23–29)
CO2 SERPL-SCNC: 20 MMOL/L (ref 23–29)
CO2 SERPL-SCNC: 21 MMOL/L (ref 23–29)
CO2 SERPL-SCNC: 22 MMOL/L (ref 23–29)
CO2 SERPL-SCNC: 23 MMOL/L (ref 23–29)
CO2 SERPL-SCNC: 24 MMOL/L (ref 23–29)
CO2 SERPL-SCNC: 25 MMOL/L (ref 23–29)
CO2 SERPL-SCNC: 26 MMOL/L (ref 23–29)
CO2 SERPL-SCNC: 27 MMOL/L (ref 23–29)
CO2 SERPL-SCNC: 28 MMOL/L (ref 23–29)
COLOR UR AUTO: YELLOW
CORTIS SERPL-MCNC: 16 UG/DL (ref 4.3–22.4)
CREAT SERPL-MCNC: 0.7 MG/DL (ref 0.5–1.4)
CREAT SERPL-MCNC: 0.8 MG/DL (ref 0.5–1.4)
CREAT SERPL-MCNC: 0.9 MG/DL (ref 0.5–1.4)
CREAT SERPL-MCNC: 1 MG/DL (ref 0.5–1.4)
CREAT SERPL-MCNC: 1.1 MG/DL (ref 0.5–1.4)
CREAT SERPL-MCNC: 1.2 MG/DL (ref 0.5–1.4)
CREAT SERPL-MCNC: 1.3 MG/DL (ref 0.5–1.4)
CREAT SERPL-MCNC: 1.5 MG/DL (ref 0.5–1.4)
CREAT SERPL-MCNC: 1.6 MG/DL (ref 0.5–1.4)
DIFFERENTIAL METHOD: ABNORMAL
EOSINOPHIL # BLD AUTO: 0 K/UL (ref 0–0.5)
EOSINOPHIL # BLD AUTO: 0.1 K/UL (ref 0–0.5)
EOSINOPHIL # BLD AUTO: 0.2 K/UL (ref 0–0.5)
EOSINOPHIL # BLD AUTO: 0.3 K/UL (ref 0–0.5)
EOSINOPHIL # BLD AUTO: 0.3 K/UL (ref 0–0.5)
EOSINOPHIL NFR BLD: 0.1 % (ref 0–8)
EOSINOPHIL NFR BLD: 0.1 % (ref 0–8)
EOSINOPHIL NFR BLD: 0.2 % (ref 0–8)
EOSINOPHIL NFR BLD: 0.5 % (ref 0–8)
EOSINOPHIL NFR BLD: 0.5 % (ref 0–8)
EOSINOPHIL NFR BLD: 0.6 % (ref 0–8)
EOSINOPHIL NFR BLD: 0.7 % (ref 0–8)
EOSINOPHIL NFR BLD: 0.7 % (ref 0–8)
EOSINOPHIL NFR BLD: 0.8 % (ref 0–8)
EOSINOPHIL NFR BLD: 0.8 % (ref 0–8)
EOSINOPHIL NFR BLD: 0.9 % (ref 0–8)
EOSINOPHIL NFR BLD: 0.9 % (ref 0–8)
EOSINOPHIL NFR BLD: 1.1 % (ref 0–8)
EOSINOPHIL NFR BLD: 1.2 % (ref 0–8)
EOSINOPHIL NFR BLD: 1.5 % (ref 0–8)
EOSINOPHIL NFR BLD: 1.6 % (ref 0–8)
EOSINOPHIL NFR BLD: 1.8 % (ref 0–8)
EOSINOPHIL NFR BLD: 1.9 % (ref 0–8)
EOSINOPHIL NFR BLD: 2.1 % (ref 0–8)
EOSINOPHIL NFR BLD: 2.2 % (ref 0–8)
EOSINOPHIL NFR BLD: 2.3 % (ref 0–8)
EOSINOPHIL NFR BLD: 2.3 % (ref 0–8)
EOSINOPHIL NFR BLD: 2.5 % (ref 0–8)
EOSINOPHIL NFR BLD: 2.7 % (ref 0–8)
EOSINOPHIL NFR BLD: 3.3 % (ref 0–8)
EOSINOPHIL NFR BLD: 4.8 % (ref 0–8)
ERYTHROCYTE [DISTWIDTH] IN BLOOD BY AUTOMATED COUNT: 14.4 % (ref 11.5–14.5)
ERYTHROCYTE [DISTWIDTH] IN BLOOD BY AUTOMATED COUNT: 14.5 % (ref 11.5–14.5)
ERYTHROCYTE [DISTWIDTH] IN BLOOD BY AUTOMATED COUNT: 14.6 % (ref 11.5–14.5)
ERYTHROCYTE [DISTWIDTH] IN BLOOD BY AUTOMATED COUNT: 14.6 % (ref 11.5–14.5)
ERYTHROCYTE [DISTWIDTH] IN BLOOD BY AUTOMATED COUNT: 14.7 % (ref 11.5–14.5)
ERYTHROCYTE [DISTWIDTH] IN BLOOD BY AUTOMATED COUNT: 14.8 % (ref 11.5–14.5)
ERYTHROCYTE [DISTWIDTH] IN BLOOD BY AUTOMATED COUNT: 14.8 % (ref 11.5–14.5)
ERYTHROCYTE [DISTWIDTH] IN BLOOD BY AUTOMATED COUNT: 14.9 % (ref 11.5–14.5)
ERYTHROCYTE [DISTWIDTH] IN BLOOD BY AUTOMATED COUNT: 15 % (ref 11.5–14.5)
ERYTHROCYTE [DISTWIDTH] IN BLOOD BY AUTOMATED COUNT: 15.2 % (ref 11.5–14.5)
ERYTHROCYTE [DISTWIDTH] IN BLOOD BY AUTOMATED COUNT: 15.2 % (ref 11.5–14.5)
ERYTHROCYTE [DISTWIDTH] IN BLOOD BY AUTOMATED COUNT: 15.3 % (ref 11.5–14.5)
ERYTHROCYTE [DISTWIDTH] IN BLOOD BY AUTOMATED COUNT: 15.4 % (ref 11.5–14.5)
ERYTHROCYTE [DISTWIDTH] IN BLOOD BY AUTOMATED COUNT: 15.5 % (ref 11.5–14.5)
ERYTHROCYTE [DISTWIDTH] IN BLOOD BY AUTOMATED COUNT: 15.5 % (ref 11.5–14.5)
ERYTHROCYTE [DISTWIDTH] IN BLOOD BY AUTOMATED COUNT: 15.6 % (ref 11.5–14.5)
ERYTHROCYTE [DISTWIDTH] IN BLOOD BY AUTOMATED COUNT: 15.6 % (ref 11.5–14.5)
ERYTHROCYTE [DISTWIDTH] IN BLOOD BY AUTOMATED COUNT: 15.7 % (ref 11.5–14.5)
ERYTHROCYTE [DISTWIDTH] IN BLOOD BY AUTOMATED COUNT: 15.8 % (ref 11.5–14.5)
ERYTHROCYTE [DISTWIDTH] IN BLOOD BY AUTOMATED COUNT: 16 % (ref 11.5–14.5)
ERYTHROCYTE [DISTWIDTH] IN BLOOD BY AUTOMATED COUNT: 17.2 % (ref 11.5–14.5)
ERYTHROCYTE [DISTWIDTH] IN BLOOD BY AUTOMATED COUNT: 19.1 % (ref 11.5–14.5)
ERYTHROCYTE [DISTWIDTH] IN BLOOD BY AUTOMATED COUNT: 19.3 % (ref 11.5–14.5)
ERYTHROCYTE [DISTWIDTH] IN BLOOD BY AUTOMATED COUNT: 19.4 % (ref 11.5–14.5)
ERYTHROCYTE [DISTWIDTH] IN BLOOD BY AUTOMATED COUNT: 19.6 % (ref 11.5–14.5)
ERYTHROCYTE [DISTWIDTH] IN BLOOD BY AUTOMATED COUNT: 19.6 % (ref 11.5–14.5)
ERYTHROCYTE [DISTWIDTH] IN BLOOD BY AUTOMATED COUNT: 19.7 % (ref 11.5–14.5)
ERYTHROCYTE [DISTWIDTH] IN BLOOD BY AUTOMATED COUNT: 19.7 % (ref 11.5–14.5)
EST. GFR  (NO RACE VARIABLE): 43.8 ML/MIN/1.73 M^2
EST. GFR  (NO RACE VARIABLE): 47.4 ML/MIN/1.73 M^2
EST. GFR  (NO RACE VARIABLE): 56.2 ML/MIN/1.73 M^2
EST. GFR  (NO RACE VARIABLE): >60 ML/MIN/1.73 M^2
ESTIMATED AVG GLUCOSE: 105 MG/DL (ref 68–131)
FERRITIN SERPL-MCNC: 143 NG/ML (ref 20–300)
FERRITIN SERPL-MCNC: 51 NG/ML (ref 20–300)
GGT SERPL-CCNC: 169 U/L (ref 8–55)
GLUCOSE SERPL-MCNC: 103 MG/DL (ref 70–110)
GLUCOSE SERPL-MCNC: 105 MG/DL (ref 70–110)
GLUCOSE SERPL-MCNC: 106 MG/DL (ref 70–110)
GLUCOSE SERPL-MCNC: 108 MG/DL (ref 70–110)
GLUCOSE SERPL-MCNC: 110 MG/DL (ref 70–110)
GLUCOSE SERPL-MCNC: 111 MG/DL (ref 70–110)
GLUCOSE SERPL-MCNC: 111 MG/DL (ref 70–110)
GLUCOSE SERPL-MCNC: 112 MG/DL (ref 70–110)
GLUCOSE SERPL-MCNC: 113 MG/DL (ref 70–110)
GLUCOSE SERPL-MCNC: 113 MG/DL (ref 70–110)
GLUCOSE SERPL-MCNC: 115 MG/DL (ref 70–110)
GLUCOSE SERPL-MCNC: 116 MG/DL (ref 70–110)
GLUCOSE SERPL-MCNC: 117 MG/DL (ref 70–110)
GLUCOSE SERPL-MCNC: 117 MG/DL (ref 70–110)
GLUCOSE SERPL-MCNC: 119 MG/DL (ref 70–110)
GLUCOSE SERPL-MCNC: 121 MG/DL (ref 70–110)
GLUCOSE SERPL-MCNC: 126 MG/DL (ref 70–110)
GLUCOSE SERPL-MCNC: 127 MG/DL (ref 70–110)
GLUCOSE SERPL-MCNC: 133 MG/DL (ref 70–110)
GLUCOSE SERPL-MCNC: 156 MG/DL (ref 70–110)
GLUCOSE SERPL-MCNC: 166 MG/DL (ref 70–110)
GLUCOSE SERPL-MCNC: 167 MG/DL (ref 70–110)
GLUCOSE SERPL-MCNC: 174 MG/DL (ref 70–110)
GLUCOSE SERPL-MCNC: 178 MG/DL (ref 70–110)
GLUCOSE SERPL-MCNC: 77 MG/DL (ref 70–110)
GLUCOSE SERPL-MCNC: 80 MG/DL (ref 70–110)
GLUCOSE SERPL-MCNC: 83 MG/DL (ref 70–110)
GLUCOSE SERPL-MCNC: 89 MG/DL (ref 70–110)
GLUCOSE SERPL-MCNC: 90 MG/DL (ref 70–110)
GLUCOSE SERPL-MCNC: 91 MG/DL (ref 70–110)
GLUCOSE SERPL-MCNC: 96 MG/DL (ref 70–110)
GLUCOSE UR QL STRIP: NEGATIVE
HBA1C MFR BLD: 5.3 % (ref 4–5.6)
HCT VFR BLD AUTO: 20.4 % (ref 40–54)
HCT VFR BLD AUTO: 25.2 % (ref 40–54)
HCT VFR BLD AUTO: 26.1 % (ref 40–54)
HCT VFR BLD AUTO: 26.9 % (ref 40–54)
HCT VFR BLD AUTO: 27.3 % (ref 40–54)
HCT VFR BLD AUTO: 27.5 % (ref 40–54)
HCT VFR BLD AUTO: 27.7 % (ref 40–54)
HCT VFR BLD AUTO: 27.9 % (ref 40–54)
HCT VFR BLD AUTO: 28.4 % (ref 40–54)
HCT VFR BLD AUTO: 28.6 % (ref 40–54)
HCT VFR BLD AUTO: 28.8 % (ref 40–54)
HCT VFR BLD AUTO: 28.9 % (ref 40–54)
HCT VFR BLD AUTO: 29 % (ref 40–54)
HCT VFR BLD AUTO: 29.4 % (ref 40–54)
HCT VFR BLD AUTO: 29.6 % (ref 40–54)
HCT VFR BLD AUTO: 29.7 % (ref 40–54)
HCT VFR BLD AUTO: 29.9 % (ref 40–54)
HCT VFR BLD AUTO: 30.1 % (ref 40–54)
HCT VFR BLD AUTO: 30.2 % (ref 40–54)
HCT VFR BLD AUTO: 30.3 % (ref 40–54)
HCT VFR BLD AUTO: 31.1 % (ref 40–54)
HCT VFR BLD AUTO: 31.3 % (ref 40–54)
HCT VFR BLD AUTO: 31.9 % (ref 40–54)
HCT VFR BLD AUTO: 32.1 % (ref 40–54)
HCT VFR BLD AUTO: 32.4 % (ref 40–54)
HCT VFR BLD AUTO: 33.1 % (ref 40–54)
HCT VFR BLD AUTO: 33.3 % (ref 40–54)
HCT VFR BLD AUTO: 33.6 % (ref 40–54)
HCT VFR BLD AUTO: 33.6 % (ref 40–54)
HCT VFR BLD AUTO: 38.1 % (ref 40–54)
HCT VFR BLD AUTO: 41.3 % (ref 40–54)
HCT VFR BLD AUTO: 42.7 % (ref 40–54)
HCT VFR BLD AUTO: 43.5 % (ref 40–54)
HCT VFR BLD AUTO: 44.1 % (ref 40–54)
HCT VFR BLD AUTO: 44.1 % (ref 40–54)
HCV AB SERPL QL IA: NORMAL
HGB BLD-MCNC: 10 G/DL (ref 14–18)
HGB BLD-MCNC: 10.1 G/DL (ref 14–18)
HGB BLD-MCNC: 10.2 G/DL (ref 14–18)
HGB BLD-MCNC: 10.7 G/DL (ref 14–18)
HGB BLD-MCNC: 10.7 G/DL (ref 14–18)
HGB BLD-MCNC: 12.2 G/DL (ref 14–18)
HGB BLD-MCNC: 13.2 G/DL (ref 14–18)
HGB BLD-MCNC: 14.1 G/DL (ref 14–18)
HGB BLD-MCNC: 14.3 G/DL (ref 14–18)
HGB BLD-MCNC: 6 G/DL (ref 14–18)
HGB BLD-MCNC: 8.3 G/DL (ref 14–18)
HGB BLD-MCNC: 8.4 G/DL (ref 14–18)
HGB BLD-MCNC: 8.8 G/DL (ref 14–18)
HGB BLD-MCNC: 8.8 G/DL (ref 14–18)
HGB BLD-MCNC: 8.9 G/DL (ref 14–18)
HGB BLD-MCNC: 8.9 G/DL (ref 14–18)
HGB BLD-MCNC: 9 G/DL (ref 14–18)
HGB BLD-MCNC: 9.1 G/DL (ref 14–18)
HGB BLD-MCNC: 9.2 G/DL (ref 14–18)
HGB BLD-MCNC: 9.3 G/DL (ref 14–18)
HGB BLD-MCNC: 9.4 G/DL (ref 14–18)
HGB BLD-MCNC: 9.5 G/DL (ref 14–18)
HGB BLD-MCNC: 9.6 G/DL (ref 14–18)
HGB BLD-MCNC: 9.7 G/DL (ref 14–18)
HGB BLD-MCNC: 9.8 G/DL (ref 14–18)
HGB BLD-MCNC: 9.9 G/DL (ref 14–18)
HGB UR QL STRIP: NEGATIVE
HIV 1+2 AB+HIV1 P24 AG SERPL QL IA: NORMAL
IMM GRANULOCYTES # BLD AUTO: 0.01 K/UL (ref 0–0.04)
IMM GRANULOCYTES # BLD AUTO: 0.02 K/UL (ref 0–0.04)
IMM GRANULOCYTES # BLD AUTO: 0.02 K/UL (ref 0–0.04)
IMM GRANULOCYTES # BLD AUTO: 0.03 K/UL (ref 0–0.04)
IMM GRANULOCYTES # BLD AUTO: 0.04 K/UL (ref 0–0.04)
IMM GRANULOCYTES # BLD AUTO: 0.05 K/UL (ref 0–0.04)
IMM GRANULOCYTES # BLD AUTO: 0.06 K/UL (ref 0–0.04)
IMM GRANULOCYTES # BLD AUTO: 0.06 K/UL (ref 0–0.04)
IMM GRANULOCYTES # BLD AUTO: 0.07 K/UL (ref 0–0.04)
IMM GRANULOCYTES # BLD AUTO: 0.08 K/UL (ref 0–0.04)
IMM GRANULOCYTES # BLD AUTO: 0.09 K/UL (ref 0–0.04)
IMM GRANULOCYTES # BLD AUTO: 0.1 K/UL (ref 0–0.04)
IMM GRANULOCYTES # BLD AUTO: 0.1 K/UL (ref 0–0.04)
IMM GRANULOCYTES # BLD AUTO: 0.11 K/UL (ref 0–0.04)
IMM GRANULOCYTES # BLD AUTO: 0.11 K/UL (ref 0–0.04)
IMM GRANULOCYTES # BLD AUTO: 0.12 K/UL (ref 0–0.04)
IMM GRANULOCYTES # BLD AUTO: 0.14 K/UL (ref 0–0.04)
IMM GRANULOCYTES NFR BLD AUTO: 0.1 % (ref 0–0.5)
IMM GRANULOCYTES NFR BLD AUTO: 0.2 % (ref 0–0.5)
IMM GRANULOCYTES NFR BLD AUTO: 0.3 % (ref 0–0.5)
IMM GRANULOCYTES NFR BLD AUTO: 0.3 % (ref 0–0.5)
IMM GRANULOCYTES NFR BLD AUTO: 0.4 % (ref 0–0.5)
IMM GRANULOCYTES NFR BLD AUTO: 0.4 % (ref 0–0.5)
IMM GRANULOCYTES NFR BLD AUTO: 0.5 % (ref 0–0.5)
IMM GRANULOCYTES NFR BLD AUTO: 0.5 % (ref 0–0.5)
IMM GRANULOCYTES NFR BLD AUTO: 0.6 % (ref 0–0.5)
IMM GRANULOCYTES NFR BLD AUTO: 0.7 % (ref 0–0.5)
IMM GRANULOCYTES NFR BLD AUTO: 0.8 % (ref 0–0.5)
IMM GRANULOCYTES NFR BLD AUTO: 0.9 % (ref 0–0.5)
IMM GRANULOCYTES NFR BLD AUTO: 1.1 % (ref 0–0.5)
IMM GRANULOCYTES NFR BLD AUTO: 1.2 % (ref 0–0.5)
IMM GRANULOCYTES NFR BLD AUTO: 1.3 % (ref 0–0.5)
INR PPP: 1.1 (ref 0.8–1.2)
IRON SERPL-MCNC: 35 UG/DL (ref 45–160)
IRON SERPL-MCNC: 36 UG/DL (ref 45–160)
KETONES UR QL STRIP: NEGATIVE
LACTATE SERPL-SCNC: 0.7 MMOL/L (ref 0.5–2.2)
LDH SERPL L TO P-CCNC: 366 U/L (ref 110–260)
LEUKOCYTE ESTERASE UR QL STRIP: NEGATIVE
LIPASE SERPL-CCNC: 7 U/L (ref 4–60)
LYMPHOCYTES # BLD AUTO: 0.2 K/UL (ref 1–4.8)
LYMPHOCYTES # BLD AUTO: 0.3 K/UL (ref 1–4.8)
LYMPHOCYTES # BLD AUTO: 0.4 K/UL (ref 1–4.8)
LYMPHOCYTES # BLD AUTO: 0.5 K/UL (ref 1–4.8)
LYMPHOCYTES # BLD AUTO: 0.6 K/UL (ref 1–4.8)
LYMPHOCYTES NFR BLD: 1.5 % (ref 18–48)
LYMPHOCYTES NFR BLD: 1.6 % (ref 18–48)
LYMPHOCYTES NFR BLD: 11.1 % (ref 18–48)
LYMPHOCYTES NFR BLD: 2.2 % (ref 18–48)
LYMPHOCYTES NFR BLD: 2.3 % (ref 18–48)
LYMPHOCYTES NFR BLD: 2.5 % (ref 18–48)
LYMPHOCYTES NFR BLD: 2.8 % (ref 18–48)
LYMPHOCYTES NFR BLD: 2.9 % (ref 18–48)
LYMPHOCYTES NFR BLD: 3 % (ref 18–48)
LYMPHOCYTES NFR BLD: 3.2 % (ref 18–48)
LYMPHOCYTES NFR BLD: 3.2 % (ref 18–48)
LYMPHOCYTES NFR BLD: 3.3 % (ref 18–48)
LYMPHOCYTES NFR BLD: 3.4 % (ref 18–48)
LYMPHOCYTES NFR BLD: 3.5 % (ref 18–48)
LYMPHOCYTES NFR BLD: 3.6 % (ref 18–48)
LYMPHOCYTES NFR BLD: 3.6 % (ref 18–48)
LYMPHOCYTES NFR BLD: 4 % (ref 18–48)
LYMPHOCYTES NFR BLD: 4.3 % (ref 18–48)
LYMPHOCYTES NFR BLD: 4.4 % (ref 18–48)
LYMPHOCYTES NFR BLD: 4.4 % (ref 18–48)
LYMPHOCYTES NFR BLD: 4.5 % (ref 18–48)
LYMPHOCYTES NFR BLD: 4.5 % (ref 18–48)
LYMPHOCYTES NFR BLD: 4.8 % (ref 18–48)
LYMPHOCYTES NFR BLD: 5.1 % (ref 18–48)
LYMPHOCYTES NFR BLD: 5.3 % (ref 18–48)
LYMPHOCYTES NFR BLD: 5.4 % (ref 18–48)
LYMPHOCYTES NFR BLD: 6.5 % (ref 18–48)
LYMPHOCYTES NFR BLD: 6.8 % (ref 18–48)
LYMPHOCYTES NFR BLD: 7.1 % (ref 18–48)
LYMPHOCYTES NFR BLD: 8.4 % (ref 18–48)
LYMPHOCYTES NFR BLD: 8.6 % (ref 18–48)
MAGNESIUM SERPL-MCNC: 1.7 MG/DL (ref 1.6–2.6)
MAGNESIUM SERPL-MCNC: 1.8 MG/DL (ref 1.6–2.6)
MAGNESIUM SERPL-MCNC: 1.9 MG/DL (ref 1.6–2.6)
MAGNESIUM SERPL-MCNC: 2 MG/DL (ref 1.6–2.6)
MCH RBC QN AUTO: 24.1 PG (ref 27–31)
MCH RBC QN AUTO: 24.2 PG (ref 27–31)
MCH RBC QN AUTO: 24.4 PG (ref 27–31)
MCH RBC QN AUTO: 25.1 PG (ref 27–31)
MCH RBC QN AUTO: 26.7 PG (ref 27–31)
MCH RBC QN AUTO: 26.8 PG (ref 27–31)
MCH RBC QN AUTO: 26.8 PG (ref 27–31)
MCH RBC QN AUTO: 26.9 PG (ref 27–31)
MCH RBC QN AUTO: 27 PG (ref 27–31)
MCH RBC QN AUTO: 27.2 PG (ref 27–31)
MCH RBC QN AUTO: 27.3 PG (ref 27–31)
MCH RBC QN AUTO: 27.4 PG (ref 27–31)
MCH RBC QN AUTO: 27.6 PG (ref 27–31)
MCH RBC QN AUTO: 27.7 PG (ref 27–31)
MCH RBC QN AUTO: 27.8 PG (ref 27–31)
MCH RBC QN AUTO: 27.9 PG (ref 27–31)
MCH RBC QN AUTO: 28 PG (ref 27–31)
MCH RBC QN AUTO: 28.1 PG (ref 27–31)
MCH RBC QN AUTO: 28.2 PG (ref 27–31)
MCH RBC QN AUTO: 28.3 PG (ref 27–31)
MCH RBC QN AUTO: 28.6 PG (ref 27–31)
MCH RBC QN AUTO: 28.7 PG (ref 27–31)
MCH RBC QN AUTO: 29.2 PG (ref 27–31)
MCH RBC QN AUTO: 29.3 PG (ref 27–31)
MCH RBC QN AUTO: 29.4 PG (ref 27–31)
MCH RBC QN AUTO: 29.5 PG (ref 27–31)
MCH RBC QN AUTO: 29.7 PG (ref 27–31)
MCH RBC QN AUTO: 29.8 PG (ref 27–31)
MCHC RBC AUTO-ENTMCNC: 28.7 G/DL (ref 32–36)
MCHC RBC AUTO-ENTMCNC: 28.8 G/DL (ref 32–36)
MCHC RBC AUTO-ENTMCNC: 29.4 G/DL (ref 32–36)
MCHC RBC AUTO-ENTMCNC: 29.4 G/DL (ref 32–36)
MCHC RBC AUTO-ENTMCNC: 29.6 G/DL (ref 32–36)
MCHC RBC AUTO-ENTMCNC: 30.2 G/DL (ref 32–36)
MCHC RBC AUTO-ENTMCNC: 30.2 G/DL (ref 32–36)
MCHC RBC AUTO-ENTMCNC: 30.3 G/DL (ref 32–36)
MCHC RBC AUTO-ENTMCNC: 30.3 G/DL (ref 32–36)
MCHC RBC AUTO-ENTMCNC: 30.8 G/DL (ref 32–36)
MCHC RBC AUTO-ENTMCNC: 31.1 G/DL (ref 32–36)
MCHC RBC AUTO-ENTMCNC: 31.2 G/DL (ref 32–36)
MCHC RBC AUTO-ENTMCNC: 31.3 G/DL (ref 32–36)
MCHC RBC AUTO-ENTMCNC: 31.3 G/DL (ref 32–36)
MCHC RBC AUTO-ENTMCNC: 31.4 G/DL (ref 32–36)
MCHC RBC AUTO-ENTMCNC: 31.6 G/DL (ref 32–36)
MCHC RBC AUTO-ENTMCNC: 31.8 G/DL (ref 32–36)
MCHC RBC AUTO-ENTMCNC: 32 G/DL (ref 32–36)
MCHC RBC AUTO-ENTMCNC: 32.2 G/DL (ref 32–36)
MCHC RBC AUTO-ENTMCNC: 32.4 G/DL (ref 32–36)
MCHC RBC AUTO-ENTMCNC: 32.4 G/DL (ref 32–36)
MCHC RBC AUTO-ENTMCNC: 32.6 G/DL (ref 32–36)
MCHC RBC AUTO-ENTMCNC: 32.9 G/DL (ref 32–36)
MCHC RBC AUTO-ENTMCNC: 33 G/DL (ref 32–36)
MCHC RBC AUTO-ENTMCNC: 33.5 G/DL (ref 32–36)
MCHC RBC AUTO-ENTMCNC: 34.1 G/DL (ref 32–36)
MCV RBC AUTO: 82 FL (ref 82–98)
MCV RBC AUTO: 83 FL (ref 82–98)
MCV RBC AUTO: 83 FL (ref 82–98)
MCV RBC AUTO: 84 FL (ref 82–98)
MCV RBC AUTO: 84 FL (ref 82–98)
MCV RBC AUTO: 85 FL (ref 82–98)
MCV RBC AUTO: 85 FL (ref 82–98)
MCV RBC AUTO: 86 FL (ref 82–98)
MCV RBC AUTO: 87 FL (ref 82–98)
MCV RBC AUTO: 88 FL (ref 82–98)
MCV RBC AUTO: 89 FL (ref 82–98)
MCV RBC AUTO: 90 FL (ref 82–98)
MCV RBC AUTO: 91 FL (ref 82–98)
MCV RBC AUTO: 92 FL (ref 82–98)
MCV RBC AUTO: 93 FL (ref 82–98)
MCV RBC AUTO: 96 FL (ref 82–98)
MONOCYTES # BLD AUTO: 0.4 K/UL (ref 0.3–1)
MONOCYTES # BLD AUTO: 0.8 K/UL (ref 0.3–1)
MONOCYTES # BLD AUTO: 0.8 K/UL (ref 0.3–1)
MONOCYTES # BLD AUTO: 0.9 K/UL (ref 0.3–1)
MONOCYTES # BLD AUTO: 1 K/UL (ref 0.3–1)
MONOCYTES # BLD AUTO: 1.1 K/UL (ref 0.3–1)
MONOCYTES # BLD AUTO: 1.2 K/UL (ref 0.3–1)
MONOCYTES # BLD AUTO: 1.3 K/UL (ref 0.3–1)
MONOCYTES # BLD AUTO: 1.3 K/UL (ref 0.3–1)
MONOCYTES # BLD AUTO: 1.4 K/UL (ref 0.3–1)
MONOCYTES # BLD AUTO: 1.5 K/UL (ref 0.3–1)
MONOCYTES # BLD AUTO: 1.5 K/UL (ref 0.3–1)
MONOCYTES # BLD AUTO: 1.7 K/UL (ref 0.3–1)
MONOCYTES # BLD AUTO: 1.8 K/UL (ref 0.3–1)
MONOCYTES # BLD AUTO: 1.8 K/UL (ref 0.3–1)
MONOCYTES # BLD AUTO: 2.1 K/UL (ref 0.3–1)
MONOCYTES # BLD AUTO: 2.9 K/UL (ref 0.3–1)
MONOCYTES NFR BLD: 10 % (ref 4–15)
MONOCYTES NFR BLD: 10.3 % (ref 4–15)
MONOCYTES NFR BLD: 10.6 % (ref 4–15)
MONOCYTES NFR BLD: 10.7 % (ref 4–15)
MONOCYTES NFR BLD: 11 % (ref 4–15)
MONOCYTES NFR BLD: 11.5 % (ref 4–15)
MONOCYTES NFR BLD: 11.7 % (ref 4–15)
MONOCYTES NFR BLD: 12.3 % (ref 4–15)
MONOCYTES NFR BLD: 12.8 % (ref 4–15)
MONOCYTES NFR BLD: 13 % (ref 4–15)
MONOCYTES NFR BLD: 14.1 % (ref 4–15)
MONOCYTES NFR BLD: 14.3 % (ref 4–15)
MONOCYTES NFR BLD: 14.4 % (ref 4–15)
MONOCYTES NFR BLD: 14.8 % (ref 4–15)
MONOCYTES NFR BLD: 15.3 % (ref 4–15)
MONOCYTES NFR BLD: 15.4 % (ref 4–15)
MONOCYTES NFR BLD: 15.5 % (ref 4–15)
MONOCYTES NFR BLD: 16.5 % (ref 4–15)
MONOCYTES NFR BLD: 17.5 % (ref 4–15)
MONOCYTES NFR BLD: 18.8 % (ref 4–15)
MONOCYTES NFR BLD: 18.9 % (ref 4–15)
MONOCYTES NFR BLD: 21.3 % (ref 4–15)
MONOCYTES NFR BLD: 3.4 % (ref 4–15)
MONOCYTES NFR BLD: 6.3 % (ref 4–15)
MONOCYTES NFR BLD: 9.1 % (ref 4–15)
NEUTROPHILS # BLD AUTO: 10.4 K/UL (ref 1.8–7.7)
NEUTROPHILS # BLD AUTO: 10.6 K/UL (ref 1.8–7.7)
NEUTROPHILS # BLD AUTO: 11.8 K/UL (ref 1.8–7.7)
NEUTROPHILS # BLD AUTO: 12.1 K/UL (ref 1.8–7.7)
NEUTROPHILS # BLD AUTO: 3.9 K/UL (ref 1.8–7.7)
NEUTROPHILS # BLD AUTO: 4.1 K/UL (ref 1.8–7.7)
NEUTROPHILS # BLD AUTO: 4.2 K/UL (ref 1.8–7.7)
NEUTROPHILS # BLD AUTO: 4.6 K/UL (ref 1.8–7.7)
NEUTROPHILS # BLD AUTO: 5.2 K/UL (ref 1.8–7.7)
NEUTROPHILS # BLD AUTO: 5.2 K/UL (ref 1.8–7.7)
NEUTROPHILS # BLD AUTO: 5.9 K/UL (ref 1.8–7.7)
NEUTROPHILS # BLD AUTO: 5.9 K/UL (ref 1.8–7.7)
NEUTROPHILS # BLD AUTO: 6.1 K/UL (ref 1.8–7.7)
NEUTROPHILS # BLD AUTO: 6.6 K/UL (ref 1.8–7.7)
NEUTROPHILS # BLD AUTO: 6.7 K/UL (ref 1.8–7.7)
NEUTROPHILS # BLD AUTO: 6.7 K/UL (ref 1.8–7.7)
NEUTROPHILS # BLD AUTO: 6.8 K/UL (ref 1.8–7.7)
NEUTROPHILS # BLD AUTO: 7 K/UL (ref 1.8–7.7)
NEUTROPHILS # BLD AUTO: 7 K/UL (ref 1.8–7.7)
NEUTROPHILS # BLD AUTO: 7.4 K/UL (ref 1.8–7.7)
NEUTROPHILS # BLD AUTO: 8.3 K/UL (ref 1.8–7.7)
NEUTROPHILS # BLD AUTO: 8.4 K/UL (ref 1.8–7.7)
NEUTROPHILS # BLD AUTO: 8.6 K/UL (ref 1.8–7.7)
NEUTROPHILS # BLD AUTO: 8.7 K/UL (ref 1.8–7.7)
NEUTROPHILS # BLD AUTO: 9.2 K/UL (ref 1.8–7.7)
NEUTROPHILS # BLD AUTO: 9.2 K/UL (ref 1.8–7.7)
NEUTROPHILS # BLD AUTO: 9.3 K/UL (ref 1.8–7.7)
NEUTROPHILS # BLD AUTO: 9.4 K/UL (ref 1.8–7.7)
NEUTROPHILS # BLD AUTO: 9.5 K/UL (ref 1.8–7.7)
NEUTROPHILS # BLD AUTO: 9.9 K/UL (ref 1.8–7.7)
NEUTROPHILS NFR BLD: 66.6 % (ref 38–73)
NEUTROPHILS NFR BLD: 71.4 % (ref 38–73)
NEUTROPHILS NFR BLD: 71.6 % (ref 38–73)
NEUTROPHILS NFR BLD: 73.3 % (ref 38–73)
NEUTROPHILS NFR BLD: 73.8 % (ref 38–73)
NEUTROPHILS NFR BLD: 75.2 % (ref 38–73)
NEUTROPHILS NFR BLD: 75.8 % (ref 38–73)
NEUTROPHILS NFR BLD: 77.2 % (ref 38–73)
NEUTROPHILS NFR BLD: 77.8 % (ref 38–73)
NEUTROPHILS NFR BLD: 78.2 % (ref 38–73)
NEUTROPHILS NFR BLD: 78.3 % (ref 38–73)
NEUTROPHILS NFR BLD: 78.5 % (ref 38–73)
NEUTROPHILS NFR BLD: 79.2 % (ref 38–73)
NEUTROPHILS NFR BLD: 79.3 % (ref 38–73)
NEUTROPHILS NFR BLD: 80.2 % (ref 38–73)
NEUTROPHILS NFR BLD: 80.4 % (ref 38–73)
NEUTROPHILS NFR BLD: 81.8 % (ref 38–73)
NEUTROPHILS NFR BLD: 82.1 % (ref 38–73)
NEUTROPHILS NFR BLD: 82.3 % (ref 38–73)
NEUTROPHILS NFR BLD: 82.7 % (ref 38–73)
NEUTROPHILS NFR BLD: 83 % (ref 38–73)
NEUTROPHILS NFR BLD: 83.7 % (ref 38–73)
NEUTROPHILS NFR BLD: 84.2 % (ref 38–73)
NEUTROPHILS NFR BLD: 84.5 % (ref 38–73)
NEUTROPHILS NFR BLD: 85.1 % (ref 38–73)
NEUTROPHILS NFR BLD: 85.2 % (ref 38–73)
NEUTROPHILS NFR BLD: 86 % (ref 38–73)
NEUTROPHILS NFR BLD: 91.1 % (ref 38–73)
NEUTROPHILS NFR BLD: 94 % (ref 38–73)
NITRITE UR QL STRIP: NEGATIVE
NRBC BLD-RTO: 0 /100 WBC
PH UR STRIP: 7 [PH] (ref 5–8)
PHOSPHATE SERPL-MCNC: 2 MG/DL (ref 2.7–4.5)
PHOSPHATE SERPL-MCNC: 2.1 MG/DL (ref 2.7–4.5)
PHOSPHATE SERPL-MCNC: 2.2 MG/DL (ref 2.7–4.5)
PHOSPHATE SERPL-MCNC: 2.3 MG/DL (ref 2.7–4.5)
PHOSPHATE SERPL-MCNC: 2.4 MG/DL (ref 2.7–4.5)
PHOSPHATE SERPL-MCNC: 2.4 MG/DL (ref 2.7–4.5)
PHOSPHATE SERPL-MCNC: 2.5 MG/DL (ref 2.7–4.5)
PHOSPHATE SERPL-MCNC: 2.6 MG/DL (ref 2.7–4.5)
PHOSPHATE SERPL-MCNC: 2.8 MG/DL (ref 2.7–4.5)
PHOSPHATE SERPL-MCNC: 2.9 MG/DL (ref 2.7–4.5)
PHOSPHATE SERPL-MCNC: 3.6 MG/DL (ref 2.7–4.5)
PHOSPHATE SERPL-MCNC: 3.8 MG/DL (ref 2.7–4.5)
PHOSPHATE SERPL-MCNC: 4.4 MG/DL (ref 2.7–4.5)
PHOSPHATE SERPL-MCNC: 4.9 MG/DL (ref 2.7–4.5)
PHOSPHATE SERPL-MCNC: 6.3 MG/DL (ref 2.7–4.5)
PHOSPHATE SERPL-MCNC: 6.5 MG/DL (ref 2.7–4.5)
PHOSPHATE SERPL-MCNC: 6.5 MG/DL (ref 2.7–4.5)
PHOSPHATE SERPL-MCNC: 6.8 MG/DL (ref 2.7–4.5)
PHOSPHATE SERPL-MCNC: 7.5 MG/DL (ref 2.7–4.5)
PLATELET # BLD AUTO: 169 K/UL (ref 150–450)
PLATELET # BLD AUTO: 171 K/UL (ref 150–450)
PLATELET # BLD AUTO: 186 K/UL (ref 150–450)
PLATELET # BLD AUTO: 188 K/UL (ref 150–450)
PLATELET # BLD AUTO: 234 K/UL (ref 150–450)
PLATELET # BLD AUTO: 276 K/UL (ref 150–450)
PLATELET # BLD AUTO: 286 K/UL (ref 150–450)
PLATELET # BLD AUTO: 303 K/UL (ref 150–450)
PLATELET # BLD AUTO: 305 K/UL (ref 150–450)
PLATELET # BLD AUTO: 311 K/UL (ref 150–450)
PLATELET # BLD AUTO: 315 K/UL (ref 150–450)
PLATELET # BLD AUTO: 336 K/UL (ref 150–450)
PLATELET # BLD AUTO: 351 K/UL (ref 150–450)
PLATELET # BLD AUTO: 355 K/UL (ref 150–450)
PLATELET # BLD AUTO: 358 K/UL (ref 150–450)
PLATELET # BLD AUTO: 360 K/UL (ref 150–450)
PLATELET # BLD AUTO: 379 K/UL (ref 150–450)
PLATELET # BLD AUTO: 382 K/UL (ref 150–450)
PLATELET # BLD AUTO: 385 K/UL (ref 150–450)
PLATELET # BLD AUTO: 386 K/UL (ref 150–450)
PLATELET # BLD AUTO: 398 K/UL (ref 150–450)
PLATELET # BLD AUTO: 402 K/UL (ref 150–450)
PLATELET # BLD AUTO: 404 K/UL (ref 150–450)
PLATELET # BLD AUTO: 407 K/UL (ref 150–450)
PLATELET # BLD AUTO: 415 K/UL (ref 150–450)
PLATELET # BLD AUTO: 422 K/UL (ref 150–450)
PLATELET # BLD AUTO: 426 K/UL (ref 150–450)
PLATELET # BLD AUTO: 428 K/UL (ref 150–450)
PLATELET # BLD AUTO: 433 K/UL (ref 150–450)
PLATELET # BLD AUTO: 445 K/UL (ref 150–450)
PLATELET # BLD AUTO: 452 K/UL (ref 150–450)
PLATELET # BLD AUTO: 453 K/UL (ref 150–450)
PLATELET # BLD AUTO: 475 K/UL (ref 150–450)
PLATELET # BLD AUTO: 484 K/UL (ref 150–450)
PLATELET # BLD AUTO: 490 K/UL (ref 150–450)
PLATELET BLD QL SMEAR: ABNORMAL
PLATELET BLD QL SMEAR: ABNORMAL
PMV BLD AUTO: 8.3 FL (ref 9.2–12.9)
PMV BLD AUTO: 8.5 FL (ref 9.2–12.9)
PMV BLD AUTO: 8.6 FL (ref 9.2–12.9)
PMV BLD AUTO: 8.7 FL (ref 9.2–12.9)
PMV BLD AUTO: 8.8 FL (ref 9.2–12.9)
PMV BLD AUTO: 8.9 FL (ref 9.2–12.9)
PMV BLD AUTO: 8.9 FL (ref 9.2–12.9)
PMV BLD AUTO: 9 FL (ref 9.2–12.9)
PMV BLD AUTO: 9.1 FL (ref 9.2–12.9)
PMV BLD AUTO: 9.2 FL (ref 9.2–12.9)
PMV BLD AUTO: 9.2 FL (ref 9.2–12.9)
PMV BLD AUTO: 9.4 FL (ref 9.2–12.9)
PMV BLD AUTO: 9.5 FL (ref 9.2–12.9)
PMV BLD AUTO: 9.6 FL (ref 9.2–12.9)
POLYCHROMASIA BLD QL SMEAR: ABNORMAL
POTASSIUM SERPL-SCNC: 3.9 MMOL/L (ref 3.5–5.1)
POTASSIUM SERPL-SCNC: 4 MMOL/L (ref 3.5–5.1)
POTASSIUM SERPL-SCNC: 4.1 MMOL/L (ref 3.5–5.1)
POTASSIUM SERPL-SCNC: 4.1 MMOL/L (ref 3.5–5.1)
POTASSIUM SERPL-SCNC: 4.2 MMOL/L (ref 3.5–5.1)
POTASSIUM SERPL-SCNC: 4.3 MMOL/L (ref 3.5–5.1)
POTASSIUM SERPL-SCNC: 4.4 MMOL/L (ref 3.5–5.1)
POTASSIUM SERPL-SCNC: 4.4 MMOL/L (ref 3.5–5.1)
POTASSIUM SERPL-SCNC: 4.5 MMOL/L (ref 3.5–5.1)
POTASSIUM SERPL-SCNC: 4.6 MMOL/L (ref 3.5–5.1)
POTASSIUM SERPL-SCNC: 4.7 MMOL/L (ref 3.5–5.1)
POTASSIUM SERPL-SCNC: 4.8 MMOL/L (ref 3.5–5.1)
POTASSIUM SERPL-SCNC: 4.8 MMOL/L (ref 3.5–5.1)
POTASSIUM SERPL-SCNC: 4.9 MMOL/L (ref 3.5–5.1)
POTASSIUM SERPL-SCNC: 4.9 MMOL/L (ref 3.5–5.1)
POTASSIUM SERPL-SCNC: 5.1 MMOL/L (ref 3.5–5.1)
PROT SERPL-MCNC: 5.3 G/DL (ref 6–8.4)
PROT SERPL-MCNC: 5.4 G/DL (ref 6–8.4)
PROT SERPL-MCNC: 5.7 G/DL (ref 6–8.4)
PROT SERPL-MCNC: 5.8 G/DL (ref 6–8.4)
PROT SERPL-MCNC: 5.9 G/DL (ref 6–8.4)
PROT SERPL-MCNC: 6.1 G/DL (ref 6–8.4)
PROT SERPL-MCNC: 6.3 G/DL (ref 6–8.4)
PROT SERPL-MCNC: 6.4 G/DL (ref 6–8.4)
PROT SERPL-MCNC: 6.4 G/DL (ref 6–8.4)
PROT SERPL-MCNC: 6.5 G/DL (ref 6–8.4)
PROT SERPL-MCNC: 6.6 G/DL (ref 6–8.4)
PROT SERPL-MCNC: 6.7 G/DL (ref 6–8.4)
PROT SERPL-MCNC: 6.8 G/DL (ref 6–8.4)
PROT SERPL-MCNC: 6.9 G/DL (ref 6–8.4)
PROT SERPL-MCNC: 6.9 G/DL (ref 6–8.4)
PROT SERPL-MCNC: 7 G/DL (ref 6–8.4)
PROT SERPL-MCNC: 7.1 G/DL (ref 6–8.4)
PROT SERPL-MCNC: 7.2 G/DL (ref 6–8.4)
PROT SERPL-MCNC: 7.2 G/DL (ref 6–8.4)
PROT UR QL STRIP: NEGATIVE
PROTHROMBIN TIME: 11.1 SEC (ref 9–12.5)
RBC # BLD AUTO: 2.49 M/UL (ref 4.6–6.2)
RBC # BLD AUTO: 2.97 M/UL (ref 4.6–6.2)
RBC # BLD AUTO: 2.99 M/UL (ref 4.6–6.2)
RBC # BLD AUTO: 3.11 M/UL (ref 4.6–6.2)
RBC # BLD AUTO: 3.12 M/UL (ref 4.6–6.2)
RBC # BLD AUTO: 3.14 M/UL (ref 4.6–6.2)
RBC # BLD AUTO: 3.15 M/UL (ref 4.6–6.2)
RBC # BLD AUTO: 3.16 M/UL (ref 4.6–6.2)
RBC # BLD AUTO: 3.25 M/UL (ref 4.6–6.2)
RBC # BLD AUTO: 3.27 M/UL (ref 4.6–6.2)
RBC # BLD AUTO: 3.34 M/UL (ref 4.6–6.2)
RBC # BLD AUTO: 3.37 M/UL (ref 4.6–6.2)
RBC # BLD AUTO: 3.37 M/UL (ref 4.6–6.2)
RBC # BLD AUTO: 3.4 M/UL (ref 4.6–6.2)
RBC # BLD AUTO: 3.41 M/UL (ref 4.6–6.2)
RBC # BLD AUTO: 3.42 M/UL (ref 4.6–6.2)
RBC # BLD AUTO: 3.53 M/UL (ref 4.6–6.2)
RBC # BLD AUTO: 3.55 M/UL (ref 4.6–6.2)
RBC # BLD AUTO: 3.59 M/UL (ref 4.6–6.2)
RBC # BLD AUTO: 3.6 M/UL (ref 4.6–6.2)
RBC # BLD AUTO: 3.62 M/UL (ref 4.6–6.2)
RBC # BLD AUTO: 3.76 M/UL (ref 4.6–6.2)
RBC # BLD AUTO: 3.8 M/UL (ref 4.6–6.2)
RBC # BLD AUTO: 3.84 M/UL (ref 4.6–6.2)
RBC # BLD AUTO: 3.84 M/UL (ref 4.6–6.2)
RBC # BLD AUTO: 3.86 M/UL (ref 4.6–6.2)
RBC # BLD AUTO: 4.14 M/UL (ref 4.6–6.2)
RBC # BLD AUTO: 4.49 M/UL (ref 4.6–6.2)
RBC # BLD AUTO: 4.8 M/UL (ref 4.6–6.2)
RBC # BLD AUTO: 4.81 M/UL (ref 4.6–6.2)
RBC # BLD AUTO: 4.81 M/UL (ref 4.6–6.2)
RBC # BLD AUTO: 4.9 M/UL (ref 4.6–6.2)
SARS-COV-2 RDRP RESP QL NAA+PROBE: NEGATIVE
SATURATED IRON: 10 % (ref 20–50)
SATURATED IRON: 10 % (ref 20–50)
SMUDGE CELLS BLD QL SMEAR: PRESENT
SODIUM SERPL-SCNC: 129 MMOL/L (ref 136–145)
SODIUM SERPL-SCNC: 129 MMOL/L (ref 136–145)
SODIUM SERPL-SCNC: 130 MMOL/L (ref 136–145)
SODIUM SERPL-SCNC: 131 MMOL/L (ref 136–145)
SODIUM SERPL-SCNC: 132 MMOL/L (ref 136–145)
SODIUM SERPL-SCNC: 133 MMOL/L (ref 136–145)
SODIUM SERPL-SCNC: 135 MMOL/L (ref 136–145)
SODIUM SERPL-SCNC: 136 MMOL/L (ref 136–145)
SODIUM SERPL-SCNC: 137 MMOL/L (ref 136–145)
SODIUM SERPL-SCNC: 137 MMOL/L (ref 136–145)
SODIUM SERPL-SCNC: 138 MMOL/L (ref 136–145)
SODIUM SERPL-SCNC: 138 MMOL/L (ref 136–145)
SODIUM SERPL-SCNC: 140 MMOL/L (ref 136–145)
SP GR UR STRIP: 1.01 (ref 1–1.03)
SPECIMEN OUTDATE: NORMAL
TESTOST SERPL-MCNC: 542 NG/DL (ref 304–1227)
TOTAL IRON BINDING CAPACITY: 340 UG/DL (ref 250–450)
TOTAL IRON BINDING CAPACITY: 366 UG/DL (ref 250–450)
TRANSFERRIN SERPL-MCNC: 230 MG/DL (ref 200–375)
TRANSFERRIN SERPL-MCNC: 247 MG/DL (ref 200–375)
TSH SERPL DL<=0.005 MIU/L-ACNC: 1.7 UIU/ML (ref 0.4–4)
TSH SERPL DL<=0.005 MIU/L-ACNC: 1.75 UIU/ML (ref 0.4–4)
TSH SERPL DL<=0.005 MIU/L-ACNC: 1.87 UIU/ML (ref 0.4–4)
TSH SERPL DL<=0.005 MIU/L-ACNC: 2.21 UIU/ML (ref 0.4–4)
URN SPEC COLLECT METH UR: ABNORMAL
WBC # BLD AUTO: 10.08 K/UL (ref 3.9–12.7)
WBC # BLD AUTO: 10.2 K/UL (ref 3.9–12.7)
WBC # BLD AUTO: 10.53 K/UL (ref 3.9–12.7)
WBC # BLD AUTO: 10.88 K/UL (ref 3.9–12.7)
WBC # BLD AUTO: 10.96 K/UL (ref 3.9–12.7)
WBC # BLD AUTO: 11 K/UL (ref 3.9–12.7)
WBC # BLD AUTO: 11.03 K/UL (ref 3.9–12.7)
WBC # BLD AUTO: 11.23 K/UL (ref 3.9–12.7)
WBC # BLD AUTO: 11.65 K/UL (ref 3.9–12.7)
WBC # BLD AUTO: 11.76 K/UL (ref 3.9–12.7)
WBC # BLD AUTO: 11.93 K/UL (ref 3.9–12.7)
WBC # BLD AUTO: 12.75 K/UL (ref 3.9–12.7)
WBC # BLD AUTO: 12.89 K/UL (ref 3.9–12.7)
WBC # BLD AUTO: 13.09 K/UL (ref 3.9–12.7)
WBC # BLD AUTO: 13.27 K/UL (ref 3.9–12.7)
WBC # BLD AUTO: 14.2 K/UL (ref 3.9–12.7)
WBC # BLD AUTO: 15.23 K/UL (ref 3.9–12.7)
WBC # BLD AUTO: 15.41 K/UL (ref 3.9–12.7)
WBC # BLD AUTO: 5.64 K/UL (ref 3.9–12.7)
WBC # BLD AUTO: 5.68 K/UL (ref 3.9–12.7)
WBC # BLD AUTO: 6.24 K/UL (ref 3.9–12.7)
WBC # BLD AUTO: 6.26 K/UL (ref 3.9–12.7)
WBC # BLD AUTO: 6.94 K/UL (ref 3.9–12.7)
WBC # BLD AUTO: 7.22 K/UL (ref 3.9–12.7)
WBC # BLD AUTO: 7.56 K/UL (ref 3.9–12.7)
WBC # BLD AUTO: 7.58 K/UL (ref 3.9–12.7)
WBC # BLD AUTO: 7.99 K/UL (ref 3.9–12.7)
WBC # BLD AUTO: 8.06 K/UL (ref 3.9–12.7)
WBC # BLD AUTO: 8.49 K/UL (ref 3.9–12.7)
WBC # BLD AUTO: 8.75 K/UL (ref 3.9–12.7)
WBC # BLD AUTO: 8.81 K/UL (ref 3.9–12.7)
WBC # BLD AUTO: 8.81 K/UL (ref 3.9–12.7)
WBC # BLD AUTO: 8.85 K/UL (ref 3.9–12.7)
WBC # BLD AUTO: 9.1 K/UL (ref 3.9–12.7)
WBC # BLD AUTO: 9.61 K/UL (ref 3.9–12.7)
WBC TOXIC VACUOLES BLD QL SMEAR: PRESENT

## 2023-01-01 PROCEDURE — 63600175 PHARM REV CODE 636 W HCPCS

## 2023-01-01 PROCEDURE — 25000003 PHARM REV CODE 250: Performed by: STUDENT IN AN ORGANIZED HEALTH CARE EDUCATION/TRAINING PROGRAM

## 2023-01-01 PROCEDURE — 25500020 PHARM REV CODE 255

## 2023-01-01 PROCEDURE — 36415 COLL VENOUS BLD VENIPUNCTURE: CPT

## 2023-01-01 PROCEDURE — G0180 MD CERTIFICATION HHA PATIENT: HCPCS | Mod: ,,, | Performed by: SURGERY

## 2023-01-01 PROCEDURE — 36415 COLL VENOUS BLD VENIPUNCTURE: CPT | Mod: PO | Performed by: INTERNAL MEDICINE

## 2023-01-01 PROCEDURE — 99214 PR OFFICE/OUTPT VISIT, EST, LEVL IV, 30-39 MIN: ICD-10-PCS | Mod: S$PBB,,, | Performed by: INTERNAL MEDICINE

## 2023-01-01 PROCEDURE — 36415 COLL VENOUS BLD VENIPUNCTURE: CPT | Performed by: STUDENT IN AN ORGANIZED HEALTH CARE EDUCATION/TRAINING PROGRAM

## 2023-01-01 PROCEDURE — 83735 ASSAY OF MAGNESIUM: CPT | Performed by: STUDENT IN AN ORGANIZED HEALTH CARE EDUCATION/TRAINING PROGRAM

## 2023-01-01 PROCEDURE — 99222 PR INITIAL HOSPITAL CARE,LEVL II: ICD-10-PCS | Mod: ,,, | Performed by: UROLOGY

## 2023-01-01 PROCEDURE — 74177 CT ABD & PELVIS W/CONTRAST: CPT | Mod: TC,PO

## 2023-01-01 PROCEDURE — 63600175 PHARM REV CODE 636 W HCPCS: Performed by: STUDENT IN AN ORGANIZED HEALTH CARE EDUCATION/TRAINING PROGRAM

## 2023-01-01 PROCEDURE — 77387 GUIDANCE FOR RADJ TX DLVR: CPT | Mod: TC | Performed by: STUDENT IN AN ORGANIZED HEALTH CARE EDUCATION/TRAINING PROGRAM

## 2023-01-01 PROCEDURE — 80053 COMPREHEN METABOLIC PANEL: CPT | Performed by: STUDENT IN AN ORGANIZED HEALTH CARE EDUCATION/TRAINING PROGRAM

## 2023-01-01 PROCEDURE — 99215 OFFICE O/P EST HI 40 MIN: CPT | Mod: PBBFAC | Performed by: INTERNAL MEDICINE

## 2023-01-01 PROCEDURE — 20600001 HC STEP DOWN PRIVATE ROOM

## 2023-01-01 PROCEDURE — 51705 PR CHANGE OF BLADDER TUBE,SIMPLE: ICD-10-PCS | Mod: S$PBB,58,,

## 2023-01-01 PROCEDURE — 97535 SELF CARE MNGMENT TRAINING: CPT

## 2023-01-01 PROCEDURE — 99900035 HC TECH TIME PER 15 MIN (STAT)

## 2023-01-01 PROCEDURE — 25000003 PHARM REV CODE 250

## 2023-01-01 PROCEDURE — 85025 COMPLETE CBC W/AUTO DIFF WBC: CPT | Mod: 91 | Performed by: STUDENT IN AN ORGANIZED HEALTH CARE EDUCATION/TRAINING PROGRAM

## 2023-01-01 PROCEDURE — 99214 PR OFFICE/OUTPT VISIT, EST, LEVL IV, 30-39 MIN: ICD-10-PCS | Mod: 95,,, | Performed by: INTERNAL MEDICINE

## 2023-01-01 PROCEDURE — 97116 GAIT TRAINING THERAPY: CPT

## 2023-01-01 PROCEDURE — 99214 OFFICE O/P EST MOD 30 MIN: CPT | Mod: 95,,, | Performed by: INTERNAL MEDICINE

## 2023-01-01 PROCEDURE — C1729 CATH, DRAINAGE: HCPCS | Performed by: SURGERY

## 2023-01-01 PROCEDURE — 84100 ASSAY OF PHOSPHORUS: CPT | Performed by: STUDENT IN AN ORGANIZED HEALTH CARE EDUCATION/TRAINING PROGRAM

## 2023-01-01 PROCEDURE — 99223 1ST HOSP IP/OBS HIGH 75: CPT | Mod: AI,,, | Performed by: INTERNAL MEDICINE

## 2023-01-01 PROCEDURE — 80053 COMPREHEN METABOLIC PANEL: CPT

## 2023-01-01 PROCEDURE — 84443 ASSAY THYROID STIM HORMONE: CPT | Performed by: INTERNAL MEDICINE

## 2023-01-01 PROCEDURE — 51705 CHANGE OF BLADDER TUBE: CPT | Mod: S$PBB,58,,

## 2023-01-01 PROCEDURE — 25000242 PHARM REV CODE 250 ALT 637 W/ HCPCS: Performed by: STUDENT IN AN ORGANIZED HEALTH CARE EDUCATION/TRAINING PROGRAM

## 2023-01-01 PROCEDURE — G6002 STEREOSCOPIC X-RAY GUIDANCE: HCPCS | Mod: 26,,, | Performed by: STUDENT IN AN ORGANIZED HEALTH CARE EDUCATION/TRAINING PROGRAM

## 2023-01-01 PROCEDURE — 36000710: Performed by: SURGERY

## 2023-01-01 PROCEDURE — 25000003 PHARM REV CODE 250: Performed by: SURGERY

## 2023-01-01 PROCEDURE — 94640 AIRWAY INHALATION TREATMENT: CPT

## 2023-01-01 PROCEDURE — 83735 ASSAY OF MAGNESIUM: CPT

## 2023-01-01 PROCEDURE — 77412 RADIATION TX DELIVERY LVL 3: CPT | Performed by: STUDENT IN AN ORGANIZED HEALTH CARE EDUCATION/TRAINING PROGRAM

## 2023-01-01 PROCEDURE — 94799 UNLISTED PULMONARY SVC/PX: CPT

## 2023-01-01 PROCEDURE — 71000015 HC POSTOP RECOV 1ST HR: Performed by: SURGERY

## 2023-01-01 PROCEDURE — 80053 COMPREHEN METABOLIC PANEL: CPT | Performed by: INTERNAL MEDICINE

## 2023-01-01 PROCEDURE — 77290 THER RAD SIMULAJ FIELD CPLX: CPT | Mod: TC | Performed by: STUDENT IN AN ORGANIZED HEALTH CARE EDUCATION/TRAINING PROGRAM

## 2023-01-01 PROCEDURE — 84100 ASSAY OF PHOSPHORUS: CPT

## 2023-01-01 PROCEDURE — 99233 PR SUBSEQUENT HOSPITAL CARE,LEVL III: ICD-10-PCS | Mod: GC,,, | Performed by: HOSPITALIST

## 2023-01-01 PROCEDURE — 74177 CT CHEST ABDOMEN PELVIS WITH CONTRAST (XPD): ICD-10-PCS | Mod: 26,,, | Performed by: RADIOLOGY

## 2023-01-01 PROCEDURE — 77334 PR  RADN TREATMENT AID(S) COMPLX: ICD-10-PCS | Mod: 26,,, | Performed by: STUDENT IN AN ORGANIZED HEALTH CARE EDUCATION/TRAINING PROGRAM

## 2023-01-01 PROCEDURE — 85610 PROTHROMBIN TIME: CPT | Performed by: EMERGENCY MEDICINE

## 2023-01-01 PROCEDURE — 99213 OFFICE O/P EST LOW 20 MIN: CPT | Mod: PBBFAC | Performed by: UROLOGY

## 2023-01-01 PROCEDURE — 85025 COMPLETE CBC W/AUTO DIFF WBC: CPT | Performed by: STUDENT IN AN ORGANIZED HEALTH CARE EDUCATION/TRAINING PROGRAM

## 2023-01-01 PROCEDURE — 99215 PR OFFICE/OUTPT VISIT, EST, LEVL V, 40-54 MIN: ICD-10-PCS | Mod: S$PBB,,, | Performed by: INTERNAL MEDICINE

## 2023-01-01 PROCEDURE — 85025 COMPLETE CBC W/AUTO DIFF WBC: CPT

## 2023-01-01 PROCEDURE — 85025 COMPLETE CBC W/AUTO DIFF WBC: CPT | Mod: PO | Performed by: INTERNAL MEDICINE

## 2023-01-01 PROCEDURE — 25000003 PHARM REV CODE 250: Performed by: NURSE PRACTITIONER

## 2023-01-01 PROCEDURE — 84466 ASSAY OF TRANSFERRIN: CPT | Performed by: INTERNAL MEDICINE

## 2023-01-01 PROCEDURE — 99215 OFFICE O/P EST HI 40 MIN: CPT | Mod: 95,,, | Performed by: INTERNAL MEDICINE

## 2023-01-01 PROCEDURE — G0179 PR HOME HEALTH MD RECERTIFICATION: ICD-10-PCS | Mod: ,,, | Performed by: INTERNAL MEDICINE

## 2023-01-01 PROCEDURE — 94761 N-INVAS EAR/PLS OXIMETRY MLT: CPT

## 2023-01-01 PROCEDURE — 85025 COMPLETE CBC W/AUTO DIFF WBC: CPT | Performed by: INTERNAL MEDICINE

## 2023-01-01 PROCEDURE — 63600175 PHARM REV CODE 636 W HCPCS: Performed by: SURGERY

## 2023-01-01 PROCEDURE — 77290 PR  SET RADN THERAPY FIELD COMPLEX: ICD-10-PCS | Mod: 26,,, | Performed by: STUDENT IN AN ORGANIZED HEALTH CARE EDUCATION/TRAINING PROGRAM

## 2023-01-01 PROCEDURE — 99285 PR EMERGENCY DEPT VISIT,LEVEL V: ICD-10-PCS | Mod: ,,, | Performed by: EMERGENCY MEDICINE

## 2023-01-01 PROCEDURE — 27201423 OPTIME MED/SURG SUP & DEVICES STERILE SUPPLY: Performed by: SURGERY

## 2023-01-01 PROCEDURE — 51798 US URINE CAPACITY MEASURE: CPT

## 2023-01-01 PROCEDURE — 80053 COMPREHEN METABOLIC PANEL: CPT | Performed by: PHYSICIAN ASSISTANT

## 2023-01-01 PROCEDURE — 83735 ASSAY OF MAGNESIUM: CPT | Mod: 91 | Performed by: STUDENT IN AN ORGANIZED HEALTH CARE EDUCATION/TRAINING PROGRAM

## 2023-01-01 PROCEDURE — 84100 ASSAY OF PHOSPHORUS: CPT | Performed by: PHYSICIAN ASSISTANT

## 2023-01-01 PROCEDURE — 84100 ASSAY OF PHOSPHORUS: CPT | Performed by: INTERNAL MEDICINE

## 2023-01-01 PROCEDURE — A9698 NON-RAD CONTRAST MATERIALNOC: HCPCS | Mod: PO | Performed by: INTERNAL MEDICINE

## 2023-01-01 PROCEDURE — 99203 PR OFFICE/OUTPT VISIT, NEW, LEVL III, 30-44 MIN: ICD-10-PCS | Mod: S$PBB,,, | Performed by: PODIATRIST

## 2023-01-01 PROCEDURE — 36415 COLL VENOUS BLD VENIPUNCTURE: CPT | Mod: PO | Performed by: PHYSICIAN ASSISTANT

## 2023-01-01 PROCEDURE — G6002 PR STEREOSCOPIC XRAY GUIDE FOR RADIATION TX DELIV: ICD-10-PCS | Mod: 26,,, | Performed by: STUDENT IN AN ORGANIZED HEALTH CARE EDUCATION/TRAINING PROGRAM

## 2023-01-01 PROCEDURE — 99215 PR OFFICE/OUTPT VISIT, EST, LEVL V, 40-54 MIN: ICD-10-PCS | Mod: 95,,, | Performed by: INTERNAL MEDICINE

## 2023-01-01 PROCEDURE — 63600175 PHARM REV CODE 636 W HCPCS: Performed by: ANESTHESIOLOGY

## 2023-01-01 PROCEDURE — 99999 PR PBB SHADOW E&M-EST. PATIENT-LVL III: CPT | Mod: PBBFAC,,, | Performed by: UROLOGY

## 2023-01-01 PROCEDURE — 71000033 HC RECOVERY, INTIAL HOUR: Performed by: SURGERY

## 2023-01-01 PROCEDURE — 83605 ASSAY OF LACTIC ACID: CPT

## 2023-01-01 PROCEDURE — G0179 MD RECERTIFICATION HHA PT: HCPCS | Mod: ,,, | Performed by: INTERNAL MEDICINE

## 2023-01-01 PROCEDURE — 97164 PT RE-EVAL EST PLAN CARE: CPT

## 2023-01-01 PROCEDURE — 99999 PR PBB SHADOW E&M-EST. PATIENT-LVL V: ICD-10-PCS | Mod: PBBFAC,,, | Performed by: INTERNAL MEDICINE

## 2023-01-01 PROCEDURE — 99215 OFFICE O/P EST HI 40 MIN: CPT | Mod: PBBFAC,PO | Performed by: INTERNAL MEDICINE

## 2023-01-01 PROCEDURE — 80053 COMPREHEN METABOLIC PANEL: CPT | Mod: 91 | Performed by: STUDENT IN AN ORGANIZED HEALTH CARE EDUCATION/TRAINING PROGRAM

## 2023-01-01 PROCEDURE — 99233 PR SUBSEQUENT HOSPITAL CARE,LEVL III: ICD-10-PCS | Mod: ,,, | Performed by: INTERNAL MEDICINE

## 2023-01-01 PROCEDURE — 99999 PR PBB SHADOW E&M-EST. PATIENT-LVL II: ICD-10-PCS | Mod: PBBFAC,,, | Performed by: PODIATRIST

## 2023-01-01 PROCEDURE — 86900 BLOOD TYPING SEROLOGIC ABO: CPT | Performed by: INTERNAL MEDICINE

## 2023-01-01 PROCEDURE — 77295 3-D RADIOTHERAPY PLAN: CPT | Mod: TC | Performed by: STUDENT IN AN ORGANIZED HEALTH CARE EDUCATION/TRAINING PROGRAM

## 2023-01-01 PROCEDURE — 36415 COLL VENOUS BLD VENIPUNCTURE: CPT | Performed by: NURSE PRACTITIONER

## 2023-01-01 PROCEDURE — 99497 ADVNCD CARE PLAN 30 MIN: CPT | Mod: 95,,, | Performed by: STUDENT IN AN ORGANIZED HEALTH CARE EDUCATION/TRAINING PROGRAM

## 2023-01-01 PROCEDURE — 63600175 PHARM REV CODE 636 W HCPCS: Performed by: NURSE PRACTITIONER

## 2023-01-01 PROCEDURE — 25500020 PHARM REV CODE 255: Performed by: SURGERY

## 2023-01-01 PROCEDURE — 99204 PR OFFICE/OUTPT VISIT, NEW, LEVL IV, 45-59 MIN: ICD-10-PCS | Mod: S$PBB,,, | Performed by: CLINICAL NURSE SPECIALIST

## 2023-01-01 PROCEDURE — C9290 INJ, BUPIVACAINE LIPOSOME: HCPCS | Performed by: SURGERY

## 2023-01-01 PROCEDURE — 77290 THER RAD SIMULAJ FIELD CPLX: CPT | Mod: 26,,, | Performed by: STUDENT IN AN ORGANIZED HEALTH CARE EDUCATION/TRAINING PROGRAM

## 2023-01-01 PROCEDURE — 99024 PR POST-OP FOLLOW-UP VISIT: ICD-10-PCS | Mod: S$PBB,,, | Performed by: UROLOGY

## 2023-01-01 PROCEDURE — 63600175 PHARM REV CODE 636 W HCPCS: Performed by: NURSE ANESTHETIST, CERTIFIED REGISTERED

## 2023-01-01 PROCEDURE — 77300 PR RADIATION THERAPY,DOSIMETRY PLAN: ICD-10-PCS | Mod: 26,,, | Performed by: STUDENT IN AN ORGANIZED HEALTH CARE EDUCATION/TRAINING PROGRAM

## 2023-01-01 PROCEDURE — 82248 BILIRUBIN DIRECT: CPT | Performed by: EMERGENCY MEDICINE

## 2023-01-01 PROCEDURE — 99215 PR OFFICE/OUTPT VISIT, EST, LEVL V, 40-54 MIN: ICD-10-PCS | Mod: 95,,, | Performed by: STUDENT IN AN ORGANIZED HEALTH CARE EDUCATION/TRAINING PROGRAM

## 2023-01-01 PROCEDURE — 36415 COLL VENOUS BLD VENIPUNCTURE: CPT | Performed by: INTERNAL MEDICINE

## 2023-01-01 PROCEDURE — 99213 OFFICE O/P EST LOW 20 MIN: CPT | Mod: PBBFAC

## 2023-01-01 PROCEDURE — 85027 COMPLETE CBC AUTOMATED: CPT | Performed by: INTERNAL MEDICINE

## 2023-01-01 PROCEDURE — 77014 HC CT GUIDANCE RADIATION THERAPY FLDS PLACEMENT: CPT | Mod: TC | Performed by: STUDENT IN AN ORGANIZED HEALTH CARE EDUCATION/TRAINING PROGRAM

## 2023-01-01 PROCEDURE — 51702 INSERT TEMP BLADDER CATH: CPT | Mod: PBBFAC

## 2023-01-01 PROCEDURE — 99999 PR PBB SHADOW E&M-EST. PATIENT-LVL II: CPT | Mod: PBBFAC,,, | Performed by: CLINICAL NURSE SPECIALIST

## 2023-01-01 PROCEDURE — 37000008 HC ANESTHESIA 1ST 15 MINUTES: Performed by: SURGERY

## 2023-01-01 PROCEDURE — 99204 OFFICE O/P NEW MOD 45 MIN: CPT | Mod: S$PBB,,, | Performed by: CLINICAL NURSE SPECIALIST

## 2023-01-01 PROCEDURE — 27000221 HC OXYGEN, UP TO 24 HOURS

## 2023-01-01 PROCEDURE — 74177 CT ABD & PELVIS W/CONTRAST: CPT | Mod: 26,,, | Performed by: RADIOLOGY

## 2023-01-01 PROCEDURE — 97110 THERAPEUTIC EXERCISES: CPT

## 2023-01-01 PROCEDURE — 80053 COMPREHEN METABOLIC PANEL: CPT | Performed by: NURSE PRACTITIONER

## 2023-01-01 PROCEDURE — 77300 RADIATION THERAPY DOSE PLAN: CPT | Mod: 26,,, | Performed by: STUDENT IN AN ORGANIZED HEALTH CARE EDUCATION/TRAINING PROGRAM

## 2023-01-01 PROCEDURE — 27000646 HC AEROBIKA DEVICE

## 2023-01-01 PROCEDURE — 82728 ASSAY OF FERRITIN: CPT | Performed by: INTERNAL MEDICINE

## 2023-01-01 PROCEDURE — 81003 URINALYSIS AUTO W/O SCOPE: CPT | Performed by: EMERGENCY MEDICINE

## 2023-01-01 PROCEDURE — 87086 URINE CULTURE/COLONY COUNT: CPT

## 2023-01-01 PROCEDURE — 51040 INCISE & DRAIN BLADDER: CPT | Mod: ,,, | Performed by: UROLOGY

## 2023-01-01 PROCEDURE — D9220A PRA ANESTHESIA: Mod: ANES,,, | Performed by: ANESTHESIOLOGY

## 2023-01-01 PROCEDURE — 82533 TOTAL CORTISOL: CPT | Performed by: INTERNAL MEDICINE

## 2023-01-01 PROCEDURE — 77417 THER RADIOLOGY PORT IMAGE(S): CPT | Performed by: STUDENT IN AN ORGANIZED HEALTH CARE EDUCATION/TRAINING PROGRAM

## 2023-01-01 PROCEDURE — 94664 DEMO&/EVAL PT USE INHALER: CPT

## 2023-01-01 PROCEDURE — 71260 CT CHEST ABDOMEN PELVIS WITH CONTRAST (XPD): ICD-10-PCS | Mod: 26,,, | Performed by: RADIOLOGY

## 2023-01-01 PROCEDURE — 99024 PR POST-OP FOLLOW-UP VISIT: ICD-10-PCS | Mod: POP,,,

## 2023-01-01 PROCEDURE — 36000711: Performed by: SURGERY

## 2023-01-01 PROCEDURE — 77295 PR 3D RADIOTHERAPY PLAN: ICD-10-PCS | Mod: 26,,, | Performed by: STUDENT IN AN ORGANIZED HEALTH CARE EDUCATION/TRAINING PROGRAM

## 2023-01-01 PROCEDURE — 99215 OFFICE O/P EST HI 40 MIN: CPT | Mod: S$PBB,,, | Performed by: INTERNAL MEDICINE

## 2023-01-01 PROCEDURE — 99223 1ST HOSP IP/OBS HIGH 75: CPT | Mod: ,,, | Performed by: STUDENT IN AN ORGANIZED HEALTH CARE EDUCATION/TRAINING PROGRAM

## 2023-01-01 PROCEDURE — G0180 PR HOME HEALTH MD CERTIFICATION: ICD-10-PCS | Mod: ,,, | Performed by: SURGERY

## 2023-01-01 PROCEDURE — D9220A PRA ANESTHESIA: Mod: CRNA,,, | Performed by: NURSE ANESTHETIST, CERTIFIED REGISTERED

## 2023-01-01 PROCEDURE — D9220A PRA ANESTHESIA: ICD-10-PCS | Mod: ANES,,, | Performed by: ANESTHESIOLOGY

## 2023-01-01 PROCEDURE — 99233 SBSQ HOSP IP/OBS HIGH 50: CPT | Mod: ,,, | Performed by: INTERNAL MEDICINE

## 2023-01-01 PROCEDURE — 84100 ASSAY OF PHOSPHORUS: CPT | Mod: 91 | Performed by: STUDENT IN AN ORGANIZED HEALTH CARE EDUCATION/TRAINING PROGRAM

## 2023-01-01 PROCEDURE — 99223 PR INITIAL HOSPITAL CARE,LEVL III: ICD-10-PCS | Mod: ,,, | Performed by: STUDENT IN AN ORGANIZED HEALTH CARE EDUCATION/TRAINING PROGRAM

## 2023-01-01 PROCEDURE — 99214 OFFICE O/P EST MOD 30 MIN: CPT | Mod: S$PBB,,, | Performed by: INTERNAL MEDICINE

## 2023-01-01 PROCEDURE — 99024 PR POST-OP FOLLOW-UP VISIT: ICD-10-PCS | Mod: S$PBB,,,

## 2023-01-01 PROCEDURE — 37000009 HC ANESTHESIA EA ADD 15 MINS: Performed by: SURGERY

## 2023-01-01 PROCEDURE — 99213 OFFICE O/P EST LOW 20 MIN: CPT | Mod: PBBFAC | Performed by: INTERNAL MEDICINE

## 2023-01-01 PROCEDURE — 99212 OFFICE O/P EST SF 10 MIN: CPT | Mod: PBBFAC,27 | Performed by: CLINICAL NURSE SPECIALIST

## 2023-01-01 PROCEDURE — 99497 PR ADVNCD CARE PLAN 30 MIN: ICD-10-PCS | Mod: 95,,, | Performed by: STUDENT IN AN ORGANIZED HEALTH CARE EDUCATION/TRAINING PROGRAM

## 2023-01-01 PROCEDURE — 85025 COMPLETE CBC W/AUTO DIFF WBC: CPT | Performed by: NURSE PRACTITIONER

## 2023-01-01 PROCEDURE — 97165 OT EVAL LOW COMPLEX 30 MIN: CPT

## 2023-01-01 PROCEDURE — 99999 PR PBB SHADOW E&M-EST. PATIENT-LVL III: ICD-10-PCS | Mod: PBBFAC,,, | Performed by: INTERNAL MEDICINE

## 2023-01-01 PROCEDURE — 77334 RADIATION TREATMENT AID(S): CPT | Mod: 26,,, | Performed by: STUDENT IN AN ORGANIZED HEALTH CARE EDUCATION/TRAINING PROGRAM

## 2023-01-01 PROCEDURE — 77295 3-D RADIOTHERAPY PLAN: CPT | Mod: 26,,, | Performed by: STUDENT IN AN ORGANIZED HEALTH CARE EDUCATION/TRAINING PROGRAM

## 2023-01-01 PROCEDURE — 80053 COMPREHEN METABOLIC PANEL: CPT | Performed by: EMERGENCY MEDICINE

## 2023-01-01 PROCEDURE — 77334 RADIATION TREATMENT AID(S): CPT | Mod: TC | Performed by: STUDENT IN AN ORGANIZED HEALTH CARE EDUCATION/TRAINING PROGRAM

## 2023-01-01 PROCEDURE — 99999 PR PBB SHADOW E&M-EST. PATIENT-LVL II: CPT | Mod: PBBFAC,,, | Performed by: PODIATRIST

## 2023-01-01 PROCEDURE — 99233 SBSQ HOSP IP/OBS HIGH 50: CPT | Mod: GC,,, | Performed by: HOSPITALIST

## 2023-01-01 PROCEDURE — 83036 HEMOGLOBIN GLYCOSYLATED A1C: CPT | Performed by: INTERNAL MEDICINE

## 2023-01-01 PROCEDURE — 99203 OFFICE O/P NEW LOW 30 MIN: CPT | Mod: S$PBB,,, | Performed by: PODIATRIST

## 2023-01-01 PROCEDURE — 44320 COLOSTOMY: CPT | Mod: 22,GC,, | Performed by: SURGERY

## 2023-01-01 PROCEDURE — 99999 PR PBB SHADOW E&M-EST. PATIENT-LVL V: CPT | Mod: PBBFAC,,, | Performed by: INTERNAL MEDICINE

## 2023-01-01 PROCEDURE — 99999 PR PBB SHADOW E&M-EST. PATIENT-LVL III: ICD-10-PCS | Mod: PBBFAC,,,

## 2023-01-01 PROCEDURE — 99999 PR PBB SHADOW E&M-EST. PATIENT-LVL III: CPT | Mod: PBBFAC,,, | Performed by: INTERNAL MEDICINE

## 2023-01-01 PROCEDURE — 84403 ASSAY OF TOTAL TESTOSTERONE: CPT | Performed by: INTERNAL MEDICINE

## 2023-01-01 PROCEDURE — 25000242 PHARM REV CODE 250 ALT 637 W/ HCPCS

## 2023-01-01 PROCEDURE — 85025 COMPLETE CBC W/AUTO DIFF WBC: CPT | Performed by: EMERGENCY MEDICINE

## 2023-01-01 PROCEDURE — 99285 EMERGENCY DEPT VISIT HI MDM: CPT | Mod: ,,, | Performed by: EMERGENCY MEDICINE

## 2023-01-01 PROCEDURE — 85025 COMPLETE CBC W/AUTO DIFF WBC: CPT | Performed by: PHYSICIAN ASSISTANT

## 2023-01-01 PROCEDURE — 83690 ASSAY OF LIPASE: CPT | Performed by: EMERGENCY MEDICINE

## 2023-01-01 PROCEDURE — 51040 PR INCISE/DRAIN BLADDER: ICD-10-PCS | Mod: ,,, | Performed by: UROLOGY

## 2023-01-01 PROCEDURE — 99223 PR INITIAL HOSPITAL CARE,LEVL III: ICD-10-PCS | Mod: AI,,, | Performed by: INTERNAL MEDICINE

## 2023-01-01 PROCEDURE — 99222 1ST HOSP IP/OBS MODERATE 55: CPT | Mod: ,,, | Performed by: UROLOGY

## 2023-01-01 PROCEDURE — 94645 CONT INHLJ TX EACH ADDL HOUR: CPT

## 2023-01-01 PROCEDURE — 99212 OFFICE O/P EST SF 10 MIN: CPT | Mod: PBBFAC | Performed by: PODIATRIST

## 2023-01-01 PROCEDURE — 71260 CT THORAX DX C+: CPT | Mod: 26,,, | Performed by: RADIOLOGY

## 2023-01-01 PROCEDURE — 44320 PR COLOSTOMY: ICD-10-PCS | Mod: 22,GC,, | Performed by: SURGERY

## 2023-01-01 PROCEDURE — 97161 PT EVAL LOW COMPLEX 20 MIN: CPT

## 2023-01-01 PROCEDURE — 86900 BLOOD TYPING SEROLOGIC ABO: CPT | Performed by: EMERGENCY MEDICINE

## 2023-01-01 PROCEDURE — 99999 PR PBB SHADOW E&M-EST. PATIENT-LVL III: CPT | Mod: PBBFAC,,,

## 2023-01-01 PROCEDURE — 25500020 PHARM REV CODE 255: Mod: PO | Performed by: INTERNAL MEDICINE

## 2023-01-01 PROCEDURE — 71260 CT THORAX DX C+: CPT | Mod: TC,PO

## 2023-01-01 PROCEDURE — 77263 THER RADIOLOGY TX PLNG CPLX: CPT | Mod: ,,, | Performed by: STUDENT IN AN ORGANIZED HEALTH CARE EDUCATION/TRAINING PROGRAM

## 2023-01-01 PROCEDURE — 25000003 PHARM REV CODE 250: Performed by: NURSE ANESTHETIST, CERTIFIED REGISTERED

## 2023-01-01 PROCEDURE — 99999 PR PBB SHADOW E&M-EST. PATIENT-LVL II: ICD-10-PCS | Mod: PBBFAC,,, | Performed by: CLINICAL NURSE SPECIALIST

## 2023-01-01 PROCEDURE — 77263 PR  RADIATION THERAPY PLAN COMPLEX: ICD-10-PCS | Mod: ,,, | Performed by: STUDENT IN AN ORGANIZED HEALTH CARE EDUCATION/TRAINING PROGRAM

## 2023-01-01 PROCEDURE — 77336 RADIATION PHYSICS CONSULT: CPT | Performed by: STUDENT IN AN ORGANIZED HEALTH CARE EDUCATION/TRAINING PROGRAM

## 2023-01-01 PROCEDURE — S0030 INJECTION, METRONIDAZOLE: HCPCS | Performed by: NURSE ANESTHETIST, CERTIFIED REGISTERED

## 2023-01-01 PROCEDURE — 36415 COLL VENOUS BLD VENIPUNCTURE: CPT | Performed by: ANESTHESIOLOGY

## 2023-01-01 PROCEDURE — 99285 EMERGENCY DEPT VISIT HI MDM: CPT

## 2023-01-01 PROCEDURE — 99999 PR PBB SHADOW E&M-EST. PATIENT-LVL III: ICD-10-PCS | Mod: PBBFAC,,, | Performed by: UROLOGY

## 2023-01-01 PROCEDURE — 99215 OFFICE O/P EST HI 40 MIN: CPT | Mod: 95,,, | Performed by: STUDENT IN AN ORGANIZED HEALTH CARE EDUCATION/TRAINING PROGRAM

## 2023-01-01 PROCEDURE — U0002 COVID-19 LAB TEST NON-CDC: HCPCS | Performed by: INTERNAL MEDICINE

## 2023-01-01 PROCEDURE — 87389 HIV-1 AG W/HIV-1&-2 AB AG IA: CPT | Performed by: PHYSICIAN ASSISTANT

## 2023-01-01 PROCEDURE — 82977 ASSAY OF GGT: CPT | Performed by: EMERGENCY MEDICINE

## 2023-01-01 PROCEDURE — D9220A PRA ANESTHESIA: ICD-10-PCS | Mod: CRNA,,, | Performed by: NURSE ANESTHETIST, CERTIFIED REGISTERED

## 2023-01-01 PROCEDURE — 83615 LACTATE (LD) (LDH) ENZYME: CPT | Performed by: PHYSICIAN ASSISTANT

## 2023-01-01 PROCEDURE — 99024 POSTOP FOLLOW-UP VISIT: CPT | Mod: POP,,,

## 2023-01-01 PROCEDURE — 99024 POSTOP FOLLOW-UP VISIT: CPT | Mod: S$PBB,,, | Performed by: UROLOGY

## 2023-01-01 PROCEDURE — 86803 HEPATITIS C AB TEST: CPT | Performed by: PHYSICIAN ASSISTANT

## 2023-01-01 PROCEDURE — 77300 RADIATION THERAPY DOSE PLAN: CPT | Mod: TC | Performed by: STUDENT IN AN ORGANIZED HEALTH CARE EDUCATION/TRAINING PROGRAM

## 2023-01-01 PROCEDURE — 99024 POSTOP FOLLOW-UP VISIT: CPT | Mod: S$PBB,,,

## 2023-01-01 PROCEDURE — 97530 THERAPEUTIC ACTIVITIES: CPT

## 2023-01-01 PROCEDURE — 85027 COMPLETE CBC AUTOMATED: CPT | Mod: PO | Performed by: INTERNAL MEDICINE

## 2023-01-01 PROCEDURE — 86900 BLOOD TYPING SEROLOGIC ABO: CPT | Performed by: ANESTHESIOLOGY

## 2023-01-01 RX ORDER — POLYETHYLENE GLYCOL 3350 17 G/17G
17 POWDER, FOR SOLUTION ORAL
Status: DISCONTINUED | OUTPATIENT
Start: 2023-01-01 | End: 2023-01-01

## 2023-01-01 RX ORDER — SIMETHICONE 80 MG
1 TABLET,CHEWABLE ORAL 4 TIMES DAILY PRN
Status: DISCONTINUED | OUTPATIENT
Start: 2023-01-01 | End: 2023-01-01 | Stop reason: HOSPADM

## 2023-01-01 RX ORDER — GUAIFENESIN 600 MG/1
1200 TABLET, EXTENDED RELEASE ORAL 2 TIMES DAILY PRN
Status: DISCONTINUED | OUTPATIENT
Start: 2023-01-01 | End: 2023-01-01 | Stop reason: HOSPADM

## 2023-01-01 RX ORDER — SODIUM CHLORIDE 0.9 % (FLUSH) 0.9 %
10 SYRINGE (ML) INJECTION EVERY 12 HOURS PRN
Status: DISCONTINUED | OUTPATIENT
Start: 2023-01-01 | End: 2023-01-01 | Stop reason: HOSPADM

## 2023-01-01 RX ORDER — LIDOCAINE HYDROCHLORIDE 20 MG/ML
INJECTION, SOLUTION EPIDURAL; INFILTRATION; INTRACAUDAL; PERINEURAL
Status: DISCONTINUED | OUTPATIENT
Start: 2023-01-01 | End: 2023-01-01

## 2023-01-01 RX ORDER — TRAMADOL HYDROCHLORIDE 50 MG/1
100 TABLET ORAL EVERY 6 HOURS PRN
Status: DISCONTINUED | OUTPATIENT
Start: 2023-01-01 | End: 2023-01-01 | Stop reason: HOSPADM

## 2023-01-01 RX ORDER — MAGNESIUM SULFATE HEPTAHYDRATE 40 MG/ML
2 INJECTION, SOLUTION INTRAVENOUS ONCE
Status: COMPLETED | OUTPATIENT
Start: 2023-01-01 | End: 2023-01-01

## 2023-01-01 RX ORDER — PROPOFOL 10 MG/ML
VIAL (ML) INTRAVENOUS
Status: DISCONTINUED | OUTPATIENT
Start: 2023-01-01 | End: 2023-01-01

## 2023-01-01 RX ORDER — METHOCARBAMOL 500 MG/1
500 TABLET, FILM COATED ORAL 4 TIMES DAILY PRN
Qty: 40 TABLET | Refills: 0 | Status: SHIPPED | OUTPATIENT
Start: 2023-01-01 | End: 2023-01-01

## 2023-01-01 RX ORDER — CYANOCOBALAMIN (VITAMIN B-12) 1000MCG/ML
2 DROPS ORAL NIGHTLY
Status: DISCONTINUED | OUTPATIENT
Start: 2023-01-01 | End: 2023-01-01 | Stop reason: HOSPADM

## 2023-01-01 RX ORDER — METOPROLOL SUCCINATE 50 MG/1
50 TABLET, EXTENDED RELEASE ORAL DAILY
Qty: 90 TABLET | Refills: 3 | Status: SHIPPED | OUTPATIENT
Start: 2023-01-01 | End: 2023-01-01

## 2023-01-01 RX ORDER — TAMSULOSIN HYDROCHLORIDE 0.4 MG/1
0.4 CAPSULE ORAL DAILY
Status: DISCONTINUED | OUTPATIENT
Start: 2023-01-01 | End: 2023-01-01

## 2023-01-01 RX ORDER — DEXTROSE 40 %
15 GEL (GRAM) ORAL
Status: DISCONTINUED | OUTPATIENT
Start: 2023-01-01 | End: 2023-01-01 | Stop reason: HOSPADM

## 2023-01-01 RX ORDER — SODIUM CHLORIDE 9 MG/ML
INJECTION, SOLUTION INTRAVENOUS CONTINUOUS
Status: DISCONTINUED | OUTPATIENT
Start: 2023-01-01 | End: 2023-01-01

## 2023-01-01 RX ORDER — DEXMEDETOMIDINE HYDROCHLORIDE 100 UG/ML
INJECTION, SOLUTION INTRAVENOUS
Status: DISCONTINUED | OUTPATIENT
Start: 2023-01-01 | End: 2023-01-01

## 2023-01-01 RX ORDER — METOPROLOL SUCCINATE 50 MG/1
50 TABLET, EXTENDED RELEASE ORAL DAILY
Status: DISCONTINUED | OUTPATIENT
Start: 2023-01-01 | End: 2023-01-01

## 2023-01-01 RX ORDER — SODIUM,POTASSIUM PHOSPHATES 280-250MG
2 POWDER IN PACKET (EA) ORAL
Status: DISCONTINUED | OUTPATIENT
Start: 2023-01-01 | End: 2023-01-01

## 2023-01-01 RX ORDER — HEPARIN SODIUM 5000 [USP'U]/ML
INJECTION, SOLUTION INTRAVENOUS; SUBCUTANEOUS
Status: DISCONTINUED | OUTPATIENT
Start: 2023-01-01 | End: 2023-01-01

## 2023-01-01 RX ORDER — SODIUM CHLORIDE 50 MG/ML
2 SOLUTION/ DROPS OPHTHALMIC
Status: DISCONTINUED | OUTPATIENT
Start: 2023-01-01 | End: 2023-01-01

## 2023-01-01 RX ORDER — OMEGA-3/DHA/EPA/FISH OIL 300-1000MG
1 CAPSULE,DELAYED RELEASE (ENTERIC COATED) ORAL DAILY
Status: DISCONTINUED | OUTPATIENT
Start: 2023-01-01 | End: 2023-01-01 | Stop reason: HOSPADM

## 2023-01-01 RX ORDER — PHENYLEPHRINE HYDROCHLORIDE 10 MG/ML
INJECTION INTRAVENOUS
Status: DISCONTINUED | OUTPATIENT
Start: 2023-01-01 | End: 2023-01-01

## 2023-01-01 RX ORDER — DEXTROSE MONOHYDRATE, SODIUM CHLORIDE, AND POTASSIUM CHLORIDE 50; 1.49; 4.5 G/1000ML; G/1000ML; G/1000ML
INJECTION, SOLUTION INTRAVENOUS CONTINUOUS
Status: DISCONTINUED | OUTPATIENT
Start: 2023-01-01 | End: 2023-01-01

## 2023-01-01 RX ORDER — ACETAMINOPHEN 325 MG/1
650 TABLET ORAL EVERY 4 HOURS PRN
Status: DISCONTINUED | OUTPATIENT
Start: 2023-01-01 | End: 2023-01-01

## 2023-01-01 RX ORDER — BUPIVACAINE HYDROCHLORIDE 5 MG/ML
INJECTION, SOLUTION EPIDURAL; INTRACAUDAL
Status: DISCONTINUED | OUTPATIENT
Start: 2023-01-01 | End: 2023-01-01 | Stop reason: HOSPADM

## 2023-01-01 RX ORDER — SODIUM CHLORIDE 0.9 % (FLUSH) 0.9 %
3 SYRINGE (ML) INJECTION EVERY 30 MIN PRN
Status: DISCONTINUED | OUTPATIENT
Start: 2023-01-01 | End: 2023-01-01

## 2023-01-01 RX ORDER — ONDANSETRON 2 MG/ML
INJECTION INTRAMUSCULAR; INTRAVENOUS
Status: DISCONTINUED | OUTPATIENT
Start: 2023-01-01 | End: 2023-01-01

## 2023-01-01 RX ORDER — FAMOTIDINE 10 MG/ML
INJECTION INTRAVENOUS
Status: DISCONTINUED | OUTPATIENT
Start: 2023-01-01 | End: 2023-01-01

## 2023-01-01 RX ORDER — OXYCODONE HYDROCHLORIDE 5 MG/1
5 TABLET ORAL EVERY 4 HOURS PRN
Qty: 120 TABLET | Refills: 0 | Status: SHIPPED | OUTPATIENT
Start: 2023-01-01

## 2023-01-01 RX ORDER — ENOXAPARIN SODIUM 100 MG/ML
40 INJECTION SUBCUTANEOUS EVERY 24 HOURS
Status: DISCONTINUED | OUTPATIENT
Start: 2023-01-01 | End: 2023-01-01

## 2023-01-01 RX ORDER — TRAMADOL HYDROCHLORIDE 50 MG/1
50 TABLET ORAL EVERY 4 HOURS PRN
Qty: 10 TABLET | Refills: 0 | Status: SHIPPED | OUTPATIENT
Start: 2023-01-01 | End: 2023-01-01

## 2023-01-01 RX ORDER — METOCLOPRAMIDE HYDROCHLORIDE 5 MG/ML
10 INJECTION INTRAMUSCULAR; INTRAVENOUS EVERY 6 HOURS
Status: DISCONTINUED | OUTPATIENT
Start: 2023-01-01 | End: 2023-01-01

## 2023-01-01 RX ORDER — BISACODYL 5 MG
5 TABLET, DELAYED RELEASE (ENTERIC COATED) ORAL 2 TIMES DAILY
Status: DISCONTINUED | OUTPATIENT
Start: 2023-01-01 | End: 2023-01-01

## 2023-01-01 RX ORDER — NIFEDIPINE 30 MG/1
30 TABLET, EXTENDED RELEASE ORAL DAILY
Status: DISCONTINUED | OUTPATIENT
Start: 2023-01-01 | End: 2023-01-01

## 2023-01-01 RX ORDER — METOPROLOL SUCCINATE 100 MG/1
100 TABLET, EXTENDED RELEASE ORAL DAILY
Qty: 30 TABLET | Refills: 11 | Status: SHIPPED | OUTPATIENT
Start: 2023-01-01 | End: 2024-09-05

## 2023-01-01 RX ORDER — ATORVASTATIN CALCIUM 40 MG/1
40 TABLET, FILM COATED ORAL NIGHTLY
Status: DISCONTINUED | OUTPATIENT
Start: 2023-01-01 | End: 2023-01-01 | Stop reason: HOSPADM

## 2023-01-01 RX ORDER — HEPARIN 100 UNIT/ML
500 SYRINGE INTRAVENOUS
Status: CANCELLED | OUTPATIENT
Start: 2023-01-01

## 2023-01-01 RX ORDER — NIFEDIPINE 60 MG/1
60 TABLET, EXTENDED RELEASE ORAL DAILY
Qty: 30 TABLET | Refills: 11 | Status: SHIPPED | OUTPATIENT
Start: 2023-01-01 | End: 2024-09-17

## 2023-01-01 RX ORDER — METRONIDAZOLE 500 MG/100ML
INJECTION, SOLUTION INTRAVENOUS
Status: DISCONTINUED | OUTPATIENT
Start: 2023-01-01 | End: 2023-01-01

## 2023-01-01 RX ORDER — METOPROLOL SUCCINATE 50 MG/1
50 TABLET, EXTENDED RELEASE ORAL DAILY
COMMUNITY
End: 2023-01-01 | Stop reason: SDUPTHER

## 2023-01-01 RX ORDER — METOCLOPRAMIDE 5 MG/1
10 TABLET ORAL EVERY 6 HOURS
Status: DISCONTINUED | OUTPATIENT
Start: 2023-01-01 | End: 2023-01-01 | Stop reason: HOSPADM

## 2023-01-01 RX ORDER — LANOLIN ALCOHOL/MO/W.PET/CERES
800 CREAM (GRAM) TOPICAL ONCE
Status: COMPLETED | OUTPATIENT
Start: 2023-01-01 | End: 2023-01-01

## 2023-01-01 RX ORDER — TRAMADOL HYDROCHLORIDE 50 MG/1
50 TABLET ORAL EVERY 6 HOURS PRN
Status: DISCONTINUED | OUTPATIENT
Start: 2023-01-01 | End: 2023-01-01 | Stop reason: HOSPADM

## 2023-01-01 RX ORDER — ONDANSETRON 2 MG/ML
4 INJECTION INTRAMUSCULAR; INTRAVENOUS DAILY PRN
Status: DISCONTINUED | OUTPATIENT
Start: 2023-01-01 | End: 2023-01-01

## 2023-01-01 RX ORDER — SUCRALFATE 1 G/10ML
1 SUSPENSION ORAL 2 TIMES DAILY WITH MEALS
Status: DISCONTINUED | OUTPATIENT
Start: 2023-01-01 | End: 2023-01-01

## 2023-01-01 RX ORDER — ACETAMINOPHEN 10 MG/ML
INJECTION, SOLUTION INTRAVENOUS
Status: DISCONTINUED | OUTPATIENT
Start: 2023-01-01 | End: 2023-01-01

## 2023-01-01 RX ORDER — ATORVASTATIN CALCIUM 40 MG/1
40 TABLET, FILM COATED ORAL DAILY
Status: DISCONTINUED | OUTPATIENT
Start: 2023-01-01 | End: 2023-01-01

## 2023-01-01 RX ORDER — FAMOTIDINE 20 MG/1
20 TABLET, FILM COATED ORAL 2 TIMES DAILY
Status: ON HOLD | COMMUNITY
End: 2023-01-01 | Stop reason: HOSPADM

## 2023-01-01 RX ORDER — ALPRAZOLAM 0.25 MG/1
0.25 TABLET ORAL 3 TIMES DAILY
Qty: 60 TABLET | Refills: 0 | Status: ON HOLD | OUTPATIENT
Start: 2023-01-01 | End: 2023-01-01 | Stop reason: HOSPADM

## 2023-01-01 RX ORDER — HYDROMORPHONE HCL IN 0.9% NACL 6 MG/30 ML
PATIENT CONTROLLED ANALGESIA SYRINGE INTRAVENOUS CONTINUOUS
Status: DISCONTINUED | OUTPATIENT
Start: 2023-01-01 | End: 2023-01-01

## 2023-01-01 RX ORDER — GLUCAGON 1 MG
1 KIT INJECTION
Status: DISCONTINUED | OUTPATIENT
Start: 2023-01-01 | End: 2023-01-01 | Stop reason: HOSPADM

## 2023-01-01 RX ORDER — ROCURONIUM BROMIDE 10 MG/ML
INJECTION, SOLUTION INTRAVENOUS
Status: DISCONTINUED | OUTPATIENT
Start: 2023-01-01 | End: 2023-01-01

## 2023-01-01 RX ORDER — ACETAMINOPHEN 500 MG
500 TABLET ORAL 2 TIMES DAILY PRN
Status: DISCONTINUED | OUTPATIENT
Start: 2023-01-01 | End: 2023-01-01

## 2023-01-01 RX ORDER — CYANOCOBALAMIN (VITAMIN B-12) 250 MCG
1000 TABLET ORAL DAILY
Status: DISCONTINUED | OUTPATIENT
Start: 2023-01-01 | End: 2023-01-01 | Stop reason: HOSPADM

## 2023-01-01 RX ORDER — CYANOCOBALAMIN (VITAMIN B-12) 1000MCG/ML
DROPS ORAL
Status: ON HOLD | COMMUNITY
End: 2023-01-01 | Stop reason: CLARIF

## 2023-01-01 RX ORDER — NIFEDIPINE 30 MG/1
30 TABLET, EXTENDED RELEASE ORAL DAILY
Qty: 90 TABLET | Refills: 3 | Status: SHIPPED | OUTPATIENT
Start: 2023-01-01 | End: 2023-01-01

## 2023-01-01 RX ORDER — ENOXAPARIN SODIUM 100 MG/ML
40 INJECTION SUBCUTANEOUS DAILY
Status: DISCONTINUED | OUTPATIENT
Start: 2023-01-01 | End: 2023-01-01 | Stop reason: HOSPADM

## 2023-01-01 RX ORDER — LEVALBUTEROL INHALATION SOLUTION 0.63 MG/3ML
0.63 SOLUTION RESPIRATORY (INHALATION)
Status: COMPLETED | OUTPATIENT
Start: 2023-01-01 | End: 2023-01-01

## 2023-01-01 RX ORDER — NIFEDIPINE 30 MG/1
60 TABLET, EXTENDED RELEASE ORAL DAILY
Status: DISCONTINUED | OUTPATIENT
Start: 2023-01-01 | End: 2023-01-01

## 2023-01-01 RX ORDER — FERROUS GLUCONATE 324(38)MG
324 TABLET ORAL 2 TIMES DAILY WITH MEALS
COMMUNITY

## 2023-01-01 RX ORDER — HYDRALAZINE HYDROCHLORIDE 20 MG/ML
10 INJECTION INTRAMUSCULAR; INTRAVENOUS EVERY 6 HOURS PRN
Status: DISCONTINUED | OUTPATIENT
Start: 2023-01-01 | End: 2023-01-01 | Stop reason: HOSPADM

## 2023-01-01 RX ORDER — FENTANYL CITRATE 50 UG/ML
25 INJECTION, SOLUTION INTRAMUSCULAR; INTRAVENOUS EVERY 5 MIN PRN
Status: DISCONTINUED | OUTPATIENT
Start: 2023-01-01 | End: 2023-01-01

## 2023-01-01 RX ORDER — LOSARTAN POTASSIUM 100 MG/1
100 TABLET ORAL DAILY
Qty: 90 TABLET | Refills: 3 | Status: SHIPPED | OUTPATIENT
Start: 2023-01-01

## 2023-01-01 RX ORDER — SUCRALFATE 1 G/10ML
SUSPENSION ORAL
COMMUNITY
Start: 2023-01-01 | End: 2023-01-01

## 2023-01-01 RX ORDER — OXYBUTYNIN CHLORIDE 5 MG/1
5 TABLET ORAL 3 TIMES DAILY
Qty: 42 TABLET | Refills: 1 | Status: SHIPPED | OUTPATIENT
Start: 2023-01-01 | End: 2023-01-01

## 2023-01-01 RX ORDER — NITROGLYCERIN 0.4 MG/1
0.4 TABLET SUBLINGUAL EVERY 5 MIN PRN
Status: DISCONTINUED | OUTPATIENT
Start: 2023-01-01 | End: 2023-01-01 | Stop reason: HOSPADM

## 2023-01-01 RX ORDER — MAGNESIUM 250 MG
250 TABLET ORAL DAILY
COMMUNITY

## 2023-01-01 RX ORDER — NIFEDIPINE 30 MG/1
30 TABLET, EXTENDED RELEASE ORAL NIGHTLY
Status: DISCONTINUED | OUTPATIENT
Start: 2023-01-01 | End: 2023-01-01 | Stop reason: HOSPADM

## 2023-01-01 RX ORDER — BENZONATATE 100 MG/1
100 CAPSULE ORAL 3 TIMES DAILY PRN
Status: DISCONTINUED | OUTPATIENT
Start: 2023-01-01 | End: 2023-01-01 | Stop reason: HOSPADM

## 2023-01-01 RX ORDER — FENTANYL CITRATE 50 UG/ML
INJECTION, SOLUTION INTRAMUSCULAR; INTRAVENOUS
Status: DISCONTINUED | OUTPATIENT
Start: 2023-01-01 | End: 2023-01-01

## 2023-01-01 RX ORDER — DEXTROSE 40 %
30 GEL (GRAM) ORAL
Status: DISCONTINUED | OUTPATIENT
Start: 2023-01-01 | End: 2023-01-01 | Stop reason: HOSPADM

## 2023-01-01 RX ORDER — ONDANSETRON 8 MG/1
8 TABLET, ORALLY DISINTEGRATING ORAL EVERY 8 HOURS PRN
Status: DISCONTINUED | OUTPATIENT
Start: 2023-01-01 | End: 2023-01-01 | Stop reason: HOSPADM

## 2023-01-01 RX ORDER — HYDROMORPHONE HCL/0.9% NACL/PF 6 MG/30 ML
PATIENT CONTROLLED ANALGESIA SYRINGE INTRAVENOUS
Status: COMPLETED
Start: 2023-01-01 | End: 2023-01-01

## 2023-01-01 RX ORDER — TALC
6 POWDER (GRAM) TOPICAL NIGHTLY PRN
Status: DISCONTINUED | OUTPATIENT
Start: 2023-01-01 | End: 2023-01-01 | Stop reason: HOSPADM

## 2023-01-01 RX ORDER — ENOXAPARIN SODIUM 100 MG/ML
40 INJECTION SUBCUTANEOUS DAILY
Qty: 11.2 ML | Refills: 0 | Status: SHIPPED | OUTPATIENT
Start: 2023-01-01 | End: 2023-01-01

## 2023-01-01 RX ORDER — AMMONIUM LACTATE 12 G/100G
LOTION TOPICAL 2 TIMES DAILY
COMMUNITY
Start: 2023-01-01 | End: 2023-01-01

## 2023-01-01 RX ORDER — ACETAMINOPHEN 10 MG/ML
1000 INJECTION, SOLUTION INTRAVENOUS EVERY 8 HOURS
Status: DISPENSED | OUTPATIENT
Start: 2023-01-01 | End: 2023-01-01

## 2023-01-01 RX ORDER — SODIUM CHLORIDE 0.9 % (FLUSH) 0.9 %
10 SYRINGE (ML) INJECTION
Status: CANCELLED | OUTPATIENT
Start: 2023-01-01

## 2023-01-01 RX ORDER — NITROGLYCERIN 0.4 MG/1
0.4 TABLET SUBLINGUAL EVERY 5 MIN PRN
Qty: 30 TABLET | Refills: 3 | Status: SHIPPED | OUTPATIENT
Start: 2023-01-01

## 2023-01-01 RX ORDER — NIFEDIPINE 30 MG/1
30 TABLET, EXTENDED RELEASE ORAL DAILY
COMMUNITY
End: 2023-01-01 | Stop reason: SDUPTHER

## 2023-01-01 RX ORDER — ACETAMINOPHEN 500 MG
1000 TABLET ORAL EVERY 8 HOURS
Status: DISCONTINUED | OUTPATIENT
Start: 2023-01-01 | End: 2023-01-01 | Stop reason: HOSPADM

## 2023-01-01 RX ORDER — HYDROMORPHONE HYDROCHLORIDE 1 MG/ML
0.5 INJECTION, SOLUTION INTRAMUSCULAR; INTRAVENOUS; SUBCUTANEOUS EVERY 6 HOURS PRN
Status: DISCONTINUED | OUTPATIENT
Start: 2023-01-01 | End: 2023-01-01 | Stop reason: HOSPADM

## 2023-01-01 RX ORDER — GUAIFENESIN 600 MG/1
600 TABLET, EXTENDED RELEASE ORAL 2 TIMES DAILY PRN
Status: DISCONTINUED | OUTPATIENT
Start: 2023-01-01 | End: 2023-01-01

## 2023-01-01 RX ORDER — CETIRIZINE HYDROCHLORIDE 5 MG/1
5 TABLET ORAL NIGHTLY
Status: DISCONTINUED | OUTPATIENT
Start: 2023-01-01 | End: 2023-01-01 | Stop reason: HOSPADM

## 2023-01-01 RX ORDER — KETAMINE HCL IN 0.9 % NACL 50 MG/5 ML
SYRINGE (ML) INTRAVENOUS
Status: DISCONTINUED | OUTPATIENT
Start: 2023-01-01 | End: 2023-01-01

## 2023-01-01 RX ORDER — METOPROLOL SUCCINATE 50 MG/1
50 TABLET, EXTENDED RELEASE ORAL NIGHTLY
Status: DISCONTINUED | OUTPATIENT
Start: 2023-01-01 | End: 2023-01-01 | Stop reason: HOSPADM

## 2023-01-01 RX ORDER — SODIUM,POTASSIUM PHOSPHATES 280-250MG
2 POWDER IN PACKET (EA) ORAL
Status: DISCONTINUED | OUTPATIENT
Start: 2023-01-01 | End: 2023-01-01 | Stop reason: HOSPADM

## 2023-01-01 RX ORDER — TADALAFIL 5 MG/1
5 TABLET ORAL DAILY
Status: DISCONTINUED | OUTPATIENT
Start: 2023-01-01 | End: 2023-01-01 | Stop reason: HOSPADM

## 2023-01-01 RX ORDER — NALOXONE HCL 0.4 MG/ML
0.02 VIAL (ML) INJECTION
Status: DISCONTINUED | OUTPATIENT
Start: 2023-01-01 | End: 2023-01-01

## 2023-01-01 RX ORDER — POLYETHYLENE GLYCOL 3350 17 G/17G
17 POWDER, FOR SOLUTION ORAL DAILY
Status: DISCONTINUED | OUTPATIENT
Start: 2023-01-01 | End: 2023-01-01

## 2023-01-01 RX ORDER — OXYBUTYNIN CHLORIDE 5 MG/1
5 TABLET ORAL 3 TIMES DAILY PRN
Status: DISCONTINUED | OUTPATIENT
Start: 2023-01-01 | End: 2023-01-01 | Stop reason: HOSPADM

## 2023-01-01 RX ORDER — FAMOTIDINE 20 MG/1
20 TABLET, FILM COATED ORAL 2 TIMES DAILY
Status: DISCONTINUED | OUTPATIENT
Start: 2023-01-01 | End: 2023-01-01 | Stop reason: HOSPADM

## 2023-01-01 RX ORDER — CHOLECALCIFEROL (VITAMIN D3) 25 MCG
2000 TABLET ORAL DAILY
Status: DISCONTINUED | OUTPATIENT
Start: 2023-01-01 | End: 2023-01-01 | Stop reason: HOSPADM

## 2023-01-01 RX ORDER — TADALAFIL 5 MG/1
5 TABLET ORAL DAILY
Status: DISCONTINUED | OUTPATIENT
Start: 2023-01-01 | End: 2023-01-01

## 2023-01-01 RX ORDER — SODIUM CHLORIDE, SODIUM LACTATE, POTASSIUM CHLORIDE, CALCIUM CHLORIDE 600; 310; 30; 20 MG/100ML; MG/100ML; MG/100ML; MG/100ML
INJECTION, SOLUTION INTRAVENOUS CONTINUOUS
Status: DISCONTINUED | OUTPATIENT
Start: 2023-01-01 | End: 2023-01-01

## 2023-01-01 RX ORDER — FLUTICASONE PROPIONATE 50 MCG
1 SPRAY, SUSPENSION (ML) NASAL 2 TIMES DAILY
Status: DISCONTINUED | OUTPATIENT
Start: 2023-01-01 | End: 2023-01-01 | Stop reason: HOSPADM

## 2023-01-01 RX ORDER — MAG HYDROX/ALUMINUM HYD/SIMETH 200-200-20
30 SUSPENSION, ORAL (FINAL DOSE FORM) ORAL 4 TIMES DAILY PRN
Status: DISCONTINUED | OUTPATIENT
Start: 2023-01-01 | End: 2023-01-01 | Stop reason: HOSPADM

## 2023-01-01 RX ORDER — SODIUM,POTASSIUM PHOSPHATES 280-250MG
2 POWDER IN PACKET (EA) ORAL
Status: COMPLETED | OUTPATIENT
Start: 2023-01-01 | End: 2023-01-01

## 2023-01-01 RX ORDER — HYDROCORTISONE 25 MG/G
CREAM TOPICAL 2 TIMES DAILY
Qty: 20 G | Refills: 2 | Status: SHIPPED | OUTPATIENT
Start: 2023-01-01 | End: 2023-01-01

## 2023-01-01 RX ORDER — NITROGLYCERIN 0.4 MG/1
1 TABLET SUBLINGUAL EVERY 5 MIN PRN
COMMUNITY
End: 2023-01-01 | Stop reason: SDUPTHER

## 2023-01-01 RX ORDER — HYDROCORTISONE 25 MG/G
CREAM TOPICAL 2 TIMES DAILY PRN
Status: DISCONTINUED | OUTPATIENT
Start: 2023-01-01 | End: 2023-01-01 | Stop reason: HOSPADM

## 2023-01-01 RX ADMIN — ACETAMINOPHEN 500 MG: 500 TABLET ORAL at 10:05

## 2023-01-01 RX ADMIN — Medication 2000 MG: at 08:05

## 2023-01-01 RX ADMIN — LEVALBUTEROL HYDROCHLORIDE 0.63 MG: 0.63 SOLUTION RESPIRATORY (INHALATION) at 08:05

## 2023-01-01 RX ADMIN — TRAMADOL HYDROCHLORIDE 50 MG: 50 TABLET, COATED ORAL at 09:05

## 2023-01-01 RX ADMIN — ACETAMINOPHEN 1000 MG: 500 TABLET ORAL at 03:05

## 2023-01-01 RX ADMIN — PHENYLEPHRINE HYDROCHLORIDE 100 MCG: 10 INJECTION INTRAVENOUS at 05:05

## 2023-01-01 RX ADMIN — CHOLECALCIFEROL TAB 25 MCG (1000 UNIT) 2000 UNITS: 25 TAB at 08:05

## 2023-01-01 RX ADMIN — Medication 1 CAPSULE: at 08:05

## 2023-01-01 RX ADMIN — NIFEDIPINE 30 MG: 30 TABLET, FILM COATED, EXTENDED RELEASE ORAL at 09:05

## 2023-01-01 RX ADMIN — FAMOTIDINE 20 MG: 10 INJECTION, SOLUTION INTRAVENOUS at 06:05

## 2023-01-01 RX ADMIN — SODIUM CHLORIDE, POTASSIUM CHLORIDE, SODIUM LACTATE AND CALCIUM CHLORIDE: 600; 310; 30; 20 INJECTION, SOLUTION INTRAVENOUS at 08:05

## 2023-01-01 RX ADMIN — THERA TABS 1 TABLET: TAB at 08:04

## 2023-01-01 RX ADMIN — CYANOCOBALAMIN TAB 1000 MCG 1000 MCG: 1000 TAB at 08:05

## 2023-01-01 RX ADMIN — FAMOTIDINE 20 MG: 20 TABLET ORAL at 09:05

## 2023-01-01 RX ADMIN — POLYETHYLENE GLYCOL 3350 17 G: 17 POWDER, FOR SOLUTION ORAL at 10:05

## 2023-01-01 RX ADMIN — METOPROLOL SUCCINATE 50 MG: 50 TABLET, EXTENDED RELEASE ORAL at 08:05

## 2023-01-01 RX ADMIN — Medication 2000 MG: at 08:04

## 2023-01-01 RX ADMIN — CETIRIZINE HYDROCHLORIDE 5 MG: 5 TABLET, FILM COATED ORAL at 09:04

## 2023-01-01 RX ADMIN — POTASSIUM & SODIUM PHOSPHATES POWDER PACK 280-160-250 MG 2 PACKET: 280-160-250 PACK at 04:05

## 2023-01-01 RX ADMIN — FLUTICASONE PROPIONATE 50 MCG: 50 SPRAY, METERED NASAL at 09:04

## 2023-01-01 RX ADMIN — THERA TABS 1 TABLET: TAB at 09:04

## 2023-01-01 RX ADMIN — BISACODYL 5 MG: 5 TABLET, COATED ORAL at 09:05

## 2023-01-01 RX ADMIN — ACETAMINOPHEN 1000 MG: 500 TABLET ORAL at 06:05

## 2023-01-01 RX ADMIN — FLUTICASONE PROPIONATE 50 MCG: 50 SPRAY, METERED NASAL at 08:04

## 2023-01-01 RX ADMIN — ENOXAPARIN SODIUM 40 MG: 40 INJECTION SUBCUTANEOUS at 05:05

## 2023-01-01 RX ADMIN — ENOXAPARIN SODIUM 40 MG: 40 INJECTION SUBCUTANEOUS at 08:05

## 2023-01-01 RX ADMIN — METHOCARBAMOL 500 MG: 100 INJECTION, SOLUTION INTRAMUSCULAR; INTRAVENOUS at 11:05

## 2023-01-01 RX ADMIN — NIFEDIPINE 30 MG: 30 TABLET, FILM COATED, EXTENDED RELEASE ORAL at 08:05

## 2023-01-01 RX ADMIN — THERA TABS 1 TABLET: TAB at 08:05

## 2023-01-01 RX ADMIN — GUAIFENESIN 1200 MG: 600 TABLET, EXTENDED RELEASE ORAL at 08:05

## 2023-01-01 RX ADMIN — ATORVASTATIN CALCIUM 40 MG: 40 TABLET, FILM COATED ORAL at 09:05

## 2023-01-01 RX ADMIN — ENOXAPARIN SODIUM 40 MG: 40 INJECTION SUBCUTANEOUS at 05:04

## 2023-01-01 RX ADMIN — FLUTICASONE PROPIONATE 50 MCG: 50 SPRAY, METERED NASAL at 09:05

## 2023-01-01 RX ADMIN — Medication 1 CAPSULE: at 10:05

## 2023-01-01 RX ADMIN — TADALAFIL 5 MG: 5 TABLET, FILM COATED ORAL at 08:05

## 2023-01-01 RX ADMIN — POLYETHYLENE GLYCOL 3350 17 G: 17 POWDER, FOR SOLUTION ORAL at 08:04

## 2023-01-01 RX ADMIN — POTASSIUM & SODIUM PHOSPHATES POWDER PACK 280-160-250 MG 2 PACKET: 280-160-250 PACK at 05:05

## 2023-01-01 RX ADMIN — FAMOTIDINE 20 MG: 20 TABLET ORAL at 08:04

## 2023-01-01 RX ADMIN — CETIRIZINE HYDROCHLORIDE 5 MG: 5 TABLET, FILM COATED ORAL at 09:05

## 2023-01-01 RX ADMIN — TADALAFIL 5 MG: 5 TABLET ORAL at 09:05

## 2023-01-01 RX ADMIN — SODIUM CHLORIDE, SODIUM GLUCONATE, SODIUM ACETATE, POTASSIUM CHLORIDE, MAGNESIUM CHLORIDE, SODIUM PHOSPHATE, DIBASIC, AND POTASSIUM PHOSPHATE: .53; .5; .37; .037; .03; .012; .00082 INJECTION, SOLUTION INTRAVENOUS at 06:05

## 2023-01-01 RX ADMIN — BISACODYL 5 MG: 5 TABLET, COATED ORAL at 08:05

## 2023-01-01 RX ADMIN — TADALAFIL 5 MG: 5 TABLET, FILM COATED ORAL at 10:05

## 2023-01-01 RX ADMIN — FLUTICASONE PROPIONATE 50 MCG: 50 SPRAY, METERED NASAL at 08:05

## 2023-01-01 RX ADMIN — SODIUM PHOSPHATE, MONOBASIC, MONOHYDRATE AND SODIUM PHOSPHATE, DIBASIC, ANHYDROUS 20.01 MMOL: 276; 142 INJECTION, SOLUTION INTRAVENOUS at 11:05

## 2023-01-01 RX ADMIN — DEXTROSE, SODIUM CHLORIDE, AND POTASSIUM CHLORIDE: 5; .45; .15 INJECTION INTRAVENOUS at 09:05

## 2023-01-01 RX ADMIN — SODIUM CHLORIDE: 0.9 INJECTION, SOLUTION INTRAVENOUS at 03:05

## 2023-01-01 RX ADMIN — METOCLOPRAMIDE 10 MG: 5 INJECTION, SOLUTION INTRAMUSCULAR; INTRAVENOUS at 06:05

## 2023-01-01 RX ADMIN — TADALAFIL 5 MG: 5 TABLET, FILM COATED ORAL at 09:05

## 2023-01-01 RX ADMIN — ACETAMINOPHEN 500 MG: 500 TABLET ORAL at 08:04

## 2023-01-01 RX ADMIN — CHOLECALCIFEROL TAB 25 MCG (1000 UNIT) 2000 UNITS: 25 TAB at 09:04

## 2023-01-01 RX ADMIN — ENOXAPARIN SODIUM 40 MG: 40 INJECTION SUBCUTANEOUS at 04:05

## 2023-01-01 RX ADMIN — DEXMEDETOMIDINE HYDROCHLORIDE 8 MCG: 100 INJECTION, SOLUTION INTRAVENOUS at 07:05

## 2023-01-01 RX ADMIN — LIDOCAINE HYDROCHLORIDE 100 MG: 20 INJECTION, SOLUTION EPIDURAL; INFILTRATION; INTRACAUDAL; PERINEURAL at 03:05

## 2023-01-01 RX ADMIN — Medication 1 CAPSULE: at 09:05

## 2023-01-01 RX ADMIN — ROCURONIUM BROMIDE 20 MG: 10 INJECTION INTRAVENOUS at 06:05

## 2023-01-01 RX ADMIN — GUAIFENESIN 1200 MG: 600 TABLET, EXTENDED RELEASE ORAL at 10:05

## 2023-01-01 RX ADMIN — Medication 2000 MG: at 09:05

## 2023-01-01 RX ADMIN — CYANOCOBALAMIN TAB 1000 MCG 1000 MCG: 1000 TAB at 09:04

## 2023-01-01 RX ADMIN — NIFEDIPINE 30 MG: 30 TABLET, FILM COATED, EXTENDED RELEASE ORAL at 08:04

## 2023-01-01 RX ADMIN — ACETAMINOPHEN 1000 MG: 500 TABLET ORAL at 05:05

## 2023-01-01 RX ADMIN — METOCLOPRAMIDE 10 MG: 5 INJECTION, SOLUTION INTRAMUSCULAR; INTRAVENOUS at 11:05

## 2023-01-01 RX ADMIN — METOCLOPRAMIDE 10 MG: 5 INJECTION, SOLUTION INTRAMUSCULAR; INTRAVENOUS at 12:05

## 2023-01-01 RX ADMIN — POTASSIUM & SODIUM PHOSPHATES POWDER PACK 280-160-250 MG 2 PACKET: 280-160-250 PACK at 06:05

## 2023-01-01 RX ADMIN — ACETAMINOPHEN 1000 MG: 500 TABLET ORAL at 10:05

## 2023-01-01 RX ADMIN — FENTANYL CITRATE 25 MCG: 50 INJECTION, SOLUTION INTRAMUSCULAR; INTRAVENOUS at 08:05

## 2023-01-01 RX ADMIN — TRAMADOL HYDROCHLORIDE 100 MG: 50 TABLET, COATED ORAL at 09:05

## 2023-01-01 RX ADMIN — PHENYLEPHRINE HYDROCHLORIDE 100 MCG: 10 INJECTION INTRAVENOUS at 04:05

## 2023-01-01 RX ADMIN — METOCLOPRAMIDE 10 MG: 5 INJECTION, SOLUTION INTRAMUSCULAR; INTRAVENOUS at 05:05

## 2023-01-01 RX ADMIN — SODIUM CHLORIDE: 9 INJECTION, SOLUTION INTRAVENOUS at 10:05

## 2023-01-01 RX ADMIN — METOCLOPRAMIDE 10 MG: 5 TABLET ORAL at 12:05

## 2023-01-01 RX ADMIN — TRAMADOL HYDROCHLORIDE 50 MG: 50 TABLET, COATED ORAL at 08:05

## 2023-01-01 RX ADMIN — BISACODYL 5 MG: 5 TABLET, COATED ORAL at 09:04

## 2023-01-01 RX ADMIN — ENOXAPARIN SODIUM 40 MG: 40 INJECTION SUBCUTANEOUS at 04:04

## 2023-01-01 RX ADMIN — POTASSIUM & SODIUM PHOSPHATES POWDER PACK 280-160-250 MG 2 PACKET: 280-160-250 PACK at 01:05

## 2023-01-01 RX ADMIN — GUAIFENESIN 1200 MG: 600 TABLET, EXTENDED RELEASE ORAL at 08:04

## 2023-01-01 RX ADMIN — GUAIFENESIN 600 MG: 600 TABLET, EXTENDED RELEASE ORAL at 08:04

## 2023-01-01 RX ADMIN — SODIUM CHLORIDE 2 DROP: 20 SOLUTION OPHTHALMIC at 09:05

## 2023-01-01 RX ADMIN — POTASSIUM & SODIUM PHOSPHATES POWDER PACK 280-160-250 MG 2 PACKET: 280-160-250 PACK at 12:05

## 2023-01-01 RX ADMIN — PHENYLEPHRINE HYDROCHLORIDE 100 MCG: 10 INJECTION INTRAVENOUS at 03:05

## 2023-01-01 RX ADMIN — IOHEXOL 15 ML: 350 INJECTION, SOLUTION INTRAVENOUS at 10:05

## 2023-01-01 RX ADMIN — POTASSIUM & SODIUM PHOSPHATES POWDER PACK 280-160-250 MG 2 PACKET: 280-160-250 PACK at 08:05

## 2023-01-01 RX ADMIN — MAGNESIUM SULFATE 2 G: 2 INJECTION INTRAVENOUS at 10:05

## 2023-01-01 RX ADMIN — CHOLECALCIFEROL TAB 25 MCG (1000 UNIT) 2000 UNITS: 25 TAB at 08:04

## 2023-01-01 RX ADMIN — ACETAMINOPHEN 1000 MG: 10 INJECTION, SOLUTION INTRAVENOUS at 06:05

## 2023-01-01 RX ADMIN — ROCURONIUM BROMIDE 20 MG: 10 INJECTION INTRAVENOUS at 04:05

## 2023-01-01 RX ADMIN — ACETAMINOPHEN 1000 MG: 500 TABLET ORAL at 09:05

## 2023-01-01 RX ADMIN — Medication 1 CAPSULE: at 09:04

## 2023-01-01 RX ADMIN — TRAMADOL HYDROCHLORIDE 50 MG: 50 TABLET, COATED ORAL at 10:05

## 2023-01-01 RX ADMIN — ACETAMINOPHEN 1000 MG: 500 TABLET ORAL at 08:05

## 2023-01-01 RX ADMIN — ATORVASTATIN CALCIUM 40 MG: 40 TABLET, FILM COATED ORAL at 08:05

## 2023-01-01 RX ADMIN — CYANOCOBALAMIN TAB 1000 MCG 1000 MCG: 1000 TAB at 09:05

## 2023-01-01 RX ADMIN — METHOCARBAMOL 500 MG: 100 INJECTION, SOLUTION INTRAMUSCULAR; INTRAVENOUS at 05:05

## 2023-01-01 RX ADMIN — CETIRIZINE HYDROCHLORIDE 5 MG: 5 TABLET, FILM COATED ORAL at 08:05

## 2023-01-01 RX ADMIN — METOPROLOL SUCCINATE 50 MG: 50 TABLET, EXTENDED RELEASE ORAL at 08:04

## 2023-01-01 RX ADMIN — Medication 25 MG: at 04:05

## 2023-01-01 RX ADMIN — CYANOCOBALAMIN TAB 1000 MCG 1000 MCG: 1000 TAB at 08:04

## 2023-01-01 RX ADMIN — METOPROLOL SUCCINATE 50 MG: 50 TABLET, EXTENDED RELEASE ORAL at 09:04

## 2023-01-01 RX ADMIN — SUGAMMADEX 200 MG: 100 INJECTION, SOLUTION INTRAVENOUS at 07:05

## 2023-01-01 RX ADMIN — POTASSIUM & SODIUM PHOSPHATES POWDER PACK 280-160-250 MG 2 PACKET: 280-160-250 PACK at 02:05

## 2023-01-01 RX ADMIN — CETIRIZINE HYDROCHLORIDE 5 MG: 5 TABLET, FILM COATED ORAL at 08:04

## 2023-01-01 RX ADMIN — SODIUM CHLORIDE 2 DROP: 20 SOLUTION OPHTHALMIC at 08:05

## 2023-01-01 RX ADMIN — SODIUM CHLORIDE 1 DROP: 20 SOLUTION OPHTHALMIC at 10:05

## 2023-01-01 RX ADMIN — FAMOTIDINE 20 MG: 20 TABLET ORAL at 08:05

## 2023-01-01 RX ADMIN — THERA TABS 1 TABLET: TAB at 09:05

## 2023-01-01 RX ADMIN — METOPROLOL SUCCINATE 50 MG: 50 TABLET, EXTENDED RELEASE ORAL at 10:05

## 2023-01-01 RX ADMIN — HEPARIN SODIUM 5000 UNITS: 5000 INJECTION, SOLUTION INTRAVENOUS; SUBCUTANEOUS at 06:05

## 2023-01-01 RX ADMIN — FLUTICASONE PROPIONATE 50 MCG: 50 SPRAY, METERED NASAL at 10:05

## 2023-01-01 RX ADMIN — ACETAMINOPHEN 500 MG: 500 TABLET ORAL at 08:05

## 2023-01-01 RX ADMIN — METOCLOPRAMIDE 10 MG: 5 INJECTION, SOLUTION INTRAMUSCULAR; INTRAVENOUS at 09:05

## 2023-01-01 RX ADMIN — BISACODYL 5 MG: 5 TABLET, COATED ORAL at 07:04

## 2023-01-01 RX ADMIN — SODIUM CHLORIDE 2 DROP: 20 SOLUTION OPHTHALMIC at 08:04

## 2023-01-01 RX ADMIN — POTASSIUM PHOSPHATE, MONOBASIC AND POTASSIUM PHOSPHATE, DIBASIC 30 MMOL: 224; 236 INJECTION, SOLUTION, CONCENTRATE INTRAVENOUS at 10:04

## 2023-01-01 RX ADMIN — CHOLECALCIFEROL TAB 25 MCG (1000 UNIT) 2000 UNITS: 25 TAB at 10:05

## 2023-01-01 RX ADMIN — CHOLECALCIFEROL TAB 25 MCG (1000 UNIT) 2000 UNITS: 25 TAB at 09:05

## 2023-01-01 RX ADMIN — SODIUM PHOSPHATE, MONOBASIC, MONOHYDRATE AND SODIUM PHOSPHATE, DIBASIC, ANHYDROUS 30 MMOL: 142; 276 INJECTION, SOLUTION INTRAVENOUS at 12:05

## 2023-01-01 RX ADMIN — TAMSULOSIN HYDROCHLORIDE 0.4 MG: 0.4 CAPSULE ORAL at 08:05

## 2023-01-01 RX ADMIN — POTASSIUM & SODIUM PHOSPHATES POWDER PACK 280-160-250 MG 2 PACKET: 280-160-250 PACK at 09:05

## 2023-01-01 RX ADMIN — ATORVASTATIN CALCIUM 40 MG: 40 TABLET, FILM COATED ORAL at 10:05

## 2023-01-01 RX ADMIN — TRAMADOL HYDROCHLORIDE 50 MG: 50 TABLET, COATED ORAL at 06:05

## 2023-01-01 RX ADMIN — ACETAMINOPHEN 1000 MG: 10 INJECTION INTRAVENOUS at 02:05

## 2023-01-01 RX ADMIN — ATORVASTATIN CALCIUM 40 MG: 40 TABLET, FILM COATED ORAL at 09:04

## 2023-01-01 RX ADMIN — ENOXAPARIN SODIUM 40 MG: 40 INJECTION SUBCUTANEOUS at 09:05

## 2023-01-01 RX ADMIN — METOPROLOL SUCCINATE 50 MG: 50 TABLET, EXTENDED RELEASE ORAL at 07:04

## 2023-01-01 RX ADMIN — Medication 1 CAPSULE: at 10:04

## 2023-01-01 RX ADMIN — ACETAMINOPHEN 1000 MG: 10 INJECTION INTRAVENOUS at 12:05

## 2023-01-01 RX ADMIN — ACETAMINOPHEN 500 MG: 500 TABLET ORAL at 09:04

## 2023-01-01 RX ADMIN — FAMOTIDINE 20 MG: 20 TABLET ORAL at 10:05

## 2023-01-01 RX ADMIN — NIFEDIPINE 30 MG: 30 TABLET, FILM COATED, EXTENDED RELEASE ORAL at 07:04

## 2023-01-01 RX ADMIN — SODIUM CHLORIDE 2 DROP: 20 SOLUTION OPHTHALMIC at 10:04

## 2023-01-01 RX ADMIN — TRAMADOL HYDROCHLORIDE 100 MG: 50 TABLET, COATED ORAL at 05:05

## 2023-01-01 RX ADMIN — IOHEXOL 75 ML: 350 INJECTION, SOLUTION INTRAVENOUS at 11:08

## 2023-01-01 RX ADMIN — GUAIFENESIN 1200 MG: 600 TABLET, EXTENDED RELEASE ORAL at 09:05

## 2023-01-01 RX ADMIN — ONDANSETRON 4 MG: 2 INJECTION INTRAMUSCULAR; INTRAVENOUS at 06:05

## 2023-01-01 RX ADMIN — METHOCARBAMOL 500 MG: 100 INJECTION, SOLUTION INTRAMUSCULAR; INTRAVENOUS at 06:05

## 2023-01-01 RX ADMIN — SODIUM CHLORIDE 2 DROP: 20 SOLUTION OPHTHALMIC at 09:04

## 2023-01-01 RX ADMIN — LEVALBUTEROL HYDROCHLORIDE 0.63 MG: 0.63 SOLUTION RESPIRATORY (INHALATION) at 07:05

## 2023-01-01 RX ADMIN — ACETAMINOPHEN 1000 MG: 500 TABLET ORAL at 02:05

## 2023-01-01 RX ADMIN — Medication 2000 MG: at 10:05

## 2023-01-01 RX ADMIN — Medication 800 MG: at 12:04

## 2023-01-01 RX ADMIN — POTASSIUM & SODIUM PHOSPHATES POWDER PACK 280-160-250 MG 2 PACKET: 280-160-250 PACK at 11:05

## 2023-01-01 RX ADMIN — Medication 2000 MG: at 07:04

## 2023-01-01 RX ADMIN — BISACODYL 5 MG: 5 TABLET, COATED ORAL at 08:04

## 2023-01-01 RX ADMIN — TAMSULOSIN HYDROCHLORIDE 0.4 MG: 0.4 CAPSULE ORAL at 09:04

## 2023-01-01 RX ADMIN — Medication: at 08:05

## 2023-01-01 RX ADMIN — DEXTROSE, SODIUM CHLORIDE, AND POTASSIUM CHLORIDE: 5; .45; .15 INJECTION INTRAVENOUS at 05:05

## 2023-01-01 RX ADMIN — Medication 5 MG: at 05:05

## 2023-01-01 RX ADMIN — POLYETHYLENE GLYCOL 3350 17 G: 17 POWDER, FOR SOLUTION ORAL at 11:05

## 2023-01-01 RX ADMIN — ATORVASTATIN CALCIUM 40 MG: 40 TABLET, FILM COATED ORAL at 08:04

## 2023-01-01 RX ADMIN — IOHEXOL 1000 ML: 9 SOLUTION ORAL at 11:08

## 2023-01-01 RX ADMIN — METHOCARBAMOL 500 MG: 100 INJECTION, SOLUTION INTRAMUSCULAR; INTRAVENOUS at 12:05

## 2023-01-01 RX ADMIN — DIATRIZOATE MEGLUMINE AND DIATRIZOATE SODIUM 50 ML: 660; 100 LIQUID ORAL; RECTAL at 09:05

## 2023-01-01 RX ADMIN — SIMETHICONE 80 MG: 80 TABLET, CHEWABLE ORAL at 11:05

## 2023-01-01 RX ADMIN — TAMSULOSIN HYDROCHLORIDE 0.4 MG: 0.4 CAPSULE ORAL at 09:05

## 2023-01-01 RX ADMIN — LEVALBUTEROL HYDROCHLORIDE 0.63 MG: 0.63 SOLUTION RESPIRATORY (INHALATION) at 02:05

## 2023-01-01 RX ADMIN — POTASSIUM & SODIUM PHOSPHATES POWDER PACK 280-160-250 MG 2 PACKET: 280-160-250 PACK at 10:05

## 2023-01-01 RX ADMIN — FLUTICASONE PROPIONATE 50 MCG: 50 SPRAY, METERED NASAL at 07:04

## 2023-01-01 RX ADMIN — LEVALBUTEROL HYDROCHLORIDE 0.63 MG: 0.63 SOLUTION RESPIRATORY (INHALATION) at 01:05

## 2023-01-01 RX ADMIN — METOPROLOL SUCCINATE 50 MG: 50 TABLET, EXTENDED RELEASE ORAL at 09:05

## 2023-01-01 RX ADMIN — Medication 1 CAPSULE: at 12:04

## 2023-01-01 RX ADMIN — DEXTROSE, SODIUM CHLORIDE, AND POTASSIUM CHLORIDE: 5; .45; .15 INJECTION INTRAVENOUS at 01:05

## 2023-01-01 RX ADMIN — ENOXAPARIN SODIUM 40 MG: 40 INJECTION SUBCUTANEOUS at 10:05

## 2023-01-01 RX ADMIN — SODIUM CHLORIDE 500 ML: 9 INJECTION, SOLUTION INTRAVENOUS at 12:04

## 2023-01-01 RX ADMIN — IOHEXOL 100 ML: 350 INJECTION, SOLUTION INTRAVENOUS at 12:05

## 2023-01-01 RX ADMIN — LEVALBUTEROL HYDROCHLORIDE 0.63 MG: 0.63 SOLUTION RESPIRATORY (INHALATION) at 09:05

## 2023-01-01 RX ADMIN — TADALAFIL 5 MG: 5 TABLET ORAL at 10:05

## 2023-01-01 RX ADMIN — FAMOTIDINE 20 MG: 20 TABLET ORAL at 09:04

## 2023-01-01 RX ADMIN — METOCLOPRAMIDE 10 MG: 5 TABLET ORAL at 05:05

## 2023-01-01 RX ADMIN — CEFTRIAXONE SODIUM 1 G: 1 INJECTION, SOLUTION INTRAVENOUS at 03:05

## 2023-01-01 RX ADMIN — Medication 10 MG: at 06:05

## 2023-01-01 RX ADMIN — NIFEDIPINE 30 MG: 30 TABLET, FILM COATED, EXTENDED RELEASE ORAL at 10:05

## 2023-01-01 RX ADMIN — THERA TABS 1 TABLET: TAB at 10:05

## 2023-01-01 RX ADMIN — METOCLOPRAMIDE 10 MG: 5 TABLET ORAL at 11:05

## 2023-01-01 RX ADMIN — TRAMADOL HYDROCHLORIDE 100 MG: 50 TABLET, COATED ORAL at 11:05

## 2023-01-01 RX ADMIN — METHOCARBAMOL 500 MG: 100 INJECTION, SOLUTION INTRAMUSCULAR; INTRAVENOUS at 01:05

## 2023-01-01 RX ADMIN — NIFEDIPINE 30 MG: 30 TABLET, FILM COATED, EXTENDED RELEASE ORAL at 09:04

## 2023-01-01 RX ADMIN — POLYETHYLENE GLYCOL 3350 17 G: 17 POWDER, FOR SOLUTION ORAL at 09:04

## 2023-01-01 RX ADMIN — CETIRIZINE HYDROCHLORIDE 5 MG: 5 TABLET, FILM COATED ORAL at 10:05

## 2023-01-01 RX ADMIN — GUAIFENESIN 600 MG: 600 TABLET, EXTENDED RELEASE ORAL at 10:04

## 2023-01-01 RX ADMIN — IOHEXOL 15 ML: 350 INJECTION, SOLUTION INTRAVENOUS at 09:05

## 2023-01-01 RX ADMIN — MAGNESIUM SULFATE 2 G: 2 INJECTION INTRAVENOUS at 02:05

## 2023-01-01 RX ADMIN — SODIUM CHLORIDE 2 DROP: 20 SOLUTION OPHTHALMIC at 12:04

## 2023-01-01 RX ADMIN — Medication 1 CAPSULE: at 08:04

## 2023-01-01 RX ADMIN — SODIUM CHLORIDE, POTASSIUM CHLORIDE, SODIUM LACTATE AND CALCIUM CHLORIDE: 600; 310; 30; 20 INJECTION, SOLUTION INTRAVENOUS at 09:05

## 2023-01-01 RX ADMIN — TAMSULOSIN HYDROCHLORIDE 0.4 MG: 0.4 CAPSULE ORAL at 08:04

## 2023-01-01 RX ADMIN — PROPOFOL 140 MG: 10 INJECTION, EMULSION INTRAVENOUS at 03:05

## 2023-01-01 RX ADMIN — METHOCARBAMOL 500 MG: 100 INJECTION, SOLUTION INTRAMUSCULAR; INTRAVENOUS at 03:05

## 2023-01-01 RX ADMIN — BISACODYL 5 MG: 5 TABLET, COATED ORAL at 10:05

## 2023-01-01 RX ADMIN — GUAIFENESIN 600 MG: 600 TABLET, EXTENDED RELEASE ORAL at 09:04

## 2023-01-01 RX ADMIN — POTASSIUM & SODIUM PHOSPHATES POWDER PACK 280-160-250 MG 2 PACKET: 280-160-250 PACK at 08:04

## 2023-01-01 RX ADMIN — PHENYLEPHRINE HYDROCHLORIDE 100 MCG: 10 INJECTION INTRAVENOUS at 06:05

## 2023-01-01 RX ADMIN — SODIUM CHLORIDE, SODIUM GLUCONATE, SODIUM ACETATE, POTASSIUM CHLORIDE, MAGNESIUM CHLORIDE, SODIUM PHOSPHATE, DIBASIC, AND POTASSIUM PHOSPHATE: .53; .5; .37; .037; .03; .012; .00082 INJECTION, SOLUTION INTRAVENOUS at 03:05

## 2023-01-01 RX ADMIN — METRONIDAZOLE 500 MG: 500 INJECTION, SOLUTION INTRAVENOUS at 03:05

## 2023-01-01 RX ADMIN — ROCURONIUM BROMIDE 50 MG: 10 INJECTION INTRAVENOUS at 03:05

## 2023-01-01 RX ADMIN — POTASSIUM & SODIUM PHOSPHATES POWDER PACK 280-160-250 MG 2 PACKET: 280-160-250 PACK at 04:04

## 2023-01-01 RX ADMIN — TRAMADOL HYDROCHLORIDE 50 MG: 50 TABLET, COATED ORAL at 12:05

## 2023-01-01 RX ADMIN — FAMOTIDINE 20 MG: 20 TABLET ORAL at 07:04

## 2023-01-01 RX ADMIN — FENTANYL CITRATE 100 MCG: 50 INJECTION, SOLUTION INTRAMUSCULAR; INTRAVENOUS at 03:05

## 2023-01-01 RX ADMIN — Medication 10 MG: at 07:05

## 2023-01-01 RX ADMIN — POTASSIUM & SODIUM PHOSPHATES POWDER PACK 280-160-250 MG 2 PACKET: 280-160-250 PACK at 12:04

## 2023-01-01 RX ADMIN — METOCLOPRAMIDE 10 MG: 5 INJECTION, SOLUTION INTRAMUSCULAR; INTRAVENOUS at 01:05

## 2023-01-01 RX ADMIN — Medication 800 MG: at 11:05

## 2023-01-01 RX ADMIN — CETIRIZINE HYDROCHLORIDE 5 MG: 5 TABLET, FILM COATED ORAL at 07:04

## 2023-01-01 RX ADMIN — CYANOCOBALAMIN TAB 1000 MCG 1000 MCG: 1000 TAB at 10:05

## 2023-01-01 RX ADMIN — SUCRALFATE 1 G: 1 SUSPENSION ORAL at 09:04

## 2023-01-01 RX ADMIN — ROCURONIUM BROMIDE 30 MG: 10 INJECTION INTRAVENOUS at 05:05

## 2023-01-01 RX ADMIN — Medication 2000 MG: at 09:04

## 2023-01-01 RX ADMIN — TAMSULOSIN HYDROCHLORIDE 0.4 MG: 0.4 CAPSULE ORAL at 04:04

## 2023-01-13 NOTE — PROGRESS NOTES
The patient location is: home  The chief complaint leading to consultation is: follow up for GIST    Visit type: audiovisual    Face to Face time with patient: 15 min  30 minutes of total time spent on the encounter, which includes face to face time and non-face to face time preparing to see the patient (eg, review of tests), Obtaining and/or reviewing separately obtained history, Documenting clinical information in the electronic or other health record, Independently interpreting results (not separately reported) and communicating results to the patient/family/caregiver, or Care coordination (not separately reported).         Each patient to whom he or she provides medical services by telemedicine is:  (1) informed of the relationship between the physician and patient and the respective role of any other health care provider with respect to management of the patient; and (2) notified that he or she may decline to receive medical services by telemedicine and may withdraw from such care at any time.    Notes:          Patient ID: Forrest Schmidt is a 77 y.o. male.     Chief Complaint:  Follow up for GIST, wild-type     DIAGNOSIS:   1. 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 Recurrent oligametastatic GIST in the liver found on 5/20/19. S/p ablation on 7/23/19 at [facility]  3. 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 He initially presented with melena, syncope, hypotension on 2/5/18 at OSH. Colonoscopy showed old blood in the terminal ileum. CT abdomen/pelvis showed an ileal mass that measures approximately 5 cm. He was transferred to [facility] and underwent segmental small bowel resection by Dr. Sanchez on 2/15/18. Pathology showed a  "6-cm GIST, histologic type: GIST, mixed; mitotic rate 9 mitoses/5 mm2, G2, high grade, high risk, margins negative. Cd117+, pathologic T3NxMx.  He had incisional wound infection after the surgery and took antibiotics for that.   2. Started Gleevec on 4/11/18. Foundation One results came back on 4/24/18 neg for KIT mutations. Long discussion with Mr and Mrs Schmidt on 4/25 regarding likely the lack of benefit from adjuvant Gleevec (please see note from that day for details). Mr. Schmidt would like to continue with Gleevec for now. He stopped Gleevec after he went to see Dr. Guaman at [facility]. CT abdomen/pelvis on 11/12/18 was negative for recurrent disease.   3. CT scan on 5/20/19 showed a Nonspecific hepatic hypodensity at the dome of the liver near the inferior vena cava   4. S/p liver lesion biopsy and ablation at [facility] on 7/23/19. Biopsy showed metastatic GIST positive for DOG-1 and . The amount of tumor was insufficient for molecular testing.   5. Surveillance MRI on 2/11/2020 showed "a right external iliac metastatic deposit measuring 4.6 x 3.5 cm (axial stir series 9, image 20), previously measuring 1.3 cm.  There is a metastatic deposit within the right anterior pelvis measuring 1.9 cm (series 9, image 13), previously not seen."  6. Gleevec 400 mg daily from 3/14/20 to present. CT at [facility] on 6/1/20 showed progressive disease in the peritoneal masses.   7. Underwent cytoreductive surgery at [facility] on 7/10/2020.   8. Dr Guaman recommends starting regorafenib after surgery. However, insurance does not cover it unless patient fails sutent. Patient started sutent 37.5 mg daily on 8/10/2020.   9. CT scan 10/5/20: A metastatic lesion in segment VII of the liver adjacent to the right hepatic vein and inferior vena cava remains overall stable, measuring approximately 2.1 cm (series 6 image 166). There is another lesion in segment V measuring approximately 1.1 cm (series 6 image 188), suspect for metastatic " "disease and not well seen on the prior study.  10. CT scan 12/7/2020: "Mild enlargement of liver metastases. A new (nonspecific) subcentimeter hypodensity in segment 6 can be followed on future exams. Mild areas of pneumatosis affecting the ileum in the lower abdomen. Recommend clinical correlation with abdominal pain and drug regimen." Patient underwent IR ablation to segment 5 lesion and re-treatment of segment 7 lesion. Sutent was continued           11. CT scan 9/30/21: No definitive CT evidence of new or increasing metastatic disease in the chest, abdomen or pelvis.  Decrease in size of segment 5 and 7 ablation zones/cavities. Stable subcentimeter pulmonary nodules/nodular densities.  12. CT CAP at [facility] 1/12/22: New small liver lesions are suspicious for metastases. An enlarging 4 mm left upper lobe pulmonary nodule may be infectious/inflammatory or metastatic. Treatment was changed to regorafenib. Patient started on 1/29/2022   13. Progression noted on CT scan 3/23/22: "Further increased size of multiple small hypoenhancing liver lesions, in keeping with metastases. Liver protocol CT at follow-up may be helpful for improved visualization of small liver metastases. Enlarging peritoneal nodules, in keeping with carcinomatosis. Enlarging left upper lobe nodule, concerning for metastasis."  14. Patient started temodar 85 mg/m2 daily x 21 days every 28-day cycle on 4/6/2022. Cycle 2 started on 5/5/22.   15. CT C/A/P 5/31/22 showed "Enlarging peritoneal and hepatic metastases compared to 03/23/2022. Enlarging left upper lobe pulmonary nodules suspicious for metastatic disease"  16. Treatment switched to pazopanib. Patient started on 6/10/22. Started with escalating dose. Reached 800 mg daily on 6/24/22. Held for 1 week before and 3 weeks after Y90 on 8/17/22. Resumed at 600 mg on 9/8/22. Had significant fatigue when he was on 800 mg daily. Stopped votrient on 11/10/22 for [facility] trial. Had progression on votrient  17. " Started rogaratinib on trial at [facility] on 2022. On 800 mg bid    History of Present Illness:   Mr. Schmidt returns today for continued follow up. Feeling well. Started rogaratinib on 2022. On 800 mg bid. Phosphorus was 4.5 two weeks ago. He was started on a liquid phosphorus binding. Sodium was low last week.     ECO     ROS:   See HPI, otherwise negative.      Physical Examination:   Gen: well hydrated, well developed. In no acute distress  HEENT: normocephalic, anicteric sclerae, EOMI  Psych: pleasant and appropriate mood and affect  Neuro: alert and oriented x 4     Objective:      Laboratory Data:  Labs reviewed. Cr 1.3. Ca 10.7. phosphorus 6.8     Imaging Data:  CT C/A/P 8/15/22:  1. Slightly enlarging hepatic metastasis compared to CT of 2022.     2. Stable and enlarging peritoneal metastasis.     3. Stable pulmonary nodules.    CT C/A/P 10/24/2022:  1. Subcentimeter pulmonary nodules, increased from previously, concerning for metastases.     2. Enlarging pelvic masses invading the anterior rectum and inseparable from the sigmoid colon.     3. Interval radio embolization of the right liver, with stable to slightly decreased metastases in segments 5 and 6.     4. Slightly increased metastases in segments 8 and 4, noting suboptimal evaluation due to grainy images of the liver.    Assessment and Plan:      1. 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 Metastasis to liver    3. 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 Malignant neoplasm metastatic to lung, unspecified laterality    5. Immunodeficiency secondary to neoplasm    6. Immunodeficiency secondary to chemotherapy    7. Stage 3b chronic kidney disease    8. Primary hypertension    9. Coronary artery disease involving native coronary artery of native heart without angina pectoris    10. Hyperphosphatemia      1-6  - Mr. Schmidt is a 76 yo man with stage IIIB GIST of the small intestine  (T3N0Mx), 6 cm, mitotic rate 9 mitoses/5  mm2, s/p resection on 2/15/18. His GIST is negative for KIT, PDGFRA, SDH, BRAF, NF1, NF2 mutations. He had oligometastatic disease to the liver found on CT scan 5/20/19. He went to [facility] and underwent IR liver lesion biopsy and ablation on 7/23/19. Pathology showed metastatic GIST positive for DOG-1 and .   - surveillance MRI in Feb 2020 showed progressive disease with new peritoneal mets. Patient was seen by Dr Guaman. Gleevec was recommended.   - CT after 2.5 month of Gleevec showed progressive peritoneal disease.   - underwent cytoreductive surgery at [facility] on 7/10/20  - started sutent 37.5 mg daily on 8/10/20.   - s/p ablation to segment 5 lesion and re-treatment of segment 7 lesion in Dec 2020.   - CT scan 1/12/22 showed progression. Treatment was switched to regorafenib. Started on 1/29/22  - CT 3/23/22 showed progression in liver metastases, peritoneal mets and left lung nodule  - started temodar 85 mg/m2 3 weeks on, 1 week off on 4/6/22. CT on 5/31/22 showed progression  - started pazopanib on 6/10/22 with an escalating dose, reached 800 mg daily on 6/24/22  - Held for 1 week before and 3 weeks after Y90 on 8/17/22. Resumed at 600 mg on 9/8/22. Had significant fatigue when he was on 800 mg daily. Stopped votrient on 11/10/22 for [facility] trial. Had progression on votrient  - Started rogaratinib on trial at [facility] on 11/30/2022. On 800 mg bid  - c/w rogaratinib on trial  - asked patient to contact study team re phosphorus and calcium. Patient understands and agrees with the plan.   - He is scheduled for restaging scan at [facility] in 2 weeks. Will have virtual visit in 3 weeks. If progression, may try ripretinib before immunotherapy. Will discuss with Dr Guaman.     7.  - [facility] is scheduling him to see nephrology, presumably for electrolyte abnormalities. Patient wants to see one with [facility] as well. Referral placed    8.  - home log reviewed. BP good at home  - c/w current meds    9.  - asymptomatic. F/u  with cardiology                     10.                   - 2/2 rogaratinib                   - patient will contact study team re increasing dose of phosphorus binder    RTC in 3 weeks  Encouraged to call should issues arise      Route Chart for Scheduling    Med Onc Chart Routing      Follow up with physician 3 weeks. please schedule patient to see nephrology (Jaxon burger, if not available then main campus). virtual visit with me in 3 weeks with CBC, CMP, phosphorus done at local lab 1 day prior   Follow up with [facility]    Infusion scheduling note    Injection scheduling note    Labs CBC, CMP and phosphorus   Lab interval:     Imaging    Pharmacy appointment    Other referrals Additional referrals needed

## 2023-02-17 NOTE — PROGRESS NOTES
"                      Patient ID: Forrest Schmidt is a 77 y.o. male.     Chief Complaint:  Follow up for GIST, wild-type     DIAGNOSIS:   1. 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 Recurrent oligametastatic GIST in the liver found on 5/20/19. S/p ablation on 7/23/19 at [facility]  3. 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 He initially presented with melena, syncope, hypotension on 2/5/18 at OSH. Colonoscopy showed old blood in the terminal ileum. CT abdomen/pelvis showed an ileal mass that measures approximately 5 cm. He was transferred to [facility] and underwent segmental small bowel resection by Dr. Sanchez on 2/15/18. Pathology showed a 6-cm GIST, histologic type: GIST, mixed; mitotic rate 9 mitoses/5 mm2, G2, high grade, high risk, margins negative. Cd117+, pathologic T3NxMx.  He had incisional wound infection after the surgery and took antibiotics for that.   2. Started Gleevec on 4/11/18. Foundation One results came back on 4/24/18 neg for KIT mutations. Long discussion with Mr and Mrs Schmidt on 4/25 regarding likely the lack of benefit from adjuvant Gleevec (please see note from that day for details). Mr. Schmidt would like to continue with Gleevec for now. He stopped Gleevec after he went to see Dr. Guaman at [facility]. CT abdomen/pelvis on 11/12/18 was negative for recurrent disease.   3. CT scan on 5/20/19 showed a Nonspecific hepatic hypodensity at the dome of the liver near the inferior vena cava   4. S/p liver lesion biopsy and ablation at [facility] on 7/23/19. Biopsy showed metastatic GIST positive for DOG-1 and . The amount of tumor was insufficient for molecular testing.   5. Surveillance MRI on 2/11/2020 showed "a right " "external iliac metastatic deposit measuring 4.6 x 3.5 cm (axial stir series 9, image 20), previously measuring 1.3 cm.  There is a metastatic deposit within the right anterior pelvis measuring 1.9 cm (series 9, image 13), previously not seen."  6. Gleevec 400 mg daily from 3/14/20 to present. CT at [facility] on 6/1/20 showed progressive disease in the peritoneal masses.   7. Underwent cytoreductive surgery at [facility] on 7/10/2020.   8. Dr Guaman recommends starting regorafenib after surgery. However, insurance does not cover it unless patient fails sutent. Patient started sutent 37.5 mg daily on 8/10/2020.   9. CT scan 10/5/20: A metastatic lesion in segment VII of the liver adjacent to the right hepatic vein and inferior vena cava remains overall stable, measuring approximately 2.1 cm (series 6 image 166). There is another lesion in segment V measuring approximately 1.1 cm (series 6 image 188), suspect for metastatic disease and not well seen on the prior study.  10. CT scan 12/7/2020: "Mild enlargement of liver metastases. A new (nonspecific) subcentimeter hypodensity in segment 6 can be followed on future exams. Mild areas of pneumatosis affecting the ileum in the lower abdomen. Recommend clinical correlation with abdominal pain and drug regimen." Patient underwent IR ablation to segment 5 lesion and re-treatment of segment 7 lesion. Sutent was continued           11. CT scan 9/30/21: No definitive CT evidence of new or increasing metastatic disease in the chest, abdomen or pelvis.  Decrease in size of segment 5 and 7 ablation zones/cavities. Stable subcentimeter pulmonary nodules/nodular densities.  12. CT CAP at [facility] 1/12/22: New small liver lesions are suspicious for metastases. An enlarging 4 mm left upper lobe pulmonary nodule may be infectious/inflammatory or metastatic. Treatment was changed to regorafenib. Patient started on 1/29/2022   13. Progression noted on CT scan 3/23/22: "Further increased size of multiple " "small hypoenhancing liver lesions, in keeping with metastases. Liver protocol CT at follow-up may be helpful for improved visualization of small liver metastases. Enlarging peritoneal nodules, in keeping with carcinomatosis. Enlarging left upper lobe nodule, concerning for metastasis."  14. Patient started temodar 85 mg/m2 daily x 21 days every 28-day cycle on 2022. Cycle 2 started on 22.   15. CT C/A/P 22 showed "Enlarging peritoneal and hepatic metastases compared to 2022. Enlarging left upper lobe pulmonary nodules suspicious for metastatic disease"  16. Treatment switched to pazopanib. Patient started on 6/10/22. Started with escalating dose. Reached 800 mg daily on 22. Held for 1 week before and 3 weeks after Y90 on 22. Resumed at 600 mg on 22. Had significant fatigue when he was on 800 mg daily. Stopped votrient on 11/10/22 for [facility] trial. Had progression on votrient  17. Started rogaratinib on trial at [facility] on 2022. On 800 mg bid    History of Present Illness:   Mr. Schmidt returns today for continued follow up. Started rogaratinib on 2022. Chlorthalidone was stopped due to low sodium. Toprol XL dropped from 100 mg to 50 mg daily by [facility] team. CT 2023 showed  posttreatment changes of the liver. Multiple hypodense lesions similar to prior CT. Pelvic pericolonic lesions slightly smaller from 5.3 x 4.7 cm to 3.3 x 3.1 cm. He has developed several side effects from rogaratinib including bad hand-foot syndrome, vision changes, hyperphosphotemia, hyponatremia. Seen by local opthal. Eye exam was normal. OCT was done. Opthal asked him to see a retinal specialist. Going to [facility] next Tuesday. Regoratinib was stopped last week for vision changes.     ECO     ROS:   See HPI, otherwise negative.      Physical Examination:   Gen: well hydrated, well developed. In no acute distress  HEENT: normocephalic, anicteric sclerae, EOMI. + erythema of skin over the forehead and " bilateral cheeks  Neck: supple, no cervical LAD, no JVD or thyromegaly,   Lungs: CTAB, no wheezing/rales/rhonchi  Heart: RRR, no M/R/G  Abdomen: soft, no tenderness, nondistended, no hepatosplenomegaly, BS present  Ext: no clubbing, cyanosis, edema. +loosening of nail texture and erythematous change of nail color. Significant peeling and dry skin of both hands.   Skin: see above HEENT and Ext exam  Psych: pleasant and appropriate mood and affect  Neuro: alert and oriented x 4, no focal neuro deficit     Objective:      Laboratory Data:  Labs reviewed. Cr 1.6. phos 7.5     Imaging Data:  CT C/A/P 8/15/22:  1. Slightly enlarging hepatic metastasis compared to CT of 5/31/2022.     2. Stable and enlarging peritoneal metastasis.     3. Stable pulmonary nodules.    CT C/A/P 10/24/2022:  1. Subcentimeter pulmonary nodules, increased from previously, concerning for metastases.     2. Enlarging pelvic masses invading the anterior rectum and inseparable from the sigmoid colon.     3. Interval radio embolization of the right liver, with stable to slightly decreased metastases in segments 5 and 6.     4. Slightly increased metastases in segments 8 and 4, noting suboptimal evaluation due to grainy images of the liver.    CT 1/24/2023 showed  posttreatment changes of the liver. Multiple hypodense lesions similar to prior CT. Pelvic pericolonic lesions slightly smaller from 5.3 x 4.7 cm to 3.3 x 3.1 cm.    Assessment and Plan:      1. 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 Metastasis to liver    4. Immunodeficiency secondary to neoplasm    5. Immunodeficiency secondary to chemotherapy    6. Hemorrhoids, unspecified hemorrhoid type    7. Hand foot syndrome    8. Constipation, unspecified constipation type    9. Hyperphosphatemia    10. RENÉ (acute kidney injury)    11. Stage 3b chronic kidney disease    12. Essential hypertension        1-5  - Mr. Schmidt is a 78 yo man  with stage IIIB GIST of the small intestine  (T3N0Mx), 6 cm, mitotic rate 9 mitoses/5 mm2, s/p resection on 2/15/18. His GIST is negative for KIT, PDGFRA, SDH, BRAF, NF1, NF2 mutations. He had oligometastatic disease to the liver found on CT scan 5/20/19. He went to [facility] and underwent IR liver lesion biopsy and ablation on 7/23/19. Pathology showed metastatic GIST positive for DOG-1 and .   - surveillance MRI in Feb 2020 showed progressive disease with new peritoneal mets. Patient was seen by Dr Guaman. Gleevec was recommended.   - CT after 2.5 month of Gleevec showed progressive peritoneal disease.   - underwent cytoreductive surgery at [facility] on 7/10/20  - started sutent 37.5 mg daily on 8/10/20.   - s/p ablation to segment 5 lesion and re-treatment of segment 7 lesion in Dec 2020.   - CT scan 1/12/22 showed progression. Treatment was switched to regorafenib. Started on 1/29/22  - CT 3/23/22 showed progression in liver metastases, peritoneal mets and left lung nodule  - started temodar 85 mg/m2 3 weeks on, 1 week off on 4/6/22. CT on 5/31/22 showed progression  - started pazopanib on 6/10/22 with an escalating dose, reached 800 mg daily on 6/24/22  - Held for 1 week before and 3 weeks after Y90 on 8/17/22. Resumed at 600 mg on 9/8/22. Had significant fatigue when he was on 800 mg daily. Stopped votrient on 11/10/22 for [facility] trial. Had progression on votrient  - Started rogaratinib on trial at [facility] on 11/30/2022. On 800 mg bid  - CT at [facility] on 1/24/23 showed response. Rogaratinib on hold due to side effects  - f/u at [facility]  - virtual with me in one month with local labs  - will upload [facility] records including disc into our system    6.  - anusol cream prescribed    7.  - using several cream. Continue.     8.  - discussed miralax prn    9.  - 2/2 study pills. Has been stopped.   - f/u at [facility]    10.11.  - reviewed lab results. Discussed increasing fluid intake    12.  - BP controlled  - c/w current  medication      RTC in one month  Encouraged to call should issues arise    I spent 55 minutes reviewing the chart, interpreting laboratory result and imaging data, coordinating patient's care, with at least 50% of the time on face-to-face counseling.     Route Chart for Scheduling    Med Onc Chart Routing      Follow up with physician 1 month. virtual visit with me with CBC, CMP, TSH, phos checked at local lab 1 day prior   Follow up with [facility]    Infusion scheduling note    Injection scheduling note    Labs CBC, CMP, TSH and phosphorus   Lab interval:     Imaging    Pharmacy appointment    Other referrals

## 2023-03-23 NOTE — TELEPHONE ENCOUNTER
Care Companion Intervention    Reason for intervention: Questionnaire response  Comment:  Patient has continued hand-foot syndrome from chemotherapy medication being given from clinical trial with [facility]. He was seen at the [facility] this past week and advised to increase his Gabapentin.     Intervention: Education provided to patient  Comment:  Will continue to monitor. Pt advised to increase the dose of his Gabapentin per approval from the clinical trial team. If Gabapentin does not help, can consider trying Lyrica if approved by [facility].         ALFRED Diallo, PA-C  Physician Assistant Certified  Dept of Hematology/Oncology  PA-C to Dr. Umana, Dr. Patino and Dr. Donovan

## 2023-03-27 NOTE — PROGRESS NOTES
Subjective:    Patient ID:  Forrest Schmidt is a 77 y.o. male who presents for follow-up of Follow-up      HPI77 yo WM with GIST being treated by oncology at [facility]. Also hx of PTCA in 2004, HTN and HLD. Cardiac status stable. Denies CP or SOB. Gets occasional ankle edema    Review of Systems   Constitutional: Negative for decreased appetite, fever, malaise/fatigue, weight gain and weight loss.   HENT:  Negative for hearing loss and nosebleeds.    Eyes:  Negative for visual disturbance.   Cardiovascular:  Positive for leg swelling. Negative for chest pain, claudication, cyanosis, dyspnea on exertion, irregular heartbeat, near-syncope, orthopnea, palpitations, paroxysmal nocturnal dyspnea and syncope.   Respiratory:  Negative for cough, hemoptysis, shortness of breath, sleep disturbances due to breathing, snoring and wheezing.    Endocrine: Negative for cold intolerance, heat intolerance, polydipsia and polyuria.   Hematologic/Lymphatic: Negative for adenopathy and bleeding problem. Does not bruise/bleed easily.   Skin:  Positive for nail changes. Negative for color change, itching, poor wound healing, rash and suspicious lesions.   Musculoskeletal:  Negative for arthritis, back pain, falls, joint pain, joint swelling, muscle cramps, muscle weakness and myalgias.   Gastrointestinal:  Negative for bloating, abdominal pain, change in bowel habit, constipation, flatus, heartburn, hematemesis, hematochezia, hemorrhoids, jaundice, melena, nausea and vomiting.   Genitourinary:  Negative for bladder incontinence, decreased libido, frequency, hematuria, hesitancy and urgency.   Neurological:  Negative for brief paralysis, difficulty with concentration, excessive daytime sleepiness, dizziness, focal weakness, headaches, light-headedness, loss of balance, numbness, vertigo and weakness.   Psychiatric/Behavioral:  Negative for altered mental status, depression and memory loss. The patient does not have insomnia and is not  "nervous/anxious.    Allergic/Immunologic: Negative for environmental allergies, hives and persistent infections.      Objective:    Physical Exam  Constitutional:       General: He is not in acute distress.     Appearance: He is well-developed. He is not diaphoretic.      Comments: /60   Pulse 68   Ht 5' 11" (1.803 m)   Wt 83.4 kg (183 lb 12.8 oz)   SpO2 98%   BMI 25.63 kg/m²      HENT:      Head: Normocephalic and atraumatic.   Eyes:      General: Lids are normal. No scleral icterus.        Right eye: No discharge.      Conjunctiva/sclera: Conjunctivae normal.      Pupils: Pupils are equal, round, and reactive to light.   Neck:      Thyroid: No thyromegaly.      Vascular: No JVD.      Trachea: No tracheal deviation.   Cardiovascular:      Rate and Rhythm: Normal rate and regular rhythm.      Pulses: Intact distal pulses.           Carotid pulses are 2+ on the right side and 2+ on the left side.       Radial pulses are 2+ on the right side and 2+ on the left side.        Femoral pulses are 2+ on the right side and 2+ on the left side.       Popliteal pulses are 2+ on the right side and 2+ on the left side.        Dorsalis pedis pulses are 2+ on the right side and 2+ on the left side.        Posterior tibial pulses are 2+ on the right side and 2+ on the left side.      Heart sounds: Normal heart sounds, S1 normal and S2 normal. No murmur heard.    No friction rub. No gallop.   Pulmonary:      Effort: Pulmonary effort is normal. No respiratory distress.      Breath sounds: Normal breath sounds. No wheezing or rales.   Chest:      Chest wall: No tenderness.   Abdominal:      General: Bowel sounds are normal. There is no distension.      Palpations: Abdomen is soft. There is no hepatomegaly or mass.      Tenderness: There is no abdominal tenderness. There is no guarding or rebound.   Musculoskeletal:         General: No tenderness. Normal range of motion.      Cervical back: Normal range of motion and neck " supple.   Lymphadenopathy:      Cervical: No cervical adenopathy.   Skin:     General: Skin is warm and dry.      Coloration: Skin is not pale.      Findings: No erythema or rash.      Comments: Changes in nails   Neurological:      Mental Status: He is alert and oriented to person, place, and time.      Cranial Nerves: No cranial nerve deficit.      Coordination: Coordination normal.      Deep Tendon Reflexes: Reflexes are normal and symmetric.   Psychiatric:         Speech: Speech normal.         Behavior: Behavior normal.         Thought Content: Thought content normal.         Judgment: Judgment normal.         Assessment:       1. Coronary artery disease involving native coronary artery of native heart without angina pectoris    2. Hypercholesteremia    3. Primary hypertension    4. 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    Follow up in about 6 months (around 9/27/2023).

## 2023-03-28 NOTE — PROGRESS NOTES
"Subjective:       Patient ID: Forrest Schmidt is a 77 y.o. male.    Chief Complaint: Nail Problem (Patient states nails are lifting from nail beds on all toes, states they are very sensitive. Pt taking Rogaratinib, suspects it could be the cause. X 2 mo. ) and Foot Pain (Pt states balls of feet hurt. )      HPI: Forrest Schmidt presents to the office with complaints of pains to the right great toe and left 3rd due to its partial attachment. Patient states does not trauma to the toe and nail plate. Is on Chemotherapy drugs. Patient's Primary Care Provider is Ahmet Watkins Jr, MD. Dr. Robert Umana MD is his HEME ONC for Gastric CA.     Review of patient's allergies indicates:   Allergen Reactions    Augmentin [amoxicillin-pot clavulanate] Shortness Of Breath     disoriented    Fexofenadine Other (See Comments)     Pt states he can't remember the details but it was a bad effect.    Singulair [montelukast] Other (See Comments)     Pt "felt strange"  Pt "felt strange" bloating, Intestinal irritation, abd cramping      Ace inhibitors Other (See Comments)     cough    Captopril     Doxycycline Nausea And Vomiting     headache    Horse/equine containing products Hives    Hydrocodone Nausea Only    Scopolamine hbr Other (See Comments)     Nausea, headache, changes in BP        Past Medical History:   Diagnosis Date    Anticoagulant long-term use     Arthritis     BPH (benign prostatic hyperplasia)     Coronary artery disease     stent x1 2005    Hypercholesteremia     Hypertension     Low iron     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 2/15/2018    Osteopenia        Family History   Problem Relation Age of Onset    Breast cancer Mother     Diabetes Mother     Cancer Mother     Stroke Mother     Stroke Father     Cancer Maternal Grandmother     Heart attack Paternal Grandfather        Social History     Socioeconomic History    Marital status:    Tobacco Use    Smoking status: Former     Packs/day: " 0.50     Years: 15.00     Pack years: 7.50     Types: Cigarettes     Quit date: 1974     Years since quittin.2    Smokeless tobacco: Never    Tobacco comments:     Quit smoking about 40 yrs ago   Substance and Sexual Activity    Alcohol use: Yes     Alcohol/week: 1.0 standard drink     Types: 1 Glasses of wine per week     Comment: 2-4 glasses wine/day, 17 last drink    Drug use: No    Sexual activity: Yes     Partners: Female       Past Surgical History:   Procedure Laterality Date    APPENDECTOMY      COLONOSCOPY N/A 5/15/2017    Procedure: COLONOSCOPY;  Surgeon: Dez Jimenez MD;  Location: [facility];  Service: Endoscopy;  Laterality: N/A;    CORONARY STENT PLACEMENT      ESOPHAGOGASTRODUODENOSCOPY      EYE SURGERY      cataract extraction    SKIN BIOPSY      TONSILLECTOMY      VASECTOMY         Review of Systems   Constitutional:  Negative for chills, fatigue and fever.   HENT:  Negative for hearing loss.    Eyes:  Negative for photophobia and visual disturbance.   Respiratory:  Negative for cough, chest tightness, shortness of breath and wheezing.    Cardiovascular:  Negative for chest pain and palpitations.   Gastrointestinal:  Negative for constipation, diarrhea, nausea and vomiting.   Endocrine: Negative for cold intolerance and heat intolerance.   Genitourinary:  Negative for flank pain.   Musculoskeletal:  Negative for neck pain and neck stiffness.   Neurological:  Negative for light-headedness and headaches.   Psychiatric/Behavioral:  Negative for sleep disturbance.        Objective:   There were no vitals taken for this visit.      LOWER EXTREMITY PHYSICAL EXAMINATION    VASCULAR: On the  left and right  foot, the dorsalis pedis pulse is 2/4 and the posterior tibial pulse is 2/4. Capillary refill time is less than 3 seconds. Hair growth is present on the dorsum of the foot and at the digits. Proximal to distal temperature is warm to warm.    DERMATOLOGY: The right great toe nail plate is  severely loose(ened) due atraumatic/traumatic partial avulsion. The nail plate remains slightly attached at the eponychium, as well as the proximal aspects of the medial and lateral nail folds. There is no drainage, purulence or malodor. There are mild to moderate pains to palpation of the nail plate itself as well as the medial, lateral and proximal nail folds. The nail plate  show mild thickening and/or onychomycotic changes. There are also changes to the LLE 3rd toe.    NEUROLOGY: Sensation to light touch is intact. Proprioception is intact, bilateral. Sensation to pin prick is reduced to absent. Examination with 5.07 [place] Kiana monofilament reveals that protective sensation is not intact to the left and right plantar surfaces of the foot and digits, as the patient has no sensation/detection at greater than 4 distinct points of contact.     Assessment:     1. 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 Adverse effect of chemotherapy, sequela    3. 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     Continue low dose Gabapentin.    Denies avulsion of problematic nail plates at present.            Future Appointments   Date Time Provider Department Center   4/3/2023  2:15 PM LABORATORY, [facility] [place]   9/25/2023  2:00 PM Tera Upton Jr., MD [facility] CARDIO [place] Card

## 2023-03-31 NOTE — TELEPHONE ENCOUNTER
Rec'd CT of chest, abdomen and pelvis done on 3/21/2023. Disc delivered to film library for images to be uploaded into chart.

## 2023-04-17 NOTE — TELEPHONE ENCOUNTER
Care Companion Intervention    Reason for intervention: Questionnaire response  Comment:  5-12 BMs per day    Intervention: Other intervention (comment)  Comment:  Message sent to Dr. Umana and MEAGHAN Kerr to look at patient f/u and assess current complaints.

## 2023-04-24 PROBLEM — C78.7 METASTASIS TO LIVER: Chronic | Status: ACTIVE | Noted: 2019-08-21

## 2023-04-24 PROBLEM — R53.83 LETHARGY: Status: ACTIVE | Noted: 2023-01-01

## 2023-04-24 PROBLEM — K56.609: Status: ACTIVE | Noted: 2023-01-01

## 2023-04-24 PROBLEM — D72.829 LEUCOCYTOSIS: Status: ACTIVE | Noted: 2023-01-01

## 2023-04-24 PROBLEM — C79.89 METASTATIC CANCER TO PELVIS: Chronic | Status: ACTIVE | Noted: 2020-02-13

## 2023-04-24 PROBLEM — E87.1 HYPONATREMIA: Status: ACTIVE | Noted: 2023-01-01

## 2023-04-24 PROBLEM — N18.30 STAGE 3 CHRONIC KIDNEY DISEASE: Chronic | Status: ACTIVE | Noted: 2021-07-08

## 2023-04-24 PROBLEM — D72.829 LEUKOCYTOSIS: Status: ACTIVE | Noted: 2023-01-01

## 2023-04-24 PROBLEM — R33.9 URINARY RETENTION: Status: ACTIVE | Noted: 2023-01-01

## 2023-04-24 NOTE — ASSESSMENT & PLAN NOTE
Likely related to possible infection, progressing cancer.   PT/OT consulted for assessment of worsening functional status

## 2023-04-24 NOTE — SUBJECTIVE & OBJECTIVE
"Oncology Treatment Plan:   [No matching plan found]    Medications:  Continuous Infusions:  Scheduled Meds:   [START ON 4/25/2023] atorvastatin  40 mg Oral Daily    bisacodyL  5 mg Oral BID    fluticasone propionate  1 spray Each Nostril BID    [START ON 4/25/2023] metoprolol succinate  50 mg Oral Daily    [START ON 4/25/2023] multivitamin  1 tablet Oral Daily    [START ON 4/25/2023] NIFEdipine  60 mg Oral Daily     PRN Meds:acetaminophen, aluminum-magnesium hydroxide-simethicone, dextrose 10%, dextrose 10%, dextrose, dextrose, glucagon (human recombinant), hydrocortisone, melatonin, naloxone, nitroGLYCERIN, ondansetron, simethicone, sodium chloride 0.9%, sodium chloride 5%     Review of patient's allergies indicates:   Allergen Reactions    Augmentin [amoxicillin-pot clavulanate] Shortness Of Breath     disoriented    Fexofenadine Other (See Comments)     Pt states he can't remember the details but it was a bad effect.    Singulair [montelukast] Other (See Comments)     Pt "felt strange"  Pt "felt strange" bloating, Intestinal irritation, abd cramping      Ace inhibitors Other (See Comments)     cough    Captopril     Doxycycline Nausea And Vomiting     headache    Horse/equine containing products Hives    Hydrocodone Nausea Only    Scopolamine hbr Other (See Comments)     Nausea, headache, changes in BP         Past Medical History:   Diagnosis Date    Anticoagulant long-term use     Arthritis     BPH (benign prostatic hyperplasia)     Coronary artery disease     stent x1 2005    Hypercholesteremia     Hypertension     Low iron     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 2/15/2018    Osteopenia      Past Surgical History:   Procedure Laterality Date    APPENDECTOMY      COLONOSCOPY N/A 5/15/2017    Procedure: COLONOSCOPY;  Surgeon: Dez Jimenez MD;  Location: [facility];  Service: Endoscopy;  Laterality: N/A;    CORONARY STENT PLACEMENT      ESOPHAGOGASTRODUODENOSCOPY      EYE " SURGERY      cataract extraction    SKIN BIOPSY      TONSILLECTOMY      VASECTOMY       Family History       Problem Relation (Age of Onset)    Breast cancer Mother    Cancer Mother, Maternal Grandmother    Diabetes Mother    Heart attack Paternal Grandfather    Stroke Mother, Father          Tobacco Use    Smoking status: Former     Packs/day: 0.50     Years: 15.00     Pack years: 7.50     Types: Cigarettes     Quit date:      Years since quittin.3    Smokeless tobacco: Never    Tobacco comments:     Quit smoking about 40 yrs ago   Substance and Sexual Activity    Alcohol use: Yes     Alcohol/week: 1.0 standard drink     Types: 1 Glasses of wine per week     Comment: 2-4 glasses wine/day, 17 last drink    Drug use: No    Sexual activity: Yes     Partners: Female       Review of Systems   Constitutional:  Positive for activity change, chills, fatigue and fever.   HENT:  Positive for sinus pressure.    Eyes:  Negative for visual disturbance.   Respiratory:  Positive for cough. Negative for shortness of breath.    Cardiovascular:  Negative for chest pain and leg swelling.   Gastrointestinal:  Positive for abdominal pain, blood in stool and constipation. Negative for abdominal distention, diarrhea, nausea and vomiting.   Genitourinary:  Positive for decreased urine volume, difficulty urinating, dysuria and frequency. Negative for urgency.   Musculoskeletal:  Negative for myalgias.   Skin:  Negative for wound.   Neurological:  Positive for weakness. Negative for headaches.   Psychiatric/Behavioral:  Negative for confusion.    Objective:     Vital Signs (Most Recent):  Temp: 97.5 °F (36.4 °C) (23 1253)  Pulse: 71 (23 1632)  Resp: (!) 25 (23 1632)  BP: (!) 153/76 (23 1632)  SpO2: 100 % (23 1632)   Vital Signs (24h Range):  Temp:  [97.5 °F (36.4 °C)] 97.5 °F (36.4 °C)  Pulse:  [64-74] 71  Resp:  [18-25] 25  SpO2:  [95 %-100 %] 100 %  BP: (136-153)/(69-76) 153/76     Weight: 77.1  kg (170 lb)  Body mass index is 23.71 kg/m².  Body surface area is 1.97 meters squared.      Intake/Output Summary (Last 24 hours) at 4/24/2023 1646  Last data filed at 4/24/2023 1515  Gross per 24 hour   Intake --   Output 200 ml   Net -200 ml       Physical Exam  Vitals and nursing note reviewed.   Constitutional:       General: He is not in acute distress.     Appearance: Normal appearance. He is obese. He is not ill-appearing or toxic-appearing.   HENT:      Head: Normocephalic and atraumatic.      Mouth/Throat:      Mouth: Mucous membranes are dry.   Eyes:      Extraocular Movements: Extraocular movements intact.      Conjunctiva/sclera: Conjunctivae normal.      Pupils: Pupils are equal, round, and reactive to light.   Cardiovascular:      Rate and Rhythm: Normal rate and regular rhythm.      Pulses: Normal pulses.   Pulmonary:      Effort: Pulmonary effort is normal.      Breath sounds: Normal breath sounds. No wheezing or rhonchi.   Abdominal:      General: Bowel sounds are normal.      Palpations: Abdomen is soft.      Tenderness: There is abdominal tenderness (suprapubic). There is no guarding or rebound.   Musculoskeletal:         General: Normal range of motion.      Cervical back: Normal range of motion.      Right lower leg: No edema.      Left lower leg: No edema.   Skin:     General: Skin is warm.   Neurological:      General: No focal deficit present.      Mental Status: He is alert and oriented to person, place, and time.   Psychiatric:         Mood and Affect: Mood normal.     Significant Labs:   CBC:   Recent Labs   Lab 04/24/23  1356   WBC 15.23*   HGB 10.7*   HCT 33.6*      , CMP:   Recent Labs   Lab 04/24/23  1356   *   K 4.3   CL 97   CO2 23      BUN 18   CREATININE 1.1   CALCIUM 9.6   PROT 7.2   ALBUMIN 2.6*   BILITOT 0.4   ALKPHOS 250*   AST 80*   ALT 61*   ANIONGAP 9   , Coagulation:   Recent Labs   Lab 04/24/23  1356   INR 1.1   , and Urine Studies: No results for  input(s): COLORU, APPEARANCEUA, PHUR, SPECGRAV, PROTEINUA, GLUCUA, KETONESU, BILIRUBINUA, OCCULTUA, NITRITE, UROBILINOGEN, LEUKOCYTESUR, RBCUA, WBCUA, BACTERIA, SQUAMEPITHEL, HYALINECASTS in the last 48 hours.    Invalid input(s): PriceRAFA    Diagnostic Results:  I have reviewed all pertinent imaging results/findings within the past 24 hours.

## 2023-04-24 NOTE — ASSESSMENT & PLAN NOTE
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 His GIST is negative for KIT, PDGFRA, SDH, BRAF, NF1, NF2 mutations. He had oligometastatic disease to the liver found on CT scan 5/20/19. He went to [facility] and underwent IR liver lesion biopsy and ablation on 7/23/19. Pathology showed metastatic GIST positive for DOG-1 and .   - surveillance MRI in Feb 2020 showed progressive disease with new peritoneal mets. Patient was seen by Dr Guaman. Gleevec was recommended.   - CT after 2.5 month of Gleevec showed progressive peritoneal disease.   - underwent cytoreductive surgery at [facility] on 7/10/20  - started sutent 37.5 mg daily on 8/10/20.   - s/p ablation to segment 5 lesion and re-treatment of segment 7 lesion in Dec 2020.   - CT scan 1/12/22 showed progression. Treatment was switched to regorafenib. Started on 1/29/22  - CT 3/23/22 showed progression in liver metastases, peritoneal mets and left lung nodule  - started temodar 85 mg/m2 3 weeks on, 1 week off on 4/6/22. CT on 5/31/22 showed progression  - started pazopanib on 6/10/22 with an escalating dose, reached 800 mg daily on 6/24/22  - Held for 1 week before and 3 weeks after Y90 on 8/17/22. Resumed at 600 mg on 9/8/22. Had significant fatigue when he was on 800 mg daily. Stopped votrient on 11/10/22 for [facility] trial. Had progression on votrient  - Started rogaratinib on trial at [facility] on 11/30/2022. On 800 mg bid  - CT at [facility] on 1/24/23 showed response. Rogaratinib on hold due to side effects  - Rogaratinib temporarily suspended on 4/17 in the setting of new onset constipation and blood with BM to investigate whether drug was causing adverse effects vs. GI/tumor source

## 2023-04-24 NOTE — ASSESSMENT & PLAN NOTE
WBC 15 on presentation. Reported fever of T 100.5 at home day prior to admission. Concern for urinary source with recent difficulty urinating, dysuria    - f/u UA, treat as indicated  - baldwin ordered  - lactate wnl

## 2023-04-24 NOTE — TELEPHONE ENCOUNTER
Spoke with Dr Elaine at [facility] and Dr Guaman at [facility]. Patient has been having bloody BM. Went to local ED. CT showed progression of pelvic mass growing into rectum and colon. Discussed with patient re inpatient care with surg onc and rad onc consult. Patient wants to go to [facility] but does not feel good enough. He has been exhausted. Asked patient to go to local ED and request to be transfer to [facility]. Patient will see if he can get an ambulance to [facility] directly.

## 2023-04-24 NOTE — CONSULTS
Consult Note:    Please see Intern H&P for additional details.    Zhen Pisano D.O.  Hematology/Oncology Fellow, PGY-IV

## 2023-04-24 NOTE — ED PROVIDER NOTES
"Encounter Date: 4/24/2023       History     Chief Complaint   Patient presents with    Abdominal Pain     And constipation for 1 week, hx of colorectal cancer     78-year-old past medical history of hypercholesterolemia, hypertension, GIST tumor, arthritis, BPH, CAD status post stent 2005 presenting with constipation. Patient recently seen in as per notes by Dr. Umana patient's Heme-Onc, instructed patient to present to emergency room possible evaluation by surg/onc and rad/onc.  Patient mentions for past 5 days having worsening constipation small amount of stool being past mixed with blood.  Patient additionally mentions having worsening fatigue during type.  Denies any nausea, vomiting, diarrhea fevers chills night sweats noticed small amount of urine during time.    Patient recently presented to [facility] ED 3 days ago and laboratory testing and CT scan noted for when large pelvic mass.  Patient DC that time instructed follow-up as outpatient with GI for possible colonoscopy.      Review of patient's allergies indicates:   Allergen Reactions    Augmentin [amoxicillin-pot clavulanate] Shortness Of Breath     disoriented    Fexofenadine Other (See Comments)     Pt states he can't remember the details but it was a bad effect.    Singulair [montelukast] Other (See Comments)     Pt "felt strange"  Pt "felt strange" bloating, Intestinal irritation, abd cramping      Ace inhibitors Other (See Comments)     cough    Captopril     Doxycycline Nausea And Vomiting     headache    Horse/equine containing products Hives    Hydrocodone Nausea Only    Scopolamine hbr Other (See Comments)     Nausea, headache, changes in BP      Past Medical History:   Diagnosis Date    Anticoagulant long-term use     Arthritis     BPH (benign prostatic hyperplasia)     Coronary artery disease     stent x1 2005    Hypercholesteremia     Hypertension     Low iron     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 2/15/2018    " Osteopenia      Past Surgical History:   Procedure Laterality Date    APPENDECTOMY      COLONOSCOPY N/A 5/15/2017    Procedure: COLONOSCOPY;  Surgeon: Dez Jimenez MD;  Location: [facility];  Service: Endoscopy;  Laterality: N/A;    CORONARY STENT PLACEMENT      ESOPHAGOGASTRODUODENOSCOPY      EYE SURGERY      cataract extraction    SKIN BIOPSY      TONSILLECTOMY      VASECTOMY       Family History   Problem Relation Age of Onset    Breast cancer Mother     Diabetes Mother     Cancer Mother     Stroke Mother     Stroke Father     Cancer Maternal Grandmother     Heart attack Paternal Grandfather      Social History     Tobacco Use    Smoking status: Former     Packs/day: 0.50     Years: 15.00     Pack years: 7.50     Types: Cigarettes     Quit date:      Years since quittin.3    Smokeless tobacco: Never    Tobacco comments:     Quit smoking about 40 yrs ago   Substance Use Topics    Alcohol use: Yes     Alcohol/week: 1.0 standard drink     Types: 1 Glasses of wine per week     Comment: 2-4 glasses wine/day, 17 last drink    Drug use: No     Review of Systems    Physical Exam     Initial Vitals   BP Pulse Resp Temp SpO2   23 1251 23 1251 23 1251 23 1253 23 1251   137/76 74 18 97.5 °F (36.4 °C) 95 %      MAP       --                Physical Exam    Nursing note and vitals reviewed.  Constitutional: He appears well-developed and well-nourished. He is not diaphoretic. No distress.   HENT:   Head: Normocephalic and atraumatic.   Nose: Nose normal.   Eyes: Conjunctivae and EOM are normal. Pupils are equal, round, and reactive to light. No scleral icterus.   Neck: Neck supple.   Normal range of motion.  Cardiovascular:  Normal rate and regular rhythm.     Exam reveals no gallop and no friction rub.       No murmur heard.  Pulmonary/Chest: Breath sounds normal. No respiratory distress. He has no wheezes. He has no rhonchi. He has no rales.   Abdominal: Abdomen is soft. Bowel  sounds are normal. There is abdominal tenderness.   Suprapubic tenderness to palpation. There is no rebound and no guarding.   Genitourinary:    Genitourinary Comments: Patient deferring rectal exam at this time.     Musculoskeletal:         General: No tenderness or edema. Normal range of motion.      Cervical back: Normal range of motion and neck supple.     Neurological: He is alert and oriented to person, place, and time. He has normal strength. No sensory deficit. GCS score is 15. GCS eye subscore is 4. GCS verbal subscore is 5. GCS motor subscore is 6.   Skin: Skin is warm and dry. No rash noted. No erythema. No pallor.   Psychiatric: He has a normal mood and affect. His behavior is normal. Judgment and thought content normal.       ED Course   Procedures  Labs Reviewed   CBC W/ AUTO DIFFERENTIAL - Abnormal; Notable for the following components:       Result Value    WBC 15.23 (*)     RBC 3.84 (*)     Hemoglobin 10.7 (*)     Hematocrit 33.6 (*)     MCHC 31.8 (*)     RDW 14.7 (*)     MPV 8.6 (*)     Gran # (ANC) 11.8 (*)     Immature Grans (Abs) 0.07 (*)     Lymph # 0.5 (*)     Mono # 2.9 (*)     Gran % 77.2 (*)     Lymph % 3.2 (*)     Mono % 18.8 (*)     All other components within normal limits   COMPREHENSIVE METABOLIC PANEL - Abnormal; Notable for the following components:    Sodium 129 (*)     Albumin 2.6 (*)     Alkaline Phosphatase 250 (*)     AST 80 (*)     ALT 61 (*)     All other components within normal limits   URINALYSIS, REFLEX TO URINE CULTURE - Abnormal; Notable for the following components:    Appearance, UA Hazy (*)     All other components within normal limits    Narrative:     Specimen Source->Urine   GAMMA GT - Abnormal; Notable for the following components:     (*)     All other components within normal limits    Narrative:     Add on GGT YANDEL MONZON per [facility] order [id number]  15:42    04/24/2023    HIV 1 / 2 ANTIBODY    Narrative:     Release to patient->Immediate    HEPATITIS C ANTIBODY    Narrative:     Release to patient->Immediate   PROTIME-INR   LACTIC ACID, PLASMA   BILIRUBIN, DIRECT   GAMMA GT   LIPASE   BILIRUBIN, DIRECT    Narrative:     Add on GGT LIPAS BILID per [facility] BENNET[facility] order [id number]  15:42    04/24/2023    LIPASE    Narrative:     Add on GGT LIPAS BILID per [facility] BENNET[facility] order [id number]  15:42    04/24/2023    URINALYSIS, REFLEX TO URINE CULTURE   TYPE & SCREEN          Imaging Results    None          Medications   sodium chloride 0.9% flush 10 mL (has no administration in time range)   naloxone 0.4 mg/mL injection 0.02 mg (has no administration in time range)   glucagon (human recombinant) injection 1 mg (has no administration in time range)   dextrose 10% bolus 125 mL 125 mL (has no administration in time range)   dextrose 10% bolus 250 mL 250 mL (has no administration in time range)   dextrose 40 % gel 15,000 mg (has no administration in time range)   dextrose 40 % gel 30,000 mg (has no administration in time range)   acetaminophen tablet 650 mg (has no administration in time range)   ondansetron disintegrating tablet 8 mg (has no administration in time range)   melatonin tablet 6 mg (has no administration in time range)   simethicone chewable tablet 80 mg (has no administration in time range)   aluminum-magnesium hydroxide-simethicone 200-200-20 mg/5 mL suspension 30 mL (has no administration in time range)   bisacodyL EC tablet 5 mg (5 mg Oral Given 4/24/23 2135)   fluticasone propionate 50 mcg/actuation nasal spray 50 mcg (0 mcg Each Nostril Hold 4/24/23 2100)   hydrocortisone 2.5 % cream (has no administration in time range)   metoprolol succinate (TOPROL-XL) 24 hr tablet 50 mg (has no administration in time range)   multivitamin tablet (has no administration in time range)   nitroGLYCERIN SL tablet 0.4 mg (has no administration in time range)   sodium chloride 5% ophthalmic solution 2 drop (has no administration in time range)   polyethylene  glycol packet 17 g (has no administration in time range)   enoxaparin injection 40 mg (40 mg Subcutaneous Not Given 4/24/23 1900)   NIFEdipine 24 hr tablet 30 mg (has no administration in time range)     Medical Decision Making:   History:   Old Medical Records: I decided to obtain old medical records.  Initial Assessment:   78-year-old past medical history of GIST tumor with recent CT scan showing enlarging pelvic mass presenting with worsening abdominal pain and blood in stool and constipation.  Recent CT scan with enlargement pelvic mass.  Differential Diagnosis:   DX includes anemia, electrolyte derangement, pelvic mass, dehydration,  Clinical Tests:   Lab Tests: Ordered and Reviewed  Radiological Study: Ordered and Reviewed  ED Management:  Plan:  Obtain CBC, CMP, coags, UA patient recent CT scan will defer to Heme-Onc at this time will continue to reassess.  Likely hospital admission.           ED Course as of 04/25/23 0016 Mon Apr 24, 2023   1503 Spoke with Heme-Onc plan for hospital admission further management evaluation. [DC]      ED Course User Index  [DC] Teetee Castillo Jr., MD                 Clinical Impression:   Final diagnoses:  [R10.9] Abdominal pain, unspecified abdominal location (Primary)  [R19.00] Pelvic mass        ED Disposition Condition    Admit                 Teetee Castillo Jr., MD  04/25/23 0016

## 2023-04-24 NOTE — ASSESSMENT & PLAN NOTE
On metoprolol 50, losartan 100 and nifedipine 60 per chart review. Normotensive. Will clarify and add anti-HTN as appropriate.     Will hold losartan for potential surgical procedure.   Continue metoprolol, nifedipine.

## 2023-04-24 NOTE — H&P
"[facility] - Emergency Dept  Hematology/Oncology  H&P    Patient Name: Forrest Schmidt  MRN: [id number]  Admission Date: 4/24/2023  Code Status: Full Code   Attending Provider: Miguel Nova MD  Primary Care Physician: Dez Jimenez MD  Principal Problem:Mechanical gastrointestinal obstruction    Subjective:     HPI: Mr. 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 He also complains of burning/difficulty urinating over the past week. Patient is currently in Rogaratinib trial with [facility]. He saw  Dr. Elaine at the [facility] last week, who advised him to temporarily suspend the Rogaratinib to pursue GI workup of new onset constipation. Additionally, he complains of lethargy, suprapubic pain over the past week and one febrile episode with Tmax 100.5 at home. He denies CP, N/V, leg swelling. He was scheduled to get a colonoscopy on 4/26 to further investigate.  [facility] and underwent CT A/P on 4/21 revealing progression of pelvic mass. Patient was advised to present to [facility] for further evaluation.     ED Course: AF, HDS on RA. Labs notable for mild anemia - Hgb 10.7, leukocytosis - WBC 15, Hyponatremia - Na 129, elevated alkaline phosphatase 250, elevated AST/ALT 80/61.     Oncology History:  - Mr. 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 His GIST is negative for KIT, PDGFRA, SDH, BRAF, NF1, NF2 mutations. He had oligometastatic disease to the liver found on CT scan 5/20/19. He went to [facility] and underwent IR liver lesion biopsy and ablation on 7/23/19. Pathology showed metastatic GIST positive for DOG-1 and .   - surveillance MRI in Feb 2020 showed progressive disease with new peritoneal mets. Patient was seen by Dr Guaman. Gleevec was recommended.   - " CT after 2.5 month of Gleevec showed progressive peritoneal disease.   - underwent cytoreductive surgery at [facility] on 7/10/20  - started sutent 37.5 mg daily on 8/10/20.   - s/p ablation to segment 5 lesion and re-treatment of segment 7 lesion in Dec 2020.   - CT scan 1/12/22 showed progression. Treatment was switched to regorafenib. Started on 1/29/22  - CT 3/23/22 showed progression in liver metastases, peritoneal mets and left lung nodule  - started temodar 85 mg/m2 3 weeks on, 1 week off on 4/6/22. CT on 5/31/22 showed progression  - started pazopanib on 6/10/22 with an escalating dose, reached 800 mg daily on 6/24/22  - Held for 1 week before and 3 weeks after Y90 on 8/17/22. Resumed at 600 mg on 9/8/22. Had significant fatigue when he was on 800 mg daily. Stopped votrient on 11/10/22 for [facility] trial. Had progression on votrient  - Started rogaratinib on trial at [facility] on 11/30/2022. On 800 mg bid  - CT at [facility] on 1/24/23 showed response. Rogaratinib on hold due to side effects       Oncology Treatment Plan:   [No matching plan found]    Medications:  Continuous Infusions:  Scheduled Meds:   [START ON 4/25/2023] atorvastatin  40 mg Oral Daily    bisacodyL  5 mg Oral BID    fluticasone propionate  1 spray Each Nostril BID    [START ON 4/25/2023] metoprolol succinate  50 mg Oral Daily    [START ON 4/25/2023] multivitamin  1 tablet Oral Daily    [START ON 4/25/2023] NIFEdipine  60 mg Oral Daily     PRN Meds:acetaminophen, aluminum-magnesium hydroxide-simethicone, dextrose 10%, dextrose 10%, dextrose, dextrose, glucagon (human recombinant), hydrocortisone, melatonin, naloxone, nitroGLYCERIN, ondansetron, simethicone, sodium chloride 0.9%, sodium chloride 5%     Review of patient's allergies indicates:   Allergen Reactions    Augmentin [amoxicillin-pot clavulanate] Shortness Of Breath     disoriented    Fexofenadine Other (See Comments)     Pt states he can't remember the details but it was a bad effect.     "Singulair [montelukast] Other (See Comments)     Pt "felt strange"  Pt "felt strange" bloating, Intestinal irritation, abd cramping      Ace inhibitors Other (See Comments)     cough    Captopril     Doxycycline Nausea And Vomiting     headache    Horse/equine containing products Hives    Hydrocodone Nausea Only    Scopolamine hbr Other (See Comments)     Nausea, headache, changes in BP         Past Medical History:   Diagnosis Date    Anticoagulant long-term use     Arthritis     BPH (benign prostatic hyperplasia)     Coronary artery disease     stent x1     Hypercholesteremia     Hypertension     Low iron     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 2/15/2018    Osteopenia      Past Surgical History:   Procedure Laterality Date    APPENDECTOMY      COLONOSCOPY N/A 5/15/2017    Procedure: COLONOSCOPY;  Surgeon: Dez Jimenez MD;  Location: [facility];  Service: Endoscopy;  Laterality: N/A;    CORONARY STENT PLACEMENT      ESOPHAGOGASTRODUODENOSCOPY      EYE SURGERY      cataract extraction    SKIN BIOPSY      TONSILLECTOMY      VASECTOMY       Family History       Problem Relation (Age of Onset)    Breast cancer Mother    Cancer Mother, Maternal Grandmother    Diabetes Mother    Heart attack Paternal Grandfather    Stroke Mother, Father          Tobacco Use    Smoking status: Former     Packs/day: 0.50     Years: 15.00     Pack years: 7.50     Types: Cigarettes     Quit date: 1974     Years since quittin.3    Smokeless tobacco: Never    Tobacco comments:     Quit smoking about 40 yrs ago   Substance and Sexual Activity    Alcohol use: Yes     Alcohol/week: 1.0 standard drink     Types: 1 Glasses of wine per week     Comment: 2-4 glasses wine/day, 17 last drink    Drug use: No    Sexual activity: Yes     Partners: Female       Review of Systems   Constitutional:  Positive for activity change, chills, fatigue and fever.   HENT:  Positive for sinus pressure.    Eyes:  " Negative for visual disturbance.   Respiratory:  Positive for cough. Negative for shortness of breath.    Cardiovascular:  Negative for chest pain and leg swelling.   Gastrointestinal:  Positive for abdominal pain, blood in stool and constipation. Negative for abdominal distention, diarrhea, nausea and vomiting.   Genitourinary:  Positive for decreased urine volume, difficulty urinating, dysuria and frequency. Negative for urgency.   Musculoskeletal:  Negative for myalgias.   Skin:  Negative for wound.   Neurological:  Positive for weakness. Negative for headaches.   Psychiatric/Behavioral:  Negative for confusion.    Objective:     Vital Signs (Most Recent):  Temp: 97.5 °F (36.4 °C) (04/24/23 1253)  Pulse: 71 (04/24/23 1632)  Resp: (!) 25 (04/24/23 1632)  BP: (!) 153/76 (04/24/23 1632)  SpO2: 100 % (04/24/23 1632)   Vital Signs (24h Range):  Temp:  [97.5 °F (36.4 °C)] 97.5 °F (36.4 °C)  Pulse:  [64-74] 71  Resp:  [18-25] 25  SpO2:  [95 %-100 %] 100 %  BP: (136-153)/(69-76) 153/76     Weight: 77.1 kg (170 lb)  Body mass index is 23.71 kg/m².  Body surface area is 1.97 meters squared.      Intake/Output Summary (Last 24 hours) at 4/24/2023 1646  Last data filed at 4/24/2023 1515  Gross per 24 hour   Intake --   Output 200 ml   Net -200 ml       Physical Exam  Vitals and nursing note reviewed.   Constitutional:       General: He is not in acute distress.     Appearance: Normal appearance. He is obese. He is not ill-appearing or toxic-appearing.   HENT:      Head: Normocephalic and atraumatic.      Mouth/Throat:      Mouth: Mucous membranes are dry.   Eyes:      Extraocular Movements: Extraocular movements intact.      Conjunctiva/sclera: Conjunctivae normal.      Pupils: Pupils are equal, round, and reactive to light.   Cardiovascular:      Rate and Rhythm: Normal rate and regular rhythm.      Pulses: Normal pulses.   Pulmonary:      Effort: Pulmonary effort is normal.      Breath sounds: Normal breath sounds. No  "wheezing or rhonchi.   Abdominal:      General: Bowel sounds are normal.      Palpations: Abdomen is soft.      Tenderness: There is abdominal tenderness (suprapubic). There is no guarding or rebound.   Musculoskeletal:         General: Normal range of motion.      Cervical back: Normal range of motion.      Right lower leg: No edema.      Left lower leg: No edema.   Skin:     General: Skin is warm.   Neurological:      General: No focal deficit present.      Mental Status: He is alert and oriented to person, place, and time.   Psychiatric:         Mood and Affect: Mood normal.     Significant Labs:   CBC:   Recent Labs   Lab 04/24/23  1356   WBC 15.23*   HGB 10.7*   HCT 33.6*      , CMP:   Recent Labs   Lab 04/24/23  1356   *   K 4.3   CL 97   CO2 23      BUN 18   CREATININE 1.1   CALCIUM 9.6   PROT 7.2   ALBUMIN 2.6*   BILITOT 0.4   ALKPHOS 250*   AST 80*   ALT 61*   ANIONGAP 9   , Coagulation:   Recent Labs   Lab 04/24/23  1356   INR 1.1   , and Urine Studies: No results for input(s): COLORU, APPEARANCEUA, PHUR, SPECGRAV, PROTEINUA, GLUCUA, KETONESU, BILIRUBINUA, OCCULTUA, NITRITE, UROBILINOGEN, LEUKOCYTESUR, RBCUA, WBCUA, BACTERIA, SQUAMEPITHEL, HYALINECASTS in the last 48 hours.    Invalid input(s): WRIGHTSUR    Diagnostic Results:  I have reviewed all pertinent imaging results/findings within the past 24 hours.    Assessment/Plan:     * Mechanical gastrointestinal obstruction  Patient presented with 1 month worsening constipation and scant blood with BM. BM consist of 10 "pellets" daily. Associated with 1 week of difficulty urinating, dysuria and lethargy.     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  Moderate colonic stool noted proximal E.  - There are hypodense lesions within the liver which are not definitively seen on recent CT suspicious for metastatic disease progression.  Additional partially calcified lesions are stable likely treated " "lesions."    Plan  - consulted surgical oncology for potential surgical intervention.  - plan to consult Urology for urinary retention/obstruction  - regular diet, NPO @ [facility] for potential surgical intervention  - patient still passing gas and hard stool, presentation not consistent with full obstruction.  - will place baldwin for urinary retention/obstruction      GIST (gastrointestinal stromal tumor) of small bowel, malignant  Metastases to liver and pelvis    - Mr. 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 His GIST is negative for KIT, PDGFRA, SDH, BRAF, NF1, NF2 mutations. He had oligometastatic disease to the liver found on CT scan 5/20/19. He went to [facility] and underwent IR liver lesion biopsy and ablation on 7/23/19. Pathology showed metastatic GIST positive for DOG-1 and .   - surveillance MRI in Feb 2020 showed progressive disease with new peritoneal mets. Patient was seen by Dr Guaman. Gleevec was recommended.   - CT after 2.5 month of Gleevec showed progressive peritoneal disease.   - underwent cytoreductive surgery at [facility] on 7/10/20  - started sutent 37.5 mg daily on 8/10/20.   - s/p ablation to segment 5 lesion and re-treatment of segment 7 lesion in Dec 2020.   - CT scan 1/12/22 showed progression. Treatment was switched to regorafenib. Started on 1/29/22  - CT 3/23/22 showed progression in liver metastases, peritoneal mets and left lung nodule  - started temodar 85 mg/m2 3 weeks on, 1 week off on 4/6/22. CT on 5/31/22 showed progression  - started pazopanib on 6/10/22 with an escalating dose, reached 800 mg daily on 6/24/22  - Held for 1 week before and 3 weeks after Y90 on 8/17/22. Resumed at 600 mg on 9/8/22. Had significant fatigue when he was on 800 mg daily. Stopped votrient on 11/10/22 for [facility] trial. Had progression on votrient  - Started rogaratinib on trial at [facility] on 11/30/2022. On 800 " mg bid  - CT at [facility] on 1/24/23 showed response. Rogaratinib on hold due to side effects  - Rogaratinib temporarily suspended on 4/17 in the setting of new onset constipation and blood with BM to investigate whether drug was causing adverse effects vs. GI/tumor source      Leukocytosis  WBC 15 on presentation. Reported fever of T 100.5 at home day prior to admission. Concern for urinary source with recent difficulty urinating, dysuria    - f/u UA, treat as indicated  - baldwin ordered  - lactate wnl    Lethargy  Likely related to possible infection, progressing cancer.   PT/OT consulted for assessment of worsening functional status    Hyponatremia  Baseline 136. 129 on admission, likely in the setting of recent poor PO intake    Stage 3 chronic kidney disease  Baseline sCr 1.1    Coronary artery disease  ASA and statin. Holding ASA for potential surgical intervention.    Hypercholesteremia  On statin; has mild transaminitis on labs. Will hold for now, consider resuming.    Hypertension  On metoprolol 50, losartan 100 and nifedipine 60 per chart review. Normotensive. Will clarify and add anti-HTN as appropriate.     Will hold losartan for potential surgical procedure.   Continue metoprolol, nifedipine.         Alvino Araya MD  Hematology/Oncology  [facility] - Emergency Dept        I have reviewed the notes, assessments, and/or procedures performed by the housestaff, as above.  I have personally interviewed and examined the patient at the beside, and rounded with the housestaff. I concur with her/his assessment and plan and the documentation of Forrest Schmidt.  More than 70 mins total time were spent during this encounter.      Miguel Nova M.D., M.S., F.A.C.P.  Hematology/Oncology Attending  [facility]

## 2023-04-24 NOTE — ASSESSMENT & PLAN NOTE
"Patient presented with 1 month worsening constipation and scant blood with BM. BM consist of 10 "pellets" daily. Associated with 1 week of difficulty urinating, dysuria and lethargy.     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  Moderate colonic stool noted proximal E.  - There are hypodense lesions within the liver which are not definitively seen on recent CT suspicious for metastatic disease progression.  Additional partially calcified lesions are stable likely treated lesions."    Plan  - consulted surgical oncology for potential surgical intervention.  - plan to consult Urology for urinary retention/obstruction  - regular diet, NPO @ [facility] for potential surgical intervention  - patient still passing gas and hard stool, presentation not consistent with full obstruction.  - will place baldwin for urinary retention/obstruction    "

## 2023-04-24 NOTE — ED TRIAGE NOTES
Patient to ED with complaints of worsening abd pain and bright red blood in stool x 1 month. PMH colorectal CA - last chemo x1 week ago - per patient, chemo suspended 2/2 blood in stool. Denies dizziness or shortness of breath. Patient also endorses dysuria and urinary frequency x this week. AAOx4. Respirations even and unlabored. Skin warm and dry. Patient appears pale. Wife at bedside. ED stretcher in low and locked position. Side rails up x2. Plan of care discussed with patient - verbalized understanding. Pending ED workup at this time.

## 2023-04-24 NOTE — HPI
"Mr. 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 He also complains of burning/difficulty urinating over the past week. Patient is currently in Rogaratinib trial with [facility]. He saw  Dr. Elaine at the [facility] last week, who advised him to temporarily suspend the Rogaratinib to pursue GI workup of new onset constipation. Additionally, he complains of lethargy, suprapubic pain over the past week and one febrile episode with Tmax 100.5 at home. He denies CP, N/V, leg swelling. He was scheduled to get a colonoscopy on 4/26 to further investigate.  [facility] and underwent CT A/P on 4/21 revealing progression of pelvic mass. Patient was advised to present to [facility] for further evaluation.     ED Course: AF, HDS on RA. Labs notable for mild anemia - Hgb 10.7, leukocytosis - WBC 15, Hyponatremia - Na 129, elevated alkaline phosphatase 250, elevated AST/ALT 80/61.     Oncology History:  - Mr. 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 His GIST is negative for KIT, PDGFRA, SDH, BRAF, NF1, NF2 mutations. He had oligometastatic disease to the liver found on CT scan 5/20/19. He went to [facility] and underwent IR liver lesion biopsy and ablation on 7/23/19. Pathology showed metastatic GIST positive for DOG-1 and .   - surveillance MRI in Feb 2020 showed progressive disease with new peritoneal mets. Patient was seen by Dr Guaman. Gleevec was recommended.   - CT after 2.5 month of Gleevec showed progressive peritoneal disease.   - underwent cytoreductive surgery at [facility] on 7/10/20  - started sutent 37.5 mg daily on 8/10/20.   - s/p ablation to segment 5 lesion and re-treatment of segment 7 lesion in Dec 2020.   - CT scan 1/12/22 showed progression. Treatment was " switched to regorafenib. Started on 1/29/22  - CT 3/23/22 showed progression in liver metastases, peritoneal mets and left lung nodule  - started temodar 85 mg/m2 3 weeks on, 1 week off on 4/6/22. CT on 5/31/22 showed progression  - started pazopanib on 6/10/22 with an escalating dose, reached 800 mg daily on 6/24/22  - Held for 1 week before and 3 weeks after Y90 on 8/17/22. Resumed at 600 mg on 9/8/22. Had significant fatigue when he was on 800 mg daily. Stopped votrient on 11/10/22 for [facility] trial. Had progression on votrient  - Started rogaratinib on trial at [facility] on 11/30/2022. On 800 mg bid  - CT at [facility] on 1/24/23 showed response. Rogaratinib on hold due to side effects

## 2023-04-25 PROBLEM — K21.9 GERD (GASTROESOPHAGEAL REFLUX DISEASE): Status: ACTIVE | Noted: 2023-01-01

## 2023-04-25 PROBLEM — R10.9 ABDOMINAL PAIN: Status: ACTIVE | Noted: 2023-01-01

## 2023-04-25 PROBLEM — R19.00 PELVIC MASS: Status: ACTIVE | Noted: 2023-01-01

## 2023-04-25 PROBLEM — R07.9 CHEST PAIN: Status: ACTIVE | Noted: 2023-01-01

## 2023-04-25 PROBLEM — C49.A4 GIST (GASTROINTESTINAL STROMA TUMOR), MALIGNANT, COLON: Status: ACTIVE | Noted: 2023-01-01

## 2023-04-25 NOTE — PROGRESS NOTES
"[facility] - Transplant Stepdown  Hematology/Oncology  Progress Note    Patient Name: Forrest Schmidt  Admission Date: 4/24/2023  Hospital Length of Stay: 1 days  Code Status: Full Code     Subjective:     HPI:  Mr. 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 He also complains of burning/difficulty urinating over the past week. Patient is currently in Rogaratinib trial with [facility]. He saw  Dr. Elaine at the [facility] last week, who advised him to temporarily suspend the Rogaratinib to pursue GI workup of new onset constipation. Additionally, he complains of lethargy, suprapubic pain over the past week and one febrile episode with Tmax 100.5 at home. He denies CP, N/V, leg swelling. He was scheduled to get a colonoscopy on 4/26 to further investigate.  [facility] and underwent CT A/P on 4/21 revealing progression of pelvic mass. Patient was advised to present to [facility] for further evaluation.     ED Course: AF, HDS on RA. Labs notable for mild anemia - Hgb 10.7, leukocytosis - WBC 15, Hyponatremia - Na 129, elevated alkaline phosphatase 250, elevated AST/ALT 80/61.     Oncology History:  - Mr. 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 His GIST is negative for KIT, PDGFRA, SDH, BRAF, NF1, NF2 mutations. He had oligometastatic disease to the liver found on CT scan 5/20/19. He went to [facility] and underwent IR liver lesion biopsy and ablation on 7/23/19. Pathology showed metastatic GIST positive for DOG-1 and .   - surveillance MRI in Feb 2020 showed progressive disease with new peritoneal mets. Patient was seen by Dr Guaman. Gleevec was recommended.   - CT after 2.5 month of Gleevec showed progressive peritoneal disease.   - underwent cytoreductive surgery at [facility] on 7/10/20  - started sutent 37.5 mg daily on 8/10/20.   - s/p ablation to segment 5 lesion and re-treatment of segment 7 lesion in Dec 2020.   - CT scan 1/12/22 showed progression. Treatment was switched to regorafenib. Started on 1/29/22  - CT 3/23/22 showed progression in liver metastases, peritoneal mets and left lung nodule  - started temodar 85 mg/m2 3 weeks on, 1 week off on 4/6/22. CT on 5/31/22 showed progression  - started pazopanib on 6/10/22 with an escalating dose, reached 800 mg daily on 6/24/22  - Held for 1 week before and 3 weeks after Y90 on 8/17/22. Resumed at 600 mg on 9/8/22. Had significant fatigue when he was on 800 mg daily. Stopped votrient on 11/10/22 for [facility] trial. Had progression on votrient  - Started rogaratinib on trial at [facility] on 11/30/2022. On 800 mg bid  - CT at [facility] on 1/24/23 showed response. Rogaratinib on hold due to side effects        Interval History: NAEON. Patient continues to pass gas, small hard pellet-like stool and BRBPR. Patient able to void successfully, not requiring baldwin catheter at this time.     AF, HDS on RA    Oncology Treatment Plan:   [No matching plan found]    Medications:  Continuous Infusions:  Scheduled Meds:   arginine HCl (L-arginine)  2 tablet Oral QHS    atorvastatin  40 mg Oral QHS    bisacodyL  5 mg Oral BID    cetirizine  5 mg Oral QHS    cyanocobalamin  1,000 mcg Oral Daily    enoxaparin  40 mg Subcutaneous Daily    famotidine  20 mg Oral BID    fluticasone propionate  1 spray Each Nostril BID    metoprolol succinate  50 mg Oral QHS    multivitamin  1 tablet Oral Daily    NIFEdipine  30 mg Oral QHS    omega 3-dha-epa-fish oil  1 capsule Oral Daily    polyethylene glycol  17 g Oral Daily    sodium chloride 2%  2 drop Both Eyes BID    sucralfate  1 g Oral BID WM    vitamin D  2,000 Units Oral Daily     PRN Meds:acetaminophen, aluminum-magnesium hydroxide-simethicone, dextrose 10%, dextrose 10%, dextrose, dextrose, glucagon (human  recombinant), hydrocortisone, melatonin, naloxone, nitroGLYCERIN, ondansetron, simethicone, sodium chloride 0.9%, sodium chloride 2%     Review of Systems   Constitutional:  Positive for activity change and fatigue. Negative for chills and fever.   HENT:  Positive for sinus pressure.    Eyes:  Negative for visual disturbance.   Respiratory:  Positive for cough. Negative for shortness of breath.    Cardiovascular:  Negative for chest pain and leg swelling.   Gastrointestinal:  Positive for abdominal pain, blood in stool and constipation. Negative for abdominal distention, diarrhea, nausea and vomiting.   Genitourinary:  Positive for decreased urine volume, difficulty urinating, dysuria and frequency. Negative for urgency.   Musculoskeletal:  Negative for myalgias.   Skin:  Negative for wound.   Neurological:  Positive for weakness. Negative for headaches.   Psychiatric/Behavioral:  Negative for confusion.    Objective:     Vital Signs (Most Recent):  Temp: 98.1 °F (36.7 °C) (04/25/23 0858)  Pulse: 77 (04/25/23 0858)  Resp: 14 (04/25/23 0858)  BP: 136/74 (04/25/23 0858)  SpO2: 95 % (04/25/23 0858) Vital Signs (24h Range):  Temp:  [97.5 °F (36.4 °C)-98.5 °F (36.9 °C)] 98.1 °F (36.7 °C)  Pulse:  [] 77  Resp:  [14-25] 14  SpO2:  [95 %-100 %] 95 %  BP: (132-153)/(69-79) 136/74     Weight: 80.6 kg (177 lb 11.1 oz)  Body mass index is 24.78 kg/m².  Body surface area is 2.01 meters squared.      Intake/Output Summary (Last 24 hours) at 4/25/2023 1147  Last data filed at 4/25/2023 1107  Gross per 24 hour   Intake 500 ml   Output 900 ml   Net -400 ml       Physical Exam  Vitals and nursing note reviewed.   Constitutional:       General: He is not in acute distress.     Appearance: Normal appearance. He is obese. He is not ill-appearing or toxic-appearing.   HENT:      Head: Normocephalic and atraumatic.      Mouth/Throat:      Mouth: Mucous membranes are dry.   Eyes:      Extraocular Movements: Extraocular movements  "intact.      Conjunctiva/sclera: Conjunctivae normal.      Pupils: Pupils are equal, round, and reactive to light.   Cardiovascular:      Rate and Rhythm: Normal rate and regular rhythm.      Pulses: Normal pulses.   Pulmonary:      Effort: Pulmonary effort is normal.      Breath sounds: Normal breath sounds. No wheezing or rhonchi.   Abdominal:      General: Bowel sounds are normal.      Palpations: Abdomen is soft.      Tenderness: There is abdominal tenderness (suprapubic). There is no guarding or rebound.   Musculoskeletal:         General: Normal range of motion.      Cervical back: Normal range of motion.      Right lower leg: No edema.      Left lower leg: No edema.   Skin:     General: Skin is warm.   Neurological:      General: No focal deficit present.      Mental Status: He is alert and oriented to person, place, and time.   Psychiatric:         Mood and Affect: Mood normal.       Significant Labs:   CBC:   Recent Labs   Lab 04/24/23  1356 04/25/23  0633   WBC 15.23* 12.75*   HGB 10.7* 10.7*   HCT 33.6* 33.6*    379    and CMP:   Recent Labs   Lab 04/24/23  1356 04/25/23  0633   * 129*   K 4.3 4.1   CL 97 98   CO2 23 21*    108   BUN 18 21   CREATININE 1.1 1.1   CALCIUM 9.6 9.3   PROT 7.2 7.0   ALBUMIN 2.6* 2.5*   BILITOT 0.4 0.4   ALKPHOS 250* 235*   AST 80* 56*   ALT 61* 49*   ANIONGAP 9 10       Diagnostic Results:  CT: 4/21:   Enlarging pelvic mass which involves the rectum and sigmoid colon.  Moderate colonic stool noted proximal E.  2. There are hypodense lesions within the liver which are not definitively seen on recent CT suspicious for metastatic disease progression.  Additional partially calcified lesions are stable likely treated lesions.    Assessment/Plan:     * Mechanical gastrointestinal obstruction  Patient presented with 1 month worsening constipation and scant blood with BM. BM consist of 10 "pellets" daily. Associated with 1 week of difficulty urinating, dysuria and " "lethargy.     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  Moderate colonic stool noted proximal E.  - There are hypodense lesions within the liver which are not definitively seen on recent CT suspicious for metastatic disease progression.  Additional partially calcified lesions are stable likely treated lesions."    Plan  - consulted surgical oncology for potential surgical intervention.  - consulted Urology for urinary retention/obstruction  - regular diet  - patient still passing gas and hard stool, presentation not consistent with full obstruction.  - patient able to void successfully, no need for baldwin catheter      GIST (gastrointestinal stromal tumor) of small bowel, malignant  Metastases to liver and pelvis    - Mr. 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 His GIST is negative for KIT, PDGFRA, SDH, BRAF, NF1, NF2 mutations. He had oligometastatic disease to the liver found on CT scan 5/20/19. He went to [facility] and underwent IR liver lesion biopsy and ablation on 7/23/19. Pathology showed metastatic GIST positive for DOG-1 and .   - surveillance MRI in Feb 2020 showed progressive disease with new peritoneal mets. Patient was seen by Dr Guaman. Gleevec was recommended.   - CT after 2.5 month of Gleevec showed progressive peritoneal disease.   - underwent cytoreductive surgery at [facility] on 7/10/20  - started sutent 37.5 mg daily on 8/10/20.   - s/p ablation to segment 5 lesion and re-treatment of segment 7 lesion in Dec 2020.   - CT scan 1/12/22 showed progression. Treatment was switched to regorafenib. Started on 1/29/22  - CT 3/23/22 showed progression in liver metastases, peritoneal mets and left lung nodule  - started temodar 85 mg/m2 3 weeks on, 1 week off on 4/6/22. CT on 5/31/22 showed progression  - started pazopanib on 6/10/22 with an escalating dose, " reached 800 mg daily on 6/24/22  - Held for 1 week before and 3 weeks after Y90 on 8/17/22. Resumed at 600 mg on 9/8/22. Had significant fatigue when he was on 800 mg daily. Stopped votrient on 11/10/22 for [facility] trial. Had progression on votrient  - Started rogaratinib on trial at [facility] on 11/30/2022. On 800 mg bid  - CT at [facility] on 1/24/23 showed response. Rogaratinib on hold due to side effects  - Rogaratinib temporarily suspended on 4/17 in the setting of new onset constipation and blood with BM to investigate whether drug was causing adverse effects vs. GI/tumor source      Leukocytosis  WBC 15 on presentation. Reported fever of T 100.5 at home day prior to admission. Concern for urinary source with recent difficulty urinating, dysuria    - UA noninfectious  - afebrile since admission  - lactate wnl    GERD (gastroesophageal reflux disease)  Continue home pepcid 20mg bid    Lethargy  Likely related to possible infection, progressing cancer.   PT/OT consulted for assessment of worsening functional status    Hyponatremia  Baseline 136. 129 on admission, likely in the setting of recent poor PO intake    Stage 3 chronic kidney disease  Baseline sCr 1.1    Coronary artery disease  ASA and statin. Holding ASA for potential surgical intervention.    Hypercholesteremia  On statin; has mild transaminitis on labs. Continue home atorvastatin    Hypertension  On metoprolol 50, losartan 100 and nifedipine 60 per chart review. Normotensive. Will clarify and add anti-HTN as appropriate.     Will hold losartan for potential surgical procedure.   Continue metoprolol, nifedipine.              Alvino Araya MD  Hematology/Oncology  [facility] - [facility]        I have reviewed the notes, assessments, and/or procedures performed by the housestaff, as above.  I have personally interviewed and examined the patient at the beside, and rounded with the housestaff. I concur with her/his assessment and plan and the documentation of  Forrest Schmidt.  More than 40 mins total time were spent during this encounter.      Miguel Nova M.D., M.S., F.A.C.P.  Hematology/Oncology Attending  [facility]

## 2023-04-25 NOTE — PLAN OF CARE
Pt AAOx4. SBA. Wife at bedside. Bladder scan done - 263 retained but urinated it out not long after. No longer retaining urine. Na 129; phos 2; LFTs elevated. Lovenox held for potential of procedures. VS stable. No surgical procedure done today. Cialis 5mg not carried at [facility] - pt getting 5mg from home soon.

## 2023-04-25 NOTE — HPI
"Brayan is a 78 yo M with a history of GIST of small intestine with worsening mets to pelvis and liver, HTN, CKD presenting for new onset constipation and increasing blood with BM over the past month  Urology consulted for difficulty with urination.    Pt reports feeling like he has not been able to completely empty his bladder for the past few weeks.  He endorses intermittent suprapubic pain and pelvic pressure.  He also notes that he has a weak stream and is only able to urinate "a few dribbles" at at time.  He reports one febrile episode to 100.5 at home and burning with urination but denies dysuria currently.  He has been on flomax in the past as well as 5 mg of daily cialis for LUTS but these have not been reordered inpatient.      On assessment, he is AFVSS.  Cr 1.1 at baseline.  WBC 15.  UA negative nitrites, negative leuks.  No micro available.  CTAP obtained on 4/21 shows large pelvic mass involving the rectum and sigmoid colon, with likely mass effect on the bladder.  Bladder appears distended.  Pelvic mass does not seem to involve the prostate.  No hydronephrosis or stones bilaterally.  Bilateral renal and parapelvic cysts.    PVR bladder scan obtained after voiding 150 mL showing 263 mL in the bladder.   He then voided another 200 mL      "

## 2023-04-25 NOTE — HOSPITAL COURSE
Patient admitted with constipation and bleeding with BMs, with imaging showing progression of known pelvic mass. Discussed with surgical oncology with options being mass debulking (felt to be worse option) and possible diverting colostomy. They are planning to discuss with patient's medical oncology team at [facility] and present final options to the patient. Patient able to void, Dove not needed. Patient continued to have small bowel movements with scant blood. Radiation Oncology consulted and initiated palliative radiation therapy to the pelvis on 4/27, planned for 5 cycles. Surgical Oncology planned for diverting ostomy.

## 2023-04-25 NOTE — CONSULTS
Surgical Oncology    Reason for Consult: Recurrent GIST    History of Present Illness:  78 y.o. male who presented with malaise, constipation, and BRBPR in the setting of known recurrent GIST.  He also notes that he is having trouble emptying his bladder.  He had a CT on 4/21 showing a pelvic mass w/ mass effect on bladder.  He notes he is still having bowel movements, but these are usually small pellets.  He is still tolerating a diet.    Surgical oncology was consulted for obstructive symptoms and BRBPR.    Relevant oncologic hx is  5 years ago- SBR by Dr. Ahmet Sanchez  4 years ago- y90 liver ablation  3 years ago- Ex lap, colon resection, peritoneal resection, small bowel resection, mesh underlay for closure (at [facility])  Since then- multiple different medical therapies, most recently has been on Rogaratinib for several months.  Says he initially had good response, but now progression of disease    Allergies: Augmentin (SOB)  PMHx: HTN, CKD  PSHx: Relevant above    ROS - Negative except above    Vital Signs (Most Recent)  Temp: 98.9 °F (37.2 °C) (04/25/23 1544)  Pulse: 77 (04/25/23 0858)  Resp: 14 (04/25/23 0858)  BP: 136/74 (04/25/23 0858)  SpO2: 95 % (04/25/23 0858)    Physical Exam   Constitutional: He is oriented to person, place, and time. He appears well-developed and well-nourished. No distress.   HENT:   Head: Normocephalic and atraumatic.   Eyes: Pupils are equal, round, and reactive to light. Right eye exhibits no discharge. Left eye exhibits no discharge.   Cardiovascular: Normal rate and regular rhythm. Pulmonary:      Effort: Pulmonary effort is normal. No respiratory distress.      Breath sounds: Normal breath sounds.     Abdominal:   Soft, minimally distended  Well healed midline incision   Musculoskeletal:         General: No tenderness or deformity. Normal range of motion.      Cervical back: Normal range of motion and neck supple.   Neurological: He is alert and oriented to person, place,  and time.   Skin: Skin is warm and dry. Capillary refill takes less than 2 seconds. No rash noted. He is not diaphoretic.   Psychiatric: His behavior is normal.   Vitals reviewed.     Labs- WBC 13, hgb 10.7, Na 129, Alk phos 235, Albumin 2.5  Imaging- CT reviewed, large pelvic mass looks to be causing bladder and rectal compression.  Liver mets noted.  Appears to have near obstructing rectal mass on this scan.    Assessment and Plan:  78 y.o. male w/ recurrent GIST (metastatic)    -Spoke with pt and wife about surgical options  -Resection of pelvic mass would require end colostomy and would be a major undertaking.  -Alternative option would be loop colostomy to relieve obstruction and leave pelvic mass in place  -We will discuss with his surgeon at [facility] and he and his wife will consider options for now.  We will check back in with him tomorrow  -He did express some interest in transferring back to [facility].  He isn't completely obstructed at this time, so do not have a medical reason he couldn't transfer right now.    Amilcar Lim MD  General Surgery, PGY-5  [phone]

## 2023-04-25 NOTE — ASSESSMENT & PLAN NOTE
78M with hx of GIST and worsening pelvic mets presents for worsening constipation and difficulty urinating.    - based on bladder scans and PVR, patient is emptying his bladder   - no current indication for baldwin catheter, but would have a low threshold to bladder scan and place baldwin if patient feels like he is unable to urinate or starts having suprapubic pain   - would recommend catheter placement for bladder scan > 500   - recommend Flomax and restarting cialis 5 mg daily   - recommend aggressive bowel regimen as constipation is likely contributing to urinary sx

## 2023-04-25 NOTE — CONSULTS
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    Pt reports feeling like he has not been able to completely empty his bladder for the past few weeks.  He endorses intermittent suprapubic pain and pelvic pressure.  He also notes that he has a weak stream and is only able to urinate "a few dribbles" at at time.  He reports one febrile episode to 100.5 at home and burning with urination but denies dysuria currently.  He has been on flomax in the past as well as 5 mg of daily cialis for LUTS but these have not been reordered inpatient.      On assessment, he is AFVSS.  Cr 1.1 at baseline.  WBC 15.  UA negative nitrites, negative leuks.  No micro available.  CTAP obtained on 4/21 shows large pelvic mass involving the rectum and sigmoid colon, with likely mass effect on the bladder.  Bladder appears distended.  Pelvic mass does not seem to involve the prostate.  No hydronephrosis or stones bilaterally.  Bilateral renal and parapelvic cysts.    PVR bladder scan obtained after voiding 150 mL showing 263 mL in the bladder.   He then voided another 200 mL          Past Medical History:   Diagnosis Date    Anticoagulant long-term use     Arthritis     BPH (benign prostatic hyperplasia)     " "Coronary artery disease     stent x1     Hypercholesteremia     Hypertension     Low iron     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 2/15/2018    Osteopenia        Past Surgical History:   Procedure Laterality Date    APPENDECTOMY      COLONOSCOPY N/A 5/15/2017    Procedure: COLONOSCOPY;  Surgeon: Dez Jimenez MD;  Location: [facility];  Service: Endoscopy;  Laterality: N/A;    CORONARY STENT PLACEMENT      ESOPHAGOGASTRODUODENOSCOPY      EYE SURGERY      cataract extraction    SKIN BIOPSY      TONSILLECTOMY      VASECTOMY         Review of patient's allergies indicates:   Allergen Reactions    Augmentin [amoxicillin-pot clavulanate] Shortness Of Breath     disoriented    Fexofenadine Other (See Comments)     Pt states he can't remember the details but it was a bad effect.    Singulair [montelukast] Other (See Comments)     Pt "felt strange"  Pt "felt strange" bloating, Intestinal irritation, abd cramping      Ace inhibitors Other (See Comments)     cough    Captopril     Doxycycline Nausea And Vomiting     headache    Horse/equine containing products Hives    Hydrocodone Nausea Only    Scopolamine hbr Other (See Comments)     Nausea, headache, changes in BP        Family History       Problem Relation (Age of Onset)    Breast cancer Mother    Cancer Mother, Maternal Grandmother    Diabetes Mother    Heart attack Paternal Grandfather    Stroke Mother, Father            Tobacco Use    Smoking status: Former     Packs/day: 0.50     Years: 15.00     Pack years: 7.50     Types: Cigarettes     Quit date:      Years since quittin.3    Smokeless tobacco: Never    Tobacco comments:     Quit smoking about 40 yrs ago   Substance and Sexual Activity    Alcohol use: Yes     Alcohol/week: 1.0 standard drink     Types: 1 Glasses of wine per week     Comment: 2-4 glasses wine/day, 17 last drink    Drug use: No    Sexual activity: Yes     Partners: Female       Review of Systems "   Constitutional:  Positive for fever. Negative for chills.   Respiratory:  Negative for cough and shortness of breath.    Gastrointestinal:  Positive for abdominal pain, blood in stool and constipation.   Genitourinary:  Positive for decreased urine volume, difficulty urinating and dysuria. Negative for flank pain and hematuria.   Psychiatric/Behavioral:  Negative for agitation and confusion.      Objective:     Temp:  [97.5 °F (36.4 °C)-98.5 °F (36.9 °C)] 98.1 °F (36.7 °C)  Pulse:  [] 77  Resp:  [14-25] 14  SpO2:  [95 %-100 %] 95 %  BP: (132-153)/(69-79) 136/74     Body mass index is 24.78 kg/m².           Drains       None                   Physical Exam  Constitutional:       General: He is not in acute distress.  HENT:      Head: Normocephalic.   Eyes:      Extraocular Movements: Extraocular movements intact.   Cardiovascular:      Rate and Rhythm: Normal rate.   Pulmonary:      Effort: Pulmonary effort is normal. No respiratory distress.   Abdominal:      Palpations: Abdomen is soft. There is no mass.   Genitourinary:     Comments: LUPE: smooth prostate, no nodules or induration.  50g   Musculoskeletal:         General: No swelling or deformity.      Cervical back: Normal range of motion.   Skin:     General: Skin is warm and dry.   Neurological:      General: No focal deficit present.      Mental Status: He is alert.   Psychiatric:         Mood and Affect: Mood normal.         Behavior: Behavior normal.       Significant Labs:    BMP:  Recent Labs   Lab 04/21/23  1658 04/24/23  1356 04/25/23  0633   * 129* 129*   K 4.5 4.3 4.1   CL 95 97 98   CO2 30 23 21*   BUN 17 18 21   CREATININE 1.27 1.1 1.1   CALCIUM 9.1 9.6 9.3       CBC:  Recent Labs   Lab 04/21/23  1658 04/24/23  1356 04/25/23  0633   WBC 11.98 15.23* 12.75*   HGB 11.7* 10.7* 10.7*   HCT 35.9* 33.6* 33.6*    358 379       Blood Culture: No results for input(s): LABBLOO in the last 168 hours.  Urine Culture: No results for input(s):  LABURIN in the last 168 hours.  Urine Studies:   Recent Labs   Lab 04/24/23  1517   COLORU Yellow   APPEARANCEUA Hazy*   PHUR 7.0   SPECGRAV 1.010   PROTEINUA Negative   GLUCUA Negative   KETONESU Negative   BILIRUBINUA Negative   OCCULTUA Negative   NITRITE Negative   LEUKOCYTESUR Negative     All pertinent labs results from the past 24 hours have been reviewed.    Significant Imaging:  All pertinent imaging results/findings from the past 24 hours have been reviewed.                      Assessment and Plan:     GIST (gastrointestinal stromal tumor) of small bowel, malignant  78M with hx of GIST and worsening pelvic mets presents for worsening constipation and difficulty urinating.    - based on bladder scans and PVR, patient is emptying his bladder   - no current indication for baldwin catheter, but would have a low threshold to bladder scan and place baldwin if patient feels like he is unable to urinate or starts having suprapubic pain   - would recommend catheter placement for bladder scan > 500   - recommend Flomax and restarting cialis 5 mg daily   - recommend aggressive bowel regimen as constipation is likely contributing to urinary sx         VTE Risk Mitigation (From admission, onward)           Ordered     enoxaparin injection 40 mg  Daily         04/24/23 1750     Reason for No Pharmacological VTE Prophylaxis  Once        Question:  Reasons:  Answer:  Active Bleeding    04/24/23 1532     IP VTE HIGH RISK PATIENT  Once         04/24/23 1532     Place sequential compression device  Until discontinued         04/24/23 1532                    Thank you for your consult. I will sign off. Please contact us if you have any additional questions.    Jeimy Hammond MD  Urology  [facility]    I have reviewed and concur with the resident's history, physical, assessment, and plan.  I have personally interviewed and examined the patient at bedside.  See below addendum for my evaluation and additional  findings.   No geetha urinary retention.  Bladder scan prn concern for incomplete emptying.

## 2023-04-25 NOTE — NURSING TRANSFER
Nursing Transfer Note      04/24/2023    Reason patient is being transferred: admit inpt.    Transfer From: ED To: TSU    Transfer via wheelchair    Transfer with Personal belongings    Transported by transporter.    Medicines sent: none.    Any special needs or follow-up needed: COVID test results.    Chart send with patient: No    Notified: spouse @ bedside.    Patient reassessed at: 04/24/23, 2310.     Upon arrival to floor: patient oriented to room, call bell in reach, and bed in lowest position.    Pts' VSS upon arrival to . Pt. Admitted to inpt. The POC reviewed with pt. & pts' spouse. WCTM.

## 2023-04-25 NOTE — TELEPHONE ENCOUNTER
Care Companion Intervention    Reason for intervention: Other reason (comment)  Comment:  Bleeding    Intervention: Patient instructed to go to emergency department  Comment:  Patient is currently admitted for bleeding. Will f/u once he is discharged. Noted to have progression of disease per his most recent scan        ALFRED Diallo, PA-C  Physician Assistant Certified  Dept of Hematology/Oncology  PA-C to Dr. Umana, Dr. Patino and Dr. Donovan

## 2023-04-25 NOTE — PHARMACY MED REC
"Admission Medication History     The home medication history was taken by Irena Lorenzana.    You may go to "Admission" then "Reconcile Home Medications" tabs to review and/or act upon these items.     The home medication list has been updated by the Pharmacy department.   Please read ALL comments highlighted in yellow.   Please address this information as you see fit.    Feel free to contact us if you have any questions or require assistance.      The medications listed below were removed from the home medication list. Please reorder if appropriate:  Patient reports no longer taking the following medication(s):  SUCRALFATE 100 MG/ ML SUSPENSION   ROGARATINIB  ZINC GLUCONATE    Medications listed below were obtained from: Patient/family  PTA Medications   Medication Sig    acetaminophen (TYLENOL ORAL) Take 500 mg by mouth 2 (two) times a day.    ammonium lactate (LAC-HYDRIN) 12 % lotion Apply topically 2 (two) times daily.    arginine 500 mg tablet Take 1,000 mg by mouth once daily.     aspirin (ECOTRIN) 81 MG EC tablet Take 81 mg by mouth once daily.    atorvastatin (LIPITOR) 40 MG tablet Take 1 tablet (40 mg total) by mouth once daily.    bisacodyL (DULCOLAX) 5 mg EC tablet Take 1 tablet (5 mg total) by mouth 2 (two) times daily.    calcium carbonate (TUMS) 200 mg calcium (500 mg) chewable tablet Take 500 mg by mouth 3 (three) times daily as needed for Heartburn.    cetirizine (ZYRTEC) 5 MG tablet Take 5 mg by mouth once daily.    cholecalciferol, vitamin D3, (VITAMIN D3) 50 mcg (2,000 unit) Cap Take 1 capsule by mouth once daily. morning    cyanocobalamin (VITAMIN B-12) 1000 MCG tablet Take 1,000 mcg by mouth once daily. morning    famotidine (PEPCID) 20 MG tablet Take 20 mg by mouth 2 (two) times daily.    fish oil-omega-3 fatty acids 300-1,000 mg capsule Take 1 g by mouth once daily. morning    fluticasone (FLONASE) 50 mcg/actuation nasal spray 1 spray by Each Nare route 2 (two) times daily.    guaiFENesin 1,200 " mg Ta12 Take 1 tablet by mouth 2 (two) times daily as needed (cold symptoms).    hydrocortisone 2.5 % cream Apply topically 2 (two) times daily.    losartan (COZAAR) 100 MG tablet Take 1 tablet (100 mg total) by mouth once daily.    magnesium 250 mg Tab Take 1 tablet by mouth once daily. morning    metoprolol succinate (TOPROL-XL) 50 MG 24 hr tablet Take 50 mg by mouth once daily.    multivitamin (THERAGRAN) per tablet Take 1 tablet by mouth once daily. morning    NIFEdipine (PROCARDIA-XL) 30 MG (OSM) 24 hr tablet Take 30 mg by mouth once daily.    nitroGLYCERIN (NITROSTAT) 0.4 MG SL tablet Place 1 tablet (0.4 mg total) under the tongue every 5 (five) minutes as needed for Chest pain.    ondansetron (ZOFRAN-ODT) 8 MG TbDL Take 1 tablet (8 mg total) by mouth every 6 (six) hours as needed (nausea).    promethazine (PHENERGAN) 25 MG tablet Take 1 tablet (25 mg total) by mouth every 6 (six) hours as needed for Nausea.    sodium chloride 5% () 5 % ophthalmic solution Place 1 drop in the right eye three times every morning    tadalafil (CIALIS) 5 MG tablet Take 5 mg by mouth once daily at 6am.    testosterone cypionate (DEPOTESTOTERONE CYPIONATE) 200 mg/mL injection Inject 200 mg into the muscle every 14 (fourteen) days.     vit A/vit C/vit E/zinc/copper (ICAPS AREDS ORAL) Take 1 tablet by mouth twice daily.         Irena Lorenzana  EXT [id number]                  .

## 2023-04-25 NOTE — PLAN OF CARE
Problem: Occupational Therapy  Goal: Occupational Therapy Goal  Description: No goals set 2/2 pt performing at/near baseline.     Outcome: Met   Pt performing skills at/near baseline and pt declined further OT services; please reconsult if necessary.   Pt with some impaired GM/FM use of B hands 2/2 reporting hand and foot syndrome due to chemo therapy. Pt with digital sheaths at bedside with L 4th digit sheath donned, becoming red with blood 2/2 nail bed bleeding after pt reporting he grasped handrail in session. Pt reports that he has had difficulty with this as a side effect before but that this time it is more painful and that he has had more bleeding from nails recently. Pt provided with standard bandage but reports other bandages work better as compression causes pain. Pt and spouse educated on possible follow up with OP OT for digital splints or resting hand splints to limit digital flexion to reduce possible nail bed trauma with hand and foot syndrome with functional tasks during day or with unconscious movement at night. Pt reports he does not yet feel he needs splinting at this time and reports symptoms likely to improve without continued chemo. Pt and spouse educated on use of built up handles and generalized use of GM grasps to reduce nail bed trauma as able. Pt provided with built up handle using Coban for spoon as well as Coban to build up handles for fork and knife (not yet present in room). Pt and spouse educated on use of PVC pipe insulation as adaptive tool to build up utensils for home use as pt reports that he has some PVC pipe insulation tubing at home.     Pt reported no further OT needs, reports independence with home adaptations (such as using needle nose pliers to assist with small buttons) and educated on possibility to follow up with OT services after return home if he wishes to trial adaptive dressing tools. Pt and spouse verbalized understanding at this time.   D/C OT per pt request.    I will START or STAY ON the medications listed below when I get home from the hospital:    oxyCODONE-acetaminophen 5 mg-325 mg oral tablet  -- 1 tab(s) by mouth every 4 hours, As needed, Moderate Pain (4 - 6) MDD:6  -- Indication: For As needed moderate pain    dilTIAZem 60 mg oral tablet  -- 1 tab(s) by mouth 3 times a day  -- Indication: For Heart medicine    glyBURIDE micronized 3 mg oral tablet  -- 1 tab(s) by mouth once a day  -- Indication: For Diabetes     metFORMIN 850 mg oral tablet  -- 1 tab(s) by mouth once a day (in the morning)  -- Indication: For Diabetes     simvastatin 20 mg oral tablet  -- 1 tab(s) by mouth once a day (at bedtime)  -- Indication: For Cholesterol    zolpidem 10 mg oral tablet  -- 0.5 tab(s) by mouth once a day (at bedtime), As Needed  -- Indication: For Insomnia    metoprolol tartrate 25 mg oral tablet  -- 1 tab(s) by mouth 2 times a day  -- Indication: For Heart medicine    docusate sodium 100 mg oral capsule  -- 1 cap(s) by mouth 3 times a day  -- Indication: For Stool softener with pain medication    sulfamethoxazole-trimethoprim 800 mg-160 mg oral tablet  -- 1 tab(s) by mouth 2 times a day  -- Indication: For Antibiotic    Levoxyl 75 mcg (0.075 mg) oral tablet  -- 1 tab(s) by mouth once a day. AM  -- Indication: For Thyroid    Vitamin D2 50,000 intl units (1.25 mg) oral capsule  -- 1 cap(s) by mouth once a week. Friday  -- Indication: For Supplement

## 2023-04-25 NOTE — PT/OT/SLP EVAL
Occupational Therapy   Evaluation and Discharge Note    Name: Forrest Schmidt  MRN: [id number]  Admitting Diagnosis: Mechanical gastrointestinal obstruction  Recent Surgery: * No surgery found *      Recommendations:     Discharge Recommendations: home health PT, home health OT  Discharge Equipment Recommendations: none  Barriers to discharge:  None    Assessment:     Forrest Schmidt is a 78 y.o. male with a medical diagnosis of Mechanical gastrointestinal obstruction. At this time, patient is functioning at their prior level of function and does not require further acute OT services.     Plan:     During this hospitalization, patient does not require further acute OT services.  Please re-consult if situation changes.    Plan of Care Reviewed with: patient, spouse    Subjective     Chief Complaint: Pt reports some difficulty using B hands for GM/FM activities 2/2 nail beds pulling up with movement as pt reports he has hand and foot syndrome from chemo. Pt reports slight head ache  Patient/Family Comments/goals: To return to [facility], to have good plan for oncology management, for good outcome for tumor management.     Occupational Profile:  Living Environment: Pt [place] [facility] [place]; preferred bed and bath on 2nd floor (~17 steps to 2nd floor) with WIS and SC; bed and bath available on first floor with tub/sh and pt has clamp GB and 3-4 built in GBs available for use  Previous level of function: Indep to Mod I ADLs and functional mobility with PRN assist recently for small buttons and feeding per spouse as pt's nails have become more painful   Roles and Routines: Caretaker to self and home. Generally pt cooks, cleans, drives but spouse completes household grocery shopping. Pt enjoys gardening at his home, spouse reports that pts has built several large green houses in the past few years and does fairly large scale gardening. He maintains 20 acres of land and enjoys yard work, reading, watching TV  Equipment Used at home:  shower chair, walker, rolling, bedside commode, cane, straight  Assistance upon Discharge: Family    Pain/Comfort:  Pain Rating 1:  (1-2/10 (which he states is baseline))  Location - Orientation 1: generalized  Location 1: head  Pain Addressed 1: Distraction, Cessation of Activity  Pain Rating Post-Intervention 1:  (did not rate)  Pain Rating 2:  (did not rate)  Location 2:  (all nail beds)  Pain Addressed 2: Cessation of Activity, Distraction (provided with means to build up handles on utensils)  Pain Rating Post-Intervention 2:  (did not rate)    Patients cultural, spiritual, Advent conflicts given the current situation:      Objective:     Communicated with: nsg prior to session.  Patient found HOB elevated with telemetry, pulse ox (continuous), peripheral IV (digital sheaths, 4th digit L hand donned, all others doffed) upon OT entry to room.    General Precautions: Standard, fall  Orthopedic Precautions: N/A  Braces: N/A  Respiratory Status: Room air     Occupational Performance:    Bed Mobility:    Patient completed Rolling/Turning to Right with supervision  Patient completed Scooting/Bridging with supervision  Patient completed Supine to Sit with supervision  Patient completed Sit to Supine with supervision    Functional Mobility/Transfers:  Patient completed Sit <> Stand Transfer with supervision  with  no assistive device   Functional Mobility: Pt with fair+ to good dynamic seated and standing balance. Pt ambulated in hallway with PT and completed stair training; see PT note for full details.     Activities of Daily Living:  Grooming: set up A to open small plastic packaging (which spouse states she has difficulty with as well to brush teeth in stance at sink and to wash hands 2/2 L 4th digit bleeding  Upper Body Dressing: stand by assistance to don gown as robe seated EOB  Lower Body Dressing: stand by assistance to don/doff B socks seated EOB  Toileting: modified independence per pt and family as nsg  reports pt has just been up to toilet prior to OT entrance to room     Cognitive/Visual Perceptual:  Cognitive/Psychosocial Skills:     -       Oriented to: Person, Place, Time and Situation   -       Follows Commands/attention:Follows multistep  commands  -       Communication: clear/fluent  -       Memory: No Deficits noted  -       Safety awareness/insight to disability: intact   -       Mood/Affect/Coping skills/emotional control: Appropriate to situation     Physical Exam:  Postural examination/scapula alignment:    -       Rounded shoulders, forward head  Skin integrity: Nail beds impaired, pulling from skin, some with active and prior bleeding  Sensation:    -       Impaired, intermittent tingling BUE per pt report  Motor Planning:    -       WFL  Dominant hand:    -       R handed  Upper Extremity Range of Motion: BUE WFL     Upper Extremity Strength:  BUE grossly 4/5    Strength:  B hands WFL  Fine Motor Coordination:    -      Fairly intact but limited 2/2 pain in nail bed with current nail bed changes  Gross motor coordination:   WFL     AMPAC 6 Click ADL:  AMPAC Total Score: 19    Treatment & Education:  Pt educated on role of OT and POC.   Pt performing skills as listed above.   Pt with some impaired GM/FM use of B hands 2/2 reporting hand and foot syndrome due to chemo therapy. Pt with digital sheaths at bedside with L 4th digit sheath donned, becoming red with blood 2/2 nail bed bleeding after pt reporting he grasped handrail in session. Pt reports that he has had difficulty with this as a side effect before but that this time it is more painful and that he has had more bleeding from nails recently. Pt provided with standard bandage but reports other bandages work better as compression causes pain. Pt and spouse educated on possible follow up with OP OT for digital splints or resting hand splints to limit digital flexion to reduce possible nail bed trauma with hand and foot syndrome with  functional tasks during day or with unconscious movement at night. Pt reports he does not yet feel he needs splinting at this time and reports symptoms likely to improve without continued chemo. Pt and spouse educated on use of built up handles and generalized use of GM grasps to reduce nail bed trauma as able. Pt provided with built up handle using Coban for spoon as well as Coban to build up handles for fork and knife (not yet present in room). Pt and spouse educated on use of PVC pipe insulation as adaptive tool to build up utensils for home use as pt reports that he has some PVC pipe insulation tubing at home.      Pt reported no further OT needs, reports independence with home adaptations (such as using needle nose pliers to assist with small buttons) and educated on possibility to follow up with OT services after return home if he wishes to trial adaptive dressing tools. Pt and spouse verbalized understanding at this time.   D/C OT per pt request.     Patient left HOB elevated with all lines intact, call button in reach, nsg notified, and spouse present    GOALS:   Multidisciplinary Problems       Occupational Therapy Goals       Not on file              Multidisciplinary Problems (Resolved)          Problem: Occupational Therapy    Goal Priority Disciplines Outcome Interventions   Occupational Therapy Goal   (Resolved)     OT, PT/OT Met    Description: No goals set 2/2 pt performing at/near baseline.                          History:     Past Medical History:   Diagnosis Date    Anticoagulant long-term use     Arthritis     BPH (benign prostatic hyperplasia)     Coronary artery disease     stent x1 2005    Hypercholesteremia     Hypertension     Low iron     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 2/15/2018    Osteopenia          Past Surgical History:   Procedure Laterality Date    APPENDECTOMY      COLONOSCOPY N/A 5/15/2017    Procedure: COLONOSCOPY;  Surgeon: Dez Jimenez,  MD;  Location: [facility];  Service: Endoscopy;  Laterality: N/A;    CORONARY STENT PLACEMENT      ESOPHAGOGASTRODUODENOSCOPY      EYE SURGERY      cataract extraction    SKIN BIOPSY      TONSILLECTOMY      VASECTOMY         Time Tracking:     OT Date of Treatment: 04/25/23  OT Start Time: 1113  OT Stop Time: 1145  OT Total Time (min): 32 min    Billable Minutes:Evaluation 16  Self Care/Home Management 16    4/25/2023

## 2023-04-25 NOTE — ASSESSMENT & PLAN NOTE
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 His GIST is negative for KIT, PDGFRA, SDH, BRAF, NF1, NF2 mutations. He had oligometastatic disease to the liver found on CT scan 5/20/19. He went to [facility] and underwent IR liver lesion biopsy and ablation on 7/23/19. Pathology showed metastatic GIST positive for DOG-1 and .   - surveillance MRI in Feb 2020 showed progressive disease with new peritoneal mets. Patient was seen by Dr Guaman. Gleevec was recommended.   - CT after 2.5 month of Gleevec showed progressive peritoneal disease.   - underwent cytoreductive surgery at [facility] on 7/10/20  - started sutent 37.5 mg daily on 8/10/20.   - s/p ablation to segment 5 lesion and re-treatment of segment 7 lesion in Dec 2020.   - CT scan 1/12/22 showed progression. Treatment was switched to regorafenib. Started on 1/29/22  - CT 3/23/22 showed progression in liver metastases, peritoneal mets and left lung nodule  - started temodar 85 mg/m2 3 weeks on, 1 week off on 4/6/22. CT on 5/31/22 showed progression  - started pazopanib on 6/10/22 with an escalating dose, reached 800 mg daily on 6/24/22  - Held for 1 week before and 3 weeks after Y90 on 8/17/22. Resumed at 600 mg on 9/8/22. Had significant fatigue when he was on 800 mg daily. Stopped votrient on 11/10/22 for [facility] trial. Had progression on votrient  - Started rogaratinib on trial at [facility] on 11/30/2022. On 800 mg bid  - CT at [facility] on 1/24/23 showed response. Rogaratinib on hold due to side effects  - Rogaratinib temporarily suspended on 4/17 in the setting of new onset constipation and blood with BM to investigate whether drug was causing adverse effects vs. GI/tumor source

## 2023-04-25 NOTE — PT/OT/SLP EVAL
Physical Therapy Co-Evaluation and Discharge Note    Patient Name:  Forrest Schmidt   MRN:  [id number]    Co-evaluation and co-treatment performed for this visit due to suspected patient need for two skilled therapists to ensure patient and staff safety and to accommodate for patient activity tolerance/pain management     Recommendations:     Discharge Recommendations: other (see comments)  Discharge Equipment Recommendations: none   Barriers to discharge: None    Assessment:     Forrest Schmidt is a 78 y.o. male admitted with a medical diagnosis of Mechanical gastrointestinal obstruction. .  At this time, patient is functioning at their prior level of function and does not require further acute PT services.     Recent Surgery: * No surgery found *      Plan:     During this hospitalization, patient does not require further acute PT services.  Please re-consult if situation changes.      Subjective     Chief Complaint: HA  Patient/Family Comments/goals: pt. Agreeable to PT  Pain/Comfort:  Pain Rating 1: 1/10  Location 1:  (headache)  Pain Addressed 1: Distraction  Pain Rating Post-Intervention 1: 1/10    Patients cultural, spiritual, Restorationism conflicts given the current situation: no    Living Environment:  Pt. Lives with spouse in 2-story home with 1 ELROY and 17 stairs and (B) handrails to second floor bedroom/bathroom. Pt. has bedroom/bathroom on first floor.  Prior to admission, patients level of function was amb. with/without AD.  Equipment used at home: walker, rolling, cane, straight, bedside commode, shower chair.  Upon discharge, patient will have assistance from spouse.    Objective:     Communicated with nursing prior to session.  Patient found supine with peripheral IV upon PT entry to room.    General Precautions: Standard, fall    Orthopedic Precautions:N/A   Braces: N/A  Respiratory Status: Room air    Exams:  RLE ROM: WFL  RLE Strength: WFL  LLE ROM: WFL  LLE Strength: WFL    Functional Mobility:  Bed  Mobility:     Rolling Right: supervision  Scooting: supervision  Supine to Sit: supervision  Sit to Supine: supervision  Transfers:     Sit to Stand:  supervision with no AD  Gait: >300' with Supervision without AD or LOB  Balance: good  Stairs:  Pt ascended/descended 12 stair(s) with No Assistive Device with bilateral handrails with Supervision or Set-up Assistance.     AM-PAC 6 CLICK MOBILITY  Total Score:22       Treatment and Education:  Discussed therapy needs, goals, and POC. Pt. Performed ADLs at sink with OT in standing.    AM-PAC 6 CLICK MOBILITY  Total Score:22     Patient left supine with all lines intact and call button in reach.    GOALS:   Multidisciplinary Problems       Physical Therapy Goals       Not on file              Multidisciplinary Problems (Resolved)          Problem: Physical Therapy    Goal Priority Disciplines Outcome Goal Variances Interventions   Physical Therapy Goal   (Resolved)     PT, PT/OT Met                         History:     Past Medical History:   Diagnosis Date    Anticoagulant long-term use     Arthritis     BPH (benign prostatic hyperplasia)     Coronary artery disease     stent x1 2005    Hypercholesteremia     Hypertension     Low iron     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 2/15/2018    Osteopenia        Past Surgical History:   Procedure Laterality Date    APPENDECTOMY      COLONOSCOPY N/A 5/15/2017    Procedure: COLONOSCOPY;  Surgeon: Dez Jimenez MD;  Location: [facility];  Service: Endoscopy;  Laterality: N/A;    CORONARY STENT PLACEMENT      ESOPHAGOGASTRODUODENOSCOPY      EYE SURGERY      cataract extraction    SKIN BIOPSY      TONSILLECTOMY      VASECTOMY         Time Tracking:     PT Received On: 04/25/23  PT Start Time: 1113     PT Stop Time: 1145  PT Total Time (min): 32 min     Billable Minutes: Evaluation 15 and Gait Training 17      04/25/2023

## 2023-04-25 NOTE — ASSESSMENT & PLAN NOTE
WBC 15 on presentation. Reported fever of T 100.5 at home day prior to admission. Concern for urinary source with recent difficulty urinating, dysuria    - UA noninfectious  - afebrile since admission  - lactate wnl

## 2023-04-25 NOTE — PROGRESS NOTES
Fall bundle in place. Pt. Remained free from falls/trauma/injuries. No complaints of CP/ SOB/discomfort. VSS. No tele, pulse WNL. A&Ox4. Pt. Reports pain this shift, refuses pain meds. NPO since midnight pending possible surgical intervention. Pt. Refused baldwin, team informed. The POC was reviewed with pt. & pts' spouse @ bedside, questions were encouraged & answered. No further concerns at this time.

## 2023-04-25 NOTE — SUBJECTIVE & OBJECTIVE
"Past Medical History:   Diagnosis Date    Anticoagulant long-term use     Arthritis     BPH (benign prostatic hyperplasia)     Coronary artery disease     stent x1     Hypercholesteremia     Hypertension     Low iron     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 2/15/2018    Osteopenia        Past Surgical History:   Procedure Laterality Date    APPENDECTOMY      COLONOSCOPY N/A 5/15/2017    Procedure: COLONOSCOPY;  Surgeon: Dez Jimenez MD;  Location: [facility];  Service: Endoscopy;  Laterality: N/A;    CORONARY STENT PLACEMENT      ESOPHAGOGASTRODUODENOSCOPY      EYE SURGERY      cataract extraction    SKIN BIOPSY      TONSILLECTOMY      VASECTOMY         Review of patient's allergies indicates:   Allergen Reactions    Augmentin [amoxicillin-pot clavulanate] Shortness Of Breath     disoriented    Fexofenadine Other (See Comments)     Pt states he can't remember the details but it was a bad effect.    Singulair [montelukast] Other (See Comments)     Pt "felt strange"  Pt "felt strange" bloating, Intestinal irritation, abd cramping      Ace inhibitors Other (See Comments)     cough    Captopril     Doxycycline Nausea And Vomiting     headache    Horse/equine containing products Hives    Hydrocodone Nausea Only    Scopolamine hbr Other (See Comments)     Nausea, headache, changes in BP        Family History       Problem Relation (Age of Onset)    Breast cancer Mother    Cancer Mother, Maternal Grandmother    Diabetes Mother    Heart attack Paternal Grandfather    Stroke Mother, Father            Tobacco Use    Smoking status: Former     Packs/day: 0.50     Years: 15.00     Pack years: 7.50     Types: Cigarettes     Quit date:      Years since quittin.3    Smokeless tobacco: Never    Tobacco comments:     Quit smoking about 40 yrs ago   Substance and Sexual Activity    Alcohol use: Yes     Alcohol/week: 1.0 standard drink     Types: 1 Glasses of wine per week     Comment: " 2-4 glasses wine/day, 5/11/17 last drink    Drug use: No    Sexual activity: Yes     Partners: Female       Review of Systems   Constitutional:  Positive for fever. Negative for chills.   Respiratory:  Negative for cough and shortness of breath.    Gastrointestinal:  Positive for abdominal pain, blood in stool and constipation.   Genitourinary:  Positive for decreased urine volume, difficulty urinating and dysuria. Negative for flank pain and hematuria.   Psychiatric/Behavioral:  Negative for agitation and confusion.      Objective:     Temp:  [97.5 °F (36.4 °C)-98.5 °F (36.9 °C)] 98.1 °F (36.7 °C)  Pulse:  [] 77  Resp:  [14-25] 14  SpO2:  [95 %-100 %] 95 %  BP: (132-153)/(69-79) 136/74     Body mass index is 24.78 kg/m².           Drains       None                   Physical Exam  Constitutional:       General: He is not in acute distress.  HENT:      Head: Normocephalic.   Eyes:      Extraocular Movements: Extraocular movements intact.   Cardiovascular:      Rate and Rhythm: Normal rate.   Pulmonary:      Effort: Pulmonary effort is normal. No respiratory distress.   Abdominal:      Palpations: Abdomen is soft. There is no mass.   Genitourinary:     Comments: LUPE: smooth prostate, no nodules or induration.  50g   Musculoskeletal:         General: No swelling or deformity.      Cervical back: Normal range of motion.   Skin:     General: Skin is warm and dry.   Neurological:      General: No focal deficit present.      Mental Status: He is alert.   Psychiatric:         Mood and Affect: Mood normal.         Behavior: Behavior normal.       Significant Labs:    BMP:  Recent Labs   Lab 04/21/23  1658 04/24/23  1356 04/25/23  0633   * 129* 129*   K 4.5 4.3 4.1   CL 95 97 98   CO2 30 23 21*   BUN 17 18 21   CREATININE 1.27 1.1 1.1   CALCIUM 9.1 9.6 9.3       CBC:  Recent Labs   Lab 04/21/23  1658 04/24/23  1356 04/25/23  0633   WBC 11.98 15.23* 12.75*   HGB 11.7* 10.7* 10.7*   HCT 35.9* 33.6* 33.6*     358 379       Blood Culture: No results for input(s): LABBLOO in the last 168 hours.  Urine Culture: No results for input(s): LABURIN in the last 168 hours.  Urine Studies:   Recent Labs   Lab 04/24/23  1517   COLORU Yellow   APPEARANCEUA Hazy*   PHUR 7.0   SPECGRAV 1.010   PROTEINUA Negative   GLUCUA Negative   KETONESU Negative   BILIRUBINUA Negative   OCCULTUA Negative   NITRITE Negative   LEUKOCYTESUR Negative     All pertinent labs results from the past 24 hours have been reviewed.    Significant Imaging:  All pertinent imaging results/findings from the past 24 hours have been reviewed.

## 2023-04-25 NOTE — SUBJECTIVE & OBJECTIVE
Interval History: NAEON. Patient continues to pass gas, small hard pellet-like stool and BRBPR. Patient able to void successfully, not requiring baldwin catheter at this time.     AF, HDS on RA    Oncology Treatment Plan:   [No matching plan found]    Medications:  Continuous Infusions:  Scheduled Meds:   arginine HCl (L-arginine)  2 tablet Oral QHS    atorvastatin  40 mg Oral QHS    bisacodyL  5 mg Oral BID    cetirizine  5 mg Oral QHS    cyanocobalamin  1,000 mcg Oral Daily    enoxaparin  40 mg Subcutaneous Daily    famotidine  20 mg Oral BID    fluticasone propionate  1 spray Each Nostril BID    metoprolol succinate  50 mg Oral QHS    multivitamin  1 tablet Oral Daily    NIFEdipine  30 mg Oral QHS    omega 3-dha-epa-fish oil  1 capsule Oral Daily    polyethylene glycol  17 g Oral Daily    sodium chloride 2%  2 drop Both Eyes BID    sucralfate  1 g Oral BID WM    vitamin D  2,000 Units Oral Daily     PRN Meds:acetaminophen, aluminum-magnesium hydroxide-simethicone, dextrose 10%, dextrose 10%, dextrose, dextrose, glucagon (human recombinant), hydrocortisone, melatonin, naloxone, nitroGLYCERIN, ondansetron, simethicone, sodium chloride 0.9%, sodium chloride 2%     Review of Systems   Constitutional:  Positive for activity change and fatigue. Negative for chills and fever.   HENT:  Positive for sinus pressure.    Eyes:  Negative for visual disturbance.   Respiratory:  Positive for cough. Negative for shortness of breath.    Cardiovascular:  Negative for chest pain and leg swelling.   Gastrointestinal:  Positive for abdominal pain, blood in stool and constipation. Negative for abdominal distention, diarrhea, nausea and vomiting.   Genitourinary:  Positive for decreased urine volume, difficulty urinating, dysuria and frequency. Negative for urgency.   Musculoskeletal:  Negative for myalgias.   Skin:  Negative for wound.   Neurological:  Positive for weakness. Negative for headaches.   Psychiatric/Behavioral:  Negative  for confusion.    Objective:     Vital Signs (Most Recent):  Temp: 98.1 °F (36.7 °C) (04/25/23 0858)  Pulse: 77 (04/25/23 0858)  Resp: 14 (04/25/23 0858)  BP: 136/74 (04/25/23 0858)  SpO2: 95 % (04/25/23 0858) Vital Signs (24h Range):  Temp:  [97.5 °F (36.4 °C)-98.5 °F (36.9 °C)] 98.1 °F (36.7 °C)  Pulse:  [] 77  Resp:  [14-25] 14  SpO2:  [95 %-100 %] 95 %  BP: (132-153)/(69-79) 136/74     Weight: 80.6 kg (177 lb 11.1 oz)  Body mass index is 24.78 kg/m².  Body surface area is 2.01 meters squared.      Intake/Output Summary (Last 24 hours) at 4/25/2023 1147  Last data filed at 4/25/2023 1107  Gross per 24 hour   Intake 500 ml   Output 900 ml   Net -400 ml       Physical Exam  Vitals and nursing note reviewed.   Constitutional:       General: He is not in acute distress.     Appearance: Normal appearance. He is obese. He is not ill-appearing or toxic-appearing.   HENT:      Head: Normocephalic and atraumatic.      Mouth/Throat:      Mouth: Mucous membranes are dry.   Eyes:      Extraocular Movements: Extraocular movements intact.      Conjunctiva/sclera: Conjunctivae normal.      Pupils: Pupils are equal, round, and reactive to light.   Cardiovascular:      Rate and Rhythm: Normal rate and regular rhythm.      Pulses: Normal pulses.   Pulmonary:      Effort: Pulmonary effort is normal.      Breath sounds: Normal breath sounds. No wheezing or rhonchi.   Abdominal:      General: Bowel sounds are normal.      Palpations: Abdomen is soft.      Tenderness: There is abdominal tenderness (suprapubic). There is no guarding or rebound.   Musculoskeletal:         General: Normal range of motion.      Cervical back: Normal range of motion.      Right lower leg: No edema.      Left lower leg: No edema.   Skin:     General: Skin is warm.   Neurological:      General: No focal deficit present.      Mental Status: He is alert and oriented to person, place, and time.   Psychiatric:         Mood and Affect: Mood normal.        Significant Labs:   CBC:   Recent Labs   Lab 04/24/23  1356 04/25/23  0633   WBC 15.23* 12.75*   HGB 10.7* 10.7*   HCT 33.6* 33.6*    379    and CMP:   Recent Labs   Lab 04/24/23  1356 04/25/23  0633   * 129*   K 4.3 4.1   CL 97 98   CO2 23 21*    108   BUN 18 21   CREATININE 1.1 1.1   CALCIUM 9.6 9.3   PROT 7.2 7.0   ALBUMIN 2.6* 2.5*   BILITOT 0.4 0.4   ALKPHOS 250* 235*   AST 80* 56*   ALT 61* 49*   ANIONGAP 9 10       Diagnostic Results:  CT: 4/21:   Enlarging pelvic mass which involves the rectum and sigmoid colon.  Moderate colonic stool noted proximal E.  2. There are hypodense lesions within the liver which are not definitively seen on recent CT suspicious for metastatic disease progression.  Additional partially calcified lesions are stable likely treated lesions.

## 2023-04-25 NOTE — ASSESSMENT & PLAN NOTE
"Patient presented with 1 month worsening constipation and scant blood with BM. BM consist of 10 "pellets" daily. Associated with 1 week of difficulty urinating, dysuria and lethargy.     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  Moderate colonic stool noted proximal E.  - There are hypodense lesions within the liver which are not definitively seen on recent CT suspicious for metastatic disease progression.  Additional partially calcified lesions are stable likely treated lesions."    Plan  - consulted surgical oncology for potential surgical intervention.  - consulted Urology for urinary retention/obstruction  - regular diet  - patient still passing gas and hard stool, presentation not consistent with full obstruction.  - patient able to void successfully, no need for baldwin catheter    "

## 2023-04-26 NOTE — ASSESSMENT & PLAN NOTE
Baseline sCr 1.1 - stable at baseline.   PM&R PROGRESS NOTE:  Linda Buckley 67 y o  female MRN: [id number]  Unit/Bed#: -01 Encounter: [id number]        Rehabilitation Diagnosis: Impairment of mobility, safety and Activities of Daily Living (ADLs) due to Orthopedic Disorders:  08 2  Femur (Shaft) Fracture    HPI: Jarrett Carlson is a 67 y o  female with a history of hypothyroid, osteoporosis, scoliosis, and hyperlipidemia that presented to [facility] on 2/2/23 with c/o right hip pain after feeling a pop in her right hip when descending stairs  She was unable to ambulate, lowered herself to the floor and crawled to phone to call EMS  On exam right lower extremity shortening and external rotation  Imaging showed acute transverse substantially displaced proximal femoral shaft fracture  Orthopedics was consulted and recommended NWB with Amne's traction, with surgical intervention  On 2/3, patient underwent right insertion IM nailing with Dr Trish Tavares  Patient is WBAT with Lovenox for 28 days for DVT prophylaxis  Post-op complicated by dizziness and an episode requiring her to be lowered to floor  CTA of head/neck was obtained, which showed no acute findings; did show outpouching of PCA  Neurovascular recommended follow-up as outpatient  Echo EF 65%, mild TVR and trace PVR  Patient's hemoglobin 7 3 which she received 1 unit pRBC improving to 9 4  Patient was deemed hemodynamically stable, PT/OT recommend inpatient acute rehabilitation  Patient was admitted to [address] [place] [address] on 02/09/23  SUBJECTIVE: Patient seen face to face  No acute issues overnight  Patient reports adequate pain control with current regimen  Discussed DME recommended by therapies  Patient will follow-up with occupational therapy to review plan for transfer bench as the one they recommended is not available until 03/05  Patient's brother to provide transportation at discharge  Patient has been self administering Lovenox with nursing supervision   Patient able to inject medication but does not have the strength to engage safety shield unless removed from abdomen and both thumbs used to engage  Neighbor was here over weekend and agreed to assist if needed  Visualized distal incision hematoma, improved, images in media  LBM 02/20  No chest pain, shortness of breath, fever, chills, nausea, abdominal pain  ASSESSMENT: Stable, progressing    PLAN:  - pain management- continue with Lidoderm patch and scheduled tylenol  - Continue current rehabilitation program with planned discharge 02/23  -Lovenox- continue to have patient self administer with supervision    Rehabilitation  • Functional deficits:  Self care, impaired mobility  • Continue current rehabilitation plan of care to maximize function      • Functional update:   • PT: mobility- supervision, transfers- incidental touching with RW ambulation- incidental touching with ', stairs- 6 LHR  • OT: ADL- bathing- incidental touching, dressing UB supervision, LB min A, footwear- min A, toileting- incidental touching  • Estimated Discharge: anticipated 02/23 with Outpatient at home PT    Pain  • tylenol    DVT prophylaxis  • Lovenox    Bladder plan  • Continent    Bowel plan  • Continent    * Femur fracture ([address] )  Assessment & Plan  - Pt felt "pop" when descending stairs with inability to ambulate  -X-ray: acute transverse substantially displaced proximal femoral shaft fracture  -2/3/23 right IMN by Dr Amira Ball  -WBAT right lower extremity  - Lovenox for 28 days (3/3)  S/p PRBC x 1 unit    Plan at [facility]:  -Continue WBAT RLE  - Patient developed swelling at right lateral knee evaluated by orthopedics - x-ray completed - per ortho distal incision with hematoma; ace wrap daily and continue therapies as tolerated  -Monitor incision for signs of infection -incision very stable  -Ortho removed staples, 02/16/23  -Continue acute rehabilitation program  -Follow-up with Orthopedic Surgery (Dr Amira Ball)    Acute pain due to trauma  Assessment & Plan  -acute post-op pain right hip/leg  -tylenol 975 mg scheduled, oxycodone IR 2 5-5 mg prn (pt not taking) added Lidoderm patch   -monitor pain with therapy  -adjust medication as needed    Acute blood loss anemia  Assessment & Plan  In acute care post op dizziness, diaphoresis, lowered to floor  -IVF  -Hbg 7 3, received 1 unit pRBC    Here at [facility]:  -current Hbg 9 9  -monitor hemoglobin  -No further diaphoretic episodes    Lightheadedness  Assessment & Plan  -post-op with ambulation  - orthostatic BP normal  -Hbg 7 3 (2/7)  -received IVF, 1 unit PRBC in acute care    No further episodes in acute rehabilitation    Osteoporosis  Assessment & Plan  -took bisphosphonate <10 years  -per Ortho: pt should stop bisphosphonate at this time d/t left femur lateral cortical thickening without obvious stress fracture/break  -needs immediate f/u if develops pain in left leg  -follow-up with Orthopedics    Nail abnormalities  Assessment & Plan  Patient requesting podiatry consult for nail abnormalities - consult placed 2/17/23    HLD (hyperlipidemia)  Assessment & Plan  -  -home: Crestor 10 mg  -continue Pravachol 20 mg    Hypothyroidism  Assessment & Plan  -continue levothyroxine    Appreciate IM consultants medical co-management  Labs, medications, and imaging reviewed  ROS:  Review of Systems   A 10 point review of systems was negative except for what is noted in the HPI  OBJECTIVE:   /61 (BP Location: Left leg)   Pulse 63   Temp 98 °F (36 7 °C) (Oral)   Resp 18   Ht 5' 1" (1 549 m)   Wt 58 7 kg (129 lb 6 4 oz)   SpO2 95%   BMI 24 45 kg/m²     Physical Exam  Constitutional:       Appearance: Normal appearance  HENT:      Head: Normocephalic and atraumatic  Mouth/Throat:      Mouth: Mucous membranes are moist    Cardiovascular:      Rate and Rhythm: Normal rate and regular rhythm  Pulses: Normal pulses  Heart sounds: Normal heart sounds     Pulmonary:      Effort: Pulmonary effort is normal  Breath sounds: Normal breath sounds  Abdominal:      General: Bowel sounds are normal       Palpations: Abdomen is soft  Musculoskeletal:         General: Tenderness present  Normal range of motion  Cervical back: Normal range of motion  Right lower leg: Edema present  Skin:     General: Skin is warm and dry  Capillary Refill: Capillary refill takes less than 2 seconds  Findings: Bruising present  Comments: Right leg ecchymosis, hematoma improving   Neurological:      Mental Status: She is alert and oriented to person, place, and time  Motor: Weakness present        Comments: Right leg weakness   Psychiatric:         Mood and Affect: Mood normal          Judgment: Judgment normal         Lab Results   Component Value Date    WBC 7 57 02/16/2023    HGB 9 9 (L) 02/16/2023    HCT 32 3 (L) 02/16/2023    MCV 96 02/16/2023     (H) 02/16/2023     Lab Results   Component Value Date    SODIUM 142 02/16/2023    K 3 8 02/16/2023     (H) 02/16/2023    CO2 26 02/16/2023    BUN 16 02/16/2023    CREATININE 0 51 (L) 02/16/2023    GLUC 87 02/16/2023    CALCIUM 8 8 02/16/2023     Lab Results   Component Value Date    INR 1 05 02/03/2023    INR 0 95 02/02/2023    PROTIME 13 9 02/03/2023    PROTIME 12 8 02/02/2023       Current Facility-Administered Medications:   •  acetaminophen (TYLENOL) tablet 975 mg, 975 mg, Oral, TID, Fernie Gutierrez MD, 975 mg at 02/20/23 0601  •  artificial tear (LUBRIFRESH P M ) ophthalmic ointment, , Both Eyes, HS PRN, SANTI Fonseca, Given at 02/09/23 1746  •  bisacodyl (DULCOLAX) rectal suppository 10 mg, 10 mg, Rectal, Daily PRN, SANTI Bobo  •  enoxaparin (LOVENOX) subcutaneous injection 40 mg, 40 mg, Subcutaneous, Daily, SANTI Bobo, 40 mg at 02/19/23 1349  •  glycerin-hypromellose- (ARTIFICIAL TEARS) ophthalmic solution 1 drop, 1 drop, Both Eyes, TID, SANTI Bobo, 1 drop at 02/20/23 0804  •  levothyroxine tablet 25 mcg, 25 mcg, Oral, Daily, SANTI Bobo, 25 mcg at 02/19/23 2131  •  lidocaine (LIDODERM) 5 % patch 1 patch, 1 patch, Topical, Daily, SANTI Bobo, 1 patch at 02/20/23 0802  •  meclizine (ANTIVERT) tablet 12 5 mg, 12 5 mg, Oral, Q8H PRN, SANTI Bobo  •  melatonin tablet 3 mg, 3 mg, Oral, HS, Gissell Jaffe MD, 3 mg at 02/19/23 2321  •  oxyCODONE (ROXICODONE) IR tablet 2 5 mg, 2 5 mg, Oral, Q4H PRN, SANTI Bobo  •  oxyCODONE (ROXICODONE) IR tablet 5 mg, 5 mg, Oral, Q4H PRN, SANTI Bobo  •  polyethylene glycol (MIRALAX) packet 17 g, 17 g, Oral, Daily PRN, SANTI Bobo  •  pravastatin (PRAVACHOL) tablet 20 mg, 20 mg, Oral, Daily With Dinner, SANTI Cotter, 20 mg at 02/19/23 1541  •  senna-docusate sodium (SENOKOT S) 8 6-50 mg per tablet 1 tablet, 1 tablet, Oral, HS PRN, SANTI Cotter    Past Medical History:   Diagnosis Date   • Arthritis 2005   • Closed nondisplaced fracture of fifth metatarsal bone of right foot with routine healing    • Disease of thyroid gland     hypothyroid   • Glaucoma, bilateral    • Hyperlipidemia    • Osteoporosis    • Scoliosis 12/12/12       Patient Active Problem List    Diagnosis Date Noted   • Femur fracture ([facility] ) 02/02/2023   • Acute pain due to trauma 02/03/2023   • Acute blood loss anemia 02/05/2023   • Lightheadedness 02/06/2023   • Osteoporosis 02/09/2023   • Nail abnormalities 02/17/2023   • HLD (hyperlipidemia) 02/06/2023   • Fall 02/03/2023   • Hypothyroidism 02/03/2023   • Chest pain 10/31/2022   • Abnormal electrocardiogram (ECG) (EKG) 10/31/2022   • Sjogren's syndrome ([facility] ) 10/06/2021   • Primary osteoarthritis of right knee 05/14/2019   • Primary osteoarthritis of left knee 05/14/2019      SANTI Cotter  Physical Medicine and [facility] 12    Total visit time: 35 minutes, with more than 50% spent counseling/coordinating care   Counseling includes discussion with patient re: progress in therapies, functional issues observed by therapy staff, and discussion with patient regarding their current medical state and wellbeing  Coordination of patient's care was performed in conjunction with Internal Medicine service to monitor patient's labs, vitals, and management of their comorbidities

## 2023-04-26 NOTE — ASSESSMENT & PLAN NOTE
WBC 15 on presentation. Reported fever of T 100.5 at home day prior to admission. Concern for urinary source with recent difficulty urinating, dysuria    - UA noninfectious - holding abx  - afebrile since admission  - lactate wnl

## 2023-04-26 NOTE — PROGRESS NOTES
Surgical Oncology Progress Note    I discussed Mr. Schmidt's case with Dr. Pineda, his surgeon at [facility]. Given that there is significant progression of disease on imaging, he does not think surgical resection should be pursued, and I agree. I do think we should consider diverting colostomy as he will likely obstruct soon given the relationship of the tumor to the rectum on imaging. We also discussed the possibility of palliative radiation to the pelvic mass to help control bleeding. I have reached out to Dr. Baig to discuss the prospect of palliative radiation for bleeding control. I discussed all of this with the patient and his wife. The patient is interested in palliative colostomy for impending obstruction. He was told by the [facility] that he should wait two weeks from last Rogaratinib prior to surgery. He will confirm that is the case. We will tentatively plan for colostomy early next week.     Surinder Shaw MD  Surgical Oncology

## 2023-04-26 NOTE — ASSESSMENT & PLAN NOTE
"Patient presented with 1 month worsening constipation and scant blood with BM. BM consist of 10 "pellets" daily. Associated with 1 week of difficulty urinating, dysuria and lethargy.     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  Moderate colonic stool noted proximal E.  - There are hypodense lesions within the liver which are not definitively seen on recent CT suspicious for metastatic disease progression.  Additional partially calcified lesions are stable likely treated lesions."    Plan  - consulted surgical oncology for potential surgical intervention - appreciate assistance  - consulted Urology for urinary retention/obstruction - no intervention warranted as urinating without baldwin  - regular diet  - patient still passing gas and hard stool, presentation not consistent with full obstruction.  - patient able to void successfully, no need for baldwin catheter    "

## 2023-04-26 NOTE — ASSESSMENT & PLAN NOTE
Baseline 136. 129 on admission, likely in the setting of recent poor PO intake, which has remained poor during stay.     - 500 cc bolus of NS, will assess for improvement after

## 2023-04-26 NOTE — PROGRESS NOTES
"[facility] - Transplant Stepdown  Hematology/Oncology  Progress Note    Patient Name: Forrest Schmidt  Admission Date: 4/24/2023  Hospital Length of Stay: 2 days  Code Status: Full Code     Subjective:     HPI:  Mr. 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 He also complains of burning/difficulty urinating over the past week. Patient is currently in Rogaratinib trial with [facility]. He saw  Dr. Elaine at the [facility] last week, who advised him to temporarily suspend the Rogaratinib to pursue GI workup of new onset constipation. Additionally, he complains of lethargy, suprapubic pain over the past week and one febrile episode with Tmax 100.5 at home. He denies CP, N/V, leg swelling. He was scheduled to get a colonoscopy on 4/26 to further investigate.  [facility] and underwent CT A/P on 4/21 revealing progression of pelvic mass. Patient was advised to present to [facility] for further evaluation.     ED Course: AF, HDS on RA. Labs notable for mild anemia - Hgb 10.7, leukocytosis - WBC 15, Hyponatremia - Na 129, elevated alkaline phosphatase 250, elevated AST/ALT 80/61.     Oncology History:  - Mr. 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 His GIST is negative for KIT, PDGFRA, SDH, BRAF, NF1, NF2 mutations. He had oligometastatic disease to the liver found on CT scan 5/20/19. He went to [facility] and underwent IR liver lesion biopsy and ablation on 7/23/19. Pathology showed metastatic GIST positive for DOG-1 and .   - surveillance MRI in Feb 2020 showed progressive disease with new peritoneal mets. Patient was seen by Dr Guaman. Gleevec was recommended.   - CT after 2.5 month of Gleevec showed progressive peritoneal disease.   - underwent cytoreductive surgery at [facility] on 7/10/20  - started sutent 37.5 mg daily on 8/10/20.   - s/p ablation to segment 5 lesion and re-treatment of segment 7 lesion in Dec 2020.   - CT scan 1/12/22 showed progression. Treatment was switched to regorafenib. Started on 1/29/22  - CT 3/23/22 showed progression in liver metastases, peritoneal mets and left lung nodule  - started temodar 85 mg/m2 3 weeks on, 1 week off on 4/6/22. CT on 5/31/22 showed progression  - started pazopanib on 6/10/22 with an escalating dose, reached 800 mg daily on 6/24/22  - Held for 1 week before and 3 weeks after Y90 on 8/17/22. Resumed at 600 mg on 9/8/22. Had significant fatigue when he was on 800 mg daily. Stopped votrient on 11/10/22 for [facility] trial. Had progression on votrient  - Started rogaratinib on trial at [facility] on 11/30/2022. On 800 mg bid  - CT at [facility] on 1/24/23 showed response. Rogaratinib on hold due to side effects        Interval History: NAEON. Patient continues to pass gas, small hard pellet-like stool and BRBPR. Continues to void. Pending surgical oncology discussion with medical oncology team at [facility] to determine final recommendations. Unlikely to require any urgent surgical intervention during this stay.     AF, HDS on RA    Oncology Treatment Plan:   [No matching plan found]    Medications:  Continuous Infusions:  Scheduled Meds:   arginine HCl (L-arginine)  2 tablet Oral QHS    atorvastatin  40 mg Oral QHS    bisacodyL  5 mg Oral BID    cetirizine  5 mg Oral QHS    cyanocobalamin  1,000 mcg Oral Daily    enoxaparin  40 mg Subcutaneous Daily    famotidine  20 mg Oral BID    fluticasone propionate  1 spray Each Nostril BID    metoprolol succinate  50 mg Oral QHS    multivitamin  1 tablet Oral Daily    NIFEdipine  30 mg Oral QHS    omega 3-dha-epa-fish oil  1 capsule Oral Daily    polyethylene glycol  17 g Oral Daily    sodium chloride 0.9%  500 mL Intravenous Once    sodium chloride 2%  2 drop Both Eyes BID    tadalafiL  5 mg Oral Daily     tamsulosin  0.4 mg Oral Daily    vitamin D  2,000 Units Oral Daily     PRN Meds:acetaminophen, aluminum-magnesium hydroxide-simethicone, benzonatate, dextrose 10%, dextrose 10%, dextrose, dextrose, glucagon (human recombinant), guaiFENesin, hydrocortisone, melatonin, naloxone, nitroGLYCERIN, ondansetron, simethicone, sodium chloride 0.9%, sodium chloride 2%     Review of Systems   Constitutional:  Positive for activity change and fatigue. Negative for chills and fever.   HENT:  Positive for sinus pressure.    Eyes:  Negative for visual disturbance.   Respiratory:  Positive for cough. Negative for shortness of breath.    Cardiovascular:  Negative for chest pain and leg swelling.   Gastrointestinal:  Positive for abdominal pain, blood in stool and constipation. Negative for abdominal distention, diarrhea, nausea and vomiting.   Genitourinary:  Positive for decreased urine volume, difficulty urinating, dysuria and frequency. Negative for urgency.   Musculoskeletal:  Negative for myalgias.   Skin:  Negative for wound.   Neurological:  Positive for weakness. Negative for headaches.   Psychiatric/Behavioral:  Negative for confusion.    Objective:     Vital Signs (Most Recent):  Temp: 97.9 °F (36.6 °C) (04/26/23 0937)  Pulse: 88 (04/26/23 0937)  Resp: 17 (04/26/23 0937)  BP: 127/71 (04/26/23 0937)  SpO2: 95 % (04/26/23 0937) Vital Signs (24h Range):  Temp:  [97.9 °F (36.6 °C)-98.9 °F (37.2 °C)] 97.9 °F (36.6 °C)  Pulse:  [] 88  Resp:  [11-26] 17  SpO2:  [95 %-98 %] 95 %  BP: (114-146)/(69-80) 127/71     Weight: 80.6 kg (177 lb 11.1 oz)  Body mass index is 24.78 kg/m².  Body surface area is 2.01 meters squared.      Intake/Output Summary (Last 24 hours) at 4/26/2023 1206  Last data filed at 4/26/2023 0047  Gross per 24 hour   Intake 240 ml   Output 500 ml   Net -260 ml         Physical Exam  Vitals and nursing note reviewed.   Constitutional:       General: He is not in acute distress.     Appearance: Normal  appearance. He is obese. He is not ill-appearing or toxic-appearing.   HENT:      Head: Normocephalic and atraumatic.      Mouth/Throat:      Mouth: Mucous membranes are dry.   Eyes:      Extraocular Movements: Extraocular movements intact.      Conjunctiva/sclera: Conjunctivae normal.      Pupils: Pupils are equal, round, and reactive to light.   Cardiovascular:      Rate and Rhythm: Normal rate and regular rhythm.      Pulses: Normal pulses.   Pulmonary:      Effort: Pulmonary effort is normal.      Breath sounds: Normal breath sounds. No wheezing or rhonchi.   Abdominal:      General: Bowel sounds are normal.      Palpations: Abdomen is soft.      Tenderness: There is abdominal tenderness (suprapubic). There is no guarding or rebound.   Musculoskeletal:         General: Normal range of motion.      Cervical back: Normal range of motion.      Right lower leg: No edema.      Left lower leg: No edema.   Skin:     General: Skin is warm.   Neurological:      General: No focal deficit present.      Mental Status: He is alert and oriented to person, place, and time.   Psychiatric:         Mood and Affect: Mood normal.       Significant Labs:   CBC:   Recent Labs   Lab 04/24/23  1356 04/25/23  0633 04/26/23  0602   WBC 15.23* 12.75* 11.65   HGB 10.7* 10.7* 9.9*   HCT 33.6* 33.6* 31.1*    379 398      and CMP:   Recent Labs   Lab 04/24/23  1356 04/25/23  0633 04/26/23  0602   * 129* 130*   K 4.3 4.1 4.3   CL 97 98 99   CO2 23 21* 22*    108 105   BUN 18 21 25*   CREATININE 1.1 1.1 1.2   CALCIUM 9.6 9.3 9.2   PROT 7.2 7.0 6.6   ALBUMIN 2.6* 2.5* 2.3*   BILITOT 0.4 0.4 0.4   ALKPHOS 250* 235* 233*   AST 80* 56* 49*   ALT 61* 49* 41   ANIONGAP 9 10 9         Diagnostic Results:  CT: 4/21:   Enlarging pelvic mass which involves the rectum and sigmoid colon.  Moderate colonic stool noted proximal E.  2. There are hypodense lesions within the liver which are not definitively seen on recent CT suspicious for  "metastatic disease progression.  Additional partially calcified lesions are stable likely treated lesions.    Assessment/Plan:     * Mechanical gastrointestinal obstruction  Patient presented with 1 month worsening constipation and scant blood with BM. BM consist of 10 "pellets" daily. Associated with 1 week of difficulty urinating, dysuria and lethargy.     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  Moderate colonic stool noted proximal E.  - There are hypodense lesions within the liver which are not definitively seen on recent CT suspicious for metastatic disease progression.  Additional partially calcified lesions are stable likely treated lesions."    Plan  - consulted surgical oncology for potential surgical intervention - appreciate assistance  - consulted Urology for urinary retention/obstruction - no intervention warranted as urinating without baldwin  - regular diet  - patient still passing gas and hard stool, presentation not consistent with full obstruction.  - patient able to void successfully, no need for baldwin catheter      GIST (gastrointestinal stroma tumor), malignant, colon  See GIST, above    Abdominal pain  See 'mechanical obstruction'    Pelvic mass  See GIST    GERD (gastroesophageal reflux disease)  Continue home pepcid 20mg bid    Lethargy  Likely related to possible infection, progressing cancer, metabolic derangements and poor PO intake  PT/OT consulted for assessment of worsening functional status - no needs while inpatient    Hyponatremia  Baseline 136. 129 on admission, likely in the setting of recent poor PO intake, which has remained poor during stay.     - 500 cc bolus of NS, will assess for improvement after    Leukocytosis  WBC 15 on presentation. Reported fever of T 100.5 at home day prior to admission. Concern for urinary source with recent difficulty urinating, dysuria    - UA noninfectious - holding abx  - afebrile since admission  - " lactate wnl    Stage 3 chronic kidney disease  Baseline sCr 1.1 - stable at baseline.    Metastatic cancer to pelvis  See GIST    Metastasis to liver  See GIST    GIST (gastrointestinal stromal tumor) of small bowel, malignant  Metastases to liver and pelvis    - Mr. 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 His GIST is negative for KIT, PDGFRA, SDH, BRAF, NF1, NF2 mutations. He had oligometastatic disease to the liver found on CT scan 5/20/19. He went to [facility] and underwent IR liver lesion biopsy and ablation on 7/23/19. Pathology showed metastatic GIST positive for DOG-1 and .   - surveillance MRI in Feb 2020 showed progressive disease with new peritoneal mets. Patient was seen by Dr Guaman. Gleevec was recommended.   - CT after 2.5 month of Gleevec showed progressive peritoneal disease.   - underwent cytoreductive surgery at [facility] on 7/10/20  - started sutent 37.5 mg daily on 8/10/20.   - s/p ablation to segment 5 lesion and re-treatment of segment 7 lesion in Dec 2020.   - CT scan 1/12/22 showed progression. Treatment was switched to regorafenib. Started on 1/29/22  - CT 3/23/22 showed progression in liver metastases, peritoneal mets and left lung nodule  - started temodar 85 mg/m2 3 weeks on, 1 week off on 4/6/22. CT on 5/31/22 showed progression  - started pazopanib on 6/10/22 with an escalating dose, reached 800 mg daily on 6/24/22  - Held for 1 week before and 3 weeks after Y90 on 8/17/22. Resumed at 600 mg on 9/8/22. Had significant fatigue when he was on 800 mg daily. Stopped votrient on 11/10/22 for [facility] trial. Had progression on votrient  - Started rogaratinib on trial at [facility] on 11/30/2022. On 800 mg bid  - CT at [facility] on 1/24/23 showed response. Rogaratinib on hold due to side effects  - Rogaratinib temporarily suspended on 4/17 in the setting of new onset constipation and blood with BM to  investigate whether drug was causing adverse effects vs. GI/tumor source      Coronary artery disease  ASA and statin. Holding ASA for potential surgical intervention.    Hypercholesteremia  On statin; has mild transaminitis on labs, chronic. Continue home atorvastatin    Hypertension  On metoprolol 50, losartan 100 and nifedipine 60 per chart review. Normotensive. Will clarify and add anti-HTN as appropriate.     Will hold losartan for potential surgical procedure.   Continue metoprolol, nifedipine.              Martell Dumont MD  Hematology/Oncology  [facility] - Transplant Stepdown        I have reviewed the notes, assessments, and/or procedures performed by the housestaff, as above.  I have personally interviewed and examined the patient at the beside, and rounded with the housestaff. I concur with her/his assessment and plan and the documentation of Forrest Schmidt.      Discussed with surg onc.  Will consider a diverting colostomy.  Will also consult rad onc to consider XRT for bleeding mass.  Hbg holding well at 9.      More than 40 mins total time were spent during this encounter.      Miguel Nova M.D., M.S., F.A.C.P.  Hematology/Oncology Attending  [facility]

## 2023-04-26 NOTE — SUBJECTIVE & OBJECTIVE
Interval History: NAEON. Patient continues to pass gas, small hard pellet-like stool and BRBPR. Continues to void. Pending surgical oncology discussion with medical oncology team at [facility] to determine final recommendations. Unlikely to require any urgent surgical intervention during this stay.     AF, HDS on RA    Oncology Treatment Plan:   [No matching plan found]    Medications:  Continuous Infusions:  Scheduled Meds:   arginine HCl (L-arginine)  2 tablet Oral QHS    atorvastatin  40 mg Oral QHS    bisacodyL  5 mg Oral BID    cetirizine  5 mg Oral QHS    cyanocobalamin  1,000 mcg Oral Daily    enoxaparin  40 mg Subcutaneous Daily    famotidine  20 mg Oral BID    fluticasone propionate  1 spray Each Nostril BID    metoprolol succinate  50 mg Oral QHS    multivitamin  1 tablet Oral Daily    NIFEdipine  30 mg Oral QHS    omega 3-dha-epa-fish oil  1 capsule Oral Daily    polyethylene glycol  17 g Oral Daily    sodium chloride 0.9%  500 mL Intravenous Once    sodium chloride 2%  2 drop Both Eyes BID    tadalafiL  5 mg Oral Daily    tamsulosin  0.4 mg Oral Daily    vitamin D  2,000 Units Oral Daily     PRN Meds:acetaminophen, aluminum-magnesium hydroxide-simethicone, benzonatate, dextrose 10%, dextrose 10%, dextrose, dextrose, glucagon (human recombinant), guaiFENesin, hydrocortisone, melatonin, naloxone, nitroGLYCERIN, ondansetron, simethicone, sodium chloride 0.9%, sodium chloride 2%     Review of Systems   Constitutional:  Positive for activity change and fatigue. Negative for chills and fever.   HENT:  Positive for sinus pressure.    Eyes:  Negative for visual disturbance.   Respiratory:  Positive for cough. Negative for shortness of breath.    Cardiovascular:  Negative for chest pain and leg swelling.   Gastrointestinal:  Positive for abdominal pain, blood in stool and constipation. Negative for abdominal distention, diarrhea, nausea and vomiting.   Genitourinary:  Positive for decreased urine volume,  difficulty urinating, dysuria and frequency. Negative for urgency.   Musculoskeletal:  Negative for myalgias.   Skin:  Negative for wound.   Neurological:  Positive for weakness. Negative for headaches.   Psychiatric/Behavioral:  Negative for confusion.    Objective:     Vital Signs (Most Recent):  Temp: 97.9 °F (36.6 °C) (04/26/23 0937)  Pulse: 88 (04/26/23 0937)  Resp: 17 (04/26/23 0937)  BP: 127/71 (04/26/23 0937)  SpO2: 95 % (04/26/23 0937) Vital Signs (24h Range):  Temp:  [97.9 °F (36.6 °C)-98.9 °F (37.2 °C)] 97.9 °F (36.6 °C)  Pulse:  [] 88  Resp:  [11-26] 17  SpO2:  [95 %-98 %] 95 %  BP: (114-146)/(69-80) 127/71     Weight: 80.6 kg (177 lb 11.1 oz)  Body mass index is 24.78 kg/m².  Body surface area is 2.01 meters squared.      Intake/Output Summary (Last 24 hours) at 4/26/2023 1206  Last data filed at 4/26/2023 0047  Gross per 24 hour   Intake 240 ml   Output 500 ml   Net -260 ml         Physical Exam  Vitals and nursing note reviewed.   Constitutional:       General: He is not in acute distress.     Appearance: Normal appearance. He is obese. He is not ill-appearing or toxic-appearing.   HENT:      Head: Normocephalic and atraumatic.      Mouth/Throat:      Mouth: Mucous membranes are dry.   Eyes:      Extraocular Movements: Extraocular movements intact.      Conjunctiva/sclera: Conjunctivae normal.      Pupils: Pupils are equal, round, and reactive to light.   Cardiovascular:      Rate and Rhythm: Normal rate and regular rhythm.      Pulses: Normal pulses.   Pulmonary:      Effort: Pulmonary effort is normal.      Breath sounds: Normal breath sounds. No wheezing or rhonchi.   Abdominal:      General: Bowel sounds are normal.      Palpations: Abdomen is soft.      Tenderness: There is abdominal tenderness (suprapubic). There is no guarding or rebound.   Musculoskeletal:         General: Normal range of motion.      Cervical back: Normal range of motion.      Right lower leg: No edema.      Left lower  leg: No edema.   Skin:     General: Skin is warm.   Neurological:      General: No focal deficit present.      Mental Status: He is alert and oriented to person, place, and time.   Psychiatric:         Mood and Affect: Mood normal.       Significant Labs:   CBC:   Recent Labs   Lab 04/24/23  1356 04/25/23  0633 04/26/23  0602   WBC 15.23* 12.75* 11.65   HGB 10.7* 10.7* 9.9*   HCT 33.6* 33.6* 31.1*    379 398      and CMP:   Recent Labs   Lab 04/24/23  1356 04/25/23  0633 04/26/23  0602   * 129* 130*   K 4.3 4.1 4.3   CL 97 98 99   CO2 23 21* 22*    108 105   BUN 18 21 25*   CREATININE 1.1 1.1 1.2   CALCIUM 9.6 9.3 9.2   PROT 7.2 7.0 6.6   ALBUMIN 2.6* 2.5* 2.3*   BILITOT 0.4 0.4 0.4   ALKPHOS 250* 235* 233*   AST 80* 56* 49*   ALT 61* 49* 41   ANIONGAP 9 10 9         Diagnostic Results:  CT: 4/21:   Enlarging pelvic mass which involves the rectum and sigmoid colon.  Moderate colonic stool noted proximal E.  2. There are hypodense lesions within the liver which are not definitively seen on recent CT suspicious for metastatic disease progression.  Additional partially calcified lesions are stable likely treated lesions.

## 2023-04-26 NOTE — PROGRESS NOTES
"Fall bundle in place. Pt. Remained free from falls/trauma/injuries. No complaints of CP/ SOB/discomfort. VSS. No tele, pulse WNL. A&Ox4. Pt. Reports pain this shift, given Tylenol. Pt. Received PRN mucinex for cough, order received from team. Pt. Awaiting "discuss the case with his medical oncologist KAITY. If he is not thought to be a candidate for resection, then I think a colostomy for palliation would be reasonable. This does not need to be done urgently as the patient is not completely obstructed". The POC was reviewed with pt. & pts' spouse @ bedside, questions were encouraged & answered. No further concerns at this time.    "

## 2023-04-26 NOTE — PLAN OF CARE
Pt AAOx4. SBA. Wife at bedside. H+H trending down. Na 130; phos 2.2; LFTs elevated. Lovenox held for potential of procedures. VS stable. No surgical procedure done today. Cialis 5mg not carried at [facility] - pt getting 5mg from home soon. NS bolus given.

## 2023-04-26 NOTE — ASSESSMENT & PLAN NOTE
Likely related to possible infection, progressing cancer, metabolic derangements and poor PO intake  PT/OT consulted for assessment of worsening functional status - no needs while inpatient   Yes

## 2023-04-26 NOTE — CONSULTS
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 Shaw, Surgical Oncology      Therapy to Date:  No radiation        Diagnosis/Assessment:   Forrest Schmidt is a 78 y.o. man with Stage IV (cT3, cN1, cM1) GIST of the small intestine mitotic rate 9 mitoses/5 mm2 s/p resection, progression on systemic therapy, until recently on Rogaratinib trial with [facility], now admitted with constipation and lower GI bleed.     ECOG 2     Radiation oncology consulted for palliative RT to the pelvis for lower GI bleed     Plan/Recommendations:      We discussed the option of palliative RT to the pelvis for lower GIB 25Gy/5fx M-F using 3DCRT    We discussed risks benefits and alternatives.  CT simulation will be done with a full and and empty bladder.  Side effects include but are not limited to fatigue, cystitis, and bowel irritation.    The patient signed RT informed consent after I answered questions to their apparent satisfaction.    They were also given our contact information should further questions or concerns arise.  - 4/27/2023 10AM CT SIM full and empty bladder - we will coordinate transportation  - will try to start RT same day afternoon 4/27/2023 - we will coordinate transportation  - I will coordinate care with Dr Shaw and rest of care team          HPI:   Forrest Schmidt is a 78 y.o. 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 His GIST is negative for KIT, PDGFRA, SDH,  BRAF, NF1, NF2 mutations. He had oligometastatic disease to the liver found on CT scan 5/20/19. He went to [facility] and underwent IR liver lesion biopsy and ablation on 7/23/19. Pathology showed metastatic GIST positive for DOG-1 and .   - surveillance MRI in Feb 2020 showed progressive disease with new peritoneal mets. Patient was seen by Dr Guaman. Gleevec was recommended.   - CT after 2.5 month of Gleevec showed progressive peritoneal disease.   - underwent cytoreductive surgery at [facility] on 7/10/20  - started sutent 37.5 mg daily on 8/10/20.   - s/p ablation to segment 5 lesion and re-treatment of segment 7 lesion in Dec 2020.   - CT scan 1/12/22 showed progression. Treatment was switched to regorafenib. Started on 1/29/22  - CT 3/23/22 showed progression in liver metastases, peritoneal mets and left lung nodule  - started temodar 85 mg/m2 3 weeks on, 1 week off on 4/6/22. CT on 5/31/22 showed progression  - started pazopanib on 6/10/22 with an escalating dose, reached 800 mg daily on 6/24/22  - Held for 1 week before and 3 weeks after Y90 on 8/17/22. Resumed at 600 mg on 9/8/22. Had significant fatigue when he was on 800 mg daily. Stopped votrient on 11/10/22 for [facility] trial. Had progression on votrient  - Started rogaratinib on trial at [facility] on 11/30/2022. On 800 mg bid  - CT at [facility] on 1/24/23 showed response. Rogaratinib on hold due to side effects     Recent Oncology History  4/17/2021 Rogaratinib temporarily suspended in setting of new onset constipation and blood per rectum    4/21/2023 CT A/P  Enlarging  pelvic mass involving rectum and sigmoid colon approximately 9.8 x 8.0 cm , previously measured 7.1 x 6.1 cm in 03/2023  hypodense lesions within the liver which are not definitively seen on recent CT     4/26/2023 surgical oncology (Dr Shaw) discussed case with Dr. Pineda, his surgeon at [facility].Discussed the possibility of palliative radiation to the pelvic mass to help control  bleeding.      Subjective:   I saw patient at bedside, accompanied by his wife Veena. They live in [place], [place]. He is a retired psychologist.     Overall he reports:  - tired  - constipation  - distention of his lower abdomen, pain 1-2/10.   - chronic headaches which he treats with tylenol 500mg PRN     He denies other major complaints     The patient denies any history of radiation therapy, implantable cardiac devices, or connective tissue disease.    No current facility-administered medications on file prior to encounter.     Current Outpatient Medications on File Prior to Encounter   Medication Sig Dispense Refill    acetaminophen (TYLENOL ORAL) Take 500 mg by mouth 2 (two) times a day.      ammonium lactate (LAC-HYDRIN) 12 % lotion Apply topically 2 (two) times daily.      arginine 500 mg tablet Take 1,000 mg by mouth once daily.       aspirin (ECOTRIN) 81 MG EC tablet Take 81 mg by mouth once daily.      atorvastatin (LIPITOR) 40 MG tablet Take 1 tablet (40 mg total) by mouth once daily. 90 tablet 3    bisacodyL (DULCOLAX) 5 mg EC tablet Take 1 tablet (5 mg total) by mouth 2 (two) times daily. 14 tablet 0    calcium carbonate (TUMS) 200 mg calcium (500 mg) chewable tablet Take 500 mg by mouth 3 (three) times daily as needed for Heartburn.      cetirizine (ZYRTEC) 5 MG tablet Take 5 mg by mouth once daily.      cholecalciferol, vitamin D3, (VITAMIN D3) 50 mcg (2,000 unit) Cap Take 1 capsule by mouth once daily. morning      cyanocobalamin (VITAMIN B-12) 1000 MCG tablet Take 1,000 mcg by mouth once daily. morning      famotidine (PEPCID) 20 MG tablet Take 20 mg by mouth 2 (two) times daily.      fish oil-omega-3 fatty acids 300-1,000 mg capsule Take 1 g by mouth once daily. morning      fluticasone (FLONASE) 50 mcg/actuation nasal spray 1 spray by Each Nare route 2 (two) times daily.      guaiFENesin 1,200 mg Ta12 Take 1 tablet by mouth 2 (two) times daily as needed (cold symptoms).      hydrocortisone 2.5 %  "cream Apply topically 2 (two) times daily. 20 g 2    losartan (COZAAR) 100 MG tablet Take 1 tablet (100 mg total) by mouth once daily. 90 tablet 3    magnesium 250 mg Tab Take 1 tablet by mouth once daily. morning      metoprolol succinate (TOPROL-XL) 50 MG 24 hr tablet Take 50 mg by mouth once daily.      multivitamin (THERAGRAN) per tablet Take 1 tablet by mouth once daily. morning      NIFEdipine (PROCARDIA-XL) 30 MG (OSM) 24 hr tablet Take 30 mg by mouth once daily.      nitroGLYCERIN (NITROSTAT) 0.4 MG SL tablet Place 1 tablet (0.4 mg total) under the tongue every 5 (five) minutes as needed for Chest pain. 20 tablet 3    ondansetron (ZOFRAN-ODT) 8 MG TbDL Take 1 tablet (8 mg total) by mouth every 6 (six) hours as needed (nausea). 30 tablet 2    promethazine (PHENERGAN) 25 MG tablet Take 1 tablet (25 mg total) by mouth every 6 (six) hours as needed for Nausea. 60 tablet 2    sodium chloride 5% () 5 % ophthalmic solution Place 1 drop in the right eye three times every morning      tadalafil (CIALIS) 5 MG tablet Take 5 mg by mouth once daily at 6am.      testosterone cypionate (DEPOTESTOTERONE CYPIONATE) 200 mg/mL injection Inject 200 mg into the muscle every 14 (fourteen) days.       vit A/vit C/vit E/zinc/copper (ICAPS AREDS ORAL) Take 1 tablet by mouth 2 (two) times a day.         Review of patient's allergies indicates:   Allergen Reactions    Augmentin [amoxicillin-pot clavulanate] Shortness Of Breath     disoriented    Fexofenadine Other (See Comments)     Pt states he can't remember the details but it was a bad effect.    Singulair [montelukast] Other (See Comments)     Pt "felt strange"  Pt "felt strange" bloating, Intestinal irritation, abd cramping      Ace inhibitors Other (See Comments)     cough    Captopril     Doxycycline Nausea And Vomiting     headache    Horse/equine containing products Hives    Hydrocodone Nausea Only    Scopolamine hbr Other (See Comments)     Nausea, headache, changes " in BP          Past Surgical History:   Procedure Laterality Date    APPENDECTOMY      COLONOSCOPY N/A 5/15/2017    Procedure: COLONOSCOPY;  Surgeon: Dez Jimenez MD;  Location: [facility];  Service: Endoscopy;  Laterality: N/A;    CORONARY STENT PLACEMENT      ESOPHAGOGASTRODUODENOSCOPY      EYE SURGERY      cataract extraction    SKIN BIOPSY      TONSILLECTOMY      VASECTOMY       Past Medical History:   Diagnosis Date    Anticoagulant long-term use     Arthritis     BPH (benign prostatic hyperplasia)     Coronary artery disease     stent x1 2005    Hypercholesteremia     Hypertension     Low iron     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 2/15/2018    Osteopenia            Review of Systems  Negative unless as above           Objective:      Physical Exam  Vitals reviewed.     Constitutional:       Appearance: Pale elderly man.  Lying in bed NAD. Wife Natalie at bedside  HENT:      Head: NC/AT  Eyes:      Conjunctiva/sclera: Conjunctivae normal.   Cardiovascular:      Comments: extremities well perfused  Pulmonary:      Effort: Pulmonary effort is normal.   Abdominal:      General: There is distension.   Musculoskeletal:         General: Normal range of motion.      Cervical back: Normal range of motion.   Neurological:      General: No focal deficit present.      Mental Status: Alert and oriented   Psychiatric:         Mood and Affect: Mood normal.         Behavior: Behavior normal.      Imaging: I have personally reviewed the patient's available images and reports and summarized pertinent findings above in HPI.      Pathology: I have personally reviewed the patient's available pathology and summarized pertinent findings above in HPI.         Thank you for the opportunity to care for this patient. Please do not hesitate to contact me with any questions.     Chad Baig MD/PhD

## 2023-04-27 NOTE — SUBJECTIVE & OBJECTIVE
Interval History: NAEON. Patient continues to pass gas, small hard pellet-like stool and BRBPR. Continues to void without problem. Started palliative radiation per radiation oncology recommendations.    Patient will likely undergo ostomy with surgical oncology next week.    AF, HDS on RA    Oncology Treatment Plan:   [No matching plan found]    Medications:  Continuous Infusions:  Scheduled Meds:   arginine HCl (L-arginine)  2 tablet Oral QHS    atorvastatin  40 mg Oral QHS    bisacodyL  5 mg Oral BID    cetirizine  5 mg Oral QHS    cyanocobalamin  1,000 mcg Oral Daily    enoxaparin  40 mg Subcutaneous Daily    famotidine  20 mg Oral BID    fluticasone propionate  1 spray Each Nostril BID    metoprolol succinate  50 mg Oral QHS    multivitamin  1 tablet Oral Daily    NIFEdipine  30 mg Oral QHS    omega 3-dha-epa-fish oil  1 capsule Oral Daily    polyethylene glycol  17 g Oral Daily    sodium chloride 2%  2 drop Both Eyes BID    tadalafiL  5 mg Oral Daily    tamsulosin  0.4 mg Oral Daily    vitamin D  2,000 Units Oral Daily     PRN Meds:acetaminophen, aluminum-magnesium hydroxide-simethicone, benzonatate, dextrose 10%, dextrose 10%, dextrose, dextrose, glucagon (human recombinant), guaiFENesin, hydrocortisone, melatonin, naloxone, nitroGLYCERIN, ondansetron, simethicone, sodium chloride 0.9%, sodium chloride 2%     Review of Systems   Constitutional:  Positive for activity change and fatigue. Negative for chills and fever.   HENT:  Negative for sinus pressure.    Eyes:  Negative for visual disturbance.   Respiratory:  Negative for cough and shortness of breath.    Cardiovascular:  Negative for chest pain and leg swelling.   Gastrointestinal:  Positive for blood in stool and constipation. Negative for abdominal distention, abdominal pain, diarrhea, nausea and vomiting.   Genitourinary:  Positive for difficulty urinating and frequency. Negative for decreased urine volume, dysuria and urgency.   Musculoskeletal:   Negative for myalgias.   Skin:  Negative for wound.   Neurological:  Positive for weakness. Negative for headaches.   Psychiatric/Behavioral:  Negative for confusion.    Objective:     Vital Signs (Most Recent):  Temp: 98 °F (36.7 °C) (04/27/23 1142)  Pulse: 70 (04/27/23 1142)  Resp: 17 (04/27/23 1142)  BP: 115/67 (04/27/23 1142)  SpO2: 95 % (04/27/23 1142) Vital Signs (24h Range):  Temp:  [96.5 °F (35.8 °C)-98.7 °F (37.1 °C)] 98 °F (36.7 °C)  Pulse:  [63-80] 70  Resp:  [17-23] 17  SpO2:  [93 %-97 %] 95 %  BP: (115-137)/(60-76) 115/67     Weight: 80.5 kg (177 lb 7.5 oz)  Body mass index is 24.75 kg/m².  Body surface area is 2.01 meters squared.      Intake/Output Summary (Last 24 hours) at 4/27/2023 1403  Last data filed at 4/27/2023 0935  Gross per 24 hour   Intake 1357.28 ml   Output --   Net 1357.28 ml         Physical Exam  Vitals and nursing note reviewed.   Constitutional:       General: He is not in acute distress.     Appearance: Normal appearance. He is obese. He is ill-appearing. He is not toxic-appearing.   HENT:      Head: Normocephalic and atraumatic.      Mouth/Throat:      Mouth: Mucous membranes are dry.   Eyes:      Extraocular Movements: Extraocular movements intact.      Conjunctiva/sclera: Conjunctivae normal.      Pupils: Pupils are equal, round, and reactive to light.   Cardiovascular:      Rate and Rhythm: Normal rate and regular rhythm.      Pulses: Normal pulses.   Pulmonary:      Effort: Pulmonary effort is normal.      Breath sounds: Normal breath sounds. No wheezing or rhonchi.   Abdominal:      General: Bowel sounds are normal.      Palpations: Abdomen is soft.      Tenderness: There is abdominal tenderness (suprapubic). There is no guarding or rebound.   Musculoskeletal:         General: Normal range of motion.      Cervical back: Normal range of motion.      Right lower leg: No edema.      Left lower leg: No edema.   Skin:     General: Skin is warm.   Neurological:      General: No  focal deficit present.      Mental Status: He is alert and oriented to person, place, and time.   Psychiatric:         Mood and Affect: Mood normal.       Significant Labs:   CBC:   Recent Labs   Lab 04/26/23  0602 04/27/23  0505   WBC 11.65 11.93   HGB 9.9* 9.5*   HCT 31.1* 28.9*    382      and CMP:   Recent Labs   Lab 04/26/23  0602 04/27/23  0505   * 130*   K 4.3 4.1   CL 99 100   CO2 22* 22*    126*   BUN 25* 24*   CREATININE 1.2 1.0   CALCIUM 9.2 8.8   PROT 6.6 6.4   ALBUMIN 2.3* 2.2*   BILITOT 0.4 0.3   ALKPHOS 233* 246*   AST 49* 46*   ALT 41 39   ANIONGAP 9 8         Diagnostic Results:  CT: 4/21:   Enlarging pelvic mass which involves the rectum and sigmoid colon.  Moderate colonic stool noted proximal E.  2. There are hypodense lesions within the liver which are not definitively seen on recent CT suspicious for metastatic disease progression.  Additional partially calcified lesions are stable likely treated lesions.

## 2023-04-27 NOTE — PLAN OF CARE
Pt has call bell in reach, non slip socks on, and bedrails up x2.  Pt encouraged to wash hands.  He has wife at bedside attentive to pt.  Pt on strict I&Os.  He has prn pain meds.  Pt CBC being monitored and bowel movements.

## 2023-04-27 NOTE — AI DETERIORATION ALERT
Evaluation of Early Detection of Sepsis Risk     Patient Name: Forrest Schmidt  MRN:  [id number]  Primary Care Team: [facility] MEDICAL ONCOLOGY  Date of Admission:  4/24/2023     Principal Problem:  Mechanical gastrointestinal obstruction   Date of Review: 04/27/2023    Patient reviewed for elevated sepsis risk score. Sepsis Screen Negative  Will continue to monitor for signs and symptoms of new or worsening infection and screen as needed. Please notify Rapid Response Team for any hypotension or decompensation.    Sepsis Screen Results     IP Sepsis Screen (most recent)       Sepsis Screen (IP) - 04/27/23 1010       Is the patient's history or complaint suggestive of a possible infection? Yes  -SS    Are there at least two of the following signs and symptoms present? No  -SS    Are any of the following organ dysfunction criteria present and not considered to be due to a chronic condition? Yes  -SS    Organ Dysfunction Criteria - O2 O2 Saturation < 95% on room air  -SS    Initiate Sepsis Protocol No  -SS              User Key  (r) = Recorded By, (t) = Taken By, (c) = Cosigned By      Initials Name    SS Stephen Saenz, PA-C Stephen Saenz, PA-C [facility] Sepsis Screening [facility]

## 2023-04-27 NOTE — ASSESSMENT & PLAN NOTE
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 His GIST is negative for KIT, PDGFRA, SDH, BRAF, NF1, NF2 mutations. He had oligometastatic disease to the liver found on CT scan 5/20/19. He went to [facility] and underwent IR liver lesion biopsy and ablation on 7/23/19. Pathology showed metastatic GIST positive for DOG-1 and .   - surveillance MRI in Feb 2020 showed progressive disease with new peritoneal mets. Patient was seen by Dr Guaman. Gleevec was recommended.   - CT after 2.5 month of Gleevec showed progressive peritoneal disease.   - underwent cytoreductive surgery at [facility] on 7/10/20  - started sutent 37.5 mg daily on 8/10/20.   - s/p ablation to segment 5 lesion and re-treatment of segment 7 lesion in Dec 2020.   - CT scan 1/12/22 showed progression. Treatment was switched to regorafenib. Started on 1/29/22  - CT 3/23/22 showed progression in liver metastases, peritoneal mets and left lung nodule  - started temodar 85 mg/m2 3 weeks on, 1 week off on 4/6/22. CT on 5/31/22 showed progression  - started pazopanib on 6/10/22 with an escalating dose, reached 800 mg daily on 6/24/22  - Held for 1 week before and 3 weeks after Y90 on 8/17/22. Resumed at 600 mg on 9/8/22. Had significant fatigue when he was on 800 mg daily. Stopped votrient on 11/10/22 for [facility] trial. Had progression on votrient  - Started rogaratinib on trial at [facility] on 11/30/2022. On 800 mg bid  - CT at [facility] on 1/24/23 showed response. Rogaratinib on hold due to side effects  - Rogaratinib temporarily suspended on 4/17 in the setting of new onset constipation and blood with BM to investigate whether drug was causing adverse effects vs. GI/tumor source    - Radiation oncology consulted, appreciate recommendations  - beginning palliative pelvic radiation 4/27 per Dr. Baig: GIB 25Gy/5fx M-F using 3DCRT

## 2023-04-27 NOTE — ASSESSMENT & PLAN NOTE
No Aspirin for 5 days per Dr. Bro' Instruction to pt.  Tylenol as needed for pain, dosing per packaging instructions.   Baseline 136. 129 on admission, likely in the setting of recent poor PO intake, which has remained poor during stay. Improving slowly

## 2023-04-27 NOTE — ASSESSMENT & PLAN NOTE
Likely related to possible infection, progressing cancer, metabolic derangements and poor PO intake  PT/OT consulted for assessment of worsening functional status - no needs while inpatient

## 2023-04-27 NOTE — PROGRESS NOTES
"[facility] - Transplant Stepdown  Hematology/Oncology  Progress Note    Patient Name: Forrest Schmidt  Admission Date: 4/24/2023  Hospital Length of Stay: 3 days  Code Status: Full Code     Subjective:     HPI:  Mr. 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 He also complains of burning/difficulty urinating over the past week. Patient is currently in Rogaratinib trial with [facility]. He saw  Dr. Elaine at the [facility] last week, who advised him to temporarily suspend the Rogaratinib to pursue GI workup of new onset constipation. Additionally, he complains of lethargy, suprapubic pain over the past week and one febrile episode with Tmax 100.5 at home. He denies CP, N/V, leg swelling. He was scheduled to get a colonoscopy on 4/26 to further investigate.  [facility] and underwent CT A/P on 4/21 revealing progression of pelvic mass. Patient was advised to present to [facility] for further evaluation.     ED Course: AF, HDS on RA. Labs notable for mild anemia - Hgb 10.7, leukocytosis - WBC 15, Hyponatremia - Na 129, elevated alkaline phosphatase 250, elevated AST/ALT 80/61.     Oncology History:  - Mr. 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 His GIST is negative for KIT, PDGFRA, SDH, BRAF, NF1, NF2 mutations. He had oligometastatic disease to the liver found on CT scan 5/20/19. He went to [facility] and underwent IR liver lesion biopsy and ablation on 7/23/19. Pathology showed metastatic GIST positive for DOG-1 and .   - surveillance MRI in Feb 2020 showed progressive disease with new peritoneal mets. Patient was seen by Dr Guaman. Gleevec was recommended.   - CT after 2.5 month of Gleevec showed progressive peritoneal disease.   - underwent cytoreductive surgery at [facility] on 7/10/20  - started sutent 37.5 mg daily on 8/10/20.   - s/p ablation to segment 5 lesion and re-treatment of segment 7 lesion in Dec 2020.   - CT scan 1/12/22 showed progression. Treatment was switched to regorafenib. Started on 1/29/22  - CT 3/23/22 showed progression in liver metastases, peritoneal mets and left lung nodule  - started temodar 85 mg/m2 3 weeks on, 1 week off on 4/6/22. CT on 5/31/22 showed progression  - started pazopanib on 6/10/22 with an escalating dose, reached 800 mg daily on 6/24/22  - Held for 1 week before and 3 weeks after Y90 on 8/17/22. Resumed at 600 mg on 9/8/22. Had significant fatigue when he was on 800 mg daily. Stopped votrient on 11/10/22 for [facility] trial. Had progression on votrient  - Started rogaratinib on trial at [facility] on 11/30/2022. On 800 mg bid  - CT at [facility] on 1/24/23 showed response. Rogaratinib on hold due to side effects        Interval History: NAEON. Patient continues to pass gas, small hard pellet-like stool and BRBPR. Continues to void without problem. Started palliative radiation per radiation oncology recommendations.    Patient will likely undergo colostomy with surgical oncology next week.    AF, HDS on RA    Oncology Treatment Plan:   [No matching plan found]    Medications:  Continuous Infusions:  Scheduled Meds:   arginine HCl (L-arginine)  2 tablet Oral QHS    atorvastatin  40 mg Oral QHS    bisacodyL  5 mg Oral BID    cetirizine  5 mg Oral QHS    cyanocobalamin  1,000 mcg Oral Daily    enoxaparin  40 mg Subcutaneous Daily    famotidine  20 mg Oral BID    fluticasone propionate  1 spray Each Nostril BID    metoprolol succinate  50 mg Oral QHS    multivitamin  1 tablet Oral Daily    NIFEdipine  30 mg Oral QHS    omega 3-dha-epa-fish oil  1 capsule Oral Daily    polyethylene glycol  17 g Oral Daily    sodium chloride 2%  2 drop Both Eyes BID    tadalafiL  5 mg Oral Daily    tamsulosin  0.4 mg Oral Daily    vitamin D  2,000 Units Oral  Daily     PRN Meds:acetaminophen, aluminum-magnesium hydroxide-simethicone, benzonatate, dextrose 10%, dextrose 10%, dextrose, dextrose, glucagon (human recombinant), guaiFENesin, hydrocortisone, melatonin, naloxone, nitroGLYCERIN, ondansetron, simethicone, sodium chloride 0.9%, sodium chloride 2%     Review of Systems   Constitutional:  Positive for activity change and fatigue. Negative for chills and fever.   HENT:  Negative for sinus pressure.    Eyes:  Negative for visual disturbance.   Respiratory:  Negative for cough and shortness of breath.    Cardiovascular:  Negative for chest pain and leg swelling.   Gastrointestinal:  Positive for blood in stool and constipation. Negative for abdominal distention, abdominal pain, diarrhea, nausea and vomiting.   Genitourinary:  Positive for difficulty urinating and frequency. Negative for decreased urine volume, dysuria and urgency.   Musculoskeletal:  Negative for myalgias.   Skin:  Negative for wound.   Neurological:  Positive for weakness. Negative for headaches.   Psychiatric/Behavioral:  Negative for confusion.    Objective:     Vital Signs (Most Recent):  Temp: 98 °F (36.7 °C) (04/27/23 1142)  Pulse: 70 (04/27/23 1142)  Resp: 17 (04/27/23 1142)  BP: 115/67 (04/27/23 1142)  SpO2: 95 % (04/27/23 1142) Vital Signs (24h Range):  Temp:  [96.5 °F (35.8 °C)-98.7 °F (37.1 °C)] 98 °F (36.7 °C)  Pulse:  [63-80] 70  Resp:  [17-23] 17  SpO2:  [93 %-97 %] 95 %  BP: (115-137)/(60-76) 115/67     Weight: 80.5 kg (177 lb 7.5 oz)  Body mass index is 24.75 kg/m².  Body surface area is 2.01 meters squared.      Intake/Output Summary (Last 24 hours) at 4/27/2023 1403  Last data filed at 4/27/2023 0935  Gross per 24 hour   Intake 1357.28 ml   Output --   Net 1357.28 ml         Physical Exam  Vitals and nursing note reviewed.   Constitutional:       General: He is not in acute distress.     Appearance: Normal appearance. He is obese. He is ill-appearing. He is not toxic-appearing.   HENT:       Head: Normocephalic and atraumatic.      Mouth/Throat:      Mouth: Mucous membranes are dry.   Eyes:      Extraocular Movements: Extraocular movements intact.      Conjunctiva/sclera: Conjunctivae normal.      Pupils: Pupils are equal, round, and reactive to light.   Cardiovascular:      Rate and Rhythm: Normal rate and regular rhythm.      Pulses: Normal pulses.   Pulmonary:      Effort: Pulmonary effort is normal.      Breath sounds: Normal breath sounds. No wheezing or rhonchi.   Abdominal:      General: Bowel sounds are normal.      Palpations: Abdomen is soft.      Tenderness: There is abdominal tenderness (suprapubic). There is no guarding or rebound.   Musculoskeletal:         General: Normal range of motion.      Cervical back: Normal range of motion.      Right lower leg: No edema.      Left lower leg: No edema.   Skin:     General: Skin is warm.   Neurological:      General: No focal deficit present.      Mental Status: He is alert and oriented to person, place, and time.   Psychiatric:         Mood and Affect: Mood normal.       Significant Labs:   CBC:   Recent Labs   Lab 04/26/23  0602 04/27/23  0505   WBC 11.65 11.93   HGB 9.9* 9.5*   HCT 31.1* 28.9*    382      and CMP:   Recent Labs   Lab 04/26/23  0602 04/27/23  0505   * 130*   K 4.3 4.1   CL 99 100   CO2 22* 22*    126*   BUN 25* 24*   CREATININE 1.2 1.0   CALCIUM 9.2 8.8   PROT 6.6 6.4   ALBUMIN 2.3* 2.2*   BILITOT 0.4 0.3   ALKPHOS 233* 246*   AST 49* 46*   ALT 41 39   ANIONGAP 9 8         Diagnostic Results:  CT: 4/21:   Enlarging pelvic mass which involves the rectum and sigmoid colon.  Moderate colonic stool noted proximal E.  2. There are hypodense lesions within the liver which are not definitively seen on recent CT suspicious for metastatic disease progression.  Additional partially calcified lesions are stable likely treated lesions.    Assessment/Plan:     * Mechanical gastrointestinal obstruction  Patient  "presented with 1 month worsening constipation and scant blood with BM. BM consist of 10 "pellets" daily. Associated with 1 week of difficulty urinating, dysuria and lethargy.     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  Moderate colonic stool noted proximal E.  - There are hypodense lesions within the liver which are not definitively seen on recent CT suspicious for metastatic disease progression.  Additional partially calcified lesions are stable likely treated lesions."    Plan  - consulted surgical oncology for potential surgical intervention - appreciate assistance  - tentative plan for colostomy next week per surgical oncology  - consulted Urology for urinary retention/obstruction - no intervention indicated as urinating without baldwin  - regular diet  - patient still passing gas and hard stool, presentation not consistent with full obstruction.  - patient able to void successfully, no need for baldwin catheter      GIST (gastrointestinal stromal tumor) of small bowel, malignant  Metastases to liver and pelvis    - Mr. 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 His GIST is negative for KIT, PDGFRA, SDH, BRAF, NF1, NF2 mutations. He had oligometastatic disease to the liver found on CT scan 5/20/19. He went to [facility] and underwent IR liver lesion biopsy and ablation on 7/23/19. Pathology showed metastatic GIST positive for DOG-1 and .   - surveillance MRI in Feb 2020 showed progressive disease with new peritoneal mets. Patient was seen by Dr Guaman. Gleevec was recommended.   - CT after 2.5 month of Gleevec showed progressive peritoneal disease.   - underwent cytoreductive surgery at [facility] on 7/10/20  - started sutent 37.5 mg daily on 8/10/20.   - s/p ablation to segment 5 lesion and re-treatment of segment 7 lesion in Dec 2020.   - CT scan 1/12/22 showed " progression. Treatment was switched to regorafenib. Started on 1/29/22  - CT 3/23/22 showed progression in liver metastases, peritoneal mets and left lung nodule  - started temodar 85 mg/m2 3 weeks on, 1 week off on 4/6/22. CT on 5/31/22 showed progression  - started pazopanib on 6/10/22 with an escalating dose, reached 800 mg daily on 6/24/22  - Held for 1 week before and 3 weeks after Y90 on 8/17/22. Resumed at 600 mg on 9/8/22. Had significant fatigue when he was on 800 mg daily. Stopped votrient on 11/10/22 for [facility] trial. Had progression on votrient  - Started rogaratinib on trial at [facility] on 11/30/2022. On 800 mg bid  - CT at [facility] on 1/24/23 showed response. Rogaratinib on hold due to side effects  - Rogaratinib temporarily suspended on 4/17 in the setting of new onset constipation and blood with BM to investigate whether drug was causing adverse effects vs. GI/tumor source    - Radiation oncology consulted, appreciate recommendations  - beginning palliative pelvic radiation 4/27 per Dr. Baig: GIB 25Gy/5fx M-F using 3DCRT      Leukocytosis  WBC 15 on presentation. Reported fever of T 100.5 at home day prior to admission. Concern for urinary source with recent difficulty urinating, dysuria    - UA noninfectious - holding abx  - afebrile since admission  - lactate wnl    GIST (gastrointestinal stroma tumor), malignant, colon  See GIST, above    Abdominal pain  See 'mechanical obstruction'    Pelvic mass  See GIST    GERD (gastroesophageal reflux disease)  Continue home pepcid 20mg bid    Lethargy  Likely related to possible infection, progressing cancer, metabolic derangements and poor PO intake  PT/OT consulted for assessment of worsening functional status - no needs while inpatient    Hyponatremia  Baseline 136. 129 on admission, likely in the setting of recent poor PO intake, which has remained poor during stay. Improving slowly    Stage 3 chronic kidney disease  Baseline sCr 1.1 - stable at  baseline.    Metastatic cancer to pelvis  See GIST    Metastasis to liver  See GIST    Coronary artery disease  ASA and statin. Holding ASA for potential surgical intervention.    Hypercholesteremia  On statin; has mild transaminitis on labs, chronic. Continue home atorvastatin    Hypertension  On metoprolol 50, losartan 100 and nifedipine 60 per chart review. Normotensive. Will clarify and add anti-HTN as appropriate.     Will hold losartan for potential surgical procedure.   Continue metoprolol, nifedipine.              Alvino Araya MD  Hematology/Oncology  [facility] - Trans[facility] Step[facility]

## 2023-04-27 NOTE — ASSESSMENT & PLAN NOTE
"Patient presented with 1 month worsening constipation and scant blood with BM. BM consist of 10 "pellets" daily. Associated with 1 week of difficulty urinating, dysuria and lethargy.     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  Moderate colonic stool noted proximal E.  - There are hypodense lesions within the liver which are not definitively seen on recent CT suspicious for metastatic disease progression.  Additional partially calcified lesions are stable likely treated lesions."    Plan  - consulted surgical oncology for potential surgical intervention - appreciate assistance  - tentative plan for colostomy next week per surgical oncology  - consulted Urology for urinary retention/obstruction - no intervention indicated as urinating without baldwin  - regular diet  - patient still passing gas and hard stool, presentation not consistent with full obstruction.  - patient able to void successfully, no need for baldwin catheter    "

## 2023-04-28 NOTE — PROGRESS NOTES
SURGICAL ONCOLOGY    HPI  78 y.o. male who presented with malaise, constipation, and BRBPR in the setting of known recurrent GIST.  He also notes that he is having trouble emptying his bladder.  He had a CT on 4/21 showing a pelvic mass w/ mass effect on bladder.  He notes he is still having bowel movements, but these are usually small pellets.  He is still tolerating a diet.     Surgical oncology was consulted for obstructive symptoms and BRBPR.     Relevant oncologic hx is  5 years ago- SBR by Dr. Ahmet Sanchez  4 years ago- y90 liver ablation  3 years ago- Ex lap, colon resection, peritoneal resection, small bowel resection, mesh underlay for closure (at [facility])  Since then- multiple different medical therapies, most recently has been on Rogaratinib for several months.  Says he initially had good response, but now progression of disease  SUBJ    OBJ  Vitals:    04/28/23 0700   BP: 122/74   Pulse: 63   Resp: 17   Temp:       Physical Exam  Constitutional: He is oriented to person, place, and time. He appears well-developed and well-nourished. No distress.   HENT:   Head: Normocephalic and atraumatic.   Eyes: Pupils are equal, round, and reactive to light. Right eye exhibits no discharge. Left eye exhibits no discharge.   Cardiovascular: Normal rate and regular rhythm. Pulmonary:      Effort: Pulmonary effort is normal. No respiratory distress.      Breath sounds: Normal breath sounds.      Abdominal:   Soft, minimally distended  Well healed midline incision   Musculoskeletal:         General: No tenderness or deformity. Normal range of motion.      Cervical back: Normal range of motion and neck supple.   Neurological: He is alert and oriented to person, place, and time.   Skin: Skin is warm and dry. Capillary refill takes less than 2 seconds. No rash noted. He is not diaphoretic.   Psychiatric: His behavior is normal.    No results for input(s): WBC, RBC, HGB, HCT, PLT, MCV, MCH, MCHC in the last 24 hours.   No  results for input(s): GLUCOSE, NA, K, CL, CO2, BUN, CREATININE, MG in the last 24 hours.    Invalid input(s):  CALCIUM   Intake/Output - Last 3 Shifts         04/26 0700 04/27 0659 04/27 0700 04/28 0659 04/28 0700 04/29 0659    P.O. 720 1050     IV Piggyback 457.3      Total Intake(mL/kg) 1177.3 (14.6) 1050 (13)     Urine (mL/kg/hr) 250 (0.1)      Stool       Total Output 250      Net +927.3 +1050            Urine Occurrence 2 x 12 x     Stool Occurrence 4 x 11 x           Imaging- None in the last 24 hours    A+P  78 y.o. male w/ recurrent GIST (metastatic).  One mass is causing partial obstruction and bleeding at level of the rectum    -Changed to our service as primary team  -Palliative radiation today  -Colostomy in the next week or so  -Reg diet, home meds    Amilcar Lim MD  General Surgery, PGY-5  [phone]

## 2023-04-28 NOTE — PLAN OF CARE
[facility] Care Plan    POC reviewed with Forrest Schmidt and family at 1400. Pt verbalized understanding. Questions and concerns addressed. No acute events today. Pt progressing toward goals. Will continue to monitor. See below and flowsheets for full assessment and VS info.       Neuro:  [place] Coma Scale  Best Eye Response: 4-->(E4) spontaneous  Best Motor Response: 6-->(M6) obeys commands  Best Verbal Response: 5-->(V5) oriented  [place] Coma Scale Score: 15  Pupil PERRLA: yes     24 hr Temp:  [97.4 °F (36.3 °C)-98.7 °F (37.1 °C)]     CV:      BP goals:   MAP > 65    Resp:           Plan: N/A    GI/:     Diet/Nutrition Received: regular  Last Bowel Movement: 04/28/23  Voiding Characteristics: other (see comments) (retention)    Intake/Output Summary (Last 24 hours) at 4/28/2023 1635  Last data filed at 4/28/2023 1539  Gross per 24 hour   Intake 1590 ml   Output --   Net 1590 ml     Unmeasured Output  Urine Occurrence: 3  Stool Occurrence: 1  Emesis Occurrence:  (while in radiation)    Labs/Accuchecks:  Recent Labs   Lab 04/28/23  0729   WBC 11.76   RBC 3.53*   HGB 10.0*   HCT 30.3*   *      Recent Labs   Lab 04/28/23  0729   *   K 4.3   CO2 23      BUN 25*   CREATININE 1.1   ALKPHOS 282*   ALT 38   AST 45*   BILITOT 0.4      Recent Labs   Lab 04/24/23  1356   INR 1.1    No results for input(s): CPK, CPKMB, TROPONINI, MB in the last 168 hours.    Electrolytes: Electrolytes replaced  Accuchecks: none    Gtts:      LDA/Wounds:  Lines/Drains/Airways       Peripheral Intravenous Line  Duration                  Peripheral IV - Single Lumen 04/28/23 1530 22 G Anterior;Left;Proximal Forearm <1 day                  Wounds: No  Wound care consulted: No    Problem: Adult Inpatient Plan of Care  Goal: Plan of Care Review  Outcome: Ongoing, Progressing  Goal: Optimal Comfort and Wellbeing  Outcome: Ongoing, Progressing

## 2023-04-28 NOTE — PLAN OF CARE
VSS. Afebrile. Visi in place, re-calibrated. Pain managed with PRN Tylenol. Bed in lowest position. Call light within reach, pt and wife encouraged to call for nurse as needed. No concerns voiced at this time.

## 2023-04-29 NOTE — PROGRESS NOTES
SURGICAL ONCOLOGY    HPI  78 y.o. male who presented with malaise, constipation, and BRBPR in the setting of known recurrent GIST.  He also notes that he is having trouble emptying his bladder.  He had a CT on 4/21 showing a pelvic mass w/ mass effect on bladder.  He notes he is still having bowel movements, but these are usually small pellets.  He is still tolerating a diet.     Surgical oncology was consulted for obstructive symptoms and BRBPR.     Relevant oncologic hx is  5 years ago- SBR by Dr. Ahmet Sanchez  4 years ago- y90 liver ablation  3 years ago- Ex lap, colon resection, peritoneal resection, small bowel resection, mesh underlay for closure (at [facility])  Since then- multiple different medical therapies, most recently has been on Rogaratinib for several months.  Says he initially had good response, but now progression of disease    SUBJ  NAARIADNE, VSS. Pt had radiation therapy yesterday. Reports feeling fine but fatigued. Tolerating diet. 3x stool overnight, has firmed up.       OBJ  Vitals:    04/29/23 0436   BP: 122/70   Pulse: 68   Resp: 16   Temp: 98 °F (36.7 °C)      Physical Exam  Constitutional: He is oriented to person, place, and time. He appears well-developed and well-nourished. No distress.   HENT:   Head: Normocephalic and atraumatic.   Eyes: Pupils are equal, round, and reactive to light. Right eye exhibits no discharge. Left eye exhibits no discharge.   Cardiovascular: Normal rate and regular rhythm. Pulmonary:      Effort: Pulmonary effort is normal. No respiratory distress.      Breath sounds: Normal breath sounds.      Abdominal:   Soft, minimally distended  Well healed midline incision   Musculoskeletal:         General: No tenderness or deformity. Normal range of motion.      Cervical back: Normal range of motion and neck supple.   Neurological: He is alert and oriented to person, place, and time.   Skin: Skin is warm and dry. Capillary refill takes less than 2 seconds. No rash noted.  He is not diaphoretic.   Psychiatric: His behavior is normal.    Recent Labs   Lab 04/28/23  0729   WBC 11.76   RBC 3.53*   HGB 10.0*   HCT 30.3*   *   MCV 86   MCH 28.3   MCHC 33.0      Recent Labs   Lab 04/28/23  0729   *   K 4.3      CO2 23   BUN 25*   CREATININE 1.1   MG 1.9      Intake/Output - Last 3 Shifts         04/27 0700 04/28 0659 04/28 0700 04/29 0659 04/29 0700 04/30 0659    P.O. 1050 2040     IV Piggyback       Total Intake(mL/kg) 1050 (13) 2040 (25.2)     Urine (mL/kg/hr)       Total Output       Net +1050 +2040            Urine Occurrence 12 x 9 x     Stool Occurrence 11 x 10 x           Imaging- None in the last 24 hours    A+P  78 y.o. male w/ recurrent GIST (metastatic).  One mass is causing partial obstruction and bleeding at level of the rectum    -Palliative radiation Monday  -Colostomy likely late next week  -Stoma therapy consult early next week  -Reg diet, home meds    JACKIE Bentley MD  General Surgery, PGY-1  [phone]

## 2023-04-30 NOTE — PLAN OF CARE
Pt aaox4. VSS on RA. Visi monitoring in place. Pt still having small pellet BM's, reported there was no blood in stool overnight. Pt states he is urinating without difficulty. Plan for colostomy next week. Palliative radiation Monday. Prn tylenol and mucinex given x1 overnight. Pt up independently to bathroom. Wife at bedside assisting with care. VS and assessments in flowsheets.

## 2023-04-30 NOTE — PLAN OF CARE
78 year-old male admitted 4/24/23 for GI obstruction. Pt has hx of GIST of small intestine w/mets, CKD, BPH  -AAOx4, ambulates independently  -22 G Lt FA  -Phos 2.1   -Potassium Phos IVPB  -1 BM this shift  -pallative radiation for 5/1  -plan for colostomy in upcoming week   -room air  -pt ambulated in halls   -pt sitting up in chair for meals, wheels locked, non-skid socks in place, call light within reach

## 2023-04-30 NOTE — PROGRESS NOTES
SURGICAL ONCOLOGY    HPI  78 y.o. male who presented with malaise, constipation, and BRBPR in the setting of known recurrent GIST.  He also notes that he is having trouble emptying his bladder.  He had a CT on 4/21 showing a pelvic mass w/ mass effect on bladder.  He notes he is still having bowel movements, but these are usually small pellets.  He is still tolerating a diet.     Surgical oncology was consulted for obstructive symptoms and BRBPR.     Relevant oncologic hx is  5 years ago- SBR by Dr. Ahmet Sanchez  4 years ago- y90 liver ablation  3 years ago- Ex lap, colon resection, peritoneal resection, small bowel resection, mesh underlay for closure (at [facility])  Since then- multiple different medical therapies, most recently has been on Rogaratinib for several months.  Says he initially had good response, but now progression of disease    SUBJ  NAOE, VSS. Continues to have trouble emptying bladder.       OBJ  Vitals:    04/30/23 0426   BP: 137/76   Pulse: 68   Resp: 19   Temp: 98 °F (36.7 °C)      Physical Exam  Constitutional: He is oriented to person, place, and time. He appears well-developed and well-nourished. No distress.   HENT:   Head: Normocephalic and atraumatic.   Eyes: Pupils are equal, round, and reactive to light. Right eye exhibits no discharge. Left eye exhibits no discharge.   Cardiovascular: Normal rate and regular rhythm. Pulmonary:      Effort: Pulmonary effort is normal. No respiratory distress.      Breath sounds: Normal breath sounds.      Abdominal:   Soft, minimally distended  Well healed midline incision   Musculoskeletal:         General: No tenderness or deformity. Normal range of motion.      Cervical back: Normal range of motion and neck supple.   Neurological: He is alert and oriented to person, place, and time.   Skin: Skin is warm and dry. Capillary refill takes less than 2 seconds. No rash noted. He is not diaphoretic.   Psychiatric: His behavior is normal.    Recent Labs    Lab 04/30/23  0706   WBC 11.00   RBC 3.41*   HGB 9.8*   HCT 31.3*      MCV 92   MCH 28.7   MCHC 31.3*        Recent Labs   Lab 04/30/23  0706   *   K 4.2   CL 99   CO2 23   BUN 23   CREATININE 0.9   MG 2.0        Intake/Output - Last 3 Shifts         04/28 0700 04/29 0659 04/29 0700 04/30 0659 04/30 0700 05/01 0659    P.O. 2040 680     Total Intake(mL/kg) 2040 (25.2) 680 (8.4)     Urine (mL/kg/hr)  350 (0.2)     Stool  0     Total Output  350     Net +2040 +330            Urine Occurrence 9 x 4 x     Stool Occurrence 10 x 7 x           Imaging- None in the last 24 hours    A+P  78 y.o. male w/ recurrent GIST (metastatic).  One mass is causing partial obstruction and bleeding at level of the rectum    -Palliative radiation Monday  -Colostomy likely late this week  -Stoma therapy consult early this week  -Reg diet, home meds  - Daily labs, replete lytes prn. Replaced phos today    Dispo: floor    JACKIE Bentley MD  General Surgery, PGY-1  [phone]

## 2023-05-01 NOTE — PLAN OF CARE
Pt is AAOx4, afebrile, and VSS. PRN PO tylenol and mucinex given x1 overnight. Pt had 2 small soft BMs overnight. Pt is in bed in the lowest position, wheels locked, and call light in reach.Plan for colostomy later this week and palliative radiation today.

## 2023-05-01 NOTE — PROGRESS NOTES
SURGICAL ONCOLOGY    HPI  78 y.o. male who presented with malaise, constipation, and BRBPR in the setting of known recurrent GIST.  He also notes that he is having trouble emptying his bladder.  He had a CT on 4/21 showing a pelvic mass w/ mass effect on bladder.  He notes he is still having bowel movements, but these are usually small pellets.  He is still tolerating a diet.     Surgical oncology was consulted for obstructive symptoms and BRBPR.     Relevant oncologic hx is  5 years ago- SBR by Dr. Ahmet Sanchez  4 years ago- y90 liver ablation  3 years ago- Ex lap, colon resection, peritoneal resection, small bowel resection, mesh underlay for closure (at [facility])  Since then- multiple different medical therapies, most recently has been on Rogaratinib for several months.  Says he initially had good response, but now progression of disease    SUBJ  NAOE, VSS. Tolerating a regular diet, voiding spontaneously.      OBJ  Vitals:    05/01/23 0444   BP: 131/62   Pulse: 63   Resp: 18   Temp: 98 °F (36.7 °C)      Physical Exam  Constitutional: He is oriented to person, place, and time. He appears well-developed and well-nourished. No distress.   HENT:   Head: Normocephalic and atraumatic.   Eyes: Pupils are equal, round, and reactive to light. Right eye exhibits no discharge. Left eye exhibits no discharge.   Cardiovascular: Normal rate and regular rhythm. Pulmonary:      Effort: Pulmonary effort is normal. No respiratory distress.      Breath sounds: Normal breath sounds.      Abdominal:   Soft, minimally distended  Well healed midline incision   Musculoskeletal:         General: No tenderness or deformity. Normal range of motion.      Cervical back: Normal range of motion and neck supple.   Neurological: He is alert and oriented to person, place, and time.   Skin: Skin is warm and dry. Capillary refill takes less than 2 seconds. No rash noted. He is not diaphoretic.   Psychiatric: His behavior is normal.    No results  for input(s): WBC, RBC, HGB, HCT, PLT, MCV, MCH, MCHC in the last 24 hours.     No results for input(s): GLUCOSE, NA, K, CL, CO2, BUN, CREATININE, MG in the last 24 hours.    Invalid input(s):  CALCIUM     Intake/Output - Last 3 Shifts         04/29 0700  04/30 0659 04/30 0700 05/01 0659 05/01 0700 05/02 0659    P.O. 680 1474     Total Intake(mL/kg) 680 (8.4) 1474 (18.2)     Urine (mL/kg/hr) 350 (0.2)      Stool 0      Total Output 350      Net +330 +1474            Urine Occurrence 4 x 10 x     Stool Occurrence 7 x 7 x           Imaging- None in the last 24 hours    A+P  78 y.o. male w/ recurrent GIST (metastatic).  One mass is causing partial obstruction and bleeding at level of the rectum    -Palliative radiation today  -Colostomy likely late this week  -Stoma therapy consult today  -Reg diet, home meds  - Daily labs, replete lytes prn. Replaced phos today    Dispo: floor    WALTER Frias MD   General Surgery, PGY-4

## 2023-05-01 NOTE — PLAN OF CARE
[facility] - Transplant Stepdown  Initial Discharge Assessment       Primary Care Provider: Dez Jimenez MD    Admission Diagnosis: Pelvic mass [R19.00]  Chest pain [R07.9]  Abdominal pain, unspecified abdominal location [R10.9]    Admission Date: 4/24/2023  Expected Discharge Date: 5/4/2023    Discharge Barriers Identified: None    Payor: MEDICARE / Plan: MEDICARE PART A & B / Product Type: Government /     Extended Emergency Contact Information  Primary Emergency Contact: Veena Schmidt  Address: [address] [place] [address]  Home Phone: [phone]  Mobile Phone: [phone]  Relation: Spouse    Discharge Plan A: Home  Discharge Plan B: Home [facility]      [facility] [address]  [address] [facility] [address]  Phone: [phone] Fax: [phone]    [id number] - [place], [place] - [address] [address]  Phone: [phone] Fax: [phone]      Initial Assessment (most recent)       Adult Discharge Assessment - 05/01/23 1200          Discharge Assessment    Assessment Type Discharge Planning Assessment     Confirmed/corrected address, phone number and insurance Yes     Confirmed Demographics Correct on Facesheet     Source of Information patient     Communicated HOMER with patient/caregiver Date not available/Unable to determine     Reason For Admission Mechanical gastrointestinal obstruction     People in Home spouse     Do you expect to return to your current living situation? Yes     Do you have help at home or someone to help you manage your care at home? Yes     Who are your caregiver(s) and their phone number(s)? Veena Schmidt [phone]     Prior to hospitilization cognitive status: Alert/Oriented     Current cognitive status: Alert/Oriented     Home Layout Able to live on 1st floor     Equipment Currently Used at Home cane, straight;shower  chair;walker, rolling;bedside commode     Patient currently being followed by outpatient case management? No     Do you currently have service(s) that help you manage your care at home? No     Do you take prescription medications? Yes     Do you have any problems affording any of your prescribed medications? Yes     If yes, what medications? MEDICARE - MEDICARE PART A & B     Who is going to help you get home at discharge? Veena Ricez     How do you get to doctors appointments? car, drives self;family or friend will provide     Are you on dialysis? No     Do you take coumadin? No     Discharge Plan A Home     Discharge Plan B Home Health     DME Needed Upon Discharge  none     Discharge Plan discussed with: Patient;Spouse/sig other     Name(s) and Number(s) Veena Schmidt [phone]     Discharge Barriers Identified None        Physical Activity    On average, how many days per week do you engage in moderate to strenuous exercise (like a brisk walk)? 7 days        Financial Resource Strain    How hard is it for you to pay for the very basics like food, housing, medical care, and heating? Not hard at all        Housing Stability    In the last 12 months, was there a time when you were not able to pay the mortgage or rent on time? No     In the last 12 months, was there a time when you did not have a steady place to sleep or slept in a shelter (including now)? No        Transportation Needs    In the past 12 months, has lack of transportation kept you from medical appointments or from getting medications? No     In the past 12 months, has lack of transportation kept you from meetings, work, or from getting things needed for daily living? No        Food Insecurity    Within the past 12 months, you worried that your food would run out before you got the money to buy more. Patient refused     Within the past 12 months, the food you bought just didn't last and you didn't have money to get more. Patient refused        Stress     Do you feel stress - tense, restless, nervous, or anxious, or unable to sleep at night because your mind is troubled all the time - these days? Patient refused        Social Connections    In a typical week, how many times do you talk on the phone with family, friends, or neighbors? Patient refused     How often do you get together with friends or relatives? Patient refused     How often do you attend Latter day or Methodist services? Patient refused     Do you belong to any clubs or organizations such as Latter day groups, unions, fraternal or athletic groups, or school groups? Patient refused     How often do you attend meetings of the clubs or organizations you belong to? Patient refused     Are you , , , , never , or living with a partner?         Alcohol Use    Q1: How often do you have a drink containing alcohol? Patient refused     Q2: How many drinks containing alcohol do you have on a typical day when you are drinking? Patient refused     Q3: How often do you have six or more drinks on one occasion? Patient refused                      This SW met with patient and his wife at bedside to complete DPA. Questions answered / contact numbers provided.  Use PREFERRED PHARMACY / BEDSIDE DELIVERY for any necessary medications at time of discharge. The patient is independent with all ADLs - does use a shower chair/ walker/BSC and cane, is not on HD, BTs or home oxygen. The patient's wife will be assisting with help upon discharge. The patient's wife  will be providing transportation home. Hospital follow up will be scheduled with PCP. Will continue to follow for course of hospitalization.     Park John LMSW  Case Management [facility]  Ext: [phone]

## 2023-05-01 NOTE — PLAN OF CARE
"[facility] DAILY GOALS       A: Awake    RASS: Goal -    Actual -     Restraint necessity:    B: Breathe   SBT: Not intubated   C: Coordinate A & B, analgesics/sedatives   Pain: managed    SAT: Not intubated  D: Delirium   CAM-ICU:    E: Early(intubated/ Progressive (non-intubated) Mobility   MOVE Screen: Pass   Activity: Activity Management: Ambulated to bathroom - L4, Ambulated in gomez - L4, Ambulated in room - L4  FAS: Feeding/Nutrition   Diet order: Diet/Nutrition Received: regular,    T: Thrombus   DVT prophylaxis: VTE Required Core Measure: Pharmacological prophylaxis initiated/maintained  H: HOB Elevation   Head of Bed (HOB) Positioning: HOB elevated  U: Ulcer Prophylaxis   GI: yes  G: Glucose control   managed    S: Skin   Bathing/Skin Care: other (see comments) (pt independent of self care and ADL's.)  Device Skin Pressure Protection: adhesive use limited  Pressure Reduction Devices: alternating pressure pump (ADD)  Pressure Reduction Techniques: frequent weight shift encouraged  Skin Protection: adhesive use limited  B: Bowel Function   Frequent small BM's during the day and night.  I: Indwelling Catheters   Dove necessity:     CVC necessity: No  D: De-escalation Antibiotics   Yes    Family/Goals of care/Code Status   Code Status: Full Code    24H Vital Sign Range  Temp:  [97.8 °F (36.6 °C)-98.3 °F (36.8 °C)]   Pulse:  [63-71]   Resp:  [16-20]   BP: (127-136)/(60-63)   SpO2:  [96 %-98 %]      Shift Events   No acute events throughout shift. Pt requested a Tylenol PRN this morning for abd pain 4/10. He ambulated in the halls, in his room and to his bathroom with a steady gait. He reports a poor appetite saying that eating is a "chore" for him nowadays. He has eaten >50% of all his meals. Wife is at bedside. VSS, afebrile. No distress noted or reported.    VS and assessment per flow sheet, patient progressing towards goals as tolerated, plan of care reviewed with  patient and his wife, at bedside , all concerns " addressed, will continue to monitor.    Alice Agrawal RN  Problem: Adult Inpatient Plan of Care  Goal: Plan of Care Review  5/1/2023 1730 by Alice Agrawal RN  Outcome: Ongoing, Progressing  5/1/2023 1730 by Alice Agrawal RN  Outcome: Ongoing, Progressing  Goal: Patient-Specific Goal (Individualized)  5/1/2023 1730 by Alice Agrawal RN  Outcome: Ongoing, Progressing  5/1/2023 1730 by Alice Agrawal RN  Outcome: Ongoing, Progressing  Goal: Absence of Hospital-Acquired Illness or Injury  5/1/2023 1730 by Alice Agrawal RN  Outcome: Ongoing, Progressing  5/1/2023 1730 by Alice Agrawal RN  Outcome: Ongoing, Progressing  Goal: Optimal Comfort and Wellbeing  5/1/2023 1730 by Alice Agrawal RN  Outcome: Ongoing, Progressing  5/1/2023 1730 by Alice Agrawal RN  Outcome: Ongoing, Progressing  Goal: Readiness for Transition of Care  5/1/2023 1730 by Alice Agrawal RN  Outcome: Ongoing, Progressing  5/1/2023 1730 by Alice Agrawal RN  Outcome: Ongoing, Progressing     Problem: Infection  Goal: Absence of Infection Signs and Symptoms  5/1/2023 1730 by Alice Agrawal RN  Outcome: Ongoing, Progressing  5/1/2023 1730 by Alice Agrawal RN  Outcome: Ongoing, Progressing     Problem: Fall Injury Risk  Goal: Absence of Fall and Fall-Related Injury  5/1/2023 1730 by Alice Agrawal RN  Outcome: Ongoing, Progressing  5/1/2023 1730 by Alice Agrawal RN  Outcome: Ongoing, Progressing

## 2023-05-02 NOTE — PROGRESS NOTES
SURGICAL ONCOLOGY    HPI:  Forrest Schmidt is a 78 y.o. 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 Admitted with malaise, constipation, BRBPR and urinary difficulties.  who presented with malaise, constipation, and BRBPR in the setting of known recurrent GIST.  CT from 04/21/23 shows progression of his known liver and pelvic disease. Continues to have bowel function and passing flatus but seems to have impending colonic obstruction based on the relationship of the tumor to the rectum.     Medical hx:  HTN, HLD, BPH, GERD, arthritis     Relevant oncologic hx:  02/2018: SBR by Dr. Ahmet Sanchez  07/2019, 12/2020, 08/2020: liver ablation  07/2020: Ex lap, cytoreductive surgery (MD Langston)  11/2022: Started Rogaratinib     SUBJ:  No issues overnight. Not yet marked by ostomy nurse.      OBJ:  Vitals:    05/02/23 0414   BP: (!) 143/67   Pulse: 75   Resp: 18   Temp: 98 °F (36.7 °C)      Physical Exam  Vitals and nursing note reviewed.   Constitutional:       General: He is not in acute distress.     Appearance: Normal appearance. He is not ill-appearing, toxic-appearing or diaphoretic.   HENT:      Head: Normocephalic.   Cardiovascular:      Rate and Rhythm: Normal rate and regular rhythm.   Pulmonary:      Effort: Pulmonary effort is normal. No respiratory distress.   Abdominal:      General: There is no distension.      Palpations: Abdomen is soft. There is no mass.      Tenderness: There is no abdominal tenderness. There is no guarding or rebound.      Hernia: No hernia is present.      Comments: Healed surgical scars   Skin:     General: Skin is warm and dry.      Capillary Refill: Capillary refill takes less than 2 seconds.   Neurological:      Mental Status: He is alert and oriented to person, place, and time.     Pending 05/02 AM labs    Recent Labs   Lab 05/01/23  0840   WBC 10.88   RBC 3.40*   HGB 9.3*   HCT 29.4*   *   MCV 87   MCH 27.4   MCHC 31.6*        Recent Labs   Lab 05/01/23  0840   *   K 4.3      CO2 23   BUN 23   CREATININE 0.8   MG 1.8        Intake/Output - Last 3 Shifts         04/30 0700 05/01 0659 05/01 0700 05/02 0659 05/02 0700  05/03 0659    P.O. 1474 1020     Total Intake(mL/kg) 1474 (18.2) 1020 (12.6)     Urine (mL/kg/hr)       Stool       Total Output       Net +1474 +1020            Urine Occurrence 10 x 12 x     Stool Occurrence 7 x 7 x           Imaging: None    A+P:  78 y.o. male w/ recurrent metastatic GIST w/ progression of known liver and pelvic disease. The pelvic mass is causing a partial obstruction of the rectum. Initially admitted to the hospital medicine service. Transferred to the Surgical Oncology service 04/28.    - Plan for laparoscopic colostomy creation 05/04  - Consulted wound care nurse for stoma marking  - Palliative radiation scheduled for 05/03  - Regular diet  - Continue home meds  - Urology previously consulted this admission (04/24) - will re engage, patient may need indwelling baldwin catheter v suprapubic catheter  - Morning labs still in process    Kierra Fowler MD  General Surgery PGY4

## 2023-05-02 NOTE — PROGRESS NOTES
"  [facility] - Transplant Stepdown  Adult Nutrition  Consult Note    SUMMARY     Recommendations    1) Continue regular diet -Encourage intake   2) Following    Goals: Meet % een/epn by next Rd f/u  Nutrition Goal Status: new  Communication of RD Recs: other (comment) (POC)    Assessment and Plan    Nutrition Problem  Inadequate energy intake     Related to (etiology):   Inadequate oral intake    Signs and Symptoms (as evidenced by):   50% intake of meals     Interventions/Recommendations (treatment strategy):  Collaboration with other providers    Nutrition Diagnosis Status:   New       Reason for Assessment    Reason For Assessment: length of stay  Diagnosis: gastrointestinal disease (Mechanical gastrointestinal obstruction)  Relevant Medical History: HTN, HLD, GERD  Interdisciplinary Rounds: did not attend  General Information Comments: LOS x 8. Endorses 50-75% intake of meals. Denies N/V/D/C. Endorses 10-15 small BMS daily w/ blood present. Denies chewing/swallowing issues. Decreased appetite PTA. -180#. # Appears nourished per visual NFPE. Following  Nutrition Discharge Planning: pending medical course    Nutrition Risk Screen    Nutrition Risk Screen: no indicators present    Nutrition/Diet History    Patient Reported Diet/Restrictions/Preferences: other (see comments) (lod phos due to Experimental chemo drug)  Typical Food/Fluid Intake: 3 meals/day  Spiritual, Cultural Beliefs, Mosque Practices, Values that Affect Care: no  Vitamin/Mineral/Herbal Supplements: MVI, D3, B12, fish oil, L arginine, magnesium, eye vitamins  Food Allergies: NKFA    Anthropometrics    Temp: 97.8 °F (36.6 °C)  Height Method: Stated  Height: 5' 11" (180.3 cm)  Height (inches): 71 in  Weight Method: Bed Scale  Weight: 80.7 kg (178 lb)  Weight (lb): 178 lb  Ideal Body Weight (IBW), Male: 172 lb  % Ideal Body Weight, Male (lb): 103.49 %  BMI (Calculated): 24.8  BMI Grade: 18.5-24.9 - normal   "     Lab/Procedures/Meds    Pertinent Labs Reviewed: reviewed  Pertinent Labs Comments: H/H: 9.3/29.4, MCHC: 31.6, Na: 131, Glu: 156, phos: 2.0, ast: 99, alt: 97  Pertinent Medications Reviewed: reviewed  Pertinent Medications Comments: atorvastatin, arginine, cyanocobalamin, enoxaparin, famotidine, MVI, omega 3, polyethylene glycol, potassium sodium phosphates, tamsulosin, vitamin D  \    Estimated/Assessed Needs    Weight Used For Calorie Calculations: 80.7 kg (178 lb)  Energy Calorie Requirements (kcal): 3933-1359 (20-23 kcal/kg)  Energy Need Method: Kcal/kg  Protein Requirements: 80-89 (1-1.1 g/kg of actual BW)  Weight Used For Protein Calculations: 80.7 kg (178 lb)        RDA Method (mL): 1614         Nutrition Prescription Ordered    Current Diet Order: Regular    Evaluation of Received Nutrient/Fluid Intake    I/O: +6.1 L since admit  Comments: LBM 5/1/23  Tolerance: tolerating  % Intake of Estimated Energy Needs: 50 - 75 %  % Meal Intake: 50 - 75 %    Nutrition Risk    Level of Risk/Frequency of Follow-up: low (f/u 1x/week)       Monitor and Evaluation    Food and Nutrient Intake: energy intake, food and beverage intake  Food and Nutrient Adminstration: diet order  Knowledge/Beliefs/Attitudes: food and nutrition knowledge/skill, beliefs and attitudes  Physical Activity and Function: nutrition-related ADLs and IADLs  Anthropometric Measurements: height/length, weight, weight change, body mass index  Biochemical Data, Medical Tests and Procedures: gastrointestinal profile, electrolyte and renal panel, lipid profile, inflammatory profile, glucose/endocrine profile  Nutrition-Focused Physical Findings: overall appearance       Nutrition Follow-Up    RD Follow-up?: Yes    Monica De Dios, Registration Eligible, Provisional LDN

## 2023-05-02 NOTE — PLAN OF CARE
Recommendations    1) Continue regular diet -Encourage intake   2) Following    Goals: Meet % een/epn by next Rd f/u  Nutrition Goal Status: new  Communication of RD Recs: other (comment) (POC

## 2023-05-02 NOTE — PLAN OF CARE
Patient AAO X 4, VSS. Gave PRN mucinex once and tylenol. Skin intact. Patient received palliative radiation yesterday and will receive it today also. Colostomy/stoma therapy consult is in. Spouse at bedside.  Up ambulatory, bed locked at lowest setting, yellow socks on, call bell in reach. Remains free from falls.

## 2023-05-02 NOTE — PHYSICIAN QUERY
PT Name: Forrest Schmidt  MR #: [id number]    DOCUMENTATION CLARIFICATION      CDS/: Xi Patel RN, BSN             Contact information: [email or web address]     This form is a permanent document in the medical record.      Query Date: May 2, 2023    By submitting this query, we are merely seeking further clarification of documentation. Please utilize your independent clinical judgment when addressing the question(s) below.    The Medical Record contains the following:   Indicators  Supporting Clinical Findings Location in Medical Record   x Anemia documented anemia - Hgb 10.7 Heme/Onc, 4/27     x H&H  04/24/23 13:56 04/25/23 06:33 04/26/23 06:02 04/27/23 05:05 04/28/23 07:29 04/29/23 07:15 04/30/23 07:06 05/01/23 08:40   Hemoglobin 10.7 (L) 10.7 (L) 9.9 (L) 9.5 (L) 10.0 (L) 9.4 (L) 9.8 (L) 9.3 (L)      04/24/23 13:56 04/25/23 06:33 04/26/23 06:02 04/27/23 05:05 04/28/23 07:29 04/29/23 07:15 04/30/23 07:06 05/01/23 08:40   Hematocrit 33.6 (L) 33.6 (L) 31.1 (L) 28.9 (L) 30.3 (L) 29.6 (L) 31.3 (L) 29.4 (L)    LAB                     x BP                    HR Pulse:  [64-74] 71  BP: (136-153)/(69-76) 153/76    Pulse:  [] 77  BP: (132-153)/(69-79) 136/74    Pulse:  [] 88  BP: (114-146)/(69-80) 127/71    Pulse:  [63-80] 70  BP: (115-137)/(60-76) 115/67   H&P, 4/24      Heme/Onc, 4/25      Heme/Onc, 4/26      Heme/Onc, 4/27   x GI bleeding documented blood in stool     increasing blood with BM over the past month   Heme/Onc, 4/27    H&P, 4/24    Acute bleeding (Non GI site)      Transfusion(s)     x Acute/Chronic illness   Weakness  Mechanical gastrointestinal obstruction  Metastatic cancer to pelvis  Metastasis to liver  Lethargy  Coronary artery disease  ASA and statin. 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 GI/tumor source     Heme/Onc, 4/27    H&P, 4/24        Other       Provider, please further specify the Anemia diagnosis or diagnoses associated with above clinical findings.   [x] Acute on Chronic blood loss anemia    [   ] Chronic blood loss anemia     [   ] Pernicious anemia    [   ] Anemia of chronic kidney disease    [   ] Anemia due to neoplastic disease   [   ] Anemia due to antineoplastic chemotherapy   [   ] Anemia due to chronic disease (please specify): _________________   [   ] Anemia, unspecified    [   ] Other Hematological Diagnosis (please specify): _________________   [   ] Clinically Undetermined     Please document in your progress notes daily for the duration of treatment, until resolved, and include in your discharge summary.    Form No. [id number]

## 2023-05-03 NOTE — ANESTHESIA PREPROCEDURE EVALUATION
[facility]  Anesthesia Pre-Operative Evaluation         Patient Name: Forrest Schmidt  YOB: 1945  MRN: [id number]    SUBJECTIVE:     Pre-operative evaluation for Procedure(s) (LRB):  CREATION, COLOSTOMY, LAPAROSCOPIC - rq 3pm start (N/A)     05/03/2023    Forrest Schmidt is a 78 y.o. 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 Admitted with malaise, constipation, BRBPR and urinary difficulties.       Patient now presents for the above procedure(s).        LDA:        Peripheral IV - Single Lumen 04/28/23 1530 22 G Anterior;Left;Proximal Forearm (Active)   Site Assessment Clean;Dry;Intact;No redness;No swelling 05/03/23 0900   Extremity Assessment Distal to IV No warmth;No swelling;No redness;No abnormal discoloration 05/02/23 1936   Line Status Flushed;Saline locked 05/03/23 0900   Dressing Status Clean;Dry;Intact 05/03/23 0900   Dressing Intervention Integrity maintained 05/02/23 1936   Dressing Change Due 05/02/23 05/03/23 0900   Site Change Due 05/02/23 05/03/23 0900   Reason Not Rotated Poor venous access 05/03/23 0900   Number of days: 4       Prev airway:  7/10/2020  Placement Date: 07/10/20; Placement Time: 0724; Existing LDA Placed By: CRNA; Urgency: Elective; Mask Ventilation: Ventilated by mask with oral airway (2-handed, leak due to beard); Technique: Preoxygenated, Stylet, Video laryngoscopy, Oral Airway - Large; Type: Cuffed, Oral; Tube Size: 8 mm; Laryngoscope: C-MAC; Blade Size: 3; Grade View: 2a; Insertion Attempts: 1; Placement Verification: Bilateral breath sounds equal, End tidal CO2, Vocal cords visualized; Secured at: 24 cm; Secured by: Cloth tape; Measured from: Lips; Airway Comments: atraumatic intubation.; Removal Date: 07/10/20; Removal Time: 1712    Drips: None documented.    Patient Active Problem List   Diagnosis    Hypertension    Hypercholesteremia    Coronary  "artery disease    BPH (benign prostatic hyperplasia)    Arthritis    Excessive use of nonsteroidal anti-inflammatory drug (NSAID)    Pain of left hip joint    Mass of small intestine    Melena    GIST (gastrointestinal stromal tumor) of small bowel, malignant    Incisional hernia, without obstruction or gangrene    Metastasis to liver    Metastatic cancer to pelvis    Stage 3 chronic kidney disease    Mechanical gastrointestinal obstruction    Leukocytosis    Hyponatremia    Lethargy    GERD (gastroesophageal reflux disease)    Pelvic mass    Abdominal pain    Chest pain    GIST (gastrointestinal stroma tumor), malignant, colon       Review of patient's allergies indicates:   Allergen Reactions    Augmentin [amoxicillin-pot clavulanate] Shortness Of Breath     disoriented    Fexofenadine Other (See Comments)     Pt states he can't remember the details but it was a bad effect.    Singulair [montelukast] Other (See Comments)     Pt "felt strange"  Pt "felt strange" bloating, Intestinal irritation, abd cramping      Ace inhibitors Other (See Comments)     cough    Captopril     Doxycycline Nausea And Vomiting     headache    Horse/equine containing products Hives    Hydrocodone Nausea Only    Scopolamine hbr Other (See Comments)     Nausea, headache, changes in BP        Current Outpatient Medications:    Current Facility-Administered Medications:     acetaminophen tablet 500 mg, 500 mg, Oral, BID PRN, Shayla Johnson MD, 500 mg at 05/03/23 0822    aluminum-magnesium hydroxide-simethicone 200-200-20 mg/5 mL suspension 30 mL, 30 mL, Oral, QID PRN, Alvino Araya MD    arginine HCl (L-arginine) Tab 2,000 mg, 2 tablet, Oral, QHS, Alvino Araya MD, 2,000 mg at 05/02/23 2018    atorvastatin tablet 40 mg, 40 mg, Oral, QHS, Alvino Araya MD, 40 mg at 05/02/23 2014    benzonatate capsule 100 mg, 100 mg, Oral, TID PRN, Shayla Johnson MD    bisacodyL EC tablet 5 mg, 5 mg, Oral, BID, " Alvino Araya MD, 5 mg at 05/03/23 0815    cetirizine tablet 5 mg, 5 mg, Oral, QHS, Alvino Araya MD, 5 mg at 05/02/23 2015    cyanocobalamin tablet 1,000 mcg, 1,000 mcg, Oral, Daily, Alvino Araya MD, 1,000 mcg at 05/03/23 0815    dextrose 10% bolus 125 mL 125 mL, 12.5 g, Intravenous, PRN, Alvino Araya MD    dextrose 10% bolus 250 mL 250 mL, 25 g, Intravenous, PRN, Alvino Araya MD    dextrose 40 % gel 15,000 mg, 15 g, Oral, PRN, Alvino Araya MD    dextrose 40 % gel 30,000 mg, 30 g, Oral, PRN, Alvino Araya MD    enoxaparin injection 40 mg, 40 mg, Subcutaneous, Daily, Alvino Araya MD, 40 mg at 05/02/23 1726    famotidine tablet 20 mg, 20 mg, Oral, BID, Alvino Araya MD, 20 mg at 05/03/23 0815    fluticasone propionate 50 mcg/actuation nasal spray 50 mcg, 1 spray, Each Nostril, BID, Alvino Araya MD, 50 mcg at 05/03/23 0816    glucagon (human recombinant) injection 1 mg, 1 mg, Intramuscular, PRN, Alvino Araya MD    guaiFENesin 12 hr tablet 1,200 mg, 1,200 mg, Oral, BID PRN, Jocelyn Bentley MD, 1,200 mg at 05/03/23 0821    hydrocortisone 2.5 % cream, , Topical (Top), BID PRN, Alvino Araya MD    melatonin tablet 6 mg, 6 mg, Oral, Nightly PRN, Alvino Araya MD    metoprolol succinate (TOPROL-XL) 24 hr tablet 50 mg, 50 mg, Oral, QHS, Alvino Araya MD, 50 mg at 05/02/23 2014    multivitamin tablet, 1 tablet, Oral, Daily, Bobbi Frias MD, 1 tablet at 05/03/23 0815    naloxone 0.4 mg/mL injection 0.02 mg, 0.02 mg, Intravenous, PRN, Alvino Araya MD    NIFEdipine 24 hr tablet 30 mg, 30 mg, Oral, QHS, Alvino Araya MD, 30 mg at 05/02/23 2014    nitroGLYCERIN SL tablet 0.4 mg, 0.4 mg, Sublingual, Q5 Min PRN, Alvino Araya MD    omega 3-xts-ueb-fish oil capsule 1 capsule, 1 capsule, Oral, Daily, Alvino Araya MD, 1 capsule at 05/03/23 0825    ondansetron disintegrating tablet 8 mg, 8 mg, Oral, Q8H PRN, Alvino Araya MD    [START ON 5/4/2023] polyethylene glycol packet 17 g, 17  g, Oral, Q48H, Kierra Fowler MD    potassium, sodium phosphates 280-160-250 mg packet 2 packet, 2 packet, Oral, TID WM, Kierra Fowler MD    simethicone chewable tablet 80 mg, 1 tablet, Oral, QID PRN, Alvino Araya MD    sodium chloride 0.9% flush 10 mL, 10 mL, Intravenous, Q12H PRN, Alvino Araya MD    sodium chloride 2% ophthalmic solution 1 drop, 1 drop, Both Eyes, PRN, Alvino Araya MD    sodium chloride 2% ophthalmic solution 2 drop, 2 drop, Both Eyes, BID, Alvino Araya MD, 2 drop at 23 0817    tadalafiL tablet 5 mg, 5 mg, Oral, Daily, Surinder Shaw MD, 5 mg at 23 0816    tamsulosin 24 hr capsule 0.4 mg, 0.4 mg, Oral, Daily, Alvino Araya MD, 0.4 mg at 23 0815    vitamin D 1000 units tablet 2,000 Units, 2,000 Units, Oral, Daily, Alvino Araya MD, 2,000 Units at 23 0815    Past Surgical History:   Procedure Laterality Date    APPENDECTOMY      COLONOSCOPY N/A 5/15/2017    Procedure: COLONOSCOPY;  Surgeon: Dez Jimenez MD;  Location: [facility];  Service: Endoscopy;  Laterality: N/A;    CORONARY STENT PLACEMENT      ESOPHAGOGASTRODUODENOSCOPY      EYE SURGERY      cataract extraction    SKIN BIOPSY      TONSILLECTOMY      VASECTOMY         Social History     Socioeconomic History    Marital status:    Tobacco Use    Smoking status: Former     Packs/day: 0.50     Years: 15.00     Pack years: 7.50     Types: Cigarettes     Quit date: 1974     Years since quittin.3    Smokeless tobacco: Never    Tobacco comments:     Quit smoking about 40 yrs ago   Substance and Sexual Activity    Alcohol use: Yes     Alcohol/week: 1.0 standard drink     Types: 1 Glasses of wine per week     Comment: 2-4 glasses wine/day, 17 last drink    Drug use: No    Sexual activity: Yes     Partners: Female     Social Determinants of Health     Financial Resource Strain: Low Risk     Difficulty of Paying Living Expenses: Not hard at all   Food Insecurity:  Unknown    Worried About Running Out of Food in the Last Year: Patient refused    Ran Out of Food in the Last Year: Patient refused   Transportation Needs: No Transportation Needs    Lack of Transportation (Medical): No    Lack of Transportation (Non-Medical): No   Physical Activity: Unknown    Days of Exercise per Week: 7 days   Stress: Unknown    Feeling of Stress : Patient refused   Social Connections: Unknown    Frequency of Communication with Friends and Family: Patient refused    Frequency of Social Gatherings with Friends and Family: Patient refused    Attends Quaker Services: Patient refused    Active Member of Clubs or Organizations: Patient refused    Attends Club or Organization Meetings: Patient refused    Marital Status:    Housing Stability: Unknown    Unable to Pay for Housing in the Last Year: No    Unstable Housing in the Last Year: No       OBJECTIVE:     Vital Signs Range (Last 24H):  Temp:  [36.5 °C (97.7 °F)-37 °C (98.6 °F)]   Pulse:  []   Resp:  [18]   BP: (121-142)/(56-71)   SpO2:  [96 %-99 %]       Significant Labs:  Lab Results   Component Value Date    WBC 10.53 05/03/2023    HGB 9.1 (L) 05/03/2023    HCT 29.0 (L) 05/03/2023     (H) 05/03/2023    CHOL 128 11/16/2022    TRIG 85 11/16/2022    HDL 40 11/16/2022     (H) 05/03/2023     (H) 05/03/2023     (L) 05/03/2023    K 4.4 05/03/2023     05/03/2023    CREATININE 0.9 05/03/2023    BUN 24 (H) 05/03/2023    CO2 24 05/03/2023    TSH 1.748 03/14/2023    INR 1.1 04/24/2023    HGBA1C 6.2 11/16/2022       Diagnostic Studies: No relevant studies.    EKG:   Results for orders placed or performed in visit on 04/24/23   EKG 12-lead    Collection Time: 04/24/23  2:07 PM    Narrative    Test Reason :     Vent. Rate : 068 BPM     Atrial Rate : 068 BPM     P-R Int : 176 ms          QRS Dur : 102 ms      QT Int : 390 ms       P-R-T Axes : 060 -04 035 degrees     QTc Int : 414 ms    Normal sinus  rhythm  Normal ECG  When compared with ECG of 12-SEP-2022 14:27,  Premature atrial complexes No longer present  Confirmed by Lia Brown MD ([id number]) on 4/24/2023 2:41:59 PM    Referred By: SARA   SELF           Confirmed By:Lia Brown MD       2D ECHO:  TTE:  No results found. However, due to the size of the patient record, not all encounters were searched. Please check Results Review for a complete set of results.    SONA:  No results found. However, due to the size of the patient record, not all encounters were searched. Please check Results Review for a complete set of results.    ASSESSMENT/PLAN:                                                                                                                 05/03/2023  Forrest Schmidt is a 78 y.o., male.      Pre-op Assessment    I have reviewed the Patient Summary Reports.     I have reviewed the Nursing Notes. I have reviewed the NPO Status.   I have reviewed the Medications.     Review of Systems  Anesthesia Hx:  No problems with previous Anesthesia  History of prior surgery of interest to airway management or planning: Denies Family Hx of Anesthesia complications.   Denies Personal Hx of Anesthesia complications.   Hematology/Oncology:        Current/Recent Cancer.   Cardiovascular:   Hypertension CAD  CABG/stent (stent 2004)   Denies Angina. hyperlipidemia SNELL Saw cardiologist Dr. Upton recently- stable CAD  Some dyspnea lately   Pulmonary:   Denies COPD.  Denies Recent URI.    Renal/:   Chronic Renal Disease, CKD    Hepatic/GI:   GERD Liver Disease,    Neurological:  Neurology Normal    Endocrine:  Denies Obesity / BMI > 30      Physical Exam  General: Well nourished, Cooperative, Alert and Oriented    Airway:  Mallampati: II   Mouth Opening: Normal  TM Distance: Normal  Tongue: Normal  Neck ROM: Normal ROM    Dental:  Intact    Chest/Lungs:  Normal Respiratory Rate        Anesthesia Plan  Type of Anesthesia, risks & benefits discussed:    Anesthesia  Type: Gen ETT  Intra-op Monitoring Plan: Standard ASA Monitors  Post Op Pain Control Plan: multimodal analgesia and IV/PO Opioids PRN  Induction:  IV  Airway Plan: Direct, Post-Induction  Informed Consent: Informed consent signed with the Patient and all parties understand the risks and agree with anesthesia plan.  All questions answered.   ASA Score: 4  Day of Surgery Review of History & Physical: H&P Update referred to the surgeon/provider.    Ready For Surgery From Anesthesia Perspective.     .

## 2023-05-03 NOTE — PLAN OF CARE
Pt aaox4. Independent and ambulatory. VSS. Pt endorses mild abdominal pain relieved c tylenol. Pt continues to have urinary frequency associated c frequent stools that vary in consistency from loose to hard. Continue c laxatives and cialis. Pt refused flomax dose-would like to speak c MD before taking. Pt s/p palliative radiation yesterday; plan for another treatment today. Pt seen by ostomu nurse-education provided and abdomen marked in preparation of colostomy placement on Thursday. Wife at bedside and attentive to pt.

## 2023-05-03 NOTE — SUBJECTIVE & OBJECTIVE
Interval History: AF VSS, does appear fatigued, bothered by symptoms of urgency and pelvic pain, PVR 257cc    Review of Systems  Objective:     Temp:  [97.7 °F (36.5 °C)-98.6 °F (37 °C)] 97.9 °F (36.6 °C)  Pulse:  [] 63  Resp:  [18] 18  SpO2:  [96 %-99 %] 96 %  BP: (121-142)/(56-71) 121/56     Body mass index is 24.66 kg/m².      Bladder Scan Volume (mL): 198 mL (05/02/23 1030)  Post Void Cath Residual Output (mL): 257 mL (05/03/23 0634)    Drains       None                   Physical Exam  Constitutional:       Appearance: He is ill-appearing.   HENT:      Head: Normocephalic and atraumatic.   Eyes:      General: No scleral icterus.  Cardiovascular:      Rate and Rhythm: Normal rate.   Pulmonary:      Effort: Pulmonary effort is normal. No respiratory distress.   Abdominal:      Tenderness: There is abdominal tenderness (generalized, mild).   Skin:     General: Skin is warm.      Coloration: Skin is pale.   Neurological:      General: No focal deficit present.      Mental Status: He is alert and oriented to person, place, and time.   Psychiatric:         Mood and Affect: Mood normal.         Behavior: Behavior normal.       Significant Labs:    BMP:  Recent Labs   Lab 05/01/23  0840 05/02/23  0709 05/03/23  0643   * 131* 132*   K 4.3 4.8 4.4    102 101   CO2 23 21* 24   BUN 23 23 24*   CREATININE 0.8 1.0 0.9   CALCIUM 9.0 9.4 9.2       CBC:   Recent Labs   Lab 05/01/23  0840 05/02/23  0709 05/03/23  0643   WBC 10.88 10.96 10.53   HGB 9.3* 8.4* 9.1*   HCT 29.4* 28.6* 29.0*   * 404 484*       All pertinent labs results from the past 24 hours have been reviewed.    Significant Imaging:  All pertinent imaging results/findings from the past 24 hours have been reviewed.

## 2023-05-03 NOTE — PROGRESS NOTES
SURGICAL ONCOLOGY    HPI:  Forrest Schmidt is a 78 y.o. 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 Admitted with malaise, constipation, BRBPR and urinary difficulties.  who presented with malaise, constipation, and BRBPR in the setting of known recurrent GIST.  CT from 04/21/23 shows progression of his known liver and pelvic disease. Continues to have bowel function and passing flatus but seems to have impending colonic obstruction based on the relationship of the tumor to the rectum.     Medical hx: HTN, HLD, BPH, GERD, arthritis     Relevant oncologic hx:  02/2018: SBR by Dr. Ahmet Sanchez  07/2019, 12/2020, 08/2020: liver ablation  07/2020: Ex lap, cytoreductive surgery (MD Langston)  11/2022: Started Rogaratinib (held since 4/17 due to constipation and hematochezia)    SUBJ:  No issues overnight. Completed palliative radiation therapy yesterday. Plan for another treatment today. Discussed with urology about need for suprapubic catheter. They don't feel it's indicated at this time and that his symptoms are likely due to bladder spasms. They will continue to follow him as an OP. He was marked by the ostomy nurse.     OBJ:  Vitals:    05/03/23 0754   BP: (!) 142/69   Pulse: 65   Resp: 18   Temp: 98.2 °F (36.8 °C)      Physical Exam  Vitals and nursing note reviewed.   Constitutional:       General: He is not in acute distress.     Appearance: Normal appearance. He is not ill-appearing, toxic-appearing or diaphoretic.   HENT:      Head: Normocephalic.   Cardiovascular:      Rate and Rhythm: Normal rate and regular rhythm.   Pulmonary:      Effort: Pulmonary effort is normal. No respiratory distress.   Abdominal:      General: There is no distension.      Palpations: Abdomen is soft. There is no mass.      Tenderness: There is no abdominal tenderness. There is no guarding or rebound.      Hernia: No hernia is present.      Comments: Healed surgical scars   Skin:      General: Skin is warm and dry.      Capillary Refill: Capillary refill takes less than 2 seconds.   Neurological:      Mental Status: He is alert and oriented to person, place, and time.     Recent Labs   Lab 05/03/23  0643   WBC 10.53   RBC 3.34*   HGB 9.1*   HCT 29.0*   *   MCV 87   MCH 27.2   MCHC 31.4*       Recent Labs   Lab 05/03/23  0643   *   K 4.4      CO2 24   BUN 24*   CREATININE 0.9   MG 1.7        Intake/Output - Last 3 Shifts         05/01 0700 05/02 0659 05/02 0700 05/03 0659 05/03 0700  05/04 0659    P.O. 1020 1190     Total Intake(mL/kg) 1020 (12.6) 1190 (14.8)     Urine (mL/kg/hr)  257 (0.1)     Stool  0     Total Output  257     Net +1020 +933            Urine Occurrence 12 x 26 x     Stool Occurrence 7 x 15 x           Imaging: None    A+P:  78 y.o. male w/ recurrent metastatic GIST w/ progression of known liver and pelvic disease. The pelvic mass is causing a partial obstruction of the rectum. Initially admitted to the hospital medicine service. Transferred to the Surgical Oncology service 04/28.    - Plan for laparoscopic colostomy creation 05/04. Informed consent obtained. Marked by ostomy nurse  - Will need ostomy teaching post op  - Palliative radiation  treatment completed yesterday. Second treatment planned for today.  - Regular diet. NPO at MN  - Continue home meds  - Bladder scans ordered to monitor PVR. PVR this 5/3 AM: 257 mL  - Follow up Urology recommendations.    Kierra Fowler MD  General Surgery PGY4

## 2023-05-03 NOTE — PLAN OF CARE
Radiation completed this am. Has decreased appetite. Stats voids small amount frequently. Pt own Tadalafil given. Refused Flomax. Stated he was only to take Flomax until Tadalafil was available. Dr. rFias notified. Plan to clarify with urology MD. Afebrile. Ambulated in [place] several times today with wife wearing non-skid socks. Tolerated well.

## 2023-05-03 NOTE — PROGRESS NOTES
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    Pt reports feeling like he has not been able to completely empty his bladder for the past few weeks.  He endorses intermittent suprapubic pain and pelvic pressure.  He also notes that he has a weak stream and is only able to urinate "a few dribbles" at at time.  He reports one febrile episode to 100.5 at home and burning with urination but denies dysuria currently.  He has been on flomax in the past as well as 5 mg of daily cialis for LUTS but these have not been reordered inpatient.      On assessment, he is AFVSS.  Cr 1.1 at baseline.  WBC 15.  UA negative nitrites, negative leuks.  No micro available.  CTAP obtained on 4/21 shows large pelvic mass involving the rectum and sigmoid colon, with likely mass effect on the bladder.  Bladder appears distended.  Pelvic mass does not seem to involve the prostate.  No hydronephrosis or stones bilaterally.  Bilateral renal and parapelvic cysts.    PVR bladder scan obtained after voiding 150 mL showing 263 mL in the bladder.   He then voided another 200 mL          Interval History: AF VSS, does appear fatigued, bothered by symptoms of urgency and pelvic pain, PVR 257cc    Review of Systems  Objective:     Temp:  [97.7 °F (36.5 °C)-98.6 °F (37 °C)] 97.9 °F (36.6 °C)  Pulse:  [] 63  Resp:  [18] 18  SpO2:  [96 %-99 %] 96 %  BP: (121-142)/(56-71) 121/56     Body mass index is 24.66 kg/m².      Bladder Scan Volume (mL): 198 mL " (05/02/23 1030)  Post Void Cath Residual Output (mL): 257 mL (05/03/23 0634)    Drains       None                   Physical Exam  Constitutional:       Appearance: He is ill-appearing.   HENT:      Head: Normocephalic and atraumatic.   Eyes:      General: No scleral icterus.  Cardiovascular:      Rate and Rhythm: Normal rate.   Pulmonary:      Effort: Pulmonary effort is normal. No respiratory distress.   Abdominal:      Tenderness: There is abdominal tenderness (generalized, mild).   Skin:     General: Skin is warm.      Coloration: Skin is pale.   Neurological:      General: No focal deficit present.      Mental Status: He is alert and oriented to person, place, and time.   Psychiatric:         Mood and Affect: Mood normal.         Behavior: Behavior normal.       Significant Labs:    BMP:  Recent Labs   Lab 05/01/23  0840 05/02/23  0709 05/03/23  0643   * 131* 132*   K 4.3 4.8 4.4    102 101   CO2 23 21* 24   BUN 23 23 24*   CREATININE 0.8 1.0 0.9   CALCIUM 9.0 9.4 9.2       CBC:   Recent Labs   Lab 05/01/23  0840 05/02/23  0709 05/03/23  0643   WBC 10.88 10.96 10.53   HGB 9.3* 8.4* 9.1*   HCT 29.4* 28.6* 29.0*   * 404 484*       All pertinent labs results from the past 24 hours have been reviewed.    Significant Imaging:  All pertinent imaging results/findings from the past 24 hours have been reviewed.                    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    - patient is severely bothered by his urinary urgency and pelvic pressure. Urinates 15x per day and throughout the night with hesitancy. Straining leads to fecal incontinence. Does not get much rest because of this.   - We had a lengthy discussion that placing an SPT may not lead to relief of his symptoms as they could be and are likely due to his pelvic mass. However, given his  condition, an SPT would be a palliative measure and is certainly worth a try to improve his quality of life. He acknowledged.    - can continue flomax for now  - will obtain consent for suprapubic tube placement         VTE Risk Mitigation (From admission, onward)           Ordered     enoxaparin injection 40 mg  Daily         04/27/23 0829     Reason for No Pharmacological VTE Prophylaxis  Once        Question:  Reasons:  Answer:  Active Bleeding    04/24/23 1532     IP VTE HIGH RISK PATIENT  Once         04/24/23 1532     Place sequential compression device  Until discontinued         04/24/23 1532                    Gisela Cruz MD  Urology  [facility] - Transplant Stepdown    As above.

## 2023-05-03 NOTE — ASSESSMENT & PLAN NOTE
78M with hx of GIST and worsening pelvic mets presents for worsening constipation and difficulty urinating.    - patient is severely bothered by his urinary urgency and pelvic pressure. Urinates 15x per day and throughout the night with hesitancy. Straining leads to fecal incontinence. Does not get much rest because of this.   - We had a lengthy discussion that placing an SPT may not lead to relief of his symptoms as they could be and are likely due to his pelvic mass. However, given his condition, an SPT would be a palliative measure and is certainly worth a try to improve his quality of life. He acknowledged.    - can continue flomax for now  - will obtain consent for suprapubic tube placement

## 2023-05-04 NOTE — OP NOTE
[facility]  Urology Department  Operative Note    Date of Procedure: 5/4/2023     Pre-Operative Diagnosis:   Pelvic mass [R19.00]  Lower urinary tract symptoms with elevated postvoid residual      Post-Operative Diagnosis: same    Procedure:   Cystoscopy with open insertion of suprapubic catheter    Primary: Kalani Dickson MD  Resident - Assisting: Jocelyn Bentley MD; Kierra Fowler MD        Anesthesia: General    Estimated Blood Loss (EBL): min     Drains: 18 Fr suprapubic catheter, 30 cc sterile water in balloon    Specimens: none    Indications:   Forrest Schmidt is a 78 y.o. male with a large pelvic mass causing lower urinary tract symptoms and elevated postvoid residuals. After the risks, benefits, and alternatives were discussed and all questions were answered to his satisfaction, he elected to proceed with suprapubic catheter placement. This will be done at the same time as diverting colostomy.    Operative Findings:   - percutaneous suprapubic catheter placement attempted; unable to advance Cook One-Step Trocar through patient's fascia; converted to open SP tube placement, fascia scarred down; once fascia taken down, able to advance trocar into bladder for SP tube placement  - peritoneal cavity not entered      Procedure in Detail:  After informed consent was obtained, the patient was brought to the operating suite and placed int he supine position.  SCDs were applied and working.  Anesthesia was administered.  The patient was then placed in the dorsal lithotomy and the urethral catheter was removed.  The patient was prepped and draped in the usual sterile fashion.       A rigid cystoscope was advanced into the patient's bladder via the urethra.  The entire urethra was visualized which showed no evidence of urethral strictures or abnormalities. The scope passed easily. The right and left ureteral orifices were identified in the normal anatomic position.  Cystoscopy revealed no masses or  lesions suspicious for malignancy. The architecture was slightly distorted due to the pelvic mass The bladder was filled with irrigation until it was distended.    The superior border of the pubic bone was marked. A hailey was made 2 finger breadths superior to this in the midline.  A spinal needle was advanced through this hailey into the bladder under cystoscopic guidance. The spinal needle was removed.  A 1 cm skin incision was created at the hailey.  The one-step trocar, dilator, and sheath were advanced through the skin incision until resistance was met and the bladder wall was noted to deflect on cystoscopy.  The device was unable to be advanced through the fascia; we therefore decided to convert to open SP tube placement. The incision was extended to the level of the pubic symphysis. We dissected down through rectus fascia, which was scarred down (presumably due to prior surgery). We exposed the bladder which was identified through transillumination from the flexible cystoscope. Care was taken to avoid entry into the peritoneal cavity. The Cook One-Step trocar was then able to be advanced into the bladder under cystoscopic view without back walling the bladder. . The Inner dilator and trocar were removed, and an 18 Fr Councill-tip catheter was advanced through the sheath. The balloon was insufflated with 30 cc sterile water and the sheath was removed. The fascia was then closed with 0 PDS with multiple figure-of-eight sutures.      A 3-0 vicryl was used to close the skin incision deeply, 4-0 monocryl running subcuticular superficially. Care was taken to avoid damaging the catheter.  The suprapubic catheter was secured in place with a 2-0 nylon.  The case was then turned over to Surgical Oncology for diverting colostomy.       MD RUDDY Gonsales was present for the entire case and agree with the above note.

## 2023-05-04 NOTE — SUBJECTIVE & OBJECTIVE
Interval History: NAEON. AFVSS. NPO since MN, labs stable, good UOP. Plan for SPT today.       Objective:     Temp:  [97.9 °F (36.6 °C)-98.6 °F (37 °C)] 97.9 °F (36.6 °C)  Pulse:  [63-81] 72  Resp:  [18] 18  SpO2:  [95 %-97 %] 95 %  BP: (121-147)/(56-69) 131/61     Body mass index is 24.48 kg/m².      Bladder Scan Volume (mL): 198 mL (05/02/23 1030)  Post Void Cath Residual Output (mL): 257 mL (05/03/23 0634)    Drains       None                    Physical Exam  Constitutional:       Appearance: He is ill-appearing.   HENT:      Head: Normocephalic and atraumatic.   Eyes:      General: No scleral icterus.  Cardiovascular:      Rate and Rhythm: Normal rate.   Pulmonary:      Effort: Pulmonary effort is normal. No respiratory distress.   Abdominal:      Tenderness: There is abdominal tenderness (generalized, mild).   Skin:     General: Skin is warm.      Coloration: Skin is pale.   Neurological:      General: No focal deficit present.      Mental Status: He is alert and oriented to person, place, and time.   Psychiatric:         Mood and Affect: Mood normal.         Behavior: Behavior normal.         Significant Labs:    BMP:  Recent Labs   Lab 05/01/23  0840 05/02/23  0709 05/03/23  0643   * 131* 132*   K 4.3 4.8 4.4    102 101   CO2 23 21* 24   BUN 23 23 24*   CREATININE 0.8 1.0 0.9   CALCIUM 9.0 9.4 9.2       CBC:   Recent Labs   Lab 05/01/23  0840 05/02/23  0709 05/03/23  0643   WBC 10.88 10.96 10.53   HGB 9.3* 8.4* 9.1*   HCT 29.4* 28.6* 29.0*   * 404 484*       All pertinent labs results from the past 24 hours have been reviewed.    Significant Imaging:  All pertinent imaging results/findings from the past 24 hours have been reviewed.

## 2023-05-04 NOTE — PLAN OF CARE
[facility]  Discharge Reassessment    Primary Care Provider: Dez Jimenez MD    Expected Discharge Date: 5/5/2023    Reassessment (most recent)       Discharge Reassessment - 05/04/23 1333          Discharge Reassessment    Assessment Type Discharge Planning Reassessment     Did the patient's condition or plan change since previous assessment? Yes     Discharge Plan discussed with: Patient     Communicated HOMER with patient/caregiver Date not available/Unable to determine     Discharge Plan A --   TBD    Discharge Plan B --   TBD    DME Needed Upon Discharge  none     Why the patient remains in the hospital Requires continued medical care

## 2023-05-04 NOTE — PROGRESS NOTES
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    Pt reports feeling like he has not been able to completely empty his bladder for the past few weeks.  He endorses intermittent suprapubic pain and pelvic pressure.  He also notes that he has a weak stream and is only able to urinate "a few dribbles" at at time.  He reports one febrile episode to 100.5 at home and burning with urination but denies dysuria currently.  He has been on flomax in the past as well as 5 mg of daily cialis for LUTS but these have not been reordered inpatient.      On assessment, he is AFVSS.  Cr 1.1 at baseline.  WBC 15.  UA negative nitrites, negative leuks.  No micro available.  CTAP obtained on 4/21 shows large pelvic mass involving the rectum and sigmoid colon, with likely mass effect on the bladder.  Bladder appears distended.  Pelvic mass does not seem to involve the prostate.  No hydronephrosis or stones bilaterally.  Bilateral renal and parapelvic cysts.    PVR bladder scan obtained after voiding 150 mL showing 263 mL in the bladder.   He then voided another 200 mL          Interval History: NAEON. AFVSS. NPO since MN, labs stable, good UOP. Plan for SPT today.       Objective:     Temp:  [97.9 °F (36.6 °C)-98.6 °F (37 °C)] 97.9 °F (36.6 °C)  Pulse:  [63-81] 72  Resp:  [18] 18  SpO2:  [95 %-97 %] 95 %  BP: (121-147)/(56-69) 131/61     Body mass index is 24.48 kg/m².      Bladder Scan Volume (mL): 198 mL (05/02/23 1030)  Post Void Cath Residual " Output (mL): 257 mL (05/03/23 0634)    Drains       None                    Physical Exam  Constitutional:       Appearance: He is ill-appearing.   HENT:      Head: Normocephalic and atraumatic.   Eyes:      General: No scleral icterus.  Cardiovascular:      Rate and Rhythm: Normal rate.   Pulmonary:      Effort: Pulmonary effort is normal. No respiratory distress.   Abdominal:      Tenderness: There is abdominal tenderness (generalized, mild).   Skin:     General: Skin is warm.      Coloration: Skin is pale.   Neurological:      General: No focal deficit present.      Mental Status: He is alert and oriented to person, place, and time.   Psychiatric:         Mood and Affect: Mood normal.         Behavior: Behavior normal.         Significant Labs:    BMP:  Recent Labs   Lab 05/01/23  0840 05/02/23  0709 05/03/23  0643   * 131* 132*   K 4.3 4.8 4.4    102 101   CO2 23 21* 24   BUN 23 23 24*   CREATININE 0.8 1.0 0.9   CALCIUM 9.0 9.4 9.2       CBC:   Recent Labs   Lab 05/01/23  0840 05/02/23  0709 05/03/23  0643   WBC 10.88 10.96 10.53   HGB 9.3* 8.4* 9.1*   HCT 29.4* 28.6* 29.0*   * 404 484*       All pertinent labs results from the past 24 hours have been reviewed.    Significant Imaging:  All pertinent imaging results/findings from the past 24 hours have been reviewed.                    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    - patient is severely bothered by his urinary urgency and pelvic pressure. Urinates 15x per day and throughout the night with hesitancy. Straining leads to fecal incontinence. Does not get much rest because of this.   - We had a lengthy discussion that placing an SPT may not lead to relief of his symptoms as they could be and are likely due to his pelvic mass. However, given his condition, an SPT would be a palliative  measure and is certainly worth a try to improve his quality of life. He acknowledged.    - can continue flomax for now  - suprapubic tube placement         VTE Risk Mitigation (From admission, onward)         Ordered     enoxaparin injection 40 mg  Daily         04/27/23 0869     Reason for No Pharmacological VTE Prophylaxis  Once        Question:  Reasons:  Answer:  Active Bleeding    04/24/23 1532     IP VTE HIGH RISK PATIENT  Once         04/24/23 1532     Place sequential compression device  Until discontinued         04/24/23 1532                Oleg Salgado MD  Urology  [facility] - [facility]

## 2023-05-04 NOTE — PLAN OF CARE
Pt aaox4. Independent and ambulatory. VSS stable o/n. Pt s/p palliative radiation treatment 2/2 yesterday. Pt still c multiple episodes of urinary frequency/retention and BMs. NPO for scheduled colostomy placement along c suprapubic catheter placement. Wife at bedside and attentive to pt. Plan of care reviewed.

## 2023-05-04 NOTE — PROGRESS NOTES
SURGICAL ONCOLOGY    HPI:  Forrest Schmidt is a 78 y.o. 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 Admitted with malaise, constipation, BRBPR and urinary difficulties.  who presented with malaise, constipation, and BRBPR in the setting of known recurrent GIST.  CT from 04/21/23 shows progression of his known liver and pelvic disease. Continues to have bowel function and passing flatus but seems to have impending colonic obstruction based on the relationship of the tumor to the rectum.     Medical hx: HTN, HLD, BPH, GERD, arthritis     Relevant oncologic hx:  02/2018: SBR by Dr. Ahmet Sanchez  07/2019, 12/2020, 08/2020: liver ablation  07/2020: Ex lap, cytoreductive surgery (MD Langston)  11/2022: Started Rogaratinib (held since 4/17 due to constipation and hematochezia)    SUBJ:  No issues overnight. Completed palliative radiation therapy. NPO since midnight for OR today. Urology planning for SPT today.    OBJ:  Vitals:    05/04/23 0432   BP: 131/61   Pulse: 72   Resp: 18   Temp: 97.9 °F (36.6 °C)      Physical Exam  Vitals and nursing note reviewed.   Constitutional:       General: He is not in acute distress.     Appearance: Normal appearance. He is not ill-appearing, toxic-appearing or diaphoretic.   HENT:      Head: Normocephalic.   Cardiovascular:      Rate and Rhythm: Normal rate and regular rhythm.   Pulmonary:      Effort: Pulmonary effort is normal. No respiratory distress.   Abdominal:      General: There is no distension.      Palpations: Abdomen is soft. There is no mass.      Tenderness: There is no abdominal tenderness. There is no guarding or rebound.      Hernia: No hernia is present.      Comments: Healed surgical scars   Skin:     General: Skin is warm and dry.      Capillary Refill: Capillary refill takes less than 2 seconds.   Neurological:      Mental Status: He is alert and oriented to person, place, and time.     Recent Labs   Lab 05/04/23  0605    WBC 10.20   RBC 3.16*   HGB 8.9*   HCT 27.5*      MCV 87   MCH 28.2   MCHC 32.4       Recent Labs   Lab 05/04/23  0605   *   K 4.2      CO2 24   BUN 25*   CREATININE 0.8   MG 1.7        Intake/Output - Last 3 Shifts         05/02 0700 05/03 0659 05/03 0700 05/04 0659 05/04 0700 05/05 0659    P.O. 1190 275     Total Intake(mL/kg) 1190 (14.8) 275 (3.5)     Urine (mL/kg/hr) 257 (0.1)      Stool 0      Total Output 257      Net +933 +275            Urine Occurrence 26 x 17 x     Stool Occurrence 15 x 19 x           Imaging: None    A+P:  78 y.o. male w/ recurrent metastatic GIST w/ progression of known liver and pelvic disease. The pelvic mass is causing a partial obstruction of the rectum. Initially admitted to the hospital medicine service. Transferred to the Surgical Oncology service 04/28.    - Plan for laparoscopic colostomy creation today. Informed consent obtained. Marked by ostomy nurse  - Urology to place SBT today   - Will need ostomy teaching post op  - Palliative radiation treatment completed yesterday.  - NPO  - Continue home meds  - Follow up Urology recommendations.    Kierra Fowler MD  General Surgery PGY4

## 2023-05-04 NOTE — ANESTHESIA PROCEDURE NOTES
Intubation    Date/Time: 5/4/2023 3:21 PM  Performed by: Dustin Tabares CRNA  Authorized by: Lizette Bettencourt MD     Intubation:     Induction:  Intravenous    Intubated:  Postinduction    Mask Ventilation:  Not attempted    Attempts:  1    Attempted By:  CRNA    Method of Intubation:  Video laryngoscopy    Blade:  Flynn 3    Laryngeal View Grade: Grade I - full view of cords      Difficult Airway Encountered?: No      Complications:  None    Airway Device:  Oral endotracheal tube    Airway Device Size:  7.5    Tube secured:  23    Secured at:  The lips    Placement Verified By:  Capnometry    Complicating Factors:  None    Findings Post-Intubation:  BS equal bilateral and atraumatic/condition of teeth unchanged

## 2023-05-04 NOTE — ASSESSMENT & PLAN NOTE
78M with hx of GIST and worsening pelvic mets presents for worsening constipation and difficulty urinating.    - patient is severely bothered by his urinary urgency and pelvic pressure. Urinates 15x per day and throughout the night with hesitancy. Straining leads to fecal incontinence. Does not get much rest because of this.   - We had a lengthy discussion that placing an SPT may not lead to relief of his symptoms as they could be and are likely due to his pelvic mass. However, given his condition, an SPT would be a palliative measure and is certainly worth a try to improve his quality of life. He acknowledged.    - can continue flomax for now  - suprapubic tube placement

## 2023-05-05 NOTE — BRIEF OP NOTE
[facility]  Brief Operative Note    SUMMARY     Surgery Date: 5/4/2023     Surgeon(s) and Role:  Panel 1:     * Surinder Shaw MD - Primary     * Kierra Fowler MD - Resident - Assisting  Panel 2:     * Kalani Dickson MD - Primary     * Neftali Langston MD - Resident - Assisting     * Jeimy Hammond MD - Resident - Assisting    Pre-op Diagnosis:  Pelvic mass [R19.00]    Post-op Diagnosis:  Post-Op Diagnosis Codes:     * Pelvic mass [R19.00]    Procedure(s) (LRB):  CREATION, COLOSTOMY, LAPAROSCOPIC - CONVERTED TO OPEN (N/A)  INSERTION, OPEN SUPRAPUBIC CATHETER (N/A)  CYSTOSCOPY (N/A)    Anesthesia: General    Operative Findings: Lap converted to open loop colostomy creation. Prior to this procedure he had an pen suprapubic catheter placement by the Urology team.     Estimated Blood Loss: 100 mL    Estimated Blood Loss has been documented.         Specimens:   Specimen (24h ago, onward)      None            [id number]    Kierra Fowler MD  General Surgery PGY4

## 2023-05-05 NOTE — PROGRESS NOTES
Fall bundle in place. Pt. Remained free from falls/trauma/injuries. No complaints of CP/ SOB/discomfort. VSS. No tele, pulse WNL. A&Ox4. Pt. Remains sedated & is briefly awake to answer questions, does arouse to voice with some touch stimulation. No S/S of pain this shift. Pt. Received IVPB Tylenol & Robaxin, along with Reglan IV push. Pt. Has LR continuous infusing @ 75 mls/hr. Pt. Also has HYDROmorphone PCA syringe 6 mg/30 mL (0.2 mg/mL) NS; pt. Unable to use due to still being sedated from surgery, spouse educated on device for pt. Use ONLY when pt. Is becomes more alert & is able to manage the device. Surgery oncology paged & informed pt. Unable to take PO meds due to sedation & are aware of pt. Not being able to take nightly meds; PRN IV Hydralazine was placed for BP of sys > 170. Pts' colostomy bag scant red drainage, not enough to empty; area remains CDI. Pts' suprapubic baldwin site remains CDI, total UOP this shift was 900cc. Pt. Only able to suck on sponge for intake, too weak to suck on straw; mouth was moistened with water & swabs. The POC was reviewed with pts' spouse @ bedside due to pts' intermittently sedative state, questions were encouraged & answered. No further concerns at this time.

## 2023-05-05 NOTE — PLAN OF CARE
AAOX4  VVS,  ROOMAIR  PCA IN USE   NO ACUTE DISTRESS NOTED OR VOICED   PT UP TO CHAIR FOR FEW HOURS TOLERATED WELL   [facility] ORDERED FOR TRANSFERS   VERBAL UNDERSTANDING NOTED TO CALL PRN, SAFETY INTACT

## 2023-05-05 NOTE — PROGRESS NOTES
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    Pt reports feeling like he has not been able to completely empty his bladder for the past few weeks.  He endorses intermittent suprapubic pain and pelvic pressure.  He also notes that he has a weak stream and is only able to urinate "a few dribbles" at at time.  He reports one febrile episode to 100.5 at home and burning with urination but denies dysuria currently.  He has been on flomax in the past as well as 5 mg of daily cialis for LUTS but these have not been reordered inpatient.      On assessment, he is AFVSS.  Cr 1.1 at baseline.  WBC 15.  UA negative nitrites, negative leuks.  No micro available.  CTAP obtained on 4/21 shows large pelvic mass involving the rectum and sigmoid colon, with likely mass effect on the bladder.  Bladder appears distended.  Pelvic mass does not seem to involve the prostate.  No hydronephrosis or stones bilaterally.  Bilateral renal and parapelvic cysts.    PVR bladder scan obtained after voiding 150 mL showing 263 mL in the bladder.   He then voided another 200 mL          Interval History: NAEO. AFVSS. Patient s/p SPT placement and colostomy creation by general surgery on 05/04/23. SPT draining c/y/u with some sediment.     Objective:     Temp:  [96.6 °F (35.9 °C)-98.7 °F (37.1 °C)] 97.2 °F (36.2 °C)  Pulse:  [58-88] 64  Resp:  [15-23] 16  SpO2:  [96 %-100 %] 99 %  BP: (111-159)/(58-89) 143/65     Body mass index is 24.48 kg/m².      Bladder " Scan Volume (mL): 198 mL (05/02/23 1030)  Post Void Cath Residual Output (mL): 257 mL (05/03/23 0634)    Drains       Drain  Duration                  Colostomy 05/04/23 1914 Loop LUQ <1 day         Suprapubic Catheter 05/04/23 1615 <1 day                     Physical Exam  HENT:      Head: Normocephalic and atraumatic.   Eyes:      General: No scleral icterus.  Cardiovascular:      Rate and Rhythm: Normal rate.   Pulmonary:      Effort: Pulmonary effort is normal. No respiratory distress.   Abdominal:      Tenderness: There is abdominal tenderness (generalized, mild).      Comments: Midline abdominal incision CDI. Viable appearing ostomy. Suprapubic tube draining c/y/u with some sediment.   Skin:     General: Skin is warm.   Neurological:      General: No focal deficit present.      Mental Status: He is alert and oriented to person, place, and time.   Psychiatric:         Mood and Affect: Mood normal.         Behavior: Behavior normal.         Significant Labs:    BMP:  Recent Labs   Lab 05/03/23  0643 05/04/23  0605 05/04/23 2039   * 133* 135*   K 4.4 4.2 4.7    102 103   CO2 24 24 23   BUN 24* 25* 23   CREATININE 0.9 0.8 0.8   CALCIUM 9.2 9.1 8.7       CBC:   Recent Labs   Lab 05/03/23 0643 05/04/23  0605 05/04/23 2039   WBC 10.53 10.20 11.03   HGB 9.1* 8.9* 9.6*   HCT 29.0* 27.5* 30.2*   * 433 402       Urine Studies: No results for input(s): COLORU, APPEARANCEUA, PHUR, SPECGRAV, PROTEINUA, GLUCUA, KETONESU, BILIRUBINUA, OCCULTUA, NITRITE, UROBILINOGEN, LEUKOCYTESUR, RBCUA, WBCUA, BACTERIA, SQUAMEPITHEL, HYALINECASTS in the last 168 hours.    Invalid input(s): WRIGHTSUR  All pertinent labs results from the past 24 hours have been reviewed.    Significant Imaging:  All pertinent imaging results/findings from the past 24 hours have been reviewed.                    Assessment/Plan:     GIST (gastrointestinal stromal tumor) of small bowel, malignant  78M with hx of GIST and worsening pelvic  mets presents for worsening constipation and difficulty urinating.      -Patient now s/p open SPT placement and colostomy creation by general surgery on 05/04/23  -SPT draining c/y/u with sediment  -Will arrange follow up with Dr. Dickson in 6 weeks for SPT exchange  -Rest of care per primary  -Urology will sign off at this time           VTE Risk Mitigation (From admission, onward)         Ordered     enoxaparin injection 40 mg  Daily         05/04/23 2027     IP VTE HIGH RISK PATIENT  Once         04/24/23 1532     Place sequential compression device  Until discontinued         04/24/23 1532                Santo Pressley MD  Urology  [facility]

## 2023-05-05 NOTE — RESPIRATORY THERAPY
RAPID RESPONSE RESPIRATORY THERAPY ETCO2 CHECK         Time of visit: 821     Code Status: Full Code   : 1945  Bed: 90855/59732 A:   MRN: [id number]  Time spent at the bedside: < 15 min    SITUATION    Evaluated patient for: ETCo2 compliance    BACKGROUND    Why is the patient in the hospital?: Mechanical gastrointestinal obstruction    Patient has a past medical history of Anticoagulant long-term use, Arthritis, BPH (benign prostatic hyperplasia), Coronary artery disease, Hypercholesteremia, Hypertension, Low iron, 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, and Osteopenia.    24 Hours Vitals Range:  Temp:  [96.6 °F (35.9 °C)-98.7 °F (37.1 °C)]   Pulse:  [58-80]   Resp:  [15-23]   BP: (111-159)/(58-89)   SpO2:  [96 %-100 %]     Labs:    Recent Labs     23  0623   * 135* 135*   K 4.2 4.7 4.7    103 103   CO2 24 23 24   CREATININE 0.8 0.8 0.9   * 166* 133*   PHOS 2.6* 2.9 3.6   MG 1.7 1.8 1.9        No results for input(s): PH, PCO2, PO2, HCO3, POCSATURATED, BE in the last 72 hours.    ASSESSMENT/INTERVENTIONS      Last VS   Temp: 98.2 °F (36.8 °C) (841)  Pulse: 80 (841)  Resp: 18 (841)  BP: 142/82 (841)  SpO2: 97 % (841)    Level of Consciousness: Level of Consciousness (AVPU): alert  Respiratory Effort: Respiratory Effort: Normal, Unlabored Expansion/Accessory Muscle Usage: Expansion/Accessory Muscles/Retractions: no use of accessory muscles, no retractions, expansion symmetric  All Lung Field Breath Sounds: All Lung Fields Breath Sounds: Anterior:, Lateral:, diminished  Is the ETCO2 monitor on? Yes  Is the patient wearing a cannula? Yes  Are ETCO2 orders placed? Yes  Is the patient on a PCA pump? Yes  ETCO2 monitored: ETCO2 (mmHg): 35 mmHg  Ambu at bedside: Ambu bag with the patient?: Yes, Adult Ambu    Active Orders   Respiratory Care    ACAPELLA TREATMENT Q6H WAKE     Frequency: Q6H WAKE      Number of Occurrences: Until Specified     Order Comments: Q10x Q1 hour w/a      END TIDAL CO2 MONITOR Q12H     Frequency: Q12H     Number of Occurrences: Until Specified    Incentive spirometry     Frequency: Q6H WAKE     Number of Occurrences: Until Specified     Order Comments: Q10x Q1 hour w/a      Incentive spirometry     Frequency: Q4H     Number of Occurrences: Until Specified     Order Comments: Q4 while awake until discharge.  Educate patient in use; Keep incentive spirometer within reach; and Document incentive spirometer volume every 4 hours while awake.    ((10 sets per hour (3-5 inspirations per set)) on original order)      Inhalation Treatment Q6H WAKE     Frequency: Q6H WAKE     Number of Occurrences: 2 Days    Oxygen Continuous     Frequency: Continuous     Number of Occurrences: Until Specified     Order Questions:      Device type: Low flow      Device: Nasal Cannula (1- 5 Liters)      LPM: 1      Titrate O2 per Oxygen Titration Protocol: Yes      To maintain SpO2 goal of: >= 90%      Notify MD of: Inability to achieve desired SpO2; Sudden change in patient status and requires 20% increase in FiO2; Patient requires >60% FiO2    Pulse Oximetry Q Shift     Frequency: Q Shift     Number of Occurrences: Until Specified       RECOMMENDATIONS    We recommend: RRT Recs: Continue POC per primary team.      FOLLOW-UP    Please call back the Rapid Response RT, Milad Altman RRT at x [phone] for any questions or concerns.

## 2023-05-05 NOTE — TRANSFER OF CARE
"Anesthesia Transfer of Care Note    Patient: Forrest Schmidt    Procedure(s) Performed: Procedure(s) (LRB):  CREATION, COLOSTOMY, LAPAROSCOPIC - CONVERTED TO OPEN (N/A)  INSERTION, OPEN SUPRAPUBIC CATHETER (N/A)  CYSTOSCOPY (N/A)    Patient location: PACU    Anesthesia Type: general    Transport from OR: Transported from OR on 6-10 L/min O2 by face mask with adequate spontaneous ventilation    Post pain: adequate analgesia    Post assessment: no apparent anesthetic complications and tolerated procedure well    Post vital signs: stable    Level of consciousness: awake    Nausea/Vomiting: no nausea/vomiting    Complications: none    Transfer of care protocol was followed      Last vitals:   Visit Vitals  /64 (BP Location: Left arm, Patient Position: Lying)   Pulse 78   Temp 36.2 °C (97.2 °F) (Temporal)   Resp 20   Ht 5' 11" (1.803 m)   Wt 79.6 kg (175 lb 7.8 oz)   SpO2 100%   BMI 24.48 kg/m²     "

## 2023-05-05 NOTE — PLAN OF CARE
Problem: Physical Therapy  Goal: Physical Therapy Goal  Description: Goals to be met by: 2023     Patient will increase functional independence with mobility by performin. Supine to sit with Set-up [place]  2. Sit to supine with Set-up [place]  3. Sit to stand transfer with Supervision  4. Bed to chair transfer with Supervision using LRAD  5. Gait  x 200 feet with Supervision using LRAD.   6. Lower extremity exercise program x15 reps per handout, with supervision    Outcome: Ongoing, Progressing

## 2023-05-05 NOTE — PROGRESS NOTES
SURGICAL ONCOLOGY    HPI:  Forrest Schmidt is a 78 y.o. 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 Admitted with malaise, constipation, BRBPR and urinary difficulties.  who presented with malaise, constipation, and BRBPR in the setting of known recurrent GIST.  CT from 04/21/23 shows progression of his known liver and pelvic disease. Continues to have bowel function and passing flatus but seems to have impending colonic obstruction based on the relationship of the tumor to the rectum.     Medical hx: HTN, HLD, BPH, GERD, arthritis     Relevant oncologic hx:  02/2018: SBR by Dr. Ahmet Sanchez  07/2019, 12/2020, 08/2020: liver ablation  07/2020: Ex lap, cytoreductive surgery (MD Langston)  11/2022: Started Rogaratinib (held since 4/17 due to constipation and hematochezia)    SUBJ:  POD 1 SBT and open loop colostomy creation. No issues overnight. Ostomy viable, bowel sweat in bag    OBJ:  Vitals:    05/05/23 0400   BP: (!) 143/65   Pulse: 64   Resp:    Temp:       Physical Exam  Vitals and nursing note reviewed.   Constitutional:       General: He is not in acute distress.     Appearance: Normal appearance. He is not ill-appearing, toxic-appearing or diaphoretic.   HENT:      Head: Normocephalic.   Cardiovascular:      Rate and Rhythm: Normal rate and regular rhythm.   Pulmonary:      Effort: Pulmonary effort is normal. No respiratory distress.   Abdominal:      General: There is no distension.      Palpations: Abdomen is soft. There is no mass.      Tenderness: There is no abdominal tenderness. There is no guarding or rebound.      Hernia: No hernia is present.      Comments: Midline incision with dermabond. Ostomy viable with sweat in bag. Suprapubic catheter in place   Skin:     General: Skin is warm and dry.      Capillary Refill: Capillary refill takes less than 2 seconds.   Neurological:      Mental Status: He is alert and oriented to person, place, and time.      Recent Labs   Lab 05/04/23 2039   WBC 11.03   RBC 3.41*   HGB 9.6*   HCT 30.2*      MCV 89   MCH 28.2   MCHC 31.8*       Recent Labs   Lab 05/04/23 2039   *   K 4.7      CO2 23   BUN 23   CREATININE 0.8   MG 1.8        Intake/Output - Last 3 Shifts         05/03 0700  05/04 0659 05/04 0700 05/05 0659 05/05 0700  05/06 0659    P.O. 275 0     IV Piggyback  2400     Total Intake(mL/kg) 275 (3.5) 2400 (30.2)     Urine (mL/kg/hr)  1700 (0.9)     Stool  0     Blood  100     Total Output  1800     Net +275 +600            Urine Occurrence 17 x      Stool Occurrence 19 x 3 x           Imaging: None    A+P:  78 y.o. male w/ recurrent metastatic GIST w/ progression of known liver and pelvic disease. The pelvic mass is causing a partial obstruction of the rectum. Initially admitted to the hospital medicine service. Transferred to the Surgical Oncology service 04/28. 1 Day Post-Op SBT placement and open loop colostomy    - Advance to CLD  - Continue PCA for today  - F/u Urology recs  - Will need ostomy teaching  - Continue home meds  - PT/OT  - F/u 9A labs    Kierra Fowler MD  General Surgery PGY4

## 2023-05-05 NOTE — ASSESSMENT & PLAN NOTE
78M with hx of GIST and worsening pelvic mets presents for worsening constipation and difficulty urinating.      -Patient now s/p open SPT placement and colostomy creation by general surgery on 05/04/23  -SPT draining c/y/u with sediment  -Will arrange follow up with Dr. Dickson in 6 weeks for SPT exchange  -Rest of care per primary  -Urology will sign off at this time

## 2023-05-05 NOTE — NURSING TRANSFER
Nursing Transfer Note      5/4/2023     Reason patient is being transferred: post procedure     Transfer To: [id number]    Transfer via bed    Transfer with 2 L NC O2    Transported by transport     Medicines sent: yes     Any special needs or follow-up needed: routine     Chart send with patient: Yes    Notified: spouse    Patient reassessed at: 7068 on 5/4

## 2023-05-05 NOTE — OP NOTE
[facility] Step[facility]  Surgery Department  Operative Note       Date of Procedure: 5/4/2023     Procedure: Procedure(s) (LRB):  CREATION, COLOSTOMY, LAPAROSCOPIC - CONVERTED TO OPEN (N/A)  INSERTION, OPEN SUPRAPUBIC CATHETER (N/A)  CYSTOSCOPY (N/A)     Surgeon(s) and Role:  Panel 1:     * Surinder Shaw MD - Primary     * Kierra Fowler MD - Resident - Assisting  Panel 2:     * Kalani Dickson MD - Primary     * Neftali Langston MD - Resident - Assisting     * Jeimy Hammond MD - Resident - Assisting        Pre-Operative Diagnosis:   Recurrent metastatic GIST with pending rectal obstruction      Post-Operative Diagnosis:   Same      Comorbidities:  CAD s/p coronary stent placement  2.   Hypertension  3.   Hypercholesterolemia  4.  BPH  5.  Osteopenia  6.  Chronic anemia    Anesthesia: General    Operative Findings:   Extensive small bowel to small bowel and small bowel to transverse and left colon adhesions  Mesh placed over two years ago for abdominal wall reconstruction was completely intact    Procedures:  Laparoscopic converted to open descending loop colostomy  Lysis of adhesions > 90 minutes  22 modifier given extensive intraabdominal adhesions that made the operation 150% more difficult than a typical colostomy    Estimated Blood Loss (EBL):  100 mL           Indications:  Forrest Schmidt is a 78 year old man with recurrent metastatic GIST who has exhausted his options for medical treatment. He has a large pelvic GIST with pending colorectal obstruction. I recommended diverting colostomy for palliation.  Risks and benefits of laparoscopic, possible open, diverting colostomy were discussed with the patient including but not limited to: infection, bleeding, injury to adjacent organs, hernia, colostomy ischemia or retraction, cardiovascular and pulmonary complications, need for additional procedures, death, and imponderables.  He understands and signed written informed consent to proceed.    Details:  The  patient was transported to the operating room and satisfactory anesthesia established.  Preoperative antibiotics were administered. The patient was placed in the supine position and extremities were padded and protected throughout. The abdomen was prepped and draped. An appropriate timeout was performed. Prior to my procedure, the Urology team placed a suprapubic catheter for palliation.     I started with a cutdown using the Kevin technique to gain entry to the abdomen. His umbilicus had previously been removed, but I entered the abdomen at approximately the level of the umbilicus. There was a large piece of bioprosthetic mesh that seemed to be completely intact under the fascia. This was sharply incised taking care not to injury any bowel. A 10 mm Kevin trocar was placed. The abdomen was insufflated, and a camera was inserted. There was no evidence of any injury, but there were extensive small bowel adhesions that were clearly going to make a laparoscopic procedure impossible. The procedure was converted to open at that point.     The incision was extended to a midline laparotomy. Small bowel adhesions to the mesh and interloop adhesions were carefully taken down with scissors. There were extensive adhesions that were relatively thin but that involved the entire visible small bowel, omentum, transverse colon, and descending colon. It took > 90 minutes to lyse all of the adhesions to be able to see the descending colon. No injuries were made to the bowel during the lysis. Once there was sufficient room on the abdominal wall to place a retractor, the Lorenzana retractor was placed. The descending colon was then mobilized from the retroperitoneum along the white line of Toldt. Once this had been mobilized, there was clearly enough mobility for the colon to reach through the abdominal wall.     A circular incision was made in the skin of the left abdomen at the location marked by the wound ostomy nurse. The anterior  and posterior fascia over the rectus were divided using a muscle sparing technique. A hole was then made in the mesh that would easily fit 3 fingers. A roger was placed on the descending colon, and the colon was pulled through the abdominal wall defect. The small bowel was then examined, and there was no evidence of any injuries. The abdomen was also examined for hemostasis.    The fascia was closed with #1 looped PDS incorporating the mesh into the closure. The wound was irrigated and skin closed with 3-0 Vicryl, 4-0 Monocryl, and dermabond.      A red rubber catheter was placed through the mesentery under the center of the planned stoma to prevent retraction. A colotomy was then made, and a loop colostomy was fashioned with 3-0 Vicryl suture. The afferent end was sewn to the skin in Macy fashion. The efferent end was sewn to the skin with simple sutures. A colostomy appliance was placed.     All needle, lap, and sponge counts were reported as correct. I communicated the intraoperative findings with the family following the procedure.     Implants: * No implants in log *    Specimens:   Specimen (24h ago, onward)      None                    Condition: Good    Disposition: PACU - hemodynamically stable.    Attestation: I was present and scrubbed for the entire procedure.    Surinder Shaw MD  Staff Surgeon  Surgical Oncology

## 2023-05-05 NOTE — SUBJECTIVE & OBJECTIVE
Interval History: NAEO. AFVSS. Patient s/p SPT placement and colostomy creation by general surgery on 05/04/23. SPT draining c/y/u with some sediment.     Objective:     Temp:  [96.6 °F (35.9 °C)-98.7 °F (37.1 °C)] 97.2 °F (36.2 °C)  Pulse:  [58-88] 64  Resp:  [15-23] 16  SpO2:  [96 %-100 %] 99 %  BP: (111-159)/(58-89) 143/65     Body mass index is 24.48 kg/m².      Bladder Scan Volume (mL): 198 mL (05/02/23 1030)  Post Void Cath Residual Output (mL): 257 mL (05/03/23 0634)    Drains       Drain  Duration                  Colostomy 05/04/23 1914 Loop LUQ <1 day         Suprapubic Catheter 05/04/23 1615 <1 day                     Physical Exam  HENT:      Head: Normocephalic and atraumatic.   Eyes:      General: No scleral icterus.  Cardiovascular:      Rate and Rhythm: Normal rate.   Pulmonary:      Effort: Pulmonary effort is normal. No respiratory distress.   Abdominal:      Tenderness: There is abdominal tenderness (generalized, mild).      Comments: Midline abdominal incision CDI. Viable appearing ostomy. Suprapubic tube draining c/y/u with some sediment.   Skin:     General: Skin is warm.   Neurological:      General: No focal deficit present.      Mental Status: He is alert and oriented to person, place, and time.   Psychiatric:         Mood and Affect: Mood normal.         Behavior: Behavior normal.         Significant Labs:    BMP:  Recent Labs   Lab 05/03/23  0643 05/04/23  0605 05/04/23 2039   * 133* 135*   K 4.4 4.2 4.7    102 103   CO2 24 24 23   BUN 24* 25* 23   CREATININE 0.9 0.8 0.8   CALCIUM 9.2 9.1 8.7       CBC:   Recent Labs   Lab 05/03/23  0643 05/04/23  0605 05/04/23 2039   WBC 10.53 10.20 11.03   HGB 9.1* 8.9* 9.6*   HCT 29.0* 27.5* 30.2*   * 433 402       Urine Studies: No results for input(s): COLORU, APPEARANCEUA, PHUR, SPECGRAV, PROTEINUA, GLUCUA, KETONESU, BILIRUBINUA, OCCULTUA, NITRITE, UROBILINOGEN, LEUKOCYTESUR, RBCUA, WBCUA, BACTERIA, SQUAMEPITHEL, HYALINECASTS in  the last 168 hours.    Invalid input(s): WHITNEYSUR  All pertinent labs results from the past 24 hours have been reviewed.    Significant Imaging:  All pertinent imaging results/findings from the past 24 hours have been reviewed.

## 2023-05-05 NOTE — PT/OT/SLP RE-EVAL
Physical Therapy Re-evaluation    Patient Name:  Forrest Schmidt   MRN:  [id number]    Recommendations:     Discharge Recommendations: other (see comments)  Discharge Equipment Recommendations: none   Barriers to discharge: None    Assessment:     Forrest Schmidt is a 78 y.o. male admitted with a medical diagnosis of Mechanical gastrointestinal obstruction.  He presents with the following impairments/functional limitations: weakness, impaired endurance, impaired self care skills, impaired functional mobility, gait instability, impaired balance, pain Pt. cooperative and tolerated treatment fairly well. Pt. progressing slowly with mobility. Pt. would benefit from continued PT at home upon discharge.    Rehab Prognosis:  Good; patient would benefit from acute skilled PT services to address these deficits and reach maximum level of function.      Recent Surgery: Procedure(s) (LRB):  CREATION, COLOSTOMY, LAPAROSCOPIC - CONVERTED TO OPEN (N/A)  INSERTION, OPEN SUPRAPUBIC CATHETER (N/A)  CYSTOSCOPY (N/A) 1 Day Post-Op    Plan:     During this hospitalization, patient to be seen 4 x/week to address the above listed problems via gait training, therapeutic activities, therapeutic exercises  Plan of Care Expires:  06/04/23  Plan of Care Reviewed with: patient, spouse    Subjective     Communicated with nursing prior to session.  Patient found supine with PCA, peripheral IV, colostomy (suprapubic catheter) upon PT entry to room, agreeable to evaluation.      Chief Complaint: abd. discomfort  Patient comments/goals: pt. Agreeable to PT  Pain/Comfort:  Pain Rating 1:  (pt. did not rate, but using PCA pump)  Location - Orientation 1: generalized  Location 1: abdomen  Pain Addressed 1: Pre-medicate for activity, Reposition, Distraction, Cessation of Activity    Patients cultural, spiritual, Restorationist conflicts given the current situation: no      Objective:     Patient found with: PCA, peripheral IV, colostomy (suprapubic catheter)      General Precautions: Standard, fall  Orthopedic Precautions: N/A  Braces: N/A  Respiratory Status: Nasal cannula, flow 2 L/min    Exams:  RLE ROM: WFL  RLE Strength: WFL  LLE ROM: WFL  LLE Strength: WFL    Functional Mobility:  Bed Mobility:     Rolling Left:  minimum assistance  Scooting: minimum assistance  Supine to Sit: minimum assistance  Transfers:     Sit to Stand:  minimum assistance with rolling walker  Gait: 8 steps with RW from bed to bedside chair with CGA-Min A for safety. Pt. amb. with decreased step length/shell.  Balance: fair    AM-PAC 6 CLICK MOBILITY  Total Score:18       Treatment and Education:   Discussed therapy needs, goals, and POC.    Patient left up in chair with all lines intact and call button in reach.    GOALS:   Multidisciplinary Problems       Physical Therapy Goals          Problem: Physical Therapy    Goal Priority Disciplines Outcome Goal Variances Interventions   Physical Therapy Goal     PT, PT/OT Ongoing, Progressing     Description: Goals to be met by: 2023     Patient will increase functional independence with mobility by performin. Supine to sit with Set-up [place]  2. Sit to supine with Set-up [place]  3. Sit to stand transfer with Supervision  4. Bed to chair transfer with Supervision using LRAD  5. Gait  x 200 feet with Supervision using LRAD.   6. 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 2/15/2018    Osteopenia        Past Surgical History:   Procedure Laterality Date    APPENDECTOMY      COLONOSCOPY N/A 5/15/2017    Procedure: COLONOSCOPY;  Surgeon: Dez Jimenez MD;  Location: [facility];  Service: Endoscopy;  Laterality: N/A;    CORONARY STENT PLACEMENT      CREATION, COLOSTOMY, LAPAROSCOPIC N/A 5/4/2023    Procedure: CREATION, COLOSTOMY, LAPAROSCOPIC - CONVERTED TO OPEN;  Surgeon: Surinder Shaw MD;  Location: [address];  Service: General;  Laterality: N/A;    CYSTOSCOPY N/A 5/4/2023    Procedure: CYSTOSCOPY;  Surgeon: Kalani Dickson MD;  Location: [address];  Service: Urology;  Laterality: N/A;    ESOPHAGOGASTRODUODENOSCOPY      EYE SURGERY      cataract extraction    INSERTION, SUPRAPUBIC CATHETER N/A 5/4/2023    Procedure: INSERTION, OPEN SUPRAPUBIC CATHETER;  Surgeon: Kalani Dickson MD;  Location: [address];  Service: Urology;  Laterality: N/A;    SKIN BIOPSY      TONSILLECTOMY      VASECTOMY         Time Tracking:     PT Received On: 05/05/23  PT Start Time: 1010     PT Stop Time: 1041  PT Total Time (min): 31 min     Billable Minutes: Re-eval 16 and Gait Training 15      05/05/2023

## 2023-05-06 NOTE — PLAN OF CARE
[id number]  VVS  PCA STOPPED   PO PAIN MEDS GIVEN AS ORDERED  PT WALKED IN ROOM WITH STANDBY ASSIST. PT IN CHAIR FOR COUPLE HOURS   NO ACUTE DISTRESS NOTED   VERBAL UNDERSTANDING NOTED TO CALL PRN

## 2023-05-06 NOTE — PT/OT/SLP EVAL
"Occupational Therapy   Evaluation    Name: Forrest Schmidt  MRN: [id number]  Admitting Diagnosis: Mechanical gastrointestinal obstruction  Recent Surgery: Procedure(s) (LRB):  CREATION, COLOSTOMY, LAPAROSCOPIC - CONVERTED TO OPEN (N/A)  INSERTION, OPEN SUPRAPUBIC CATHETER (N/A)  CYSTOSCOPY (N/A) 2 Days Post-Op    Recommendations:     Discharge Recommendations: other (see comments) (TBD)  Discharge Equipment Recommendations:  none  Barriers to discharge:  None    Assessment:     Forrest Schmidt is a 78 y.o. male with a medical diagnosis of Mechanical gastrointestinal obstruction.  He presents with disinterest in OT eval as nurse just got him back to bed from bedside chair. Pt agreed to any evaluation while HOB elevated and asked to return later for all OOB activities. Performance deficits affecting function: weakness, impaired endurance, impaired self care skills, impaired functional mobility, gait instability, impaired balance, pain.  Patient would benefit from continued skilled acute OT 4x/wk to improve functional mobility, increase independence with ADLs, and address established goals.      Rehab Prognosis: Good; patient would benefit from acute skilled OT services to address these deficits and reach maximum level of function.       Plan:     Patient to be seen 4 x/week to address the above listed problems via self-care/home management, therapeutic activities, therapeutic exercises  Plan of Care Expires: 05/27/23  Plan of Care Reviewed with: patient, spouse    Subjective   Pts wife said "I"ll be nagging him to do what he needs to do"  Chief Complaint: Tired as he was in bedside chair.  Patient/Family Comments/goals: To increase fx independence.    Occupational Profile:  Living Environment: Pt lives with spouse in 2  with walk in shower with shower chair and grab bars.  Wife will help upon return home.  Previous level of function: Ind  Roles and Routines: retired  Equipment Used at Home: cane, straight, shower chair, " walker, rolling, bedside commode  Assistance upon Discharge: TBD    Pain/Comfort:  Pain Rating 1: 5/10  Location - Side 1: Left  Location - Orientation 1: lower  Location 1: abdomen  Pain Addressed 1: Cessation of Activity    Patients cultural, spiritual, Advent conflicts given the current situation: no    Objective:     Communicated with: nurse prior to session.  Patient found HOB elevated with PCA, peripheral IV, colostomy upon OT entry to room.    General Precautions: Standard, fall  Orthopedic Precautions: N/A  Braces: N/A  Respiratory Status: Nasal cannula 2L    Occupational Performance:    Bed Mobility:    Did not perform at this eval    Functional Mobility/Transfers:  Patient completed Sit <> Stand Transfer with min A  as reported by nurse  with  rolling walker   Patient completed Bed <> Chair Transfer using Step Transfer technique with minimum assistance with rolling walker as reported by nurse.  Functional Mobility: unable to assess    Activities of Daily Living:  To be assessed next visit.    Cognitive/Visual Perceptual:  Cognitive/Psychosocial Skills:     -       Oriented to: Person, Place, Time, and Situation   -       Follows Commands/attention:Follows multistep  commands  -       Communication: clear/fluent  -       Memory: No Deficits noted  -       Safety awareness/insight to disability: intact   -       Mood/Affect/Coping skills/emotional control: Lethargic  Visual/Perceptual:      -Intact WFL    Physical Exam:  Neurological: -       WFL    AMPAC 6 Click ADL:  AMPAC Total Score: 16    Treatment & Education:  Role of OT and POC  Safety  ADL retraining  Functional mobility training  Discharge planning  Importance of EOB/OOB activity      Patient left HOB elevated with all lines intact, call button in reach, nurse notified, and wie present    GOALS:   Multidisciplinary Problems       Occupational Therapy Goals       Not on file              Multidisciplinary Problems (Resolved)          Problem:  Occupational Therapy    Goal Priority Disciplines Outcome Interventions   Occupational Therapy Goal   (Resolved)     OT, PT/OT Met    Description: No goals set 2/2 pt performing at/near baseline.                          History:     Past Medical History:   Diagnosis Date    Anticoagulant long-term use     Arthritis     BPH (benign prostatic hyperplasia)     Coronary artery disease     stent x1 2005    Hypercholesteremia     Hypertension     Low iron     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 2/15/2018    Osteopenia          Past Surgical History:   Procedure Laterality Date    APPENDECTOMY      COLONOSCOPY N/A 5/15/2017    Procedure: COLONOSCOPY;  Surgeon: Dez Jimenez MD;  Location: [facility];  Service: Endoscopy;  Laterality: N/A;    CORONARY STENT PLACEMENT      CREATION, COLOSTOMY, LAPAROSCOPIC N/A 5/4/2023    Procedure: CREATION, COLOSTOMY, LAPAROSCOPIC - CONVERTED TO OPEN;  Surgeon: Surinder Shaw MD;  Location: [address];  Service: General;  Laterality: N/A;    CYSTOSCOPY N/A 5/4/2023    Procedure: CYSTOSCOPY;  Surgeon: Kalani Dickson MD;  Location: [address];  Service: Urology;  Laterality: N/A;    ESOPHAGOGASTRODUODENOSCOPY      EYE SURGERY      cataract extraction    INSERTION, SUPRAPUBIC CATHETER N/A 5/4/2023    Procedure: INSERTION, OPEN SUPRAPUBIC CATHETER;  Surgeon: Kalani Dickson MD;  Location: [address];  Service: Urology;  Laterality: N/A;    SKIN BIOPSY      TONSILLECTOMY      VASECTOMY         Time Tracking:     OT Date of Treatment: 05/06/23  OT Start Time: 1345  OT Stop Time: 1400  OT Total Time (min): 15 min    Billable Minutes:Evaluation 15    5/6/2023

## 2023-05-06 NOTE — PLAN OF CARE
Problem: Occupational Therapy  Goal: Occupational Therapy Goal  Description: Goals to be met by: 5/27     Patient will increase functional independence with ADLs by performing:    UE Dressing with Supervision.  LE Dressing with Supervision.  Grooming while standing at sink with Set-up Assistance.  Toileting from toilet with Supervision for hygiene and clothing management.     Outcome: Ongoing, Progressing   Pts goals are set.

## 2023-05-06 NOTE — PROGRESS NOTES
Fall bundle in place. Pt. Remained free from falls/trauma/injuries. No complaints of CP/ SOB/discomfort. VSS. No tele, pulse WNL. A&Ox4. Pt. Remains on HYDROmorphone PCA syringe 6 mg/30 mL (0.2 mg/mL) NS. Pt. Hesitates in using PCA pump, education reinforced but pt. Is failing to grasp the overall concept; this is impacting his Bps. Bps are elevated when in pain, but once pt. Uses PCA pump BP trends down. Pts' colostomy bag scant red drainage, area remains CDI. Pts' suprapubic baldwin site remains dry & intact with scant dried drainage, total UOP this shift was cc. The POC was reviewed with pt & pts' spouse @ bedside, questions were encouraged & answered. No further concerns at this time.

## 2023-05-06 NOTE — PROGRESS NOTES
SURGICAL ONCOLOGY    HPI:  Forrest Schmidt is a 78 y.o. 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 Admitted with malaise, constipation, BRBPR and urinary difficulties.  who presented with malaise, constipation, and BRBPR in the setting of known recurrent GIST.  CT from 04/21/23 shows progression of his known liver and pelvic disease. Continues to have bowel function and passing flatus but seems to have impending colonic obstruction based on the relationship of the tumor to the rectum.     Medical hx: HTN, HLD, BPH, GERD, arthritis     Relevant oncologic hx:  02/2018: SBR by Dr. Ahmet Sanchez  07/2019, 12/2020, 08/2020: liver ablation  07/2020: Ex lap, cytoreductive surgery (MD Langston)  11/2022: Started Rogaratinib (held since 4/17 due to constipation and hematochezia)    SUBJ:  No issues overnight. Ostomy viable, gas and bowel sweat in bag.  SP cath working.  Pain controlled.    OBJ:  Vitals:    05/06/23 0457   BP:    Pulse:    Resp: 18   Temp: 98.2 °F (36.8 °C)      Physical Exam  Vitals and nursing note reviewed.   Constitutional:       General: He is not in acute distress.     Appearance: Normal appearance. He is not ill-appearing, toxic-appearing or diaphoretic.   HENT:      Head: Normocephalic.   Cardiovascular:      Rate and Rhythm: Normal rate and regular rhythm.   Pulmonary:      Effort: Pulmonary effort is normal. No respiratory distress.   Abdominal:      General: There is no distension.      Palpations: Abdomen is soft. There is no mass.      Tenderness: There is no abdominal tenderness. There is no guarding or rebound.      Hernia: No hernia is present.      Comments: Midline incision with dermabond. Ostomy viable with sweat in bag and air. Suprapubic catheter in place.    Abdomen soft and non-distended  Appropriately tender  Incision c/d/i   Skin:     General: Skin is warm and dry.      Capillary Refill: Capillary refill takes less than 2 seconds.    Neurological:      Mental Status: He is alert and oriented to person, place, and time.     Recent Labs   Lab 05/06/23  0548   WBC 9.61   RBC 3.15*   HGB 8.8*   HCT 27.3*      MCV 87   MCH 27.9   MCHC 32.2       Recent Labs   Lab 05/06/23  0548   *   K 4.5      CO2 25   BUN 21   CREATININE 0.9   MG 1.9        Intake/Output - Last 3 Shifts         05/04 0700  05/05 0659 05/05 0700  05/06 0659    P.O. 0 730    IV Piggyback 2400     Total Intake(mL/kg) 2400 (30.2) 730 (9.2)    Urine (mL/kg/hr) 1700 (0.9) 1375 (0.7)    Stool 0     Blood 100     Total Output 1800 1375    Net +600 -645          Stool Occurrence 3 x           Imaging: None    A+P:  78 y.o. male w/ recurrent metastatic GIST w/ progression of known liver and pelvic disease. The pelvic mass is causing a partial obstruction of the rectum. Initially admitted to the hospital medicine service. Transferred to the Surgical Oncology service 04/28. 2 Days Post-Op SBT placement and open loop colostomy    - Reg diet  - Likely switch off PCA later today  - F/u Urology recs  - Ostomy teaching  - Continue home meds  - Reglan  - PT/OT    Amilcar Lim MD  General Surgery, PGY-5  [phone]

## 2023-05-06 NOTE — ANESTHESIA POSTPROCEDURE EVALUATION
Anesthesia Post Evaluation    Patient: Forrest Schmidt    Procedure(s) Performed: Procedure(s) (LRB):  CREATION, COLOSTOMY, LAPAROSCOPIC - CONVERTED TO OPEN (N/A)  INSERTION, OPEN SUPRAPUBIC CATHETER (N/A)  CYSTOSCOPY (N/A)    Final Anesthesia Type: general      Patient location during evaluation: PACU  Patient participation: Yes- Able to Participate  Level of consciousness: awake  Post-procedure vital signs: reviewed and stable  Pain management: adequate  Airway patency: patent    PONV status at discharge: No PONV  Anesthetic complications: no      Cardiovascular status: blood pressure returned to baseline  Respiratory status: unassisted  Hydration status: euvolemic  Follow-up not needed.          Vitals Value Taken Time   /74 05/05/23 2201   Temp 36.5 °C (97.7 °F) 05/05/23 1957   Pulse 71 05/05/23 2229   Resp 12 05/05/23 1957   SpO2 99 % 05/05/23 2229   Vitals shown include unvalidated device data.      Event Time   Out of Recovery 05/04/2023 20:45:00         Pain/Denice Score: Pain Rating Prior to Med Admin: 4 (5/5/2023  9:10 PM)  Pain Rating Post Med Admin: 0 (5/5/2023  2:52 PM)  Denice Score: 9 (5/4/2023  8:45 PM)

## 2023-05-07 NOTE — NURSING TRANSFER
Nursing Transfer Note      5/7/2023   Report Called to OVIDIO RN. Pt Placed in Transport to be sent to 1047. Chart and All Personal Things sent with Pt.

## 2023-05-07 NOTE — PROGRESS NOTES
SURGICAL ONCOLOGY    HPI:  Forrest Schmidt is a 78 y.o. 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 Admitted with malaise, constipation, BRBPR and urinary difficulties.  who presented with malaise, constipation, and BRBPR in the setting of known recurrent GIST.  CT from 04/21/23 shows progression of his known liver and pelvic disease. Continues to have bowel function and passing flatus but seems to have impending colonic obstruction based on the relationship of the tumor to the rectum.     Medical hx: HTN, HLD, BPH, GERD, arthritis     Relevant oncologic hx:  02/2018: SBR by Dr. Ahmet Sanchez  07/2019, 12/2020, 08/2020: liver ablation  07/2020: Ex lap, cytoreductive surgery (MD Langston)  11/2022: Started Rogaratinib (held since 4/17 due to constipation and hematochezia)    SUBJ:  No issues overnight. Ostomy viable, gas and bowel sweat in bag.  SP cath working.  Pain controlled.  More distended    OBJ:  Vitals:    05/07/23 0618   BP:    Pulse:    Resp: 19   Temp:       Physical Exam  Vitals and nursing note reviewed.   Constitutional:       General: He is not in acute distress.     Appearance: Normal appearance. He is not ill-appearing, toxic-appearing or diaphoretic.   HENT:      Head: Normocephalic.   Cardiovascular:      Rate and Rhythm: Normal rate and regular rhythm.   Pulmonary:      Effort: Pulmonary effort is normal. No respiratory distress.   Abdominal:      General: There is no distension.      Palpations: Abdomen is soft. There is no mass.      Tenderness: There is no abdominal tenderness. There is no guarding or rebound.      Hernia: No hernia is present.      Comments: Midline incision with dermabond. Ostomy viable with sweat in bag and air. Suprapubic catheter in place.    Abdomen soft but distended  Appropriately tender  Incision c/d/i   Skin:     General: Skin is warm and dry.      Capillary Refill: Capillary refill takes less than 2 seconds.    Neurological:      Mental Status: He is alert and oriented to person, place, and time.     Recent Labs   Lab 05/07/23  0554   WBC 10.08   RBC 3.41*   HGB 9.3*   HCT 29.9*      MCV 88   MCH 27.3   MCHC 31.1*       Recent Labs   Lab 05/07/23  0554   *   K 4.2   CL 99   CO2 24   BUN 17   CREATININE 0.8   MG 1.7        Intake/Output - Last 3 Shifts         05/05 0700 05/06 0659 05/06 0700 05/07 0659    P.O. 730     Total Intake(mL/kg) 730 (9.2)     Urine (mL/kg/hr) 1375 (0.7) 1125 (0.6)    Total Output 1375 1125    Net -970 -1378                Imaging: None    A+P:  78 y.o. male w/ recurrent metastatic GIST w/ progression of known liver and pelvic disease. The pelvic mass is causing a partial obstruction of the rectum. Initially admitted to the hospital medicine service. Transferred to the Surgical Oncology service 04/28. 3 Days Post-Op SPT placement and open loop colostomy    - NPO, okay for sips/chips  - Ostomy teaching  - Continue home meds  - Reglan  - KUB this AM, may need to go back on all IV meds  - Digitize ostomy with 18 fr catheter   - mIVF  -Fluids  - PT/OT    Amilcar Lim MD  General Surgery, PGY-5  [phone]

## 2023-05-07 NOTE — PROGRESS NOTES
Fall bundle in place. Pt. Remained free from falls/trauma/injuries. No complaints of CP/ SOB/discomfort. VSS. No tele, pulse WNL. A&Ox4. Pt. Reports pain, given PRN Tramadol. Pts' colostomy bag now producing brown/dark red sweat, gas present; area remains CDI. Pts' suprapubic baldwin site remains dry & intact with scant dried drainage, total UOP this shift was >1L. Pts' midabdominal incision CDI, abdomen in general distended. STAT ABD Xray ordered, xray called no answer; handed off to Day Shift RN. The POC reviewed w/ pt. & spouse, questions encouraged & answered. No further concerns at this time.

## 2023-05-08 NOTE — PROGRESS NOTES
Urology Progress Note    Patient had an episode where he voided through urethra and is concerned regarding this. SP tube has been draining. He has been having bladder spasms as well. He had one episode of incontinence but has not had any such episodes since placement of a condom catheter.    I explained to patient that recent surgery on bladder and presence of Dove catheter likely accounts for bladder spasms. Can start oxybutynin 5 mg TID PRN if it becomes bothersome, but currently does not appear to be overly bothersome.    Continue planned follow-up with Dr. Dickson 6 weeks postop for SP tube exchange.

## 2023-05-08 NOTE — PLAN OF CARE
Problem: Physical Therapy  Goal: Physical Therapy Goal  Description: Goals to be met by: 2023     Patient will increase functional independence with mobility by performin. Supine to sit with Set-up [place]   2. Sit to supine with Set-up [place]  3. Sit to stand transfer with Supervision Met   4. Bed to chair transfer with Supervision using LRAD Met   5. Gait  x 200 feet with Supervision using LRAD. Met   6. Lower extremity exercise program x15 reps per handout, with supervision    Outcome: Ongoing, Progressing     Treatment completed. Goals 3, 4, and 5 met. Goals remain appropriate.

## 2023-05-08 NOTE — PROGRESS NOTES
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 2/15/2018    Osteopenia        Social History     Socioeconomic History    Marital status:    Tobacco Use    Smoking status: Former     Packs/day: 0.50     Years: 15.00     Pack years: 7.50     Types: Cigarettes     Quit date: 1974     Years since quittin.3    Smokeless tobacco: Never    Tobacco comments:     Quit smoking about 40 yrs ago   Substance and Sexual Activity    Alcohol use: Yes     Alcohol/week: 1.0 standard drink     Types: 1 Glasses of wine per week     Comment: 2-4 glasses wine/day, 17 last drink    Drug use: No    Sexual activity: Yes     Partners: Female     Social Determinants of Health     Financial Resource Strain: Low Risk     Difficulty of Paying Living Expenses: Not hard at all   Food Insecurity: Unknown    Worried About Running Out of Food in the Last Year: Patient refused    Ran Out of Food in the Last Year: Patient refused   Transportation Needs: No Transportation Needs    Lack of Transportation (Medical): No    Lack of Transportation (Non-Medical): No   Physical Activity: Unknown    Days of Exercise per Week: 7 days   Stress: Unknown    Feeling of Stress : Patient refused   Social Connections: Unknown    Frequency of Communication with Friends and Family: Patient refused    Frequency of Social Gatherings with Friends and Family: Patient refused    Attends Mandaen Services: Patient refused    Active Member of Clubs or Organizations: Patient refused    Attends Club or Organization Meetings: Patient refused    Marital Status:     Housing Stability: Unknown    Unable to Pay for Housing in the Last Year: No    Unstable Housing in the Last Year: No       Precautions:     Allergies as of 04/24/2023 - Reviewed 04/24/2023   Allergen Reaction Noted    Augmentin [amoxicillin-pot clavulanate] Shortness Of Breath 10/15/2015    Fexofenadine Other (See Comments) 05/30/2018    Singulair [montelukast] Other (See Comments) 05/15/2017    Ace inhibitors Other (See Comments) 06/13/2019    Captopril      Doxycycline Nausea And Vomiting 06/13/2019    Horse/equine containing products Hives 05/15/2017    Hydrocodone Nausea Only 06/13/2019    Scopolamine hbr Other (See Comments) 07/15/2020       WOC Assessment Details/Treatment     Patient seen for wound care consultation.   Reviewed chart for this encounter.   See Flow Sheet for findings.    Patient seen for new ostomy consult. Initial ostomy education begun.  Goals of education include:    Pouch emptying, sizing/cuting pouch, and applying pouch  Stoma and ledy-stomal care  Food choices and hydration  Obtaining supplies    Discussed cutting ostomy pouch and emptying pouch of stool and gas.  Patient returned demonstration without difficulty.  Discussed meal planning with particular foods to avoid.  Discussed importance of hydration and increasing fluid intake.  Explained process of ordering supplies to be delivered to patient's home. Provided reading materials regarding today's training and will follow up with patient tomorrow. Ostomy pouch was intact and displayed no leakage. No change required at this time.     RECOMMENDATIONS  Recommendations made to primary team for above plan via secured chat. WOC to follow up Orders placed.     Discussed POC with patient and primary RN.   See EMR for orders & patient education.  Discussed POC with primary team.    Nursing to continue care.  Nursing to maintain pressure injury prevention interventions.  Contact wound care for any further questions.     05/08/23 3291    WOCN Assessment   WOCN Total Time (mins) 30   Visit Date 05/08/23   Visit Time 1130   Consult Type Follow Up   WOCN Speciality Ostomy   Intervention assessed;changed;applied;chart review;coordination of care   Teaching on-going        Colostomy 05/04/23 1914 Loop LUQ   Placement Date/Time: 05/04/23 1914   Present Prior to Hospital Arrival?: No  Inserted by: (c) MD  Colostomy Type: Loop  Location: LUQ   Stomal Appliance 1 piece;Clean;Dry;Intact;No Leakage   Stoma Appearance protruding above skin level;moist;round   Peristomal Assessment JUAN   Stoma Function flatus;stool       05/08/2023

## 2023-05-08 NOTE — PROGRESS NOTES
SURGICAL ONCOLOGY    HPI:  Forrest Schmidt is a 78 y.o. 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 Admitted with malaise, constipation, BRBPR and urinary difficulties.  who presented with malaise, constipation, and BRBPR in the setting of known recurrent GIST.  CT from 04/21/23 shows progression of his known liver and pelvic disease. Continues to have bowel function and passing flatus but seems to have impending colonic obstruction based on the relationship of the tumor to the rectum.     Medical hx: HTN, HLD, BPH, GERD, arthritis     Relevant oncologic hx:  02/2018: SBR by Dr. Ahmet Sanchez  07/2019, 12/2020, 08/2020: liver ablation  07/2020: Ex lap, cytoreductive surgery (MD Langston)  11/2022: Started Rogaratinib (held since 4/17 due to constipation and hematochezia)    SUBJ:  No issues overnight. Ostomy viable, not having much ostomy output besides bowel sweat.  SP cath working.  Pain controlled.  Moderately distended.    OBJ:  Vitals:    05/08/23 0818   BP: (!) 163/82   Pulse: 71   Resp: 18   Temp: 98.1 °F (36.7 °C)      Physical Exam  Vitals and nursing note reviewed.   Constitutional:       General: He is not in acute distress.     Appearance: Normal appearance. He is not ill-appearing, toxic-appearing or diaphoretic.   HENT:      Head: Normocephalic.   Cardiovascular:      Rate and Rhythm: Normal rate and regular rhythm.   Pulmonary:      Effort: Pulmonary effort is normal. No respiratory distress.   Abdominal:      General: There is no distension.      Palpations: Abdomen is soft. There is no mass.      Tenderness: There is no abdominal tenderness. There is no guarding or rebound.      Hernia: No hernia is present.      Comments: Midline incision with dermabond. Ostomy viable with sweat in bag . Suprapubic catheter in place.    Abdomen moderately distended  Appropriately tender  Incision c/d/i   Skin:     General: Skin is warm and dry.      Capillary Refill:  Capillary refill takes less than 2 seconds.   Neurological:      Mental Status: He is alert and oriented to person, place, and time.     Recent Labs   Lab 05/08/23  0556   WBC 8.81   RBC 3.27*   HGB 9.1*   HCT 27.9*      MCV 85   MCH 27.8   MCHC 32.6       Recent Labs   Lab 05/08/23  0556   *   K 4.0   CL 98   CO2 24   BUN 18   CREATININE 0.7   MG 1.7        Intake/Output - Last 3 Shifts         05/06 0700 05/07 0659 05/07 0700 05/08 0659 05/08 0700 05/09 0659    P.O.       Total Intake(mL/kg)       Urine (mL/kg/hr) 1125 (0.6) 1325 (0.7)     Total Output 1125 1325     Net -1125 -1325                  Imaging:  Abdominal XR:  FINDINGS:  Dilated gas-filled loops of small bowel measure up to 5.5 cm, as measured in the left upper quadrant abdomen.  Gaseous distension of colonic loops.  Relative paucity of air in the distal colon and rectum.  Ileus versus small-bowel obstruction considered.  Attention on follow-up.     Findings in keeping with reported recent history of suprapubic catheter placement and colostomy creation.  Osseous and soft tissue structures without acute change.    A+P:  78 y.o. male w/ recurrent metastatic GIST w/ progression of known liver and pelvic disease. The pelvic mass is causing a partial obstruction of the rectum. Initially admitted to the hospital medicine service. Transferred to the Surgical Oncology service 04/28. 4 Days Post-Op SPT placement and open loop colostomy. Colostomy is still not productive, digitized yesteday.     - NPO, okay for sips/chips  - Will interrogate colostomy again today  - Lytes repleted  - Continue home meds  - Reglan  - mIVF  -Fluids  - PT/OT    WALTER Frias MD  General Surgery, PGY-4

## 2023-05-08 NOTE — NURSING
Nurses Note -- 4 Eyes      5/7/2023   7:31 PM      Skin assessed during: Transfer      [] No Altered Skin Integrity Present    []Prevention Measures Documented      [x] Yes- Altered Skin Integrity Present or Discovered   [x] LDA Added if Not in Epic (Describe Wound)   [] New Altered Skin Integrity was Present on Admit and Documented in LDA   [] Wound Image Taken    Wound Care Consulted? No    Attending Nurse:  Gely Ventura RN     Second RN/Staff Member:  Rosio Stanley RN

## 2023-05-08 NOTE — PT/OT/SLP PROGRESS
Physical Therapy Treatment    Patient Name:  Forrest Schmidt   MRN:  [id number]  Admit Date: 2023  Admitting Diagnosis:  Mechanical gastrointestinal obstruction   Length of Stay: 14 days  Recent Surgery: Procedure(s) (LRB):  CREATION, COLOSTOMY, LAPAROSCOPIC - CONVERTED TO OPEN (N/A)  INSERTION, OPEN SUPRAPUBIC CATHETER (N/A)  CYSTOSCOPY (N/A) 4 Days Post-Op    Recommendations:     Discharge Recommendations:  other (see comments)   Discharge Equipment Recommendations: none   Barriers to discharge: None    Plan:     During this hospitalization, patient to be seen 2 x/week to address the listed problems via gait training, therapeutic activities, therapeutic exercises, neuromuscular re-education  Plan of Care Expires:  23  Plan of Care Reviewed with: patient, spouse    Assessment:     Forrest Schmidt is a 78 y.o. male admitted with a medical diagnosis of Mechanical gastrointestinal obstruction. Pt found alert and cooperative. Pt progressing towards goals, but not at PLOF. Pt tolerated session well.  Pt is improving with therapy evidenced by increased gait distance. Pt would benefit from continued PT services to improve overall functional mobility. Plan to work on bed mobility with HOB flat next session.          Problem List: pain.  Rehab Prognosis: Good     GOALS:   Multidisciplinary Problems       Physical Therapy Goals          Problem: Physical Therapy    Goal Priority Disciplines Outcome Goal Variances Interventions   Physical Therapy Goal     PT, PT/OT Ongoing, Progressing     Description: Goals to be met by: 2023     Patient will increase functional independence with mobility by performin. Supine to sit with Set-up [place]   2. Sit to supine with Set-up [place]  3. Sit to stand transfer with Supervision Met /8  4. Bed to chair transfer with Supervision using LRAD Met /8  5. Gait  x 200 feet with Supervision using LRAD. Met /8  6. Lower extremity exercise program x15 reps per handout,  "with supervision                   Multidisciplinary Problems (Resolved)          Problem: Physical Therapy    Goal Priority Disciplines Outcome Goal Variances Interventions   Physical Therapy Goal   (Resolved)     PT, PT/OT Met                         Subjective   Communicated with RN prior to session.  Patient found HOB elevated upon PT entry to room, agreeable to evaluation. Forrest Schmidt's wife present during session.    Chief Complaint: pain  Patient/Family Comments/goals: to get better  Pain/Comfort:  Pain Rating 1: 3/10  Location 1:  (abdomen)  Pain Addressed 1: Reposition, Distraction  Pain Rating Post-Intervention 1: 3/10    Objective:   Patient found with: peripheral IV, colostomy (suprapubic catheter)   General Precautions: Standard, Cardiac fall   Orthopedic Precautions:N/A   Braces:     Oxygen Device: Room Air  Vitals: /73 (BP Location: Left arm)   Pulse 76   Temp 97.5 °F (36.4 °C) (Oral)   Resp 20   Ht 5' 11" (1.803 m)   Wt 80.2 kg (176 lb 12.9 oz)   SpO2 96%   BMI 24.66 kg/m²     Outcome Measures:  AM-PAC 6 CLICK MOBILITY  Turning over in bed (including adjusting bedclothes, sheets and blankets)?: 3  Sitting down on and standing up from a chair with arms (e.g., wheelchair, bedside commode, etc.): 4  Moving from lying on back to sitting on the side of the bed?: 3  Moving to and from a bed to a chair (including a wheelchair)?: 4  Need to walk in hospital room?: 4  Climbing 3-5 steps with a railing?: 3  Basic Mobility Total Score: 21     Functional Mobility:  Additional staff present: N/A  Bed Mobility:   Supine to Sit: stand by assistance; HOB flat  Increased time   Scooting anteriorly to EOB to have both feet planted on floor: independence    Sitting Balance at Edge of Bed:  Assistance Level Required: [place]    Transfers:   Sit <> Stand Transfer: modified independence with rolling walker from EOB      Gait:  Patient ambulated: 300ft   Patient required: modified independent  Patient " used: rolling walker  Gait Pattern observed: reciprocal gait  Gait Deviation(s): NA  Comments:   No LOB with horizontal head turns, change of speed, or change in direction  No SOB      Therapeutic Activities, Exercises, and Education:   Educated pt on PT role/POC  Educated pt on importance of OOB activity and daily ambulation   Pt verbalized understanding       T/f to chair to increase tolerance to OOB activity and to create optimal positioning for lung expansion       Patient left up in chair with all lines intact, call button in reach, and RN notified..    Time Tracking:     PT Received On: 05/08/23  PT Start Time: 1429     PT Stop Time: 1445  PT Total Time (min): 16 min       Billable Minutes:   Gait Training 10    Treatment Type: Treatment  PT/PTA: PT

## 2023-05-08 NOTE — PT/OT/SLP PROGRESS
Occupational Therapy   Treatment    Name: Forrest Schmidt  MRN: [id number]  Admitting Diagnosis:  Mechanical gastrointestinal obstruction  4 Days Post-Op    Recommendations:     Discharge Recommendations: other (see comments)  Discharge Equipment Recommendations:  none  Barriers to discharge:       Assessment:     Forrest Schmidt is a 78 y.o. male with a medical diagnosis of Mechanical gastrointestinal obstruction. Pt walked 375' today with SBA / RW. Performance deficits affecting function are weakness, impaired balance, impaired endurance, decreased coordination, impaired self care skills, impaired functional mobility, gait instability, decreased safety awareness.     Rehab Prognosis:  Good; patient would benefit from acute skilled OT services to address these deficits and reach maximum level of function.       Plan:     Patient to be seen 4 x/week to address the above listed problems via self-care/home management, therapeutic activities, therapeutic exercises  Plan of Care Expires: 05/27/23  Plan of Care Reviewed with: spouse, patient    Subjective     Pain/Comfort:  Pain Rating 1: 0/10    Objective:     Communicated with: rn prior to session.  Patient found supine with peripheral IV, Condom Catheter upon OT entry to room.    General Precautions: Standard, fall    Orthopedic Precautions:N/A  Braces: N/A  Respiratory Status: Room air     Occupational Performance:     Bed Mobility:    Patient completed Scooting/Bridging with stand by assistance  Patient completed Supine to Sit with stand by assistance     Functional Mobility/Transfers:  Patient completed Sit <> Stand Transfer with stand by assistance  with  rolling walker   Patient completed Bed <> Chair Transfer using Step Transfer technique with stand by assistance with rolling walker  Functional Mobility: Pt walked 375' today with SBA / RW.     Activities of Daily Living:  Grooming: stand by assistance standing at the sink.    [facility] 6 Click ADL: 21    Treatment &  Education:  Discussed OT POC and progress.    Patient left up in chair with all lines intact and call button in reach    GOALS:   Multidisciplinary Problems       Occupational Therapy Goals          Problem: Occupational Therapy    Goal Priority Disciplines Outcome Interventions   Occupational Therapy Goal     OT, PT/OT Ongoing, Progressing    Description: Goals to be met by: 5/27     Patient will increase functional independence with ADLs by performing:    UE Dressing with Supervision.  LE Dressing with Supervision.  Grooming while standing at sink with Set-up Assistance.  Toileting from toilet with Supervision for hygiene and clothing management.                    Multidisciplinary Problems (Resolved)          Problem: Occupational Therapy    Goal Priority Disciplines Outcome Interventions   Occupational Therapy Goal   (Resolved)     OT, PT/OT Met    Description: No goals set 2/2 pt performing at/near baseline.                          Time Tracking:     OT Date of Treatment: 05/08/23  OT Start Time: 0913  OT Stop Time: 0944  OT Total Time (min): 31 min    Billable Minutes:Self Care/Home Management 10  Therapeutic Activity 21    OT/NATHALIE: OT     Number of NATHALIE visits since last OT visit: 0    5/8/2023

## 2023-05-09 NOTE — PROGRESS NOTES
SURGICAL ONCOLOGY    HPI:  Forrest Schmidt is a 78 y.o. 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 Admitted with malaise, constipation, BRBPR and urinary difficulties.  who presented with malaise, constipation, and BRBPR in the setting of known recurrent GIST.  CT from 04/21/23 shows progression of his known liver and pelvic disease. Continues to have bowel function and passing flatus but seems to have impending colonic obstruction based on the relationship of the tumor to the rectum.     Medical hx: HTN, HLD, BPH, GERD, arthritis     Relevant oncologic hx:  02/2018: SBR by Dr. Ahmet Sanchez  07/2019, 12/2020, 08/2020: liver ablation  07/2020: Ex lap, cytoreductive surgery (MD Langston)  11/2022: Started Rogaratinib (held since 4/17 due to constipation and hematochezia)    SUBJ:  No issues overnight. Ostomy viable, not having much ostomy output besides bowel sweat. The ostomy was digitized yesterday and gastrograffin was injected into the proximal limb. Does not seem to be obstructed at the fascia. SP cath working.  Pain controlled.  Moderately distended. Denying nausea/vomiting. Ambulating    OBJ:  Vitals:    05/09/23 0445   BP: (!) 164/79   Pulse: 75   Resp: 18   Temp: 97.8 °F (36.6 °C)      Physical Exam  Vitals and nursing note reviewed.   Constitutional:       General: He is not in acute distress.     Appearance: Normal appearance. He is not ill-appearing, toxic-appearing or diaphoretic.   HENT:      Head: Normocephalic.   Cardiovascular:      Rate and Rhythm: Normal rate and regular rhythm.   Pulmonary:      Effort: Pulmonary effort is normal. No respiratory distress.   Abdominal:      General: There is no distension.      Palpations: Abdomen is soft. There is no mass.      Tenderness: There is no abdominal tenderness. There is no guarding or rebound.      Hernia: No hernia is present.      Comments: Midline incision with dermabond. Ostomy viable with sweat in bag,  no gas. Red rubber catheter draining the proximal limb of the colostomy.  Suprapubic catheter in place.    Abdomen moderately distended  Appropriately tender  Incision c/d/i   Skin:     General: Skin is warm and dry.      Capillary Refill: Capillary refill takes less than 2 seconds.   Neurological:      Mental Status: He is alert and oriented to person, place, and time.     Recent Labs   Lab 05/09/23  0520   WBC 7.99   RBC 3.11*   HGB 8.9*   HCT 26.1*      MCV 84   MCH 28.6   MCHC 34.1       Recent Labs   Lab 05/09/23  0520   *   K 3.9      CO2 24   BUN 16   CREATININE 0.7   MG 1.9        Intake/Output - Last 3 Shifts         05/07 0700 05/08 0659 05/08 0700 05/09 0659 05/09 0700  05/10 0659    P.O.  240     I.V. (mL/kg)  2491.6 (31.1)     Total Intake(mL/kg)  2731.6 (34.1)     Urine (mL/kg/hr) 1325 (0.7) 1700 (0.9)     Stool  0     Total Output 1325 1700     Net -1325 +1031.6            Stool Occurrence  0 x           Imaging:  Abdominal XR:  FINDINGS:  Dilated gas-filled loops of small bowel measure up to 5.5 cm, as measured in the left upper quadrant abdomen.  Gaseous distension of colonic loops.  Relative paucity of air in the distal colon and rectum.  Ileus versus small-bowel obstruction considered.  Attention on follow-up.     Findings in keeping with reported recent history of suprapubic catheter placement and colostomy creation.  Osseous and soft tissue structures without acute change.    A+P:  78 y.o. male w/ recurrent metastatic GIST w/ progression of known liver and pelvic disease. The pelvic mass is causing a partial obstruction of the rectum. Initially admitted to the hospital medicine service. Transferred to the Surgical Oncology service 04/28. 5 Days Post-Op SPT placement and open loop colostomy. Colostomy is still not productive, digitized yesteday.     - NPO, okay for sips/chips  - CT abdomen/pelvis w/ IV and PO contrast ordered  - Continue home meds  - Reglan  - mIVF  -  PT/OT    WALTER Frias MD  General Surgery, PGY-4

## 2023-05-09 NOTE — PROGRESS NOTES
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    Osteopenia        Social History     Socioeconomic History    Marital status:    Tobacco Use    Smoking status: Former     Packs/day: 0.50     Years: 15.00     Pack years: 7.50     Types: Cigarettes     Quit date: 1974     Years since quittin.3    Smokeless tobacco: Never    Tobacco comments:     Quit smoking about 40 yrs ago   Substance and Sexual Activity    Alcohol use: Yes     Alcohol/week: 1.0 standard drink     Types: 1 Glasses of wine per week     Comment: 2-4 glasses wine/day, 17 last drink    Drug use: No    Sexual activity: Yes     Partners: Female     Social Determinants of Health     Financial Resource Strain: Low Risk     Difficulty of Paying Living Expenses: Not hard at all   Food Insecurity: Unknown    Worried About Running Out of Food in the Last Year: Patient refused    Ran Out of Food in the Last Year: Patient refused   Transportation Needs: No Transportation Needs    Lack of Transportation (Medical): No    Lack of Transportation (Non-Medical): No   Physical Activity: Unknown    Days of Exercise per Week: 7 days   Stress: Unknown    Feeling of Stress : Patient refused   Social Connections: Unknown    Frequency of Communication with Friends and Family: Patient refused    Frequency of Social Gatherings with Friends and Family: Patient refused    Attends [employer] Services: Patient refused    Active Member of Clubs or Organizations: Patient refused    Attends Club or Organization Meetings: Patient refused    Marital Status:     Housing Stability: Unknown    Unable to Pay for Housing in the Last Year: No    Unstable Housing in the Last Year: No       Precautions:     Allergies as of 04/24/2023 - Reviewed 04/24/2023   Allergen Reaction Noted    Augmentin [amoxicillin-pot clavulanate] Shortness Of Breath 10/15/2015    Fexofenadine Other (See Comments) 05/30/2018    Singulair [montelukast] Other (See Comments) 05/15/2017    Ace inhibitors Other (See Comments) 06/13/2019    Captopril      Doxycycline Nausea And Vomiting 06/13/2019    Horse/equine containing products Hives 05/15/2017    Hydrocodone Nausea Only 06/13/2019    Scopolamine hbr Other (See Comments) 07/15/2020       WOC Assessment Details/Treatment   Patient seen for wound care consultation.   Reviewed chart for this encounter.   See Flow Sheet for findings.    Pt sitting in chair agreeable to care. WOC Nurse noted an intact ostomy pouch, half full. Emptied ostomy pouch of 325 mL of brown thin liquid. Pt stated he would attempt to empty ostomy pouch throughout the day. WOC Nurse will continue to follow.     RECOMMENDATIONS  Recommendations made to primary team for above plan via secured chat. WOC to follow up Orders placed.     Discussed POC with patient and primary RN.   See EMR for orders & patient education.  Discussed POC with primary team.    Nursing to continue care.  Nursing to maintain pressure injury prevention interventions.  Contact wound care for any further questions.     05/09/23 1130   WOCN Assessment   WOCN Total Time (mins) 30   Visit Date 05/09/23   Visit Time 1130   Consult Type Follow Up   WOCN Speciality Ostomy   Intervention assessed;changed;applied;chart review;coordination of care   Teaching on-going        Colostomy 05/04/23 1914 Loop LUQ   Placement Date/Time: 05/04/23 1914   Present Prior to Hospital Arrival?: No  Inserted by: (c) MD  Colostomy Type: Loop  Location: LUQ   Stomal Appliance 1 piece;Dry;Intact;No Leakage   Stoma Appearance  round;moist;pink;bridge in place;flush w/ skin   Peristomal Assessment JUAN   Stoma Function flatus;stool   Tolerance no signs/symptoms of discomfort   Output (mL) 325 mL         05/09/2023

## 2023-05-09 NOTE — PROGRESS NOTES
"[facility]  Adult Nutrition  Progress Note    SUMMARY       Recommendations    1.) Recommend when medically feasible to ADAT, with goal of low fiber/low residue diet, as tolerated.     2.) If and when medically feasible, recommend Boost Plus TID to help meet EEN/EPN.     3.) If alternative nutrition is medically warranted, recommend initiating TPN with 250g Dextrose and 89g AA + IV lipids to provide 1706 kcal and 89g PRO (GIR 2.2).     4.) RD to monitor wt, nutritional status, labs, skin.      Goals: Meet % een/epn by next Rd f/u  Nutrition Goal Status: progressing towards goal  Communication of RD Recs:  (POC)    Assessment and Plan    Nutrition Problem  Inadequate energy intake      Related to (etiology):   Inadequate oral intake     Signs and Symptoms (as evidenced by):   50% intake of meals      Interventions/Recommendations (treatment strategy):  Collaboration with other providers     Nutrition Diagnosis Status:   Continues    Reason for Assessment    Reason For Assessment: RD follow-up  Diagnosis: gastrointestinal disease (Mechanical gastrointestinal obstruction)  Relevant Medical History: HTN, HLD, GERD  Interdisciplinary Rounds: did not attend  General Information Comments: RD f/u: Pt seen by RD. Pt denies n/v/d. Pt had an ostomy placed on 5/4. Pt stated that they have been NPO, d/t no BM's. Pt states that they are "moderately hungry" and they are now starting to look forward to eating. Pt is at risk for malnutrition, d/t NPO.  Nutrition Discharge Planning: pending medical course    Nutrition Risk Screen    Nutrition Risk Screen: no indicators present    Nutrition/Diet History    Patient Reported Diet/Restrictions/Preferences: other (see comments) (lod phos due to Experimental chemo drug)  Typical Food/Fluid Intake: 3 meals/day  Spiritual, Cultural Beliefs, Latter day Practices, Values that Affect Care: no  Vitamin/Mineral/Herbal Supplements: MVI, D3, B12, fish oil, L arginine, magnesium, eye " "vitamins  Food Allergies: NKFA    Anthropometrics    Temp: 98.5 °F (36.9 °C)  Height Method: Stated  Height: 5' 11" (180.3 cm)  Height (inches): 71 in  Weight Method: Bed Scale  Weight: 80.2 kg (176 lb 12.9 oz)  Weight (lb): 176.81 lb  Ideal Body Weight (IBW), Male: 172 lb  % Ideal Body Weight, Male (lb): 103.49 %  BMI (Calculated): 24.7  BMI Grade: 18.5-24.9 - normal     Lab/Procedures/Meds    Pertinent Labs Reviewed: reviewed  Pertinent Labs Comments: Na: 135, Ca: 8.3, phos: 2.4, alk phos; 376, PRO: 5.7, alb: 1.8, AST: 75, ALT: 73  Pertinent Medications Reviewed: reviewed  Pertinent Medications Comments: arginine, atorvastatin, bisacodyl, VitB12, famotidine, metoprolol, omega3, nifedipine, potassium- sodium phosphate, VitD, D5/NaCl    Estimated/Assessed Needs    Weight Used For Calorie Calculations: 80.2 kg (176 lb 12.9 oz)  Energy Calorie Requirements (kcal): 1604- 1845 kcal (20-23 kcal/kg)  Energy Need Method: Kcal/kg  Protein Requirements: 80-89g (1.0-1.1g/kg)  Weight Used For Protein Calculations: 80.2 kg (176 lb 12.9 oz)  Fluid Requirements (mL): 1ml/1kcal or per MD  Estimated Fluid Requirement Method: RDA Method  RDA Method (mL): 1604     Nutrition Prescription Ordered    Current Diet Order: NPO    Evaluation of Received Nutrient/Fluid Intake    I/O: -3.6L since 5/4  Energy Calories Required: not meeting needs  Protein Required: not meeting needs  Fluid Required:  (as per MD)  Comments: LBM 5/4 (pre- ostomy)  Tolerance: tolerating  % Intake of Estimated Energy Needs: 0 - 25 %  % Meal Intake: NPO    Nutrition Risk    Level of Risk/Frequency of Follow-up:  (RD to f/u x1/week)     Monitor and Evaluation    Food and Nutrient Intake: energy intake, food and beverage intake  Food and Nutrient Adminstration: diet order  Knowledge/Beliefs/Attitudes: food and nutrition knowledge/skill, beliefs and attitudes  Physical Activity and Function: nutrition-related ADLs and IADLs  Anthropometric Measurements: height/length, " weight, weight change, body mass index  Biochemical Data, Medical Tests and Procedures: gastrointestinal profile, electrolyte and renal panel, lipid profile, inflammatory profile, glucose/endocrine profile  Nutrition-Focused Physical Findings: overall appearance     Nutrition Follow-Up    RD Follow-up?: Yes

## 2023-05-09 NOTE — PLAN OF CARE
Recommendations     1.) Recommend when medically feasible to ADAT, with goal of low fiber/low residue diet, as tolerated.      2.) If and when medically feasible, recommend Boost Plus TID to help meet EEN/EPN.      3.) If alternative nutrition is medically warranted, recommend initiating TPN with 250g Dextrose and 89g AA + IV lipids to provide 1706 kcal and 89g PRO (GIR 2.2).      4.) RD to monitor wt, nutritional status, labs, skin.        Goals: Meet % een/epn by next Rd f/u  Nutrition Goal Status: progressing towards goal  Communication of RD Recs:  (POC)

## 2023-05-09 NOTE — NURSING
END OF SHIFT NOTE  Pt rested well throughout shift, no distress at this time, no complaints, VSS, suprapubic cat remain WNL, no output from ostomy, no episode of urine incontinence; wife remain at bedside, bed in lowest position, side rails up x2. Bed alarm set, personal items within reach. Call light within reach,       Kelin Benedict, KEVINN RN

## 2023-05-10 NOTE — PLAN OF CARE
05/10/23 1252   Post-Acute Status   Post-Acute Authorization Home Health   Home Health Status Referrals Sent   Patient choice form signed by patient/caregiver List from System Post-Acute Care   Discharge Delays (!) Change in Medical Condition   Discharge Plan   Discharge Plan A Home Health   Discharge Plan B Home Health     [facility] in [facility], Pt notified SW he would like [facility] again. A referral has been sent, along with HH orders.     Pt not yet medically ready for discharge, HH orders may have to be updated.     2:28 PM  [facility] has accepted the patient.     ENDY Cabrera, LMSW [facility]  [id number]

## 2023-05-10 NOTE — PROGRESS NOTES
SURGICAL ONCOLOGY    HPI:  Forrest Schmidt is a 78 y.o. 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 Admitted with malaise, constipation, BRBPR and urinary difficulties.  who presented with malaise, constipation, and BRBPR in the setting of known recurrent GIST.  CT from 04/21/23 shows progression of his known liver and pelvic disease. Continues to have bowel function and passing flatus but seems to have impending colonic obstruction based on the relationship of the tumor to the rectum.     Medical hx: HTN, HLD, BPH, GERD, arthritis     Relevant oncologic hx:  02/2018: SBR by Dr. Ahmet Sanchez  07/2019, 12/2020, 08/2020: liver ablation  07/2020: Ex lap, cytoreductive surgery (MD Langston)  11/2022: Started Rogaratinib (held since 4/17 due to constipation and hematochezia)    SUBJ:  NAOE, VSS. Ostomy viable, with some brown liquid stool. No n/v. Pain managed.    OBJ:  Vitals:    05/10/23 0824   BP: (!) 183/81   Pulse: 69   Resp: 20   Temp: 98.2 °F (36.8 °C)      Physical Exam  Vitals and nursing note reviewed.   Constitutional:       General: He is not in acute distress.     Appearance: Normal appearance. He is not ill-appearing, toxic-appearing or diaphoretic.   HENT:      Head: Normocephalic.   Cardiovascular:      Rate and Rhythm: Normal rate and regular rhythm.   Pulmonary:      Effort: Pulmonary effort is normal. No respiratory distress.   Abdominal:      General: There is no distension.      Palpations: Abdomen is soft. There is no mass.      Tenderness: There is no abdominal tenderness. There is no guarding or rebound.      Hernia: No hernia is present.      Comments: Midline incision with dermabond. Ostomy viable with brown thicker liquid in bag, no gas. Red rubber catheter draining the proximal limb of the colostomy.  Suprapubic catheter in place.    Abdomen with less distension than prev exams  Appropriately tender  Incision c/d/i   Skin:     General: Skin is warm  and dry.      Capillary Refill: Capillary refill takes less than 2 seconds.   Neurological:      Mental Status: He is alert and oriented to person, place, and time.     Recent Labs   Lab 05/10/23  0618   WBC 7.58   RBC 3.12*   HGB 8.4*   HCT 27.7*      MCV 89   MCH 26.9*   MCHC 30.3*       Recent Labs   Lab 05/10/23  0618   *   K 4.3      CO2 23   BUN 15   CREATININE 0.7   MG 2.0        Intake/Output - Last 3 Shifts         05/08 0700 05/09 0659 05/09 0700  05/10 0659 05/10 0700  05/11 0659    P.O. 240      I.V. (mL/kg) 2491.6 (31.1) 900 (11.2)     Total Intake(mL/kg) 2731.6 (34.1) 900 (11.2)     Urine (mL/kg/hr) 1700 (0.9) 1500 (0.8)     Stool 100 675     Total Output 1800 2175     Net +931.6 -1275            Stool Occurrence 0 x 1 x           Imaging:  CT 5/09: no large bowel distension; dilated small bowel loops. Likely ileus    A+P:  78 y.o. male w/ recurrent metastatic GIST w/ progression of known liver and pelvic disease. The pelvic mass is causing a partial obstruction of the rectum. Initially admitted to the hospital medicine service. Transferred to the Surgical Oncology service 04/28. 6 Days Post-Op SPT placement and open loop colostomy. Colostomy productive, digitized 5/08.    - CLD. Will advance to fulls later today if continues to do well with plans for regular diet tomorrow  - Ostomy nurse to revisit  - Will dc the red rubber and baldwin in the stoma  - Continue home meds  - Reglan  - mIVF  - PT/OT    Dispo: discharge within the next 2-3 days if continuing to do well    JACKIE Bentley MD  General Surgery, PGY1  [phone]

## 2023-05-10 NOTE — PROGRESS NOTES
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    Osteopenia        Social History     Socioeconomic History    Marital status:    Tobacco Use    Smoking status: Former     Packs/day: 0.50     Years: 15.00     Pack years: 7.50     Types: Cigarettes     Quit date: 1974     Years since quittin.3    Smokeless tobacco: Never    Tobacco comments:     Quit smoking about 40 yrs ago   Substance and Sexual Activity    Alcohol use: Yes     Alcohol/week: 1.0 standard drink     Types: 1 Glasses of wine per week     Comment: 2-4 glasses wine/day, 17 last drink    Drug use: No    Sexual activity: Yes     Partners: Female     Social Determinants of Health     Financial Resource Strain: Low Risk     Difficulty of Paying Living Expenses: Not hard at all   Food Insecurity: Unknown    Worried About Running Out of Food in the Last Year: Patient refused    Ran Out of Food in the Last Year: Patient refused   Transportation Needs: No Transportation Needs    Lack of Transportation (Medical): No    Lack of Transportation (Non-Medical): No   Physical Activity: Unknown    Days of Exercise per Week: 7 days   Stress: Unknown    Feeling of Stress : Patient refused   Social Connections: Unknown    Frequency of Communication with Friends and Family: Patient refused    Frequency of Social Gatherings with Friends and Family: Patient refused    Attends Jainism Services: Patient refused    Active Member of Clubs or Organizations: Patient refused    Attends Club or Organization Meetings: Patient refused    Marital Status:     Housing Stability: Unknown    Unable to Pay for Housing in the Last Year: No    Unstable Housing in the Last Year: No       Precautions:     Allergies as of 04/24/2023 - Reviewed 04/24/2023   Allergen Reaction Noted    Augmentin [amoxicillin-pot clavulanate] Shortness Of Breath 10/15/2015    Fexofenadine Other (See Comments) 05/30/2018    Singulair [montelukast] Other (See Comments) 05/15/2017    Ace inhibitors Other (See Comments) 06/13/2019    Captopril      Doxycycline Nausea And Vomiting 06/13/2019    Horse/equine containing products Hives 05/15/2017    Hydrocodone Nausea Only 06/13/2019    Scopolamine hbr Other (See Comments) 07/15/2020       WOC Assessment Details/Treatment   Patient seen for wound care consultation.   Reviewed chart for this encounter.   See Flow Sheet for findings.    Pt sitting in chair with wife at bedside. WOC Nurse unveiled ostomy pouch to reveal a bright pink, moist stoma. Cleansed peristomal area with water soaked-gauze. Applied Cavilon and cut  flat pouch to 38 mm. Applied Brava ring to pouch then applied pouch with no s/sx of discomfort. Ostomy lesson initiated and supplies given. Intact blanchable, red skin noted to perineum, ordered new bed to offload pressure. Pt and wife educated and encouraged to empty the ostomy bag prior to next visit.     RECOMMENDATIONS  Recommendations made to primary team for above plan via secured chat. WOC to follow up Orders placed.     Discussed POC with patient and primary RN.   See EMR for orders & patient education.  Discussed POC with primary team.    Nursing to continue care.  Nursing to maintain pressure injury prevention interventions.  Contact wound care for any further questions.       05/10/23 1630   WOCN Assessment   WOCN Total Time (mins) 30   Visit Date 05/10/23   Visit Time 1630   Consult Type Follow Up   WOCN Speciality Ostomy   WOCN List colostomy   Ostomy Type Colostomy   Intervention assessed;changed;applied;chart  review;coordination of care;orders   Teaching on-going        Colostomy 05/04/23 1914 Loop LUQ   Placement Date/Time: 05/04/23 1914   Present Prior to Hospital Arrival?: No  Inserted by: (c) MD  Colostomy Type: Loop  Location: LUQ   Stomal Appliance 1 piece;Dry;Intact;No Leakage;Changed   Stoma Appearance irregular;moist;pink;protruding above skin level   Stoma Size (in) 38 mm   Peristomal Assessment Clean;Intact;Blanchable erythema   Accessories/Skin Care cleansed w/ water;ostomy deodorant;barrier substance over peristomal skin;skin barrier ring   Stoma Function stool;thick liquid   Treatment Bag change   Tolerance no signs/symptoms of discomfort   Output (mL) 150 mL       05/10/2023

## 2023-05-10 NOTE — PLAN OF CARE
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]  Yes     Chronic    GERD (gastroesophageal reflux disease) [K21.9]  Unknown    Pelvic mass [R19.00]  Yes    Abdominal pain [R10.9]  Yes    Chest pain [R07.9]  Yes    GIST (gastrointestinal stroma tumor), malignant, colon [C49.A4]  Yes    Mechanical gastrointestinal obstruction [K56.609]  Yes    Leukocytosis [D72.829]  Unknown    Hyponatremia [E87.1]  Unknown    Lethargy [R53.83]  Yes    Stage 3 chronic kidney disease [N18.30]  Yes     Chronic    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       Future Appointments   Date Time Provider Department Center   5/18/2023 10:30 AM Nimco Delvalle NP [facility]   6/16/2023  2:20 PM Kalani Dickson MD [facility] UROLOGY Rafael raoul   9/25/2023  2:00 PM Tera Upton Jr., MD [facility] CARDIO [facility]           I have seen and examined this patient face to face today. My clinical findings that support the need for the home health skilled services and home bound status are the following:  Weakness/numbness causing balance and gait disturbance due to Surgery making it taxing to leave home.    Allergies:  Review of patient's allergies indicates:   Allergen Reactions  "   Augmentin [amoxicillin-pot clavulanate] Shortness Of Breath     disoriented    Fexofenadine Other (See Comments)     Pt states he can't remember the details but it was a bad effect.    Singulair [montelukast] Other (See Comments)     Pt "felt strange"  Pt "felt strange" bloating, Intestinal irritation, abd cramping      Ace inhibitors Other (See Comments)     cough    Captopril     Doxycycline Nausea And Vomiting     headache    Horse/equine containing products Hives    Hydrocodone Nausea Only    Scopolamine hbr Other (See Comments)     Nausea, headache, changes in BP        Diet: Low fiber; low residue diet    Activities: activity as tolerated    Nursing:   SN to complete comprehensive assessment including routine vital signs. Instruct on disease process and s/s of complications to report to MD. Review/verify medication list sent home with the patient at time of discharge  and instruct patient/caregiver as needed. Frequency may be adjusted depending on start of care date.    Notify MD if SBP > 160 or < 90; DBP > 90 or < 50; HR > 120 or < 50; Temp > 101    CONSULTS:    Physical Therapy to evaluate and treat. Evaluate for home safety and equipment needs; Establish/upgrade home exercise program. Perform / instruct on therapeutic exercises, gait training, transfer training, and Range of Motion.  Occupational Therapy to evaluate and treat. Evaluate home environment for safety and equipment needs. Perform/Instruct on transfers, ADL training, ROM, and therapeutic exercises.      MISCELLANEOUS CARE:  Colostomy Care:  Instruct patient/caregiver to empty bag when full and PRN., Change and clean site every 48 hours, and Monitor skin integrity.  Dove Care: Instruct patient/caregiver to empty Dove bag.  Dr. Dickson for follow up for suprapubic catheter.     Medications: Review discharge medications with patient and family and provide education.      Current Discharge Medication List        START taking these medications    " Details   enoxaparin (LOVENOX) 40 mg/0.4 mL Syrg Inject 0.4 mLs (40 mg total) into the skin once daily.  Qty: 11.2 mL, Refills: 0      traMADoL (ULTRAM) 50 mg tablet Take 1 tablet (50 mg total) by mouth every 4 (four) hours as needed for Pain.  Qty: 10 tablet, Refills: 0    Comments: Quantity prescribed more than 7 day supply? No           CONTINUE these medications which have NOT CHANGED    Details   acetaminophen (TYLENOL ORAL) Take 500 mg by mouth 2 (two) times a day.      ammonium lactate (LAC-HYDRIN) 12 % lotion Apply topically 2 (two) times daily.      aspirin (ECOTRIN) 81 MG EC tablet Take 81 mg by mouth once daily.      atorvastatin (LIPITOR) 40 MG tablet Take 1 tablet (40 mg total) by mouth once daily.  Qty: 90 tablet, Refills: 3    Associated Diagnoses: Hypercholesteremia      bisacodyL (DULCOLAX) 5 mg EC tablet Take 1 tablet (5 mg total) by mouth 2 (two) times daily.  Qty: 14 tablet, Refills: 0      calcium carbonate (TUMS) 200 mg calcium (500 mg) chewable tablet Take 500 mg by mouth 3 (three) times daily as needed for Heartburn.      cetirizine (ZYRTEC) 5 MG tablet Take 5 mg by mouth once daily.      cholecalciferol, vitamin D3, (VITAMIN D3) 50 mcg (2,000 unit) Cap Take 1 capsule by mouth once daily. morning      cyanocobalamin (VITAMIN B-12) 1000 MCG tablet Take 1,000 mcg by mouth once daily. morning      famotidine (PEPCID) 20 MG tablet Take 20 mg by mouth 2 (two) times daily.      fish oil-omega-3 fatty acids 300-1,000 mg capsule Take 1 g by mouth once daily. morning      fluticasone (FLONASE) 50 mcg/actuation nasal spray 1 spray by Each Nare route 2 (two) times daily.      guaiFENesin 1,200 mg Ta12 Take 1 tablet by mouth 2 (two) times daily as needed (cold symptoms).      hydrocortisone 2.5 % cream Apply topically 2 (two) times daily.  Qty: 20 g, Refills: 2    Associated Diagnoses: Hemorrhoids, unspecified hemorrhoid type      losartan (COZAAR) 100 MG tablet Take 1 tablet (100 mg total) by mouth once  daily.  Qty: 90 tablet, Refills: 3    Comments: .      magnesium 250 mg Tab Take 1 tablet by mouth once daily. morning      metoprolol succinate (TOPROL-XL) 50 MG 24 hr tablet Take 50 mg by mouth once daily.      multivitamin (THERAGRAN) per tablet Take 1 tablet by mouth once daily. morning      NIFEdipine (PROCARDIA-XL) 30 MG (OSM) 24 hr tablet Take 30 mg by mouth once daily.      nitroGLYCERIN (NITROSTAT) 0.4 MG SL tablet Place 1 tablet (0.4 mg total) under the tongue every 5 (five) minutes as needed for Chest pain.  Qty: 20 tablet, Refills: 3      ondansetron (ZOFRAN-ODT) 8 MG TbDL Take 1 tablet (8 mg total) by mouth every 6 (six) hours as needed (nausea).  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      testosterone cypionate (DEPOTESTOTERONE CYPIONATE) 200 mg/mL injection Inject 200 mg into the muscle every 14 (fourteen) days.       vit A/vit C/vit E/zinc/copper (ICAPS AREDS ORAL) Take 1 tablet by mouth 2 (two) times a day.           STOP taking these medications       arginine 500 mg tablet Comments:   Reason for Stopping:         arginine HCl, L-arginine, 1,000 mg Tab Comments:   Reason for Stopping:               I certify that this patient is confined to his home and needs intermittent skilled nursing care, physical therapy, and occupational therapy.      Electronically Signed  _________________________________  Loren Loza, MARISOL  05/12/2023

## 2023-05-10 NOTE — NURSING
END OF SHIFT NOTE  Pt rested well throughout shift, no distress at this time, no complaints, VSS, suprapubic cat remain WNL, ostomy now with output, see flowsheet for output. , no episode of urine incontinence;  bed in lowest position, side rails up x2. Bed alarm set, personal items within reach. Call light within reach,         Kelin Benedict, KEVINN RN

## 2023-05-10 NOTE — NURSING
[facility] Plan of Care Note  Dx Final diagnoses:  [R10.9] Abdominal pain, unspecified abdominal location  [R19.00] Pelvic mass     Shift Events Ostomy changed, walked the hallway once down and back - tolerated well, up to the chair most of the day. Ostomy with good output.     Goals of Care: Pain control, ambulate, ostomy output    Neuro: 4    Vital Signs: VSS    Respiratory: RA    Diet: full liquid diet    Is patient tolerating current diet? yes    GTTS: NS w/ KCL@ 75    Urine Output/Bowel Movement: Suprapubic cath, ostomy    Drains/Tubes/Tube Feeds (include total output/shift): 1000 out suprapubic, 30 ostomy    Lines: L FA 20g      Accuchecks:NA    Skin: intact    Fall Risk Score:   Fall Risk Assessment 5/10/2023   History Of Fall (W/I 3 Mos) 0-->No   Polypharmacy 3-->Yes   Central Nervous System/Psychotropic Medication 3-->Yes   Cardiovascular Medication 3-->Yes   Age Greater Than 65 Years 2-->Yes   Altered Elimination 0-->No   Cognitive Deficit 0-->No   Sensory Deficit 0-->No   Dizziness/Vertigo 0-->No   Depression 0-->No   Mobility Deficit/Weakness 2-->Yes   Male 1-->Yes   Fall Risk Score 14        Activity level? SBA w/ walker    Any scheduled procedures? no    Any safety concerns? no    Other: na

## 2023-05-11 NOTE — NURSING
[facility] Plan of Care Note  Dx Final diagnoses:  [R10.9] Abdominal pain, unspecified abdominal location  [R19.00] Pelvic mass      Shift Events Ostomy changed, walked the hallway 4 times - tolerated well, up to the chair most of the day. Ostomy with good output.      Goals of Care: Pain control, ambulate, ostomy output     Neuro: 4     Vital Signs: VSS     Respiratory: RA     Diet: Reg     Is patient tolerating current diet? yes     GTTS: NA     Urine Output/Bowel Movement: Suprapubic cath, ostomy     Drains/Tubes/Tube Feeds (include total output/shift): 700 out suprapubic, 200 ostomy     Lines: L FA 20g       Accuchecks:NA     Skin: intact     Fall Risk Score:   Fall Risk Assessment 5/11/2023   History Of Fall (W/I 3 Mos) 0-->No   Polypharmacy 3-->Yes   Central Nervous System/Psychotropic Medication 3-->Yes   Cardiovascular Medication 3-->Yes   Age Greater Than 65 Years 2-->Yes   Altered Elimination 0-->No   Cognitive Deficit 0-->No   Sensory Deficit 0-->No   Dizziness/Vertigo 0-->No   Depression 0-->No   Mobility Deficit/Weakness 2-->Yes   Male 1-->Yes   Fall Risk Score 14       Activity level? SBA w/ walker     Any scheduled procedures? no     Any safety concerns? no     Other: na

## 2023-05-11 NOTE — PT/OT/SLP PROGRESS
Occupational Therapy   Treatment    Name: Forrest Schmidt  MRN: [id number]  Admitting Diagnosis:  Mechanical gastrointestinal obstruction  7 Days Post-Op    Recommendations:     Discharge Recommendations: home  Discharge Equipment Recommendations:  none  Barriers to discharge:  None    Assessment:     Forrest Schmidt is a 78 y.o. male with a medical diagnosis of Mechanical gastrointestinal obstruction. Pt walked 375' with (SBA) / RW. Performance deficits affecting function are weakness, impaired endurance, impaired balance, impaired self care skills, impaired functional mobility, gait instability.     Rehab Prognosis:  Good; patient would benefit from acute skilled OT services to address these deficits and reach maximum level of function.       Plan:     Patient to be seen 3 x/week to address the above listed problems via self-care/home management, therapeutic activities, therapeutic exercises  Plan of Care Expires: 05/27/23  Plan of Care Reviewed with: patient, spouse    Subjective     Pain/Comfort:  Pain Rating 1: 0/10    Objective:     Communicated with: rn prior to session.  Patient found up in chair with peripheral IV upon OT entry to room.    General Precautions: Standard, fall    Orthopedic Precautions:N/A  Braces: N/A  Respiratory Status: Room air     Occupational Performance:     Bed Mobility:    Up in chair.    Functional Mobility/Transfers:  Patient completed Sit <> Stand Transfer with supervision  with  rolling walker   Functional Mobility: Pt walked 375' with (SBA) / RW.   Activities of Daily Living:  Grooming: supervision standing at the sink.  Upper Body Dressing: minimum assistance    [facility] 6 Click ADL: 21    Treatment & Education:  From chair level, pt completed BUE/LE therex (x 15 reps each) including:  - lat rows  - bicep curls  - straight arm raises  - Hor Abd/Add  - knee flexion/extension  - ankle pumps  - seated marching      Patient left up in chair with all lines intact and call button in  reach    GOALS:   Multidisciplinary Problems       Occupational Therapy Goals          Problem: Occupational Therapy    Goal Priority Disciplines Outcome Interventions   Occupational Therapy Goal     OT, PT/OT Ongoing, Progressing    Description: Goals to be met by: 5/27     Patient will increase functional independence with ADLs by performing:    UE Dressing with Supervision.  LE Dressing with Supervision.  Grooming while standing at sink with Set-up Assistance. MET 5/11.  Toileting from toilet with Supervision for hygiene and clothing management.                    Multidisciplinary Problems (Resolved)          Problem: Occupational Therapy    Goal Priority Disciplines Outcome Interventions   Occupational Therapy Goal   (Resolved)     OT, PT/OT Met    Description: No goals set 2/2 pt performing at/near baseline.                          Time Tracking:     OT Date of Treatment: 05/11/23  OT Start Time: 1235  OT Stop Time: 1258  OT Total Time (min): 23 min    Billable Minutes:Self Care/Home Management 10  Therapeutic Activity 13    OT/NATHALIE: OT     Number of NATHALIE visits since last OT visit: 0    5/11/2023

## 2023-05-11 NOTE — NURSING
END OF SHIFT NOTE  Pt rested well throughout shift, no distress at this time, no complaints, VSS, suprapubic cat remain WNL, ostomy output WNL, see flowsheet for output. , n bed in lowest position, side rails up x2. Bed alarm set, personal items within reach. Call light within reach,         KEVIN ManleyN RN

## 2023-05-11 NOTE — PROGRESS NOTES
[facility]  Wound Care    Patient Name:  Forrest Schmidt   MRN:  [id number]  Date: 2023  Diagnosis: Mechanical gastrointestinal obstruction    History:     Past Medical History:   Diagnosis Date    Anticoagulant long-term use     Arthritis     BPH (benign prostatic hyperplasia)     Coronary artery disease     stent x1     Hypercholesteremia     Hypertension     Low iron     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 2/15/2018    Osteopenia        Social History     Socioeconomic History    Marital status:    Tobacco Use    Smoking status: Former     Packs/day: 0.50     Years: 15.00     Pack years: 7.50     Types: Cigarettes     Quit date: 1974     Years since quittin.3    Smokeless tobacco: Never    Tobacco comments:     Quit smoking about 40 yrs ago   Substance and Sexual Activity    Alcohol use: Yes     Alcohol/week: 1.0 standard drink     Types: 1 Glasses of wine per week     Comment: 2-4 glasses wine/day, 17 last drink    Drug use: No    Sexual activity: Yes     Partners: Female     Social Determinants of Health     Financial Resource Strain: Low Risk     Difficulty of Paying Living Expenses: Not hard at all   Food Insecurity: Unknown    Worried About Running Out of Food in the Last Year: Patient refused    Ran Out of Food in the Last Year: Patient refused   Transportation Needs: No Transportation Needs    Lack of Transportation (Medical): No    Lack of Transportation (Non-Medical): No   Physical Activity: Unknown    Days of Exercise per Week: 7 days   Stress: Unknown    Feeling of Stress : Patient refused   Social Connections: Unknown    Frequency of Communication with Friends and Family: Patient refused    Frequency of Social Gatherings with Friends and Family: Patient refused    Attends Taoist Services: Patient refused    Active Member of Clubs or Organizations: Patient refused    Attends Club or Organization Meetings: Patient refused    Marital Status:     Housing Stability: Unknown    Unable to Pay for Housing in the Last Year: No    Unstable Housing in the Last Year: No       Precautions:     Allergies as of 04/24/2023 - Reviewed 04/24/2023   Allergen Reaction Noted    Augmentin [amoxicillin-pot clavulanate] Shortness Of Breath 10/15/2015    Fexofenadine Other (See Comments) 05/30/2018    Singulair [montelukast] Other (See Comments) 05/15/2017    Ace inhibitors Other (See Comments) 06/13/2019    Captopril      Doxycycline Nausea And Vomiting 06/13/2019    Horse/equine containing products Hives 05/15/2017    Hydrocodone Nausea Only 06/13/2019    Scopolamine hbr Other (See Comments) 07/15/2020       WOC Assessment Details/Treatment   Patient seen for wound care consultation.   Reviewed chart for this encounter.   See Flow Sheet for findings.    Pt sitting in chair with wife at the bedside. Pt stated to the WOC Nurse that he emptied his own ostomy pouch. WOC Nurse brought supplies to the pt. Pt denied needs at this time. WOC Nurse to FU within the week.     RECOMMENDATIONS  Recommendations made to primary team for above plan via secured chat. WOC to follow up Orders placed.     Discussed POC with patient and primary RN.   See EMR for orders & patient education.  Discussed POC with primary team.    Nursing to continue care.  Nursing to maintain pressure injury prevention interventions.  Contact wound care for any further questions.      05/11/2023

## 2023-05-11 NOTE — PT/OT/SLP PROGRESS
Physical Therapy Treatment    Patient Name:  Forrest Schmidt   MRN:  [id number]    Recommendations:     Discharge Recommendations: home  Discharge Equipment Recommendations: none  Barriers to discharge: None    Assessment:     Forrest Schmidt is a 78 y.o. male admitted with a medical diagnosis of Mechanical gastrointestinal obstruction.  He presents with the following impairments/functional limitations: impaired endurance Pt. cooperative and tolerated treatment well. Pt. progressing with mobility.    Rehab Prognosis: Good; patient would benefit from acute skilled PT services to address these deficits and reach maximum level of function.    Recent Surgery: Procedure(s) (LRB):  CREATION, COLOSTOMY, LAPAROSCOPIC - CONVERTED TO OPEN (N/A)  INSERTION, OPEN SUPRAPUBIC CATHETER (N/A)  CYSTOSCOPY (N/A) 7 Days Post-Op    Plan:     During this hospitalization, patient to be seen 2 x/week to address the identified rehab impairments via gait training, therapeutic activities, therapeutic exercises and progress toward the following goals:    Plan of Care Expires:  06/04/23    Subjective     Chief Complaint: no new c/o  Patient/Family Comments/goals: pt. Agreeable to PT  Pain/Comfort:  Pain Rating 1: 0/10      Objective:     Communicated with nursing prior to session.  Patient found up in chair with peripheral IV upon PT entry to room.     General Precautions: Standard, fall  Orthopedic Precautions: N/A  Braces: N/A  Respiratory Status: Room air     Functional Mobility:  Transfers:     Sit to Stand:  stand by assistance with rolling walker  Bed to Chair: stand by assistance with  rolling walker  using  Stand Pivot  Gait: 400' with RW and SBA with slow, steady gait without LOB.  Balance: fair+  Stairs:  Pt ascended/descended 10 stair(s) with No Assistive Device with bilateral handrails with Stand-by Assistance.       AM-PAC 6 CLICK MOBILITY  Turning over in bed (including adjusting bedclothes, sheets and blankets)?: 3  Sitting down on and  standing up from a chair with arms (e.g., wheelchair, bedside commode, etc.): 3  Moving from lying on back to sitting on the side of the bed?: 3  Moving to and from a bed to a chair (including a wheelchair)?: 3  Need to walk in hospital room?: 3  Climbing 3-5 steps with a railing?: 3  Basic Mobility Total Score: 18       Treatment & Education:  Discussed pt.'s progress, goals, and POC.    Patient left up in chair with all lines intact and call button in reach..    GOALS:   Multidisciplinary Problems       Physical Therapy Goals          Problem: Physical Therapy    Goal Priority Disciplines Outcome Goal Variances Interventions   Physical Therapy Goal     PT, PT/OT Ongoing, Progressing     Description: Goals to be met by: 2023     Patient will increase functional independence with mobility by performin. Supine to sit with Set-up Doyle   2. Sit to supine with Set-up Doyle  3. Sit to stand transfer with Supervision Met   4. Bed to chair transfer with Supervision using LRAD Met   5. Gait  x 200 feet with Supervision using LRAD. Met   6. Lower extremity exercise program x15 reps per handout, with supervision                   Multidisciplinary Problems (Resolved)          Problem: Physical Therapy    Goal Priority Disciplines Outcome Goal Variances Interventions   Physical Therapy Goal   (Resolved)     PT, PT/OT Met                         Time Tracking:     PT Received On: 23  PT Start Time: 859     PT Stop Time: 914  PT Total Time (min): 15 min     Billable Minutes: Gait Training 15    Treatment Type: Treatment  PT/PTA: PT           2023

## 2023-05-11 NOTE — PROGRESS NOTES
SURGICAL ONCOLOGY    HPI:  Forrest Schmidt is a 78 y.o. 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 Admitted with malaise, constipation, BRBPR and urinary difficulties.  who presented with malaise, constipation, and BRBPR in the setting of known recurrent GIST.  CT from 04/21/23 shows progression of his known liver and pelvic disease. Continues to have bowel function and passing flatus but seems to have impending colonic obstruction based on the relationship of the tumor to the rectum.     Medical hx: HTN, HLD, BPH, GERD, arthritis     Relevant oncologic hx:  02/2018: SBR by Dr. Ahmet Sanchez  07/2019, 12/2020, 08/2020: liver ablation  07/2020: Ex lap, cytoreductive surgery (MD Langston)  11/2022: Started Rogaratinib (held since 4/17 due to constipation and hematochezia)    SUBJ:  NAOE, VSS. Ostomy viable, with some brown stool. Becoming more formed. No n/v. Pain managed.    OBJ:  Vitals:    05/11/23 0728   BP: (!) 152/72   Pulse: 70   Resp: 18   Temp: 98.5 °F (36.9 °C)      Physical Exam  Vitals and nursing note reviewed.   Constitutional:       General: He is not in acute distress.     Appearance: Normal appearance. He is not ill-appearing, toxic-appearing or diaphoretic.   HENT:      Head: Normocephalic.   Cardiovascular:      Rate and Rhythm: Normal rate and regular rhythm.   Pulmonary:      Effort: Pulmonary effort is normal. No respiratory distress.   Abdominal:      General: There is no distension.      Palpations: Abdomen is soft. There is no mass.      Tenderness: There is no abdominal tenderness. There is no guarding or rebound.      Hernia: No hernia is present.      Comments: Midline incision with dermabond. Ostomy viable with brown thicker liquid in bag, no gas. Red rubber catheter draining the proximal limb of the colostomy.  Suprapubic catheter in place.    Abdomen with less distension than prev exams  Appropriately tender  Incision c/d/i   Skin:      General: Skin is warm and dry.      Capillary Refill: Capillary refill takes less than 2 seconds.   Neurological:      Mental Status: He is alert and oriented to person, place, and time.     Recent Labs   Lab 05/11/23  0218   WBC 7.56   RBC 2.97*   HGB 8.3*   HCT 25.2*      MCV 85   MCH 27.9   MCHC 32.9       Recent Labs   Lab 05/11/23  0218   *   K 4.3      CO2 25   BUN 13   CREATININE 0.7   MG 1.7        Intake/Output - Last 3 Shifts         05/09 0700  05/10 0659 05/10 0700 05/11 0659 05/11 0700  05/12 0659    P.O.       I.V. (mL/kg) 900 (11.2) 1843.6 (23)     Total Intake(mL/kg) 900 (11.2) 1843.6 (23)     Urine (mL/kg/hr) 1500 (0.8) 1900 (1)     Stool 675 580     Total Output 2175 2480     Net -1275 -636.4            Stool Occurrence 1 x 1 x           Imaging:  CT 5/09: no large bowel distension; dilated small bowel loops. Likely ileus    A+P:  78 y.o. male w/ recurrent metastatic GIST w/ progression of known liver and pelvic disease. The pelvic mass is causing a partial obstruction of the rectum. Initially admitted to the hospital medicine service. Transferred to the Surgical Oncology service 04/28. 7 Days Post-Op SPT placement and open loop colostomy. Colostomy productive, digitized 5/08.    - Advance to regular diet  - Discontinue IVF  - Continue home meds  - Reglan  - PT/OT    Dispo: plan to d/c tomorrow    Kierra Fowler MD  General Surgery PGY4

## 2023-05-12 NOTE — HOSPITAL COURSE
Pt admitted with obstructing pelvic metastasis from GIST tumor.  He underwent diverting colostomy and SP tube placement on 5/4.  He had slow return of bowel function, but otherwise had no issues postoperatively.  He is discharged on 5/12 ambulating and having bowel function.

## 2023-05-12 NOTE — DISCHARGE SUMMARY
[facility]  General Surgery  Discharge Summary      Patient Name: Forrest Schmidt  MRN: [id number]  Admission Date: 4/24/2023  Hospital Length of Stay: 18 days  Discharge Date and Time:  05/12/2023 7:41 AM  Attending Physician: Surinder Shaw MD   Discharging Provider: Amilcar Lim MD  Primary Care Provider: Dez Jimenez MD    HPI:   Forrest Schmidt is a 78 y.o. 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 Admitted with malaise, constipation, BRBPR and urinary difficulties.  who presented with malaise, constipation, and BRBPR in the setting of known recurrent GIST.  CT from 04/21/23 shows progression of his known liver and pelvic disease. Continues to have bowel function and passing flatus but seems to have impending colonic obstruction based on the relationship of the tumor to the rectum.      Medical hx: HTN, HLD, BPH, GERD, arthritis      Relevant oncologic hx:  02/2018: SBR by Dr. Ahmet Sanchez  07/2019, 12/2020, 08/2020: liver ablation  07/2020: Ex lap, cytoreductive surgery (MD Langston)  11/2022: Started Rogaratinib (held since 4/17 due to constipation and hematochezia)    Procedure(s) (LRB):  CREATION, COLOSTOMY, LAPAROSCOPIC - CONVERTED TO OPEN (N/A)  INSERTION, OPEN SUPRAPUBIC CATHETER (N/A)  CYSTOSCOPY (N/A)      Physical exam day of discharge:  Physical Exam  Vitals and nursing note reviewed.   Constitutional:       General: He is not in acute distress.     Appearance: Normal appearance. He is not ill-appearing, toxic-appearing or diaphoretic.   HENT:      Head: Normocephalic.   Cardiovascular:      Rate and Rhythm: Normal rate and regular rhythm.   Pulmonary:      Effort: Pulmonary effort is normal. No respiratory distress.   Abdominal:      General: There is no distension.      Palpations: Abdomen is soft. There is no mass.      Tenderness: There is no abdominal tenderness. There is no guarding or rebound.      Hernia: No hernia is present.       Comments: Midline incision with dermabond. Ostomy viable with brown thicker liquid in bag, no gas. Red rubber catheter draining the proximal limb of the colostomy.  Suprapubic catheter in place.     Abdomen with less distension than prev exams  Appropriately tender  Incision c/d/i   Skin:     General: Skin is warm and dry.      Capillary Refill: Capillary refill takes less than 2 seconds.   Neurological:      Mental Status: He is alert and oriented to person, place, and time.     Indwelling Lines/Drains at time of discharge:   Lines/Drains/Airways       Drain  Duration                  Colostomy 05/04/23 1914 Loop LUQ 7 days         Suprapubic Catheter 05/04/23 1615 7 days                  Hospital Course: Pt admitted with obstructing pelvic metastasis from GIST tumor.  He underwent diverting colostomy and SP tube placement on 5/4.  He had slow return of bowel function, but otherwise had no issues postoperatively.  He is discharged on 5/12 ambulating and having bowel function.      Goals of Care Treatment Preferences:  Code Status: Full Code    Living Will: Yes              Consults:   Consults (From admission, onward)          Status Ordering Provider     Inpatient consult to Radiation Oncology  Once        Provider:  (Not yet assigned)    Completed BENIGNO ABDI     Inpatient consult to Urology  Once        Provider:  (Not yet assigned)    Completed WOLF RICHEY     Inpatient consult to Surgical Oncology  Once        Provider:  (Not yet assigned)    Completed LINNEA VO     Inpatient consult to Hematology/Oncology  Once        Provider:  (Not yet assigned)    Completed KARI HARLEY JR            Significant Diagnostic Studies: N/A    Pending Diagnostic Studies:       Procedure Component Value Units Date/Time    Urinalysis, Reflex to Urine Culture Urine, Clean Catch [[id number]] Collected: 04/24/23 1538    Order Status: Sent Lab Status: In process Updated: 04/24/23 1539    Specimen: Urine        "    Final Active Diagnoses:    Diagnosis Date Noted POA    PRINCIPAL PROBLEM:  GIST (gastrointestinal stromal tumor) of small bowel, malignant [C49.A3] 02/15/2018 Yes     Chronic    GERD (gastroesophageal reflux disease) [K21.9] 04/25/2023 Unknown    Pelvic mass [R19.00] 04/25/2023 Yes    Abdominal pain [R10.9] 04/25/2023 Yes    Chest pain [R07.9] 04/25/2023 Yes    GIST (gastrointestinal stroma tumor), malignant, colon [C49.A4] 04/25/2023 Yes    Mechanical gastrointestinal obstruction [K56.609] 04/24/2023 Yes    Leukocytosis [D72.829] 04/24/2023 Unknown    Hyponatremia [E87.1] 04/24/2023 Unknown    Lethargy [R53.83] 04/24/2023 Yes    Stage 3 chronic kidney disease [N18.30] 07/08/2021 Yes     Chronic    Metastatic cancer to pelvis [C79.89] 02/13/2020 Yes     Chronic    Metastasis to liver [C78.7] 08/21/2019 Yes     Chronic    Hypertension [I10]  Yes     Chronic    Hypercholesteremia [E78.00]  Yes     Chronic    Coronary artery disease [I25.10]  Yes     Chronic      Problems Resolved During this Admission:      Discharged Condition: fair    Disposition: Home or Self Care    Follow Up:   Follow-up Information       Surinder Shaw MD Follow up in 1 week(s).    Specialty: Surgical Oncology  Why: Post-op  Contact information:  [place] [facility] [address]  [phone]                           Patient Instructions:      WALKER FOR HOME USE     Order Specific Question Answer Comments   Type of Walker: Adult (5'4"-6'6")    With wheels? Yes    Height: 5' 11" (1.803 m)    Weight: 80.2 kg (176 lb 12.9 oz)    Length of need (1-99 months): 99    Does patient have medical equipment at home? cane, straight    Does patient have medical equipment at home? shower chair    Does patient have medical equipment at home? walker, rolling    Does patient have medical equipment at home? bedside commode    Please check all that apply: Patient's condition impairs ambulation.    Please check all that apply: Patient is unable to " safely ambulate without equipment.    Please check all that apply: Patient needs help to get in and out of chair.    Please check all that apply: Walker will be used for gait training.      Diet Adult Regular     Lifting restrictions   Order Comments: No lifting greater than 10 pounds for 6 weeks from day of surgery.  No pushing/pulling such as vacuuming or raking.  No straining, avoid constipation and take stool softeners as described and laxatives as needed.  No driving while on narcotics and until you can react quickly without pain.     No dressing needed   Order Comments: WOUND CARE  You have skin glue over your incision(s)  This will slowly flake away in about 10 days  It is okay to shower starting the day after surgery  Can pat your incision dry  Please do not scrub hard over your incisions  Please do not pick the glue off or it will reopen the wound     Notify your health care provider if you experience any of the following:  temperature >100.4     Notify your health care provider if you experience any of the following:  severe uncontrolled pain     Notify your health care provider if you experience any of the following:  redness, tenderness, or signs of infection (pain, swelling, redness, odor or green/yellow discharge around incision site)     Medications:  Reconciled Home Medications:      Medication List        START taking these medications      enoxaparin 40 mg/0.4 mL Syrg  Commonly known as: LOVENOX  Inject 0.4 mLs (40 mg total) into the skin once daily.     traMADoL 50 mg tablet  Commonly known as: ULTRAM  Take 1 tablet (50 mg total) by mouth every 4 (four) hours as needed for Pain.            CONTINUE taking these medications      ammonium lactate 12 % lotion  Commonly known as: LAC-HYDRIN  Apply topically 2 (two) times daily.     aspirin 81 MG EC tablet  Commonly known as: ECOTRIN  Take 81 mg by mouth once daily.     atorvastatin 40 MG tablet  Commonly known as: LIPITOR  Take 1 tablet (40 mg total)  by mouth once daily.     bisacodyL 5 mg EC tablet  Commonly known as: DULCOLAX  Take 1 tablet (5 mg total) by mouth 2 (two) times daily.     calcium carbonate 200 mg calcium (500 mg) chewable tablet  Commonly known as: TUMS  Take 500 mg by mouth 3 (three) times daily as needed for Heartburn.     cetirizine 5 MG tablet  Commonly known as: ZYRTEC  Take 5 mg by mouth once daily.     cholecalciferol (vitamin D3) 50 mcg (2,000 unit) Cap capsule  Commonly known as: VITAMIN D3  Take 1 capsule by mouth once daily. morning     cyanocobalamin 1000 MCG tablet  Commonly known as: VITAMIN B-12  Take 1,000 mcg by mouth once daily. morning     famotidine 20 MG tablet  Commonly known as: PEPCID  Take 20 mg by mouth 2 (two) times daily.     fish oil-omega-3 fatty acids 300-1,000 mg capsule  Take 1 g by mouth once daily. morning     fluticasone propionate 50 mcg/actuation nasal spray  Commonly known as: FLONASE  1 spray by Each Nare route 2 (two) times daily.     guaiFENesin 1,200 mg Ta12  Take 1 tablet by mouth 2 (two) times daily as needed (cold symptoms).     hydrocortisone 2.5 % cream  Apply topically 2 (two) times daily.     ICAPS AREDS ORAL  Take 1 tablet by mouth 2 (two) times a day.     losartan 100 MG tablet  Commonly known as: COZAAR  Take 1 tablet (100 mg total) by mouth once daily.     magnesium 250 mg Tab  Take 1 tablet by mouth once daily. morning     metoprolol succinate 50 MG 24 hr tablet  Commonly known as: TOPROL-XL  Take 50 mg by mouth once daily.     multivitamin per tablet  Commonly known as: THERAGRAN  Take 1 tablet by mouth once daily. morning     NIFEdipine 30 MG (OSM) 24 hr tablet  Commonly known as: PROCARDIA-XL  Take 30 mg by mouth once daily.     nitroGLYCERIN 0.4 MG SL tablet  Commonly known as: NITROSTAT  Place 1 tablet (0.4 mg total) under the tongue every 5 (five) minutes as needed for Chest pain.     ondansetron 8 MG Tbdl  Commonly known as: ZOFRAN-ODT  Take 1 tablet (8 mg total) by mouth every 6  (six) hours as needed (nausea).     promethazine 25 MG tablet  Commonly known as: PHENERGAN  Take 1 tablet (25 mg total) by mouth every 6 (six) hours as needed for Nausea.     sodium chloride 5% 5 % ophthalmic solution  Commonly known as:   Place 1 drop in the right eye three times every morning     tadalafiL 5 MG tablet  Commonly known as: CIALIS  Take 5 mg by mouth once daily at 6am.     testosterone cypionate 200 mg/mL injection  Commonly known as: DEPOTESTOTERONE CYPIONATE  Inject 200 mg into the muscle every 14 (fourteen) days.     TYLENOL ORAL  Take 500 mg by mouth 2 (two) times a day.            STOP taking these medications      arginine 500 mg tablet            Time spent on the discharge of patient: 15 minutes    Amilcar Lim MD  General Surgery  [facility]

## 2023-05-12 NOTE — PROGRESS NOTES
[facility]  Wound Care    Patient Name:  Forrest Schmidt   MRN:  [id number]  Date: 2023  Diagnosis: GIST (gastrointestinal stromal tumor) of small bowel, malignant    History:     Past Medical History:   Diagnosis Date    Anticoagulant long-term use     Arthritis     BPH (benign prostatic hyperplasia)     Coronary artery disease     stent x1 2005    Hypercholesteremia     Hypertension     Low iron     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 2/15/2018    Osteopenia        Social History     Socioeconomic History    Marital status:    Tobacco Use    Smoking status: Former     Packs/day: 0.50     Years: 15.00     Pack years: 7.50     Types: Cigarettes     Quit date: 1974     Years since quittin.3    Smokeless tobacco: Never    Tobacco comments:     Quit smoking about 40 yrs ago   Substance and Sexual Activity    Alcohol use: Yes     Alcohol/week: 1.0 standard drink     Types: 1 Glasses of wine per week     Comment: 2-4 glasses wine/day, 17 last drink    Drug use: No    Sexual activity: Yes     Partners: Female     Social Determinants of Health     Financial Resource Strain: Low Risk     Difficulty of Paying Living Expenses: Not hard at all   Food Insecurity: Unknown    Worried About Running Out of Food in the Last Year: Patient refused    Ran Out of Food in the Last Year: Patient refused   Transportation Needs: No Transportation Needs    Lack of Transportation (Medical): No    Lack of Transportation (Non-Medical): No   Physical Activity: Unknown    Days of Exercise per Week: 7 days   Stress: Unknown    Feeling of Stress : Patient refused   Social Connections: Unknown    Frequency of Communication with Friends and Family: Patient refused    Frequency of Social Gatherings with Friends and Family: Patient refused    Attends Islam Services: Patient refused    Active Member of Clubs or Organizations: Patient refused    Attends Club or Organization Meetings: Patient refused     Marital Status:    Housing Stability: Unknown    Unable to Pay for Housing in the Last Year: No    Unstable Housing in the Last Year: No       Precautions:     Allergies as of 04/24/2023 - Reviewed 04/24/2023   Allergen Reaction Noted    Augmentin [amoxicillin-pot clavulanate] Shortness Of Breath 10/15/2015    Fexofenadine Other (See Comments) 05/30/2018    Singulair [montelukast] Other (See Comments) 05/15/2017    Ace inhibitors Other (See Comments) 06/13/2019    Captopril      Doxycycline Nausea And Vomiting 06/13/2019    Horse/equine containing products Hives 05/15/2017    Hydrocodone Nausea Only 06/13/2019    Scopolamine hbr Other (See Comments) 07/15/2020       WOC Assessment Details/Treatment   Patient seen for wound care consultation.   Reviewed chart for this encounter.   See Flow Sheet for findings.    Pt sitting in chair with wife at bedside. WOC Nurse initiated ostomy lesson. Pt demonstrated return demonstration with minimal assistance from WOC Nurse. Ostomy pouch cut to 38 mm, Cavilon applied to peristomal skin. Brava ring applied to pouch then pouch placed with s /sx of discomfort. Pt tolerated treatment well. Supplies given, please contact WOC Nurse for further questions.     RECOMMENDATIONS  Recommendations made to primary team for above plan via secured chat. WOC to follow up Orders placed.     Discussed POC with patient and primary RN.   See EMR for orders & patient education.  Discussed POC with primary team.    Nursing to continue care.  Nursing to maintain pressure injury prevention interventions.  Contact wound care for any further questions.     05/12/23 1000   WOCN Assessment   WOCN Total Time (mins) 30   Visit Date 05/12/23   Visit Time 1000   Consult Type Follow Up   WOCN Speciality Ostomy   WOCN List colostomy   Ostomy Type Colostomy   Intervention assessed;changed;applied;chart review;orders   Teaching on-going        Colostomy 05/04/23 1914 Loop LUQ   Placement Date/Time:  05/04/23 1914   Present Prior to Hospital Arrival?: No  Inserted by: (c) MD  Colostomy Type: Loop  Location: LUQ   Wound Image    Stomal Appliance 1 piece   Stoma Appearance irregular;moist;pink;protruding above skin level   Stoma Size (in) 38 mm   Peristomal Assessment Clean;Intact;Blanchable erythema   Accessories/Skin Care cleansed w/ water;barrier substance over peristomal skin;skin barrier ring   Stoma Function flatus;stool   Treatment Bag change   Tolerance signs/symptoms of discomfort       05/12/2023

## 2023-05-12 NOTE — PLAN OF CARE
Problem: Adult Inpatient Plan of Care  Goal: Plan of Care Review  Outcome: Ongoing, Progressing  Goal: Patient-Specific Goal (Individualized)  Outcome: Ongoing, Progressing  Goal: Absence of Hospital-Acquired Illness or Injury  Outcome: Ongoing, Progressing  Goal: Optimal Comfort and Wellbeing  Outcome: Ongoing, Progressing  Goal: Readiness for Transition of Care  Outcome: Ongoing, Progressing     Problem: Infection  Goal: Absence of Infection Signs and Symptoms  Outcome: Ongoing, Progressing     Problem: Fall Injury Risk  Goal: Absence of Fall and Fall-Related Injury  Outcome: Ongoing, Progressing     Problem: Coping Ineffective (Oncology Care)  Goal: Effective Coping  Outcome: Ongoing, Progressing     Problem: Fatigue (Oncology Care)  Goal: Improved Activity Tolerance  Outcome: Ongoing, Progressing     Problem: Oral Intake Altered (Oncology Care)  Goal: Optimal Oral Intake  Outcome: Ongoing, Progressing     Problem: Oral Mucositis (Oncology Care)  Goal: Improved Oral Mucous Membrane Integrity  Outcome: Ongoing, Progressing     Problem: Pain Acute (Oncology Care)  Goal: Optimal Pain Control  Outcome: Ongoing, Progressing     Problem: Pain Acute  Goal: Acceptable Pain Control and Functional Ability  Outcome: Ongoing, Progressing     Problem: Urinary Retention  Goal: Effective Urinary Elimination  Outcome: Ongoing, Progressing     Problem: Skin Injury Risk Increased  Goal: Skin Health and Integrity  Outcome: Ongoing, Progressing     Problem: Impaired Wound Healing  Goal: Optimal Wound Healing  Outcome: Ongoing, Progressing

## 2023-05-12 NOTE — PLAN OF CARE
[facility]  Discharge Final Note    Primary Care Provider: Dez Jimenez MD    Expected Discharge Date: 5/12/2023    Final Discharge Note (most recent)       Final Note - 05/12/23 1842          Final Note    Assessment Type Final Discharge Note     Anticipated Discharge Disposition Home-Health Care [facility] [place]     What phone number can be called within the next 1-3 days to see how you are doing after discharge? [phone]     Hospital Resources/Appts/Education Provided Provided patient/caregiver with written discharge plan information;Appointments scheduled by Navigator/Coordinator;Post-Acute resouces added to AVS        Post-Acute Status    HME Status Set-up Complete/Auth obtained   Accepted by [facility], [place], [place]    Home Health Status Set-up Complete/Auth obtained   [facility] delivered RW to bedside.    Discharge Delays None known at this time                   Important Message from Medicare  Important Message from Medicare regarding Discharge Appeal Rights: Given to patient/caregiver, Explained to patient/caregiver, Signed/date by patient/caregiver     Date IMM was signed: 05/12/23  Time IMM was signed: 0929    Contact Info       Surinder Shaw MD   Specialty: Surgical Oncology    [address]  [facility] [address]   Phone: [phone]       Next Steps: Follow up in 1 week(s)    Instructions: Post-op    [facility], [facility]   Specialty: Home Health Services, Home Therapy Services, Home Living Aide Services    [address]  [facility] [address]   Phone: [phone]       Next Steps: Follow up    Instructions: Agency will contact patient with start of care details.          Helen Heath RN  Weekend  - [facility] [place]  Spectralink: [phone]

## 2023-05-19 NOTE — PROGRESS NOTES
I saw Mr. Schmidt with Nimco Delvalle NP. He is doing well postoperatively. Pain mostly controlled with Tylenol, although he gets some intermittent muscle spasms. Tolerating a diet. Having thick stool from colostomy. No significant rectal bleeding. Abdomen soft, non-tender. Incision healing well. Colostomy pink and patent. Hgb up slightly from what it was when he was discharged. Will add Robaxin for pain. Will update Dr. Umana, and he will see me on an as needed basis.     Surinder Shaw MD  Surgical Oncology

## 2023-05-26 NOTE — PROGRESS NOTES
"  Post-Op Follow-up Visit:   5/18/2023  Patient ID: Forrest Schmidt is a 78 y.o. male, born 1945    Chief Complaint   Patient presents with    Post-op Evaluation     Interval History: Mr. 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 He states that he is doing well. His pain is controlled with OTC tylenol. He is tolerating a regular diet. He is having output from his ostomy. He is passing some mucous from his rectum but is not having any bleeding. Denies N/V, fever ,chills, SOB, chest pain.     Physical Exam:  BP (!) 105/57   Pulse 78   Ht 5' 11" (1.803 m)   Wt 71.1 kg (156 lb 12 oz)   SpO2 98%   BMI 21.86 kg/m²     General:  Non-toxic, ambulatory  Abd:  Soft, non-tender  Incision:  CDI without erythema or drainage      ICD-10-CM ICD-9-CM    1. GIST (gastrointestinal stroma tumor), malignant, colon  C49.A4 153.9 Ambulatory referral/consult to Wound Clinic      methocarbamoL (ROBAXIN) 500 MG Tab      2. Postoperative pain  G89.18 338.18 methocarbamoL (ROBAXIN) 500 MG Tab      3. GIST (gastrointestinal stromal tumor) of small bowel, malignant  C49.A3 152.9         Plan   Mr. 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 He states that he is doing well. His pain is controlled with OTC tylenol. He is tolerating a regular diet. He is having output from his ostomy. He is passing some mucous from his rectum but is not having any bleeding. His incision is healing nicely. Discussed f/u with Dr. Umana. Questions were asked and answered to patient's satisfaction.  We discussed the need for continued clinical/radiographic/endoscopic follow-up.     Follow up if symptoms worsen or fail to improve.    Patient seen in conjunction with Dr. Shaw.        MADONNA Hogan, FNP-C  Upper GI / Hepatobiliary Surgical Oncology  [facility] [place], " [place]  Office: [phone]  Fax: [phone]

## 2023-05-26 NOTE — PATIENT INSTRUCTIONS
"1.  Ipilimumab                (i pi weinberg? ue mab)  Trade name : Yervoy  Ipilimumab is the generic name for the trade drug name Yervoy®. In some cases, health care professionals may use the generic name ipilimumab when referring to the trade drug name Yervoy®.  Drug type : Ipilimumab is a monoclonal antibody- (For more detail, see "How this drug works," below).  What Ipilimumab Is Used For:  For the treatment of melanoma.  For treatment of patients with metastatic non-small cell lung cancer whose tumors express PD-L1, with no EGFR or ALK genomic tumor abnormalities. Given in combination with nivolumab.  For treatment of patients with renal cell cancer who have had no prior treatment. Given in combination with nivolumab.  Note: If a drug has been approved for one use, physicians may elect to use this same drug for other problems if they believe it may be helpful.  How Ipilimumab Is Given:  Ipilimumab is given as an intravenous injection through a vein (IV).  You may receive medications before the infusion to reduce allergic reactions.  The amount of ipilimumab that you will receive depends on many factors, including your weight, your general health or other health problems, and the type of cancer or condition being treated. Your doctor will determine your dose and schedule.  Side Effects:  Important things to remember about the side effects of Ipilimumab:  Most people do not experience all of the side effects listed.  Side effects are often predictable in terms of their onset and duration.  A few side effects can occur weeks or months after discontinuation of treatment.  There are many options to help manage and prevent worsening of side effects.  There is no relationship between the presence or severity of side effects and the effectiveness of the medication.  The following side effects are common (occurring in greater than 30%) for patients taking ipilimumab:  Fatigue  Diarrhea  Itching  Rash  Nausea/vomiting  These " side effects are less common side effects (occurring in about 10-29%) of patients receiving ipilimumab:  Decreased appetite  Constipation  Cough  Headache  Abdominal pain  Shortness of breath  Anemia  Fever  A serious, but uncommon side effect of ipilimumab may be an immune-mediated reaction. When this side effect occurs, it affects primarily the bowels, liver, skin, nerves and the endocrine system. This immune reaction can occur during treatment but can also be seen weeks or months after discontinuation of treatment. Symptoms of this reaction will be monitored throughout treatment (diarrhea, rash, and neuropathy). Lab work will check for elevated liver enzymes and thyroid function.  Contact your health care provider right away if you have any new or worsening symptoms.  Not all side effects are listed above. Some that are rare (occurring in less than 10% of patients) are not listed here. However, you should always inform your health care provider if you experience any unusual symptoms.  When to contact your doctor or health care provider:  Contact your health care provider immediately, day or night, if you should experience any of the following symptoms:  Fever of 100.4° F (38° or higher)  Chills  Signs of reaction to the drug (wheezing, chest tightness, itching, bad cough, swelling of the face, lips, tongue or throat).  Always inform your health care provider if you experience any unusual symptoms.  Diarrhea  Blood in your stools or dark, tarry, sticky stools  Stomach pain or tenderness, especially right side  Nausea (interferes with ability to eat and unrelieved with prescribed medication)  Vomiting (vomiting more than 4-5 times in a 24 hour period)  Unable to eat or drink for 24 hours or have signs of dehydration: tiredness, thirst, dry mouth, dark and decrease amount of urine, or dizziness  Skin or the whites of your eyes turn yellow  Urine turns dark or brown (tea color)  Decreased appetite  Bleed or bruise  more easily than normal  Skin rash with or without itching  Sores in the mouth  Skin blisters and/or peels  Unusual weakness of legs, arms or face  Numbness or tingling in hands or feet  Persistent or unusual headaches  Unusual sluggishness, feeling cold all the time, or weight gain  Changes in mood or behavior such as decreased sex drive, irritability or forgetfulness  Dizziness or fainting  Blurry vision, double vision or other vision problems  Eye pain or redness  Always inform your health care provider if you experience any unusual symptoms.  Precautions:  Before starting ipilimumab treatment, make sure you tell your doctor about any other medications you are taking (including prescription, over-the-counter, vitamins, herbal remedies, etc.).  Do not receive any kind of immunization or vaccination without your doctor's approval while taking ipilimumab.  Inform your health care professional if you are pregnant or may be pregnant prior to starting this treatment. Pregnancy category C (ipilimumab may be hazardous to the fetus. Women who are pregnant or become pregnant must be advised of the potential hazard to the fetus.)  For both men and women: Do not conceive a child (get pregnant) while taking ipilimumab. Barrier methods of contraception, such as condoms, are recommended. Discuss with your doctor when you may safely become pregnant or conceive a child after therapy.  Do not breast feed while taking this medication.  Self-Care Tips:  Drink at least two to three quarts of fluid every 24 hours, unless you are instructed otherwise.  If you should experience nausea, take anti-nausea medications as prescribed by your doctor, and eat small frequent meals. Sucking on lozenges and chewing gum may also help.  Avoid sun exposure. Wear SPF 15 (or higher) sun block and protective clothing.  In general, drinking alcoholic beverages should be kept to a minimum or avoided completely. You should discuss this with your  doctor.  Get plenty of rest.  Maintain good nutrition.  Wash your hands often  You may be at risk for infection, report fever or any other signs of infection immediately to your healthcare provider  Discuss with your health care provider before taking any other medications including over the counter and herbal preparations.  To help prevent mouth sores use a soft toothbrush, and rinse three times a day with ½ to 1 teaspoon of baking soda and/or ½ to 1 teaspoon of salt mixed with 8 ounces of water  If you experience symptoms or side effects, be sure to discuss them with your health care team. They can prescribe medications and/or offer other suggestions that are effective in managing such problems.  Monitoring and Testing:  You will be checked regularly by your health care professional while you are taking ipilimumab, to monitor side effects and check your response to therapy.  How Ipilimumab Works:  Ipilimumab is classified as a monoclonal antibody. Monoclonal antibodies are a relatively new type of targeted cancer therapy.  Antibodies are an integral part of the bodys immune system. Normally, the body creates antibodies in response to an antigen (such as a protein in a germ) that has entered the body. The antibodies attach to the antigen in order to hailey it for destruction by the immune system.  To make anti-cancer monoclonal antibodies in the laboratory, scientists analyze specific antigens on the surface of cancer cells (the targets). Then, using animal and human proteins, they create a specific antibody that will attach to the target antigen on the cancer cells. When given to the patient, these monoclonal antibodies will attach to matching antigens like a key fits a lock.  Since monoclonal antibodies target only specific cells, they may cause less toxicity to healthy cells. Monoclonal antibody therapy is usually only given for cancers in which antigens (and the respective antibodies) have been identified  "already.  Ipilimumab works by targeting the CTLA-4 antigen on normal and malignant T-cells. Ipilimumab binds to the CTLA-4 which augments T-cell activation and proliferation. The mechanism of action of ipilimumabs effect in patients with melanoma is indirect, possibly through T-cell mediated anti-tumor immune responses.  Note: We strongly encourage you to talk with your health care professional about your specific medical condition and treatments. The information contained in this website is meant to be helpful and educational, but is not a substitute for medical advice.    2.  Nivolumab                (maria VOL ue mab)  Trade name: Opdivo®  Nivolumab is the generic name for the trade drug name Opdivo®. In some cases, health care professionals may use the generic name nivolumab when referring to the trade drug name Opdivo®.  Drug type: Nivolumab is a targeted therapy. It is a human programmed death receptor-1 (PD-1) blocking antibody. (For more detail, see "How this drug works," below.)  What Nivolumab Is Used For:  Treatment of patients with:  Unresectable or metastatic melanoma  Metastatic non-small cell lung cancer (NSCLC)  Advanced renal cell cancer  Classical Hodgkins lymphoma (cHL)  Recurrent or metastatic squamous cell carcinoma of the head and neck ([facility])  Locally advanced or metastatic urothelial carcinoma  Metastatic small cell lung cancer (SCLC)  Adult and pediatric patients with unresectable or metastatic, microsatellite instability-high (MSI-H) or mismatch repair deficient solid tumors or colorectal cancer  Hepatocellular cancer (HCC)  Unresectable advanced, recurrent or metastatic esophageal squamous cell carcinoma (ESCC)  Advanced or metastatic gastric cancer, and gastroesophageal junction cancer, and esophageal adenocarcinoma  Note: If a drug has been approved for one use, physicians may elect to use this same drug for other problems if they believe it may be helpful.  How Nivolumab Is " Given:  Nivolumab is administered intravenously (IV) into the vein over 60 minutes every 2 weeks.  The amount of nivolumab that you will receive depends on many factors, your general health or other health problems, and the type of cancer or condition being treated. Higher doses do not give a better response and may cause increased toxicity. Your doctor will determine your dose and schedule.  If you miss any appointments call your healthcare provider as soon as possible to reschedule your appointment.  Side Effects:  Important things to remember about the side effects of nivolumab:  Most people do not experience all of the side effects listed.  Side effects are often predictable in terms of their onset and duration.  Side effects are almost always reversible and will go away after treatment is complete.  There are many options to help minimize or prevent side effects.  There is no relationship between the presence or severity of side effects and the effectiveness of the medication.  The following side effects are common (occurring in greater than 30%) for patients taking nivolumab:  Fatigue  Lymphocytopenia (low white blood cells)  Low sodium  Shortness of breath  Musculoskeletal pain  Decreased appetite  Cough  These side effects are less common side effects (occurring in about 10-29%) of patients receiving nivolumab:  Nausea  Anemia  Constipation  Increased serum creatinine  Colitis  Low potassium  Low magnesium  High calcium  Vomiting  Weakness  Diarrhea  High potassium  Low calcium  Swelling  Fever  Rash  Abdominal pain  Increased serum AST  Thrombocytopenia  Increased serum alkaline phosphatase  Chest pain  Weight loss  Joint pain  Increased serum ALT  Itching  Pneumonia  Pain  Not all side effects are listed above. Some that are rare (occurring in less than 10% of patients) are not listed here. However, you should always inform your health care provider if you experience any unusual symptoms.  A few rare but  severe side effects include:  Immune-mediated Pneumonitis  Immune-mediated Colitis  Immune-mediated Hepatitis  Immune-medicated Nephritis and Renal Dysfunction  Immune-mediated Hypothyroidism and Hyperthyroidism  When to contact your doctor or health care provider:  Contact your health care provider immediately, day or night, if you should experience any of the following symptoms:  Fever of 100.4° F (38° or higher, chills)  Signs of an allergic reaction, like rash; hives; itching; red, peeling, or blistered, skin with or without fever; tightness in the chest or throat; wheezing; trouble breathing or talking; unusual hoarseness; or swelling of the lips, mouth, face, throat, or tongue.  Always inform your health care provider if you experience any unusual symptoms.  The following symptoms require medical attention, but are not an emergency. Contact your health care provider within 24 hours of noticing any of the following:  Diarrhea should be reported to your health care provider  Nausea (interferes with ability to eat and unrelieved with prescribed medication)  Vomiting (vomiting more than 4-5 times in a 24 hour period)  Unable to eat or drink for 24 hours or have signs of dehydration: tiredness, thirst, dry mouth, dark and decrease amount of urine, or dizziness  Sudden change in eyesight  Sudden onset of shortness of breath, accompanied by cough and/or fever  Skin or the whites of your eyes turn yellow  Light colored stools  Blood in stools  Dark, tarry or sticky stools  Urine turns dark or brown (tea color)  Blood in the urine  Big weight gain  Unable to pass urine or change in the amount of urine passed  Decreased appetite  Stomach pain or upset stomach  Bleed or bruise more easily than normal  Fast heartbeat  Cough with or without mucus  Any skin change, irritation, itching or rash  Very bad muscle or weakness  Very bad joint pain  Swelling in the arms or legs  Signs of trouble with your thyroid or pituitary  gland (change in mood or the way you act, change in weight, constipation, dizziness, deeper voice, feeling cold, fainting, hair loss, feeling very tired, headache or loss of sex drive)  Always inform your health care provider if you experience any unusual symptoms.  Precautions:  Before starting nivolumab treatment, make sure you tell your doctor about any other medications you are taking (including prescription, over-the-counter, vitamins, herbal remedies, etc.).  Do not receive any kind of immunization or vaccination without your doctor's approval while taking nivolumab.  Inform your health care professional if you are pregnant or may be pregnant prior to starting this treatment. Pregnancy category D (nivolumab may be hazardous to the fetus. Women who are pregnant or become pregnant must be advised of the potential hazard to the fetus.)  For both men and women: Do not conceive a child (get pregnant) while taking nivolumab Barrier methods of contraception, such as condoms, are recommended while you are taking this drug and for 5 months after you stop taking it. Discuss with your doctor when you may safely become pregnant or conceive a child after therapy.  Do not breast feed while taking this medication.  Self-Care Tips:  Drink at least two to three quarts of fluid every 24 hours, unless you are instructed otherwise.  Nivolumab can cause visual changes, dizziness and tiredness. If you have any of these symptoms, use caution when driving a car, using machinery, or anything that requires you to be alert.  Wash your hands often.  Avoid contact sports or activities that could cause injury.  To reduce any potential nausea eat small, frequent meals. Sucking on lozenges and chewing gum may also help.  Eat foods that may help reduce diarrhea (see managing side effects - diarrhea).  Avoid sun exposure. Wear SPF 30 (or higher) sunblock and protective clothing.  In general, drinking alcoholic beverages should be kept to a  minimum or avoided completely. You should discuss this with your doctor.  Get plenty of rest.  Maintain good nutrition. (see eating well during chemotherapy)  If you experience symptoms or side effects, be sure to discuss them with your health care team. They can prescribe medications and/or offer other suggestions that are effective in managing such problems.  Monitoring and Testing:  You will be checked regularly by your health care professional while you are taking nivolumab to monitor side effects and check your response to therapy.  How Nivolumab Works:  Targeted therapy is the result of about 100 years of research dedicated to understanding the differences between cancer cells and normal cells. To date, cancer treatment has focused primarily on killing rapidly dividing cells because one feature of cancer cells is that they divide rapidly. Unfortunately, some of our normal cells divide rapidly too, causing multiple side effects.  Targeted therapy is about identifying other features of cancer cells. Scientists look for specific differences in the cancer cells and the normal cells. This information is used to create a targeted therapy to attack the cancer cells without damaging the normal cells, thus leading to fewer side effects. Each type of targeted therapy works a little bit differently but all interfere with the ability of the cancer cell to grow, divide, repair and/or communicate with other cells.  There are different types of targeted therapies, defined in three broad categories. Some targeted therapies focus on the internal components and function of the cancer cell. The targeted therapies use small molecules that can get into the cell and disrupt the function of the cells, causing them to die. There are several types of targeted therapy that focus on the inner parts of the cells. Other targeted therapies target receptors that are on the outside of the cell. Therapies that target receptors are also known  as monoclonal antibodies. Antiangiogenesis inhibitors target the blood vessels that supply oxygen to the cells, ultimately causing the cells to starve.  Nivolumab is a human monoclonal antibody that blocks the interaction between PD-1, PD-L1 and PD-L2. Binding of these ligands to the PD-1 receptor found on T cells, inhibits T cell proliferation and cytokine production. Upregulation of the PD-1 ligands occurs in some tumors and signaling through this pathway can contribute to inhibition of active T-cell immune surveillance of tumors. Nivolumab is a human immunoglobulin G4 (IgG4) monoclonal antibody that binds to the PD-1 receptor and blocks its interaction with PD-L1 and PD-L2, releasing PD-1 pathway-mediated inhibition of the immune response, including the anti-tumor immune response, resulting in decreased tumor growth.  Note: We strongly encourage you to talk with your health care professional about your specific medical condition and treatments. The information contained in this website is meant to be helpful and educational, but is not a substitute for medical advice.

## 2023-05-31 NOTE — TELEPHONE ENCOUNTER
Notified  nurse, Adriane Ceron that pt is to complete full course of Lovenox (28 days) from discharge. Nurse verbalized understanding and will relay message to pt.

## 2023-05-31 NOTE — TELEPHONE ENCOUNTER
----- Message from Hilaria Bonilla sent at 5/31/2023 11:17 AM CDT -----  Regarding: nurse concern  Name of Who is Calling: [facility]           What is the request in detail: pt is wondering how long he have to take lovenox shots           Can the clinic reply by [facility]: no           What Number to Call Back if not in [facility]: [phone]

## 2023-06-06 NOTE — TELEPHONE ENCOUNTER
Spoke with  nurse.  Patient and her are concerned over his resting HR---between .      Message routed to dr doll's office

## 2023-06-06 NOTE — TELEPHONE ENCOUNTER
----- Message from Ad Ibrahim sent at 6/6/2023 12:53 PM CDT -----  Regarding: Consult/Advisory  Consult/Advisory    Name Of Caller: Marquita, [facility]      Contact Preference: [phone]    Nature of call: Patient is taking BP and his resting heart is 95 - 99 and today it was 103. Patient is concerned about his numbers.

## 2023-06-06 NOTE — TELEPHONE ENCOUNTER
Attempted without success x2 to contact HH nurse to discuss this. Left voicemail for Kate to call back to further discuss.

## 2023-06-06 NOTE — TELEPHONE ENCOUNTER
Spoke with Kate and informed her of Dr. Upton's response: His lab suggest he has been anemic and that would be a normal response of the heart.  Kate verbalized understanding and will call back with any further questions or concerns regarding this pt.

## 2023-06-09 NOTE — PROGRESS NOTES
The patient location is: home  The chief complaint leading to consultation is: f/u for GIST    Visit type: audiovisual    Face to Face time with patient: 15 min  30 minutes of total time spent on the encounter, which includes face to face time and non-face to face time preparing to see the patient (eg, review of tests), Obtaining and/or reviewing separately obtained history, Documenting clinical information in the electronic or other health record, Independently interpreting results (not separately reported) and communicating results to the patient/family/caregiver, or Care coordination (not separately reported).         Each patient to whom he or she provides medical services by telemedicine is:  (1) informed of the relationship between the physician and patient and the respective role of any other health care provider with respect to management of the patient; and (2) notified that he or she may decline to receive medical services by telemedicine and may withdraw from such care at any time.    Notes:                       Patient ID: Forrest Schmidt is a 78 y.o. male.     Chief Complaint:  Follow up for GIST, wild-type     DIAGNOSIS:   1. 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 Recurrent oligametastatic GIST in the liver found on 5/20/19. S/p ablation on 7/23/19 at [facility]  3. 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 He initially presented with melena, syncope, hypotension on 2/5/18 at OSH. Colonoscopy showed old blood in the terminal ileum. CT abdomen/pelvis showed an ileal mass that measures approximately 5 cm. He was transferred to [facility] and underwent segmental small bowel resection by Dr. Sanchez on 2/15/18. Pathology showed a  "6-cm GIST, histologic type: GIST, mixed; mitotic rate 9 mitoses/5 mm2, G2, high grade, high risk, margins negative. Cd117+, pathologic T3NxMx.  He had incisional wound infection after the surgery and took antibiotics for that.   2. Started Gleevec on 4/11/18. Foundation One results came back on 4/24/18 neg for KIT mutations. Long discussion with Mr and Mrs Schmidt on 4/25 regarding likely the lack of benefit from adjuvant Gleevec (please see note from that day for details). Mr. Schmidt would like to continue with Gleevec for now. He stopped Gleevec after he went to see Dr. Guaman at [facility]. CT abdomen/pelvis on 11/12/18 was negative for recurrent disease.   3. CT scan on 5/20/19 showed a Nonspecific hepatic hypodensity at the dome of the liver near the inferior vena cava   4. S/p liver lesion biopsy and ablation at [facility] on 7/23/19. Biopsy showed metastatic GIST positive for DOG-1 and . The amount of tumor was insufficient for molecular testing.   5. Surveillance MRI on 2/11/2020 showed "a right external iliac metastatic deposit measuring 4.6 x 3.5 cm (axial stir series 9, image 20), previously measuring 1.3 cm.  There is a metastatic deposit within the right anterior pelvis measuring 1.9 cm (series 9, image 13), previously not seen."  6. Gleevec 400 mg daily from 3/14/20 to present. CT at [facility] on 6/1/20 showed progressive disease in the peritoneal masses.   7. Underwent cytoreductive surgery at [facility] on 7/10/2020.   8. Dr Guaman recommends starting regorafenib after surgery. However, insurance does not cover it unless patient fails sutent. Patient started sutent 37.5 mg daily on 8/10/2020.   9. CT scan 10/5/20: A metastatic lesion in segment VII of the liver adjacent to the right hepatic vein and inferior vena cava remains overall stable, measuring approximately 2.1 cm (series 6 image 166). There is another lesion in segment V measuring approximately 1.1 cm (series 6 image 188), suspect for metastatic " "disease and not well seen on the prior study.  10. CT scan 12/7/2020: "Mild enlargement of liver metastases. A new (nonspecific) subcentimeter hypodensity in segment 6 can be followed on future exams. Mild areas of pneumatosis affecting the ileum in the lower abdomen. Recommend clinical correlation with abdominal pain and drug regimen." Patient underwent IR ablation to segment 5 lesion and re-treatment of segment 7 lesion. Sutent was continued           11. CT scan 9/30/21: No definitive CT evidence of new or increasing metastatic disease in the chest, abdomen or pelvis.  Decrease in size of segment 5 and 7 ablation zones/cavities. Stable subcentimeter pulmonary nodules/nodular densities.  12. CT CAP at [facility] 1/12/22: New small liver lesions are suspicious for metastases. An enlarging 4 mm left upper lobe pulmonary nodule may be infectious/inflammatory or metastatic. Treatment was changed to regorafenib. Patient started on 1/29/2022   13. Progression noted on CT scan 3/23/22: "Further increased size of multiple small hypoenhancing liver lesions, in keeping with metastases. Liver protocol CT at follow-up may be helpful for improved visualization of small liver metastases. Enlarging peritoneal nodules, in keeping with carcinomatosis. Enlarging left upper lobe nodule, concerning for metastasis."  14. Patient started temodar 85 mg/m2 daily x 21 days every 28-day cycle on 4/6/2022. Cycle 2 started on 5/5/22.   15. CT C/A/P 5/31/22 showed "Enlarging peritoneal and hepatic metastases compared to 03/23/2022. Enlarging left upper lobe pulmonary nodules suspicious for metastatic disease"  16. Treatment switched to pazopanib. Patient started on 6/10/22. Started with escalating dose. Reached 800 mg daily on 6/24/22. Held for 1 week before and 3 weeks after Y90 on 8/17/22. Resumed at 600 mg on 9/8/22. Had significant fatigue when he was on 800 mg daily. Stopped votrient on 11/10/22 for [facility] trial. Had progression on votrient  17. " "Started rogaratinib on trial at [facility] on 2022. On 800 mg bid. Initial CT showed response. But CT 3/21/23 showed increase in size of central pelvic mass. Patient went to ER on 23 for BRBPR. CT A/P showed "Enlarging pelvic mass which involves the rectum and sigmoid colon.  Moderate colonic stool noted proximal. There are hypodense lesions within the liver which are not definitively seen on recent CT suspicious for metastatic disease progression.  Additional partially calcified lesions are stable likely treated lesions." He was hospitalized 23-23 with obstructing pelvic metastasis and GI bleed from GIST tumor.  He underwent diverting colostomy and SP tube placement on 23.  he was discharged on 23. Also had palliative radiation to pelvic mass when he was in the hospital.   18. Discussed case with Dr Guaman. She recommends ipi/nivo. Discussed ipi/nivo with patient. He needs more time to think about it.      History of Present Illness:   Mr. Schmidt returns today for continued follow up. Still a little weak. Walks half a mile each time 2-3 times every day. But does feel tired after walking and lies in bed for an hour. Taking two iron pills a day.     ECO     ROS:   See HPI, otherwise negative.      Physical Examination:   Gen: well hydrated, well developed. In no acute distress  HEENT: normocephalic, anicteric sclerae, EOMI.   Psych: pleasant and appropriate mood and affect  Neuro: alert and oriented x 4     Objective:      Laboratory Data:  Labs reviewed. Hb improved to 10.2. ferritin level 143     Imaging Data:  CT C/A/P 8/15/22:  1. Slightly enlarging hepatic metastasis compared to CT of 2022.     2. Stable and enlarging peritoneal metastasis.     3. Stable pulmonary nodules.    CT C/A/P 10/24/2022:  1. Subcentimeter pulmonary nodules, increased from previously, concerning for metastases.     2. Enlarging pelvic masses invading the anterior rectum and inseparable from the sigmoid " colon.     3. Interval radio embolization of the right liver, with stable to slightly decreased metastases in segments 5 and 6.     4. Slightly increased metastases in segments 8 and 4, noting suboptimal evaluation due to grainy images of the liver.    CT 1/24/2023 showed  posttreatment changes of the liver. Multiple hypodense lesions similar to prior CT. Pelvic pericolonic lesions slightly smaller from 5.3 x 4.7 cm to 3.3 x 3.1 cm.    Ct 4/21/23:  Impression:     1. Enlarging pelvic mass which involves the rectum and sigmoid colon.  Moderate colonic stool noted proximal E.  2. There are hypodense lesions within the liver which are not definitively seen on recent CT suspicious for metastatic disease progression.  Additional partially calcified lesions are stable likely treated lesions.    CT 5/9/23:  Impression:     Post treatment changes for metastatic GIST.     Unchanged metastases involving the liver, sigmoid colon, and rectum.     Interval postoperative changes from descending loop colostomy.  No large bowel distension.  Dilated small bowel loops with gradual tapering distally, likely ileus in the early postoperative setting.       Assessment and Plan:      1. 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 Metastasis to liver    3. 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 Immunodeficiency secondary to neoplasm    5. Essential hypertension    6. Coronary artery disease involving native coronary artery of native heart without angina pectoris    7. Anemia due to chronic blood loss    8. Stage 3b chronic kidney disease      1-4  - Mr. Schmidt is a 79 yo man with stage IIIB GIST of the small intestine  (T3N0Mx), 6 cm, mitotic rate 9 mitoses/5 mm2, s/p resection on 2/15/18. His GIST is negative for KIT, PDGFRA, SDH, BRAF, NF1, NF2 mutations. He had oligometastatic disease to the liver found on CT scan 5/20/19. He went to [facility] and underwent IR liver lesion biopsy and ablation on 7/23/19. Pathology  "showed metastatic GIST positive for DOG-1 and .   - surveillance MRI in Feb 2020 showed progressive disease with new peritoneal mets. Patient was seen by Dr Guaman. Gleevec was recommended.   - CT after 2.5 month of Gleevec showed progressive peritoneal disease.   - underwent cytoreductive surgery at [facility] on 7/10/20  - started sutent 37.5 mg daily on 8/10/20.   - s/p ablation to segment 5 lesion and re-treatment of segment 7 lesion in Dec 2020.   - CT scan 1/12/22 showed progression. Treatment was switched to regorafenib. Started on 1/29/22  - CT 3/23/22 showed progression in liver metastases, peritoneal mets and left lung nodule  - started temodar 85 mg/m2 3 weeks on, 1 week off on 4/6/22. CT on 5/31/22 showed progression  - started pazopanib on 6/10/22 with an escalating dose, reached 800 mg daily on 6/24/22  - Held for 1 week before and 3 weeks after Y90 on 8/17/22. Resumed at 600 mg on 9/8/22. Had significant fatigue when he was on 800 mg daily. Stopped votrient on 11/10/22 for [facility] trial. Had progression on votrient  - Started rogaratinib on trial at [facility] on 11/30/2022. On 800 mg bid. Initial CT showed response. But CT 3/21/23 showed increase in size of central pelvic mass. Patient went to ER on 4/21/23 for BRBPR. CT A/P showed "Enlarging pelvic mass which involves the rectum and sigmoid colon.  Moderate colonic stool noted proximal. There are hypodense lesions within the liver which are not definitively seen on recent CT suspicious for metastatic disease progression.  Additional partially calcified lesions are stable likely treated lesions." He was hospitalized 4/24/23-5/12/23 with obstructing pelvic metastasis and GI bleed from GIST tumor.  He underwent diverting colostomy and SP tube placement on 5/4/23.  he was discharged on 5/12/23. Also had palliative radiation to pelvic mass when he was in the hospital.   - I have personally discussed her case with Dr Guaman. Dr Guaman feels ipi/nivo is the " only option if he is a candidate   - after our last discussion, patient has reviewed the handouts of ipi/nivo and spoken to people he knows who gets immunotherapy. He does not feel he is strong enough to start immunotherapy right now  - we again had long discussion. Reviewed results from phase II trial in sarcoma patients getting nivo with or without ipi. Single agent nivo is easy, but does not work. Ipi/nivo works in ~15% but it is a small study.   - patient would like to have more time to think about it. Will see him back virtually in 3 weeks.   - communicated with Dr Guaman.     5.  - monitor BP    6.  - stable  - c/w current meds    7.  - improving. Iron levels are good  - c/w iron tab 2 a day    8.  - Cr improved  - monitor    RTC in 3 weeks  Encouraged to call should issues arise      Route Chart for Scheduling    Med Onc Chart Routing  Urgent    Follow up with physician 3 weeks. see me in 3 weeks with CBC, CMP, TSH checked at local labs 1-2 days prior   Follow up with [facility]    Infusion scheduling note    Injection scheduling note    Labs CBC, CMP and TSH   Scheduling:  Preferred lab:  Lab interval:     Imaging    Pharmacy appointment    Other referrals          Therapy Plan Information  IV Fluids  sodium chloride 0.9% bolus 1,000 mL 1,000 mL  1,000 mL, Intravenous, Every visit  Flushes  sodium chloride 0.9% flush 10 mL  10 mL, Intravenous, PRN  heparin, porcine (PF) 100 unit/mL injection flush 500 Units  500 Units, Intravenous, PRN

## 2023-06-09 NOTE — PROGRESS NOTES
Care Companion Intervention    Reason for intervention: Questionnaire response  Comment:  Pain, increased bowel movements    Intervention: Education provided to patient  Comment:  SHANNAN Chua addressed concerns again with patient yesterday. Pain generally controlled with PRN medications. # of BM is related to recent colostomy. He is not currently on active treatment and is still adjusting since surgery. No new or worsening concerns. He was advised to let us know if he feels dehydrated going forward.

## 2023-06-16 NOTE — PROGRESS NOTES
"Subjective     Patient ID: Forrest Schmidt is a 78 y.o. male.    Chief Complaint: Colostomy    This is a new pt to me and he has a new colostomy  for palliative care.  He had this colostomy creation with Dr. Shaw on 5/4/23 for metastatic GIST. He is home with [facility] and doing well, he comes with his wife today and we will review all aspects of colostomy care .     Review of Systems   Constitutional:  Positive for activity change. Negative for fever.   Respiratory: Negative.     Cardiovascular: Negative.    Gastrointestinal:  Positive for constipation and diarrhea.        COLOSTOMY left sided   Genitourinary:         Suprapubic cath        Objective     Physical Exam  Constitutional:       Appearance: Normal appearance.   Pulmonary:      Effort: Pulmonary effort is normal. No respiratory distress.   Abdominal:      General: Bowel sounds are normal.      Comments: Soft to almost formed stool in pouch   Skin:     Findings: No rash.   Neurological:      Mental Status: He is alert.       Colostomy is 32 mm 11/4 " low budded with os leaning to the 9 o'clock side and this is side he has leaked on . Skin looks great   Today is wearing 2 3/4 new image flat pch with ring  I have suggested he move down a size to 21/4 flange and a precut pouch and convex for best fit and wear time, applied this today , gave him all the product numbers if he wants to get his instead , I applied a stoma belt and this he did like the support it gave  Reviewed diet, ADL and gave them New pt ostomy guide by [employer]     Assessment and Plan     1. Attention to colostomy    2. Encounter for ostomy care education        [facility] on board,  they are providing supplies   Recs on which pouch would give best seal made and written down   Will send a script to [facility] in case needed ( only if  discharges him )   Fu as needed.   I have reviewed the plan of care with the patient and/ wife and they express understanding. I spent over 50% of this 40 minute visit in face to " face counseling.           No follow-ups on file.

## 2023-06-16 NOTE — LETTER
June 16, 2023    Forrest Schmidt  [id number] [facility] [id number]             [facility] - U[facility] 4th Fl  [address]  [facility] [id number]  Phone: [phone] Dear {MR/MRS/MS/DR:[id number]} Brayan:    ***      If you have any questions or concerns, please don't hesitate to call.    Sincerely,        Kalani Dickson MD

## 2023-06-16 NOTE — Clinical Note
Thanks for sending, I like to see all new stomas at least once to make sure they get what they need.   I learned today from Zoraida that you are from [place].  Me too but moved here in 1980 and well ....the rest is history  Look forward to meeting you in person  Sultana Persaud [facility] WOCN

## 2023-06-16 NOTE — PROGRESS NOTES
CHIEF COMPLAINT:    Mr. Schmidt is a 78 y.o. male presenting for a hospital follow up after open suprapubic tube placement.      PRESENTING ILLNESS:    Forrest Schmidt is a 78 y.o. male who is presents with a history of GIST tumor who had significant LUTS.  He was really miserable having hesitancy, urgency, frequency x 15.  He underwent a diverting colostomy and just before that was done, he had an open suprapubic tube placed.  He states that he has times when he has significant urgency, will go to the toilet and feel like he has a bm but it is just mucous.  He has an 18 Fr baldwin catheter, it is draining well.  Occasional blood clot. (Discussed this is normal)      REVIEW OF SYSTEMS:    Review of Systems   Constitutional: Negative.    HENT: Negative.     Eyes: Negative.    Respiratory: Negative.     Cardiovascular: Negative.    Gastrointestinal: Negative.    Genitourinary:  Positive for frequency and urgency.   Musculoskeletal: Negative.    Skin: Negative.    Psychiatric/Behavioral: Negative.       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 2/15/2018    Osteopenia        Past Surgical History:   Procedure Laterality Date    APPENDECTOMY      COLONOSCOPY N/A 5/15/2017    Procedure: COLONOSCOPY;  Surgeon: Dez Jimenez MD;  Location: [facility];  Service: Endoscopy;  Laterality: N/A;    CORONARY STENT PLACEMENT      CREATION, COLOSTOMY, LAPAROSCOPIC N/A 5/4/2023    Procedure: CREATION, COLOSTOMY, LAPAROSCOPIC - CONVERTED TO OPEN;  Surgeon: Surinder Shaw MD;  Location: [facility] OR [address] [place] [address];  Service: General;  Laterality: N/A;    CYSTOSCOPY N/A 5/4/2023    Procedure: CYSTOSCOPY;  Surgeon: Kalani Dickson MD;  Location: [facility] OR [address] [place] [address];  Service: Urology;   Laterality: N/A;    ESOPHAGOGASTRODUODENOSCOPY      EYE SURGERY      cataract extraction    INSERTION, SUPRAPUBIC CATHETER N/A 2023    Procedure: INSERTION, OPEN SUPRAPUBIC CATHETER;  Surgeon: Kalani Dickson MD;  Location: [facility] OR [address] [place] [address];  Service: Urology;  Laterality: N/A;    SKIN BIOPSY      TONSILLECTOMY      VASECTOMY         Family History   Problem Relation Age of Onset    Breast cancer Mother     Diabetes Mother     Cancer Mother     Stroke Mother     Stroke Father     Cancer Maternal Grandmother     Heart attack Paternal Grandfather        Social History     Socioeconomic History    Marital status:    Tobacco Use    Smoking status: Former     Packs/day: 0.50     Years: 15.00     Pack years: 7.50     Types: Cigarettes     Quit date:      Years since quittin.4    Smokeless tobacco: Never    Tobacco comments:     Quit smoking about 40 yrs ago   Substance and Sexual Activity    Alcohol use: Yes     Alcohol/week: 1.0 standard drink     Types: 1 Glasses of wine per week     Comment: 2-4 glasses wine/day, 17 last drink    Drug use: No    Sexual activity: Yes     Partners: Female     Social Determinants of Health     Financial Resource Strain: Low Risk     Difficulty of Paying Living Expenses: Not hard at all   Food Insecurity: Unknown    Worried About Running Out of Food in the Last Year: Patient refused    Ran Out of Food in the Last Year: Patient refused   Transportation Needs: No Transportation Needs    Lack of Transportation (Medical): No    Lack of Transportation (Non-Medical): No   Physical Activity: Unknown    Days of Exercise per Week: 7 days   Stress: Unknown    Feeling of Stress : Patient refused   Social Connections: Unknown    Frequency of Communication with Friends and Family: Patient refused    Frequency of Social Gatherings with Friends and Family: Patient refused    Attends Sabianist Services: Patient refused    Active Member of Clubs or Organizations: Patient refused     Attends Club or Organization Meetings: Patient refused    Marital Status:    Housing Stability: Unknown    Unable to Pay for Housing in the Last Year: No    Unstable Housing in the Last Year: No       Allergies:  Augmentin [amoxicillin-pot clavulanate], Fexofenadine, Singulair [montelukast], Ace inhibitors, Captopril, Doxycycline, Horse/equine containing products, Hydrocodone, and Scopolamine hbr    Medications:  Outpatient Encounter Medications as of 6/16/2023   Medication Sig Dispense Refill    acetaminophen (TYLENOL ORAL) Take 500 mg by mouth 3 (three) times daily.      aspirin (ECOTRIN) 81 MG EC tablet Take 81 mg by mouth once daily.      atorvastatin (LIPITOR) 40 MG tablet Take 1 tablet (40 mg total) by mouth once daily. 90 tablet 3    bisacodyL (DULCOLAX) 5 mg EC tablet Take 1 tablet (5 mg total) by mouth 2 (two) times daily. 14 tablet 0    calcium carbonate (TUMS) 200 mg calcium (500 mg) chewable tablet Take 500 mg by mouth 3 (three) times daily as needed for Heartburn.      cetirizine (ZYRTEC) 5 MG tablet Take 5 mg by mouth once daily.      cholecalciferol, vitamin D3, (VITAMIN D3) 50 mcg (2,000 unit) Cap Take 1 capsule by mouth once daily. morning      cyanocobalamin (VITAMIN B-12) 1000 MCG tablet Take 1,000 mcg by mouth once daily. morning      famotidine (PEPCID) 20 MG tablet Take 20 mg by mouth 2 (two) times daily.      ferrous gluconate (FERGON) 324 MG tablet Take 324 mg by mouth 2 (two) times daily with meals.      fish oil-omega-3 fatty acids 300-1,000 mg capsule Take 1 g by mouth once daily. morning      fluticasone (FLONASE) 50 mcg/actuation nasal spray 1 spray by Each Nare route 2 (two) times daily.      guaiFENesin 1,200 mg Ta12 Take 1 tablet by mouth 2 (two) times daily as needed (cold symptoms).      hydrocortisone 2.5 % cream Apply topically 2 (two) times daily. 20 g 2    losartan (COZAAR) 100 MG tablet Take 1 tablet (100 mg total) by mouth once daily. 90 tablet 3    magnesium 250 mg  Tab Take 1 tablet by mouth once daily. morning      methocarbamoL (ROBAXIN) 500 MG Tab Take 1 tablet (500 mg total) by mouth 4 (four) times daily as needed (muscle spasm). 40 tablet 0    metoprolol succinate (TOPROL-XL) 50 MG 24 hr tablet Take 1 tablet (50 mg total) by mouth once daily. 90 tablet 3    multivitamin (THERAGRAN) per tablet Take 1 tablet by mouth once daily. morning      NIFEdipine (PROCARDIA-XL) 30 MG (OSM) 24 hr tablet Take 1 tablet (30 mg total) by mouth once daily. 90 tablet 3    nitroGLYCERIN (NITROSTAT) 0.4 MG SL tablet Place 1 tablet (0.4 mg total) under the tongue every 5 (five) minutes as needed for Chest pain. 20 tablet 3    ondansetron (ZOFRAN-ODT) 8 MG TbDL Take 1 tablet (8 mg total) by mouth every 6 (six) hours as needed (nausea). 30 tablet 2    promethazine (PHENERGAN) 25 MG tablet Take 1 tablet (25 mg total) by mouth every 6 (six) hours as needed for Nausea. 60 tablet 2    sodium chloride 5% () 5 % ophthalmic solution Place 1 drop in the right eye three times every morning      tadalafil (CIALIS) 5 MG tablet Take 5 mg by mouth once daily at 6am.      testosterone cypionate (DEPOTESTOTERONE CYPIONATE) 200 mg/mL injection Inject 200 mg into the muscle every 14 (fourteen) days.       traMADoL (ULTRAM) 50 mg tablet Take 1 tablet (50 mg total) by mouth every 4 (four) hours as needed for Pain. 10 tablet 0    vit A/vit C/vit E/zinc/copper (ICAPS AREDS ORAL) Take 1 tablet by mouth 2 (two) times a day.      ammonium lactate (LAC-HYDRIN) 12 % lotion Apply topically 2 (two) times daily.       No facility-administered encounter medications on file as of 6/16/2023.         PHYSICAL EXAMINATION:    The patient generally appears in good health, is appropriately interactive, and is in no apparent distress.    Skin: No lesions.    Mental: Cooperative with normal affect.    Neuro: Grossly intact.    HEENT: Normal. No evidence of lymphadenopathy.    Chest:  normal inspiratory effort.    Abdomen:  Soft, non-tender. No masses or organomegaly. Bladder is not palpable. No evidence of flank discomfort. No evidence of inguinal hernia. Suprapubic tube site is CDI.    Extremities: No clubbing, cyanosis, or edema      LABS:    Lab Results   Component Value Date    BUN 26 (H) 05/30/2023    CREATININE 0.9 05/30/2023       No results found for: PSA, PSADIAG, PSATOTAL, PSAFREE, PSAFREEPCT      IMPRESSION:    LUTS with hesitancy, urgency, frequency incomplete bladder emptying  Patient with history of GIST tumor    PLAN:    1. The catheter was removed.  A new 18 Fr baldwin catheter was placed without difficulty after prepping the abdomen with povidone iodine.  Patient was given a stat lock and a velcro stay  2. Orders placed to [facility] in [place] to do subsequent tube changes.

## 2023-06-30 NOTE — PROGRESS NOTES
The patient location is: home  The chief complaint leading to consultation is: f/u for GIST    Visit type: audiovisual    Face to Face time with patient: 15 min  30 minutes of total time spent on the encounter, which includes face to face time and non-face to face time preparing to see the patient (eg, review of tests), Obtaining and/or reviewing separately obtained history, Documenting clinical information in the electronic or other health record, Independently interpreting results (not separately reported) and communicating results to the patient/family/caregiver, or Care coordination (not separately reported).         Each patient to whom he or she provides medical services by telemedicine is:  (1) informed of the relationship between the physician and patient and the respective role of any other health care provider with respect to management of the patient; and (2) notified that he or she may decline to receive medical services by telemedicine and may withdraw from such care at any time.    Notes:                       Patient ID: Forrest Schmidt is a 78 y.o. male.     Chief Complaint:  Follow up for GIST, wild-type     DIAGNOSIS:   1. 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 Recurrent oligametastatic GIST in the liver found on 5/20/19. S/p ablation on 7/23/19 at [facility]  3. 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 He initially presented with melena, syncope, hypotension on 2/5/18 at OSH. Colonoscopy showed old blood in the terminal ileum. CT abdomen/pelvis showed an ileal mass that measures approximately 5 cm. He was transferred to [facility] and underwent segmental small bowel resection by Dr. Sanchez on 2/15/18. Pathology showed a  "6-cm GIST, histologic type: GIST, mixed; mitotic rate 9 mitoses/5 mm2, G2, high grade, high risk, margins negative. Cd117+, pathologic T3NxMx.  He had incisional wound infection after the surgery and took antibiotics for that.   2. Started Gleevec on 4/11/18. Foundation One results came back on 4/24/18 neg for KIT mutations. Long discussion with Mr and Mrs Schmidt on 4/25 regarding likely the lack of benefit from adjuvant Gleevec (please see note from that day for details). Mr. Schmidt would like to continue with Gleevec for now. He stopped Gleevec after he went to see Dr. Guaman at [facility]. CT abdomen/pelvis on 11/12/18 was negative for recurrent disease.   3. CT scan on 5/20/19 showed a Nonspecific hepatic hypodensity at the dome of the liver near the inferior vena cava   4. S/p liver lesion biopsy and ablation at [facility] on 7/23/19. Biopsy showed metastatic GIST positive for DOG-1 and . The amount of tumor was insufficient for molecular testing.   5. Surveillance MRI on 2/11/2020 showed "a right external iliac metastatic deposit measuring 4.6 x 3.5 cm (axial stir series 9, image 20), previously measuring 1.3 cm.  There is a metastatic deposit within the right anterior pelvis measuring 1.9 cm (series 9, image 13), previously not seen."  6. Gleevec 400 mg daily from 3/14/20 to present. CT at [facility] on 6/1/20 showed progressive disease in the peritoneal masses.   7. Underwent cytoreductive surgery at [facility] on 7/10/2020.   8. Dr Guaman recommends starting regorafenib after surgery. However, insurance does not cover it unless patient fails sutent. Patient started sutent 37.5 mg daily on 8/10/2020.   9. CT scan 10/5/20: A metastatic lesion in segment VII of the liver adjacent to the right hepatic vein and inferior vena cava remains overall stable, measuring approximately 2.1 cm (series 6 image 166). There is another lesion in segment V measuring approximately 1.1 cm (series 6 image 188), suspect for metastatic " "disease and not well seen on the prior study.  10. CT scan 12/7/2020: "Mild enlargement of liver metastases. A new (nonspecific) subcentimeter hypodensity in segment 6 can be followed on future exams. Mild areas of pneumatosis affecting the ileum in the lower abdomen. Recommend clinical correlation with abdominal pain and drug regimen." Patient underwent IR ablation to segment 5 lesion and re-treatment of segment 7 lesion. Sutent was continued           11. CT scan 9/30/21: No definitive CT evidence of new or increasing metastatic disease in the chest, abdomen or pelvis.  Decrease in size of segment 5 and 7 ablation zones/cavities. Stable subcentimeter pulmonary nodules/nodular densities.  12. CT CAP at [facility] 1/12/22: New small liver lesions are suspicious for metastases. An enlarging 4 mm left upper lobe pulmonary nodule may be infectious/inflammatory or metastatic. Treatment was changed to regorafenib. Patient started on 1/29/2022   13. Progression noted on CT scan 3/23/22: "Further increased size of multiple small hypoenhancing liver lesions, in keeping with metastases. Liver protocol CT at follow-up may be helpful for improved visualization of small liver metastases. Enlarging peritoneal nodules, in keeping with carcinomatosis. Enlarging left upper lobe nodule, concerning for metastasis."  14. Patient started temodar 85 mg/m2 daily x 21 days every 28-day cycle on 4/6/2022. Cycle 2 started on 5/5/22.   15. CT C/A/P 5/31/22 showed "Enlarging peritoneal and hepatic metastases compared to 03/23/2022. Enlarging left upper lobe pulmonary nodules suspicious for metastatic disease"  16. Treatment switched to pazopanib. Patient started on 6/10/22. Started with escalating dose. Reached 800 mg daily on 6/24/22. Held for 1 week before and 3 weeks after Y90 on 8/17/22. Resumed at 600 mg on 9/8/22. Had significant fatigue when he was on 800 mg daily. Stopped votrient on 11/10/22 for [facility] trial. Had progression on votrient  17. " "Started rogaratinib on trial at [facility] on 2022. On 800 mg bid. Initial CT showed response. But CT 3/21/23 showed increase in size of central pelvic mass. Patient went to ER on 23 for BRBPR. CT A/P showed "Enlarging pelvic mass which involves the rectum and sigmoid colon.  Moderate colonic stool noted proximal. There are hypodense lesions within the liver which are not definitively seen on recent CT suspicious for metastatic disease progression.  Additional partially calcified lesions are stable likely treated lesions." He was hospitalized 23-23 with obstructing pelvic metastasis and GI bleed from GIST tumor.  He underwent diverting colostomy and SP tube placement on 23.  he was discharged on 23. Also had palliative radiation to pelvic mass when he was in the hospital.   18. Discussed case with Dr Guaman. She recommends ipi/nivo. Discussed ipi/nivo with patient. He needs more time to think about it.      History of Present Illness:   Mr. Schmidt returns today for continued follow up. Getting stronger but still feels a little weak. Takes frequent breaks. Taking iron with stool softeners.     ECO     ROS:   See HPI, otherwise negative.      Physical Examination:   Gen: well hydrated, well developed. In no acute distress  HEENT: normocephalic, anicteric sclerae, EOMI.   Psych: pleasant and appropriate mood and affect  Neuro: alert and oriented x 4     Objective:      Laboratory Data:  Labs reviewed. Hb 10.1 stable     Imaging Data:  CT C/A/P 8/15/22:  1. Slightly enlarging hepatic metastasis compared to CT of 2022.     2. Stable and enlarging peritoneal metastasis.     3. Stable pulmonary nodules.    CT C/A/P 10/24/2022:  1. Subcentimeter pulmonary nodules, increased from previously, concerning for metastases.     2. Enlarging pelvic masses invading the anterior rectum and inseparable from the sigmoid colon.     3. Interval radio embolization of the right liver, with stable to slightly " decreased metastases in segments 5 and 6.     4. Slightly increased metastases in segments 8 and 4, noting suboptimal evaluation due to grainy images of the liver.    CT 1/24/2023 showed  posttreatment changes of the liver. Multiple hypodense lesions similar to prior CT. Pelvic pericolonic lesions slightly smaller from 5.3 x 4.7 cm to 3.3 x 3.1 cm.    Ct 4/21/23:  Impression:     1. Enlarging pelvic mass which involves the rectum and sigmoid colon.  Moderate colonic stool noted proximal E.  2. There are hypodense lesions within the liver which are not definitively seen on recent CT suspicious for metastatic disease progression.  Additional partially calcified lesions are stable likely treated lesions.    CT 5/9/23:  Impression:     Post treatment changes for metastatic GIST.     Unchanged metastases involving the liver, sigmoid colon, and rectum.     Interval postoperative changes from descending loop colostomy.  No large bowel distension.  Dilated small bowel loops with gradual tapering distally, likely ileus in the early postoperative setting.       Assessment and Plan:      1. 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 Metastasis to liver    3. 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 Stage 3b chronic kidney disease    5. Primary hypertension    6. Coronary artery disease involving native coronary artery of native heart without angina pectoris        1-3  - Mr. Schmidt is a 77 yo man with stage IIIB GIST of the small intestine  (T3N0Mx), 6 cm, mitotic rate 9 mitoses/5 mm2, s/p resection on 2/15/18. His GIST is negative for KIT, PDGFRA, SDH, BRAF, NF1, NF2 mutations. He had oligometastatic disease to the liver found on CT scan 5/20/19. He went to [facility] and underwent IR liver lesion biopsy and ablation on 7/23/19. Pathology showed metastatic GIST positive for DOG-1 and .   - surveillance MRI in Feb 2020 showed progressive disease with new peritoneal mets. Patient was seen by Dr Guaman.  "Gleevec was recommended.   - CT after 2.5 month of Gleevec showed progressive peritoneal disease.   - underwent cytoreductive surgery at [facility] on 7/10/20  - started sutent 37.5 mg daily on 8/10/20.   - s/p ablation to segment 5 lesion and re-treatment of segment 7 lesion in Dec 2020.   - CT scan 1/12/22 showed progression. Treatment was switched to regorafenib. Started on 1/29/22  - CT 3/23/22 showed progression in liver metastases, peritoneal mets and left lung nodule  - started temodar 85 mg/m2 3 weeks on, 1 week off on 4/6/22. CT on 5/31/22 showed progression  - started pazopanib on 6/10/22 with an escalating dose, reached 800 mg daily on 6/24/22  - Held for 1 week before and 3 weeks after Y90 on 8/17/22. Resumed at 600 mg on 9/8/22. Had significant fatigue when he was on 800 mg daily. Stopped votrient on 11/10/22 for [facility] trial. Had progression on votrient  - Started rogaratinib on trial at [facility] on 11/30/2022. On 800 mg bid. Initial CT showed response. But CT 3/21/23 showed increase in size of central pelvic mass. Patient went to ER on 4/21/23 for BRBPR. CT A/P showed "Enlarging pelvic mass which involves the rectum and sigmoid colon.  Moderate colonic stool noted proximal. There are hypodense lesions within the liver which are not definitively seen on recent CT suspicious for metastatic disease progression.  Additional partially calcified lesions are stable likely treated lesions." He was hospitalized 4/24/23-5/12/23 with obstructing pelvic metastasis and GI bleed from GIST tumor.  He underwent diverting colostomy and SP tube placement on 5/4/23.  he was discharged on 5/12/23. Also had palliative radiation to pelvic mass when he was in the hospital.   - I have personally discussed her case with Dr Guaman. Dr Guaman feels ipi/nivo is the only option if he is a candidate   - patient is feeling better and gaining weight. He feels he is not ready for ipi/nivo. Needs more time to recover  - RTC in 3 " weeks    4.  - Cr stable  - monitor    5.  - home log reviewed. BP good  - monitor BP    6.  - stable  - c/w current meds      RTC in 3 weeks  Encouraged to call should issues arise      Route Chart for Scheduling    Med Onc Chart Routing      Follow up with physician 3 weeks. virtual visit in 3 weeks with CBC, CMP checked at local lab 1 day prior   Follow up with [facility]    Infusion scheduling note    Injection scheduling note    Labs    Imaging    Pharmacy appointment    Other referrals        Therapy Plan Information  IV Fluids  sodium chloride 0.9% bolus 1,000 mL 1,000 mL  1,000 mL, Intravenous, Every visit  Flushes  sodium chloride 0.9% flush 10 mL  10 mL, Intravenous, PRN  heparin, porcine (PF) 100 unit/mL injection flush 500 Units  500 Units, Intravenous, PRN

## 2023-07-19 NOTE — PROGRESS NOTES
The patient location is: home  The chief complaint leading to consultation is: f/u for GIST    Visit type: audiovisual    Face to Face time with patient: 10 min  30 minutes of total time spent on the encounter, which includes face to face time and non-face to face time preparing to see the patient (eg, review of tests), Obtaining and/or reviewing separately obtained history, Documenting clinical information in the electronic or other health record, Independently interpreting results (not separately reported) and communicating results to the patient/family/caregiver, or Care coordination (not separately reported).         Each patient to whom he or she provides medical services by telemedicine is:  (1) informed of the relationship between the physician and patient and the respective role of any other health care provider with respect to management of the patient; and (2) notified that he or she may decline to receive medical services by telemedicine and may withdraw from such care at any time.    Notes:                       Patient ID: Forrest Schmidt is a 78 y.o. male.     Chief Complaint:  Follow up for GIST, wild-type     DIAGNOSIS:   1. 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 Recurrent oligametastatic GIST in the liver found on 5/20/19. S/p ablation on 7/23/19 at [facility]  3. 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 He initially presented with melena, syncope, hypotension on 2/5/18 at OSH. Colonoscopy showed old blood in the terminal ileum. CT abdomen/pelvis showed an ileal mass that measures approximately 5 cm. He was transferred to [facility] and underwent segmental small bowel resection by Dr. Sanchez on 2/15/18. Pathology showed a  "6-cm GIST, histologic type: GIST, mixed; mitotic rate 9 mitoses/5 mm2, G2, high grade, high risk, margins negative. Cd117+, pathologic T3NxMx.  He had incisional wound infection after the surgery and took antibiotics for that.   2. Started Gleevec on 4/11/18. Foundation One results came back on 4/24/18 neg for KIT mutations. Long discussion with Mr and Mrs Schmidt on 4/25 regarding likely the lack of benefit from adjuvant Gleevec (please see note from that day for details). Mr. Schmidt would like to continue with Gleevec for now. He stopped Gleevec after he went to see Dr. Guaman at [facility]. CT abdomen/pelvis on 11/12/18 was negative for recurrent disease.   3. CT scan on 5/20/19 showed a Nonspecific hepatic hypodensity at the dome of the liver near the inferior vena cava   4. S/p liver lesion biopsy and ablation at [facility] on 7/23/19. Biopsy showed metastatic GIST positive for DOG-1 and . The amount of tumor was insufficient for molecular testing.   5. Surveillance MRI on 2/11/2020 showed "a right external iliac metastatic deposit measuring 4.6 x 3.5 cm (axial stir series 9, image 20), previously measuring 1.3 cm.  There is a metastatic deposit within the right anterior pelvis measuring 1.9 cm (series 9, image 13), previously not seen."  6. Gleevec 400 mg daily from 3/14/20 to present. CT at [facility] on 6/1/20 showed progressive disease in the peritoneal masses.   7. Underwent cytoreductive surgery at [facility] on 7/10/2020.   8. Dr Guaman recommends starting regorafenib after surgery. However, insurance does not cover it unless patient fails sutent. Patient started sutent 37.5 mg daily on 8/10/2020.   9. CT scan 10/5/20: A metastatic lesion in segment VII of the liver adjacent to the right hepatic vein and inferior vena cava remains overall stable, measuring approximately 2.1 cm (series 6 image 166). There is another lesion in segment V measuring approximately 1.1 cm (series 6 image 188), suspect for metastatic " "disease and not well seen on the prior study.  10. CT scan 12/7/2020: "Mild enlargement of liver metastases. A new (nonspecific) subcentimeter hypodensity in segment 6 can be followed on future exams. Mild areas of pneumatosis affecting the ileum in the lower abdomen. Recommend clinical correlation with abdominal pain and drug regimen." Patient underwent IR ablation to segment 5 lesion and re-treatment of segment 7 lesion. Sutent was continued           11. CT scan 9/30/21: No definitive CT evidence of new or increasing metastatic disease in the chest, abdomen or pelvis.  Decrease in size of segment 5 and 7 ablation zones/cavities. Stable subcentimeter pulmonary nodules/nodular densities.  12. CT CAP at [facility] 1/12/22: New small liver lesions are suspicious for metastases. An enlarging 4 mm left upper lobe pulmonary nodule may be infectious/inflammatory or metastatic. Treatment was changed to regorafenib. Patient started on 1/29/2022   13. Progression noted on CT scan 3/23/22: "Further increased size of multiple small hypoenhancing liver lesions, in keeping with metastases. Liver protocol CT at follow-up may be helpful for improved visualization of small liver metastases. Enlarging peritoneal nodules, in keeping with carcinomatosis. Enlarging left upper lobe nodule, concerning for metastasis."  14. Patient started temodar 85 mg/m2 daily x 21 days every 28-day cycle on 4/6/2022. Cycle 2 started on 5/5/22.   15. CT C/A/P 5/31/22 showed "Enlarging peritoneal and hepatic metastases compared to 03/23/2022. Enlarging left upper lobe pulmonary nodules suspicious for metastatic disease"  16. Treatment switched to pazopanib. Patient started on 6/10/22. Started with escalating dose. Reached 800 mg daily on 6/24/22. Held for 1 week before and 3 weeks after Y90 on 8/17/22. Resumed at 600 mg on 9/8/22. Had significant fatigue when he was on 800 mg daily. Stopped votrient on 11/10/22 for [facility] trial. Had progression on votrient  17. " "Started rogaratinib on trial at [facility] on 2022. On 800 mg bid. Initial CT showed response. But CT 3/21/23 showed increase in size of central pelvic mass. Patient went to ER on 23 for BRBPR. CT A/P showed "Enlarging pelvic mass which involves the rectum and sigmoid colon.  Moderate colonic stool noted proximal. There are hypodense lesions within the liver which are not definitively seen on recent CT suspicious for metastatic disease progression.  Additional partially calcified lesions are stable likely treated lesions." He was hospitalized 23-23 with obstructing pelvic metastasis and GI bleed from GIST tumor.  He underwent diverting colostomy and SP tube placement on 23.  he was discharged on 23. Also had palliative radiation to pelvic mass when he was in the hospital.   18. Discussed case with Dr Guaman. She recommends ipi/nivo. Discussed ipi/nivo with patient. He needs more time to think about it.      History of Present Illness:   Mr. Schmidt returns today for continued follow up. Getting stronger but still feels a little weak. Not ready for ipi/nivo.     ECO     ROS:   See HPI, otherwise negative.      Physical Examination:   Gen: well hydrated, well developed. In no acute distress  HEENT: normocephalic, anicteric sclerae, EOMI.   Psych: pleasant and appropriate mood and affect  Neuro: alert and oriented x 4     Objective:      Laboratory Data:  Labs reviewed. Hb 9.7 stable     Imaging Data:  CT C/A/P 8/15/22:  1. Slightly enlarging hepatic metastasis compared to CT of 2022.     2. Stable and enlarging peritoneal metastasis.     3. Stable pulmonary nodules.    CT C/A/P 10/24/2022:  1. Subcentimeter pulmonary nodules, increased from previously, concerning for metastases.     2. Enlarging pelvic masses invading the anterior rectum and inseparable from the sigmoid colon.     3. Interval radio embolization of the right liver, with stable to slightly decreased metastases in segments 5 " and 6.     4. Slightly increased metastases in segments 8 and 4, noting suboptimal evaluation due to grainy images of the liver.    CT 1/24/2023 showed  posttreatment changes of the liver. Multiple hypodense lesions similar to prior CT. Pelvic pericolonic lesions slightly smaller from 5.3 x 4.7 cm to 3.3 x 3.1 cm.    Ct 4/21/23:  Impression:     1. Enlarging pelvic mass which involves the rectum and sigmoid colon.  Moderate colonic stool noted proximal E.  2. There are hypodense lesions within the liver which are not definitively seen on recent CT suspicious for metastatic disease progression.  Additional partially calcified lesions are stable likely treated lesions.    CT 5/9/23:  Impression:     Post treatment changes for metastatic GIST.     Unchanged metastases involving the liver, sigmoid colon, and rectum.     Interval postoperative changes from descending loop colostomy.  No large bowel distension.  Dilated small bowel loops with gradual tapering distally, likely ileus in the early postoperative setting.       Assessment and Plan:      1. 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 Metastasis to liver    3. 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 Immunodeficiency secondary to neoplasm    5. Essential hypertension    6. Stage 3b chronic kidney disease    7. Coronary artery disease involving native coronary artery of native heart without angina pectoris      1-4  - Mr. Schmidt is a 77 yo man with stage IIIB GIST of the small intestine  (T3N0Mx), 6 cm, mitotic rate 9 mitoses/5 mm2, s/p resection on 2/15/18. His GIST is negative for KIT, PDGFRA, SDH, BRAF, NF1, NF2 mutations. He had oligometastatic disease to the liver found on CT scan 5/20/19. He went to [facility] and underwent IR liver lesion biopsy and ablation on 7/23/19. Pathology showed metastatic GIST positive for DOG-1 and .   - surveillance MRI in Feb 2020 showed progressive disease with new peritoneal mets. Patient was seen by   "Deniz. Gleevec was recommended.   - CT after 2.5 month of Gleevec showed progressive peritoneal disease.   - underwent cytoreductive surgery at [facility] on 7/10/20  - started sutent 37.5 mg daily on 8/10/20.   - s/p ablation to segment 5 lesion and re-treatment of segment 7 lesion in Dec 2020.   - CT scan 1/12/22 showed progression. Treatment was switched to regorafenib. Started on 1/29/22  - CT 3/23/22 showed progression in liver metastases, peritoneal mets and left lung nodule  - started temodar 85 mg/m2 3 weeks on, 1 week off on 4/6/22. CT on 5/31/22 showed progression  - started pazopanib on 6/10/22 with an escalating dose, reached 800 mg daily on 6/24/22  - Held for 1 week before and 3 weeks after Y90 on 8/17/22. Resumed at 600 mg on 9/8/22. Had significant fatigue when he was on 800 mg daily. Stopped votrient on 11/10/22 for [facility] trial. Had progression on votrient  - Started rogaratinib on trial at [facility] on 11/30/2022. On 800 mg bid. Initial CT showed response. But CT 3/21/23 showed increase in size of central pelvic mass. Patient went to ER on 4/21/23 for BRBPR. CT A/P showed "Enlarging pelvic mass which involves the rectum and sigmoid colon.  Moderate colonic stool noted proximal. There are hypodense lesions within the liver which are not definitively seen on recent CT suspicious for metastatic disease progression.  Additional partially calcified lesions are stable likely treated lesions." He was hospitalized 4/24/23-5/12/23 with obstructing pelvic metastasis and GI bleed from GIST tumor.  He underwent diverting colostomy and SP tube placement on 5/4/23.  he was discharged on 5/12/23. Also had palliative radiation to pelvic mass when he was in the hospital.   - I have personally discussed her case with Dr Guaman. Dr Guaman feels ipi/nivo is the only option if he is a candidate   - Patient needs more time to think about ipi/nivo. He is interested in getting a CT scan  - RTC in 3 weeks with CT " scan    5.  - home log reviewed. BP good  - monitor BP    6.  - Cr stable  - monitor                      7.                    - stable. C/w current meds    RTC in 3 weeks  Encouraged to call should issues arise      Route Chart for Scheduling    Med Onc Chart Routing      Follow up with physician 3 weeks. get CBC, CMP, CT C/A/P in [place] in 3 weeks, in-person visit with me a couple of days later   Follow up with [facility]    Infusion scheduling note    Injection scheduling note    Labs CBC and CMP   Scheduling:  Preferred lab:  Lab interval:     Imaging CT chest abdomen pelvis      Pharmacy appointment    Other referrals        Therapy Plan Information  IV Fluids  sodium chloride 0.9% bolus 1,000 mL 1,000 mL  1,000 mL, Intravenous, Every visit  Flushes  sodium chloride 0.9% flush 10 mL  10 mL, Intravenous, PRN  heparin, porcine (PF) 100 unit/mL injection flush 500 Units  500 Units, Intravenous, PRN

## 2023-07-24 NOTE — PROGRESS NOTES
Pt was seen by Dr. Umana on 7/19 and concerns of ongoing diarrhea and pain were addressed. These symptoms are chronic due to the nature of his treatment for metastatic GIST.     Will continue to monitor.

## 2023-07-26 NOTE — TELEPHONE ENCOUNTER
URSULA Diallo Staff 2 days ago       Hi team. I am writing in regards to the above mutual patient. We are following him for his cancer diagnosis; however, he has concerns with pain and discomfort around his catheter. In addition, he has concerns that he may be developing an infection.     Would you be able to reach out to him to see if there is a concern with his baldwin catheter? Please let us know if there is anything that we can do to help as well. Please let me know your thoughts. Thank you so much     Toma    Spoke to the patient. He will come tomorrow, July 27 FOR 1 PM  to have his SPT tube site looked at. Patient stated that his spt tube has been pain x1 week. He stated that the catheter is draining well,  urine output is yellow. No report of  redness or drainage from the site. SPT was changed about two weeks with home health. Just concern about the pain/discomfort.    DEBBIE Bernardo

## 2023-08-02 NOTE — PROGRESS NOTES
CHIEF COMPLAINT:  SPT site check      HISTORY OF PRESENTING ILLINESS:  Forrest Schmidt is a 78 y.o. male who is presents with a history of GIST tumor who had significant LUTS. He presents to clinic today due to SPT pain that has been present x 2 weeks. He underwent a diverting colostomy and just before that was done, he had an open suprapubic tube placed, 5/4/2023. He states that he has times when he has significant urgency, will go to the toilet and feel like he has a bm but it is just mucous. He has an 18 Fr baldwin catheter, it is draining well, changed by home health RN 7/10/23. It is a silicone catheter. Occasional blood clot. (Discussed this is normal).    Onset: 2 weeks ago  Location: SPT site, last changed 7/10/23  Duration: daily, all of the time dull ache  Characteristics: sharp with manipulation/movement. Dull ache.  Aggravating factors: movement   Relieving factors: none       REVIEW OF SYSTEMS:  Review of Systems   Constitutional:  Negative for chills and fever.   HENT:  Negative for congestion and sore throat.    Respiratory:  Negative for cough and shortness of breath.    Cardiovascular:  Negative for chest pain and palpitations.   Gastrointestinal:  Negative for nausea and vomiting.   Genitourinary:  Negative for flank pain and hematuria.   Neurological:  Negative for dizziness and headaches.         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 2/15/2018    Osteopenia        Past Surgical History:   Procedure Laterality Date    APPENDECTOMY      COLONOSCOPY N/A 5/15/2017    Procedure: COLONOSCOPY;  Surgeon: Dez Jimenez MD;  Location: [facility];  Service: Endoscopy;  Laterality: N/A;    CORONARY STENT PLACEMENT      CREATION, COLOSTOMY,  LAPAROSCOPIC N/A 2023    Procedure: CREATION, COLOSTOMY, LAPAROSCOPIC - CONVERTED TO OPEN;  Surgeon: Surinder Shaw MD;  Location: [facility] OR [address] [place] [address];  Service: General;  Laterality: N/A;    CYSTOSCOPY N/A 2023    Procedure: CYSTOSCOPY;  Surgeon: Kalani Dickson MD;  [facility];  Service: Urology;  Laterality: N/A;    ESOPHAGOGASTRODUODENOSCOPY      EYE SURGERY      cataract extraction    INSERTION, SUPRAPUBIC CATHETER N/A 2023    Procedure: INSERTION, OPEN SUPRAPUBIC CATHETER;  Surgeon: Kalani Dickson MD;  [facility];  Service: Urology;  Laterality: N/A;    SKIN BIOPSY      TONSILLECTOMY      VASECTOMY         Family History   Problem Relation Age of Onset    Breast cancer Mother     Diabetes Mother     Cancer Mother     Stroke Mother     Stroke Father     Cancer Maternal Grandmother     Heart attack Paternal Grandfather        Social History     Socioeconomic History    Marital status:    Tobacco Use    Smoking status: Former     Current packs/day: 0.00     Average packs/day: 0.5 packs/day for 15.0 years (7.5 ttl pk-yrs)     Types: Cigarettes     Start date:      Quit date: 1974     Years since quittin.6    Smokeless tobacco: Never    Tobacco comments:     Quit smoking about 40 yrs ago   Substance and Sexual Activity    Alcohol use: Yes     Alcohol/week: 1.0 standard drink of alcohol     Types: 1 Glasses of wine per week     Comment: 2-4 glasses wine/day, 17 last drink    Drug use: No    Sexual activity: Yes     Partners: Female     Social Determinants of Health     Financial Resource Strain: Low Risk  (2023)    Overall Financial Resource Strain (CARDIA)     Difficulty of Paying Living Expenses: Not hard at all   Food Insecurity: Unknown (2023)    Hunger Vital Sign     Worried About Running Out of Food in the Last Year: Patient refused     Ran Out of Food in the Last Year: Patient refused   Transportation Needs: No Transportation Needs  (5/1/2023)    PRAPARE - Transportation     Lack of Transportation (Medical): No     Lack of Transportation (Non-Medical): No   Physical Activity: Unknown (5/1/2023)    Exercise Vital Sign     Days of Exercise per Week: 7 days   Stress: Unknown (5/1/2023)    [employer] [place] [employer] - Occupational Stress Questionnaire     Feeling of Stress : Patient refused   Social Connections: Unknown (5/1/2023)    Social Connection and Isolation Panel [NHANES]     Frequency of Communication with Friends and Family: Patient refused     Frequency of Social Gatherings with Friends and Family: Patient refused     Attends Protestant Services: Patient refused     Active Member of Clubs or Organizations: Patient refused     Attends Club or Organization Meetings: Patient refused     Marital Status:    Housing Stability: Unknown (5/1/2023)    Housing Stability Vital Sign     Unable to Pay for Housing in the Last Year: No     Unstable Housing in the Last Year: No       Allergies:  Augmentin [amoxicillin-pot clavulanate], Fexofenadine, Singulair [montelukast], Ace inhibitors, Captopril, Doxycycline, Horse/equine containing products, Hydrocodone, and Scopolamine hbr    Medications:    Current Outpatient Medications:     acetaminophen (TYLENOL ORAL), Take 500 mg by mouth 3 (three) times daily., Disp: , Rfl:     ammonium lactate (LAC-HYDRIN) 12 % lotion, Apply topically 2 (two) times daily., Disp: , Rfl:     aspirin (ECOTRIN) 81 MG EC tablet, Take 81 mg by mouth once daily., Disp: , Rfl:     atorvastatin (LIPITOR) 40 MG tablet, Take 1 tablet (40 mg total) by mouth once daily., Disp: 90 tablet, Rfl: 3    bisacodyL (DULCOLAX) 5 mg EC tablet, Take 1 tablet (5 mg total) by mouth 2 (two) times daily., Disp: 14 tablet, Rfl: 0    calcium carbonate (TUMS) 200 mg calcium (500 mg) chewable tablet, Take 500 mg by mouth 3 (three) times daily as needed for Heartburn., Disp: , Rfl:     cetirizine (ZYRTEC) 5 MG tablet, Take 5 mg by mouth  once daily., Disp: , Rfl:     cholecalciferol, vitamin D3, (VITAMIN D3) 50 mcg (2,000 unit) Cap, Take 1 capsule by mouth once daily. morning, Disp: , Rfl:     cyanocobalamin (VITAMIN B-12) 1000 MCG tablet, Take 1,000 mcg by mouth once daily. morning, Disp: , Rfl:     famotidine (PEPCID) 20 MG tablet, Take 20 mg by mouth 2 (two) times daily., Disp: , Rfl:     ferrous gluconate (FERGON) 324 MG tablet, Take 324 mg by mouth 2 (two) times daily with meals., Disp: , Rfl:     fish oil-omega-3 fatty acids 300-1,000 mg capsule, Take 1 g by mouth once daily. morning, Disp: , Rfl:     fluticasone (FLONASE) 50 mcg/actuation nasal spray, 1 spray by Each Nare route 2 (two) times daily., Disp: , Rfl:     guaiFENesin 1,200 mg Ta12, Take 1 tablet by mouth 2 (two) times daily as needed (cold symptoms)., Disp: , Rfl:     hydrocortisone 2.5 % cream, Apply topically 2 (two) times daily., Disp: 20 g, Rfl: 2    losartan (COZAAR) 100 MG tablet, Take 1 tablet (100 mg total) by mouth once daily., Disp: 90 tablet, Rfl: 3    magnesium 250 mg Tab, Take 1 tablet by mouth once daily. morning, Disp: , Rfl:     metoprolol succinate (TOPROL-XL) 50 MG 24 hr tablet, Take 1 tablet (50 mg total) by mouth once daily., Disp: 90 tablet, Rfl: 3    multivitamin (THERAGRAN) per tablet, Take 1 tablet by mouth once daily. morning, Disp: , Rfl:     NIFEdipine (PROCARDIA-XL) 30 MG (OSM) 24 hr tablet, Take 1 tablet (30 mg total) by mouth once daily., Disp: 90 tablet, Rfl: 3    nitroGLYCERIN (NITROSTAT) 0.4 MG SL tablet, Place 1 tablet (0.4 mg total) under the tongue every 5 (five) minutes as needed for Chest pain., Disp: 20 tablet, Rfl: 3    ondansetron (ZOFRAN-ODT) 8 MG TbDL, Take 1 tablet (8 mg total) by mouth every 6 (six) hours as needed (nausea)., Disp: 30 tablet, Rfl: 2    promethazine (PHENERGAN) 25 MG tablet, Take 1 tablet (25 mg total) by mouth every 6 (six) hours as needed for Nausea., Disp: 60 tablet, Rfl: 2    sodium chloride 5% () 5 %  ophthalmic solution, Place 1 drop in the right eye three times every morning, Disp: , Rfl:     tadalafil (CIALIS) 5 MG tablet, Take 5 mg by mouth once daily at 6am., Disp: , Rfl:     testosterone cypionate (DEPOTESTOTERONE CYPIONATE) 200 mg/mL injection, Inject 200 mg into the muscle every 14 (fourteen) days. , Disp: , Rfl:     traMADoL (ULTRAM) 50 mg tablet, Take 1 tablet (50 mg total) by mouth every 4 (four) hours as needed for Pain., Disp: 10 tablet, Rfl: 0    vit A/vit C/vit E/zinc/copper (ICAPS AREDS ORAL), Take 1 tablet by mouth 2 (two) times a day., Disp: , Rfl:     PHYSICAL EXAMINATION:  Physical Exam  Constitutional:       Appearance: Normal appearance.   HENT:      Head: Normocephalic and atraumatic.      Right Ear: External ear normal.      Left Ear: External ear normal.   Pulmonary:      Effort: Pulmonary effort is normal. No respiratory distress.   Abdominal:      Comments: Colostomy in place. SPT patent, draining well, no erythema, no granulation tissue.     Skin:     General: Skin is warm and dry.   Neurological:      General: No focal deficit present.      Mental Status: He is alert and oriented to person, place, and time.   Psychiatric:         Mood and Affect: Mood normal.         Behavior: Behavior normal.           Lab Results   Component Value Date    CREATININE 1.0 2023    EGFRNORACEVR >60.0 2023         IMPRESSION:  Encounter Diagnoses   Name Primary?    Incomplete emptying of bladder Yes    Indwelling catheter replaced     Indwelling Dove catheter present     Bladder spasm          Assessment:       1. Incomplete emptying of bladder    2. Indwelling catheter replaced    3. Indwelling Dove catheter present    4. Bladder spasm        Plan:   - Options discussed with the patient and his wife. Decision was made to proceed with SPT change for further evaluation.  Name,  and allergies verified  I removed existing SPT with some resistance due to sediment buildup on SPT itself. Old  SPT properly disposed, dirty gloves removed, hand hygiene performed. Site with some bleeding after removal.   Stoma cleaned with betadine.  I easily placed a 18 fr SPT (non-silicone) using sterile technique, urine sample obtained for culture. SPT flushed with 20 ml sterile water to confirm placement.   Clear yellow urine received and balloon inflated by with 10 ml sterile water.   Tolerated well.  Site cleaned.   Daily skin care and suprapubic catheter care discussed.  Educational materials given.  Increase water intake to help with sediment.   Voiced understanding.     Recommended against use of silicone catheter due to rigidity and sediment buildup around the catheter itself. Recommended 10 ml sterile water to inflate catheter balloon.   Barrier cream provided, use thin layer PRN    Ok for home health to change SPT in 4 weeks, will call with culture results. Discussed high likelihood of colonization with SPT. If pain has resolved with SPT change, advised against antibiotic treatment regardless of culture results.    I spent 30 minutes with the patient of which more than half was spent in direct consultation with the patient in regards to our treatment and plan.  We addressed the office findings and recent labs.   Education and recommendations of today's plan of care including home remedies and needed follow up with PCP.

## 2023-08-02 NOTE — PATIENT INSTRUCTIONS
Call a healthcare provider right away if:    You have a fever over 100.4 degrees Fahrenheit (38 degrees Celsius).  Pee is leaking around your suprapubic catheter.  You have pain or a feeling of fullness in your abdomen.  You notice blood clots or blood in your urine (hematuria).  You have little or no pee flow into the collection bag.  Your suprapubic catheter comes out.

## 2023-08-04 NOTE — TELEPHONE ENCOUNTER
Spoke with patient. SPT site is feeling better. Urine culture not indicative of infection, antibiotic treatment not recommended.

## 2023-08-14 NOTE — TELEPHONE ENCOUNTER
Sheri, this is Kota Barros, clinical pharmacist with [facility] that is part of your care team.  We have begun working on your prescription that your doctor has sent us. Our next steps include:     Working with your insurance company to obtain approval for your medication  Working with you to ensure your medication is affordable     We will be calling you along the way with updates on your medication but if you have any concerns or receive information that you would like to discuss please reach us at [phone].    Welcome call outcome: Patient/caregiver reached

## 2023-08-14 NOTE — PROGRESS NOTES
"                      Patient ID: Forrest Schmidt is a 78 y.o. male.     Chief Complaint:  Follow up for GIST, wild-type     DIAGNOSIS:   1. 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 Recurrent oligametastatic GIST in the liver found on 5/20/19. S/p ablation on 7/23/19 at [facility]  3. 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 He initially presented with melena, syncope, hypotension on 2/5/18 at OSH. Colonoscopy showed old blood in the terminal ileum. CT abdomen/pelvis showed an ileal mass that measures approximately 5 cm. He was transferred to [facility] and underwent segmental small bowel resection by Dr. Sanchez on 2/15/18. Pathology showed a 6-cm GIST, histologic type: GIST, mixed; mitotic rate 9 mitoses/5 mm2, G2, high grade, high risk, margins negative. Cd117+, pathologic T3NxMx.  He had incisional wound infection after the surgery and took antibiotics for that.   2. Started Gleevec on 4/11/18. Foundation One results came back on 4/24/18 neg for KIT mutations. Long discussion with Mr and Mrs Schmidt on 4/25 regarding likely the lack of benefit from adjuvant Gleevec (please see note from that day for details). Mr. Schmidt would like to continue with Gleevec for now. He stopped Gleevec after he went to see Dr. Guaman at [facility]. CT abdomen/pelvis on 11/12/18 was negative for recurrent disease.   3. CT scan on 5/20/19 showed a Nonspecific hepatic hypodensity at the dome of the liver near the inferior vena cava   4. S/p liver lesion biopsy and ablation at [facility] on 7/23/19. Biopsy showed metastatic GIST positive for DOG-1 and . The amount of tumor was insufficient for molecular testing.   5. Surveillance MRI on 2/11/2020 showed "a right " "external iliac metastatic deposit measuring 4.6 x 3.5 cm (axial stir series 9, image 20), previously measuring 1.3 cm.  There is a metastatic deposit within the right anterior pelvis measuring 1.9 cm (series 9, image 13), previously not seen."  6. Gleevec 400 mg daily from 3/14/20 to present. CT at [facility] on 6/1/20 showed progressive disease in the peritoneal masses.   7. Underwent cytoreductive surgery at [facility] on 7/10/2020.   8. Dr Guaman recommends starting regorafenib after surgery. However, insurance does not cover it unless patient fails sutent. Patient started sutent 37.5 mg daily on 8/10/2020.   9. CT scan 10/5/20: A metastatic lesion in segment VII of the liver adjacent to the right hepatic vein and inferior vena cava remains overall stable, measuring approximately 2.1 cm (series 6 image 166). There is another lesion in segment V measuring approximately 1.1 cm (series 6 image 188), suspect for metastatic disease and not well seen on the prior study.  10. CT scan 12/7/2020: "Mild enlargement of liver metastases. A new (nonspecific) subcentimeter hypodensity in segment 6 can be followed on future exams. Mild areas of pneumatosis affecting the ileum in the lower abdomen. Recommend clinical correlation with abdominal pain and drug regimen." Patient underwent IR ablation to segment 5 lesion and re-treatment of segment 7 lesion. Sutent was continued           11. CT scan 9/30/21: No definitive CT evidence of new or increasing metastatic disease in the chest, abdomen or pelvis.  Decrease in size of segment 5 and 7 ablation zones/cavities. Stable subcentimeter pulmonary nodules/nodular densities.  12. CT CAP at [facility] 1/12/22: New small liver lesions are suspicious for metastases. An enlarging 4 mm left upper lobe pulmonary nodule may be infectious/inflammatory or metastatic. Treatment was changed to regorafenib. Patient started on 1/29/2022   13. Progression noted on CT scan 3/23/22: "Further increased size of multiple " "small hypoenhancing liver lesions, in keeping with metastases. Liver protocol CT at follow-up may be helpful for improved visualization of small liver metastases. Enlarging peritoneal nodules, in keeping with carcinomatosis. Enlarging left upper lobe nodule, concerning for metastasis."  14. Patient started temodar 85 mg/m2 daily x 21 days every 28-day cycle on 4/6/2022. Cycle 2 started on 5/5/22.   15. CT C/A/P 5/31/22 showed "Enlarging peritoneal and hepatic metastases compared to 03/23/2022. Enlarging left upper lobe pulmonary nodules suspicious for metastatic disease"  16. Treatment switched to pazopanib. Patient started on 6/10/22. Started with escalating dose. Reached 800 mg daily on 6/24/22. Held for 1 week before and 3 weeks after Y90 on 8/17/22. Resumed at 600 mg on 9/8/22. Had significant fatigue when he was on 800 mg daily. Stopped votrient on 11/10/22 for [facility] trial. Had progression on votrient  17. Started rogaratinib on trial at [facility] on 11/30/2022. On 800 mg bid. Initial CT showed response. But CT 3/21/23 showed increase in size of central pelvic mass. Patient went to ER on 4/21/23 for BRBPR. CT A/P showed "Enlarging pelvic mass which involves the rectum and sigmoid colon.  Moderate colonic stool noted proximal. There are hypodense lesions within the liver which are not definitively seen on recent CT suspicious for metastatic disease progression.  Additional partially calcified lesions are stable likely treated lesions." He was hospitalized 4/24/23-5/12/23 with obstructing pelvic metastasis and GI bleed from GIST tumor.  He underwent diverting colostomy and SP tube placement on 5/4/23.  he was discharged on 5/12/23. Also had palliative radiation to pelvic mass when he was in the hospital.   18. Discussed case with Dr Guaman. She recommends ipi/nivo. Discussed ipi/nivo with patient. Patient did not want to start ipi/nivo. But would like to try ripretinib.      History of Present Illness:   Mr. Schmidt " returns today for continued follow up. Still feels tired, dyspnea with exertion. Noted pain with bladder and in other parts of the pelvis. Scheduled for cystoscopy. +tenesmus. +nervous all day    ECO     ROS:   See HPI, otherwise negative.      Physical Examination:   Vital signs reviewed.   Gen: well hydrated, well developed. In no acute distress  HEENT: normocephalic, pale conjunctivae, anicteric sclerae, EOMI.  Neck: supple, no cervical LAD, no JVD or thyromegaly,   Lungs: CTAB, no wheezing/rales/rhonchi  Heart: RRR, no M/R/G  Abdomen: soft, no tenderness, nondistended, no hepatosplenomegaly, BS present. Ostomy bag in place  Ext: no clubbing, cyanosis, edema.   Skin: no wounds  Psych: pleasant and appropriate mood and affect  Neuro: alert and oriented x 4, no focal neuro deficit     Objective:      Laboratory Data:  Labs reviewed. Hb 9.7 stable     Imaging Data:  CT C/A/P 8/15/22:  1. Slightly enlarging hepatic metastasis compared to CT of 2022.     2. Stable and enlarging peritoneal metastasis.     3. Stable pulmonary nodules.    CT C/A/P 10/24/2022:  1. Subcentimeter pulmonary nodules, increased from previously, concerning for metastases.     2. Enlarging pelvic masses invading the anterior rectum and inseparable from the sigmoid colon.     3. Interval radio embolization of the right liver, with stable to slightly decreased metastases in segments 5 and 6.     4. Slightly increased metastases in segments 8 and 4, noting suboptimal evaluation due to grainy images of the liver.    CT 2023 showed  posttreatment changes of the liver. Multiple hypodense lesions similar to prior CT. Pelvic pericolonic lesions slightly smaller from 5.3 x 4.7 cm to 3.3 x 3.1 cm.    Ct 23:  Impression:     1. Enlarging pelvic mass which involves the rectum and sigmoid colon.  Moderate colonic stool noted proximal E.  2. There are hypodense lesions within the liver which are not definitively seen on recent CT suspicious  for metastatic disease progression.  Additional partially calcified lesions are stable likely treated lesions.    CT 5/9/23:  Impression:     Post treatment changes for metastatic GIST.     Unchanged metastases involving the liver, sigmoid colon, and rectum.     Interval postoperative changes from descending loop colostomy.  No large bowel distension.  Dilated small bowel loops with gradual tapering distally, likely ileus in the early postoperative setting.     CT C/A/P 8/10/23:  COMPARISON:  CT abdomen and pelvis 05/09/2023, outside CT 03/21/2023     FINDINGS:  Base of neck/thoracic soft tissues: No significant abnormality.     Thoracic aorta: Left-sided aortic arch. No significant atherosclerosis or aneurysm.     Heart: Normal in size.  No pericardial effusion. Coronary artery calcifications.     Melody/Mediastinum: No pathologically enlarged lymph nodes.     Lungs/Airways: Airways are patent.  Respiratory motion limits evaluation of fine pulmonary parenchymal detail.  Multiple scattered subcentimeter solid pulmonary nodules, all of which appear slightly increased in size in comparison to the prior study dated 03/21/2023.  For reference.  There is a 6 mm nodule in the apicoposterior segment of the left upper lobe (series 6, image 143), previously 4 mm when directly remeasured.  There is a 4 mm solid nodule in the apical segment of the right upper lobe (series 6, image 118), previously 2 mm when directly remeasured.  No definite new pulmonary nodule or mass.  No consolidation, pleural effusion or pneumothorax.  Scattered thin bandlike densities at both lung bases suggestive of subsegmental atelectasis or scarring.     Esophagus: No significant abnormality.     Liver: Redemonstration of postablation changes in hepatic segments 7 and 5. Interval detrimental change with increased number and size of multiple additional hypoattenuating hepatic metastases.  For reference, there is a new 2.1 cm lesion in hepatic segment 3  (series 3, image 58).  Interval enlargement of a 3.0 cm lesion in hepatic segment 4, previously measuring 1.0 cm when directly remeasured (series 3, image 60).  Multiple additional smaller lesions are demonstrated.     Gallbladder: Cholelithiasis.  No significant wall thickening or pericholecystic fluid.     Bile Ducts: No evidence of dilated ducts.     Pancreas: No definite mass or peripancreatic fat stranding.     Spleen: Within normal limits.     Adrenals: No significant abnormality.     Kidneys/ Ureters: Left greater than right renal cortical atrophy.  Multiple stable-appearing hypodense renal lesions, most compatible with cysts, some which are too small to characterize.   No hydronephrosis or nephrolithiasis. No ureteral dilatation.     Bladder: Urinary bladder is collapsed around a suprapubic catheter.  Chronic circumferential bladder wall thickening.     Reproductive organs: Prostate gland measures 3.3 x 4.6 cm.     GI tract/Mesentery: Small hiatal hernia.  Postsurgical changes of prior small bowel and colonic resections with left lower quadrant ostomy.  No evidence of bowel obstruction or obvious inflammation. Redemonstration of a large, heterogeneous pelvic mass intimately associated with the rectum, measuring 9.5 x 8.2 by 10.0 cm in AP by TV by CC dimensions. No discrete fat plane with the adjacent seminal vesicles, prostate and posterior wall of the bladder.  Additional 3.1 cm soft tissue mass in the region of the sigmoid colon (series 3, image 139), previously measuring 3.5 cm.     Peritoneal Space: No ascites. No free air.     Lymph nodes: No pathologically enlarged lymph nodes.     Soft tissues: Prior ventral hernia repair with mesh.     Vasculature: Marked calcific atherosclerosis of the abdominal aorta and branch vessels.  No aortic aneurysm.     Bones:  Age-appropriate degenerative changes. No acute fracture or aggressive-appearing lytic or blastic lesion.     Impression:     1. Multiple scattered  subcentimeter solid, noncalcified pulmonary nodules, all of which appear slightly increased in size in comparison to the prior study dated 03/21/2023, concerning for metastases.  2. Post ablation changes in the right hepatic lobe.  Interval increased number and size of multiple hypoattenuating hepatic metastases, as above.  3. Pelvic masses concerning for metastases, with slight interval decrease in size of 1 lesion and no significant change in size of the second larger lesion.  4. Urinary bladder collapsed around a suprapubic catheter with prominent circumferential wall thickening.  Superimposed cystitis not excluded.     CT C/A/P 8/10/23:  Impression:     1. Multiple scattered subcentimeter solid, noncalcified pulmonary nodules, all of which appear slightly increased in size in comparison to the prior study dated 03/21/2023, concerning for metastases.  2. Post ablation changes in the right hepatic lobe.  Interval increased number and size of multiple hypoattenuating hepatic metastases, as above.  3. Pelvic masses concerning for metastases, with slight interval decrease in size of 1 lesion and no significant change in size of the second larger lesion.  4. Urinary bladder collapsed around a suprapubic catheter with prominent circumferential wall thickening.  Superimposed cystitis not excluded.       Assessment and Plan:      1. 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 Metastasis to liver    3. 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 Immunodeficiency secondary to neoplasm    5. Anxiety    6. Essential hypertension    7. Stage 3b chronic kidney disease    8. Coronary artery disease involving native coronary artery of native heart without angina pectoris    9. Tenesmus    10. Cancer related pain    11. Iron deficiency anemia due to chronic blood loss      1-4  - Mr. Schmidt is a 79 yo man with stage IIIB GIST of the small intestine  (T3N0Mx), 6 cm, mitotic rate 9 mitoses/5 mm2, s/p resection on  "2/15/18. His GIST is negative for KIT, PDGFRA, SDH, BRAF, NF1, NF2 mutations. He had oligometastatic disease to the liver found on CT scan 5/20/19. He went to [facility] and underwent IR liver lesion biopsy and ablation on 7/23/19. Pathology showed metastatic GIST positive for DOG-1 and .   - surveillance MRI in Feb 2020 showed progressive disease with new peritoneal mets. Patient was seen by Dr Guaman. Gleevec was recommended.   - CT after 2.5 month of Gleevec showed progressive peritoneal disease.   - underwent cytoreductive surgery at [facility] on 7/10/20  - started sutent 37.5 mg daily on 8/10/20.   - s/p ablation to segment 5 lesion and re-treatment of segment 7 lesion in Dec 2020.   - CT scan 1/12/22 showed progression. Treatment was switched to regorafenib. Started on 1/29/22  - CT 3/23/22 showed progression in liver metastases, peritoneal mets and left lung nodule  - started temodar 85 mg/m2 3 weeks on, 1 week off on 4/6/22. CT on 5/31/22 showed progression  - started pazopanib on 6/10/22 with an escalating dose, reached 800 mg daily on 6/24/22  - Held for 1 week before and 3 weeks after Y90 on 8/17/22. Resumed at 600 mg on 9/8/22. Had significant fatigue when he was on 800 mg daily. Stopped votrient on 11/10/22 for [facility] trial. Had progression on votrient  - Started rogaratinib on trial at [facility] on 11/30/2022. On 800 mg bid. Initial CT showed response. But CT 3/21/23 showed increase in size of central pelvic mass. Patient went to ER on 4/21/23 for BRBPR. CT A/P showed "Enlarging pelvic mass which involves the rectum and sigmoid colon.  Moderate colonic stool noted proximal. There are hypodense lesions within the liver which are not definitively seen on recent CT suspicious for metastatic disease progression.  Additional partially calcified lesions are stable likely treated lesions." He was hospitalized 4/24/23-5/12/23 with obstructing pelvic metastasis and GI bleed from GIST tumor.  He underwent " diverting colostomy and SP tube placement on 5/4/23.  he was discharged on 5/12/23. Also had palliative radiation to pelvic mass when he was in the hospital.   - I have personally discussed her case with Dr Guaman. Dr Guaman recc ipi/nivo  - Patient does not want to take ipi/nivo. Wants to try ripretinib. Reviewed side effects with patient in detail  - reviewed CT scan results and images with patient and wife. Discussed the main sites of progression is in the liver.   - Consented the patient to the treatment plan and the patient was educated on the planned duration of the treatment and schedule of the treatment administration.                   - repretinib sent to chemo pharmacy  - RTC in 3 weeks for toxicity check    5.  - try xanax    6.  - BP controlled  - c/w current medication    7.  - stable. Monitor    8.                    - stable. C/w current meds                     9.                    - from pelvic met. Radiated.                     10.                 - discussed morphine. He wants to try tramadol which he has at home                   11.                 - recent ferritin normal                 - c/w oral iron 54 mg a day. Recheck ferritin level on return    RTC in 3 weeks  Encouraged to call should issues arise      Route Chart for Scheduling    Med Onc Chart Routing      Follow up with physician 3 weeks. virtual with me in 3 weeks with CBC, CMP, ferritin, iron/TIBC, TSH checked in [place] 1 day prior   Follow up with [facility]    Infusion scheduling note    Injection scheduling note    Labs CBC, CMP, ferritin, iron and TIBC and TSH   Scheduling:  Preferred lab:  Lab interval:     Imaging    Pharmacy appointment    Other referrals        Therapy Plan Information  IV Fluids  sodium chloride 0.9% bolus 1,000 mL 1,000 mL  1,000 mL, Intravenous, Every visit  Flushes  sodium chloride 0.9% flush 10 mL  10 mL, Intravenous, PRN  heparin, porcine (PF) 100 unit/mL injection flush 500 Units  500 Units,  Intravenous, PRN

## 2023-08-15 NOTE — TELEPHONE ENCOUNTER
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 male, who is followed by the specialty pharmacy service for management and education.    Recent Encounters       Date Type Provider Description    08/15/2023 Specialty Pharmacy Kota Barros, [place] Initial Clinical Assessment    08/15/2023 Specialty Pharmacy Kota Barros, [place] Referral Authorization    11/16/2022 Specialty Pharmacy Epi Yee, [place] Refill Coordination    11/09/2022 Specialty Pharmacy Belen Orourke, Estella Refill Coordination    11/01/2022 Specialty Pharmacy Esthela Woodson, [place] Clinical Intervention            Current Outpatient Medications   Medication Sig    acetaminophen (TYLENOL ORAL) Take 500 mg by mouth 3 (three) times daily.    ALPRAZolam (XANAX) 0.25 MG tablet Take 1 tablet (0.25 mg total) by mouth 3 (three) times daily.    ammonium lactate (LAC-HYDRIN) 12 % lotion Apply topically 2 (two) times daily.    aspirin (ECOTRIN) 81 MG EC tablet Take 81 mg by mouth once daily.    atorvastatin (LIPITOR) 40 MG tablet Take 1 tablet (40 mg total) by mouth once daily.    bisacodyL (DULCOLAX) 5 mg EC tablet Take 1 tablet (5 mg total) by mouth 2 (two) times daily.    calcium carbonate (TUMS) 200 mg calcium (500 mg) chewable tablet Take 500 mg by mouth 3 (three) times daily as needed for Heartburn.    cetirizine (ZYRTEC) 5 MG tablet Take 5 mg by mouth once daily.    cholecalciferol, vitamin D3, (VITAMIN D3) 50 mcg (2,000 unit) Cap Take 1 capsule by mouth once daily. morning    cyanocobalamin (VITAMIN B-12) 1000 MCG  tablet Take 1,000 mcg by mouth once daily. morning    famotidine (PEPCID) 20 MG tablet Take 20 mg by mouth 2 (two) times daily.    ferrous gluconate (FERGON) 324 MG tablet Take 324 mg by mouth 2 (two) times daily with meals.    fish oil-omega-3 fatty acids 300-1,000 mg capsule Take 1 g by mouth once daily. morning    fluticasone (FLONASE) 50 mcg/actuation nasal spray 1 spray by Each Nare route 2 (two) times daily.    guaiFENesin 1,200 mg Ta12 Take 1 tablet by mouth 2 (two) times daily as needed (cold symptoms).    hydrocortisone 2.5 % cream Apply topically 2 (two) times daily.    losartan (COZAAR) 100 MG tablet Take 1 tablet (100 mg total) by mouth once daily.    magnesium 250 mg Tab Take 1 tablet by mouth once daily. morning    metoprolol succinate (TOPROL-XL) 50 MG 24 hr tablet Take 1 tablet (50 mg total) by mouth once daily.    multivitamin (THERAGRAN) per tablet Take 1 tablet by mouth once daily. morning    NIFEdipine (PROCARDIA-XL) 30 MG (OSM) 24 hr tablet Take 1 tablet (30 mg total) by mouth once daily.    nitroGLYCERIN (NITROSTAT) 0.4 MG SL tablet Place 1 tablet (0.4 mg total) under the tongue every 5 (five) minutes as needed for Chest pain.    ondansetron (ZOFRAN-ODT) 8 MG TbDL Take 1 tablet (8 mg total) by mouth every 6 (six) hours as needed (nausea).    promethazine (PHENERGAN) 25 MG tablet Take 1 tablet (25 mg total) by mouth every 6 (six) hours as needed for Nausea.    ripretinib 50 mg Tab Take 150 mg by mouth once daily.    sodium chloride 5% () 5 % ophthalmic solution Place 1 drop in the right eye three times every morning    tadalafil (CIALIS) 5 MG tablet Take 5 mg by mouth once daily at 6am.    testosterone cypionate (DEPOTESTOTERONE CYPIONATE) 200 mg/mL injection Inject 200 mg into the muscle every 14 (fourteen) days.     traMADoL (ULTRAM) 50 mg tablet Take 1 tablet (50 mg total) by mouth every 4 (four) hours as needed for Pain.    vit A/vit C/vit E/zinc/copper (ICAPS AREDS ORAL) Take 1  "tablet by mouth 2 (two) times a day.   Last reviewed on 8/15/2023  1:21 PM by Kota Barros, PharmD    Review of patient's allergies indicates:   Allergen Reactions    Augmentin [amoxicillin-pot clavulanate] Shortness Of Breath     disoriented    Fexofenadine Other (See Comments)     Pt states he can't remember the details but it was a bad effect.    Singulair [montelukast] Other (See Comments)     Pt "felt strange"  Pt "felt strange" bloating, Intestinal irritation, abd cramping      Ace inhibitors Other (See Comments)     cough    Captopril     Doxycycline Nausea And Vomiting     headache    Horse/equine containing products Hives    Hydrocodone Nausea Only    Scopolamine hbr Other (See Comments)     Nausea, headache, changes in BP    Last reviewed on  8/15/2023 1:14 PM by Kota Barros    Drug Interactions    Drug interactions evaluated: yes  Clinically relevant drug interactions identified: yes   Interactions list: Qinlock and Testosterone: CAT = C: Hypertension-Associated Agents may enhance the hypertensive effect of Androgen     Drug management plan: Qinlock and Testosterone: Consider monitoring for increases in blood pressure if androgens are coadministered with drugs that increase blood pressure.   Provided the patient with educational material regarding drug interactions: yes   Educational material comments: Patient is aware of interaction and plans to monitor blood pressure at home.           Medication Adherence             Other adherence tool: Daily routines   Support network for adherence: family member             Adverse Effects          *All other systems reviewed and are negative       Assessment Questions - Documented Responses      Flowsheet Row Most Recent Value   Assessment    Medication Reconciliation completed for patient Yes   During the past 4 weeks, has patient missed any activities due to condition or medication? No   During the past 4 weeks, did patient have any of the following urgent care " visits? None   Goals of Therapy Status Discussed (new start)   Status of the patients ability to self-administer: Is Able   All education points have been covered with patient? Yes, supplemental printed education provided   Welcome packet contents reviewed and discussed with patient? Yes   Assesment completed? Yes   Plan Therapy being initiated   Do you need to open a clinical intervention (i-vent)? No   Do you want to schedule first shipment? Yes   Medication #1 Assessment Info    Patient status New medication, New to [facility]   Is this medication appropriate for the patient? Yes   Is this medication effective? Not yet started          Refill Questions - Documented Responses      Flowsheet Row Most Recent Value   Patient Availability and HIPAA Verification    Does patient want to proceed with activity? Yes   HIPAA/medical authority confirmed? Yes   Relationship to patient of person spoken to? Self   Refill Screening Questions    When does the patient need to receive the medication? 08/17/23   Refill Delivery Questions    How will the patient receive the medication? [facility]   When does the patient need to receive the medication? 08/17/23   Shipping Address Home   Address in [facility] confirmed and updated if neccessary? Yes   Expected Copay ($) 80   Is the patient able to afford the medication copay? Yes   Payment Method CC on file   Days supply of Refill 30   Supplies needed? No supplies needed   Refill activity completed? Yes   Refill activity plan Refill scheduled   Shipment/Pickup Date: 08/16/23            Objective    He has a past medical history of Anticoagulant long-term use, Arthritis, BPH (benign prostatic hyperplasia), Coronary artery disease, Hypercholesteremia, Hypertension, Low iron, 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 (2/15/2018), and Osteopenia.    Tried/failed medications: Gleevec, Nexavar, Votrient, and Sutent    BP Readings from Last 4 Encounters:   08/14/23 125/63  "  08/02/23 136/66   06/16/23 123/66   05/18/23 (!) 105/57     Ht Readings from Last 4 Encounters:   08/14/23 5' 11" (1.803 m)   08/02/23 5' 11" (1.803 m)   06/16/23 5' 11" (1.803 m)   05/18/23 5' 11" (1.803 m)     Wt Readings from Last 4 Encounters:   08/14/23 78.9 kg (173 lb 15.1 oz)   08/02/23 78 kg (172 lb)   06/16/23 70.8 kg (156 lb)   05/18/23 71.1 kg (156 lb 12 oz)     Recent Labs   Lab Result Units 08/10/23  0953 07/26/23  1236 07/18/23  1400 06/29/23  1150   RBC M/uL 3.25 L 3.42 L 3.59 L 3.76 L   Hemoglobin g/dL 8.4 L 9.2 L 9.7 L 10.1 L   Hematocrit % 27.8 L 31.9 L 32.1 L 33.3 L   WBC K/uL 10.88 9.10 8.81 8.75   Gran # (ANC) K/uL 8.9 H 7.0 6.7 6.8   Gran % % 81.8 H 77.2 H 75.8 H 77.2 H   Platelets K/uL 413 385 351 355   Sodium mmol/L 137 138 140 137   Potassium mmol/L 5.0 4.6 4.5 4.9   Chloride mmol/L 102 104 106 102   Glucose mg/dL 125 H 89 96 90   BUN mg/dL 26 H 26 H 28 H 19   Creatinine mg/dL 1.1 1.0 1.0 0.9   Calcium mg/dL 9.7 9.3 8.9 9.5   Total Protein g/dL 7.3 6.8 6.7 6.6   Albumin g/dL 3.0 L 2.8 L 2.8 L 2.7 L   Total Bilirubin mg/dL 0.3 0.2 0.2 0.2   Alkaline Phosphatase U/L 148 H 146 H 157 H 153 H   AST U/L 23 29 27 25   ALT U/L 16 17 20 15     The goals of cancer treatment include:  Achieving remission of cancer, if possible  Reducing tumor size and spread of cancer, if remission is not possible  Minimizing pain and symptoms of the cancer  Preventing infection and other complications of treatment  Promoting adequate nutrition  Encouraging proper hydration  Improving or maintaining quality of life  Maintaining optimal therapy adherence  Minimizing and managing side effects    Goals of Therapy Status: Discussed (new start)    Assessment/Plan  Patient plans to start therapy on 08/17/23      Indication, dosage, appropriateness, effectiveness, safety and convenience of his specialty medication(s) were reviewed today.     Patient Education   Patient received education on the following:   Expectations and " possible outcomes of therapy  Proper use, timely administration, and missed dose management  Duration of therapy  Side effects, including prevention, minimization, and management  Contraindications and safety precautions  New or changed medications, including prescribe and over the counter medications and supplements  Reviews recommended vaccinations, as appropriate  Storage, safe handling, and disposal        Tasks added this encounter   2/2/2024 - Clinical Assessment (6 month recurrence)   Tasks due within next 3 months   No tasks due.     Kota Barros, PharmD  [facility]  [address] [place], [place] [address]  [facility] [address]  Phone: [phone]  Fax: [phone]

## 2023-08-16 NOTE — TELEPHONE ENCOUNTER
Qinlock is currently out of stock. Patient is aware. We will plan to get it to the patient by 8/18 pending availability. Will follow up with patient if delivery date is to change.

## 2023-08-28 PROBLEM — N30.00 ACUTE CYSTITIS WITHOUT HEMATURIA: Status: ACTIVE | Noted: 2023-01-01

## 2023-08-28 PROBLEM — K92.2 ACUTE GI BLEEDING: Status: ACTIVE | Noted: 2023-01-01

## 2023-08-28 PROBLEM — Z71.89 ACP (ADVANCE CARE PLANNING): Status: ACTIVE | Noted: 2023-01-01

## 2023-08-29 PROBLEM — D64.9 SYMPTOMATIC ANEMIA: Status: ACTIVE | Noted: 2023-01-01

## 2023-08-30 PROBLEM — R42 VERTIGO: Status: ACTIVE | Noted: 2023-01-01

## 2023-08-30 PROBLEM — T83.510A URINARY TRACT INFECTION ASSOCIATED WITH CYSTOSTOMY CATHETER: Status: ACTIVE | Noted: 2023-01-01

## 2023-08-30 PROBLEM — N39.0 URINARY TRACT INFECTION ASSOCIATED WITH CYSTOSTOMY CATHETER: Status: ACTIVE | Noted: 2023-01-01

## 2023-09-08 NOTE — PROGRESS NOTES
The patient location is: home  The chief complaint leading to consultation is: GIST    Visit type: audiovisual    Face to Face time with patient: 30 min  45 minutes of total time spent on the encounter, which includes face to face time and non-face to face time preparing to see the patient (eg, review of tests), Obtaining and/or reviewing separately obtained history, Documenting clinical information in the electronic or other health record, Independently interpreting results (not separately reported) and communicating results to the patient/family/caregiver, or Care coordination (not separately reported).         Each patient to whom he or she provides medical services by telemedicine is:  (1) informed of the relationship between the physician and patient and the respective role of any other health care provider with respect to management of the patient; and (2) notified that he or she may decline to receive medical services by telemedicine and may withdraw from such care at any time.    Notes:                         Patient ID: Forrest Schmidt is a 78 y.o. male.     Chief Complaint:  Follow up for GIST, wild-type     DIAGNOSIS:   1. 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 Recurrent oligametastatic GIST in the liver found on 5/20/19. S/p ablation on 7/23/19 at [facility]  3. 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 He initially presented with melena, syncope, hypotension on 2/5/18 at OSH. Colonoscopy showed old blood in the terminal ileum. CT abdomen/pelvis showed an ileal mass that measures approximately 5 cm. He was transferred to [facility] and underwent segmental small bowel resection by Dr. Sanchez on 2/15/18. Pathology showed a 6-cm GIST,  "histologic type: GIST, mixed; mitotic rate 9 mitoses/5 mm2, G2, high grade, high risk, margins negative. Cd117+, pathologic T3NxMx.  He had incisional wound infection after the surgery and took antibiotics for that.   2. Started Gleevec on 4/11/18. Foundation One results came back on 4/24/18 neg for KIT mutations. Long discussion with Mr and Mrs Schmidt on 4/25 regarding likely the lack of benefit from adjuvant Gleevec (please see note from that day for details). Mr. Schmidt would like to continue with Gleevec for now. He stopped Gleevec after he went to see Dr. Guaman at [facility]. CT abdomen/pelvis on 11/12/18 was negative for recurrent disease.   3. CT scan on 5/20/19 showed a Nonspecific hepatic hypodensity at the dome of the liver near the inferior vena cava   4. S/p liver lesion biopsy and ablation at [facility] on 7/23/19. Biopsy showed metastatic GIST positive for DOG-1 and . The amount of tumor was insufficient for molecular testing.   5. Surveillance MRI on 2/11/2020 showed "a right external iliac metastatic deposit measuring 4.6 x 3.5 cm (axial stir series 9, image 20), previously measuring 1.3 cm.  There is a metastatic deposit within the right anterior pelvis measuring 1.9 cm (series 9, image 13), previously not seen."  6. Gleevec 400 mg daily from 3/14/20 to present. CT at [facility] on 6/1/20 showed progressive disease in the peritoneal masses.   7. Underwent cytoreductive surgery at [facility] on 7/10/2020.   8. Dr Guaman recommends starting regorafenib after surgery. However, insurance does not cover it unless patient fails sutent. Patient started sutent 37.5 mg daily on 8/10/2020.   9. CT scan 10/5/20: A metastatic lesion in segment VII of the liver adjacent to the right hepatic vein and inferior vena cava remains overall stable, measuring approximately 2.1 cm (series 6 image 166). There is another lesion in segment V measuring approximately 1.1 cm (series 6 image 188), suspect for metastatic disease and not " "well seen on the prior study.  10. CT scan 12/7/2020: "Mild enlargement of liver metastases. A new (nonspecific) subcentimeter hypodensity in segment 6 can be followed on future exams. Mild areas of pneumatosis affecting the ileum in the lower abdomen. Recommend clinical correlation with abdominal pain and drug regimen." Patient underwent IR ablation to segment 5 lesion and re-treatment of segment 7 lesion. Sutent was continued           11. CT scan 9/30/21: No definitive CT evidence of new or increasing metastatic disease in the chest, abdomen or pelvis.  Decrease in size of segment 5 and 7 ablation zones/cavities. Stable subcentimeter pulmonary nodules/nodular densities.  12. CT CAP at [facility] 1/12/22: New small liver lesions are suspicious for metastases. An enlarging 4 mm left upper lobe pulmonary nodule may be infectious/inflammatory or metastatic. Treatment was changed to regorafenib. Patient started on 1/29/2022   13. Progression noted on CT scan 3/23/22: "Further increased size of multiple small hypoenhancing liver lesions, in keeping with metastases. Liver protocol CT at follow-up may be helpful for improved visualization of small liver metastases. Enlarging peritoneal nodules, in keeping with carcinomatosis. Enlarging left upper lobe nodule, concerning for metastasis."  14. Patient started temodar 85 mg/m2 daily x 21 days every 28-day cycle on 4/6/2022. Cycle 2 started on 5/5/22.   15. CT C/A/P 5/31/22 showed "Enlarging peritoneal and hepatic metastases compared to 03/23/2022. Enlarging left upper lobe pulmonary nodules suspicious for metastatic disease"  16. Treatment switched to pazopanib. Patient started on 6/10/22. Started with escalating dose. Reached 800 mg daily on 6/24/22. Held for 1 week before and 3 weeks after Y90 on 8/17/22. Resumed at 600 mg on 9/8/22. Had significant fatigue when he was on 800 mg daily. Stopped votrient on 11/10/22 for NIH trial. Had progression on votrient  17. Started " "rogaratinib on trial at [facility] on 2022. On 800 mg bid. Initial CT showed response. But CT 3/21/23 showed increase in size of central pelvic mass. Patient went to ER on 23 for BRBPR. CT A/P showed "Enlarging pelvic mass which involves the rectum and sigmoid colon.  Moderate colonic stool noted proximal. There are hypodense lesions within the liver which are not definitively seen on recent CT suspicious for metastatic disease progression.  Additional partially calcified lesions are stable likely treated lesions." He was hospitalized 23-23 with obstructing pelvic metastasis and GI bleed from GIST tumor.  He underwent diverting colostomy and SP tube placement on 23.  he was discharged on 23. Also had palliative radiation to pelvic mass when he was in the hospital.   18. Discussed case with Dr Guaman. She recommends ipi/nivo. Discussed ipi/nivo with patient. Patient did not want to start ipi/nivo. But would like to try ripretinib. Started ripretinib around 23.      History of Present Illness:   Mr. Schmidt returns today for continued follow up. He was hospitalized recently for dizziness after he took tramadol and xanax together. Was found to have Hb of 7.6. also has intermittent hematuria and diarrhea. Wife thinks he might be bleeding at that time. Stool is always dark from iron. EGD showed gastritis. MRI enterography unremarkable. MRI brain unremarkable. He received blood transfusion with improvement in Hb. He is feeling okay. Noted fatigue, weakness, diarrhea since started ripretinib. Noted sensitivity and pain on tongue.       ECO     ROS:   See HPI, otherwise negative.      Physical Examination:   Gen: well hydrated, well developed. In no acute distress  HEENT: normocephalic, pale conjunctivae, anicteric sclerae, EOMI.  Psych: pleasant and appropriate mood and affect  Neuro: alert and oriented x 4, no focal neuro deficit     Objective:      Laboratory Data:  Labs reviewed. Hb 9.3 " stable     Imaging Data:  CT C/A/P 8/15/22:  1. Slightly enlarging hepatic metastasis compared to CT of 5/31/2022.     2. Stable and enlarging peritoneal metastasis.     3. Stable pulmonary nodules.    CT C/A/P 10/24/2022:  1. Subcentimeter pulmonary nodules, increased from previously, concerning for metastases.     2. Enlarging pelvic masses invading the anterior rectum and inseparable from the sigmoid colon.     3. Interval radio embolization of the right liver, with stable to slightly decreased metastases in segments 5 and 6.     4. Slightly increased metastases in segments 8 and 4, noting suboptimal evaluation due to grainy images of the liver.    CT 1/24/2023 showed  posttreatment changes of the liver. Multiple hypodense lesions similar to prior CT. Pelvic pericolonic lesions slightly smaller from 5.3 x 4.7 cm to 3.3 x 3.1 cm.    Ct 4/21/23:  Impression:     1. Enlarging pelvic mass which involves the rectum and sigmoid colon.  Moderate colonic stool noted proximal E.  2. There are hypodense lesions within the liver which are not definitively seen on recent CT suspicious for metastatic disease progression.  Additional partially calcified lesions are stable likely treated lesions.    CT 5/9/23:  Impression:     Post treatment changes for metastatic GIST.     Unchanged metastases involving the liver, sigmoid colon, and rectum.     Interval postoperative changes from descending loop colostomy.  No large bowel distension.  Dilated small bowel loops with gradual tapering distally, likely ileus in the early postoperative setting.     CT C/A/P 8/10/23:  COMPARISON:  CT abdomen and pelvis 05/09/2023, outside CT 03/21/2023     FINDINGS:  Base of neck/thoracic soft tissues: No significant abnormality.     Thoracic aorta: Left-sided aortic arch. No significant atherosclerosis or aneurysm.     Heart: Normal in size.  No pericardial effusion. Coronary artery calcifications.     Melody/Mediastinum: No pathologically enlarged  lymph nodes.     Lungs/Airways: Airways are patent.  Respiratory motion limits evaluation of fine pulmonary parenchymal detail.  Multiple scattered subcentimeter solid pulmonary nodules, all of which appear slightly increased in size in comparison to the prior study dated 03/21/2023.  For reference.  There is a 6 mm nodule in the apicoposterior segment of the left upper lobe (series 6, image 143), previously 4 mm when directly remeasured.  There is a 4 mm solid nodule in the apical segment of the right upper lobe (series 6, image 118), previously 2 mm when directly remeasured.  No definite new pulmonary nodule or mass.  No consolidation, pleural effusion or pneumothorax.  Scattered thin bandlike densities at both lung bases suggestive of subsegmental atelectasis or scarring.     Esophagus: No significant abnormality.     Liver: Redemonstration of postablation changes in hepatic segments 7 and 5. Interval detrimental change with increased number and size of multiple additional hypoattenuating hepatic metastases.  For reference, there is a new 2.1 cm lesion in hepatic segment 3 (series 3, image 58).  Interval enlargement of a 3.0 cm lesion in hepatic segment 4, previously measuring 1.0 cm when directly remeasured (series 3, image 60).  Multiple additional smaller lesions are demonstrated.     Gallbladder: Cholelithiasis.  No significant wall thickening or pericholecystic fluid.     Bile Ducts: No evidence of dilated ducts.     Pancreas: No definite mass or peripancreatic fat stranding.     Spleen: Within normal limits.     Adrenals: No significant abnormality.     Kidneys/ Ureters: Left greater than right renal cortical atrophy.  Multiple stable-appearing hypodense renal lesions, most compatible with cysts, some which are too small to characterize.   No hydronephrosis or nephrolithiasis. No ureteral dilatation.     Bladder: Urinary bladder is collapsed around a suprapubic catheter.  Chronic circumferential bladder  wall thickening.     Reproductive organs: Prostate gland measures 3.3 x 4.6 cm.     GI tract/Mesentery: Small hiatal hernia.  Postsurgical changes of prior small bowel and colonic resections with left lower quadrant ostomy.  No evidence of bowel obstruction or obvious inflammation. Redemonstration of a large, heterogeneous pelvic mass intimately associated with the rectum, measuring 9.5 x 8.2 by 10.0 cm in AP by TV by CC dimensions. No discrete fat plane with the adjacent seminal vesicles, prostate and posterior wall of the bladder.  Additional 3.1 cm soft tissue mass in the region of the sigmoid colon (series 3, image 139), previously measuring 3.5 cm.     Peritoneal Space: No ascites. No free air.     Lymph nodes: No pathologically enlarged lymph nodes.     Soft tissues: Prior ventral hernia repair with mesh.     Vasculature: Marked calcific atherosclerosis of the abdominal aorta and branch vessels.  No aortic aneurysm.     Bones:  Age-appropriate degenerative changes. No acute fracture or aggressive-appearing lytic or blastic lesion.     Impression:     1. Multiple scattered subcentimeter solid, noncalcified pulmonary nodules, all of which appear slightly increased in size in comparison to the prior study dated 03/21/2023, concerning for metastases.  2. Post ablation changes in the right hepatic lobe.  Interval increased number and size of multiple hypoattenuating hepatic metastases, as above.  3. Pelvic masses concerning for metastases, with slight interval decrease in size of 1 lesion and no significant change in size of the second larger lesion.  4. Urinary bladder collapsed around a suprapubic catheter with prominent circumferential wall thickening.  Superimposed cystitis not excluded.     CT C/A/P 8/10/23:  Impression:     1. Multiple scattered subcentimeter solid, noncalcified pulmonary nodules, all of which appear slightly increased in size in comparison to the prior study dated 03/21/2023, concerning for  metastases.  2. Post ablation changes in the right hepatic lobe.  Interval increased number and size of multiple hypoattenuating hepatic metastases, as above.  3. Pelvic masses concerning for metastases, with slight interval decrease in size of 1 lesion and no significant change in size of the second larger lesion.  4. Urinary bladder collapsed around a suprapubic catheter with prominent circumferential wall thickening.  Superimposed cystitis not excluded.       Assessment and Plan:      1. 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 Metastasis to liver    3. 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 Immunodeficiency secondary to neoplasm    5. Immunodeficiency due to drugs    6. Mucositis    7. Diarrhea, unspecified type    8. Acute blood loss anemia    9. Essential hypertension    10. Coronary artery disease involving native coronary artery of native heart without angina pectoris    11. Stage 3b chronic kidney disease      1-4  - Mr. Schmidt is a 77 yo man with stage IIIB GIST of the small intestine  (T3N0Mx), 6 cm, mitotic rate 9 mitoses/5 mm2, s/p resection on 2/15/18. His GIST is negative for KIT, PDGFRA, SDH, BRAF, NF1, NF2 mutations. He had oligometastatic disease to the liver found on CT scan 5/20/19. He went to [facility] and underwent IR liver lesion biopsy and ablation on 7/23/19. Pathology showed metastatic GIST positive for DOG-1 and .   - surveillance MRI in Feb 2020 showed progressive disease with new peritoneal mets. Patient was seen by Dr Guaman. Gleevec was recommended.   - CT after 2.5 month of Gleevec showed progressive peritoneal disease.   - underwent cytoreductive surgery at [facility] on 7/10/20  - started sutent 37.5 mg daily on 8/10/20.   - s/p ablation to segment 5 lesion and re-treatment of segment 7 lesion in Dec 2020.   - CT scan 1/12/22 showed progression. Treatment was switched to regorafenib. Started on 1/29/22  - CT 3/23/22 showed progression in liver  "metastases, peritoneal mets and left lung nodule  - started temodar 85 mg/m2 3 weeks on, 1 week off on 4/6/22. CT on 5/31/22 showed progression  - started pazopanib on 6/10/22 with an escalating dose, reached 800 mg daily on 6/24/22  - Held for 1 week before and 3 weeks after Y90 on 8/17/22. Resumed at 600 mg on 9/8/22. Had significant fatigue when he was on 800 mg daily. Stopped votrient on 11/10/22 for NIH trial. Had progression on votrient  - Started rogaratinib on trial at [facility] on 11/30/2022. On 800 mg bid. Initial CT showed response. But CT 3/21/23 showed increase in size of central pelvic mass. Patient went to ER on 4/21/23 for BRBPR. CT A/P showed "Enlarging pelvic mass which involves the rectum and sigmoid colon.  Moderate colonic stool noted proximal. There are hypodense lesions within the liver which are not definitively seen on recent CT suspicious for metastatic disease progression.  Additional partially calcified lesions are stable likely treated lesions." He was hospitalized 4/24/23-5/12/23 with obstructing pelvic metastasis and GI bleed from GIST tumor.  He underwent diverting colostomy and SP tube placement on 5/4/23.  he was discharged on 5/12/23. Also had palliative radiation to pelvic mass when he was in the hospital.   - I have personally discussed her case with Dr Guaman. Dr Guaman [facility] ipi/nivo  - Patient does not want to take ipi/nivo. Wants to try ripretinib.   - Ripretinib started around 8/17/23  - c/w ripretinib 150 mg daily. Management of side effects see below  - Weekly CBC, CMP to monitor Hb given recent bleeding  - virtual in 2 weeks for toxicity check    6.  - Rollins's solution prescribed    7.  - imodium/lomotil prn    8.  - Hb stable  - pelvic mass growing into the rectum has been radiated. He is diverted  - monitor CBC weekly. Transfuse as needed    9.  - BP high when on ripretinib. Toprol increased from 50 mg to 100 mg daily  - monitor    10.  - stable  - c/w current meds    11.  - " Cr stable  - encouraged oral hydration. Monitor Cr given diarrhea    RTC in 2 weeks  Encouraged to call should issues arise      Route Chart for Scheduling    Med Onc Chart Routing      Follow up with physician 2 weeks. virtual with me in 2 weeks   Follow up with [facility]    Infusion scheduling note    Injection scheduling note    Labs    Imaging    Pharmacy appointment    Other referrals        Therapy Plan Information  IV Fluids  sodium chloride 0.9% bolus 1,000 mL 1,000 mL  1,000 mL, Intravenous, Every visit  Flushes  sodium chloride 0.9% flush 10 mL  10 mL, Intravenous, PRN  heparin, porcine (PF) 100 unit/mL injection flush 500 Units  500 Units, Intravenous, PRN

## 2023-09-08 NOTE — PROGRESS NOTES
RENEE received consult from Dr. Umana. Subsequent HH orders written. RENEE faxed to [facility] at [phone].

## 2023-09-21 NOTE — PROGRESS NOTES
RENEE notified by Dr. Umana that patient needs labs before her appointment on 9/25.     Dr. Umana placed subsequent orders. RENEE faxed to [facility].

## 2023-09-25 NOTE — PROGRESS NOTES
Social Work Consult    Name:Forrest Schmidt  : 1945  MRN: [id number]    Referral:Home Health    SW received secure chat from Dr. Umana requesting subsequent  orders for weekly lab draws be sent. SW faxed subsequent orders and confirmed with [facility] that patient is active with them.

## 2023-09-25 NOTE — TELEPHONE ENCOUNTER
----- Message from Emely Patino sent at 9/25/2023  1:34 PM CDT -----  Pt stated he is at [facility] and will not make the 2:00 appointment. Call back number is .[phone]. Tlhx EL

## 2023-09-25 NOTE — PROGRESS NOTES
The patient location is: home  The chief complaint leading to consultation is: GIST    Visit type: audiovisual    Face to Face time with patient: 30 min  45 minutes of total time spent on the encounter, which includes face to face time and non-face to face time preparing to see the patient (eg, review of tests), Obtaining and/or reviewing separately obtained history, Documenting clinical information in the electronic or other health record, Independently interpreting results (not separately reported) and communicating results to the patient/family/caregiver, or Care coordination (not separately reported).         Each patient to whom he or she provides medical services by telemedicine is:  (1) informed of the relationship between the physician and patient and the respective role of any other health care provider with respect to management of the patient; and (2) notified that he or she may decline to receive medical services by telemedicine and may withdraw from such care at any time.    Notes:                         Patient ID: Forrest Schmidt is a 78 y.o. male.     Chief Complaint:  Follow up for GIST, wild-type     DIAGNOSIS:   1. 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 Recurrent oligametastatic GIST in the liver found on 5/20/19. S/p ablation on 7/23/19 at [facility]  3. 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 He initially presented with melena, syncope, hypotension on 2/5/18 at OSH. Colonoscopy showed old blood in the terminal ileum. CT abdomen/pelvis showed an ileal mass that measures approximately 5 cm. He was transferred to [facility] and underwent segmental small bowel resection by Dr. Sanchez on 2/15/18. Pathology showed a 6-cm GIST,  "histologic type: GIST, mixed; mitotic rate 9 mitoses/5 mm2, G2, high grade, high risk, margins negative. Cd117+, pathologic T3NxMx.  He had incisional wound infection after the surgery and took antibiotics for that.   2. Started Gleevec on 4/11/18. Foundation One results came back on 4/24/18 neg for KIT mutations. Long discussion with Mr and Mrs Schmidt on 4/25 regarding likely the lack of benefit from adjuvant Gleevec (please see note from that day for details). Mr. Schmidt would like to continue with Gleevec for now. He stopped Gleevec after he went to see Dr. Guaman at [facility]. CT abdomen/pelvis on 11/12/18 was negative for recurrent disease.   3. CT scan on 5/20/19 showed a Nonspecific hepatic hypodensity at the dome of the liver near the inferior vena cava   4. S/p liver lesion biopsy and ablation at [facility] on 7/23/19. Biopsy showed metastatic GIST positive for DOG-1 and . The amount of tumor was insufficient for molecular testing.   5. Surveillance MRI on 2/11/2020 showed "a right external iliac metastatic deposit measuring 4.6 x 3.5 cm (axial stir series 9, image 20), previously measuring 1.3 cm.  There is a metastatic deposit within the right anterior pelvis measuring 1.9 cm (series 9, image 13), previously not seen."  6. Gleevec 400 mg daily from 3/14/20 to present. CT at [facility] on 6/1/20 showed progressive disease in the peritoneal masses.   7. Underwent cytoreductive surgery at [facility] on 7/10/2020.   8. Dr Guaman recommends starting regorafenib after surgery. However, insurance does not cover it unless patient fails sutent. Patient started sutent 37.5 mg daily on 8/10/2020.   9. CT scan 10/5/20: A metastatic lesion in segment VII of the liver adjacent to the right hepatic vein and inferior vena cava remains overall stable, measuring approximately 2.1 cm (series 6 image 166). There is another lesion in segment V measuring approximately 1.1 cm (series 6 image 188), suspect for metastatic disease and not " "well seen on the prior study.  10. CT scan 12/7/2020: "Mild enlargement of liver metastases. A new (nonspecific) subcentimeter hypodensity in segment 6 can be followed on future exams. Mild areas of pneumatosis affecting the ileum in the lower abdomen. Recommend clinical correlation with abdominal pain and drug regimen." Patient underwent IR ablation to segment 5 lesion and re-treatment of segment 7 lesion. Sutent was continued           11. CT scan 9/30/21: No definitive CT evidence of new or increasing metastatic disease in the chest, abdomen or pelvis.  Decrease in size of segment 5 and 7 ablation zones/cavities. Stable subcentimeter pulmonary nodules/nodular densities.  12. CT CAP at [facility] 1/12/22: New small liver lesions are suspicious for metastases. An enlarging 4 mm left upper lobe pulmonary nodule may be infectious/inflammatory or metastatic. Treatment was changed to regorafenib. Patient started on 1/29/2022   13. Progression noted on CT scan 3/23/22: "Further increased size of multiple small hypoenhancing liver lesions, in keeping with metastases. Liver protocol CT at follow-up may be helpful for improved visualization of small liver metastases. Enlarging peritoneal nodules, in keeping with carcinomatosis. Enlarging left upper lobe nodule, concerning for metastasis."  14. Patient started temodar 85 mg/m2 daily x 21 days every 28-day cycle on 4/6/2022. Cycle 2 started on 5/5/22.   15. CT C/A/P 5/31/22 showed "Enlarging peritoneal and hepatic metastases compared to 03/23/2022. Enlarging left upper lobe pulmonary nodules suspicious for metastatic disease"  16. Treatment switched to pazopanib. Patient started on 6/10/22. Started with escalating dose. Reached 800 mg daily on 6/24/22. Held for 1 week before and 3 weeks after Y90 on 8/17/22. Resumed at 600 mg on 9/8/22. Had significant fatigue when he was on 800 mg daily. Stopped votrient on 11/10/22 for [facility] trial. Had progression on votrient  17. Started " "rogaratinib on trial at [facility] on 2022. On 800 mg bid. Initial CT showed response. But CT 3/21/23 showed increase in size of central pelvic mass. Patient went to ER on 23 for BRBPR. CT A/P showed "Enlarging pelvic mass which involves the rectum and sigmoid colon.  Moderate colonic stool noted proximal. There are hypodense lesions within the liver which are not definitively seen on recent CT suspicious for metastatic disease progression.  Additional partially calcified lesions are stable likely treated lesions." He was hospitalized 23-23 with obstructing pelvic metastasis and GI bleed from GIST tumor.  He underwent diverting colostomy and SP tube placement on 23.  he was discharged on 23. Also had palliative radiation to pelvic mass when he was in the hospital.   18. Discussed case with Dr Guaman. She recommends ipi/nivo. Discussed ipi/nivo with patient. Patient did not want to start ipi/nivo. But would like to try ripretinib. Started ripretinib around 23.      History of Present Illness:   Mr. Schmidt returns today for continued follow up. He was hospitalized again for Hb of 6.9 with hematuria. Had cystoscopy. Had clots in urine that were flushed out per patient. He felt better after blood transfusion but feels lightheaded and weak this morning when he got up.       ECO     ROS:   See HPI, otherwise negative.      Physical Examination:   Gen: well hydrated, well developed. In no acute distress  HEENT: normocephalic, pale conjunctivae, anicteric sclerae, EOMI.  Psych: pleasant and appropriate mood and affect  Neuro: alert and oriented x 4, no focal neuro deficit     Objective:      Laboratory Data:  Labs reviewed. Hb 9.1 last week     Imaging Data:  CT C/A/P 8/15/22:  1. Slightly enlarging hepatic metastasis compared to CT of 2022.     2. Stable and enlarging peritoneal metastasis.     3. Stable pulmonary nodules.    CT C/A/P 10/24/2022:  1. Subcentimeter pulmonary nodules, " increased from previously, concerning for metastases.     2. Enlarging pelvic masses invading the anterior rectum and inseparable from the sigmoid colon.     3. Interval radio embolization of the right liver, with stable to slightly decreased metastases in segments 5 and 6.     4. Slightly increased metastases in segments 8 and 4, noting suboptimal evaluation due to grainy images of the liver.    CT 1/24/2023 showed  posttreatment changes of the liver. Multiple hypodense lesions similar to prior CT. Pelvic pericolonic lesions slightly smaller from 5.3 x 4.7 cm to 3.3 x 3.1 cm.    Ct 4/21/23:  Impression:     1. Enlarging pelvic mass which involves the rectum and sigmoid colon.  Moderate colonic stool noted proximal E.  2. There are hypodense lesions within the liver which are not definitively seen on recent CT suspicious for metastatic disease progression.  Additional partially calcified lesions are stable likely treated lesions.    CT 5/9/23:  Impression:     Post treatment changes for metastatic GIST.     Unchanged metastases involving the liver, sigmoid colon, and rectum.     Interval postoperative changes from descending loop colostomy.  No large bowel distension.  Dilated small bowel loops with gradual tapering distally, likely ileus in the early postoperative setting.     CT C/A/P 8/10/23:  COMPARISON:  CT abdomen and pelvis 05/09/2023, outside CT 03/21/2023     FINDINGS:  Base of neck/thoracic soft tissues: No significant abnormality.     Thoracic aorta: Left-sided aortic arch. No significant atherosclerosis or aneurysm.     Heart: Normal in size.  No pericardial effusion. Coronary artery calcifications.     Melody/Mediastinum: No pathologically enlarged lymph nodes.     Lungs/Airways: Airways are patent.  Respiratory motion limits evaluation of fine pulmonary parenchymal detail.  Multiple scattered subcentimeter solid pulmonary nodules, all of which appear slightly increased in size in comparison to the prior  study dated 03/21/2023.  For reference.  There is a 6 mm nodule in the apicoposterior segment of the left upper lobe (series 6, image 143), previously 4 mm when directly remeasured.  There is a 4 mm solid nodule in the apical segment of the right upper lobe (series 6, image 118), previously 2 mm when directly remeasured.  No definite new pulmonary nodule or mass.  No consolidation, pleural effusion or pneumothorax.  Scattered thin bandlike densities at both lung bases suggestive of subsegmental atelectasis or scarring.     Esophagus: No significant abnormality.     Liver: Redemonstration of postablation changes in hepatic segments 7 and 5. Interval detrimental change with increased number and size of multiple additional hypoattenuating hepatic metastases.  For reference, there is a new 2.1 cm lesion in hepatic segment 3 (series 3, image 58).  Interval enlargement of a 3.0 cm lesion in hepatic segment 4, previously measuring 1.0 cm when directly remeasured (series 3, image 60).  Multiple additional smaller lesions are demonstrated.     Gallbladder: Cholelithiasis.  No significant wall thickening or pericholecystic fluid.     Bile Ducts: No evidence of dilated ducts.     Pancreas: No definite mass or peripancreatic fat stranding.     Spleen: Within normal limits.     Adrenals: No significant abnormality.     Kidneys/ Ureters: Left greater than right renal cortical atrophy.  Multiple stable-appearing hypodense renal lesions, most compatible with cysts, some which are too small to characterize.   No hydronephrosis or nephrolithiasis. No ureteral dilatation.     Bladder: Urinary bladder is collapsed around a suprapubic catheter.  Chronic circumferential bladder wall thickening.     Reproductive organs: Prostate gland measures 3.3 x 4.6 cm.     GI tract/Mesentery: Small hiatal hernia.  Postsurgical changes of prior small bowel and colonic resections with left lower quadrant ostomy.  No evidence of bowel obstruction or  obvious inflammation. Redemonstration of a large, heterogeneous pelvic mass intimately associated with the rectum, measuring 9.5 x 8.2 by 10.0 cm in AP by TV by CC dimensions. No discrete fat plane with the adjacent seminal vesicles, prostate and posterior wall of the bladder.  Additional 3.1 cm soft tissue mass in the region of the sigmoid colon (series 3, image 139), previously measuring 3.5 cm.     Peritoneal Space: No ascites. No free air.     Lymph nodes: No pathologically enlarged lymph nodes.     Soft tissues: Prior ventral hernia repair with mesh.     Vasculature: Marked calcific atherosclerosis of the abdominal aorta and branch vessels.  No aortic aneurysm.     Bones:  Age-appropriate degenerative changes. No acute fracture or aggressive-appearing lytic or blastic lesion.     Impression:     1. Multiple scattered subcentimeter solid, noncalcified pulmonary nodules, all of which appear slightly increased in size in comparison to the prior study dated 03/21/2023, concerning for metastases.  2. Post ablation changes in the right hepatic lobe.  Interval increased number and size of multiple hypoattenuating hepatic metastases, as above.  3. Pelvic masses concerning for metastases, with slight interval decrease in size of 1 lesion and no significant change in size of the second larger lesion.  4. Urinary bladder collapsed around a suprapubic catheter with prominent circumferential wall thickening.  Superimposed cystitis not excluded.     CT C/A/P 8/10/23:  Impression:     1. Multiple scattered subcentimeter solid, noncalcified pulmonary nodules, all of which appear slightly increased in size in comparison to the prior study dated 03/21/2023, concerning for metastases.  2. Post ablation changes in the right hepatic lobe.  Interval increased number and size of multiple hypoattenuating hepatic metastases, as above.  3. Pelvic masses concerning for metastases, with slight interval decrease in size of 1 lesion and no  significant change in size of the second larger lesion.  4. Urinary bladder collapsed around a suprapubic catheter with prominent circumferential wall thickening.  Superimposed cystitis not excluded.       Assessment and Plan:      1. 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 GIST (gastrointestinal stroma tumor), malignant, colon    3. Metastasis to liver    4. 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 Immunodeficiency secondary to neoplasm    6. Immunodeficiency due to drugs    7. Essential hypertension    8. Mucositis    9. Acute blood loss anemia    10. Coronary artery disease involving native coronary artery of native heart without angina pectoris    11. Stage 3b chronic kidney disease        1-6  - Mr. Schmidt is a 77 yo man with stage IIIB GIST of the small intestine  (T3N0Mx), 6 cm, mitotic rate 9 mitoses/5 mm2, s/p resection on 2/15/18. His GIST is negative for KIT, PDGFRA, SDH, BRAF, NF1, NF2 mutations. He had oligometastatic disease to the liver found on CT scan 5/20/19. He went to [facility] and underwent IR liver lesion biopsy and ablation on 7/23/19. Pathology showed metastatic GIST positive for DOG-1 and .   - surveillance MRI in Feb 2020 showed progressive disease with new peritoneal mets. Patient was seen by Dr Guaman. Gleevec was recommended.   - CT after 2.5 month of Gleevec showed progressive peritoneal disease.   - underwent cytoreductive surgery at [facility] on 7/10/20  - started sutent 37.5 mg daily on 8/10/20.   - s/p ablation to segment 5 lesion and re-treatment of segment 7 lesion in Dec 2020.   - CT scan 1/12/22 showed progression. Treatment was switched to regorafenib. Started on 1/29/22  - CT 3/23/22 showed progression in liver metastases, peritoneal mets and left lung nodule  - started temodar 85 mg/m2 3 weeks on, 1 week off on 4/6/22. CT on 5/31/22 showed progression  - started pazopanib on 6/10/22 with an escalating dose, reached 800 mg daily on 6/24/22  -  "Held for 1 week before and 3 weeks after Y90 on 8/17/22. Resumed at 600 mg on 9/8/22. Had significant fatigue when he was on 800 mg daily. Stopped votrient on 11/10/22 for [facility] trial. Had progression on votrient  - Started rogaratinib on trial at [facility] on 11/30/2022. On 800 mg bid. Initial CT showed response. But CT 3/21/23 showed increase in size of central pelvic mass. Patient went to ER on 4/21/23 for BRBPR. CT A/P showed "Enlarging pelvic mass which involves the rectum and sigmoid colon.  Moderate colonic stool noted proximal. There are hypodense lesions within the liver which are not definitively seen on recent CT suspicious for metastatic disease progression.  Additional partially calcified lesions are stable likely treated lesions." He was hospitalized 4/24/23-5/12/23 with obstructing pelvic metastasis and GI bleed from GIST tumor.  He underwent diverting colostomy and SP tube placement on 5/4/23.  he was discharged on 5/12/23. Also had palliative radiation to pelvic mass when he was in the hospital.   - I have personally discussed her case with Dr Guaman. Dr Guaman recc ipi/nivo  - Patient does not want to take ipi/nivo. Wants to try ripretinib.   - Ripretinib started around 8/17/23  - c/w ripretinib 150 mg daily. Will do restaging CT to see if it is working. He has needed blood transfusion twice since he started ripretinib.   - Weekly CBC, CMP to monitor Hb. Check it today  - see me after CT scan    7.  - monitor BP at home.     8.  - tongue sensitivity. C/w [facility]'s solution prn    9.  - see above. Monitor labs closely. Check CBC CMP today. [facility] will be preferred place for transfusion    10.  - c/w current meds. Asymptomatic    11.  - Cr stable. monitor    RTC in 2 weeks  Encouraged to call should issues arise      Route Chart for Scheduling    Med Onc Chart Routing  Urgent    Follow up with physician 2 weeks. CBC, CMP today at [place]. first available CT C/A/P. see me 1-2 days later   Follow " up with [facility]    Infusion scheduling note    Injection scheduling note    Labs CBC and CMP   Scheduling:  Preferred lab:  Lab interval:     Imaging CT chest abdomen pelvis      Pharmacy appointment    Other referrals

## 2023-09-27 NOTE — PROGRESS NOTES
The patient location is: home  The chief complaint leading to consultation is: GIST    Visit type: audiovisual    Face to Face time with patient: 30 min  45 minutes of total time spent on the encounter, which includes face to face time and non-face to face time preparing to see the patient (eg, review of tests), Obtaining and/or reviewing separately obtained history, Documenting clinical information in the electronic or other health record, Independently interpreting results (not separately reported) and communicating results to the patient/family/caregiver, or Care coordination (not separately reported).         Each patient to whom he or she provides medical services by telemedicine is:  (1) informed of the relationship between the physician and patient and the respective role of any other health care provider with respect to management of the patient; and (2) notified that he or she may decline to receive medical services by telemedicine and may withdraw from such care at any time.    Notes:                         Patient ID: Forrest Schmidt is a 78 y.o. male.     Chief Complaint:  Follow up for GIST, wild-type     DIAGNOSIS:   1. 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 Recurrent oligametastatic GIST in the liver found on 5/20/19. S/p ablation on 7/23/19 at [facility]  3. 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 He initially presented with melena, syncope, hypotension on 2/5/18 at OSH. Colonoscopy showed old blood in the terminal ileum. CT abdomen/pelvis showed an ileal mass that measures approximately 5 cm. He was transferred to [facility] and underwent segmental small bowel resection by Dr. Sanchez on 2/15/18. Pathology showed a 6-cm GIST,  "histologic type: GIST, mixed; mitotic rate 9 mitoses/5 mm2, G2, high grade, high risk, margins negative. Cd117+, pathologic T3NxMx.  He had incisional wound infection after the surgery and took antibiotics for that.   2. Started Gleevec on 4/11/18. Foundation One results came back on 4/24/18 neg for KIT mutations. Long discussion with Mr and Mrs Schmidt on 4/25 regarding likely the lack of benefit from adjuvant Gleevec (please see note from that day for details). Mr. Schmidt would like to continue with Gleevec for now. He stopped Gleevec after he went to see Dr. Guaman at [facility]. CT abdomen/pelvis on 11/12/18 was negative for recurrent disease.   3. CT scan on 5/20/19 showed a Nonspecific hepatic hypodensity at the dome of the liver near the inferior vena cava   4. S/p liver lesion biopsy and ablation at [facility] on 7/23/19. Biopsy showed metastatic GIST positive for DOG-1 and . The amount of tumor was insufficient for molecular testing.   5. Surveillance MRI on 2/11/2020 showed "a right external iliac metastatic deposit measuring 4.6 x 3.5 cm (axial stir series 9, image 20), previously measuring 1.3 cm.  There is a metastatic deposit within the right anterior pelvis measuring 1.9 cm (series 9, image 13), previously not seen."  6. Gleevec 400 mg daily from 3/14/20 to present. CT at [facility] on 6/1/20 showed progressive disease in the peritoneal masses.   7. Underwent cytoreductive surgery at [facility] on 7/10/2020.   8. Dr Guaman recommends starting regorafenib after surgery. However, insurance does not cover it unless patient fails sutent. Patient started sutent 37.5 mg daily on 8/10/2020.   9. CT scan 10/5/20: A metastatic lesion in segment VII of the liver adjacent to the right hepatic vein and inferior vena cava remains overall stable, measuring approximately 2.1 cm (series 6 image 166). There is another lesion in segment V measuring approximately 1.1 cm (series 6 image 188), suspect for metastatic disease and not " "well seen on the prior study.  10. CT scan 12/7/2020: "Mild enlargement of liver metastases. A new (nonspecific) subcentimeter hypodensity in segment 6 can be followed on future exams. Mild areas of pneumatosis affecting the ileum in the lower abdomen. Recommend clinical correlation with abdominal pain and drug regimen." Patient underwent IR ablation to segment 5 lesion and re-treatment of segment 7 lesion. Sutent was continued           11. CT scan 9/30/21: No definitive CT evidence of new or increasing metastatic disease in the chest, abdomen or pelvis.  Decrease in size of segment 5 and 7 ablation zones/cavities. Stable subcentimeter pulmonary nodules/nodular densities.  12. CT CAP at [facility] 1/12/22: New small liver lesions are suspicious for metastases. An enlarging 4 mm left upper lobe pulmonary nodule may be infectious/inflammatory or metastatic. Treatment was changed to regorafenib. Patient started on 1/29/2022   13. Progression noted on CT scan 3/23/22: "Further increased size of multiple small hypoenhancing liver lesions, in keeping with metastases. Liver protocol CT at follow-up may be helpful for improved visualization of small liver metastases. Enlarging peritoneal nodules, in keeping with carcinomatosis. Enlarging left upper lobe nodule, concerning for metastasis."  14. Patient started temodar 85 mg/m2 daily x 21 days every 28-day cycle on 4/6/2022. Cycle 2 started on 5/5/22.   15. CT C/A/P 5/31/22 showed "Enlarging peritoneal and hepatic metastases compared to 03/23/2022. Enlarging left upper lobe pulmonary nodules suspicious for metastatic disease"  16. Treatment switched to pazopanib. Patient started on 6/10/22. Started with escalating dose. Reached 800 mg daily on 6/24/22. Held for 1 week before and 3 weeks after Y90 on 8/17/22. Resumed at 600 mg on 9/8/22. Had significant fatigue when he was on 800 mg daily. Stopped votrient on 11/10/22 for NIH trial. Had progression on votrient  17. Started " "rogaratinib on trial at [facility] on 2022. On 800 mg bid. Initial CT showed response. But CT 3/21/23 showed increase in size of central pelvic mass. Patient went to ER on 23 for BRBPR. CT A/P showed "Enlarging pelvic mass which involves the rectum and sigmoid colon.  Moderate colonic stool noted proximal. There are hypodense lesions within the liver which are not definitively seen on recent CT suspicious for metastatic disease progression.  Additional partially calcified lesions are stable likely treated lesions." He was hospitalized 23-23 with obstructing pelvic metastasis and GI bleed from GIST tumor.  He underwent diverting colostomy and SP tube placement on 23.  he was discharged on 23. Also had palliative radiation to pelvic mass when he was in the hospital.   18. Discussed case with Dr Guaman. She recommends ipi/nivo. Discussed ipi/nivo with patient. Patient did not want to start ipi/nivo. But would like to try ripretinib. Started ripretinib around 23.      History of Present Illness:   Mr. Schmidt returns today for continued follow up. He went to [facility] again for hematuria. Did not need transfusion. Was told by urologist that it may be the cancer causing the bleeding. Has a lot of pelvic pain. Tried 0.25 mg of oxycodone left over from years ago which helped a little for 2-3 hours.     ECO     ROS:   See HPI, otherwise negative.      Physical Examination:   Gen: well hydrated, well developed. In pain  HEENT: normocephalic, pale conjunctivae, anicteric sclerae, EOMI.  Psych: pleasant and appropriate mood and affect  Neuro: alert and oriented x 4, no focal neuro deficit     Objective:      Laboratory Data:  Labs reviewed. Hb 8.9 on 23     Imaging Data:  CT C/A/P 8/15/22:  1. Slightly enlarging hepatic metastasis compared to CT of 2022.     2. Stable and enlarging peritoneal metastasis.     3. Stable pulmonary nodules.    CT C/A/P 10/24/2022:  1. Subcentimeter pulmonary " nodules, increased from previously, concerning for metastases.     2. Enlarging pelvic masses invading the anterior rectum and inseparable from the sigmoid colon.     3. Interval radio embolization of the right liver, with stable to slightly decreased metastases in segments 5 and 6.     4. Slightly increased metastases in segments 8 and 4, noting suboptimal evaluation due to grainy images of the liver.    CT 1/24/2023 showed  posttreatment changes of the liver. Multiple hypodense lesions similar to prior CT. Pelvic pericolonic lesions slightly smaller from 5.3 x 4.7 cm to 3.3 x 3.1 cm.    Ct 4/21/23:  Impression:     1. Enlarging pelvic mass which involves the rectum and sigmoid colon.  Moderate colonic stool noted proximal E.  2. There are hypodense lesions within the liver which are not definitively seen on recent CT suspicious for metastatic disease progression.  Additional partially calcified lesions are stable likely treated lesions.    CT 5/9/23:  Impression:     Post treatment changes for metastatic GIST.     Unchanged metastases involving the liver, sigmoid colon, and rectum.     Interval postoperative changes from descending loop colostomy.  No large bowel distension.  Dilated small bowel loops with gradual tapering distally, likely ileus in the early postoperative setting.     CT C/A/P 8/10/23:  COMPARISON:  CT abdomen and pelvis 05/09/2023, outside CT 03/21/2023     FINDINGS:  Base of neck/thoracic soft tissues: No significant abnormality.     Thoracic aorta: Left-sided aortic arch. No significant atherosclerosis or aneurysm.     Heart: Normal in size.  No pericardial effusion. Coronary artery calcifications.     Melody/Mediastinum: No pathologically enlarged lymph nodes.     Lungs/Airways: Airways are patent.  Respiratory motion limits evaluation of fine pulmonary parenchymal detail.  Multiple scattered subcentimeter solid pulmonary nodules, all of which appear slightly increased in size in comparison to  the prior study dated 03/21/2023.  For reference.  There is a 6 mm nodule in the apicoposterior segment of the left upper lobe (series 6, image 143), previously 4 mm when directly remeasured.  There is a 4 mm solid nodule in the apical segment of the right upper lobe (series 6, image 118), previously 2 mm when directly remeasured.  No definite new pulmonary nodule or mass.  No consolidation, pleural effusion or pneumothorax.  Scattered thin bandlike densities at both lung bases suggestive of subsegmental atelectasis or scarring.     Esophagus: No significant abnormality.     Liver: Redemonstration of postablation changes in hepatic segments 7 and 5. Interval detrimental change with increased number and size of multiple additional hypoattenuating hepatic metastases.  For reference, there is a new 2.1 cm lesion in hepatic segment 3 (series 3, image 58).  Interval enlargement of a 3.0 cm lesion in hepatic segment 4, previously measuring 1.0 cm when directly remeasured (series 3, image 60).  Multiple additional smaller lesions are demonstrated.     Gallbladder: Cholelithiasis.  No significant wall thickening or pericholecystic fluid.     Bile Ducts: No evidence of dilated ducts.     Pancreas: No definite mass or peripancreatic fat stranding.     Spleen: Within normal limits.     Adrenals: No significant abnormality.     Kidneys/ Ureters: Left greater than right renal cortical atrophy.  Multiple stable-appearing hypodense renal lesions, most compatible with cysts, some which are too small to characterize.   No hydronephrosis or nephrolithiasis. No ureteral dilatation.     Bladder: Urinary bladder is collapsed around a suprapubic catheter.  Chronic circumferential bladder wall thickening.     Reproductive organs: Prostate gland measures 3.3 x 4.6 cm.     GI tract/Mesentery: Small hiatal hernia.  Postsurgical changes of prior small bowel and colonic resections with left lower quadrant ostomy.  No evidence of bowel  obstruction or obvious inflammation. Redemonstration of a large, heterogeneous pelvic mass intimately associated with the rectum, measuring 9.5 x 8.2 by 10.0 cm in AP by TV by CC dimensions. No discrete fat plane with the adjacent seminal vesicles, prostate and posterior wall of the bladder.  Additional 3.1 cm soft tissue mass in the region of the sigmoid colon (series 3, image 139), previously measuring 3.5 cm.     Peritoneal Space: No ascites. No free air.     Lymph nodes: No pathologically enlarged lymph nodes.     Soft tissues: Prior ventral hernia repair with mesh.     Vasculature: Marked calcific atherosclerosis of the abdominal aorta and branch vessels.  No aortic aneurysm.     Bones:  Age-appropriate degenerative changes. No acute fracture or aggressive-appearing lytic or blastic lesion.     Impression:     1. Multiple scattered subcentimeter solid, noncalcified pulmonary nodules, all of which appear slightly increased in size in comparison to the prior study dated 03/21/2023, concerning for metastases.  2. Post ablation changes in the right hepatic lobe.  Interval increased number and size of multiple hypoattenuating hepatic metastases, as above.  3. Pelvic masses concerning for metastases, with slight interval decrease in size of 1 lesion and no significant change in size of the second larger lesion.  4. Urinary bladder collapsed around a suprapubic catheter with prominent circumferential wall thickening.  Superimposed cystitis not excluded.     CT C/A/P 8/10/23:  Impression:     1. Multiple scattered subcentimeter solid, noncalcified pulmonary nodules, all of which appear slightly increased in size in comparison to the prior study dated 03/21/2023, concerning for metastases.  2. Post ablation changes in the right hepatic lobe.  Interval increased number and size of multiple hypoattenuating hepatic metastases, as above.  3. Pelvic masses concerning for metastases, with slight interval decrease in size of 1  lesion and no significant change in size of the second larger lesion.  4. Urinary bladder collapsed around a suprapubic catheter with prominent circumferential wall thickening.  Superimposed cystitis not excluded.       Assessment and Plan:      1. 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 Metastasis to liver    4. Immunodeficiency secondary to neoplasm    5. Immunodeficiency due to drugs    6. Cancer related pain    7. Hematuria, unspecified type      1-5  - Mr. Schmidt is a 79 yo man with stage IIIB GIST of the small intestine  (T3N0Mx), 6 cm, mitotic rate 9 mitoses/5 mm2, s/p resection on 2/15/18. His GIST is negative for KIT, PDGFRA, SDH, BRAF, NF1, NF2 mutations. He had oligometastatic disease to the liver found on CT scan 5/20/19. He went to [facility] and underwent IR liver lesion biopsy and ablation on 7/23/19. Pathology showed metastatic GIST positive for DOG-1 and .   - surveillance MRI in Feb 2020 showed progressive disease with new peritoneal mets. Patient was seen by Dr Guaman. Gleevec was recommended.   - CT after 2.5 month of Gleevec showed progressive peritoneal disease.   - underwent cytoreductive surgery at [facility] on 7/10/20  - started sutent 37.5 mg daily on 8/10/20.   - s/p ablation to segment 5 lesion and re-treatment of segment 7 lesion in Dec 2020.   - CT scan 1/12/22 showed progression. Treatment was switched to regorafenib. Started on 1/29/22  - CT 3/23/22 showed progression in liver metastases, peritoneal mets and left lung nodule  - started temodar 85 mg/m2 3 weeks on, 1 week off on 4/6/22. CT on 5/31/22 showed progression  - started pazopanib on 6/10/22 with an escalating dose, reached 800 mg daily on 6/24/22  - Held for 1 week before and 3 weeks after Y90 on 8/17/22. Resumed at 600 mg on 9/8/22. Had significant fatigue when he was on 800 mg daily. Stopped votrient on 11/10/22 for NIH trial. Had progression on  "votrient  - Started rogaratinib on trial at [facility] on 11/30/2022. On 800 mg bid. Initial CT showed response. But CT 3/21/23 showed increase in size of central pelvic mass. Patient went to ER on 4/21/23 for BRBPR. CT A/P showed "Enlarging pelvic mass which involves the rectum and sigmoid colon.  Moderate colonic stool noted proximal. There are hypodense lesions within the liver which are not definitively seen on recent CT suspicious for metastatic disease progression.  Additional partially calcified lesions are stable likely treated lesions." He was hospitalized 4/24/23-5/12/23 with obstructing pelvic metastasis and GI bleed from GIST tumor.  He underwent diverting colostomy and SP tube placement on 5/4/23.  he was discharged on 5/12/23. Also had palliative radiation to pelvic mass when he was in the hospital.   - I have personally discussed her case with Dr Guaman. Dr Guaman recc ipi/nivo  - Patient does not want to take ipi/nivo. Wants to try ripretinib.   - Ripretinib started around 8/17/23  - long discussion with patient. I recommend stopping ripretinib. He has been in the hospital frequently for hematuria since he started it. It is either not controlling the cancer or contributing to the bleeding. Patient understands and agrees with the plan.   - patient asked about hospice and palliative care. Discussed if CT shows progression and he does not want to try ipi/nivo, he will need hospice. We can enroll him on hospice today, but he won't be able to have CT scan scheduled for next week. Patient wants to wait until after CT scan. Discussed palliative referral and virtual visit. Patient understands and agrees with the plan.   - messaged Dr Edmond (Dr Abdi out of office)  - see me after CT scan    6.  - prescribed oxycodone 5 mg prn    7.  - see above. Suspect progression of tumor      RTC after CT scan  Encouraged to call should issues arise      Route Chart for Scheduling    Med Onc Chart Routing  Urgent    Follow " up with physician . Keep scheduled appt. please schedule patient to see palliative   Follow up with [facility]    Infusion scheduling note    Injection scheduling note    Labs    Imaging    Pharmacy appointment    Other referrals       Additional referrals needed

## 2023-09-28 PROBLEM — Z51.5 ENCOUNTER FOR PALLIATIVE CARE: Status: ACTIVE | Noted: 2023-01-01

## 2023-09-28 PROBLEM — G89.3 CANCER RELATED PAIN: Status: ACTIVE | Noted: 2023-01-01

## 2023-09-28 NOTE — ASSESSMENT & PLAN NOTE
Patient reports he thinks his is not interested in future lines of therapy as per his previous discussions with his cancer doctors, the toxicity profile is to severe.  Discussed roll of palliative care vs hospice and that many patients chose to transition to focusing exclusively on comfort  Pt wishing to continue outpatient management until repeat CT and F/U with Dr. Umana in two weeks, then possibly enroll in hospice

## 2023-09-28 NOTE — TELEPHONE ENCOUNTER
This  informed patient interested in hospice potentially next week. This  contacted hospice [facility] [phone] with patient information (, MRN, SSN) and to contact spouse Veena [phone].    Candelario Rascon, [facility] Center[facility]ILIO  Palliative Medicine

## 2023-09-28 NOTE — PROGRESS NOTES
Palliative Medicine Clinic Note      Consult Requested By: Dr. 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             Palliative Care    Encounter for palliative care    Current Assessment & Plan     Patient reports he thinks his is not interested in future lines of therapy as per his previous discussions with his cancer doctors, the toxicity profile is to severe.  Discussed roll of palliative care vs hospice and that many patients chose to transition to focusing exclusively on comfort  Pt wishing to continue outpatient management until repeat CT and F/U with Dr. Umana in two weeks, then possibly enroll in hospice                 Advance Care Planning   Advance Directives:   Living Will: Yes        Copy on chart: Yes      Decision Making:  Patient answered questions  Goals of Care: The patient endorses that what is most important right now is to focus on spending time at home, symptom/pain control, and quality of life, even if it means sacrificing a little time    Accordingly, we have decided that the best plan to meet the patient's goals includes no further escalation in treatment and discussing enrollment with a hospice company able to service his area               Understanding of disease and Illness Trajectory: Patient  has  good understanding of his illness, they can benefit from continued  education on what to expect in the future.      30 min time was spent on advance care planning, goals of care discussion, emotional support, formulating and communicating prognosis and goals of care, exploring burden/benefit of various approaches of treatment.        Follow up: No follow-ups on file.    Plan discussed with Dr. Umana    SUBJECTIVE:     History obtained from: Patient, wife, past medical records.      complaint: No chief complaint on file.        History of Present Illness / Interval History:  Forrest Schmidt is 78 y.o. year old male presenting with GIST with metastasis now with increasing pain and hematuria due to therapy or bladder involvement.  Referred to Palliative Care for evaluation and management of physical symptoms and advance care planning,.       Mr. Schmidt reports that he has been dealing with GIST cancer for about 5 years now, and has undergone several lines of therapy over the years with Dr. Umana here at [facility], [facility] in [place], and even a trial at the [facility]. His pain has begun worsening over the past several months. At the same time, he is not unable to continue his most recent line of therapy because of hematuria. He reports concern taht he may be running out of good treatment options as the other possible future lines of therapy he has discussed with he oncologists seem to be more toxic than he thinks he is willing or able to tolerate.     With regard to his pain, he repots is primarily in his abdomen, buttock, bladder. He describes that is is worse when bending or standing, but also can be exacerbated by sitting or laying in certain positions for a long time. Oxycodone recently prescribed by Dr. Umana helps, but sometimes takes hours to kick in. He notes his pain is currently a 5, just took oxycodone an hour ago.       Review of Symptoms      Symptom Assessment (ESAS 0-10 Scale)  Pain:  8  Dyspnea:  0  Anxiety:  0  Nausea:  0  Depression:  0  Anorexia:  0  Fatigue:  0  Insomnia:   0  Restlessness:  0  Agitation:  0         Pain Assessment:    Location(s): back    Back       Location: generalized (additional pain in abdomen and buttock with similar chronicity and character)        Quality: Sharp and aching        Quantity: 8/10 in intensity        Chronicity: Onset 5 year(s) ago, rapidly worsening        Aggravating Factors: Movement        Alleviating Factors: Opiates       Associated Symptoms: None    Performance Status:  50    ECOG Performance Status rdGrdrrdarddrderd:rd rd3rd Living Arrangements:  Lives with spouse, Lives in home and Lives >50 miles from facility    Psychosocial/Cultural:   See Palliative Psychosocial Note: Yes  **Primary  to Follow**  Palliative Care  Consult: No            Disease History:  DIAGNOSIS:   1. 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 Recurrent oligametastatic GIST in the liver found on 5/20/19. S/p ablation on 7/23/19 at [facility]  3. 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 He initially presented with melena, syncope, hypotension on 2/5/18 at OSH. Colonoscopy showed old blood in the terminal ileum. CT abdomen/pelvis showed an ileal mass that measures approximately 5 cm. He was transferred to [facility] and underwent segmental small bowel resection by Dr. Sanchez on 2/15/18. Pathology showed a 6-cm GIST, histologic type: GIST, mixed; mitotic rate 9 mitoses/5 mm2, G2, high grade, high risk, margins negative. Cd117+, pathologic T3NxMx.  He had incisional wound infection after the surgery and took antibiotics for that.   2. Started Gleevec on 4/11/18. Foundation One results came back on 4/24/18 neg for KIT mutations. Long discussion with Mr and Mrs Schmidt on 4/25 regarding  "likely the lack of benefit from adjuvant Gleevec (please see note from that day for details). Mr. Schmidt would like to continue with Gleevec for now. He stopped Gleevec after he went to see Dr. Guaman at [facility]. CT abdomen/pelvis on 11/12/18 was negative for recurrent disease.   3. CT scan on 5/20/19 showed a Nonspecific hepatic hypodensity at the dome of the liver near the inferior vena cava   4. S/p liver lesion biopsy and ablation at [facility] on 7/23/19. Biopsy showed metastatic GIST positive for DOG-1 and . The amount of tumor was insufficient for molecular testing.   5. Surveillance MRI on 2/11/2020 showed "a right external iliac metastatic deposit measuring 4.6 x 3.5 cm (axial stir series 9, image 20), previously measuring 1.3 cm.  There is a metastatic deposit within the right anterior pelvis measuring 1.9 cm (series 9, image 13), previously not seen."  6. Gleevec 400 mg daily from 3/14/20 to present. CT at [facility] on 6/1/20 showed progressive disease in the peritoneal masses.   7. Underwent cytoreductive surgery at [facility] on 7/10/2020.   8. Dr Guaman recommends starting regorafenib after surgery. However, insurance does not cover it unless patient fails sutent. Patient started sutent 37.5 mg daily on 8/10/2020.   9. CT scan 10/5/20: A metastatic lesion in segment VII of the liver adjacent to the right hepatic vein and inferior vena cava remains overall stable, measuring approximately 2.1 cm (series 6 image 166). There is another lesion in segment V measuring approximately 1.1 cm (series 6 image 188), suspect for metastatic disease and not well seen on the prior study.  10. CT scan 12/7/2020: "Mild enlargement of liver metastases. A new (nonspecific) subcentimeter hypodensity in segment 6 can be followed on future exams. Mild areas of pneumatosis affecting the ileum in the lower abdomen. Recommend clinical correlation with abdominal pain and drug regimen." Patient underwent IR ablation to segment 5 " "lesion and re-treatment of segment 7 lesion. Sutent was continued           11. CT scan 9/30/21: No definitive CT evidence of new or increasing metastatic disease in the chest, abdomen or pelvis.  Decrease in size of segment 5 and 7 ablation zones/cavities. Stable subcentimeter pulmonary nodules/nodular densities.  12. CT CAP at [facility] 1/12/22: New small liver lesions are suspicious for metastases. An enlarging 4 mm left upper lobe pulmonary nodule may be infectious/inflammatory or metastatic. Treatment was changed to regorafenib. Patient started on 1/29/2022   13. Progression noted on CT scan 3/23/22: "Further increased size of multiple small hypoenhancing liver lesions, in keeping with metastases. Liver protocol CT at follow-up may be helpful for improved visualization of small liver metastases. Enlarging peritoneal nodules, in keeping with carcinomatosis. Enlarging left upper lobe nodule, concerning for metastasis."  14. Patient started temodar 85 mg/m2 daily x 21 days every 28-day cycle on 4/6/2022. Cycle 2 started on 5/5/22.   15. CT C/A/P 5/31/22 showed "Enlarging peritoneal and hepatic metastases compared to 03/23/2022. Enlarging left upper lobe pulmonary nodules suspicious for metastatic disease"  16. Treatment switched to pazopanib. Patient started on 6/10/22. Started with escalating dose. Reached 800 mg daily on 6/24/22. Held for 1 week before and 3 weeks after Y90 on 8/17/22. Resumed at 600 mg on 9/8/22. Had significant fatigue when he was on 800 mg daily. Stopped votrient on 11/10/22 for [facility] trial. Had progression on votrient  17. Started rogaratinib on trial at [facility] on 11/30/2022. On 800 mg bid. Initial CT showed response. But CT 3/21/23 showed increase in size of central pelvic mass. Patient went to ER on 4/21/23 for BRBPR. CT A/P showed "Enlarging pelvic mass which involves the rectum and sigmoid colon.  Moderate colonic stool noted proximal. There are hypodense lesions within the liver which are not " "definitively seen on recent CT suspicious for metastatic disease progression.  Additional partially calcified lesions are stable likely treated lesions." He was hospitalized 4/24/23-5/12/23 with obstructing pelvic metastasis and GI bleed from GIST tumor.  He underwent diverting colostomy and SP tube placement on 5/4/23.  he was discharged on 5/12/23. Also had palliative radiation to pelvic mass when he was in the hospital.   18. Discussed case with Dr Guaman. She recommends ipi/nivo. Discussed ipi/nivo with patient. Patient did not want to start ipi/nivo. But would like to try ripretinib. Started ripretinib around 8/17/23.   19: Repretinib discontinued due to hematuria 9/27    Previous experience or exposure to a serious illness: Yes        Medications:    Current Outpatient Medications:     acetaminophen (TYLENOL ORAL), Take 500 mg by mouth 3 (three) times daily., Disp: , Rfl:     aspirin (ECOTRIN) 81 MG EC tablet, Take 81 mg by mouth every evening., Disp: , Rfl:     atorvastatin (LIPITOR) 40 MG tablet, Take 1 tablet (40 mg total) by mouth once daily. (Patient taking differently: Take 40 mg by mouth every evening.), Disp: 90 tablet, Rfl: 3    calcium carbonate (TUMS) 200 mg calcium (500 mg) chewable tablet, Take 500 mg by mouth 3 (three) times daily as needed for Heartburn., Disp: , Rfl:     cetirizine (ZYRTEC) 5 MG tablet, Take 5 mg by mouth every evening., Disp: , Rfl:     cholecalciferol, vitamin D3, (VITAMIN D3) 50 mcg (2,000 unit) Cap, Take 1 capsule by mouth once daily. morning, Disp: , Rfl:     cyanocobalamin (VITAMIN B-12) 1000 MCG tablet, Take 1,000 mcg by mouth once daily., Disp: , Rfl:     docusate sodium (COLACE) 100 MG capsule, Take 100 mg by mouth once daily., Disp: , Rfl:     duke's soln (benadryl 30 mL, mylanta 30 mL, LIDOcaine 30 mL, nystatin 30 mL) 120mL, Take 10 mLs by mouth 4 (four) times daily., Disp: 120 mL, Rfl: 2    ferrous gluconate (FERGON) 324 MG tablet, Take 324 mg by mouth 2 (two) " times daily with meals., Disp: , Rfl:     fish oil-omega-3 fatty acids 300-1,000 mg capsule, Take 1 g by mouth once daily., Disp: , Rfl:     fluticasone (FLONASE) 50 mcg/actuation nasal spray, 1 spray by Each Nare route 2 (two) times daily., Disp: , Rfl:     guaiFENesin 1,200 mg Ta12, Take 1,200 mg by mouth 2 (two) times a day., Disp: , Rfl:     Lactobacillus rhamnosus GG (CULTURELLE) 10 billion cell capsule, Take 1 capsule by mouth once daily., Disp: 30 capsule, Rfl: 0    losartan (COZAAR) 100 MG tablet, Take 1 tablet (100 mg total) by mouth once daily., Disp: 90 tablet, Rfl: 3    magnesium 250 mg Tab, Take 250 mg by mouth once daily. morning, Disp: , Rfl:     metoprolol succinate (TOPROL-XL) 100 MG 24 hr tablet, Take 1 tablet (100 mg total) by mouth once daily., Disp: 30 tablet, Rfl: 11    multivitamin (THERAGRAN) per tablet, Take 1 tablet by mouth once daily. morning, Disp: , Rfl:     NIFEdipine (PROCARDIA-XL) 60 MG (OSM) 24 hr tablet, Take 1 tablet (60 mg total) by mouth once daily., Disp: 30 tablet, Rfl: 11    nitroGLYCERIN (NITROSTAT) 0.4 MG SL tablet, Place 1 tablet (0.4 mg total) under the tongue every 5 (five) minutes as needed for Chest pain., Disp: 30 tablet, Rfl: 3    oxybutynin (DITROPAN) 5 MG Tab, Take 1 tablet (5 mg total) by mouth 3 (three) times daily. for 14 days, Disp: 42 tablet, Rfl: 1    oxyCODONE (ROXICODONE) 5 MG immediate release tablet, Take 1 tablet (5 mg total) by mouth every 4 (four) hours as needed for Pain., Disp: 120 tablet, Rfl: 0    pantoprazole (PROTONIX) 40 MG tablet, Take 1 tablet (40 mg total) by mouth 2 (two) times daily., Disp: 60 tablet, Rfl: 1    ripretinib 50 mg Tab, Take 3 tablets (150 mg total) by mouth once daily., Disp: 90 tablet, Rfl: 11    sodium chloride 5% () 5 % ophthalmic solution, Place 1 drop into the right eye 3 (three) times daily., Disp: , Rfl:     tadalafil (CIALIS) 5 MG tablet, Take 5 mg by mouth once daily at 6am., Disp: , Rfl:     testosterone  cypionate (DEPOTESTOTERONE CYPIONATE) 200 mg/mL injection, Inject 200 mg into the muscle Every Friday., Disp: , Rfl:     vit A/vit C/vit E/zinc/copper (ICAPS AREDS ORAL), Take 1 tablet by mouth 2 (two) times a day., Disp: , Rfl:       External  database queried on 09/28/2023  by Paddy Ness .   The results reviewed and considered with the clinical data in the decision whether or not to prescribe a controlled substance.       Past Medical History:   Diagnosis Date    Anticoagulant long-term use     Arthritis     BPH (benign prostatic hyperplasia)     Coronary artery disease     stent x1 2005    Hypercholesteremia     Hypertension     Low iron     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 2/15/2018    Osteopenia      Past Surgical History:   Procedure Laterality Date    APPENDECTOMY      COLONOSCOPY N/A 5/15/2017    Procedure: COLONOSCOPY;  Surgeon: Dez Jimenez MD;  Location: [facility];  Service: Endoscopy;  Laterality: N/A;    CORONARY STENT PLACEMENT      CREATION, COLOSTOMY, LAPAROSCOPIC N/A 5/4/2023    Procedure: CREATION, COLOSTOMY, LAPAROSCOPIC - CONVERTED TO OPEN;  Surgeon: Surinder Shaw MD;  Location: [address];  Service: General;  Laterality: N/A;    CYSTOSCOPY N/A 5/4/2023    Procedure: CYSTOSCOPY;  Surgeon: Kalani Dickson MD;  Location: [address];  Service: Urology;  Laterality: N/A;    ESOPHAGOGASTRODUODENOSCOPY      ESOPHAGOGASTRODUODENOSCOPY N/A 8/29/2023    Procedure: EGD (ESOPHAGOGASTRODUODENOSCOPY);  Surgeon: Price Dick MD;  Location: [facility];  Service: Endoscopy;  Laterality: N/A;    EYE SURGERY      cataract extraction    INSERTION, SUPRAPUBIC CATHETER N/A 5/4/2023    Procedure: INSERTION, OPEN SUPRAPUBIC CATHETER;  Surgeon: Kalani Dickson MD;  Location: [address];  Service: Urology;  Laterality: N/A;    SKIN BIOPSY      TONSILLECTOMY      VASECTOMY       Family History   Problem Relation Age of Onset    Breast cancer  "Mother     Diabetes Mother     Cancer Mother     Stroke Mother     Stroke Father     Cancer Maternal Grandmother     Heart attack Paternal Grandfather      Review of patient's allergies indicates:   Allergen Reactions    Augmentin [amoxicillin-pot clavulanate] Shortness Of Breath     disoriented    Fexofenadine Other (See Comments)     Pt states he can't remember the details but it was a bad effect.    Singulair [montelukast] Other (See Comments)     Pt "felt strange"  Pt "felt strange" bloating, Intestinal irritation, abd cramping      Ace inhibitors Other (See Comments)     cough    Captopril     Doxycycline Nausea And Vomiting     headache    Horse/equine containing products Hives    Hydrocodone Nausea Only    Scopolamine hbr Other (See Comments)     Nausea, headache, changes in BP        OBJECTIVE:       Physical Exam:  Vitals:    Physical Exam  Unable to perform full physical exam due to telemedicine visit.  Limited exam:  Gen: NAD; pleasant and engaged conversation; grimacing during our visit with visible discomfort  Non diaphoretic  Speech normal  No increased work of breathing  No cyanosis  Able to walk around living room with walker with some discomfort     Labs:  CBC:   WBC   Date Value Ref Range Status   09/22/2023 14.20 (H) 3.90 - 12.70 K/uL Final       Hemoglobin   Date Value Ref Range Status   09/22/2023 9.1 (L) 14.0 - 18.0 g/dL Final       Hematocrit   Date Value Ref Range Status   09/22/2023 30.1 (L) 40.0 - 54.0 % Final       MCV   Date Value Ref Range Status   09/22/2023 83 82 - 98 fL Final       Platelets   Date Value Ref Range Status   09/22/2023 428 150 - 450 K/uL Final       Lab Results   Component Value Date    CREATININE 0.98 09/25/2023    BUN 26 (H) 09/25/2023     (L) 09/25/2023    K 4.5 09/25/2023     09/22/2023    CO2 22 09/25/2023        LFT:   Lab Results   Component Value Date    AST 29 09/25/2023     (H) 04/24/2023    ALKPHOS 276 (H) 09/25/2023    BILITOT 0.5 " 09/25/2023       Albumin:   Albumin   Date Value Ref Range Status   09/25/2023 3.6 3.5 - 4.8 g/dL Final   09/22/2023 2.5 (L) 3.5 - 5.2 g/dL Final     Protein:   Total Protein   Date Value Ref Range Status   09/25/2023 7.0 6.1 - 7.9 g/dL Final   09/22/2023 7.0 6.0 - 8.4 g/dL Final           I spent a total of 60 minutes on the day of the visit. This includes face to face time in discussion of goals of care, symptom assessment, coordination of care and emotional support.  This also includes non-face to face time preparing to see the patient (eg, review of tests/imaging), obtaining and/or reviewing separately obtained history, documenting clinical information in the electronic or other health record, independently interpreting results and communicating results to the patient/family/caregiver, or care coordinator.     20 minutes spent in discussing ACP seperately from above    This note was partially created using voice recognition software. Typographical and content errors may occur with this process. While efforts are made to detect and correct such errors, in some cases errors will persist. For this reason, wording in this document should be considered in the proper context and not strictly verbatim.      Signature: Paddy Ness,

## 2023-09-28 NOTE — ASSESSMENT & PLAN NOTE
Pt recently swapped from Tramadol 50mg to Oxycodone 5mg q6h PRN though taking as 2.5mg PRN  Given patient concerns on taking least effective dose, discussed continuing to taking 2.5mg PRN, but if not improved with this dose taking additional 2.5mg oxycodone.

## 2023-09-29 NOTE — PROGRESS NOTES
RENEE received urgent consult from Rena Sharma. [facility] is requesting orders be sent to admit the patient. RENEE spoke to ED at [facility]. She explained that the RN was at bedside, symptom management was needed urgently and that family was on board with [facility].    RENEE spoke to patient's wife who confirmed request for [facility]. Wife request RENEE thank Dr. Umana on her behalf. RENEE sent in basket message requesting Dr. Umana reach out to patient's wife when possible.    RENEE faxed orders, face sheet, progress note, and H&P to [phone]. Fax confirmed to have gone through. [facility] confirmed receipt as well.

## 2023-09-29 NOTE — TELEPHONE ENCOUNTER
"----- Message from Robert Umana MD sent at 9/29/2023  2:17 PM CDT -----  Regarding: RE: hospice  I called and spoke with her. thanks  ----- Message -----  From: Jordi Gaytan LCSW  Sent: 9/29/2023   2:08 PM CDT  To: Rena Sharma RN; Robert Umana MD  Subject: RE: hospice                                      RN is currently there, they just needed orders. I just faxed them. Dr. Umana, when I spoke to the patient's wife 15 min ago she wanted me to thank you on her behalf, I think she and family would benefit from a call from you, please reach out to her when you can (you may have just spoken to them). Thank you  ----- Message -----  From: Robert Umana MD  Sent: 9/29/2023   2:00 PM CDT  To: Rena Sharma RN; #  Subject: RE: hospice                                      Order is in. Thanks Jordi. Can they go now? Patient is miserable  ----- Message -----  From: Jordi Gaytan LCSW  Sent: 9/29/2023   1:50 PM CDT  To: Rena Sharma RN; Robert Umana MD  Subject: RE: hospice                                      Family confirmed and [facility] can take a verbal order, [phone]. The RN is there and says it's urgent. Please let me know about the order. Thanks  ----- Message -----  From: Rena Sharma RN  Sent: 9/29/2023   1:37 PM CDT  To: Robert Umana MD; Jordi Gaytan LCSW  Subject: hospice                                            ----- Message -----  From: Narinder Martin  Sent: 9/29/2023   1:20 PM CDT  To: Dong Jones Staff    Consult/Advisory:          Name Of Caller: Melanie ([facility])      Contact Preference?: [phone]      Fax  [phone]      Provider Name:  Dong      Does patient feel the need to be seen today? No      What is the nature of the call?: Requesting verbal orders for hospice, HMP, and a facesheet        Additional Notes: Stating the matter is urgent and that pt isn't doing well at all    "Thank you for all that you do for our " "patients"                "

## 2023-10-02 NOTE — PROGRESS NOTES
Care Companion Intervention    Reason for intervention: Questionnaire response  Comment:  patient feels need to be seen today    Intervention: Other intervention (comment)  Comment:  patient to have virtual visit with Dr. Umana this afternoon

## 2023-10-12 ENCOUNTER — DOCUMENT SCAN (OUTPATIENT)
Dept: HOME HEALTH SERVICES | Facility: HOSPITAL | Age: 78
End: 2023-10-12
Payer: MEDICARE

## 2023-12-06 ENCOUNTER — DOCUMENT SCAN (OUTPATIENT)
Dept: HOME HEALTH SERVICES | Facility: HOSPITAL | Age: 78
End: 2023-12-06
Payer: MEDICARE

## 2024-05-01 ENCOUNTER — DOCUMENT SCAN (OUTPATIENT)
Dept: HOME HEALTH SERVICES | Facility: HOSPITAL | Age: 79
End: 2024-05-01
Payer: MEDICARE

## 2024-10-11 NOTE — TELEPHONE ENCOUNTER
Outgoing call to pt regarding stivarga refill. Spoke with pt and he stated MD has taken him off of stivarga and switching to new med. Looks like pt maybe switching to Temodar. Transferring call to assigned amadeo araujo.   99

## 2025-07-28 NOTE — PLAN OF CARE
Problem: Adult Inpatient Plan of Care  Goal: Plan of Care Review  Outcome: Ongoing, Progressing  Goal: Patient-Specific Goal (Individualized)  Outcome: Ongoing, Progressing  Goal: Absence of Hospital-Acquired Illness or Injury  Outcome: Ongoing, Progressing  Goal: Optimal Comfort and Wellbeing  Outcome: Ongoing, Progressing  Goal: Readiness for Transition of Care  Outcome: Ongoing, Progressing     Problem: Infection  Goal: Absence of Infection Signs and Symptoms  Outcome: Ongoing, Progressing     Problem: Fall Injury Risk  Goal: Absence of Fall and Fall-Related Injury  Outcome: Ongoing, Progressing     Problem: Coping Ineffective (Oncology Care)  Goal: Effective Coping  Outcome: Ongoing, Progressing     Problem: Fatigue (Oncology Care)  Goal: Improved Activity Tolerance  Outcome: Ongoing, Progressing     Problem: Oral Intake Altered (Oncology Care)  Goal: Optimal Oral Intake  Outcome: Ongoing, Progressing     Problem: Oral Mucositis (Oncology Care)  Goal: Improved Oral Mucous Membrane Integrity  Outcome: Ongoing, Progressing     Problem: Pain Acute (Oncology Care)  Goal: Optimal Pain Control  Outcome: Ongoing, Progressing     Problem: Pain Acute  Goal: Acceptable Pain Control and Functional Ability  Outcome: Ongoing, Progressing     Problem: Urinary Retention  Goal: Effective Urinary Elimination  Outcome: Ongoing, Progressing     Problem: Skin Injury Risk Increased  Goal: Skin Health and Integrity  Outcome: Ongoing, Progressing      Chief Complaint   Patient presents with    Follow Up     RETURN LAAO     Vitals were taken and medications reconciled.    Nikolai Stevens, EMT  10:10 AM

## 2025-08-01 NOTE — PROGRESS NOTES
The patient location is: home  The chief complaint leading to consultation is: f/u for GIST    Visit type: audiovisual    Face to Face time with patient: 25 min  50 minutes of total time spent on the encounter, which includes face to face time and non-face to face time preparing to see the patient (eg, review of tests), Obtaining and/or reviewing separately obtained history, Documenting clinical information in the electronic or other health record, Independently interpreting results (not separately reported) and communicating results to the patient/family/caregiver, or Care coordination (not separately reported).         Each patient to whom he or she provides medical services by telemedicine is:  (1) informed of the relationship between the physician and patient and the respective role of any other health care provider with respect to management of the patient; and (2) notified that he or she may decline to receive medical services by telemedicine and may withdraw from such care at any time.    Notes:                       Patient ID: Forrest Schmidt is a 78 y.o. male.     Chief Complaint:  Follow up for GIST, wild-type     DIAGNOSIS:   1. 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 Recurrent oligametastatic GIST in the liver found on 5/20/19. S/p ablation on 7/23/19 at [facility]  3. 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 He initially presented with melena, syncope, hypotension on 2/5/18 at OSH. Colonoscopy showed old blood in the terminal ileum. CT abdomen/pelvis showed an ileal mass that measures approximately 5 cm. He was transferred to [facility] and underwent segmental small bowel resection by Dr. Sanchez on 2/15/18. Pathology showed a  Lets get records from     [place] [facility] and [facility] eye exam   "6-cm GIST, histologic type: GIST, mixed; mitotic rate 9 mitoses/5 mm2, G2, high grade, high risk, margins negative. Cd117+, pathologic T3NxMx.  He had incisional wound infection after the surgery and took antibiotics for that.   2. Started Gleevec on 4/11/18. Foundation One results came back on 4/24/18 neg for KIT mutations. Long discussion with Mr and Mrs Schmidt on 4/25 regarding likely the lack of benefit from adjuvant Gleevec (please see note from that day for details). Mr. Schmidt would like to continue with Gleevec for now. He stopped Gleevec after he went to see Dr. Guaman at [facility]. CT abdomen/pelvis on 11/12/18 was negative for recurrent disease.   3. CT scan on 5/20/19 showed a Nonspecific hepatic hypodensity at the dome of the liver near the inferior vena cava   4. S/p liver lesion biopsy and ablation at [facility] on 7/23/19. Biopsy showed metastatic GIST positive for DOG-1 and . The amount of tumor was insufficient for molecular testing.   5. Surveillance MRI on 2/11/2020 showed "a right external iliac metastatic deposit measuring 4.6 x 3.5 cm (axial stir series 9, image 20), previously measuring 1.3 cm.  There is a metastatic deposit within the right anterior pelvis measuring 1.9 cm (series 9, image 13), previously not seen."  6. Gleevec 400 mg daily from 3/14/20 to present. CT at [facility] on 6/1/20 showed progressive disease in the peritoneal masses.   7. Underwent cytoreductive surgery at [facility] on 7/10/2020.   8. Dr Guaman recommends starting regorafenib after surgery. However, insurance does not cover it unless patient fails sutent. Patient started sutent 37.5 mg daily on 8/10/2020.   9. CT scan 10/5/20: A metastatic lesion in segment VII of the liver adjacent to the right hepatic vein and inferior vena cava remains overall stable, measuring approximately 2.1 cm (series 6 image 166). There is another lesion in segment V measuring approximately 1.1 cm (series 6 image 188), suspect for metastatic " "disease and not well seen on the prior study.  10. CT scan 12/7/2020: "Mild enlargement of liver metastases. A new (nonspecific) subcentimeter hypodensity in segment 6 can be followed on future exams. Mild areas of pneumatosis affecting the ileum in the lower abdomen. Recommend clinical correlation with abdominal pain and drug regimen." Patient underwent IR ablation to segment 5 lesion and re-treatment of segment 7 lesion. Sutent was continued           11. CT scan 9/30/21: No definitive CT evidence of new or increasing metastatic disease in the chest, abdomen or pelvis.  Decrease in size of segment 5 and 7 ablation zones/cavities. Stable subcentimeter pulmonary nodules/nodular densities.  12. CT CAP at [facility] 1/12/22: New small liver lesions are suspicious for metastases. An enlarging 4 mm left upper lobe pulmonary nodule may be infectious/inflammatory or metastatic. Treatment was changed to regorafenib. Patient started on 1/29/2022   13. Progression noted on CT scan 3/23/22: "Further increased size of multiple small hypoenhancing liver lesions, in keeping with metastases. Liver protocol CT at follow-up may be helpful for improved visualization of small liver metastases. Enlarging peritoneal nodules, in keeping with carcinomatosis. Enlarging left upper lobe nodule, concerning for metastasis."  14. Patient started temodar 85 mg/m2 daily x 21 days every 28-day cycle on 4/6/2022. Cycle 2 started on 5/5/22.   15. CT C/A/P 5/31/22 showed "Enlarging peritoneal and hepatic metastases compared to 03/23/2022. Enlarging left upper lobe pulmonary nodules suspicious for metastatic disease"  16. Treatment switched to pazopanib. Patient started on 6/10/22. Started with escalating dose. Reached 800 mg daily on 6/24/22. Held for 1 week before and 3 weeks after Y90 on 8/17/22. Resumed at 600 mg on 9/8/22. Had significant fatigue when he was on 800 mg daily. Stopped votrient on 11/10/22 for [facility] trial. Had progression on votrient  17. " "Started rogaratinib on trial at [facility] on 2022. On 800 mg bid. Initial CT showed response. But CT 3/21/23 showed increase in size of central pelvic mass. Patient went to ER on 23 for BRBPR. CT A/P showed "Enlarging pelvic mass which involves the rectum and sigmoid colon.  Moderate colonic stool noted proximal. There are hypodense lesions within the liver which are not definitively seen on recent CT suspicious for metastatic disease progression.  Additional partially calcified lesions are stable likely treated lesions." He was hospitalized 23-23 with obstructing pelvic metastasis and GI bleed from GIST tumor.  He underwent diverting colostomy and SP tube placement on 23.  he was discharged on 23. Also had palliative radiation to pelvic mass when he was in the hospital.      History of Present Illness:   Mr. Schmidt returns today for continued follow up. Still a little weak but better since discharged. Only had blood through rectum on on day. Formed stool in ostomy bag.     ECO     ROS:   See HPI, otherwise negative.      Physical Examination:   Gen: well hydrated, well developed. In no acute distress  HEENT: normocephalic, anicteric sclerae, EOMI.   Psych: pleasant and appropriate mood and affect  Neuro: alert and oriented x 4     Objective:      Laboratory Data:  Labs reviewed. Last Hb 9     Imaging Data:  CT C/A/P 8/15/22:  1. Slightly enlarging hepatic metastasis compared to CT of 2022.     2. Stable and enlarging peritoneal metastasis.     3. Stable pulmonary nodules.    CT C/A/P 10/24/2022:  1. Subcentimeter pulmonary nodules, increased from previously, concerning for metastases.     2. Enlarging pelvic masses invading the anterior rectum and inseparable from the sigmoid colon.     3. Interval radio embolization of the right liver, with stable to slightly decreased metastases in segments 5 and 6.     4. Slightly increased metastases in segments 8 and 4, noting suboptimal " evaluation due to grainy images of the liver.    CT 1/24/2023 showed  posttreatment changes of the liver. Multiple hypodense lesions similar to prior CT. Pelvic pericolonic lesions slightly smaller from 5.3 x 4.7 cm to 3.3 x 3.1 cm.    Ct 4/21/23:  Impression:     1. Enlarging pelvic mass which involves the rectum and sigmoid colon.  Moderate colonic stool noted proximal E.  2. There are hypodense lesions within the liver which are not definitively seen on recent CT suspicious for metastatic disease progression.  Additional partially calcified lesions are stable likely treated lesions.    CT 5/9/23:  Impression:     Post treatment changes for metastatic GIST.     Unchanged metastases involving the liver, sigmoid colon, and rectum.     Interval postoperative changes from descending loop colostomy.  No large bowel distension.  Dilated small bowel loops with gradual tapering distally, likely ileus in the early postoperative setting.       Assessment and Plan:      1. 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 Metastasis to liver    3. 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 Immunodeficiency secondary to neoplasm    5. Essential hypertension    6. Coronary artery disease involving native coronary artery of native heart without angina pectoris    7. Anemia due to chronic blood loss        1-4  - Mr. 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 His GIST is negative for KIT, PDGFRA, SDH, BRAF, NF1, NF2 mutations. He had oligometastatic disease to the liver found on CT scan 5/20/19. He went to [facility] and underwent IR liver lesion biopsy and ablation on 7/23/19. Pathology showed metastatic GIST positive for DOG-1 and .   - surveillance MRI in Feb 2020 showed progressive disease with new peritoneal mets. Patient was seen by Dr Guaman. Gleevec was recommended.   - CT after 2.5 month of Gleevec showed  "progressive peritoneal disease.   - underwent cytoreductive surgery at [facility] on 7/10/20  - started sutent 37.5 mg daily on 8/10/20.   - s/p ablation to segment 5 lesion and re-treatment of segment 7 lesion in Dec 2020.   - CT scan 1/12/22 showed progression. Treatment was switched to regorafenib. Started on 1/29/22  - CT 3/23/22 showed progression in liver metastases, peritoneal mets and left lung nodule  - started temodar 85 mg/m2 3 weeks on, 1 week off on 4/6/22. CT on 5/31/22 showed progression  - started pazopanib on 6/10/22 with an escalating dose, reached 800 mg daily on 6/24/22  - Held for 1 week before and 3 weeks after Y90 on 8/17/22. Resumed at 600 mg on 9/8/22. Had significant fatigue when he was on 800 mg daily. Stopped votrient on 11/10/22 for [facility] trial. Had progression on votrient  - Started rogaratinib on trial at [facility] on 11/30/2022. On 800 mg bid. Initial CT showed response. But CT 3/21/23 showed increase in size of central pelvic mass. Patient went to ER on 4/21/23 for BRBPR. CT A/P showed "Enlarging pelvic mass which involves the rectum and sigmoid colon.  Moderate colonic stool noted proximal. There are hypodense lesions within the liver which are not definitively seen on recent CT suspicious for metastatic disease progression.  Additional partially calcified lesions are stable likely treated lesions." He was hospitalized 4/24/23-5/12/23 with obstructing pelvic metastasis and GI bleed from GIST tumor.  He underwent diverting colostomy and SP tube placement on 5/4/23.  he was discharged on 5/12/23. Also had palliative radiation to pelvic mass when he was in the hospital.   - I have personally discussed her case with Dr Guaman. Dr Guaman feels ipi/nivo is the only option if he is a candidate   - today we had a long discussion re ipi/nivo. Reviewed regimen and toxicities in detail. Handout posted to patient portal. Patient will think about it  -  virtual visit next week to see what he decides "     5.  - BP controlled  - c/w current medication    6.  - stable  - c/w current meds    7.  - last Hb 9.   - discussed OTC iron tabs with stool softener    RTC in 1 week  Encouraged to call should issues arise      Route Chart for Scheduling    Med Onc Chart Routing  Urgent    Follow up with physician . CBC, CMP, ferritin, iron at local lab on 5/30. virtual with me at 11:30 on 5/31   Follow up with [facility]    Infusion scheduling note    Injection scheduling note    Labs    Imaging    Pharmacy appointment    Other referrals           Therapy Plan Information  IV Fluids  sodium chloride 0.9% bolus 1,000 mL 1,000 mL  1,000 mL, Intravenous, Every visit  Flushes  sodium chloride 0.9% flush 10 mL  10 mL, Intravenous, PRN  heparin, porcine (PF) 100 unit/mL injection flush 500 Units  500 Units, Intravenous, PRN

## (undated) DEVICE — TROCAR KII BLLN 12MM 10CM

## (undated) DEVICE — Device

## (undated) DEVICE — SUT SILK 3-0 SH 18IN BLACK

## (undated) DEVICE — RELOAD PROXIMATE CUT BLUE 55MM

## (undated) DEVICE — STRIP MEDI WND CLSR 1X5IN

## (undated) DEVICE — ELECTRODE REM PLYHSV RETURN 9

## (undated) DEVICE — SOL IRR NACL .9% 3000ML

## (undated) DEVICE — DRAPE LAP T SHT W/ INSTR PAD

## (undated) DEVICE — BLADE SURG CARBON STEEL SZ11

## (undated) DEVICE — NDL 18GA X1 1/2 REG BEVEL

## (undated) DEVICE — SUT 4/0 27IN PDS II VIO MON

## (undated) DEVICE — SUT SILK 2-0 SH 18IN BLACK

## (undated) DEVICE — SUT 3-0 12-18IN SILK

## (undated) DEVICE — SUT VICRYL PLUS 3-0 SH 18IN

## (undated) DEVICE — SUT 1 48IN PDS II VIO MONO

## (undated) DEVICE — SPONGE GAUZE 16PLY 4X4

## (undated) DEVICE — BLADE 4 INCH EDGE UN-INS

## (undated) DEVICE — SCISSOR 5MMX35CM DIRECT DRIVE

## (undated) DEVICE — PAD K-THERMIA 24IN X 60IN

## (undated) DEVICE — TOWEL OR DISP STRL BLUE 4/PK

## (undated) DEVICE — SYR 30CC LUER LOCK

## (undated) DEVICE — DRAPE INCISE IOBAN 2 23X17IN

## (undated) DEVICE — ADHESIVE DERMABOND ADVANCED

## (undated) DEVICE — TROCAR ENDOPATH XCEL 5MM 7.5CM

## (undated) DEVICE — TUBE SUCTION YANKAUER

## (undated) DEVICE — SEE MEDLINE ITEM 157144

## (undated) DEVICE — TUBING HF INSUFFLATION W/ FLTR

## (undated) DEVICE — SET DECANTER MEDICHOICE

## (undated) DEVICE — CATH URETHRAL 16FR RED

## (undated) DEVICE — SUT SILK 3-0 STRANDS 30IN

## (undated) DEVICE — SUT MONOCRYL 4-0 PS-1 UND

## (undated) DEVICE — LOOP VESSEL BLUE MAXI

## (undated) DEVICE — SUT PROLENE 3-0 SH DA 36 BL

## (undated) DEVICE — APPLICATOR CHLORAPREP ORN 26ML

## (undated) DEVICE — DRESSING ADH ISLAND 3.6 X 14

## (undated) DEVICE — SUT SILK 2-0 STRANDS 30IN

## (undated) DEVICE — TAPE MEDIPORE 4IN X 2YDS

## (undated) DEVICE — SEALER LIGASURE IMPACT 18CM

## (undated) DEVICE — KIT ANTIFOG W/SPONG & FLUID

## (undated) DEVICE — SUT 2-0 12-18IN SILK

## (undated) DEVICE — NDL 20GX1-1/2IN IB

## (undated) DEVICE — CUTTER PROXIMATE BLUE 55MM

## (undated) DEVICE — SUT MCRYL PLUS 4-0 PS2 27IN

## (undated) DEVICE — TRAY MINOR GEN SURG OMC

## (undated) DEVICE — STAPLER SKIN PROXIMATE WIDE

## (undated) DEVICE — CONTAINER SPECIMEN STRL 4OZ

## (undated) DEVICE — DRAPE ABDOMINAL TIBURON 14X11

## (undated) DEVICE — GOWN SURGICAL X-LARGE

## (undated) DEVICE — TRAY CATH FOL SIL URIMTR 16FR

## (undated) DEVICE — SEE MEDLINE ITEM 156902

## (undated) DEVICE — TRAY FOLEY 16FR INFECTION CONT

## (undated) DEVICE — GOWN POLY REINF BRTH SLV XL

## (undated) DEVICE — SET IRR URLGY 2LINE UNIV SPIKE

## (undated) DEVICE — SEE MEDLINE ITEM 157128

## (undated) DEVICE — IRRIGATOR ENDOSCOPY DISP.

## (undated) DEVICE — SEE MEDLINE ITEM 152622

## (undated) DEVICE — KIT GELPORT LAPAROSCOPIC ABD